# Patient Record
Sex: MALE | Race: WHITE | NOT HISPANIC OR LATINO
[De-identification: names, ages, dates, MRNs, and addresses within clinical notes are randomized per-mention and may not be internally consistent; named-entity substitution may affect disease eponyms.]

---

## 2017-01-01 ENCOUNTER — NON-APPOINTMENT (OUTPATIENT)
Age: 62
End: 2017-01-01

## 2017-01-05 ENCOUNTER — RESULT REVIEW (OUTPATIENT)
Age: 62
End: 2017-01-05

## 2017-02-03 ENCOUNTER — MEDICATION RENEWAL (OUTPATIENT)
Age: 62
End: 2017-02-03

## 2017-02-14 ENCOUNTER — MEDICATION RENEWAL (OUTPATIENT)
Age: 62
End: 2017-02-14

## 2017-03-07 ENCOUNTER — MEDICATION RENEWAL (OUTPATIENT)
Age: 62
End: 2017-03-07

## 2017-03-22 ENCOUNTER — RX RENEWAL (OUTPATIENT)
Age: 62
End: 2017-03-22

## 2017-05-01 ENCOUNTER — RX RENEWAL (OUTPATIENT)
Age: 62
End: 2017-05-01

## 2017-05-09 ENCOUNTER — RX RENEWAL (OUTPATIENT)
Age: 62
End: 2017-05-09

## 2017-05-12 ENCOUNTER — RX RENEWAL (OUTPATIENT)
Age: 62
End: 2017-05-12

## 2017-05-18 ENCOUNTER — MEDICATION RENEWAL (OUTPATIENT)
Age: 62
End: 2017-05-18

## 2017-05-22 ENCOUNTER — RX RENEWAL (OUTPATIENT)
Age: 62
End: 2017-05-22

## 2017-05-23 ENCOUNTER — MEDICATION RENEWAL (OUTPATIENT)
Age: 62
End: 2017-05-23

## 2017-05-23 ENCOUNTER — RX RENEWAL (OUTPATIENT)
Age: 62
End: 2017-05-23

## 2017-05-30 ENCOUNTER — RX RENEWAL (OUTPATIENT)
Age: 62
End: 2017-05-30

## 2017-06-08 ENCOUNTER — APPOINTMENT (OUTPATIENT)
Dept: CARDIOLOGY | Facility: CLINIC | Age: 62
End: 2017-06-08

## 2017-06-12 ENCOUNTER — RX RENEWAL (OUTPATIENT)
Age: 62
End: 2017-06-12

## 2017-06-15 ENCOUNTER — APPOINTMENT (OUTPATIENT)
Dept: CARDIOLOGY | Facility: CLINIC | Age: 62
End: 2017-06-15

## 2017-08-01 ENCOUNTER — RX RENEWAL (OUTPATIENT)
Age: 62
End: 2017-08-01

## 2017-08-02 ENCOUNTER — RX RENEWAL (OUTPATIENT)
Age: 62
End: 2017-08-02

## 2017-08-03 ENCOUNTER — RX RENEWAL (OUTPATIENT)
Age: 62
End: 2017-08-03

## 2017-08-04 ENCOUNTER — RX RENEWAL (OUTPATIENT)
Age: 62
End: 2017-08-04

## 2017-08-08 ENCOUNTER — NON-APPOINTMENT (OUTPATIENT)
Age: 62
End: 2017-08-08

## 2017-08-08 ENCOUNTER — APPOINTMENT (OUTPATIENT)
Dept: CARDIOLOGY | Facility: CLINIC | Age: 62
End: 2017-08-08
Payer: COMMERCIAL

## 2017-08-08 VITALS
BODY MASS INDEX: 26.74 KG/M2 | SYSTOLIC BLOOD PRESSURE: 133 MMHG | HEART RATE: 57 BPM | HEIGHT: 71 IN | WEIGHT: 191 LBS | DIASTOLIC BLOOD PRESSURE: 75 MMHG | OXYGEN SATURATION: 98 %

## 2017-08-08 DIAGNOSIS — Z92.29 PERSONAL HISTORY OF OTHER DRUG THERAPY: ICD-10-CM

## 2017-08-08 PROCEDURE — 93000 ELECTROCARDIOGRAM COMPLETE: CPT

## 2017-08-08 PROCEDURE — 36415 COLL VENOUS BLD VENIPUNCTURE: CPT

## 2017-08-08 PROCEDURE — 99215 OFFICE O/P EST HI 40 MIN: CPT | Mod: 25

## 2017-08-09 LAB
ALBUMIN SERPL ELPH-MCNC: 4.7 G/DL
ALP BLD-CCNC: 60 U/L
ALT SERPL-CCNC: 22 U/L
ANION GAP SERPL CALC-SCNC: 18 MMOL/L
AST SERPL-CCNC: 14 U/L
BILIRUB SERPL-MCNC: 1.1 MG/DL
BUN SERPL-MCNC: 32 MG/DL
CALCIUM SERPL-MCNC: 10.2 MG/DL
CHLORIDE SERPL-SCNC: 100 MMOL/L
CHOLEST SERPL-MCNC: 161 MG/DL
CHOLEST/HDLC SERPL: 3.4 RATIO
CO2 SERPL-SCNC: 19 MMOL/L
CREAT SERPL-MCNC: 1.16 MG/DL
GLUCOSE SERPL-MCNC: 250 MG/DL
HBA1C MFR BLD HPLC: 9.9 %
HDLC SERPL-MCNC: 48 MG/DL
LDLC SERPL CALC-MCNC: 86 MG/DL
POTASSIUM SERPL-SCNC: 4.8 MMOL/L
PROT SERPL-MCNC: 7.1 G/DL
SODIUM SERPL-SCNC: 137 MMOL/L
TRIGL SERPL-MCNC: 137 MG/DL

## 2017-08-14 ENCOUNTER — RX RENEWAL (OUTPATIENT)
Age: 62
End: 2017-08-14

## 2017-09-06 ENCOUNTER — APPOINTMENT (OUTPATIENT)
Dept: DERMATOLOGY | Facility: CLINIC | Age: 62
End: 2017-09-06
Payer: COMMERCIAL

## 2017-09-06 VITALS — BODY MASS INDEX: 25.9 KG/M2 | WEIGHT: 185 LBS | HEIGHT: 71 IN

## 2017-09-06 DIAGNOSIS — I78.1 NEVUS, NON-NEOPLASTIC: ICD-10-CM

## 2017-09-06 DIAGNOSIS — D22.9 MELANOCYTIC NEVI, UNSPECIFIED: ICD-10-CM

## 2017-09-06 PROCEDURE — 11100 BX SKIN SUBCUTANEOUS&/MUCOUS MEMBRANE 1 LESION: CPT

## 2017-09-06 PROCEDURE — 99203 OFFICE O/P NEW LOW 30 MIN: CPT | Mod: 25

## 2017-09-11 ENCOUNTER — RX RENEWAL (OUTPATIENT)
Age: 62
End: 2017-09-11

## 2017-09-11 LAB — CORE LAB BIOPSY: NORMAL

## 2017-09-12 ENCOUNTER — MEDICATION RENEWAL (OUTPATIENT)
Age: 62
End: 2017-09-12

## 2017-09-25 ENCOUNTER — RX RENEWAL (OUTPATIENT)
Age: 62
End: 2017-09-25

## 2017-10-12 ENCOUNTER — APPOINTMENT (OUTPATIENT)
Dept: DERMATOLOGY | Facility: CLINIC | Age: 62
End: 2017-10-12
Payer: COMMERCIAL

## 2017-10-12 DIAGNOSIS — C44.310 BASAL CELL CARCINOMA OF SKIN OF UNSPECIFIED PARTS OF FACE: ICD-10-CM

## 2017-10-12 PROCEDURE — 17282 DSTR MAL LS F/E/E/N/L/M1.1-2: CPT

## 2017-11-06 ENCOUNTER — RX RENEWAL (OUTPATIENT)
Age: 62
End: 2017-11-06

## 2017-12-06 ENCOUNTER — RX RENEWAL (OUTPATIENT)
Age: 62
End: 2017-12-06

## 2017-12-08 ENCOUNTER — RX RENEWAL (OUTPATIENT)
Age: 62
End: 2017-12-08

## 2018-01-12 ENCOUNTER — RX RENEWAL (OUTPATIENT)
Age: 63
End: 2018-01-12

## 2018-01-25 ENCOUNTER — APPOINTMENT (OUTPATIENT)
Dept: CARDIOLOGY | Facility: CLINIC | Age: 63
End: 2018-01-25

## 2018-02-03 ENCOUNTER — RX RENEWAL (OUTPATIENT)
Age: 63
End: 2018-02-03

## 2018-02-07 ENCOUNTER — RX RENEWAL (OUTPATIENT)
Age: 63
End: 2018-02-07

## 2018-02-08 ENCOUNTER — RX RENEWAL (OUTPATIENT)
Age: 63
End: 2018-02-08

## 2018-02-09 ENCOUNTER — RX RENEWAL (OUTPATIENT)
Age: 63
End: 2018-02-09

## 2018-02-12 ENCOUNTER — RX RENEWAL (OUTPATIENT)
Age: 63
End: 2018-02-12

## 2018-02-12 ENCOUNTER — MEDICATION RENEWAL (OUTPATIENT)
Age: 63
End: 2018-02-12

## 2018-02-22 ENCOUNTER — NON-APPOINTMENT (OUTPATIENT)
Age: 63
End: 2018-02-22

## 2018-02-22 ENCOUNTER — APPOINTMENT (OUTPATIENT)
Dept: CARDIOLOGY | Facility: CLINIC | Age: 63
End: 2018-02-22
Payer: COMMERCIAL

## 2018-02-22 VITALS — SYSTOLIC BLOOD PRESSURE: 146 MMHG | DIASTOLIC BLOOD PRESSURE: 78 MMHG

## 2018-02-22 VITALS
WEIGHT: 194 LBS | HEIGHT: 71 IN | SYSTOLIC BLOOD PRESSURE: 175 MMHG | BODY MASS INDEX: 27.16 KG/M2 | DIASTOLIC BLOOD PRESSURE: 91 MMHG | OXYGEN SATURATION: 97 % | HEART RATE: 70 BPM

## 2018-02-22 PROCEDURE — 99215 OFFICE O/P EST HI 40 MIN: CPT | Mod: 25

## 2018-02-22 PROCEDURE — 93000 ELECTROCARDIOGRAM COMPLETE: CPT

## 2018-02-26 ENCOUNTER — RX RENEWAL (OUTPATIENT)
Age: 63
End: 2018-02-26

## 2018-02-27 ENCOUNTER — MEDICATION RENEWAL (OUTPATIENT)
Age: 63
End: 2018-02-27

## 2018-03-22 ENCOUNTER — RX RENEWAL (OUTPATIENT)
Age: 63
End: 2018-03-22

## 2018-04-16 ENCOUNTER — RX RENEWAL (OUTPATIENT)
Age: 63
End: 2018-04-16

## 2018-05-09 ENCOUNTER — RX RENEWAL (OUTPATIENT)
Age: 63
End: 2018-05-09

## 2018-05-18 ENCOUNTER — RX RENEWAL (OUTPATIENT)
Age: 63
End: 2018-05-18

## 2018-05-31 ENCOUNTER — APPOINTMENT (OUTPATIENT)
Dept: CARDIOLOGY | Facility: CLINIC | Age: 63
End: 2018-05-31

## 2018-06-26 ENCOUNTER — RX RENEWAL (OUTPATIENT)
Age: 63
End: 2018-06-26

## 2018-06-28 ENCOUNTER — RX RENEWAL (OUTPATIENT)
Age: 63
End: 2018-06-28

## 2018-07-09 ENCOUNTER — RX RENEWAL (OUTPATIENT)
Age: 63
End: 2018-07-09

## 2018-07-12 ENCOUNTER — MEDICATION RENEWAL (OUTPATIENT)
Age: 63
End: 2018-07-12

## 2018-07-25 PROBLEM — C44.310 BASAL CELL CARCINOMA OF FACE: Status: ACTIVE | Noted: 2017-10-12

## 2018-09-24 ENCOUNTER — RX RENEWAL (OUTPATIENT)
Age: 63
End: 2018-09-24

## 2018-09-25 ENCOUNTER — MEDICATION RENEWAL (OUTPATIENT)
Age: 63
End: 2018-09-25

## 2018-09-28 ENCOUNTER — RX RENEWAL (OUTPATIENT)
Age: 63
End: 2018-09-28

## 2018-10-09 ENCOUNTER — CLINICAL ADVICE (OUTPATIENT)
Age: 63
End: 2018-10-09

## 2018-10-29 ENCOUNTER — RX RENEWAL (OUTPATIENT)
Age: 63
End: 2018-10-29

## 2018-10-30 ENCOUNTER — MEDICATION RENEWAL (OUTPATIENT)
Age: 63
End: 2018-10-30

## 2018-11-20 ENCOUNTER — RX RENEWAL (OUTPATIENT)
Age: 63
End: 2018-11-20

## 2018-12-26 ENCOUNTER — RX RENEWAL (OUTPATIENT)
Age: 63
End: 2018-12-26

## 2018-12-31 ENCOUNTER — RX RENEWAL (OUTPATIENT)
Age: 63
End: 2018-12-31

## 2019-04-05 ENCOUNTER — RX RENEWAL (OUTPATIENT)
Age: 64
End: 2019-04-05

## 2019-06-20 ENCOUNTER — RX RENEWAL (OUTPATIENT)
Age: 64
End: 2019-06-20

## 2019-08-13 ENCOUNTER — RX RENEWAL (OUTPATIENT)
Age: 64
End: 2019-08-13

## 2019-09-10 ENCOUNTER — RX RENEWAL (OUTPATIENT)
Age: 64
End: 2019-09-10

## 2020-01-21 ENCOUNTER — APPOINTMENT (OUTPATIENT)
Dept: CARDIOLOGY | Facility: CLINIC | Age: 65
End: 2020-01-21
Payer: COMMERCIAL

## 2020-01-21 ENCOUNTER — NON-APPOINTMENT (OUTPATIENT)
Age: 65
End: 2020-01-21

## 2020-01-21 VITALS
WEIGHT: 179 LBS | SYSTOLIC BLOOD PRESSURE: 161 MMHG | BODY MASS INDEX: 25.06 KG/M2 | DIASTOLIC BLOOD PRESSURE: 93 MMHG | HEART RATE: 69 BPM | HEIGHT: 71 IN | OXYGEN SATURATION: 100 %

## 2020-01-21 PROCEDURE — 99215 OFFICE O/P EST HI 40 MIN: CPT

## 2020-01-21 PROCEDURE — 93000 ELECTROCARDIOGRAM COMPLETE: CPT

## 2020-01-21 NOTE — HISTORY OF PRESENT ILLNESS
[FreeTextEntry1] : Jeffrey Cardenas is a poorly compliant 64 year old man with multiple medical problems, including coronary artery disease, hyperlipidemia, hypertension, diabetes mellitus, peripheral artery disease, and skin cancer (basal cell carcinoma of face s/p excision in October 2017), who returns for routine examination after a nearly two-year absence -- he describes a single visit with Dr. Oliveira since I last saw him but otherwise has had no medical attention.  He says that he feels well -- angina only in the setting of strenuous activities (not with activities of daily living).  He says that he has been compliant with prescribed medications but ran out of amlodipine.\par

## 2020-01-21 NOTE — DISCUSSION/SUMMARY
[___ Week(s)] : [unfilled] week(s) [With Me] : with me [FreeTextEntry1] : \par Hypertension:  Suboptimal control - running at amlodipine early this month certainly is contributing; continue present antihypertensive regimen (with renewal of amlodipine) and reassess in 4-6 weeks with plans to titrate medications at that time as appropriate.\par \par Diabetes mellitus: I suspect continued poor control - doesn't seem willing to return to his previous endocrinologist but is amenable to establishing care with a new primary care physician; check hemoglobin A1c.  I also recommend ophthalmology and podiatry evaluations.\par \par Coronary artery disease: Multivessel CAD; continue aspirin, atorvastatin, carvedilol, and Ranexa; I told Mr. Cardenas that I would like to perform an ischemia evaluation later this year once his medical therapy is better optimized.\par \par Hypercholesterolemia: Tolerating atorvastatin. Check lipid panel.

## 2020-01-21 NOTE — REVIEW OF SYSTEMS
[Feeling Fatigued] : feeling fatigued [Recent Weight Loss (___ Lbs)] : recent [unfilled] ~Ulb weight loss [Dyspnea on exertion] : not dyspnea during exertion [Chest  Pressure] : chest pressure [Lower Ext Edema] : no extremity edema [Chest Pain] : no chest pain [Palpitations] : no palpitations [Cough] : no cough [Dizziness] : no dizziness [Joint Pain] : joint pain [Abdominal Pain] : no abdominal pain [Anxiety] : anxiety [see HPI] : see HPI [Under Stress] : under stress [Easy Bleeding] : no tendency for easy bleeding

## 2020-01-21 NOTE — PHYSICAL EXAM
[General Appearance - In No Acute Distress] : no acute distress [Well Groomed] : well groomed [No Oral Cyanosis] : no oral cyanosis [No Oral Pallor] : no oral pallor [] : no respiratory distress [FreeTextEntry1] : No JVD [Auscultation Breath Sounds / Voice Sounds] : lungs were clear to auscultation bilaterally [Respiration, Rhythm And Depth] : normal respiratory rhythm and effort [Heart Rate And Rhythm] : heart rate and rhythm were normal [Heart Sounds] : normal S1 and S2 [Murmurs] : no murmurs present [Abdomen Tenderness] : non-tender [Bowel Sounds] : normal bowel sounds [Abdomen Soft] : soft [Abnormal Walk] : normal gait [Nail Clubbing] : no clubbing of the fingernails [Cyanosis, Localized] : no localized cyanosis [Impaired Insight] : insight and judgment were intact [Oriented To Time, Place, And Person] : oriented to person, place, and time [Mood] : the mood was normal [Affect] : the affect was normal

## 2020-01-22 LAB
ALBUMIN SERPL ELPH-MCNC: 4.7 G/DL
ALP BLD-CCNC: 60 U/L
ALT SERPL-CCNC: 27 U/L
ANION GAP SERPL CALC-SCNC: 13 MMOL/L
AST SERPL-CCNC: 16 U/L
BASOPHILS # BLD AUTO: 0.04 K/UL
BASOPHILS NFR BLD AUTO: 0.7 %
BILIRUB SERPL-MCNC: 0.8 MG/DL
BUN SERPL-MCNC: 30 MG/DL
CALCIUM SERPL-MCNC: 10.2 MG/DL
CHLORIDE SERPL-SCNC: 94 MMOL/L
CHOLEST SERPL-MCNC: 194 MG/DL
CHOLEST/HDLC SERPL: 4.8 RATIO
CO2 SERPL-SCNC: 25 MMOL/L
CREAT SERPL-MCNC: 1.07 MG/DL
EOSINOPHIL # BLD AUTO: 0.07 K/UL
EOSINOPHIL NFR BLD AUTO: 1.3 %
ESTIMATED AVERAGE GLUCOSE: 303 MG/DL
GLUCOSE SERPL-MCNC: 349 MG/DL
HBA1C MFR BLD HPLC: 12.2 %
HCT VFR BLD CALC: 43 %
HDLC SERPL-MCNC: 41 MG/DL
HGB BLD-MCNC: 15.1 G/DL
IMM GRANULOCYTES NFR BLD AUTO: 0.4 %
LDLC SERPL CALC-MCNC: 117 MG/DL
LYMPHOCYTES # BLD AUTO: 1.48 K/UL
LYMPHOCYTES NFR BLD AUTO: 27.7 %
MAN DIFF?: NORMAL
MCHC RBC-ENTMCNC: 30 PG
MCHC RBC-ENTMCNC: 35.1 GM/DL
MCV RBC AUTO: 85.3 FL
MONOCYTES # BLD AUTO: 0.48 K/UL
MONOCYTES NFR BLD AUTO: 9 %
NEUTROPHILS # BLD AUTO: 3.26 K/UL
NEUTROPHILS NFR BLD AUTO: 60.9 %
PLATELET # BLD AUTO: 205 K/UL
POTASSIUM SERPL-SCNC: 4.6 MMOL/L
PROT SERPL-MCNC: 7 G/DL
PSA SERPL-MCNC: 6.34 NG/ML
RBC # BLD: 5.04 M/UL
RBC # FLD: 13.2 %
SODIUM SERPL-SCNC: 132 MMOL/L
TRIGL SERPL-MCNC: 183 MG/DL
WBC # FLD AUTO: 5.35 K/UL

## 2020-01-30 ENCOUNTER — APPOINTMENT (OUTPATIENT)
Dept: INTERNAL MEDICINE | Facility: CLINIC | Age: 65
End: 2020-01-30
Payer: COMMERCIAL

## 2020-01-30 ENCOUNTER — RESULT CHARGE (OUTPATIENT)
Age: 65
End: 2020-01-30

## 2020-01-30 VITALS
HEIGHT: 71 IN | SYSTOLIC BLOOD PRESSURE: 136 MMHG | WEIGHT: 178 LBS | RESPIRATION RATE: 14 BRPM | BODY MASS INDEX: 24.92 KG/M2 | TEMPERATURE: 97.8 F | OXYGEN SATURATION: 97 % | HEART RATE: 76 BPM | DIASTOLIC BLOOD PRESSURE: 80 MMHG

## 2020-01-30 LAB — GLUCOSE BLDC GLUCOMTR-MCNC: 367

## 2020-01-30 PROCEDURE — 82947 ASSAY GLUCOSE BLOOD QUANT: CPT | Mod: QW

## 2020-01-30 PROCEDURE — 99204 OFFICE O/P NEW MOD 45 MIN: CPT | Mod: 25

## 2020-01-30 NOTE — PLAN
[FreeTextEntry1] : Add Humalog 5 units SC TID with each meal\par Continue Lantus 42 units SC QHS\par Continue Metformin 1000mg BID\par Follow strict diabetic diet.\par See Dr. Dixon, Endocrinology, ASAP\par RTO 2 weeks\par Pt will start monitoring glucose level with Whit device\par Pt to see Urology, Dr. Pagan, regarding  elevated PSA\par \par

## 2020-01-30 NOTE — ASSESSMENT
[FreeTextEntry1] : Poorly controlled type 2 DM\par Pt admits dietary indiscretion and lack of exercise\par

## 2020-01-30 NOTE — PHYSICAL EXAM
[No Acute Distress] : no acute distress [Well Developed] : well developed [Well Nourished] : well nourished [Well-Appearing] : well-appearing [PERRL] : pupils equal round and reactive to light [Normal Sclera/Conjunctiva] : normal sclera/conjunctiva [Normal Outer Ear/Nose] : the outer ears and nose were normal in appearance [EOMI] : extraocular movements intact [Normal Oropharynx] : the oropharynx was normal [No Lymphadenopathy] : no lymphadenopathy [No JVD] : no jugular venous distention [Supple] : supple [Thyroid Normal, No Nodules] : the thyroid was normal and there were no nodules present [No Respiratory Distress] : no respiratory distress  [No Accessory Muscle Use] : no accessory muscle use [Clear to Auscultation] : lungs were clear to auscultation bilaterally [Normal Rate] : normal rate  [Normal S1, S2] : normal S1 and S2 [Regular Rhythm] : with a regular rhythm [No Murmur] : no murmur heard [No Carotid Bruits] : no carotid bruits [No Abdominal Bruit] : a ~M bruit was not heard ~T in the abdomen [Pedal Pulses Present] : the pedal pulses are present [No Varicosities] : no varicosities [No Edema] : there was no peripheral edema [No Extremity Clubbing/Cyanosis] : no extremity clubbing/cyanosis [No Palpable Aorta] : no palpable aorta [Soft] : abdomen soft [Non Tender] : non-tender [Non-distended] : non-distended [No HSM] : no HSM [No Masses] : no abdominal mass palpated [Normal Bowel Sounds] : normal bowel sounds [Normal Posterior Cervical Nodes] : no posterior cervical lymphadenopathy [Normal Anterior Cervical Nodes] : no anterior cervical lymphadenopathy [No Spinal Tenderness] : no spinal tenderness [No CVA Tenderness] : no CVA  tenderness [Grossly Normal Strength/Tone] : grossly normal strength/tone [No Joint Swelling] : no joint swelling [No Rash] : no rash [No Focal Deficits] : no focal deficits [Coordination Grossly Intact] : coordination grossly intact [Normal Affect] : the affect was normal [Normal Gait] : normal gait [Deep Tendon Reflexes (DTR)] : deep tendon reflexes were 2+ and symmetric [Normal Insight/Judgement] : insight and judgment were intact

## 2020-01-30 NOTE — HISTORY OF PRESENT ILLNESS
[FreeTextEntry8] : Pt is here regarding poorly controlled Type 2 DM. His hemoglobin A1C is elevated at 12.2. He admits to eating chocolate daily. He eats a good deal of processed carbohydrates. Pt says that he alcohol only once per week.\par Pt doesn't exercise, but is physically active at work.\par

## 2020-02-05 ENCOUNTER — APPOINTMENT (OUTPATIENT)
Dept: ENDOCRINOLOGY | Facility: CLINIC | Age: 65
End: 2020-02-05
Payer: COMMERCIAL

## 2020-02-05 ENCOUNTER — LABORATORY RESULT (OUTPATIENT)
Age: 65
End: 2020-02-05

## 2020-02-05 VITALS
DIASTOLIC BLOOD PRESSURE: 74 MMHG | WEIGHT: 182 LBS | BODY MASS INDEX: 25.48 KG/M2 | HEIGHT: 71 IN | SYSTOLIC BLOOD PRESSURE: 143 MMHG | OXYGEN SATURATION: 98 % | HEART RATE: 70 BPM

## 2020-02-05 DIAGNOSIS — Z87.891 PERSONAL HISTORY OF NICOTINE DEPENDENCE: ICD-10-CM

## 2020-02-05 LAB — GLUCOSE BLDC GLUCOMTR-MCNC: 175

## 2020-02-05 PROCEDURE — 82962 GLUCOSE BLOOD TEST: CPT

## 2020-02-05 PROCEDURE — 99205 OFFICE O/P NEW HI 60 MIN: CPT | Mod: 25

## 2020-02-05 RX ORDER — INSULIN LISPRO 100 [IU]/ML
100 INJECTION, SOLUTION INTRAVENOUS; SUBCUTANEOUS
Qty: 1 | Refills: 1 | Status: DISCONTINUED | COMMUNITY
Start: 2020-01-30 | End: 2020-02-05

## 2020-02-05 RX ORDER — INSULIN ASPART 100 [IU]/ML
100 INJECTION, SOLUTION INTRAVENOUS; SUBCUTANEOUS
Qty: 1 | Refills: 1 | Status: DISCONTINUED | COMMUNITY
Start: 2020-02-01 | End: 2020-02-05

## 2020-02-05 NOTE — PHYSICAL EXAM
[Alert] : alert [No Acute Distress] : no acute distress [Well Nourished] : well nourished [Well Developed] : well developed [Normal Sclera/Conjunctiva] : normal sclera/conjunctiva [No Proptosis] : no proptosis [EOMI] : extra ocular movement intact [Thyroid Not Enlarged] : the thyroid was not enlarged [Normal Oropharynx] : the oropharynx was normal [No Thyroid Nodules] : there were no palpable thyroid nodules [No Respiratory Distress] : no respiratory distress [No Accessory Muscle Use] : no accessory muscle use [Clear to Auscultation] : lungs were clear to auscultation bilaterally [Normal Rate] : heart rate was normal  [Normal S1, S2] : normal S1 and S2 [Regular Rhythm] : with a regular rhythm [Pedal Pulses Normal] : the pedal pulses are present [Normal Bowel Sounds] : normal bowel sounds [No Edema] : there was no peripheral edema [Not Tender] : non-tender [Soft] : abdomen soft [Not Distended] : not distended [Post Cervical Nodes] : posterior cervical nodes [Anterior Cervical Nodes] : anterior cervical nodes [Axillary Nodes] : axillary nodes [Spine Straight] : spine straight [No Spinal Tenderness] : no spinal tenderness [No Stigmata of Cushings Syndrome] : no stigmata of cushings syndrome [Normal Gait] : normal gait [Normal Strength/Tone] : muscle strength and tone were normal [No Rash] : no rash [Normal Reflexes] : deep tendon reflexes were 2+ and symmetric [No Tremors] : no tremors [Oriented x3] : oriented to person, place, and time [Right Foot Was Examined] : right foot ~C was examined [Left Foot Was Examined] : left foot ~C was examined [1+] : 1+ in the posterior tibialis [Normal] : normal [0] : 0 in the dorsalis pedis [Position Sense Dec.] : diminished position sense at the level of the toes [Diminished Throughout Both Feet] : diminished tactile sensation with monofilament testing throughout both feet [#1 Diminished] : number 1 was diminished [#2 Diminished] : number 2 was diminished [#4 Diminished] : number 4 was diminished [#3 Diminished] : number 3 was diminished [#5 Diminished] : number 5 was diminished [#6 Diminished] : number 6 was diminished [FreeTextEntry6] : 2nd toe amputated [Acanthosis Nigricans] : no acanthosis nigricans

## 2020-02-05 NOTE — REVIEW OF SYSTEMS
[Fatigue] : fatigue [Negative] : Heme/Lymph [Pain/Numbness of Digits] : pain/numbness of digits [Polyuria] : polyuria

## 2020-02-05 NOTE — ASSESSMENT
[Carbohydrate Consistent Diet] : carbohydrate consistent diet [Hypoglycemia Management] : hypoglycemia management [Diabetes Foot Care] : diabetes foot care [Long Term Vascular Complications] : long term vascular complications of diabetes [Action and use of Insulin] : action and use of short and long-acting insulin [Importance of Diet and Exercise] : importance of diet and exercise to improve glycemic control, achieve weight loss and improve cardiovascular health [Insulin Self-Administration] : insulin self-administration [Self Monitoring of Blood Glucose] : self monitoring of blood glucose [Injection Technique, Storage, Sharps Disposal] : injection technique, storage, and sharps disposal [Retinopathy Screening] : Patient was referred to ophthalmology for retinopathy screening [FreeTextEntry1] : 1. DM 2- uncontrolled, uncomplicated\par Patient with cardiac history and toe amputation with uncontrolled diabetes. I addressed how important it is for him to get the glycemic control better. At this point, he might end end up with CABG and other diabetic complications. Patient has not been paying attention to his blood sugars but his regimen was only treating mostly his basal requirements. He needs prandial treatment as well and insulin is the best option right now given his glucose toxicity. Patient's diet and the necessary changes discussed. Nutrition guides provided\par Freestyle wilfredo device taught and placed. He is recommended to check 5-6 times per day.\par Will check microalbumin/cre\par Cont Lantus 42 units QHS, Start Novolog 5 units TID w/ meals\par Cont Metformin 1000 mg BID\par Cont diabetic diet\par Cont exercise\par will check microalbumin/cre\par Concerned about his PAD\par Will refer to podiatry and vascular\par Also rec to see optho\par \par 2. HLD- elevated\par Encouraged statin compliance\par \par Follow up in 2 weeks\par \par \par

## 2020-02-05 NOTE — CONSULT LETTER
[Dear  ___] : Dear  [unfilled], [Consult Letter:] : I had the pleasure of evaluating your patient, [unfilled]. [Please see my note below.] : Please see my note below. [Consult Closing:] : Thank you very much for allowing me to participate in the care of this patient.  If you have any questions, please do not hesitate to contact me. [Sincerely,] : Sincerely, [FreeTextEntry3] : Emani Dixon MS. DO.\par Endocrinology\par [address] [place] [address]\[place] [address]\par Tel [phone]\par Fax [phone]\par

## 2020-02-05 NOTE — HISTORY OF PRESENT ILLNESS
[FreeTextEntry1] : Mr. BROWN HEATON  is a 64 year old male with past medical history of Diabetes Mellitus Type 2, HLD, CAD, HTN, PAD  who presents for management of his diabetes. Patient has a known history of non-compliance. His diabetes has been under very poor control. There is immense concern given his history CAD and PAD already. Patient has history of L toe amputation. He has neuropathy of his feet and is due to see podiatry. Patient denies any history of diabetic retinopathy or nephropathy . He denies any blurry vision and polydipsia. Patient brought in the freestyle wilfredo device.\par \par \par POCT Glucose: 175 mg/dL, fasting\par \par Diabetes first diagnosed: Over 7 years\par Type: 2\par \par Current diabetic regimen:\par Lantus 42 units QHS\par \par Other relevant medications:\par Norvasc\par Coreg\par Lipitor\par Losartan\par \par Pt does not check his blood sugars\par \par Hypoglycemia: none\par \par \par Diet:\par Breakfast: cereal, or eggs\par Lunch: deli sandwiches sometimes skip\par Dinner:deli sandwiches, chicken \par \par Exercise: None\par \par Urine micro: NA\par lipid profile:  1/21/20\par last hba1c: 12.2% 1/21/20, 9.9% 8/2017, 10.6% 2/2015\par eye exam: 2 years ago\par diabetic foot exam/podiatry: NA\par \par \par \par \par

## 2020-02-06 ENCOUNTER — APPOINTMENT (OUTPATIENT)
Dept: UROLOGY | Facility: CLINIC | Age: 65
End: 2020-02-06
Payer: COMMERCIAL

## 2020-02-06 VITALS
OXYGEN SATURATION: 97 % | SYSTOLIC BLOOD PRESSURE: 162 MMHG | HEART RATE: 63 BPM | BODY MASS INDEX: 24.92 KG/M2 | WEIGHT: 178 LBS | HEIGHT: 71 IN | DIASTOLIC BLOOD PRESSURE: 80 MMHG

## 2020-02-06 PROCEDURE — 99202 OFFICE O/P NEW SF 15 MIN: CPT

## 2020-02-08 ENCOUNTER — INPATIENT (INPATIENT)
Facility: HOSPITAL | Age: 65
LOS: 0 days | Discharge: HOME | DRG: 420 | End: 2020-02-09
Attending: INTERNAL MEDICINE | Admitting: INTERNAL MEDICINE
Payer: COMMERCIAL

## 2020-02-08 VITALS — WEIGHT: 175.05 LBS | HEIGHT: 67 IN

## 2020-02-08 DIAGNOSIS — T68.XXXA HYPOTHERMIA, INITIAL ENCOUNTER: ICD-10-CM

## 2020-02-08 LAB
ALBUMIN SERPL ELPH-MCNC: 4.6 G/DL — SIGNIFICANT CHANGE UP (ref 3.3–5)
ALP SERPL-CCNC: 54 U/L — SIGNIFICANT CHANGE UP (ref 40–120)
ALT FLD-CCNC: 37 U/L — SIGNIFICANT CHANGE UP (ref 12–78)
ANION GAP SERPL CALC-SCNC: 8 MMOL/L — SIGNIFICANT CHANGE UP (ref 5–17)
APTT BLD: 34.6 SEC — SIGNIFICANT CHANGE UP (ref 27.5–36.3)
AST SERPL-CCNC: 22 U/L — SIGNIFICANT CHANGE UP (ref 15–37)
BASOPHILS # BLD AUTO: 0.04 K/UL — SIGNIFICANT CHANGE UP (ref 0–0.2)
BASOPHILS NFR BLD AUTO: 0.5 % — SIGNIFICANT CHANGE UP (ref 0–2)
BILIRUB SERPL-MCNC: 0.7 MG/DL — SIGNIFICANT CHANGE UP (ref 0.2–1.2)
BUN SERPL-MCNC: 34 MG/DL — HIGH (ref 7–23)
CALCIUM SERPL-MCNC: 9.8 MG/DL — SIGNIFICANT CHANGE UP (ref 8.5–10.1)
CHLORIDE SERPL-SCNC: 102 MMOL/L — SIGNIFICANT CHANGE UP (ref 96–108)
CK SERPL-CCNC: 198 U/L — SIGNIFICANT CHANGE UP (ref 26–308)
CO2 SERPL-SCNC: 27 MMOL/L — SIGNIFICANT CHANGE UP (ref 22–31)
CREAT SERPL-MCNC: 1.17 MG/DL — SIGNIFICANT CHANGE UP (ref 0.5–1.3)
EOSINOPHIL # BLD AUTO: 0.04 K/UL — SIGNIFICANT CHANGE UP (ref 0–0.5)
EOSINOPHIL NFR BLD AUTO: 0.5 % — SIGNIFICANT CHANGE UP (ref 0–6)
GLUCOSE SERPL-MCNC: 236 MG/DL — HIGH (ref 70–99)
HCT VFR BLD CALC: 46 % — SIGNIFICANT CHANGE UP (ref 39–50)
HGB BLD-MCNC: 16.1 G/DL — SIGNIFICANT CHANGE UP (ref 13–17)
IMM GRANULOCYTES NFR BLD AUTO: 0.4 % — SIGNIFICANT CHANGE UP (ref 0–1.5)
INR BLD: 1.02 RATIO — SIGNIFICANT CHANGE UP (ref 0.88–1.16)
LACTATE SERPL-SCNC: 2.4 MMOL/L — HIGH (ref 0.7–2)
LYMPHOCYTES # BLD AUTO: 1.18 K/UL — SIGNIFICANT CHANGE UP (ref 1–3.3)
LYMPHOCYTES # BLD AUTO: 14.1 % — SIGNIFICANT CHANGE UP (ref 13–44)
MCHC RBC-ENTMCNC: 30.2 PG — SIGNIFICANT CHANGE UP (ref 27–34)
MCHC RBC-ENTMCNC: 35 GM/DL — SIGNIFICANT CHANGE UP (ref 32–36)
MCV RBC AUTO: 86.3 FL — SIGNIFICANT CHANGE UP (ref 80–100)
MONOCYTES # BLD AUTO: 0.36 K/UL — SIGNIFICANT CHANGE UP (ref 0–0.9)
MONOCYTES NFR BLD AUTO: 4.3 % — SIGNIFICANT CHANGE UP (ref 2–14)
NEUTROPHILS # BLD AUTO: 6.74 K/UL — SIGNIFICANT CHANGE UP (ref 1.8–7.4)
NEUTROPHILS NFR BLD AUTO: 80.2 % — HIGH (ref 43–77)
PLATELET # BLD AUTO: 215 K/UL — SIGNIFICANT CHANGE UP (ref 150–400)
POTASSIUM SERPL-MCNC: 4.3 MMOL/L — SIGNIFICANT CHANGE UP (ref 3.5–5.3)
POTASSIUM SERPL-SCNC: 4.3 MMOL/L — SIGNIFICANT CHANGE UP (ref 3.5–5.3)
PROT SERPL-MCNC: 7.8 GM/DL — SIGNIFICANT CHANGE UP (ref 6–8.3)
PROTHROM AB SERPL-ACNC: 11.3 SEC — SIGNIFICANT CHANGE UP (ref 10–12.9)
RBC # BLD: 5.33 M/UL — SIGNIFICANT CHANGE UP (ref 4.2–5.8)
RBC # FLD: 13.2 % — SIGNIFICANT CHANGE UP (ref 10.3–14.5)
SODIUM SERPL-SCNC: 137 MMOL/L — SIGNIFICANT CHANGE UP (ref 135–145)
TROPONIN I SERPL-MCNC: <0.015 NG/ML — SIGNIFICANT CHANGE UP (ref 0.01–0.04)
WBC # BLD: 8.39 K/UL — SIGNIFICANT CHANGE UP (ref 3.8–10.5)
WBC # FLD AUTO: 8.39 K/UL — SIGNIFICANT CHANGE UP (ref 3.8–10.5)

## 2020-02-08 PROCEDURE — 82962 GLUCOSE BLOOD TEST: CPT

## 2020-02-08 PROCEDURE — 71045 X-RAY EXAM CHEST 1 VIEW: CPT | Mod: 26

## 2020-02-08 PROCEDURE — 86803 HEPATITIS C AB TEST: CPT

## 2020-02-08 PROCEDURE — G0008: CPT

## 2020-02-08 PROCEDURE — 87486 CHLMYD PNEUM DNA AMP PROBE: CPT

## 2020-02-08 PROCEDURE — 99223 1ST HOSP IP/OBS HIGH 75: CPT

## 2020-02-08 PROCEDURE — 90686 IIV4 VACC NO PRSV 0.5 ML IM: CPT

## 2020-02-08 PROCEDURE — 87633 RESP VIRUS 12-25 TARGETS: CPT

## 2020-02-08 PROCEDURE — 81001 URINALYSIS AUTO W/SCOPE: CPT

## 2020-02-08 PROCEDURE — 87086 URINE CULTURE/COLONY COUNT: CPT

## 2020-02-08 PROCEDURE — 80053 COMPREHEN METABOLIC PANEL: CPT

## 2020-02-08 PROCEDURE — 83605 ASSAY OF LACTIC ACID: CPT

## 2020-02-08 PROCEDURE — 85025 COMPLETE CBC W/AUTO DIFF WBC: CPT

## 2020-02-08 PROCEDURE — 36415 COLL VENOUS BLD VENIPUNCTURE: CPT

## 2020-02-08 PROCEDURE — 87798 DETECT AGENT NOS DNA AMP: CPT

## 2020-02-08 PROCEDURE — 87581 M.PNEUMON DNA AMP PROBE: CPT

## 2020-02-08 PROCEDURE — 93010 ELECTROCARDIOGRAM REPORT: CPT

## 2020-02-08 PROCEDURE — 83036 HEMOGLOBIN GLYCOSYLATED A1C: CPT

## 2020-02-08 RX ORDER — SODIUM CHLORIDE 9 MG/ML
2500 INJECTION INTRAMUSCULAR; INTRAVENOUS; SUBCUTANEOUS ONCE
Refills: 0 | Status: COMPLETED | OUTPATIENT
Start: 2020-02-08 | End: 2020-02-08

## 2020-02-08 RX ADMIN — SODIUM CHLORIDE 2500 MILLILITER(S): 9 INJECTION INTRAMUSCULAR; INTRAVENOUS; SUBCUTANEOUS at 21:58

## 2020-02-08 NOTE — ED ADULT TRIAGE NOTE - CHIEF COMPLAINT QUOTE
patient presents to ed with chills, sweats, as per patient he is type 2 diabetic, checked his blood sugar at work at approximately 1430, sugar was 50, drank a coke and according to his insulin pump his sugar increased to 70. Pt has been seeing a new md and has had his changed his insulin to 5 units of nova log post meals. Pt sugar at triage is 177.

## 2020-02-08 NOTE — ED STATDOCS - PMH
BPH (Benign Prostatic Hyperplasia)    Bronchitis    Diabetes mellitus    Hypercholesteremia    Hypertension

## 2020-02-08 NOTE — ED PROVIDER NOTE - CLINICAL SUMMARY MEDICAL DECISION MAKING FREE TEXT BOX
Hypoglycemic episode now resolved.  Pt overcorrected by food/drink intake.  No DKA.  Hypothermic by rectal temp 92 degrees on arrival.  Lactic acid elevated slightly 2.4, however pt without signs or symptoms of infection, thus antibiotics held.  WBC normal.  UA pending.  CXR clear.  Pt still remains hypothermic to 93.7 degrees oral.  Plan for admission for continued hypothermia, in setting of transient hypoglycemia.

## 2020-02-08 NOTE — ED PROVIDER NOTE - NSCAREINITIATED _GEN_ER
Tissue Cultured Epidermal Autograft Text: The defect edges were debeveled with a #15 scalpel blade.  Given the location of the defect, shape of the defect and the proximity to free margins a tissue cultured epidermal autograft was deemed most appropriate.  The graft was then trimmed to fit the size of the defect.  The graft was then placed in the primary defect and oriented appropriately. Verna Jolley(Attending)

## 2020-02-08 NOTE — ED ADULT NURSE NOTE - OBJECTIVE STATEMENT
Patient presents to the ED stating that he was hypoglycemic at home; his at home glucometer measured 40 mg/dl; he was subsequently given coke, orange juice. pt states he only at a banana for lunch, then took his 5 units of lantus. pt states that he did not eat dinner; also took his 1000 of metformin this morning. Pt is awake and alert at this time; did not loser consciousness; pt came into ED with low temperature, temp orally and axillary was unable to be obtained; rectal temp 99.2. pt has no complaints at this time.

## 2020-02-08 NOTE — ED STATDOCS - PROGRESS NOTE DETAILS
Nidhi BOB for ED attending Dr. Jolley: 65 y/o male with a PMHx of DM, HLD, HTN presents to the ED c/o chills, sweats and hypoglycemia. Pt states he had a small lunch. Pt had an episode of feeling nauseous, dizzy and weak. Will send pt to main ED for further evaluation.

## 2020-02-08 NOTE — ED STATDOCS - CLINICAL SUMMARY MEDICAL DECISION MAKING FREE TEXT BOX
Evidence of symptomatic hypoglycemia at home, still feeling lethargic, EKG and labs will be checked in pain. Pt has normal mental status at this time. Will send pt to main ED for further evaluation.

## 2020-02-08 NOTE — ED PROVIDER NOTE - OBJECTIVE STATEMENT
63 y/o male with a PMHx of DM, HTN, HLD presents to the ED c/o episode of hypoglycemia today. Pt states he just started taking Novolog 2 days ago. Today he ate lunch and then took 5 units of Novolog at 2:30pm. Shortly after he felt dizzy and diaphoretic. His blood glucose monitor in his arm read 40. Pt drank some juice and ate some chocolate and began to feel better. On arrival to ED his blood glucose monitor read 80, however reading taken here was 177. Wife is concerned that his monitor may be inaccurate. Pt also hypothermic on arrival to 92.2. Pt denies recent fever, chills, n/v/d, cough, sore throat, abd pain. Pt notes his daughter recently had strep throat.

## 2020-02-09 ENCOUNTER — TRANSCRIPTION ENCOUNTER (OUTPATIENT)
Age: 65
End: 2020-02-09

## 2020-02-09 VITALS
OXYGEN SATURATION: 97 % | SYSTOLIC BLOOD PRESSURE: 106 MMHG | HEART RATE: 61 BPM | DIASTOLIC BLOOD PRESSURE: 57 MMHG | TEMPERATURE: 98 F

## 2020-02-09 LAB
ALBUMIN SERPL ELPH-MCNC: 3.3 G/DL — SIGNIFICANT CHANGE UP (ref 3.3–5)
ALP SERPL-CCNC: 41 U/L — SIGNIFICANT CHANGE UP (ref 40–120)
ALT FLD-CCNC: 28 U/L — SIGNIFICANT CHANGE UP (ref 12–78)
ANION GAP SERPL CALC-SCNC: 7 MMOL/L — SIGNIFICANT CHANGE UP (ref 5–17)
APPEARANCE UR: CLEAR — SIGNIFICANT CHANGE UP
AST SERPL-CCNC: 12 U/L — LOW (ref 15–37)
BASOPHILS # BLD AUTO: 0.02 K/UL — SIGNIFICANT CHANGE UP (ref 0–0.2)
BASOPHILS NFR BLD AUTO: 0.4 % — SIGNIFICANT CHANGE UP (ref 0–2)
BILIRUB SERPL-MCNC: 0.4 MG/DL — SIGNIFICANT CHANGE UP (ref 0.2–1.2)
BILIRUB UR-MCNC: NEGATIVE — SIGNIFICANT CHANGE UP
BUN SERPL-MCNC: 23 MG/DL — SIGNIFICANT CHANGE UP (ref 7–23)
CALCIUM SERPL-MCNC: 8.4 MG/DL — LOW (ref 8.5–10.1)
CHLORIDE SERPL-SCNC: 112 MMOL/L — HIGH (ref 96–108)
CO2 SERPL-SCNC: 23 MMOL/L — SIGNIFICANT CHANGE UP (ref 22–31)
COLOR SPEC: YELLOW — SIGNIFICANT CHANGE UP
CREAT SERPL-MCNC: 0.94 MG/DL — SIGNIFICANT CHANGE UP (ref 0.5–1.3)
DIFF PNL FLD: NEGATIVE — SIGNIFICANT CHANGE UP
EOSINOPHIL # BLD AUTO: 0.05 K/UL — SIGNIFICANT CHANGE UP (ref 0–0.5)
EOSINOPHIL NFR BLD AUTO: 1.1 % — SIGNIFICANT CHANGE UP (ref 0–6)
GLUCOSE SERPL-MCNC: 223 MG/DL — HIGH (ref 70–99)
GLUCOSE UR QL: 1000 MG/DL
HBA1C BLD-MCNC: 12.3 % — HIGH (ref 4–5.6)
HCT VFR BLD CALC: 36.6 % — LOW (ref 39–50)
HCV AB S/CO SERPL IA: 0.1 S/CO — SIGNIFICANT CHANGE UP (ref 0–0.99)
HCV AB SERPL-IMP: SIGNIFICANT CHANGE UP
HGB BLD-MCNC: 12.6 G/DL — LOW (ref 13–17)
IMM GRANULOCYTES NFR BLD AUTO: 0.2 % — SIGNIFICANT CHANGE UP (ref 0–1.5)
KETONES UR-MCNC: ABNORMAL
LACTATE SERPL-SCNC: 2.3 MMOL/L — HIGH (ref 0.7–2)
LEUKOCYTE ESTERASE UR-ACNC: NEGATIVE — SIGNIFICANT CHANGE UP
LYMPHOCYTES # BLD AUTO: 1.37 K/UL — SIGNIFICANT CHANGE UP (ref 1–3.3)
LYMPHOCYTES # BLD AUTO: 29.7 % — SIGNIFICANT CHANGE UP (ref 13–44)
MCHC RBC-ENTMCNC: 29.6 PG — SIGNIFICANT CHANGE UP (ref 27–34)
MCHC RBC-ENTMCNC: 34.4 GM/DL — SIGNIFICANT CHANGE UP (ref 32–36)
MCV RBC AUTO: 86.1 FL — SIGNIFICANT CHANGE UP (ref 80–100)
MONOCYTES # BLD AUTO: 0.39 K/UL — SIGNIFICANT CHANGE UP (ref 0–0.9)
MONOCYTES NFR BLD AUTO: 8.4 % — SIGNIFICANT CHANGE UP (ref 2–14)
NEUTROPHILS # BLD AUTO: 2.78 K/UL — SIGNIFICANT CHANGE UP (ref 1.8–7.4)
NEUTROPHILS NFR BLD AUTO: 60.2 % — SIGNIFICANT CHANGE UP (ref 43–77)
NITRITE UR-MCNC: NEGATIVE — SIGNIFICANT CHANGE UP
PH UR: 5 — SIGNIFICANT CHANGE UP (ref 5–8)
PLATELET # BLD AUTO: 185 K/UL — SIGNIFICANT CHANGE UP (ref 150–400)
POTASSIUM SERPL-MCNC: 4 MMOL/L — SIGNIFICANT CHANGE UP (ref 3.5–5.3)
POTASSIUM SERPL-SCNC: 4 MMOL/L — SIGNIFICANT CHANGE UP (ref 3.5–5.3)
PROT SERPL-MCNC: 5.9 GM/DL — LOW (ref 6–8.3)
PROT UR-MCNC: 30 MG/DL
RAPID RVP RESULT: SIGNIFICANT CHANGE UP
RBC # BLD: 4.25 M/UL — SIGNIFICANT CHANGE UP (ref 4.2–5.8)
RBC # FLD: 13 % — SIGNIFICANT CHANGE UP (ref 10.3–14.5)
SODIUM SERPL-SCNC: 142 MMOL/L — SIGNIFICANT CHANGE UP (ref 135–145)
SP GR SPEC: 1.02 — SIGNIFICANT CHANGE UP (ref 1.01–1.02)
UROBILINOGEN FLD QL: NEGATIVE MG/DL — SIGNIFICANT CHANGE UP
WBC # BLD: 4.62 K/UL — SIGNIFICANT CHANGE UP (ref 3.8–10.5)
WBC # FLD AUTO: 4.62 K/UL — SIGNIFICANT CHANGE UP (ref 3.8–10.5)

## 2020-02-09 PROCEDURE — 99239 HOSP IP/OBS DSCHRG MGMT >30: CPT

## 2020-02-09 RX ORDER — AMLODIPINE BESYLATE 2.5 MG/1
10 TABLET ORAL DAILY
Refills: 0 | Status: DISCONTINUED | OUTPATIENT
Start: 2020-02-09 | End: 2020-02-09

## 2020-02-09 RX ORDER — GLUCAGON INJECTION, SOLUTION 0.5 MG/.1ML
1 INJECTION, SOLUTION SUBCUTANEOUS ONCE
Refills: 0 | Status: DISCONTINUED | OUTPATIENT
Start: 2020-02-09 | End: 2020-02-09

## 2020-02-09 RX ORDER — LOSARTAN POTASSIUM 100 MG/1
100 TABLET, FILM COATED ORAL DAILY
Refills: 0 | Status: DISCONTINUED | OUTPATIENT
Start: 2020-02-09 | End: 2020-02-09

## 2020-02-09 RX ORDER — DEXTROSE 50 % IN WATER 50 %
15 SYRINGE (ML) INTRAVENOUS ONCE
Refills: 0 | Status: DISCONTINUED | OUTPATIENT
Start: 2020-02-09 | End: 2020-02-09

## 2020-02-09 RX ORDER — DEXTROSE 50 % IN WATER 50 %
25 SYRINGE (ML) INTRAVENOUS ONCE
Refills: 0 | Status: DISCONTINUED | OUTPATIENT
Start: 2020-02-09 | End: 2020-02-09

## 2020-02-09 RX ORDER — INSULIN LISPRO 100/ML
VIAL (ML) SUBCUTANEOUS
Refills: 0 | Status: DISCONTINUED | OUTPATIENT
Start: 2020-02-09 | End: 2020-02-09

## 2020-02-09 RX ORDER — ASPIRIN/CALCIUM CARB/MAGNESIUM 324 MG
81 TABLET ORAL DAILY
Refills: 0 | Status: DISCONTINUED | OUTPATIENT
Start: 2020-02-09 | End: 2020-02-09

## 2020-02-09 RX ORDER — CEFEPIME 1 G/1
500 INJECTION, POWDER, FOR SOLUTION INTRAMUSCULAR; INTRAVENOUS ONCE
Refills: 0 | Status: COMPLETED | OUTPATIENT
Start: 2020-02-09 | End: 2020-02-09

## 2020-02-09 RX ORDER — CARVEDILOL PHOSPHATE 80 MG/1
25 CAPSULE, EXTENDED RELEASE ORAL EVERY 12 HOURS
Refills: 0 | Status: DISCONTINUED | OUTPATIENT
Start: 2020-02-09 | End: 2020-02-09

## 2020-02-09 RX ORDER — CEFEPIME 1 G/1
500 INJECTION, POWDER, FOR SOLUTION INTRAMUSCULAR; INTRAVENOUS EVERY 12 HOURS
Refills: 0 | Status: DISCONTINUED | OUTPATIENT
Start: 2020-02-09 | End: 2020-02-09

## 2020-02-09 RX ORDER — DEXTROSE 50 % IN WATER 50 %
12.5 SYRINGE (ML) INTRAVENOUS ONCE
Refills: 0 | Status: DISCONTINUED | OUTPATIENT
Start: 2020-02-09 | End: 2020-02-09

## 2020-02-09 RX ORDER — INSULIN GLARGINE 100 [IU]/ML
26 INJECTION, SOLUTION SUBCUTANEOUS AT BEDTIME
Refills: 0 | Status: DISCONTINUED | OUTPATIENT
Start: 2020-02-09 | End: 2020-02-09

## 2020-02-09 RX ORDER — ATORVASTATIN CALCIUM 80 MG/1
40 TABLET, FILM COATED ORAL DAILY
Refills: 0 | Status: DISCONTINUED | OUTPATIENT
Start: 2020-02-09 | End: 2020-02-09

## 2020-02-09 RX ORDER — INFLUENZA VIRUS VACCINE 15; 15; 15; 15 UG/.5ML; UG/.5ML; UG/.5ML; UG/.5ML
0.5 SUSPENSION INTRAMUSCULAR ONCE
Refills: 0 | Status: COMPLETED | OUTPATIENT
Start: 2020-02-09 | End: 2020-02-09

## 2020-02-09 RX ORDER — SODIUM CHLORIDE 9 MG/ML
1000 INJECTION, SOLUTION INTRAVENOUS
Refills: 0 | Status: DISCONTINUED | OUTPATIENT
Start: 2020-02-09 | End: 2020-02-09

## 2020-02-09 RX ORDER — SODIUM CHLORIDE 9 MG/ML
1000 INJECTION INTRAMUSCULAR; INTRAVENOUS; SUBCUTANEOUS ONCE
Refills: 0 | Status: COMPLETED | OUTPATIENT
Start: 2020-02-09 | End: 2020-02-09

## 2020-02-09 RX ORDER — RANOLAZINE 500 MG/1
500 TABLET, FILM COATED, EXTENDED RELEASE ORAL
Refills: 0 | Status: DISCONTINUED | OUTPATIENT
Start: 2020-02-09 | End: 2020-02-09

## 2020-02-09 RX ORDER — CEFEPIME 1 G/1
INJECTION, POWDER, FOR SOLUTION INTRAMUSCULAR; INTRAVENOUS
Refills: 0 | Status: DISCONTINUED | OUTPATIENT
Start: 2020-02-09 | End: 2020-02-09

## 2020-02-09 RX ORDER — ENOXAPARIN SODIUM 100 MG/ML
40 INJECTION SUBCUTANEOUS DAILY
Refills: 0 | Status: DISCONTINUED | OUTPATIENT
Start: 2020-02-09 | End: 2020-02-09

## 2020-02-09 RX ADMIN — RANOLAZINE 500 MILLIGRAM(S): 500 TABLET, FILM COATED, EXTENDED RELEASE ORAL at 17:30

## 2020-02-09 RX ADMIN — Medication 81 MILLIGRAM(S): at 12:20

## 2020-02-09 RX ADMIN — SODIUM CHLORIDE 1000 MILLILITER(S): 9 INJECTION INTRAMUSCULAR; INTRAVENOUS; SUBCUTANEOUS at 01:14

## 2020-02-09 RX ADMIN — RANOLAZINE 500 MILLIGRAM(S): 500 TABLET, FILM COATED, EXTENDED RELEASE ORAL at 05:55

## 2020-02-09 RX ADMIN — CARVEDILOL PHOSPHATE 25 MILLIGRAM(S): 80 CAPSULE, EXTENDED RELEASE ORAL at 05:55

## 2020-02-09 RX ADMIN — CEFEPIME 100 MILLIGRAM(S): 1 INJECTION, POWDER, FOR SOLUTION INTRAMUSCULAR; INTRAVENOUS at 02:00

## 2020-02-09 RX ADMIN — Medication 6: at 17:26

## 2020-02-09 RX ADMIN — SODIUM CHLORIDE 2500 MILLILITER(S): 9 INJECTION INTRAMUSCULAR; INTRAVENOUS; SUBCUTANEOUS at 01:12

## 2020-02-09 RX ADMIN — CARVEDILOL PHOSPHATE 25 MILLIGRAM(S): 80 CAPSULE, EXTENDED RELEASE ORAL at 17:30

## 2020-02-09 RX ADMIN — INFLUENZA VIRUS VACCINE 0.5 MILLILITER(S): 15; 15; 15; 15 SUSPENSION INTRAMUSCULAR at 12:17

## 2020-02-09 RX ADMIN — AMLODIPINE BESYLATE 10 MILLIGRAM(S): 2.5 TABLET ORAL at 05:55

## 2020-02-09 RX ADMIN — ENOXAPARIN SODIUM 40 MILLIGRAM(S): 100 INJECTION SUBCUTANEOUS at 12:19

## 2020-02-09 RX ADMIN — ATORVASTATIN CALCIUM 40 MILLIGRAM(S): 80 TABLET, FILM COATED ORAL at 12:37

## 2020-02-09 RX ADMIN — LOSARTAN POTASSIUM 100 MILLIGRAM(S): 100 TABLET, FILM COATED ORAL at 05:55

## 2020-02-09 NOTE — H&P ADULT - NSICDXPASTMEDICALHX_GEN_ALL_CORE_FT
PAST MEDICAL HISTORY:  BPH (Benign Prostatic Hyperplasia)     Bronchitis     Diabetes mellitus     Hypercholesteremia     Hypertension

## 2020-02-09 NOTE — DISCHARGE NOTE PROVIDER - NSDCMRMEDTOKEN_GEN_ALL_CORE_FT
amLODIPine 10 mg oral tablet: 1 tab(s) orally once a day  Aspirin Low Dose 81 mg oral delayed release tablet: 1 tab(s) orally once a day  atorvastatin 40 mg oral tablet: 1 tab(s) orally once a day  carvedilol 25 mg oral tablet: 1 tab(s) orally 2 times a day  hydroCHLOROthiazide 12.5 mg oral tablet: 1 tab(s) orally once a day  Lantus 100 units/mL subcutaneous solution: 35  subcutaneous once a day (at bedtime)  losartan 100 mg oral tablet: 1 tab(s) orally once a day  ranolazine 500 mg oral tablet, extended release: 1 tab(s) orally 2 times a day  Vitamin B-12 500 mcg oral tablet: 1 tab(s) orally once a day  Vitamin C 1000 mg oral tablet: 1 tab(s) orally once a day  Vitamin D3 1000 intl units (25 mcg) oral tablet: 1 tab(s) orally once a day

## 2020-02-09 NOTE — DISCHARGE NOTE PROVIDER - CARE PROVIDER_API CALL
Genaro Luna)  Internal Medicine  [address] [place] [address]  Phone: [phone]  Fax: [phone]  Follow Up Time:     Emani Dixon (DO)  Internal Medicine  [address] [place] [address]  Phone: [phone]  Follow Up Time:

## 2020-02-09 NOTE — DISCHARGE NOTE PROVIDER - NSDCCPCAREPLAN_GEN_ALL_CORE_FT
PRINCIPAL DISCHARGE DIAGNOSIS  Diagnosis: Hypothermia  Assessment and Plan of Treatment:       SECONDARY DISCHARGE DIAGNOSES  Diagnosis: Hypoglycemia  Assessment and Plan of Treatment:

## 2020-02-09 NOTE — H&P ADULT - NSHPPHYSICALEXAM_GEN_ALL_CORE
Vital signsT(C): 36.7 (02-09-20 @ 02:00), Max: 36.7 (02-09-20 @ 02:00)  HR: 60 (02-09-20 @ 02:00) (50 - 70)  BP: 108/64 (02-09-20 @ 02:00) (79/52 - 147/83)  RR: 17 (02-09-20 @ 02:00) (16 - 18)  SpO2: 100% (02-09-20 @ 02:00) (98% - 100%)  Wt(kg): --  General apperance: Patient in no acute distress   HEENT: No JVD, no cervical lymphadenopathy   CVS: S1 S2 no murmurs, rubs or gallops   Lung: Clear to auscultation bilaterally   Abdomen: Soft non tender non distended + BS   Extremity: no lower extremity edema   MSK: 5/5 strength in all four extremities , sensation grossly intact throughout   Back: No spinal tenderness

## 2020-02-09 NOTE — DISCHARGE NOTE PROVIDER - NSDCFUSCHEDAPPT_GEN_ALL_CORE_FT
BROWN HEATON ; 02/19/2020 ; [facility] Endocrin [address]  BROWN HEATON ; 02/25/2020 ; [facility] Cardio [address]

## 2020-02-09 NOTE — DISCHARGE NOTE PROVIDER - HOSPITAL COURSE
65 yo male with history of HTN, HLD DM diagnsoed 7 years ago previously on lantus alone and recently changed his DM regimen and started on addtional insulin regimen who presented for hypoglycemic episode. Pt states he just started taking Novolog 2 days ago. Today he ate lunch and then took 5 units of Novolog at 2:30pm. Shortly after he felt dizzy and diaphoretic. His blood glucose monitor in his arm read 40. Pt drank some juice and ate some chocolate and began to feel better. On admission fs 177. In ED, The patients Temp was 92.2. patient was placed on bear hugger and now temp better regulated., While in ED pateints lactate noted at 4.3.     Pt was given IV abx and admitted for possible sepsis but doubt infection based on history, and LA probably elevated in the setting of metformin use hypoglycemia etc.     Will resume lantus at home (35units tonight),  follow-up with his endocrinologist tomorrow. 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 Details below.         2/9 - no headache cp palps sob abdo pain tenderness dysuria back pain tingling or numbness anywhere.    PHYSICAL EXAM:    GENERAL: NAD, able to lie flat in bed    HEAD:  Atraumatic, Normocephalic    EYES: EOMI, PERRLA, normal sclera    ENT: Moist mucous membranes    NECK: Supple, No JVD, no nuchal rigidity    CHEST/LUNG: Clear to auscultation bilaterally; No rales, rhonchi, wheezing, or rubs. Unlabored respirations    HEART: Regular rate and rhythm; No murmurs, rubs, or gallops    ABDOMEN: Bowel sounds present; Soft, Nontender, Nondistended. No hepatomegaly    EXTREMITIES:  no pitting bilaterally    NERVOUS SYSTEM:  Alert & Oriented X3, speech clear. No focal motor or sensory deficits    MSK: FROM all 4 extremities, full and equal strength    SKIN: No rashes or lesions        tele 2/9 rev by me shows sinus    CXR rev by me - no infiltrates    LABS: All Labs Reviewed:                            12.6     4.62  )-----------( 185      ( 09 Feb 2020 07:07 )               36.6         02-09        142  |  112<H>  |  23    ----------------------------<  223<H>    4.0   |  23  |  0.94        Ca    8.4<L>      09 Feb 2020 07:07        TPro  5.9<L>  /  Alb  3.3  /  TBili  0.4  /  DBili  x   /  AST  12<L>  /  ALT  28  /  AlkPhos  41  02-09    PT/INR - ( 08 Feb 2020 21:07 )   PT: 11.3 sec;   INR: 1.02 ratio      PTT - ( 08 Feb 2020 21:07 )  PTT:34.6 sec    CARDIAC MARKERS ( 08 Feb 2020 21:07 )    <0.015 ng/mL / x     / 198 U/L / x     / x           A/P     65 yo with hx of DM HTN HLD here for lactic acidosis complicated by hypoglycemia and hypothermia likely medication induced.     POSSIBLE SEPSIS    - got empiric coverage with Cefepime due to lactic acidosis and hypothermia     - continue to trend Lactate - improved    - additional liter of IV f     at this time doubt sepsis, will dc antibiotics and send patient home        DM     - give hypoglycemic episode would hold on additional insulin due to profound hypoglycemia     - will resume lantus (at home takes 42 units - tonight he can take 35units, then follow-up with his endocrinologist tomorrow)    PREMEAL INSULIN HAS BEEN DISCONTINUED    METFORMIN HELD IN LIGHT OF LACTIC ACIDOSIS     discussed with patient need for close monitoring, high A1C        HTN     - continue with CCB     - HCTZ    - now on ranexa         HLD     - statin     DVT ppx: Lovenox         discussed with RN pt and wife at bedside    DC time - 55 min

## 2020-02-09 NOTE — H&P ADULT - HISTORY OF PRESENT ILLNESS
63 yo male with history of HTN, HLD DM diagnsoed 7 years ago previously on lantus alone and recently changed his DM regimen and started on addtional insulin regimen who presented for hypoglycemic episode. Pt states he just started taking Novolog 2 days ago. Today he ate lunch and then took 5 units of Novolog at 2:30pm. Shortly after he felt dizzy and diaphoretic. His blood glucose monitor in his arm read 40. Pt drank some juice and ate some chocolate and began to feel better. On admission fs 177. In ED, The patients Temp was 92.2. patient was placed on bear hugger and now temp better regulated., While in ED pateints lactate noted at 4.3.   NO dysuria, diarrhea.   + Sick contacts - Daughter with recent strep

## 2020-02-09 NOTE — H&P ADULT - ASSESSMENT
63 yo with hx of DM HTN HLD here for lactic acidosis complicated by hypoglycemia and hypothermia likely medication induced.     SEPSIS  - empiric covereage with Cefepime due to lactic acidosis and hypothermia   - contiue to trend Lactate   - addtional liter of IV f   - follow up cultures  RVP -precautions       DM   - give hypoglycemic episode would hold on additional insulin due to profound hypoglycemia   - if stable would restart basal insulin   - will need DM teaching   - hold metfomin     HTN   - continue with CCB   - HCTZ  - now on ranexa     HLD   - statin   DVT ppx: Lovenox 65 yo with hx of DM HTN HLD here for lactic acidosis complicated by hypoglycemia and hypothermia likely medication induced.     SEPSIS  - empiric covereage with Cefepime due to lactic acidosis and hypothermia   - contiue to trend Lactate   - addtional liter of IV f   - follow up cultures  RVP -precautions       DM   - give hypoglycemic episode would hold on additional insulin due to profound hypoglycemia   - if stable would restart basal insulin   - will need DM teaching   - hold metfomin     HTN   - continue with CCB   - HCTZ  - now on ranexa     HLD   - statin   DVT ppx: Lovenox       HOME MEDICATIONS:   amLODIPine Besylate 10 MG Oral Tablet; TAKE ONE TABLET BY MOUTH EVERY DAY  Aspirin 81 MG TABS; TAKE 1 TABLET DAILY  Atorvastatin Calcium 40 MG Oral Tablet; TAKE ONE TABLET BY MOUTH EVERY DAY AS  DIRECTED  BD Pen Needle Short U/F 31G X 8 MM; USE UP TO THREE TIMES A DAY AS NEEDED  Carvedilol 25 MG Oral Tablet; TAKE ONE TABLET BY MOUTH TWICE A DAY  Glucosamine CAPS; TAKE 1 CAPSULE Daily  hydroCHLOROthiazide 12.5 MG Oral Tablet; Take 1 tablet daily  Lantus SoloStar 100 UNIT/ML Subcutaneous Solution Pen-injector; INJECT 40 UNITS  UNDER THE SKIN AS DIRECTED  Losartan Potassium 100 MG Oral Tablet; TAKE ONE TABLET BY MOUTH EVERY DAY  metFORMIN HCl - 1000 MG Oral Tablet; TAKE ONE TABLET BY MOUTH EVERY 12  HOURS  Multi-Vitamin TABS; TAKE 1 TABLET DAILY  Ranolazine  MG Oral Tablet Extended Release 12 Hour; TAKE ONE TABLET BY  MOUTH EVERY 12 HOURS  Vitamin B-12 500 MCG Oral Tablet  Vitamin C 1000 MG Oral Tablet; TAKE 1 TABLET DAILY  Vitamin D3 25 MCG (1000 UT) Oral Tablet; TAKE 1 TABLET DAILY

## 2020-02-09 NOTE — DISCHARGE NOTE NURSING/CASE MANAGEMENT/SOCIAL WORK - PATIENT PORTAL LINK FT
You can access the FollowMyHealth Patient Portal offered by [facility] by registering at the following website: [email or web address]. By joining [employer]’s FollowMyHealth portal, you will also be able to view your health information using other applications (apps) compatible with our system.

## 2020-02-09 NOTE — H&P ADULT - NSHPLABSRESULTS_GEN_ALL_CORE
.  LABS:                         16.1   8.39  )-----------( 215      ( 2020 21:07 )             46.0         137  |  102  |  34<H>  ----------------------------<  236<H>  4.3   |  27  |  1.17    Ca    9.8      2020 21:07    TPro  7.8  /  Alb  4.6  /  TBili  0.7  /  DBili  x   /  AST  22  /  ALT  37  /  AlkPhos  54      PT/INR - ( 2020 21:07 )   PT: 11.3 sec;   INR: 1.02 ratio         PTT - ( 2020 21:07 )  PTT:34.6 sec  Urinalysis Basic - ( 2020 23:50 )    Color: Yellow / Appearance: Clear / S.020 / pH: x  Gluc: x / Ketone: Trace  / Bili: Negative / Urobili: Negative mg/dL   Blood: x / Protein: 30 mg/dL / Nitrite: Negative   Leuk Esterase: Negative / RBC: 0-2 /HPF / WBC Negative   Sq Epi: x / Non Sq Epi: Occasional / Bacteria: Occasional        Lactate, Blood: 4.3 mmol/L ( @ 00:31)  Lactate, Blood: 2.4 mmol/L ( @ 21:07)      RADIOLOGY, EKG & ADDITIONAL TESTS: Reviewed.

## 2020-02-09 NOTE — DISCHARGE NOTE PROVIDER - CARE PROVIDERS DIRECT ADDRESSES
[email or web address]

## 2020-02-10 LAB
CULTURE RESULTS: SIGNIFICANT CHANGE UP
SPECIMEN SOURCE: SIGNIFICANT CHANGE UP

## 2020-02-11 LAB
CREAT SPEC-SCNC: 97 MG/DL
MICROALBUMIN 24H UR DL<=1MG/L-MCNC: 5.5 MG/DL
MICROALBUMIN/CREAT 24H UR-RTO: 56 MG/G

## 2020-02-14 LAB
CULTURE RESULTS: SIGNIFICANT CHANGE UP
CULTURE RESULTS: SIGNIFICANT CHANGE UP
SPECIMEN SOURCE: SIGNIFICANT CHANGE UP
SPECIMEN SOURCE: SIGNIFICANT CHANGE UP

## 2020-02-15 NOTE — HISTORY OF PRESENT ILLNESS
[FreeTextEntry1] : 64 year old man seen 02/06/2020  with complaint of elevated PSA. This began 1/21/20, where it was found to be 6.34.  Patient had his PSA checked on 2/3/2015 before this where it was 2.03 and does not recall if he ever had another PSA level since. \par It is not associated with LUTS. Pt reports over the years he has had some nocturia but it is not bothersome and would not like to try any medications at this time. He denies any recent sexual activity or bike riding prior to getting his PSA checked.\par \par No hematuria, no dysuria, no frequency, no urgency, no hesitancy, no straining. No incontinence. \par No fevers, no chills, no nausea, no vomiting, no flank pain.\par \par Pt denies any family hx of prostate cancer

## 2020-02-15 NOTE — ASSESSMENT
[FreeTextEntry1] : 65 yo M with elevated PSA of 6.34. Discussed with patient that PSA is imprecise test. PSA can be elevated due to benign prostate enlargement, prostate stimulation, sexual activity, urinary tract infection, prostatitis, as well as prostate cancer. There is no value for PSA which is diagnostic for prostate cancer, nor is there value which conclusively excludes prostate cancer. PSA simply stratifies risk for prostate cancer and diagnosis of prostate cancer requires prostate biopsy. \par Prostate biopsy was recommended. The risks, including bleeding, infection, urinary retention were discussed with the patient. Pt was instructed to take Cipro 500 mg BID for 3 days beginning the day prior to procedure. Pt will administer Fleets enema the morning of procedure. He will eat a light breakfast. All questions and concerns addressed.\par At this time patient prefers to repeat PSA in 3 months and based on the PSA if it remains the same or elevated, he will consider biopsy.\par - Repeat PSA in 3 months\par

## 2020-02-15 NOTE — PHYSICAL EXAM
[General Appearance - Well Nourished] : well nourished [General Appearance - Well Developed] : well developed [Normal Appearance] : normal appearance [Well Groomed] : well groomed [General Appearance - In No Acute Distress] : no acute distress [Edema] : no peripheral edema [Respiration, Rhythm And Depth] : normal respiratory rhythm and effort [Exaggerated Use Of Accessory Muscles For Inspiration] : no accessory muscle use [Abdomen Soft] : soft [Abdomen Tenderness] : non-tender [Urinary Bladder Findings] : the bladder was normal on palpation [Urethral Meatus] : meatus normal [Costovertebral Angle Tenderness] : no ~M costovertebral angle tenderness [No Prostate Nodules] : no prostate nodules [Testes Mass (___cm)] : there were no testicular masses [Scrotum] : the scrotum was normal [Prostate Enlarged] : was enlarged [Prostate Size ___ gm] : prostate size [unfilled] gm [Normal Station and Gait] : the gait and station were normal for the patient's age [No Focal Deficits] : no focal deficits [] : no rash [Mood] : the mood was normal [Affect] : the affect was normal [Oriented To Time, Place, And Person] : oriented to person, place, and time [Not Anxious] : not anxious [No Palpable Adenopathy] : no palpable adenopathy [Prostate Nodule] : did not have a nodule [Prostate Tenderness] : was not tender [Prostate Fluctuant] : was not fluctuant

## 2020-02-19 ENCOUNTER — APPOINTMENT (OUTPATIENT)
Dept: ENDOCRINOLOGY | Facility: CLINIC | Age: 65
End: 2020-02-19
Payer: COMMERCIAL

## 2020-02-19 VITALS
OXYGEN SATURATION: 98 % | WEIGHT: 178 LBS | HEART RATE: 70 BPM | HEIGHT: 71 IN | DIASTOLIC BLOOD PRESSURE: 91 MMHG | SYSTOLIC BLOOD PRESSURE: 169 MMHG | BODY MASS INDEX: 24.92 KG/M2

## 2020-02-19 LAB — GLUCOSE BLDC GLUCOMTR-MCNC: 319

## 2020-02-19 PROCEDURE — 82962 GLUCOSE BLOOD TEST: CPT

## 2020-02-19 PROCEDURE — 99214 OFFICE O/P EST MOD 30 MIN: CPT | Mod: 25

## 2020-02-19 NOTE — PHYSICAL EXAM
[Alert] : alert [No Acute Distress] : no acute distress [Well Nourished] : well nourished [Normal Sclera/Conjunctiva] : normal sclera/conjunctiva [Well Developed] : well developed [EOMI] : extra ocular movement intact [No Proptosis] : no proptosis [Thyroid Not Enlarged] : the thyroid was not enlarged [Normal Oropharynx] : the oropharynx was normal [No Thyroid Nodules] : there were no palpable thyroid nodules [No Respiratory Distress] : no respiratory distress [No Accessory Muscle Use] : no accessory muscle use [Clear to Auscultation] : lungs were clear to auscultation bilaterally [Normal Rate] : heart rate was normal  [Normal S1, S2] : normal S1 and S2 [Regular Rhythm] : with a regular rhythm [Pedal Pulses Normal] : the pedal pulses are present [No Edema] : there was no peripheral edema [Not Tender] : non-tender [Normal Bowel Sounds] : normal bowel sounds [Soft] : abdomen soft [Not Distended] : not distended [Post Cervical Nodes] : posterior cervical nodes [Anterior Cervical Nodes] : anterior cervical nodes [Axillary Nodes] : axillary nodes [Normal] : normal and non tender [No Spinal Tenderness] : no spinal tenderness [Spine Straight] : spine straight [Normal Gait] : normal gait [No Stigmata of Cushings Syndrome] : no stigmata of cushings syndrome [No Rash] : no rash [Normal Strength/Tone] : muscle strength and tone were normal [No Tremors] : no tremors [Normal Reflexes] : deep tendon reflexes were 2+ and symmetric [Oriented x3] : oriented to person, place, and time [Acanthosis Nigricans] : no acanthosis nigricans

## 2020-02-19 NOTE — ASSESSMENT
[FreeTextEntry1] : 1. DM 2- uncontrolled, uncomplicated\par Patient with cardiac history and toe amputation with uncontrolled diabetes. I addressed how important it is for him to get the glycemic control better. At this point, he might end end up with CABG and other diabetic complications. Patient is more aware now but is worried about another hypoglycemic episode. I reassured that we can slowly titrate up and as long as he pays attention to carb intake, he is at less risk. Reviewed hypoglycemia tx. Will try to get him Gvoke glucagon set. Patient has glucose tablets\par Inc to  Lantus 42 units QHS and Novolog 3-4 units TID w/ meals\par Restart Metformin 1000 mg BID\par Cont diabetic diet\par Cont exercise\par Will send to diabetes educator\par Concerned about his PAD\par Will refer to podiatry and vascular\par Also rec to see optho\par \par 2. HLD- elevated\par Encouraged statin compliance\par \par Follow up in 1 month\par \par \par

## 2020-02-19 NOTE — HISTORY OF PRESENT ILLNESS
[FreeTextEntry1] : Mr. BROWN HEATON  is a 64 year old male with past medical history of Diabetes Mellitus Type 2, HLD, CAD, HTN, PAD  who presents for follow-up of his diabetes. Two weeks ago patient had an episode of hypoglycemia and he was taken to the hospital where he was admitted overnight. He stopped the Novolog and then we started at 2 units and I encouraged to go up by 1 unit if his fsg were still above 200s. His lantus was reduced to 35 units.\par \par \par POCT Glucose: 319  mg/dL, patient is fasting, he ate chinese last night without novolog\par \par Diabetes first diagnosed: Over 7 years\par Type: 2\par \par Current diabetic regimen:\par Lantus 35 units QHS, Novolog 2 units TID\par \par Other relevant medications:\par Norvasc\par Coreg\par Lipitor\par Losartan\par \par Pt has freestyle wilfredo \par CGM interpretation: 78% out of target\par Mostly in the 200-300s. Patient is sensitive to Novolog and it brings down the sugar a lot but baseline is too high\par \par Hypoglycemia: only 1 episode\par \par \par Diet:\par Breakfast: cereal, or eggs\par Lunch: deli sandwiches sometimes skip\par Dinner:deli sandwiches, chicken \par \par Exercise: None\par \par Urine micro: 56 mg/g 2/05/20\par lipid profile:  1/21/20\par last hba1c: 12.2% 1/21/20, 9.9% 8/2017, 10.6% 2/2015\par eye exam: 2 years ago\par diabetic foot exam/podiatry: WNL 2/5/20\par \par \par \par \par

## 2020-03-10 ENCOUNTER — APPOINTMENT (OUTPATIENT)
Dept: CARDIOLOGY | Facility: CLINIC | Age: 65
End: 2020-03-10

## 2020-03-18 ENCOUNTER — APPOINTMENT (OUTPATIENT)
Dept: ENDOCRINOLOGY | Facility: CLINIC | Age: 65
End: 2020-03-18

## 2020-03-24 ENCOUNTER — APPOINTMENT (OUTPATIENT)
Dept: CARDIOLOGY | Facility: CLINIC | Age: 65
End: 2020-03-24

## 2020-04-16 ENCOUNTER — APPOINTMENT (OUTPATIENT)
Dept: DISASTER EMERGENCY | Facility: CLINIC | Age: 65
End: 2020-04-16
Payer: COMMERCIAL

## 2020-04-16 VITALS
OXYGEN SATURATION: 99 % | TEMPERATURE: 98.2 F | RESPIRATION RATE: 14 BRPM | HEART RATE: 73 BPM | SYSTOLIC BLOOD PRESSURE: 132 MMHG | DIASTOLIC BLOOD PRESSURE: 74 MMHG

## 2020-04-16 PROCEDURE — 99202 OFFICE O/P NEW SF 15 MIN: CPT

## 2020-04-16 NOTE — HISTORY OF PRESENT ILLNESS
[Patient presents to the office today for COVID-19 evaluation and testing.] : Patient presents to the office today for COVID-19 evaluation and testing. [Patient has been pre-screened by RN at call center for appointment today with our facility.] : Patient has been pre-screened by RN at call center for appointment today with our facility. [] : mild dizziness on standing [None Known] : none known [Age >= 60 years] : age >= 60 years [Heart Disease] : heart disease [None] : none [Clear] : clear [Good Air Entry] : good air entry [Speaks in full sentences] : speaks in full sentences [Normal O2 sat at rest] : normal O2 sat at rest [Grossly normal, interacts, not tired or weak] : grossly normal, interacts, not tired or weak [COVID-19 testing ordered and specimen obtained] : COVID-19 testing ordered and specimen obtained [Discharged with current Quarantine instructions and advised of signs of worsening illness.] : Patient discharged with current quarantine instructions and advised of signs of worsening illness. Patient told to seek emergent care if symptoms occur. [FreeTextEntry1] : 63 yo male presents w/ loss of taste and smell, starting a few days ago, Monday night totally gone.  Has some left low back pain, then right shoulder blade this morning.  Has nasal congestion.  A couple of weeks ago had diarrhea but none now (poss due to meds).  No fever, but has occ chill, no sweating.  No SOB.  No rashes, Lives w/ wife and daughter, both ok.  Trying to self isolate. 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 [TextBox_48] : No abnormal dizziness [TextBox_66] : HTN, DM II

## 2020-04-17 LAB — SARS-COV-2 N GENE NPH QL NAA+PROBE: DETECTED

## 2020-04-27 ENCOUNTER — RX RENEWAL (OUTPATIENT)
Age: 65
End: 2020-04-27

## 2020-05-19 ENCOUNTER — APPOINTMENT (OUTPATIENT)
Dept: UROLOGY | Facility: CLINIC | Age: 65
End: 2020-05-19

## 2020-05-19 LAB
SARS-COV-2 IGG SERPL IA-ACNC: 5.8 INDEX
SARS-COV-2 IGG SERPL QL IA: POSITIVE

## 2020-06-06 ENCOUNTER — RX RENEWAL (OUTPATIENT)
Age: 65
End: 2020-06-06

## 2020-06-30 ENCOUNTER — LABORATORY RESULT (OUTPATIENT)
Age: 65
End: 2020-06-30

## 2020-06-30 ENCOUNTER — APPOINTMENT (OUTPATIENT)
Dept: INTERNAL MEDICINE | Facility: CLINIC | Age: 65
End: 2020-06-30
Payer: COMMERCIAL

## 2020-06-30 VITALS
OXYGEN SATURATION: 98 % | HEART RATE: 63 BPM | WEIGHT: 174 LBS | RESPIRATION RATE: 14 BRPM | BODY MASS INDEX: 24.27 KG/M2 | DIASTOLIC BLOOD PRESSURE: 70 MMHG | SYSTOLIC BLOOD PRESSURE: 114 MMHG | TEMPERATURE: 98.7 F

## 2020-06-30 DIAGNOSIS — B35.3 TINEA PEDIS: ICD-10-CM

## 2020-06-30 PROCEDURE — 99214 OFFICE O/P EST MOD 30 MIN: CPT | Mod: 25

## 2020-06-30 PROCEDURE — 36415 COLL VENOUS BLD VENIPUNCTURE: CPT

## 2020-06-30 NOTE — PHYSICAL EXAM
[No Acute Distress] : no acute distress [Well Nourished] : well nourished [Normal Sclera/Conjunctiva] : normal sclera/conjunctiva [Well-Appearing] : well-appearing [Well Developed] : well developed [EOMI] : extraocular movements intact [Normal Outer Ear/Nose] : the outer ears and nose were normal in appearance [PERRL] : pupils equal round and reactive to light [Normal Oropharynx] : the oropharynx was normal [No JVD] : no jugular venous distention [No Lymphadenopathy] : no lymphadenopathy [Supple] : supple [No Respiratory Distress] : no respiratory distress  [Thyroid Normal, No Nodules] : the thyroid was normal and there were no nodules present [Normal Rate] : normal rate  [No Accessory Muscle Use] : no accessory muscle use [Clear to Auscultation] : lungs were clear to auscultation bilaterally [Regular Rhythm] : with a regular rhythm [Normal S1, S2] : normal S1 and S2 [No Murmur] : no murmur heard [No Carotid Bruits] : no carotid bruits [No Abdominal Bruit] : a ~M bruit was not heard ~T in the abdomen [No Varicosities] : no varicosities [Pedal Pulses Present] : the pedal pulses are present [No Edema] : there was no peripheral edema [No Extremity Clubbing/Cyanosis] : no extremity clubbing/cyanosis [No Palpable Aorta] : no palpable aorta [Soft] : abdomen soft [Non Tender] : non-tender [Non-distended] : non-distended [No Masses] : no abdominal mass palpated [No HSM] : no HSM [Normal Bowel Sounds] : normal bowel sounds [Normal Posterior Cervical Nodes] : no posterior cervical lymphadenopathy [Normal Anterior Cervical Nodes] : no anterior cervical lymphadenopathy [No Spinal Tenderness] : no spinal tenderness [No CVA Tenderness] : no CVA  tenderness [No Joint Swelling] : no joint swelling [Grossly Normal Strength/Tone] : grossly normal strength/tone [No Rash] : no rash [No Focal Deficits] : no focal deficits [Coordination Grossly Intact] : coordination grossly intact [Normal Affect] : the affect was normal [Normal Gait] : normal gait [Deep Tendon Reflexes (DTR)] : deep tendon reflexes were 2+ and symmetric [Normal Insight/Judgement] : insight and judgment were intact [de-identified] : likely fungal lesion of right foot

## 2020-06-30 NOTE — HISTORY OF PRESENT ILLNESS
[FreeTextEntry1] : Here for follow-up Overall feeling well. [de-identified] : Overall feeling well.\par Pt tested positive for COVID-19 in mid April. Feeling better now\par Still has some fatigue and taste alteration.\par Seeing Dr. Dixon, Endocrinology fro management of diabetes.\par Today's blood glucose level was 127 today.

## 2020-06-30 NOTE — PLAN
[FreeTextEntry1] : Pt will see Dr. Dixon, Endocrinology\par Pt will see a dermatologist to evaluate his feet

## 2020-07-07 LAB
ALBUMIN SERPL ELPH-MCNC: 4.7 G/DL
ALP BLD-CCNC: 37 U/L
ALT SERPL-CCNC: 22 U/L
ANION GAP SERPL CALC-SCNC: 15 MMOL/L
APPEARANCE: CLEAR
AST SERPL-CCNC: 18 U/L
BASOPHILS # BLD AUTO: 0.04 K/UL
BASOPHILS NFR BLD AUTO: 0.8 %
BILIRUB SERPL-MCNC: 0.6 MG/DL
BILIRUBIN URINE: NEGATIVE
BLOOD URINE: ABNORMAL
BUN SERPL-MCNC: 48 MG/DL
CALCIUM SERPL-MCNC: 9.9 MG/DL
CHLORIDE SERPL-SCNC: 104 MMOL/L
CHOLEST SERPL-MCNC: 156 MG/DL
CHOLEST/HDLC SERPL: 3.8 RATIO
CO2 SERPL-SCNC: 20 MMOL/L
COLOR: YELLOW
CREAT SERPL-MCNC: 1.26 MG/DL
EOSINOPHIL # BLD AUTO: 0.15 K/UL
EOSINOPHIL NFR BLD AUTO: 2.9 %
ESTIMATED AVERAGE GLUCOSE: 186 MG/DL
FOLATE SERPL-MCNC: 10.1 NG/ML
GLUCOSE QUALITATIVE U: NORMAL
GLUCOSE SERPL-MCNC: 135 MG/DL
HBA1C MFR BLD HPLC: 8.1 %
HCT VFR BLD CALC: 34.9 %
HDLC SERPL-MCNC: 41 MG/DL
HGB BLD-MCNC: 11.7 G/DL
IMM GRANULOCYTES NFR BLD AUTO: 0.4 %
KETONES URINE: NEGATIVE
LDLC SERPL CALC-MCNC: 91 MG/DL
LEUKOCYTE ESTERASE URINE: ABNORMAL
LYMPHOCYTES # BLD AUTO: 1.68 K/UL
LYMPHOCYTES NFR BLD AUTO: 32.6 %
MAN DIFF?: NORMAL
MCHC RBC-ENTMCNC: 31 PG
MCHC RBC-ENTMCNC: 33.5 GM/DL
MCV RBC AUTO: 92.3 FL
MONOCYTES # BLD AUTO: 0.45 K/UL
MONOCYTES NFR BLD AUTO: 8.7 %
NEUTROPHILS # BLD AUTO: 2.81 K/UL
NEUTROPHILS NFR BLD AUTO: 54.6 %
NITRITE URINE: NEGATIVE
PH URINE: 5.5
PLATELET # BLD AUTO: 219 K/UL
POTASSIUM SERPL-SCNC: 4.7 MMOL/L
PROT SERPL-MCNC: 6.6 G/DL
PROTEIN URINE: ABNORMAL
PSA SERPL-MCNC: 2.47 NG/ML
RBC # BLD: 3.78 M/UL
RBC # FLD: 14.6 %
SODIUM SERPL-SCNC: 139 MMOL/L
SPECIFIC GRAVITY URINE: 1.02
TRIGL SERPL-MCNC: 120 MG/DL
TSH SERPL-ACNC: 1.96 UIU/ML
UROBILINOGEN URINE: NORMAL
VIT B12 SERPL-MCNC: 1043 PG/ML
WBC # FLD AUTO: 5.15 K/UL

## 2020-07-09 ENCOUNTER — RX RENEWAL (OUTPATIENT)
Age: 65
End: 2020-07-09

## 2020-07-15 ENCOUNTER — RX RENEWAL (OUTPATIENT)
Age: 65
End: 2020-07-15

## 2020-08-11 ENCOUNTER — RX RENEWAL (OUTPATIENT)
Age: 65
End: 2020-08-11

## 2020-08-31 ENCOUNTER — RX RENEWAL (OUTPATIENT)
Age: 65
End: 2020-08-31

## 2020-09-11 ENCOUNTER — RX RENEWAL (OUTPATIENT)
Age: 65
End: 2020-09-11

## 2020-10-05 ENCOUNTER — RX RENEWAL (OUTPATIENT)
Age: 65
End: 2020-10-05

## 2020-10-13 ENCOUNTER — RX RENEWAL (OUTPATIENT)
Age: 65
End: 2020-10-13

## 2020-10-13 ENCOUNTER — APPOINTMENT (OUTPATIENT)
Dept: CARDIOLOGY | Facility: CLINIC | Age: 65
End: 2020-10-13
Payer: COMMERCIAL

## 2020-10-13 ENCOUNTER — NON-APPOINTMENT (OUTPATIENT)
Age: 65
End: 2020-10-13

## 2020-10-13 VITALS
RESPIRATION RATE: 14 BRPM | WEIGHT: 186 LBS | HEART RATE: 59 BPM | OXYGEN SATURATION: 100 % | HEIGHT: 71 IN | BODY MASS INDEX: 26.04 KG/M2

## 2020-10-13 VITALS
OXYGEN SATURATION: 100 % | SYSTOLIC BLOOD PRESSURE: 158 MMHG | DIASTOLIC BLOOD PRESSURE: 79 MMHG | WEIGHT: 186 LBS | BODY MASS INDEX: 26.04 KG/M2 | HEART RATE: 59 BPM | HEIGHT: 71 IN

## 2020-10-13 VITALS — SYSTOLIC BLOOD PRESSURE: 144 MMHG | DIASTOLIC BLOOD PRESSURE: 74 MMHG

## 2020-10-13 DIAGNOSIS — Z20.828 CONTACT WITH AND (SUSPECTED) EXPOSURE TO OTHER VIRAL COMMUNICABLE DISEASES: ICD-10-CM

## 2020-10-13 DIAGNOSIS — Z86.19 PERSONAL HISTORY OF OTHER INFECTIOUS AND PARASITIC DISEASES: ICD-10-CM

## 2020-10-13 DIAGNOSIS — J21.9 ACUTE BRONCHITIS, UNSPECIFIED: ICD-10-CM

## 2020-10-13 DIAGNOSIS — J20.9 ACUTE BRONCHITIS, UNSPECIFIED: ICD-10-CM

## 2020-10-13 PROCEDURE — 99214 OFFICE O/P EST MOD 30 MIN: CPT

## 2020-10-13 PROCEDURE — 93000 ELECTROCARDIOGRAM COMPLETE: CPT

## 2020-10-13 NOTE — PHYSICAL EXAM
[Well Groomed] : well groomed [No Oral Pallor] : no oral pallor [] : no respiratory distress [Respiration, Rhythm And Depth] : normal respiratory rhythm and effort [Auscultation Breath Sounds / Voice Sounds] : lungs were clear to auscultation bilaterally [Heart Rate And Rhythm] : heart rate and rhythm were normal [Heart Sounds] : normal S1 and S2 [Bowel Sounds] : normal bowel sounds [Abnormal Walk] : normal gait [Nail Clubbing] : no clubbing of the fingernails [Cyanosis, Localized] : no localized cyanosis [Oriented To Time, Place, And Person] : oriented to person, place, and time [Impaired Insight] : insight and judgment were intact [Affect] : the affect was normal [Mood] : the mood was normal [Normal Appearance] : normal appearance [FreeTextEntry1] : No JVD [Edema] : no peripheral edema present

## 2020-10-13 NOTE — REVIEW OF SYSTEMS
[Feeling Fatigued] : feeling fatigued [Joint Pain] : joint pain [see HPI] : see HPI [Anxiety] : anxiety [Under Stress] : under stress [Recent Weight Gain (___ Lbs)] : recent [unfilled] ~Ulb weight gain [Upper Back Pain] : upper back pain [Shortness Of Breath] : no shortness of breath [Dyspnea on exertion] : dyspnea during exertion [Chest  Pressure] : no chest pressure [Chest Pain] : no chest pain [Lower Ext Edema] : no extremity edema [Palpitations] : no palpitations [Cough] : no cough [Abdominal Pain] : no abdominal pain [Dizziness] : no dizziness [Easy Bleeding] : no tendency for easy bleeding

## 2020-10-13 NOTE — DISCUSSION/SUMMARY
[With Me] : with me [___ Month(s)] : [unfilled] month(s) [FreeTextEntry1] : \par Hypertension:  Improved; recent blood pressure measurements taken by other providers were lower than what I obtained today; continue present antihypertensive regimen.\par \par Diabetes mellitus: Improved - now under the care of an endocrinologist; check HgbA1C\par \par Coronary artery disease: Multivessel CAD (diffuse disease and with poor targets); continue medical management with aspirin, atorvastatin, carvedilol, and Ranexa; echocardiography to assess left ventricular systolic function given complaints of exertional dyspnea.\par \par Hypercholesterolemia: Tolerating atorvastatin with LDL less than 100

## 2020-10-13 NOTE — HISTORY OF PRESENT ILLNESS
[FreeTextEntry1] : Jeffrey Cardenas is a 65 year old man with multiple medical problems, including coronary artery disease, hyperlipidemia, hypertension, diabetes mellitus, peripheral artery disease, and skin cancer (basal cell carcinoma of face s/p excision in October 2017), who returns for cardiac examination.  Since our last encounter on January 21, 2020 he establish primary care with Dr. Luna and has been seen Dr. Dixon (endocrinology). He developed  COVID-19 infection in April 2020.  His glycemic control has improved (hemoglobin A1c 12.2% to 8.1%); LDL is now 91.  He describes ongoing fatigue, neck pain, stable dyspnea with exertion, and the absence of angina.\par

## 2020-10-14 LAB
ALBUMIN SERPL ELPH-MCNC: 4.6 G/DL
ALP BLD-CCNC: 48 U/L
ALT SERPL-CCNC: 17 U/L
ANION GAP SERPL CALC-SCNC: 13 MMOL/L
AST SERPL-CCNC: 14 U/L
BILIRUB SERPL-MCNC: 0.4 MG/DL
BUN SERPL-MCNC: 31 MG/DL
CALCIUM SERPL-MCNC: 9.9 MG/DL
CHLORIDE SERPL-SCNC: 102 MMOL/L
CO2 SERPL-SCNC: 25 MMOL/L
CREAT SERPL-MCNC: 0.94 MG/DL
ESTIMATED AVERAGE GLUCOSE: 203 MG/DL
GLUCOSE SERPL-MCNC: 201 MG/DL
HBA1C MFR BLD HPLC: 8.7 %
POTASSIUM SERPL-SCNC: 4.3 MMOL/L
PROT SERPL-MCNC: 6.5 G/DL
PSA FREE FLD-MCNC: 26 %
PSA FREE SERPL-MCNC: 0.77 NG/ML
PSA SERPL-MCNC: 2.97 NG/ML
PSA SERPL-MCNC: 3.06 NG/ML
SARS-COV-2 IGG SERPL IA-ACNC: 3.44 INDEX
SARS-COV-2 IGG SERPL QL IA: POSITIVE
SODIUM SERPL-SCNC: 139 MMOL/L

## 2020-10-21 ENCOUNTER — RX RENEWAL (OUTPATIENT)
Age: 65
End: 2020-10-21

## 2020-10-28 ENCOUNTER — RX RENEWAL (OUTPATIENT)
Age: 65
End: 2020-10-28

## 2020-11-19 ENCOUNTER — APPOINTMENT (OUTPATIENT)
Dept: INTERNAL MEDICINE | Facility: CLINIC | Age: 65
End: 2020-11-19
Payer: MEDICARE

## 2020-11-19 VITALS
HEIGHT: 71 IN | WEIGHT: 174 LBS | SYSTOLIC BLOOD PRESSURE: 122 MMHG | HEART RATE: 78 BPM | BODY MASS INDEX: 24.36 KG/M2 | DIASTOLIC BLOOD PRESSURE: 80 MMHG | RESPIRATION RATE: 14 BRPM | TEMPERATURE: 96.8 F | OXYGEN SATURATION: 97 %

## 2020-11-19 DIAGNOSIS — R10.9 UNSPECIFIED ABDOMINAL PAIN: ICD-10-CM

## 2020-11-19 DIAGNOSIS — N40.0 BENIGN PROSTATIC HYPERPLASIA WITHOUT LOWER URINARY TRACT SYMPMS: ICD-10-CM

## 2020-11-19 DIAGNOSIS — M54.9 DORSALGIA, UNSPECIFIED: ICD-10-CM

## 2020-11-19 PROCEDURE — 81003 URINALYSIS AUTO W/O SCOPE: CPT | Mod: QW

## 2020-11-19 PROCEDURE — 99214 OFFICE O/P EST MOD 30 MIN: CPT

## 2020-11-19 RX ORDER — INSULIN GLARGINE 100 [IU]/ML
100 INJECTION, SOLUTION SUBCUTANEOUS
Qty: 1 | Refills: 0 | Status: DISCONTINUED | COMMUNITY
End: 2020-11-19

## 2020-11-19 NOTE — PLAN
[FreeTextEntry1] : Renal sono\par Lumbar spine xray\par See Dr. Dixon, Endocrinology , regarding uncontrolled DM\par See Urologist regarding left flank pain and 2 episodes of dark urine

## 2020-11-19 NOTE — HISTORY OF PRESENT ILLNESS
[FreeTextEntry8] : Pt experienced right-sided sciatica about 3 weeks ago along with right inguinal soreness and swelling. These symptoms improved after 1 week. Then pt noted two episodes of dark urine. Then he developed a dull ache in his left lower vqck about 1 week ago.

## 2020-11-26 LAB
ALBUMIN SERPL ELPH-MCNC: 4.8 G/DL
ALP BLD-CCNC: 54 U/L
ALT SERPL-CCNC: 19 U/L
AMYLASE/CREAT SERPL: 42 U/L
ANION GAP SERPL CALC-SCNC: 12 MMOL/L
APPEARANCE: CLEAR
AST SERPL-CCNC: 11 U/L
BACTERIA UR CULT: NORMAL
BACTERIA: NEGATIVE
BASOPHILS # BLD AUTO: 0.03 K/UL
BASOPHILS NFR BLD AUTO: 0.6 %
BILIRUB SERPL-MCNC: 0.6 MG/DL
BILIRUB UR QL STRIP: NORMAL
BILIRUBIN URINE: NEGATIVE
BLOOD URINE: NEGATIVE
BUN SERPL-MCNC: 44 MG/DL
CALCIUM SERPL-MCNC: 9.9 MG/DL
CHLORIDE SERPL-SCNC: 97 MMOL/L
CLARITY UR: NORMAL
CO2 SERPL-SCNC: 25 MMOL/L
COLLECTION METHOD: NORMAL
COLOR: NORMAL
CREAT SERPL-MCNC: 1.51 MG/DL
CRP SERPL-MCNC: <0.1 MG/DL
EOSINOPHIL # BLD AUTO: 0.14 K/UL
EOSINOPHIL NFR BLD AUTO: 2.7 %
ESTIMATED AVERAGE GLUCOSE: 209 MG/DL
GLUCOSE QUALITATIVE U: ABNORMAL
GLUCOSE SERPL-MCNC: 266 MG/DL
GLUCOSE UR-MCNC: NORMAL
HBA1C MFR BLD HPLC: 8.9 %
HCG UR QL: 0.2 EU/DL
HCT VFR BLD CALC: 40.7 %
HGB BLD-MCNC: 14.1 G/DL
HGB UR QL STRIP.AUTO: NORMAL
HYALINE CASTS: 1 /LPF
IMM GRANULOCYTES NFR BLD AUTO: 0.4 %
KETONES UR-MCNC: NORMAL
KETONES URINE: NEGATIVE
LEUKOCYTE ESTERASE UR QL STRIP: NORMAL
LEUKOCYTE ESTERASE URINE: NEGATIVE
LPL SERPL-CCNC: 26 U/L
LYMPHOCYTES # BLD AUTO: 1.55 K/UL
LYMPHOCYTES NFR BLD AUTO: 30.2 %
MAN DIFF?: NORMAL
MCHC RBC-ENTMCNC: 30.3 PG
MCHC RBC-ENTMCNC: 34.6 GM/DL
MCV RBC AUTO: 87.5 FL
MICROSCOPIC-UA: NORMAL
MONOCYTES # BLD AUTO: 0.61 K/UL
MONOCYTES NFR BLD AUTO: 11.9 %
NEUTROPHILS # BLD AUTO: 2.78 K/UL
NEUTROPHILS NFR BLD AUTO: 54.2 %
NITRITE UR QL STRIP: NORMAL
NITRITE URINE: NEGATIVE
PH UR STRIP: 5
PH URINE: 5
PLATELET # BLD AUTO: 233 K/UL
POTASSIUM SERPL-SCNC: 4.7 MMOL/L
PROT SERPL-MCNC: 6.8 G/DL
PROT UR STRIP-MCNC: NORMAL
PROTEIN URINE: NORMAL
PSA SERPL-MCNC: 2.53 NG/ML
RBC # BLD: 4.65 M/UL
RBC # FLD: 13 %
RED BLOOD CELLS URINE: 4 /HPF
SODIUM SERPL-SCNC: 134 MMOL/L
SP GR UR STRIP: 1.02
SPECIFIC GRAVITY URINE: 1.02
SQUAMOUS EPITHELIAL CELLS: 6 /HPF
URINE COMMENTS: NORMAL
UROBILINOGEN URINE: NORMAL
WBC # FLD AUTO: 5.13 K/UL
WHITE BLOOD CELLS URINE: 30 /HPF

## 2020-11-30 ENCOUNTER — APPOINTMENT (OUTPATIENT)
Dept: CARDIOLOGY | Facility: CLINIC | Age: 65
End: 2020-11-30
Payer: MEDICARE

## 2020-11-30 PROCEDURE — 99072 ADDL SUPL MATRL&STAF TM PHE: CPT

## 2020-11-30 PROCEDURE — 93306 TTE W/DOPPLER COMPLETE: CPT

## 2020-12-02 ENCOUNTER — RX RENEWAL (OUTPATIENT)
Age: 65
End: 2020-12-02

## 2020-12-04 ENCOUNTER — NON-APPOINTMENT (OUTPATIENT)
Age: 65
End: 2020-12-04

## 2020-12-04 DIAGNOSIS — R79.89 OTHER SPECIFIED ABNORMAL FINDINGS OF BLOOD CHEMISTRY: ICD-10-CM

## 2020-12-10 ENCOUNTER — APPOINTMENT (OUTPATIENT)
Dept: UROLOGY | Facility: CLINIC | Age: 65
End: 2020-12-10
Payer: MEDICARE

## 2020-12-10 VITALS
SYSTOLIC BLOOD PRESSURE: 152 MMHG | HEIGHT: 71 IN | OXYGEN SATURATION: 98 % | DIASTOLIC BLOOD PRESSURE: 87 MMHG | WEIGHT: 178 LBS | HEART RATE: 74 BPM | BODY MASS INDEX: 24.92 KG/M2 | TEMPERATURE: 97 F

## 2020-12-10 PROCEDURE — 99072 ADDL SUPL MATRL&STAF TM PHE: CPT

## 2020-12-10 PROCEDURE — 99213 OFFICE O/P EST LOW 20 MIN: CPT

## 2020-12-10 NOTE — HISTORY OF PRESENT ILLNESS
[FreeTextEntry1] : 64 year old man seen 02/06/2020  with complaint of elevated PSA. This began 1/21/20, where it was found to be 6.34.  Patient had his PSA checked on 2/3/2015 before this where it was 2.03 and does not recall if he ever had another PSA level since. \par It is not associated with LUTS. Pt reports over the years he has had some nocturia but it is not bothersome and would not like to try any medications at this time. He denies any recent sexual activity or bike riding prior to getting his PSA checked.No hematuria, no dysuria, no frequency, no urgency, no hesitancy, no straining. No incontinence. No fevers, no chills, no nausea, no vomiting, no flank pain.Pt denies any family hx of prostate cancer\par \par 12/10/2020: Patient presents for follow up. He reports feeling a lump on RIGHT groin. It is not painful, but can be bothersome. Lifting makes it worse. He also reports some LEFT flank pain, then some gross hematuria. It has since resolved. No new  symptoms and other medical  issues remain unchanged from above. No current hematuria, no dysuria, no frequency, no urgency, no hesitancy, no straining. No incontinence. No fevers, no chills, no nausea, no vomiting, no flank pain. Renal US from [facility] was negative for stones, masses, or hydronephrosis. Cr elevated to 1.6

## 2020-12-10 NOTE — ASSESSMENT
[FreeTextEntry1] : 65 yo M with elevated PSA of 6.34 now down to baseline of 2. For hematuria, discussed that history does sound like urolithiasis however other etiologies cannot be excluded. We discussed that the differential diagnosis includes both benign and malignant conditions including renal stones, BPH, urinary tract infections, and cancer of the bladder or ureter or kidney. Cystoscopy was recommended to rule out pathology in the bladder. CT Urogram would be recommended to evaluate for presence of nephroureteral stones or malignancies, however would not do this now given the elevated Cr. Will repeat Cr, and if found to have gone back to normal will consider CTU at that time. Pt agrees and understands.

## 2020-12-10 NOTE — PHYSICAL EXAM
[General Appearance - Well Developed] : well developed [General Appearance - Well Nourished] : well nourished [Normal Appearance] : normal appearance [Well Groomed] : well groomed [General Appearance - In No Acute Distress] : no acute distress [Abdomen Soft] : soft [Abdomen Tenderness] : non-tender [Costovertebral Angle Tenderness] : no ~M costovertebral angle tenderness [Urinary Bladder Findings] : the bladder was normal on palpation [Prostate Enlarged] : was enlarged [Edema] : no peripheral edema [] : no respiratory distress [Respiration, Rhythm And Depth] : normal respiratory rhythm and effort [Exaggerated Use Of Accessory Muscles For Inspiration] : no accessory muscle use [Oriented To Time, Place, And Person] : oriented to person, place, and time [Affect] : the affect was normal [Mood] : the mood was normal [Not Anxious] : not anxious [Normal Station and Gait] : the gait and station were normal for the patient's age [No Focal Deficits] : no focal deficits [No Palpable Adenopathy] : no palpable adenopathy [Prostate Nodule] : did not have a nodule [Prostate Tenderness] : was not tender [Prostate Fluctuant] : was not fluctuant

## 2020-12-14 ENCOUNTER — APPOINTMENT (OUTPATIENT)
Dept: ENDOCRINOLOGY | Facility: CLINIC | Age: 65
End: 2020-12-14
Payer: MEDICARE

## 2020-12-14 VITALS
OXYGEN SATURATION: 99 % | HEART RATE: 67 BPM | SYSTOLIC BLOOD PRESSURE: 166 MMHG | BODY MASS INDEX: 24.64 KG/M2 | DIASTOLIC BLOOD PRESSURE: 89 MMHG | HEIGHT: 71 IN | WEIGHT: 176 LBS

## 2020-12-14 PROCEDURE — 99072 ADDL SUPL MATRL&STAF TM PHE: CPT

## 2020-12-14 PROCEDURE — 82962 GLUCOSE BLOOD TEST: CPT

## 2020-12-14 PROCEDURE — 95251 CONT GLUC MNTR ANALYSIS I&R: CPT

## 2020-12-14 PROCEDURE — 83036 HEMOGLOBIN GLYCOSYLATED A1C: CPT | Mod: QW

## 2020-12-14 PROCEDURE — 99214 OFFICE O/P EST MOD 30 MIN: CPT | Mod: 25

## 2020-12-14 RX ORDER — INSULIN ASPART 100 [IU]/ML
100 INJECTION, SOLUTION INTRAVENOUS; SUBCUTANEOUS 3 TIMES DAILY
Qty: 1 | Refills: 0 | Status: DISCONTINUED | COMMUNITY
Start: 2020-02-03 | End: 2020-12-14

## 2020-12-15 LAB
CREAT SPEC-SCNC: 82 MG/DL
MICROALBUMIN 24H UR DL<=1MG/L-MCNC: 23.6 MG/DL
MICROALBUMIN/CREAT 24H UR-RTO: 288 MG/G

## 2020-12-16 ENCOUNTER — RX RENEWAL (OUTPATIENT)
Age: 65
End: 2020-12-16

## 2020-12-17 ENCOUNTER — APPOINTMENT (OUTPATIENT)
Dept: DERMATOLOGY | Facility: CLINIC | Age: 65
End: 2020-12-17

## 2020-12-17 NOTE — HISTORY OF PRESENT ILLNESS
[FreeTextEntry1] : Mr. BROWN HEATON  is a 64 year old male with past medical history of Diabetes Mellitus Type 2, HLD, CAD, HTN, PAD  who presents for follow-up of his diabetes. \par \par \par POCT Glucose: 319  mg/dL, patient is fasting, he ate chinese last night without novolog\par \par Diabetes first diagnosed: Over 7 years\par Type: 2\par \par Current diabetic regimen:\par Lantus 35 units QHS, Novolog 2 units TID\par \par Other relevant medications:\par Norvasc\par Coreg\par Lipitor\par Losartan\par \par Pt has freestyle wilfredo \par CGM interpretation: 78% out of target\par Mostly in the 200-300s. Patient is sensitive to Novolog and it brings down the sugar a lot but baseline is too high\par \par Hypoglycemia: only 1 episode\par \par \par Diet:\par Breakfast: cereal, or eggs\par Lunch: deli sandwiches sometimes skip\par Dinner:deli sandwiches, chicken \par \par Exercise: None\par \par Urine micro: 56 mg/g 2/05/20\par lipid profile:  1/21/20\par last hba1c: 12.2% 1/21/20, 9.9% 8/2017, 10.6% 2/2015\par eye exam: 2 years ago\par diabetic foot exam/podiatry: WNL 2/5/20\par \par \par \par \par

## 2020-12-24 ENCOUNTER — RX RENEWAL (OUTPATIENT)
Age: 65
End: 2020-12-24

## 2021-01-05 ENCOUNTER — RX RENEWAL (OUTPATIENT)
Age: 66
End: 2021-01-05

## 2021-01-05 ENCOUNTER — APPOINTMENT (OUTPATIENT)
Dept: UROLOGY | Facility: CLINIC | Age: 66
End: 2021-01-05

## 2021-01-06 ENCOUNTER — RX RENEWAL (OUTPATIENT)
Age: 66
End: 2021-01-06

## 2021-01-19 ENCOUNTER — APPOINTMENT (OUTPATIENT)
Dept: DERMATOLOGY | Facility: CLINIC | Age: 66
End: 2021-01-19
Payer: COMMERCIAL

## 2021-01-19 DIAGNOSIS — L30.8 OTHER SPECIFIED DERMATITIS: ICD-10-CM

## 2021-01-19 DIAGNOSIS — B35.4 TINEA CORPORIS: ICD-10-CM

## 2021-01-19 PROCEDURE — 99072 ADDL SUPL MATRL&STAF TM PHE: CPT

## 2021-01-19 PROCEDURE — 99203 OFFICE O/P NEW LOW 30 MIN: CPT

## 2021-01-19 NOTE — PHYSICAL EXAM
[Alert] : alert [Oriented x 3] : ~L oriented x 3 [Well Nourished] : well nourished [FreeTextEntry3] : Erythematous patches of the bilateral distal legs, right > left, with peripheral distinct scale.\par \par Xerosis with eczematous patches, of the bilateral shins.

## 2021-01-19 NOTE — HISTORY OF PRESENT ILLNESS
[FreeTextEntry1] : Rash. [de-identified] : Legs, since having COVID-19 in April, '20.  Since then, eruption has progressed, with marked pruritus.  Tx'ed by primary MD with econazole for 1 full tube, without resolution.  No  tenderness.  No self tx.

## 2021-01-19 NOTE — ASSESSMENT
[FreeTextEntry1] : Tinea corporis\par Failed econazole cream.\par Try oxistat cream qd x 4-6 weeks.\par Call if eruption persists.\par \par Bilateral shins with asteatotic eczema\par Education.\par Cutivate cream bid prn.\par AmLactin lotion daily.

## 2021-01-21 ENCOUNTER — INPATIENT (INPATIENT)
Facility: HOSPITAL | Age: 66
LOS: 5 days | Discharge: HOME HEALTH | DRG: 629 | End: 2021-01-27
Attending: HOSPITALIST | Admitting: INTERNAL MEDICINE
Payer: MEDICARE

## 2021-01-21 VITALS — WEIGHT: 171.96 LBS | HEIGHT: 67 IN

## 2021-01-21 DIAGNOSIS — Z89.429 ACQUIRED ABSENCE OF OTHER TOE(S), UNSPECIFIED SIDE: Chronic | ICD-10-CM

## 2021-01-21 DIAGNOSIS — L97.519 NON-PRESSURE CHRONIC ULCER OF OTHER PART OF RIGHT FOOT WITH UNSPECIFIED SEVERITY: ICD-10-CM

## 2021-01-21 LAB
ALBUMIN SERPL ELPH-MCNC: 2.9 G/DL — LOW (ref 3.3–5)
ALP SERPL-CCNC: 70 U/L — SIGNIFICANT CHANGE UP (ref 40–120)
ALT FLD-CCNC: 31 U/L — SIGNIFICANT CHANGE UP (ref 12–78)
ANION GAP SERPL CALC-SCNC: 6 MMOL/L — SIGNIFICANT CHANGE UP (ref 5–17)
APPEARANCE UR: ABNORMAL
APTT BLD: 30.8 SEC — SIGNIFICANT CHANGE UP (ref 27.5–35.5)
AST SERPL-CCNC: 13 U/L — LOW (ref 15–37)
BASOPHILS # BLD AUTO: 0.03 K/UL — SIGNIFICANT CHANGE UP (ref 0–0.2)
BASOPHILS NFR BLD AUTO: 0.2 % — SIGNIFICANT CHANGE UP (ref 0–2)
BILIRUB SERPL-MCNC: 0.8 MG/DL — SIGNIFICANT CHANGE UP (ref 0.2–1.2)
BILIRUB UR-MCNC: NEGATIVE — SIGNIFICANT CHANGE UP
BUN SERPL-MCNC: 27 MG/DL — HIGH (ref 7–23)
CALCIUM SERPL-MCNC: 8.8 MG/DL — SIGNIFICANT CHANGE UP (ref 8.5–10.1)
CHLORIDE SERPL-SCNC: 98 MMOL/L — SIGNIFICANT CHANGE UP (ref 96–108)
CO2 SERPL-SCNC: 26 MMOL/L — SIGNIFICANT CHANGE UP (ref 22–31)
COLOR SPEC: YELLOW — SIGNIFICANT CHANGE UP
CREAT SERPL-MCNC: 1.18 MG/DL — SIGNIFICANT CHANGE UP (ref 0.5–1.3)
DIFF PNL FLD: ABNORMAL
EOSINOPHIL # BLD AUTO: 0.02 K/UL — SIGNIFICANT CHANGE UP (ref 0–0.5)
EOSINOPHIL NFR BLD AUTO: 0.1 % — SIGNIFICANT CHANGE UP (ref 0–6)
ERYTHROCYTE [SEDIMENTATION RATE] IN BLOOD: 87 MM/HR — HIGH (ref 0–20)
GLUCOSE SERPL-MCNC: 200 MG/DL — HIGH (ref 70–99)
GLUCOSE UR QL: 250 MG/DL
HCT VFR BLD CALC: 30.7 % — LOW (ref 39–50)
HGB BLD-MCNC: 10.5 G/DL — LOW (ref 13–17)
IMM GRANULOCYTES NFR BLD AUTO: 0.4 % — SIGNIFICANT CHANGE UP (ref 0–1.5)
INR BLD: 1.35 RATIO — HIGH (ref 0.88–1.16)
KETONES UR-MCNC: NEGATIVE — SIGNIFICANT CHANGE UP
LACTATE SERPL-SCNC: 1 MMOL/L — SIGNIFICANT CHANGE UP (ref 0.7–2)
LEUKOCYTE ESTERASE UR-ACNC: ABNORMAL
LYMPHOCYTES # BLD AUTO: 0.83 K/UL — LOW (ref 1–3.3)
LYMPHOCYTES # BLD AUTO: 5.9 % — LOW (ref 13–44)
MCHC RBC-ENTMCNC: 29.2 PG — SIGNIFICANT CHANGE UP (ref 27–34)
MCHC RBC-ENTMCNC: 34.2 GM/DL — SIGNIFICANT CHANGE UP (ref 32–36)
MCV RBC AUTO: 85.3 FL — SIGNIFICANT CHANGE UP (ref 80–100)
MONOCYTES # BLD AUTO: 1.31 K/UL — HIGH (ref 0–0.9)
MONOCYTES NFR BLD AUTO: 9.3 % — SIGNIFICANT CHANGE UP (ref 2–14)
NEUTROPHILS # BLD AUTO: 11.85 K/UL — HIGH (ref 1.8–7.4)
NEUTROPHILS NFR BLD AUTO: 84.1 % — HIGH (ref 43–77)
NITRITE UR-MCNC: NEGATIVE — SIGNIFICANT CHANGE UP
PH UR: 5 — SIGNIFICANT CHANGE UP (ref 5–8)
PLATELET # BLD AUTO: 303 K/UL — SIGNIFICANT CHANGE UP (ref 150–400)
POTASSIUM SERPL-MCNC: 3.9 MMOL/L — SIGNIFICANT CHANGE UP (ref 3.5–5.3)
POTASSIUM SERPL-SCNC: 3.9 MMOL/L — SIGNIFICANT CHANGE UP (ref 3.5–5.3)
PROT SERPL-MCNC: 7.1 GM/DL — SIGNIFICANT CHANGE UP (ref 6–8.3)
PROT UR-MCNC: 30 MG/DL
PROTHROM AB SERPL-ACNC: 15.6 SEC — HIGH (ref 10.6–13.6)
RBC # BLD: 3.6 M/UL — LOW (ref 4.2–5.8)
RBC # FLD: 12.6 % — SIGNIFICANT CHANGE UP (ref 10.3–14.5)
SARS-COV-2 RNA SPEC QL NAA+PROBE: SIGNIFICANT CHANGE UP
SODIUM SERPL-SCNC: 130 MMOL/L — LOW (ref 135–145)
SP GR SPEC: 1.02 — SIGNIFICANT CHANGE UP (ref 1.01–1.02)
UROBILINOGEN FLD QL: NEGATIVE MG/DL — SIGNIFICANT CHANGE UP
WBC # BLD: 14.1 K/UL — HIGH (ref 3.8–10.5)
WBC # FLD AUTO: 14.1 K/UL — HIGH (ref 3.8–10.5)

## 2021-01-21 PROCEDURE — U0005: CPT

## 2021-01-21 PROCEDURE — 73620 X-RAY EXAM OF FOOT: CPT | Mod: RT

## 2021-01-21 PROCEDURE — 82962 GLUCOSE BLOOD TEST: CPT

## 2021-01-21 PROCEDURE — 80202 ASSAY OF VANCOMYCIN: CPT

## 2021-01-21 PROCEDURE — 93010 ELECTROCARDIOGRAM REPORT: CPT

## 2021-01-21 PROCEDURE — 88305 TISSUE EXAM BY PATHOLOGIST: CPT

## 2021-01-21 PROCEDURE — 85025 COMPLETE CBC W/AUTO DIFF WBC: CPT

## 2021-01-21 PROCEDURE — 84466 ASSAY OF TRANSFERRIN: CPT

## 2021-01-21 PROCEDURE — 87075 CULTR BACTERIA EXCEPT BLOOD: CPT

## 2021-01-21 PROCEDURE — 73620 X-RAY EXAM OF FOOT: CPT | Mod: 26,RT

## 2021-01-21 PROCEDURE — 88304 TISSUE EXAM BY PATHOLOGIST: CPT

## 2021-01-21 PROCEDURE — 83550 IRON BINDING TEST: CPT

## 2021-01-21 PROCEDURE — 87186 SC STD MICRODIL/AGAR DIL: CPT

## 2021-01-21 PROCEDURE — 83540 ASSAY OF IRON: CPT

## 2021-01-21 PROCEDURE — U0003: CPT

## 2021-01-21 PROCEDURE — A9579: CPT

## 2021-01-21 PROCEDURE — 87070 CULTURE OTHR SPECIMN AEROBIC: CPT

## 2021-01-21 PROCEDURE — 73720 MRI LWR EXTREMITY W/O&W/DYE: CPT | Mod: 26,RT

## 2021-01-21 PROCEDURE — 94640 AIRWAY INHALATION TREATMENT: CPT

## 2021-01-21 PROCEDURE — 71045 X-RAY EXAM CHEST 1 VIEW: CPT

## 2021-01-21 PROCEDURE — 87116 MYCOBACTERIA CULTURE: CPT

## 2021-01-21 PROCEDURE — 85027 COMPLETE CBC AUTOMATED: CPT

## 2021-01-21 PROCEDURE — 36415 COLL VENOUS BLD VENIPUNCTURE: CPT

## 2021-01-21 PROCEDURE — 84100 ASSAY OF PHOSPHORUS: CPT

## 2021-01-21 PROCEDURE — 73720 MRI LWR EXTREMITY W/O&W/DYE: CPT | Mod: RT

## 2021-01-21 PROCEDURE — 83036 HEMOGLOBIN GLYCOSYLATED A1C: CPT

## 2021-01-21 PROCEDURE — 87015 SPECIMEN INFECT AGNT CONCNTJ: CPT

## 2021-01-21 PROCEDURE — 88311 DECALCIFY TISSUE: CPT

## 2021-01-21 PROCEDURE — 99223 1ST HOSP IP/OBS HIGH 75: CPT | Mod: GC

## 2021-01-21 PROCEDURE — 80053 COMPREHEN METABOLIC PANEL: CPT

## 2021-01-21 PROCEDURE — 87206 SMEAR FLUORESCENT/ACID STAI: CPT

## 2021-01-21 PROCEDURE — 71045 X-RAY EXAM CHEST 1 VIEW: CPT | Mod: 26

## 2021-01-21 PROCEDURE — 80048 BASIC METABOLIC PNL TOTAL CA: CPT

## 2021-01-21 PROCEDURE — 83735 ASSAY OF MAGNESIUM: CPT

## 2021-01-21 PROCEDURE — 87102 FUNGUS ISOLATION CULTURE: CPT

## 2021-01-21 PROCEDURE — 82728 ASSAY OF FERRITIN: CPT

## 2021-01-21 PROCEDURE — 93970 EXTREMITY STUDY: CPT

## 2021-01-21 RX ORDER — LOSARTAN POTASSIUM 100 MG/1
100 TABLET, FILM COATED ORAL DAILY
Refills: 0 | Status: DISCONTINUED | OUTPATIENT
Start: 2021-01-21 | End: 2021-01-26

## 2021-01-21 RX ORDER — INSULIN GLARGINE 100 [IU]/ML
42 INJECTION, SOLUTION SUBCUTANEOUS AT BEDTIME
Refills: 0 | Status: DISCONTINUED | OUTPATIENT
Start: 2021-01-21 | End: 2021-01-21

## 2021-01-21 RX ORDER — INSULIN GLARGINE 100 [IU]/ML
42 INJECTION, SOLUTION SUBCUTANEOUS AT BEDTIME
Refills: 0 | Status: DISCONTINUED | OUTPATIENT
Start: 2021-01-21 | End: 2021-01-22

## 2021-01-21 RX ORDER — METFORMIN HYDROCHLORIDE 850 MG/1
1000 TABLET ORAL
Refills: 0 | Status: DISCONTINUED | OUTPATIENT
Start: 2021-01-21 | End: 2021-01-22

## 2021-01-21 RX ORDER — PREGABALIN 225 MG/1
1000 CAPSULE ORAL DAILY
Refills: 0 | Status: DISCONTINUED | OUTPATIENT
Start: 2021-01-21 | End: 2021-01-27

## 2021-01-21 RX ORDER — VANCOMYCIN HCL 1 G
1000 VIAL (EA) INTRAVENOUS ONCE
Refills: 0 | Status: COMPLETED | OUTPATIENT
Start: 2021-01-21 | End: 2021-01-21

## 2021-01-21 RX ORDER — INSULIN LISPRO 100/ML
VIAL (ML) SUBCUTANEOUS AT BEDTIME
Refills: 0 | Status: DISCONTINUED | OUTPATIENT
Start: 2021-01-21 | End: 2021-01-27

## 2021-01-21 RX ORDER — ASCORBIC ACID 60 MG
1000 TABLET,CHEWABLE ORAL DAILY
Refills: 0 | Status: DISCONTINUED | OUTPATIENT
Start: 2021-01-21 | End: 2021-01-27

## 2021-01-21 RX ORDER — ACETAMINOPHEN 500 MG
650 TABLET ORAL ONCE
Refills: 0 | Status: COMPLETED | OUTPATIENT
Start: 2021-01-21 | End: 2021-01-22

## 2021-01-21 RX ORDER — HYDROCHLOROTHIAZIDE 25 MG
25 TABLET ORAL DAILY
Refills: 0 | Status: DISCONTINUED | OUTPATIENT
Start: 2021-01-21 | End: 2021-01-22

## 2021-01-21 RX ORDER — CEFTRIAXONE 500 MG/1
1000 INJECTION, POWDER, FOR SOLUTION INTRAMUSCULAR; INTRAVENOUS ONCE
Refills: 0 | Status: COMPLETED | OUTPATIENT
Start: 2021-01-21 | End: 2021-01-21

## 2021-01-21 RX ORDER — ATORVASTATIN CALCIUM 80 MG/1
40 TABLET, FILM COATED ORAL DAILY
Refills: 0 | Status: DISCONTINUED | OUTPATIENT
Start: 2021-01-21 | End: 2021-01-27

## 2021-01-21 RX ORDER — FLUTICASONE PROPIONATE 50 MCG
1 SPRAY, SUSPENSION NASAL
Refills: 0 | Status: DISCONTINUED | OUTPATIENT
Start: 2021-01-21 | End: 2021-01-27

## 2021-01-21 RX ORDER — GLUCAGON INJECTION, SOLUTION 0.5 MG/.1ML
1 INJECTION, SOLUTION SUBCUTANEOUS ONCE
Refills: 0 | Status: DISCONTINUED | OUTPATIENT
Start: 2021-01-21 | End: 2021-01-27

## 2021-01-21 RX ORDER — CEFTRIAXONE 500 MG/1
1000 INJECTION, POWDER, FOR SOLUTION INTRAMUSCULAR; INTRAVENOUS ONCE
Refills: 0 | Status: DISCONTINUED | OUTPATIENT
Start: 2021-01-21 | End: 2021-01-21

## 2021-01-21 RX ORDER — INSULIN LISPRO 100/ML
VIAL (ML) SUBCUTANEOUS
Refills: 0 | Status: DISCONTINUED | OUTPATIENT
Start: 2021-01-21 | End: 2021-01-25

## 2021-01-21 RX ORDER — ASPIRIN/CALCIUM CARB/MAGNESIUM 324 MG
81 TABLET ORAL DAILY
Refills: 0 | Status: DISCONTINUED | OUTPATIENT
Start: 2021-01-21 | End: 2021-01-27

## 2021-01-21 RX ORDER — AMLODIPINE BESYLATE 2.5 MG/1
10 TABLET ORAL DAILY
Refills: 0 | Status: DISCONTINUED | OUTPATIENT
Start: 2021-01-21 | End: 2021-01-26

## 2021-01-21 RX ORDER — CARVEDILOL PHOSPHATE 80 MG/1
25 CAPSULE, EXTENDED RELEASE ORAL
Refills: 0 | Status: DISCONTINUED | OUTPATIENT
Start: 2021-01-21 | End: 2021-01-27

## 2021-01-21 RX ORDER — SODIUM CHLORIDE 9 MG/ML
1000 INJECTION, SOLUTION INTRAVENOUS
Refills: 0 | Status: DISCONTINUED | OUTPATIENT
Start: 2021-01-21 | End: 2021-01-27

## 2021-01-21 RX ORDER — RANOLAZINE 500 MG/1
500 TABLET, FILM COATED, EXTENDED RELEASE ORAL
Refills: 0 | Status: DISCONTINUED | OUTPATIENT
Start: 2021-01-21 | End: 2021-01-27

## 2021-01-21 RX ADMIN — CEFTRIAXONE 1000 MILLIGRAM(S): 500 INJECTION, POWDER, FOR SOLUTION INTRAMUSCULAR; INTRAVENOUS at 20:16

## 2021-01-21 RX ADMIN — Medication 250 MILLIGRAM(S): at 18:37

## 2021-01-21 NOTE — H&P ADULT - ASSESSMENT
#T2DM   -        #T2DM   - Last seen by endocrinologist Dr. Emani Dixon on 12/14/20   - Lantus adjusted from 35 units at bedtime to currently 42 units at bedtime   - On metformin 1000 mg BID - will hold inpatient   - Continue Lantus 42 units QHS   - Continue ISS   - Carb consistent diet     #Gross Hematuria   - UA: few WBCs, few bacteria, small LE, and moderate blood   - Seen by Dr. Sohail Pagan on 12/10/20 for similar complaint   --> At the time history he provided sounded like urolithiasis   -->Cystoscopy was recommended to rule out bladder pathology   --> CT urogram was recommended to evaluate for nephroureteral stones/malignancy, however held off due to elevated Cr. Recommended to have one done when Cr back to normal.     #Asteatotic eczema   - seen by dermatologist Dr. Jean-Pierre Avitia on 1/19/21  - Recommended to start fluticasone proprionate cr     #CAD  - Continue     #T2DM   - Last seen by endocrinologist Dr. Emani Dixon on 12/14/20   - Lantus adjusted from 35 units at bedtime to currently 42 units at bedtime   - On metformin 1000 mg BID - will hold inpatient   - Continue Lantus 42 units QHS   - Continue ISS   - Carb consistent diet     #Gross Hematuria   - UA: few WBCs, few bacteria, small LE, and moderate blood   - Seen by Dr. Sohail Pagan on 12/10/20 for similar complaint   --> At the time history he provided sounded like urolithiasis   -->Cystoscopy was recommended to rule out bladder pathology   --> CT urogram was recommended to evaluate for nephroureteral stones/malignancy, however held off due to elevated Cr. Recommended to have one done when Cr back to normal.     #Asteatotic eczema   - seen by dermatologist Dr. Jean-Pierre Avitia on 1/19/21  - Recommended to start fluticasone propionate 0.05% external cream        #Gross Hematuria   - UA: few WBCs, few bacteria, small LE, and moderate blood   - Seen by Dr. Sohail Pagan on 12/10/20 for similar complaint   --> At the time history he provided sounded like urolithiasis   -->Cystoscopy was recommended to rule out bladder pathology   --> CT urogram was recommended to evaluate for nephroureteral stones/malignancy, however held off due to elevated Cr. Recommended to have one done when Cr back to normal.     #CAD  - Continue aspirin 81 mg daily   - Continue atorvastatin 40 mg daily   - Continue carvedilol 25 mg BID  - Continue ranolazine 500 mg BID    #T2DM   - Last seen by endocrinologist Dr. Emani Dixon on 12/14/20   - Lantus adjusted from 35 units at bedtime to currently 42 units at bedtime   - On metformin 1000 mg BID - will hold inpatient   - Continue Lantus 42 units QHS   - Continue ISS   - Carb consistent diet     #HLD  - Continue atorvastatin 40 mg daily     #HTN   - Continue amlodipine 10 mg daily   - Continue losartan 100 mg daily   - On hydrochlorothiazide 25 mg daily; consider holding due to low sodium levels     #Normocytic Anemia   - RBCs: 3.60 ; H/H: 10.5/30.7   - F/u AM iron studies     #Asteatotic eczema   - seen by dermatologist Dr. Jean-Pierre Avitia on 1/19/21  - Recommended to start fluticasone propionate 0.05% external cream    #Diet   - Carb consistent     #DVT ppx   - IMPROVE score   - SCDs     #Advance Directives   -     #Disposition   - pending improvement in symptoms           #Gross Hematuria   - Now resolved; noticed yesterday that his urine was tinged with blood but no longer seen this AM; denies any flank  pain   - UA: few WBCs, few bacteria, small LE, and moderate blood   - Seen by Dr. Sohail Pagan on 12/10/20 for similar complaint   --> At the time history he provided sounded like urolithiasis   -->Cystoscopy was recommended to rule out bladder pathology   --> CT urogram was recommended to evaluate for nephroureteral stones/malignancy, however held off due to elevated Cr. Recommended to have one done when Cr back to normal.   - F/u Urology consult     #CAD  - Continue aspirin 81 mg daily   - Continue atorvastatin 40 mg daily   - Continue carvedilol 25 mg BID  - Continue ranolazine 500 mg BID    #T2DM   - Last seen by endocrinologist Dr. Emani Dixon on 12/14/20   - Lantus adjusted from 35 units at bedtime to currently 42 units at bedtime   - On metformin 1000 mg BID - will hold inpatient   - Continue Lantus 42 units QHS   - Continue ISS   - Carb consistent diet     #HLD  - Continue atorvastatin 40 mg daily     #HTN   - Continue amlodipine 10 mg daily   - Continue losartan 100 mg daily   - On hydrochlorothiazide 25 mg daily; consider holding due to low sodium levels     #Normocytic Anemia   - RBCs: 3.60 ; H/H: 10.5/30.7   - F/u AM iron studies     #Asteatotic eczema   - seen by dermatologist Dr. Jean-Pierre Avitia on 1/19/21  - Recommended to start fluticasone propionate 0.05% external cream    #Diet   - Carb consistent     #DVT ppx   - IMPROVE score   - SCDs     #Advance Directives   -     #Disposition   - pending improvement in symptoms           #Gross Hematuria   - Now resolved; noticed yesterday that his urine was tinged with blood but no longer seen this AM; denies any flank  pain   - UA: few WBCs, few bacteria, small LE, and moderate blood   - Seen by Dr. Sohail Pagan on 12/10/20 for similar complaint expect at that time he had L flank pain   --> At the time history he provided sounded like urolithiasis   -->Cystoscopy was recommended to rule out bladder pathology   --> CT urogram was recommended to evaluate for nephroureteral stones/malignancy, however held off due to elevated Cr. Recommended to have one done when Cr back to normal.   --> Patient was unable to complete these tests due to weather conditions and scheduling conflicts   - F/u Urology consult     #Hyponatremia   - Na: 130   - Pt asymptomatic - denies nausea/headache/confusion/diarrhea/fatigue/excessive thirst/spasms/cramps   - His cardiologist recently increased his HCTZ dose from 12.5 mg to 25 mg two days ago, this is likely the cause   - Consider reducing/holding off on HCTZ while in hospital     #Normocytic Anemia   - RBCs: 3.60 ; H/H: 10.5/30.7   - F/u AM iron studies     #Asteatotic eczema of R lower extremity   - seen by dermatologist Dr. Jean-Pierre Avitia on 1/19/21  - Recommended to start fluticasone propionate 0.05% external cream    #Tinea corporis of L lower extremity   - Start on terbinafine cream - apply to affected area once daily for 1 week     #CAD  - Continue aspirin 81 mg daily   - Continue atorvastatin 40 mg daily   - Continue carvedilol 25 mg BID  - Continue ranolazine 500 mg BID    #T2DM   - Last seen by endocrinologist Dr. Emani Dixon on 12/14/20   - Lantus adjusted from 35 units at bedtime to currently 42 units at bedtime   - On metformin 1000 mg BID - will hold inpatient   - Continue Lantus 42 units QHS   - Continue ISS   - Carb consistent diet     #HLD  - Continue atorvastatin 40 mg daily     #HTN   - Continue amlodipine 10 mg daily   - Continue losartan 100 mg daily   - On hydrochlorothiazide 25 mg daily; consider holding or reducing dose due to low sodium levels     #Normocytic Anemia   - RBCs: 3.60 ; H/H: 10.5/30.7   - F/u AM iron studies     #Asteatotic eczema   - seen by dermatologist Dr. Jean-Pierre Avitia on 1/19/21  - Recommended to start fluticasone propionate 0.05% external cream    #Diet   - Carb consistent     #DVT ppx   - IMPROVE score: 1  - SCDs     #Advance Directives   - Full code     #Disposition   - pending improvement in symptoms         #     #Gross Hematuria   - Now resolved; noticed yesterday that his urine was tinged with blood but no longer seen this AM; denies any flank  pain   - UA: few WBCs, few bacteria, small LE, and moderate blood   - Seen by Dr. Sohail Pagan on 12/10/20 for similar complaint expect at that time he had L flank pain   --> At the time history he provided sounded like urolithiasis   -->Cystoscopy was recommended to rule out bladder pathology   --> CT urogram was recommended to evaluate for nephroureteral stones/malignancy, however held off due to elevated Cr. Recommended to have one done when Cr back to normal.   --> Patient was unable to complete these tests due to weather conditions and scheduling conflicts   - F/u Urology consult     #Hyponatremia   - Na: 130   - Pt asymptomatic - denies nausea/headache/confusion/diarrhea/fatigue/excessive thirst/spasms/cramps   - His cardiologist recently increased his HCTZ dose from 12.5 mg to 25 mg two days ago, this is likely the cause   - Consider reducing/holding off on HCTZ while in hospital     #Normocytic Anemia   - RBCs: 3.60 ; H/H: 10.5/30.7   - F/u AM iron studies     #Asteatotic eczema of R lower extremity   - seen by dermatologist Dr. Jean-Pierre Avitia on 1/19/21  - Recommended to start fluticasone propionate 0.05% external cream    #Tinea corporis of L lower extremity   - Start on clotrimazole cream - apply to affected area twice daily for 4 weeks     #CAD  - Continue aspirin 81 mg daily   - Continue atorvastatin 40 mg daily   - Continue carvedilol 25 mg BID  - Continue ranolazine 500 mg BID    #T2DM   - Last seen by endocrinologist Dr. Emani Dixon on 12/14/20   - Lantus adjusted from 35 units at bedtime to currently 42 units at bedtime   - On metformin 1000 mg BID - will hold inpatient   - Continue Lantus 42 units QHS   - Continue ISS   - Carb consistent diet     #HLD  - Continue atorvastatin 40 mg daily     #HTN   - Continue amlodipine 10 mg daily   - Continue losartan 100 mg daily   - On hydrochlorothiazide 25 mg daily; consider holding or reducing dose due to low sodium levels     #Diet   - Carb consistent     #DVT ppx   - IMPROVE score: 1  - SCDs     #Advance Directives   - Full code     #Disposition   - pending improvement in symptoms         65 y.o M with extensive PMH of DM, PAD, CAD, HTN, and HLD presenting with erythematous R foot with notable drainage found to have elevated WBC admitted for cellulitis of R foot.     # Suspected Cellulitis of R   - Pt with warm, erythematous R foot and notable drainage from 1st metarsal; WBC 14.10 (acute neutrophils 11.85)  - s/p ceftriaxone x1 and vancomycin x1 in the ED   - XR of R foot: There is soft tissue prominence along the dorsum of the foot.  - MRI of R foot:   1. Nonspecific marrow edema and enhancement involves the bipartite medial hallux sesamoid with similar appearance to the prior, however, nearby plantar soft tissue wound is present. This may reflect persistence of previously noted sesamoiditis versus osteomyelitis.  2. There are presumed reactive marrow changes involving the first metatarsal head and lateral hallux sesamoid although early infection cannot be absolutely excluded.  - F/u ID consult in AM    #Absence of dorsalis pedis/posterior tibial pulse of R foot   - Pt with hx of PAD  - F/u vascular consult in AM    #Gross Hematuria   - Now resolved; noticed yesterday that his urine was tinged with blood but no longer seen this AM; denies any flank  pain   - UA: few WBCs, few bacteria, small LE, and moderate blood   - Seen by Dr. Sohail Pagan on 12/10/20 for similar complaint expect at that time he had L flank pain   --> At the time history he provided sounded like urolithiasis   -->Cystoscopy was recommended to rule out bladder pathology   --> CT urogram was recommended to evaluate for nephroureteral stones/malignancy, however held off due to elevated Cr. Recommended to have one done when Cr back to normal.   --> Patient was unable to complete these tests due to weather conditions and scheduling conflicts   - F/u Urology consult     #Hyponatremia   - Na: 130   - Pt asymptomatic - denies nausea/headache/confusion/diarrhea/fatigue/excessive thirst/spasms/cramps   - His cardiologist recently increased his HCTZ dose from 12.5 mg to 25 mg two days ago, this is likely the cause   - Consider reducing/holding off on HCTZ while in hospital     #Normocytic Anemia   - RBCs: 3.60 ; H/H: 10.5/30.7   - F/u AM iron studies     #Asteatotic eczema of R lower extremity   - seen by dermatologist Dr. Jean-Pierre Avitia on 1/19/21  - Recommended to start fluticasone propionate 0.05% external cream    #Tinea corporis of L lower extremity   - Start on clotrimazole cream - apply to affected area twice daily for 4 weeks     #CAD  - Continue aspirin 81 mg daily   - Continue atorvastatin 40 mg daily   - Continue carvedilol 25 mg BID  - Continue ranolazine 500 mg BID    #T2DM   - Last seen by endocrinologist Dr. Emani Dixon on 12/14/20   - Lantus adjusted from 35 units at bedtime to currently 42 units at bedtime   - On metformin 1000 mg BID - will hold inpatient   - Continue Lantus 42 units QHS   - Continue ISS   - Carb consistent diet     #HLD  - Continue atorvastatin 40 mg daily     #HTN   - Continue amlodipine 10 mg daily   - Continue losartan 100 mg daily   - On hydrochlorothiazide 25 mg daily; consider holding or reducing dose due to low sodium levels     #Diet   - Carb consistent     #DVT ppx   - IMPROVE score: 1  - SCDs     #Advance Directives   - Full code     #Disposition   - pending improvement in symptoms         65 y.o M with extensive PMH of DM, PAD, CAD, HTN, and HLD presenting with erythematous R foot with notable drainage found to have elevated WBC and ESR admitted for cellulitis of R foot.     # Suspected Cellulitis of R   - Pt with warm, erythematous R foot and notable drainage from 1st metarsal; WBC 14.10 (acute neutrophils 11.85), ESR 87  - s/p ceftriaxone x1 and vancomycin x1 in the ED   - XR of R foot: There is soft tissue prominence along the dorsum of the foot.  - MRI of R foot:   1. Nonspecific marrow edema and enhancement involves the bipartite medial hallux sesamoid with similar appearance to the prior, however, nearby plantar soft tissue wound is present. This may reflect persistence of previously noted sesamoiditis versus osteomyelitis.  2. There are presumed reactive marrow changes involving the first metatarsal head and lateral hallux sesamoid although early infection cannot be absolutely excluded.  - Will start on IV Zosyn   - F/u ID consult in AM    #Absence of dorsalis pedis/posterior tibial pulse of R foot   - Pt with hx of PAD  - F/u vascular consult in AM    #Gross Hematuria   - Now resolved; noticed yesterday that his urine was tinged with blood but no longer seen this AM; denies any flank  pain   - UA: few WBCs, few bacteria, small LE, and moderate blood   - Seen by Dr. Sohail Pagan on 12/10/20 for similar complaint expect at that time he had L flank pain   --> At the time history he provided sounded like urolithiasis   -->Cystoscopy was recommended to rule out bladder pathology   --> CT urogram was recommended to evaluate for nephroureteral stones/malignancy, however held off due to elevated Cr. Recommended to have one done when Cr back to normal.   --> Patient was unable to complete these tests due to weather conditions and scheduling conflicts   - F/u Urology consult   - F/u AM CBC, H/H    #Hyponatremia   - Na: 130   - Pt asymptomatic - denies nausea/headache/confusion/diarrhea/fatigue/excessive thirst/spasms/cramps   - His cardiologist recently increased his HCTZ dose from 12.5 mg to 25 mg two days ago, this is likely the cause   - Will hold off from HCTZ in hospital     #Normocytic Anemia   - RBCs: 3.60 ; H/H: 10.5/30.7   - F/u AM iron studies   - F/u AM CBC and H/H    #Asteatotic eczema of R lower extremity   - seen by dermatologist Dr. Jean-Pierre Avitia on 1/19/21  - Recommended to start fluticasone propionate 0.05% external cream    #Tinea corporis of L lower extremity   - Start on clotrimazole cream - apply to affected area twice daily for 4 weeks     #CAD  - Continue aspirin 81 mg daily   - Continue atorvastatin 40 mg daily   - Continue carvedilol 25 mg BID  - Continue ranolazine 500 mg BID    #T2DM   - Last seen by endocrinologist Dr. Emani Dixon on 12/14/20   - Lantus adjusted from 35 units at bedtime to currently 42 units at bedtime   - On metformin 1000 mg BID - will hold inpatient   - Continue Lantus 42 units QHS   - Continue ISS   - Carb consistent diet     #HLD  - Continue atorvastatin 40 mg daily     #HTN   - Continue amlodipine 10 mg daily   - Continue losartan 100 mg daily   - On hydrochlorothiazide 25 mg daily; hold in hospital     #Diet   - Carb consistent     #DVT ppx   - IMPROVE score: 1  - SCDs     #Advance Directives   - Full code     #Disposition   - pending improvement in symptoms

## 2021-01-21 NOTE — ED STATDOCS - OBJECTIVE STATEMENT
64 y/o M with PMHx of HTN, HLD, and DM presents to the ED sent by podiatrist c/o +wound to R heel worsening x2 weeks with associated +pain and +drainage. Also notes intermittent +fevers. NKDA. PCP: Dr. Genaro Luna. Podiatrist: Dr. Jeffrey Bullock. 66 y/o M with PMHx of HTN, HLD, and DM presents to the ED sent by podiatrist c/o +wound to R foot worsening x2 weeks with associated +pain and +drainage. Also notes intermittent +fevers. NKDA. PCP: Dr. Genaro Luna. Podiatrist: Dr. Jeffrey Bullock.

## 2021-01-21 NOTE — H&P ADULT - NSHPLABSRESULTS_GEN_ALL_CORE
EKG as per my review - sinus rhythm, rate~75, normal axis, normal DE interval, normal QRS interval, normal QT interval, no ST elevations or T wave inversions appreciated

## 2021-01-21 NOTE — ED STATDOCS - SKIN, MLM
+R foot wound wrapped, dirty dressing draining, oozing pus, NVI distally +wound underneath R first toe at ball of foot spreading to the medial foot with area of discoloration to medial foot, NVI distally

## 2021-01-21 NOTE — H&P ADULT - NSHPOUTPATIENTPROVIDERS_GEN_ALL_CORE
PCP: Dr. Genaro Luna   Podiatry: Dr. Jeffrey Bullock   Vascular: Dr. Moi Arndt   Cardiology: Dr. Theodore Franco   Urology: Dr. Sohail Pagan

## 2021-01-21 NOTE — PROGRESS NOTE ADULT - ASSESSMENT
Pt / PVD T2DM PSN advise admit for ABX, off load ID & Vascular consult. Will do MRI Right foot r/o osteomyelitis.  Admit Hospitalist Service.

## 2021-01-21 NOTE — H&P ADULT - NSHPPHYSICALEXAM_GEN_ALL_CORE
GENERAL: NAD, lying in bed comfortably  HEAD:  Atraumatic, Normocephalic  EYES: EOMI, conjunctiva clear, sclera white   ENT: Moist mucous membranes  NECK: Supple, No JVD  CHEST/LUNG: Clear to auscultation bilaterally; No rales, rhonchi, wheezing, or rubs.   HEART: Regular rate and rhythm; No murmurs, rubs, or gallops  ABDOMEN: Bowel sounds present; Soft, Nontender, Nondistended.   EXTREMITIES:  no dorsalis pedis/posterior tibial pulses in R foot; diminished pulses in L foot. R foot with necrotic wound to the 1st metatarsal. 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 R leg with pruritic, dry, cracked, and polygonally fissured skin with irregular scaling. L leg with ring-shaped rash, red and slightly raised.   NERVOUS SYSTEM:  Alert & Oriented X3, speech clear. No deficits   MSK: FROM all 4 extremities, full and equal strength

## 2021-01-21 NOTE — H&P ADULT - NSHPSOCIALHISTORY_GEN_ALL_CORE
- , lives with wife   - Works in [employer]   - ETOH:   - Tobacco:   - Denies recreational drug use - , lives with wife   - Works in [employer]   - ETOH: denies alcohol use   - Tobacco: used to smoke in his 20s, has not since then   - Denies recreational drug use

## 2021-01-21 NOTE — PROGRESS NOTE ADULT - SUBJECTIVE AND OBJECTIVE BOX
PODIATRIC SX ED H & PE: Patient is a 65y old  Male who was seen in the office today with increased drainage & erythema to right foot. He states that hepicked a "callous" to his foot & Tx it with local antibiotic ointment. No fever or chills. 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 PMHx includes T2DM / Insulin x yrs HLD PSN HTN PMD MEHRDAD Franco  HPI:      Allergies    No Known Allergies    Intolerances        MEDICATIONS  (STANDING):  cefTRIAXone Injectable. 1000 milliGRAM(s) IV Push Once  vancomycin  IVPB 1000 milliGRAM(s) IV Intermittent once    MEDICATIONS  (PRN): Metformin 1000 mg twice a day Carvedilol 25 mg twice a day HCTZ 25 mg q day Amlodipine 10 mg Q day Atorvastatin 40 mg hs Losartan 100 mg q day Rapolazine 500 mg twice a day B12 Vit C 1000 mg q day   Vit D 1000 U q day ASA 81 mg q day      PHYSICAL EXAM: Anxious W M Right foot nonpalpable DP/PT pulses, left foot weak DP pulse edema R >L forefoot cap fill 3 seconds no rubor/pallor DTRs/STRs muted to lower extremities, SWM %.07 muted to 4 plantar sites Right foot with diaining sinus tract under 1st metatarsal head / coin shaped necrotic patch medial 1st metatarsal head local drainage & cellulitis to forefoot.      Constitutional:    Eyes: clear moist    ENMT:no tinnitus    Neck:No DJV    Breasts: defer    Back:no LBP    Respiratory: no cough    Cardiovascular:no SOB    Gastrointestinal: no nausea    Genitourinary: no frequency    Rectal: defer    Extremities: weakness    Vascular: PVD    Neurological: muted sensorium    Skin: DFU    Lymph Nodes:none    Musculoskeletal:weakness    Psychiatric:?        RADIOLOGY < from: Xray Foot AP + Lateral, Right (01.21.21 @ 15:51) >    EXAM:  XR FOOT 2 VIEWS RT                            PROCEDURE DATE:  01/21/2021          INTERPRETATION:  RIGHT FOOT: AP, lateral, and oblique views    CLINICAL HISTORY: Right foot ulcer.    COMPARISON: None Available    FINDINGS:    There is softtissue prominence along the dorsum of the foot.    The alignment at the tarsometatarsal joints remains within normal limits.  No acute fracture or focal osteolysis is noted.    Calcaneal enthesopathy is noted, including ossification along the plantarsurface of the foot.    Impression:    Findings as discussed above.                      PEPITO PRUETT MD; Attending Radiologist  This document has been electronically signed. Jan 21 2021  4:45PM    < end of copied text >  Xray reviewed No Fx gas or focal bone destruction.

## 2021-01-21 NOTE — H&P ADULT - NSICDXPASTMEDICALHX_GEN_ALL_CORE_FT
PAST MEDICAL HISTORY:  Basal cell carcinoma Of face   s/p excision Oct 2017    BPH (Benign Prostatic Hyperplasia)     CAD (coronary atherosclerotic disease)     Diabetes mellitus     Hypercholesteremia     Hypertension     PAD (peripheral artery disease)

## 2021-01-21 NOTE — H&P ADULT - ATTENDING COMMENTS
64 y/o M PMHx as noted above presents to  for further evaluation and management of severe right foot erythema found to have notable drainage, leukocytosis, and an elevated sedimentation rate concerning for underlying osteomyelitis.    #Right Foot Cellultis  #Suspected Osteomyelitis of the Right Foot  ~f/u PAN C+S  ~cont. IV abx  ~f/u MRI of Right foot:   ~f/u w/ ID consultation in the am    #Absence of dorsalis pedis/posterior tibial pulse of R foot   ~patient with known hx for PAD  ~f/u w/ Vascular surgery in the am    #Gross Hematuria   ~f/u w/ Urology in the am  ~patient will likely require cystoscopy as was recommended to rule out bladder pathology   ~f/u serial CBCs    #Hyponatremia   ~as noted above will hold off HCTZ for now  ~f/u am albs     #CAD  ~cont. Aspirin 81 mg po daily   ~cont. Carvedilol 25 mg po bid  ~cont. Ranolazine 500 mg po bid    #Diabetes Mellitus type 2  ~FS qAC/HS  ~cont. Basal/Bolus insulin regimen  ~cont. ADA diet    #Hyperlipidemia  ~cont. Atorvastatin 40 mg po qhs     #Hypertension  ~cont. Amlodipine 10 mg po daily   ~cont. Losartan 100 mg po daily     #Vte ppx  ~IMPROVE Vte Risk Score is 1  ~cont. SCDs

## 2021-01-21 NOTE — ED ADULT TRIAGE NOTE - CHIEF COMPLAINT QUOTE
sent in by dr. king for right foot cellulitis, need ID consult and vascular consult with dr. nixon, pt states wound is draining, denies fever, c/o pain to right foot

## 2021-01-21 NOTE — H&P ADULT - HISTORY OF PRESENT ILLNESS
In the ED vitals were /65 HR 84 RR 18 T 100.4 SpO2 98% RA. He received IV ceftriaxone 1g x1 and IV vancomycin 1g x1.  Patient is a 64 y/o M with a PMH of HTN, HLD, T2DM, PAD, CAD, and BPH presenting to the ED after he was seen in     In the ED vitals were /65 HR 84 RR 18 T 100.4 SpO2 98% RA. He received IV ceftriaxone 1g x1 and IV vancomycin 1g x1.  Patient is a 64 y/o M with a PMH of HTN, HLD, T2DM, PAD, CAD, and BPH presenting to the ED after he was seen in Dr. Bullock's office. About 2 weeks ago patient noticed a "callous' on the plantar surface near the ball of his right foot. He started to treat on his own by applying neosporin and band aids to the area. He reports that over the course of the week he noted improvement with this. However, one week ago while putting his pants on, the band-aid got caught in his pant leg and "ripped off the callous". He did not seek medical attention at the time since he did not feel any pain (due to diabetic neuropathy in bilateral legs0    In the ED vitals were /65 HR 84 RR 18 T 100.4 SpO2 98% RA. He received IV ceftriaxone 1g x1 and IV vancomycin 1g x1.  Patient is a 64 y/o M with a PMH of HTN, HLD, T2DM, PAD, CAD, and BPH presenting to the ED after he was seen in Dr. Bullock's office. About 2 weeks ago patient noticed a "callous' on the plantar surface near the ball of his right foot. He started to treat on his own by applying neosporin and band aids to the area. He reports that over the course of the week he noted improvement with this. However, one week ago while putting his pants on, the band-aid got caught in his pant leg and "ripped off the callous". He did not seek medical attention at the time since he did not feel any pain (due to diabetic neuropathy in bilateral legs). He saw his podiatrist today due to increased drainage and erythema to the R foot. He denies any fever or chills. He is able to bear weight on both feet. He also mentions noticing some blood in his urine yesterday. He saw his urologist about 1 month ago for a similar complaint.     In the ED vitals were /65 HR 84 RR 18 T 100.4 SpO2 98% RA. He received IV ceftriaxone 1g x1 and IV vancomycin 1g x1.

## 2021-01-21 NOTE — ED STATDOCS - PROGRESS NOTE DETAILS
signed Jammie Pace PA-C Pt seen initially in intake by Dr Butt.   65M sent for admission, IV abx, MRI of right foot wound by podiatry Dr Cisneros. PMH HTN, HLD, DM. Pt alert, NAD. signed Jammie Pace PA-C Pt seen initially in intake by Dr Butt.   65M sent for admission, IV abx, MRI of right foot wound by podiatry Dr Cisneros. PMH HTN, HLD, DM. Pt alert, NAD. Pt agrees with admission and plan of care. signed Jammie Pace PA-C   Case discussed with and admission accepted by hospitalist Dr. Swann. Pt seen in ED by Dr Cisneros.

## 2021-01-21 NOTE — ED ADULT NURSE NOTE - OBJECTIVE STATEMENT
Pt heaving right foot cellulitis with open wound to his right bottom of the foot. Pt was send by Dr Starr for admission. Left foot  2nd toe amputation in the past.

## 2021-01-21 NOTE — H&P ADULT - NSHPREVIEWOFSYSTEMS_GEN_ALL_CORE
REVIEW OF SYSTEMS:  CONSTITUTIONAL: No weakness, fevers or chills  RESPIRATORY: No cough, wheezing, hemoptysis; No shortness of breath  CARDIOVASCULAR: No chest pain or palpitations  GASTROINTESTINAL: No abdominal or epigastric pain. No nausea, vomiting, or hematemesis; No diarrhea or constipation. No melena or hematochezia.  GENITOURINARY: mild dysuria with some hesitancy. + hematuria    NEUROLOGICAL: No numbness or weakness  PSYCH: Denies Insomnia, loss of interest in activities, feelings of guilt, decreased energy, difficulty concentrating, decreased appetite, SI/HI  SKIN: + dry skin, +rash   All other review of systems is negative unless indicated above

## 2021-01-21 NOTE — H&P ADULT - NSICDXPASTSURGICALHX_GEN_ALL_CORE_FT
PAST SURGICAL HISTORY:  No Past Surgical History      PAST SURGICAL HISTORY:  History of amputation of toe L foot 2nd toe amputation

## 2021-01-21 NOTE — ED STATDOCS - NS_ ATTENDINGSCRIBEDETAILS _ED_A_ED_FT
I, Richy Butt MD,  performed the initial face to face bedside interview with this patient regarding history of present illness, review of symptoms and relevant past medical, social and family history.  I completed an independent physical examination.    The history, relevant review of systems, past medical and surgical history, medical decision making, and physical examination was documented by the scribe in my presence and I attest to the accuracy of the documentation.

## 2021-01-21 NOTE — ED STATDOCS - CPE ED EYE NORM
bilateral normal... Minocycline Counseling: Patient advised regarding possible photosensitivity and discoloration of the teeth, skin, lips, tongue and gums.  Patient instructed to avoid sunlight, if possible.  When exposed to sunlight, patients should wear protective clothing, sunglasses, and sunscreen.  The patient was instructed to call the office immediately if the following severe adverse effects occur:  hearing changes, easy bruising/bleeding, severe headache, or vision changes.  The patient verbalized understanding of the proper use and possible adverse effects of minocycline.  All of the patient's questions and concerns were addressed.

## 2021-01-22 ENCOUNTER — TRANSCRIPTION ENCOUNTER (OUTPATIENT)
Age: 66
End: 2021-01-22

## 2021-01-22 LAB
A1C WITH ESTIMATED AVERAGE GLUCOSE RESULT: 8.8 % — HIGH (ref 4–5.6)
ANION GAP SERPL CALC-SCNC: 8 MMOL/L — SIGNIFICANT CHANGE UP (ref 5–17)
BUN SERPL-MCNC: 27 MG/DL — HIGH (ref 7–23)
CALCIUM SERPL-MCNC: 8.5 MG/DL — SIGNIFICANT CHANGE UP (ref 8.5–10.1)
CHLORIDE SERPL-SCNC: 100 MMOL/L — SIGNIFICANT CHANGE UP (ref 96–108)
CO2 SERPL-SCNC: 26 MMOL/L — SIGNIFICANT CHANGE UP (ref 22–31)
CREAT SERPL-MCNC: 1.23 MG/DL — SIGNIFICANT CHANGE UP (ref 0.5–1.3)
CRP SERPL-MCNC: 16.4 MG/DL — HIGH (ref 0–0.4)
CULTURE RESULTS: SIGNIFICANT CHANGE UP
ESTIMATED AVERAGE GLUCOSE: 206 MG/DL — HIGH (ref 68–114)
FERRITIN SERPL-MCNC: 409 NG/ML — HIGH (ref 30–400)
GLUCOSE SERPL-MCNC: 199 MG/DL — HIGH (ref 70–99)
HCT VFR BLD CALC: 26.7 % — LOW (ref 39–50)
HGB BLD-MCNC: 9.1 G/DL — LOW (ref 13–17)
IRON SATN MFR SERPL: 19 UG/DL — LOW (ref 45–165)
IRON SATN MFR SERPL: 9 % — LOW (ref 16–55)
MAGNESIUM SERPL-MCNC: 1.8 MG/DL — SIGNIFICANT CHANGE UP (ref 1.6–2.6)
MCHC RBC-ENTMCNC: 29.1 PG — SIGNIFICANT CHANGE UP (ref 27–34)
MCHC RBC-ENTMCNC: 34.1 GM/DL — SIGNIFICANT CHANGE UP (ref 32–36)
MCV RBC AUTO: 85.3 FL — SIGNIFICANT CHANGE UP (ref 80–100)
PHOSPHATE SERPL-MCNC: 2.9 MG/DL — SIGNIFICANT CHANGE UP (ref 2.5–4.5)
PLATELET # BLD AUTO: 264 K/UL — SIGNIFICANT CHANGE UP (ref 150–400)
POTASSIUM SERPL-MCNC: 3.7 MMOL/L — SIGNIFICANT CHANGE UP (ref 3.5–5.3)
POTASSIUM SERPL-SCNC: 3.7 MMOL/L — SIGNIFICANT CHANGE UP (ref 3.5–5.3)
RBC # BLD: 3.13 M/UL — LOW (ref 4.2–5.8)
RBC # FLD: 12.5 % — SIGNIFICANT CHANGE UP (ref 10.3–14.5)
SODIUM SERPL-SCNC: 134 MMOL/L — LOW (ref 135–145)
SPECIMEN SOURCE: SIGNIFICANT CHANGE UP
TIBC SERPL-MCNC: 214 UG/DL — LOW (ref 220–430)
TRANSFERRIN SERPL-MCNC: 157 MG/DL — LOW (ref 200–360)
UIBC SERPL-MCNC: 195 UG/DL — SIGNIFICANT CHANGE UP (ref 110–370)
WBC # BLD: 10.38 K/UL — SIGNIFICANT CHANGE UP (ref 3.8–10.5)
WBC # FLD AUTO: 10.38 K/UL — SIGNIFICANT CHANGE UP (ref 3.8–10.5)

## 2021-01-22 PROCEDURE — 99232 SBSQ HOSP IP/OBS MODERATE 35: CPT | Mod: GC

## 2021-01-22 PROCEDURE — 99223 1ST HOSP IP/OBS HIGH 75: CPT

## 2021-01-22 RX ORDER — INSULIN GLARGINE 100 [IU]/ML
20 INJECTION, SOLUTION SUBCUTANEOUS AT BEDTIME
Refills: 0 | Status: DISCONTINUED | OUTPATIENT
Start: 2021-01-22 | End: 2021-01-23

## 2021-01-22 RX ORDER — VANCOMYCIN HCL 1 G
1000 VIAL (EA) INTRAVENOUS EVERY 12 HOURS
Refills: 0 | Status: DISCONTINUED | OUTPATIENT
Start: 2021-01-22 | End: 2021-01-24

## 2021-01-22 RX ORDER — INSULIN GLARGINE 100 [IU]/ML
15 INJECTION, SOLUTION SUBCUTANEOUS ONCE
Refills: 0 | Status: COMPLETED | OUTPATIENT
Start: 2021-01-22 | End: 2021-01-22

## 2021-01-22 RX ORDER — CEFEPIME 1 G/1
2000 INJECTION, POWDER, FOR SOLUTION INTRAMUSCULAR; INTRAVENOUS EVERY 12 HOURS
Refills: 0 | Status: DISCONTINUED | OUTPATIENT
Start: 2021-01-22 | End: 2021-01-24

## 2021-01-22 RX ORDER — PIPERACILLIN AND TAZOBACTAM 4; .5 G/20ML; G/20ML
3.38 INJECTION, POWDER, LYOPHILIZED, FOR SOLUTION INTRAVENOUS ONCE
Refills: 0 | Status: COMPLETED | OUTPATIENT
Start: 2021-01-22 | End: 2021-01-22

## 2021-01-22 RX ORDER — PIPERACILLIN AND TAZOBACTAM 4; .5 G/20ML; G/20ML
3.38 INJECTION, POWDER, LYOPHILIZED, FOR SOLUTION INTRAVENOUS EVERY 8 HOURS
Refills: 0 | Status: DISCONTINUED | OUTPATIENT
Start: 2021-01-22 | End: 2021-01-22

## 2021-01-22 RX ADMIN — Medication 250 MILLIGRAM(S): at 17:52

## 2021-01-22 RX ADMIN — Medication 1 SPRAY(S): at 08:35

## 2021-01-22 RX ADMIN — CARVEDILOL PHOSPHATE 25 MILLIGRAM(S): 80 CAPSULE, EXTENDED RELEASE ORAL at 20:32

## 2021-01-22 RX ADMIN — Medication 4: at 16:57

## 2021-01-22 RX ADMIN — PIPERACILLIN AND TAZOBACTAM 200 GRAM(S): 4; .5 INJECTION, POWDER, LYOPHILIZED, FOR SOLUTION INTRAVENOUS at 00:55

## 2021-01-22 RX ADMIN — RANOLAZINE 500 MILLIGRAM(S): 500 TABLET, FILM COATED, EXTENDED RELEASE ORAL at 20:31

## 2021-01-22 RX ADMIN — RANOLAZINE 500 MILLIGRAM(S): 500 TABLET, FILM COATED, EXTENDED RELEASE ORAL at 08:35

## 2021-01-22 RX ADMIN — Medication 1 SPRAY(S): at 00:54

## 2021-01-22 RX ADMIN — CEFEPIME 100 MILLIGRAM(S): 1 INJECTION, POWDER, FOR SOLUTION INTRAMUSCULAR; INTRAVENOUS at 16:59

## 2021-01-22 RX ADMIN — LOSARTAN POTASSIUM 100 MILLIGRAM(S): 100 TABLET, FILM COATED ORAL at 08:35

## 2021-01-22 RX ADMIN — RANOLAZINE 500 MILLIGRAM(S): 500 TABLET, FILM COATED, EXTENDED RELEASE ORAL at 00:54

## 2021-01-22 RX ADMIN — AMLODIPINE BESYLATE 10 MILLIGRAM(S): 2.5 TABLET ORAL at 08:33

## 2021-01-22 RX ADMIN — Medication 1000 MILLIGRAM(S): at 08:33

## 2021-01-22 RX ADMIN — CARVEDILOL PHOSPHATE 25 MILLIGRAM(S): 80 CAPSULE, EXTENDED RELEASE ORAL at 00:55

## 2021-01-22 RX ADMIN — PREGABALIN 1000 MICROGRAM(S): 225 CAPSULE ORAL at 08:35

## 2021-01-22 RX ADMIN — Medication 1 SPRAY(S): at 20:31

## 2021-01-22 RX ADMIN — INSULIN GLARGINE 15 UNIT(S): 100 INJECTION, SOLUTION SUBCUTANEOUS at 01:03

## 2021-01-22 RX ADMIN — Medication 2: at 08:34

## 2021-01-22 RX ADMIN — CARVEDILOL PHOSPHATE 25 MILLIGRAM(S): 80 CAPSULE, EXTENDED RELEASE ORAL at 08:35

## 2021-01-22 RX ADMIN — ATORVASTATIN CALCIUM 40 MILLIGRAM(S): 80 TABLET, FILM COATED ORAL at 08:34

## 2021-01-22 RX ADMIN — Medication 81 MILLIGRAM(S): at 08:33

## 2021-01-22 RX ADMIN — Medication 650 MILLIGRAM(S): at 16:57

## 2021-01-22 RX ADMIN — PIPERACILLIN AND TAZOBACTAM 25 GRAM(S): 4; .5 INJECTION, POWDER, LYOPHILIZED, FOR SOLUTION INTRAVENOUS at 08:34

## 2021-01-22 RX ADMIN — Medication 1 APPLICATION(S): at 20:32

## 2021-01-22 RX ADMIN — Medication 100 MILLIGRAM(S): at 01:03

## 2021-01-22 RX ADMIN — Medication 1 APPLICATION(S): at 08:35

## 2021-01-22 RX ADMIN — INSULIN GLARGINE 20 UNIT(S): 100 INJECTION, SOLUTION SUBCUTANEOUS at 21:19

## 2021-01-22 RX ADMIN — Medication 4: at 12:07

## 2021-01-22 NOTE — CONSULT NOTE ADULT - ASSESSMENT
Physical Exam:  Physical Exam: GENERAL: NAD, lying in bed comfortably HEAD:  Atraumatic, Normocephalic EYES: EOMI, conjunctiva clear, sclera white  ENT: Moist mucous membranes NECK: Supple, No JVD CHEST/LUNG: Clear to auscultation bilaterally; No rales, rhonchi, wheezing, or rubs.  HEART: Regular rate and rhythm; No murmurs, rubs, or gallops ABDOMEN: Bowel sounds present; Soft, Nontender, Nondistended.  EXTREMITIES:  2/4 R PT pulse and R foot adequately perfused appearing; ulcerated wound R 1st metatarsal head plantar surface with halo of erythema; no lymphangitis or crepitus NERVOUS SYSTEM:  Alert & Oriented X3, speech clear. No deficits  MSK: FROM all 4 extremities, full and equal strength    Labs and Results: Labs, Radiology, Cardiology, and Other Results: EKG as per my review - sinus rhythm, rate~75, normal axis, normal MN interval, normal QRS interval, normal QT interval, no ST elevations or T wave inversions appreciated  Laboratory:  General Chemistry:   21-Jan-2021 16:38, Comprehensive Metabolic Panel Sodium, Serum:    130, [135 - 145 mmol/L] Potassium, Serum: 3.9, [3.5 - 5.3 mmol/L] Chloride, Serum: 98, [96 - 108 mmol/L] Carbon Dioxide, Serum: 26, [22 - 31 mmol/L] Anion Gap, Serum: 6, [5 - 17 mmol/L] Blood Urea Nitrogen, Serum:    27, [7 - 23 mg/dL] Creatinine, Serum: 1.18, [0.50 - 1.30 mg/dL] Glucose, Serum:    200, [70 - 99 mg/dL] Calcium, Total Serum: 8.8, [8.5 - 10.1 mg/dL] Protein Total, Serum: 7.1, [6.0 - 8.3 gm/dL] Albumin, Serum:    2.9, [3.3 - 5.0 g/dL] Bilirubin Total, Serum: 0.8, [0.2 - 1.2 mg/dL] Alkaline Phosphatase, Serum: 70, [40 - 120 U/L] Aspartate Aminotransferase (AST/SGOT):    13, [15 - 37 U/L] Alanine Aminotransferase (ALT/SGPT): 31, [12 - 78 U/L] eGFR if Non African American: 63, [>=60 mL/min/1.73M2], Interpretative comment 	The units for eGFR are mL/min/1.73M2 (normalized body surface area). The 	eGFR is calculated from a serum creatinine using the CKD-EPI equation. 	Other variables required for calculation are race, age and sex. Among 	patients with chronic kidney disease (CKD), the eGFR is useful in 	determining the stage of disease according to KDOQI CKD classification. 	All eGFR results are reported numerically with the following 	interpretation. 	        GFR                    With                 Without 	   (ml/min/1.73 m2)    Kidney Damage       Kidney Damage 	      >= 90                    Stage 1                     Normal 	      60-89                    Stage 2                     Decreased GFR 	      30-59     Stage 3                     Stage 3 	      15-29                    Stage 4                     Stage 4 	      < 15                      Stage 5                     Stage 5 	Each stage of CKD assumes that the associated GFR level has been in 	effect for at least 3 months. Determination of stages one and two (with 	eGFR > 59 ml/min/m2) requires estimation of kidney damage for at least 3 	months as defined by structural or functional abnormalities. 	Limitations: All estimates of GFR will be less accurate for patients at 	extremes of muscle mass (including but not limited to frail elderly, 	critically ill, or cancer patients), those with unusual diets, and those 	with conditions associated with reduced secretion or extrarenal 	elimination of creatinine. The eGFR equation is not recommended for use 	in patients with unstable creatinine levels. eGFR if : 74, [>=60 mL/min/1.73M2]   21-Jan-2021 16:38, Lactate, Blood Lactate, Blood: 1.0, [0.7 - 2.0 mmol/L]   21-Jan-2021 16:38, Uric Acid, Serum Uric Acid, Serum: 5.4, [3.4 - 8.8 mg/dL] Coagulation:   21-Jan-2021 16:38, Activated Partial Thromboplastin Time Activated Partial Thromboplastin Time: 30.8, [27.5 - 35.5 sec]   21-Jan-2021 16:38, Prothrombin Time and INR, Plasma Prothrombin Time, Plasma:    15.6, [10.6 - 13.6 sec] INR:    1.35, [0.88 - 1.16 ratio], Recommended ranges for therapeutic INR: 	  2.0-3.0 for most medical and surgical thromboembolic states 	  2.0-3.0 for atrial fibrillation 	  2.0-3.0 for bileaflet mechanical valve in aortic position 	  2.5-3.5 for mechanical heart valves 	  Chest 2004;[id number] 	The presence of direct thrombin inhibitors (argatroban, refludan) 	may falsely increase results. Hematology:   21-Jan-2021 16:38, Complete Blood Count + Automated Diff WBC Count:    14.10, [3.80 - 10.50 K/uL] RBC Count:    3.60, [4.20 - 5.80 M/uL] Hemoglobin:    10.5, [13.0 - 17.0 g/dL] Hematocrit:    30.7, [39.0 - 50.0 %] Mean Cell Volume: 85.3, [80.0 - 100.0 fl] Mean Cell Hemoglobin: 29.2, [27.0 - 34.0 pg] Mean Cell Hemoglobin Conc: 34.2, [32.0 - 36.0 gm/dL] Red Cell Distrib Width: 12.6, [10.3 - 14.5 %] Platelet Count - Automated: 303, [150 - 400 K/uL] Auto Neutrophil #:    11.85, [1.80 - 7.40 K/uL] Auto Lymphocyte #:    0.83, [1.00 - 3.30 K/uL] Auto Monocyte #:    1.31, [0.00 - 0.90 K/uL] Auto Eosinophil #: 0.02, [0.00 - 0.50 K/uL] Auto Basophil #: 0.03, [0.00 - 0.20 K/uL] Auto Neutrophil %:    84.1, [43.0 - 77.0 %], Differential percentages must be correlated with absolute numbers for 	clinical significance. Auto Lymphocyte %:    5.9, [13.0 - 44.0 %] Auto Monocyte %: 9.3, [2.0 - 14.0 %] Auto Eosinophil %: 0.1, [0.0 - 6.0 %] Auto Basophil %: 0.2, [0.0 - 2.0 %] Auto Immature Granulocyte %: 0.4, [0.0 - 1.5 %]   21-Jan-2021 16:38, Sedimentation Rate, Erythrocyte Sedimentation Rate, Erythrocyte:    87, [0 - 20 mm/hr] Urine:   21-Fabrizio-2021 16:38, Urinalysis Color: Yellow, [Yellow] Urine Appearance:    very cloudy, [Clear] Bilirubin: Negative, [Negative] Ketone - Urine: Negative, [Negative] Specific Gravity: 1.020, [1.010 - 1.025] Protein, Urine:    30, [Negative mg/dL] Urobilinogen: Negative, [Negative mg/dL] Nitrite: Negative, [Negative] Leukocyte Esterase Concentration:    Small, [Negative] Blood, Urine:    Moderate, [Negative] pH Urine: 5.0, [5.0 - 8.0] Glucose Qualitative, Urine:    250, [Negative mg/dL]   21-Jan-2021 16:38, Urine Microscopic-Add On (NC) Epithelial Cells: Negative, [Neg. - Few] Red Blood Cell - Urine:    11-25, [0 - 4 /HPF] Comment - Urine: Moderate amorphous sediments Bacteria:    Few, [Negative] White Blood Cell - Urine: 3-5, [Negative]  Radiology:  MRI:   21-Jan-2021 18:33, MR Foot w/wo IV Cont, Right MR Foot w/wo IV Cont, Right:  	EXAM:  MR FOOT WAW IC RT                       	 	 	PROCEDURE DATE:  01/21/2021   	 	 	 	INTERPRETATION:  MR FOOT WITHOUT AND WITH IV CONTRAST RIGHT 	 	HISTORY: Forefoot swelling. Pain. Infection. Diabetes. Evaluate for osteomyelitis of the first metatarsophalangeal joint. 	 	TECHNIQUE:  Multiplanar MRI of the right forefoot was performed without and with 7.0 cc of Gadavist intravenous gadolinium contrast using 15 sequences. 	 	COMPARISON:  Right foot x-rays dated January 21, 2021 and MRI datedJanuary 3, 2017 	 	FINDINGS: 	 	OSSEOUS STRUCTURES 	  Acute Fractures/Stress Reactions:  None. 	  Chronic Fractures: 	  Marrow:  Marrow edema is present throughout the bipartite medial hallux sesamoid with T1 marrow replacement and enhancement. Adjacent soft tissue wound is present present. The marrow changes are similar to the prior. There is nonspecific subcortical edema of the lateral hallux sesamoid. Slight subcortical edema is noted within the plantar medial margin of the first metatarsal head. 	 	INTERPHALANGEAL JOINTS 	  Articular Surfaces:  Tiny subchondral cysts are noted within the first proximal phalanx head. 	  Effusions/Synovitis:  None. 	 	1ST METATARSOPHALANGEAL JOINT 	  Articular Surfaces:  Grade III chondromalacia is present with small to moderate-sized osteophytes and large dorsal ossification within the joint that is new from the prior. Small subchondral cysts are present with slight subchondral edema. 	  Effusions/Synovitis:  Mild synovitis is present. 	  Sesamoids:  Hallux sesamoids are located with small to moderate-sized osteophytes. Marrow edema is present throughout the bipartite medial hallux sesamoid with T1 marrow replacement and enhancement. Adjacent soft tissue wound is present present. The marrow changesare similar to the prior. There is nonspecific subcortical edema of the lateral hallux sesamoid.. 	 	LESSER METATARSOPHALANGEAL JOINTS 	  Articular Surfaces:  Preserved. 	  Effusions/Synovitis:  Slight synovitis is noted at the second metatarsophalangeal joint. 	 	TARSOMETATARSAL JOINTS 	  Articular Surfaces:  Small subchondral cysts are present at the second tarsometatarsal joint with tiny marginal osteophytes. 	  Effusions/Synovitis:  None. 	 	INTERTARSAL JOINTS 	  Articular Surfaces:  Small subchondral cysts are present with reactive marrow edema and tiny osteophytes of the naviculocuneiform joints. 	  Effusions/Synovitis:  None. 	 	INTERMETATARSAL SPACES 	  Neuromas:  None. 	  Bursa:  None. 	 	LISFRANC LIGAMENT 	Intact. 	 	TENDONS 	  Flexor Tendons:  Intact. 	  Extensor Tendons:  Intact. 	 	DISTAL PLANTAR FASCIA 	Intact. 	 	SOFT TISSUES 	  Musculature:  There is diffuse muscle atrophy with increased T2 signal consistent with diabetic neuropathy. 	  Subcutaneous Tissues:  Moderate to severe subcutaneous edema is present. No discrete abscess is seen. Soft tissue induration is present involving the first webspace with soft tissue wounds along the plantar and medial margins of the first metatarsophalangeal joint and medial hallux sesamoid. 	 	IMPRESSION: 	1. Nonspecific marrow edema and enhancement involves the bipartite medial hallux sesamoid with similar appearance to the prior, however, nearby plantar soft tissue wound is present. This may reflect persistence of previously noted sesamoiditis versus osteomyelitis. 	2. There are presumed reactive marrow changes involving the first metatarsal head and lateral hallux sesamoid although early infection cannot be absolutely excluded. 	3. Osteoarthritis is noted with possible hallux rigidus. 	 	 	 	 	 	JOSE VIVEROS MD; Attending Radiologist 	This document has been electronically signed. Jan 21 2021  8:24PM X-Ray, Fluoroscopy:   21-Jan-2021 15:49, Xray Chest 1 View-PORTABLE IMMEDIATE PACS Image: Image(s) Available Xray Chest 1 View-PORTABLE IMMEDIATE:  	EXAM:  XR CHEST PORTABLE IMMED 1V                       	 	 	PROCEDURE DATE:  01/21/2021   	 	 	 	INTERPRETATION:  AP chest on January 21, 2021 at 3:45 PM. Patient has sepsis. 	 	Heart magnified by technique. 	 	The lung fields and pleural surfacesare unremarkable. 	 	Chest is similar to January 3, 2017 except there is a right PICC line on the earlier study no longer evident. 	 	IMPRESSION: No acute chest finding. 	 	 	 	 	 	EDNA US MD; Attending Radiologist 	This document has been electronically signed. Jan 21 2021  4:47PM   21-Jan-2021 15:51, Xray Foot AP + Lateral, Right PACS Image: Image(s) Available Xray Foot AP + Lateral, Right:  	EXAM:  XR FOOT 2 VIEWS RT                       	 	 	PROCEDURE DATE:  01/21/2021   	 	 	 	INTERPRETATION:  RIGHT FOOT: AP, lateral, and oblique views 	 	CLINICAL HISTORY: Right foot ulcer. 	 	COMPARISON: None Available 	 	FINDINGS: 	 	There is softtissue prominence along the dorsum of the foot. 	 	The alignment at the tarsometatarsal joints remains within normal limits. 	No acute fracture or focal osteolysis is noted. 	 	Calcaneal enthesopathy is noted, including ossification along the plantarsurface of the foot. 	 	Impression: 	 	Findings as discussed above. 	 	 	Assessment and Plan:   Assessment: · Assessment	 65 y.o M with extensive PMH of DM, PAD, CAD, HTN, and HLD presenting with erythematous R foot with notable drainage found to have elevated WBC and ESR admitted for cellulitis of R foot.   # Suspected Cellulitis of R  - Pt with warm, erythematous R foot and notable drainage from 1st metarsal; WBC 14.10 (acute neutrophils 11.85), ESR 87 - s/p ceftriaxone x1 and vancomycin x1 in the ED  - XR of R foot: There is soft tissue prominence along the dorsum of the foot. - MRI of R foot:  1. Nonspecific marrow edema and enhancement involves the bipartite medial hallux sesamoid with similar appearance to the prior, however, nearby plantar soft tissue wound is present. This may reflect persistence of previously noted sesamoiditis versus osteomyelitis. 2. There are presumed reactive marrow changes involving the first metatarsal head and lateral hallux sesamoid although early infection cannot be absolutely excluded. - Will start on IV Zosyn  - F/u ID consult in AM  R plantar diabetic ulcer without gross osteomyelitis on MRI.  Arterial perfusion adequate given palpable PT pulse.  As per Dr. Bullock would continue with IV antibiotics, local care, unloading, and follow up imaging as appropriate.   	 	#Gross Hematuria  - Now resolved; noticed yesterday that his urine was tinged with blood but no longer seen this AM; denies any flank  pain  - UA: few WBCs, few bacteria, small LE, and moderate blood  - Seen by Dr. Sohail Pagan on 12/10/20 for similar complaint expect at that time he had L flank pain  --> At the time history he provided sounded like urolithiasis  -->Cystoscopy was recommended to rule out bladder pathology  --> CT urogram was recommended to evaluate for nephroureteral stones/malignancy, however held off due to elevated Cr. Recommended to have one done when Cr back to normal.  --> Patient was unable to complete these tests due to weather conditions and scheduling conflicts  - F/u Urology consult  - F/u AM CBC, H/H  #Hyponatremia  - Na: 130  - Pt asymptomatic - denies nausea/headache/confusion/diarrhea/fatigue/excessive thirst/spasms/cramps  - His cardiologist recently increased his HCTZ dose from 12.5 mg to 25 mg two days ago, this is likely the cause  - Will hold off from HCTZ in hospital   #Normocytic Anemia  - RBCs: 3.60 ; H/H: 10.5/30.7  - F/u AM iron studies  - F/u AM CBC and H/H  #Asteatotic eczema of R lower extremity  - seen by dermatologist Dr. Jean-Pierre Avitia on 1/19/21 - Recommended to start fluticasone propionate 0.05% external cream  #Tinea corporis of L lower extremity  - Start on clotrimazole cream - apply to affected area twice daily for 4 weeks   #CAD - Continue aspirin 81 mg daily  - Continue atorvastatin 40 mg daily  - Continue carvedilol 25 mg BID - Continue ranolazine 500 mg BID  #T2DM  - Last seen by endocrinologist Dr. Emani Dixon on 12/14/20  - Lantus adjusted from 35 units at bedtime to currently 42 units at bedtime  - On metformin 1000 mg BID - will hold inpatient  - Continue Lantus 42 units QHS  - Continue ISS  - Carb consistent diet   #HLD - Continue atorvastatin 40 mg daily   #HTN  - Continue amlodipine 10 mg daily  - Continue losartan 100 mg daily  - On hydrochlorothiazide 25 mg daily; hold in hospital

## 2021-01-22 NOTE — CONSULT NOTE ADULT - SUBJECTIVE AND OBJECTIVE BOX
Patient is a 65y old  Male who presents with a chief complaint of Right foot infection     HPI:  66 y/o Male with h/o HTN, HLD, T2DM, PAD, CAD, BPH was admitted on  after he was seen in Dr. Bullock's office for nonhealing foot wound. About 2 weeks PTA patient noticed a "callous' on the plantar surface near the ball of his right foot. He started to treat on his own by applying neosporin and band aids to the area. He reports that over the course of the week he noted improvement with this. However, one week PTA while putting his pants on, the band-aid got caught in his pant leg and "ripped off the callous". He did not seek medical attention at the time since he did not feel any pain (due to diabetic neuropathy in bilateral legs). He saw his podiatrist due to increased drainage and erythema to the R foot and was sent to ER. In ER he received IV ceftriaxone 1g x1 and IV vancomycin 1g x1, then zosyn.      PMH: as above  PSH: as above  Meds: per reconciliation sheet, noted below  MEDICATIONS  (STANDING):  amLODIPine   Tablet 10 milliGRAM(s) Oral daily  ascorbic acid  Oral Tab/Cap - Peds 1000 milliGRAM(s) Oral daily  aspirin enteric coated 81 milliGRAM(s) Oral daily  atorvastatin Oral Tab/Cap - Peds 40 milliGRAM(s) Oral daily  carvedilol Oral Tab/Cap - Peds 25 milliGRAM(s) Oral two times a day  clotrimazole 1% Cream 1 Application(s) Topical two times a day  cyanocobalamin 1000 MICROGram(s) Oral daily  dextrose 5%. 1000 milliLiter(s) (100 mL/Hr) IV Continuous <Continuous>  fluticasone propionate 50 MICROgram(s)/spray Nasal Spray 1 Spray(s) Both Nostrils two times a day  glucagon  Injectable 1 milliGRAM(s) IntraMuscular once  insulin glargine Injectable (LANTUS) 42 Unit(s) SubCutaneous at bedtime  insulin lispro (ADMELOG) corrective regimen sliding scale   SubCutaneous three times a day before meals  insulin lispro (ADMELOG) corrective regimen sliding scale   SubCutaneous at bedtime  losartan 100 milliGRAM(s) Oral daily  piperacillin/tazobactam IVPB.. 3.375 Gram(s) IV Intermittent every 8 hours  ranolazine 500 milliGRAM(s) Oral two times a day    MEDICATIONS  (PRN):  acetaminophen   Tablet .. 650 milliGRAM(s) Oral once PRN Temp greater or equal to 38C (100.4F), Mild Pain (1 - 3)    Allergies    No Known Allergies    Intolerances      Social: no smoking, no alcohol, no illegal drugs; no recent travel, no exposure to TB  FAMILY HISTORY:  FH: myocardial infarction  Father had MI,  age 47      no history of premature cardiovascular disease in first degree relatives    ROS: the patient denies fever, no chills, no HA, no seizures, no dizziness, no sore throat, no nasal congestion, no blurry vision, no CP, no palpitations, no SOB, no cough, no abdominal pain, no diarrhea, no N/V, no dysuria, no leg pain, no claudication, no rash, no joint aches, no rectal pain or bleeding, no night sweats; has right foot wound and redness  All other systems reviewed and are negative    Vital Signs Last 24 Hrs  T(C): 37.7 (2021 08:02), Max: 38 (2021 15:28)  T(F): 99.8 (2021 08:02), Max: 100.4 (2021 15:28)  HR: 68 (2021 08:02) (68 - 84)  BP: 114/61 (2021 08:02) (113/58 - 130/95)  BP(mean): 72 (2021 22:33) (72 - 82)  RR: 18 (2021 08:02) (17 - 18)  SpO2: 96% (2021 08:02) (96% - 100%)  Daily Height in cm: 170.18 (2021 15:20)    Daily     PE:    Constitutional:  No acute distress  HEENT: NC/AT, EOMI, PERRLA, conjunctivae clear; ears and nose atraumatic; pharynx benign  Neck: supple; thyroid not palpable  Back: no tenderness  Respiratory: respiratory effort normal; clear to auscultation  Cardiovascular: S1S2 regular, no murmurs  Abdomen: soft, not tender, not distended, positive BS; no liver or spleen organomegaly  Genitourinary: no suprapubic tenderness  Lymphatic: no LN palpable  Musculoskeletal: no muscle tenderness, no joint swelling or tenderness  Extremities: no pedal edema  Right foot   Neurological/ Psychiatric: AxOx3, judgement and insight normal; moving all extremities  Skin: no rashes; no palpable lesions    Labs: all available labs reviewed                        9.1    1038 )-----------( 264      ( 2021 06:35 )             26.7         134<L>  |  100  |  27<H>  ----------------------------<  199<H>  3.7   |  26  |  1.23    Ca    8.5      2021 06:35  Phos  2.9       Mg     1.8         TPro  7.1  /  Alb  2.9<L>  /  TBili  0.8  /  DBili  x   /  AST  13<L>  /  ALT  31  /  AlkPhos  70       LIVER FUNCTIONS - ( 2021 16:38 )  Alb: 2.9 g/dL / Pro: 7.1 gm/dL / ALK PHOS: 70 U/L / ALT: 31 U/L / AST: 13 U/L / GGT: x           Urinalysis Basic - ( 2021 16:38 )    Color: Yellow / Appearance: very cloudy / S.020 / pH: x  Gluc: x / Ketone: Negative  / Bili: Negative / Urobili: Negative mg/dL   Blood: x / Protein: 30 mg/dL / Nitrite: Negative   Leuk Esterase: Small / RBC: 11-25 /HPF / WBC 3-5   Sq Epi: x / Non Sq Epi: Negative / Bacteria: Few      COVID-19 PCR: NotDetec (21 @ 18:40)      Radiology: all available radiological tests reviewed    Advanced directives addressed: full resuscitation Patient is a 65y old  Male who presents with a chief complaint of Right foot infection     HPI:  66 y/o Male with h/o HTN, HLD, T2DM, PAD, CAD, BPH was admitted on  after he was seen in Dr. Bullock's office for nonhealing foot wound. About 2 weeks PTA patient noticed a "callous' on the plantar surface near the ball of his right foot. He started to treat on his own by applying neosporin and band aids to the area. He reports that over the course of the week he noted improvement with this. However, one week PTA while putting his pants on, the band-aid got caught in his pant leg and "ripped off the callous". He did not seek medical attention at the time since he did not feel any pain (due to diabetic neuropathy in bilateral legs). He saw his podiatrist due to increased drainage and erythema to the R foot and was sent to ER. In ER he received IV ceftriaxone 1g x1 and IV vancomycin 1g x1, then zosyn.      PMH: as above  PSH: as above  Meds: per reconciliation sheet, noted below  MEDICATIONS  (STANDING):  amLODIPine   Tablet 10 milliGRAM(s) Oral daily  ascorbic acid  Oral Tab/Cap - Peds 1000 milliGRAM(s) Oral daily  aspirin enteric coated 81 milliGRAM(s) Oral daily  atorvastatin Oral Tab/Cap - Peds 40 milliGRAM(s) Oral daily  carvedilol Oral Tab/Cap - Peds 25 milliGRAM(s) Oral two times a day  clotrimazole 1% Cream 1 Application(s) Topical two times a day  cyanocobalamin 1000 MICROGram(s) Oral daily  dextrose 5%. 1000 milliLiter(s) (100 mL/Hr) IV Continuous <Continuous>  fluticasone propionate 50 MICROgram(s)/spray Nasal Spray 1 Spray(s) Both Nostrils two times a day  glucagon  Injectable 1 milliGRAM(s) IntraMuscular once  insulin glargine Injectable (LANTUS) 42 Unit(s) SubCutaneous at bedtime  insulin lispro (ADMELOG) corrective regimen sliding scale   SubCutaneous three times a day before meals  insulin lispro (ADMELOG) corrective regimen sliding scale   SubCutaneous at bedtime  losartan 100 milliGRAM(s) Oral daily  piperacillin/tazobactam IVPB.. 3.375 Gram(s) IV Intermittent every 8 hours  ranolazine 500 milliGRAM(s) Oral two times a day    MEDICATIONS  (PRN):  acetaminophen   Tablet .. 650 milliGRAM(s) Oral once PRN Temp greater or equal to 38C (100.4F), Mild Pain (1 - 3)    Allergies    No Known Allergies    Intolerances      Social: no smoking, no alcohol, no illegal drugs; no recent travel, no exposure to TB  FAMILY HISTORY:  FH: myocardial infarction  Father had MI,  age 47      no history of premature cardiovascular disease in first degree relatives    ROS: the patient denies fever, no chills, no HA, no seizures, no dizziness, no sore throat, no nasal congestion, no blurry vision, no CP, no palpitations, no SOB, no cough, no abdominal pain, no diarrhea, no N/V, no dysuria, no leg pain, no claudication, no rash, no joint aches, no rectal pain or bleeding, no night sweats; has right foot wound and redness  All other systems reviewed and are negative    Vital Signs Last 24 Hrs  T(C): 37.7 (2021 08:02), Max: 38 (2021 15:28)  T(F): 99.8 (2021 08:02), Max: 100.4 (2021 15:28)  HR: 68 (2021 08:02) (68 - 84)  BP: 114/61 (2021 08:02) (113/58 - 130/95)  BP(mean): 72 (2021 22:33) (72 - 82)  RR: 18 (2021 08:02) (17 - 18)  SpO2: 96% (2021 08:02) (96% - 100%)  Daily Height in cm: 170.18 (2021 15:20)    Daily     PE:    Constitutional:  No acute distress  HEENT: NC/AT, EOMI, PERRLA, conjunctivae clear; ears and nose atraumatic; pharynx benign  Neck: supple; thyroid not palpable  Back: no tenderness  Respiratory: respiratory effort normal; clear to auscultation  Cardiovascular: S1S2 regular, no murmurs  Abdomen: soft, not tender, not distended, positive BS; no liver or spleen organomegaly  Genitourinary: no suprapubic tenderness  Lymphatic: no LN palpable  Musculoskeletal: no muscle tenderness, no joint swelling or tenderness  Extremities: no pedal edema  Right foot first MTP plantar wound with surrounding erythema and edema  Neurological/ Psychiatric: AxOx3, judgement and insight normal; moving all extremities  Skin: no rashes; no palpable lesions    Labs: all available labs reviewed                        9.1    10.38 )-----------( 264      ( 2021 06:35 )             26.7         134<L>  |  100  |  27<H>  ----------------------------<  199<H>  3.7   |  26  |  1.23    Ca    8.5      2021 06:35  Phos  2.9       Mg     1.8         TPro  7.1  /  Alb  2.9<L>  /  TBili  0.8  /  DBili  x   /  AST  13<L>  /  ALT  31  /  AlkPhos  70       LIVER FUNCTIONS - ( 2021 16:38 )  Alb: 2.9 g/dL / Pro: 7.1 gm/dL / ALK PHOS: 70 U/L / ALT: 31 U/L / AST: 13 U/L / GGT: x           Urinalysis Basic - ( 2021 16:38 )    Color: Yellow / Appearance: very cloudy / S.020 / pH: x  Gluc: x / Ketone: Negative  / Bili: Negative / Urobili: Negative mg/dL   Blood: x / Protein: 30 mg/dL / Nitrite: Negative   Leuk Esterase: Small / RBC: 11-25 /HPF / WBC 3-5   Sq Epi: x / Non Sq Epi: Negative / Bacteria: Few      COVID-19 PCR: NotDetec (21 @ 18:40)      Radiology: all available radiological tests reviewed    < from: MR Foot w/wo IV Cont, Right (21 @ 18:33) >  1. Nonspecific marrow edema and enhancement involves the bipartite medial hallux sesamoid with similar appearance to the prior, however, nearby plantar soft tissue wound is present. This may reflect persistence of previously noted sesamoiditis versus osteomyelitis.  2. There are presumed reactive marrow changes involving the first metatarsal head and lateral hallux sesamoid although early infection cannot be absolutely excluded.  3. Osteoarthritis is noted with possible hallux rigidus.    < end of copied text >      Advanced directives addressed: full resuscitation

## 2021-01-22 NOTE — CONSULT NOTE ADULT - SUBJECTIVE AND OBJECTIVE BOX
History of Present Illness:   Patient is a 64 y/o M with a PMH of HTN, HLD, T2DM, PAD, CAD, and BPH presenting to the ED after he was seen in Dr. Bullock's office. About 2 weeks ago patient noticed a "callous' on the plantar surface near the ball of his right foot. He started to treat on his own by applying neosporin and band aids to the area. He reports that over the course of the week he noted improvement with this. However, one week ago while putting his pants on, the band-aid got caught in his pant leg and "ripped off the callous". He did not seek medical attention at the time since he did not feel any pain (due to diabetic neuropathy in bilateral legs). He saw his podiatrist today due to increased drainage and erythema to the R foot. He denies any fever or chills. He is able to bear weight on both feet. He also mentions noticing some blood in his urine yesterday. He saw his urologist about 1 month ago for a similar complaint.     66 yo M admitted with infected foot ulcer, reported gross hematuria. Pt reports single episode of hematuria, voiding clear now. No other  complaints.     Review of Systems:  Review of Systems: REVIEW OF SYSTEMS:  CONSTITUTIONAL: No weakness, fevers or chills  RESPIRATORY: No cough, wheezing, hemoptysis; No shortness of breath  CARDIOVASCULAR: No chest pain or palpitations  GASTROINTESTINAL: No abdominal or epigastric pain. No nausea, vomiting, or hematemesis; No diarrhea or constipation. No melena or hematochezia.  GENITOURINARY: mild dysuria with some hesitancy.   NEUROLOGICAL: No numbness or weakness  PSYCH: Denies Insomnia, loss of interest in activities, feelings of guilt, decreased energy, difficulty concentrating, decreased appetite, SI/HI  SKIN: + dry skin, +rash   All other review of systems is negative unless indicated above  Other Review of Systems: All other review of systems negative, except as noted in HPI  Allergies and Intolerances:        Allergies:  	No Known Allergies: Patient History:   Past Medical, Past Surgical, and Family History:  PAST MEDICAL HISTORY:  Basal cell carcinoma Of face   s/p excision Oct 2017    BPH (Benign Prostatic Hyperplasia)     CAD (coronary atherosclerotic disease)     Diabetes mellitus     Hypercholesteremia     Hypertension     PAD (peripheral artery disease).     PAST SURGICAL HISTORY:  History of amputation of toe L foot 2nd toe amputation.     FAMILY HISTORY:  FH: myocardial infarction, Father had MI,  age 47.    Social History:  Social History (marital status, living situation, occupation, tobacco use, alcohol and drug use, and sexual history): - , lives with wife   - Works in Auto repair   - ETOH: denies alcohol use   - Tobacco: used to smoke in his 20s, has not since then   - Denies recreational drug use  Physical Exam:   Physical Exam: GENERAL: NAD, lying in bed comfortably  HEAD:  Atraumatic, Normocephalic  EYES: EOMI, conjunctiva clear, sclera white   ENT: Moist mucous membranes  NECK: Supple, No JVD  CHEST/LUNG: Clear to auscultation bilaterally; No rales, rhonchi, wheezing, or rubs.   HEART: Regular rate and rhythm; No murmurs, rubs, or gallops  ABDOMEN: Bowel sounds present; Soft, Nontender, Nondistended.   EXTREMITIES:  no dorsalis pedis/posterior tibial pulses in R foot; diminished pulses in L foot. R foot with necrotic wound to the 1st metatarsal. 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 R leg with pruritic, dry, cracked, and polygonally fissured skin with irregular scaling. L leg with ring-shaped rash, red and slightly raised.   NERVOUS SYSTEM:  Alert & Oriented X3, speech clear. No deficits   MSK: FROM all 4 extremities, full and equal strength                            9.1    10.38 )-----------( 264      ( 2021 06:35 )             26.7         134<L>  |  100  |  27<H>  ----------------------------<  199<H>  3.7   |  26  |  1.23    Ca    8.5      2021 06:35  Phos  2.9       Mg     1.8         TPro  7.1  /  Alb  2.9<L>  /  TBili  0.8  /  DBili  x   /  AST  13<L>  /  ALT  31  /  AlkPhos  70

## 2021-01-22 NOTE — CONSULT NOTE ADULT - ASSESSMENT
65 y.o M with extensive PMH of DM, PAD, CAD, HTN, and HLD presenting with erythematous R foot with notable drainage found to have elevated WBC and ESR admitted for cellulitis of R foot.   Urology consulted for gross hematuria, now voiding clear. He has been seen in office, hematuria work up was pending. UA does not show signs of infection. Cr wnl. Recommend defer hematuria work up for now unless hematuria returns. Outpatient CTU and cysto after he recovers from current infection

## 2021-01-22 NOTE — CONSULT NOTE ADULT - SUBJECTIVE AND OBJECTIVE BOX
History of Present Illness:  Reason for Admission: Right foot infection    History of Present Illness:   Patient is a 66 y/o M with a PMH of HTN, HLD, T2DM, PAD, CAD, and BPH presenting to the ED after he was seen in Dr. Bullock's office. About 2 weeks ago patient noticed a "callous' on the plantar surface near the ball of his right foot. He started to treat on his own by applying neosporin and band aids to the area. He reports that over the course of the week he noted improvement with this. However, one week ago while putting his pants on, the band-aid got caught in his pant leg and "ripped off the callous". He did not seek medical attention at the time since he did not feel any pain (due to diabetic neuropathy in bilateral legs). He saw his podiatrist today due to increased drainage and erythema to the R foot. He denies any fever or chills. He is able to bear weight on both feet. He also mentions noticing some blood in his urine yesterday. He saw his urologist about 1 month ago for a similar complaint.     In the ED vitals were /65 HR 84 RR 18 T 100.4 SpO2 98% RA. He received IV ceftriaxone 1g x1 and IV vancomycin 1g x1.     History as above.  He denies fever or chills.  He denies symptoms of claudication.  He is a long standing diabetic for over a decade.  He is a non smoker.       Review of Systems:  Review of Systems: REVIEW OF SYSTEMS:  CONSTITUTIONAL: No weakness, fevers or chills  RESPIRATORY: No cough, wheezing, hemoptysis; No shortness of breath  CARDIOVASCULAR: No chest pain or palpitations  GASTROINTESTINAL: No abdominal or epigastric pain. No nausea, vomiting, or hematemesis; No diarrhea or constipation. No melena or hematochezia.  GENITOURINARY: mild dysuria with some hesitancy. + hematuria    NEUROLOGICAL: No numbness or weakness  PSYCH: Denies Insomnia, loss of interest in activities, feelings of guilt, decreased energy, difficulty concentrating, decreased appetite, SI/HI  SKIN: + dry skin, +rash   All other review of systems is negative unless indicated above  Other Review of Systems: All other review of systems negative, except as noted in HPI      Allergies and Intolerances:        Allergies:  	No Known Allergies:     Home Medications:   * Patient Currently Takes Medications as of 2021 19:04 documented in Structured Notes  · 	amLODIPine 10 mg oral tablet: Last Dose Taken:  , 1 tab(s) orally once a day  · 	Aspirin Low Dose 81 mg oral delayed release tablet: Last Dose Taken:  , 1 tab(s) orally once a day  · 	atorvastatin 40 mg oral tablet: Last Dose Taken:  , 1 tab(s) orally once a day  · 	carvedilol 25 mg oral tablet: Last Dose Taken:  , 1 tab(s) orally 2 times a day  · 	losartan 100 mg oral tablet: Last Dose Taken:  , 1 tab(s) orally once a day  · 	ranolazine 500 mg oral tablet, extended release: Last Dose Taken:  , 1 tab(s) orally 2 times a day  · 	Vitamin C 1000 mg oral tablet: Last Dose Taken:  , 1 tab(s) orally once a day  · 	Vitamin D3 1000 intl units (25 mcg) oral tablet: Last Dose Taken:  , 1 tab(s) orally once a day  · 	hydroCHLOROthiazide 25 mg oral tablet: Last Dose Taken:  , 1 tab(s) orally once a day  · 	Lantus 100 units/mL subcutaneous solution: Last Dose Taken:  , 42  subcutaneous once a day (at bedtime)  · 	Vitamin B-12 1000 mcg oral tablet: Last Dose Taken:  , 1 tab(s) orally once a day  · 	metFORMIN 1000 mg oral tablet: 1 tab(s) orally 2 times a day  · 	fluticasone 50 mcg/inh nasal spray: Last Dose Taken:  , 1 spray(s) in each nostril 2 times a day  · 	Cinnamon 500 mg oral capsule: Last Dose Taken:  , 2 cap(s) orally once a day  · 	Glucosamine Chondroitin oral capsule: Last Dose Taken:  , 3 cap(s) orally once a day    .    Patient History:    Past Medical, Past Surgical, and Family History:  PAST MEDICAL HISTORY:  Basal cell carcinoma Of face   s/p excision Oct 2017    BPH (Benign Prostatic Hyperplasia)     CAD (coronary atherosclerotic disease)     Diabetes mellitus     Hypercholesteremia     Hypertension     PAD (peripheral artery disease).     PAST SURGICAL HISTORY:  History of amputation of toe L foot 2nd toe amputation.     FAMILY HISTORY:  FH: myocardial infarction, Father had MI,  age 47.     Social History:  Social History (marital status, living situation, occupation, tobacco use, alcohol and drug use, and sexual history): - , lives with wife   - Works in Auto repair   - ETOH: denies alcohol use   - Tobacco: used to smoke in his 20s, has not since then   - Denies recreational drug use     Tobacco Screening:  · Core Measure Site	Yes  · Has the patient used tobacco in the past 30 days?	No    Risk Assessment:    Present on Admission:  Deep Venous Thrombosis	no  Pulmonary Embolus	no     Heart Failure:  Does this patient have a history of or has been diagnosed with heart failure? no.

## 2021-01-22 NOTE — PROGRESS NOTE ADULT - ASSESSMENT
64 y/o M PMHx as noted above presents to  for further evaluation and management of severe right foot erythema found to have notable drainage, leukocytosis, and an elevated sedimentation rate concerning for underlying osteomyelitis.    #Right Foot Cellultis  #Suspected Osteomyelitis of the Right Foot  ~f/u PAN C+S- NGTD  ~f/u MRI of Right foot: < from: MR Foot w/wo IV Cont, Right (01.21.21 @ 18:33) >  plantar soft tissue wound is present. This may reflect persistence of previously noted sesamoiditis versus osteomyelitis.  ~ID recs appreciated: start vancomycin 1 gm IV q12h and cefepime 2 gm IV q12h      #Absence of dorsalis pedis/posterior tibial pulse of R foot   ~patient with known hx for PAD  ~f/u w/ Vascular surgery     #Gross Hematuria   ~Urology recs appreciated: oupatient CTU and cysto  ~f/u serial CBCs    #Hyponatremia   ~as noted above will hold off HCTZ for now  ~f/u am albs     #CAD  ~cont. Aspirin 81 mg po daily   ~cont. Carvedilol 25 mg po bid  ~cont. Ranolazine 500 mg po bid    #Diabetes Mellitus type 2  ~FS qAC/HS  ~cont. Basal/Bolus insulin regimen  ~cont. ADA diet    #Hyperlipidemia  ~cont. Atorvastatin 40 mg po qhs     #Hypertension  ~cont. Amlodipine 10 mg po daily   ~cont. Losartan 100 mg po daily     #Vte ppx  ~IMPROVE Vte Risk Score is 1  ~cont. SCDs .     d/w Dr. Guerrero

## 2021-01-22 NOTE — CHART NOTE - NSCHARTNOTEFT_GEN_A_CORE
Pt seen and examined with house staff.  Plan formulated and reviewed on rounds     Briefly,  66 y/o male with HTN, DM, HL, PAD and CAD admitted with R foot cellulitis infection and very suspicious for OM.  Started on IV abx  No events overnight   ROS o/w negative     Awake and alert  NAD  Stable vitals.  Tm 99.8    Grossly non focal   R foot in bandage    Labs reviewed    WBC 10    Cont with IV Vanco/Cefe  Follow up Bcx  Will eventually need PICC for long term Abx  FS with insulin coverage.  Need better FS.  Keep FS < 180.  Adjust Lantus as needed  Hematuria to be addressed as outpt   DVT prophy    Med Surg

## 2021-01-22 NOTE — PROGRESS NOTE ADULT - SUBJECTIVE AND OBJECTIVE BOX
Patient is a 65y old  Male who presents with a chief complaint of Right foot infection (21 Jan 2021 20:33)      HPI:  Patient is a 66 y/o M with a PMH of HTN, HLD, T2DM, PAD, CAD, and BPH presenting to the ED after he was seen in Dr. Bullock's office. About 2 weeks ago patient noticed a "callous' on the plantar surface near the ball of his right foot. He started to treat on his own by applying neosporin and band aids to the area. He reports that over the course of the week he noted improvement with this. However, one week ago while putting his pants on, the band-aid got caught in his pant leg and "ripped off the callous". He did not seek medical attention at the time since he did not feel any pain (due to diabetic neuropathy in bilateral legs). He saw his podiatrist today due to increased drainage and erythema to the R foot. He denies any fever or chills. He is able to bear weight on both feet. He also mentions noticing some blood in his urine yesterday. He saw his urologist about 1 month ago for a similar complaint.     In the ED vitals were /65 HR 84 RR 18 T 100.4 SpO2 98% RA. He received IV ceftriaxone 1g x1 and IV vancomycin 1g x1.  (21 Jan 2021 20:33)      Allergies    No Known Allergies    Intolerances        MEDICATIONS  (STANDING):  amLODIPine   Tablet 10 milliGRAM(s) Oral daily  ascorbic acid  Oral Tab/Cap - Peds 1000 milliGRAM(s) Oral daily  aspirin enteric coated 81 milliGRAM(s) Oral daily  atorvastatin Oral Tab/Cap - Peds 40 milliGRAM(s) Oral daily  carvedilol Oral Tab/Cap - Peds 25 milliGRAM(s) Oral two times a day  clotrimazole 1% Cream 1 Application(s) Topical two times a day  cyanocobalamin 1000 MICROGram(s) Oral daily  dextrose 5%. 1000 milliLiter(s) (100 mL/Hr) IV Continuous <Continuous>  fluticasone propionate 50 MICROgram(s)/spray Nasal Spray 1 Spray(s) Both Nostrils two times a day  glucagon  Injectable 1 milliGRAM(s) IntraMuscular once  insulin glargine Injectable (LANTUS) 42 Unit(s) SubCutaneous at bedtime  insulin lispro (ADMELOG) corrective regimen sliding scale   SubCutaneous three times a day before meals  insulin lispro (ADMELOG) corrective regimen sliding scale   SubCutaneous at bedtime  losartan 100 milliGRAM(s) Oral daily  piperacillin/tazobactam IVPB.. 3.375 Gram(s) IV Intermittent every 8 hours  ranolazine 500 milliGRAM(s) Oral two times a day    MEDICATIONS  (PRN):  acetaminophen   Tablet .. 650 milliGRAM(s) Oral once PRN Temp greater or equal to 38C (100.4F), Mild Pain (1 - 3)        RADIOLOGY    CBC Full  -  ( 22 Jan 2021 06:35 )  WBC Count : 10.38 K/uL  RBC Count : 3.13 M/uL  Hemoglobin : 9.1 g/dL  Hematocrit : 26.7 %  Platelet Count - Automated : 264 K/uL  Mean Cell Volume : 85.3 fl  Mean Cell Hemoglobin : 29.1 pg  Mean Cell Hemoglobin Concentration : 34.1 gm/dL  Auto Neutrophil # : x  Auto Lymphocyte # : x  Auto Monocyte # : x  Auto Eosinophil # : x  Auto Basophil # : x  Auto Neutrophil % : x  Auto Lymphocyte % : x  Auto Monocyte % : x  Auto Eosinophil % : x  Auto Basophil % : x      01-22    134<L>  |  100  |  27<H>  ----------------------------<  199<H>  3.7   |  26  |  1.23    Ca    8.5      22 Jan 2021 06:35  Phos  2.9     01-22  Mg     1.8     01-22    TPro  7.1  /  Alb  2.9<L>  /  TBili  0.8  /  DBili  x   /  AST  13<L>  /  ALT  31  /  AlkPhos  70  01-21      Sedimentation Rate, Erythrocyte: 87 mm/hr (01-21 @ 16:38)    < from: MR Foot w/wo IV Cont, Right (01.21.21 @ 18:33) >  EXAM:  MR FOOT WAW IC RT                            PROCEDURE DATE:  01/21/2021          INTERPRETATION:  MR FOOT WITHOUT AND WITH IV CONTRAST RIGHT    HISTORY: Forefoot swelling. Pain. Infection. Diabetes. Evaluate for osteomyelitis of the first metatarsophalangeal joint.    TECHNIQUE:  Multiplanar MRI of the right forefoot was performed without and with 7.0 cc of Gadavist intravenous gadolinium contrast using 15 sequences.    COMPARISON:  Right foot x-rays dated January 21, 2021 and MRI datedJanuary 3, 2017    FINDINGS:    OSSEOUS STRUCTURES    Acute Fractures/Stress Reactions:  None.    Chronic Fractures:    Marrow:  Marrow edema is present throughout the bipartite medial hallux sesamoid with T1 marrow replacement and enhancement. Adjacent soft tissue wound is present present. The marrow changes are similar to the prior. There is nonspecific subcortical edema of the lateral hallux sesamoid. Slight subcortical edema is noted within the plantar medial margin of the first     < end of copied text >  < from: MR Foot w/wo IV Cont, Right (01.21.21 @ 18:33) >  metatarsal head.    INTERPHALANGEAL JOINTS    Articular Surfaces:  Tiny subchondral cysts are noted within the first proximal phalanx head.    Effusions/Synovitis:  None.    1ST METATARSOPHALANGEAL JOINT    Articular Surfaces:  Grade III chondromalacia is present with small to moderate-sized osteophytes and large dorsal ossification within the joint that is new from the prior. Small subchondral cysts are present with slight subchondral edema.    Effusions/Synovitis:  Mild synovitis is present.    Sesamoids:  Hallux sesamoids are located with small to moderate-sized osteophytes. Marrow edema is present throughout the bipartite medial hallux sesamoid with T1 marrow replacement and enhancement. Adjacent soft tissue wound is present present. The marrow changesare similar to the prior. There is nonspecific subcortical edema of the lateral hallux sesamoid..    LESSER METATARSOPHALANGEAL JOINTS    Articular Surfaces:  Preserved.    Effusions/Synovitis:  Slight synovitis is noted at the second metatarsophalangeal joint.    TARSOMETATARSAL JOINTS    Articular Surfaces:  Small subchondral cysts are present at the second tarsometatarsal joint with tiny marginal osteophytes.    Effusions/Synovitis:  None.    INTERTARSAL JOINTS    Articular Surfaces:  Small subchondral cysts are present with reactive marrow edema and tiny osteophytes of the naviculocuneiform joints.    Effusions/Synovitis:  None.    INTERMETATARSAL SPACES    Neuromas:  None.    Bursa:  None.    < end of copied text >  < from: MR Foot w/wo IV Cont, Right (01.21.21 @ 18:33) >  LISFRANC LIGAMENT  Intact.    TENDONS    Flexor Tendons:  Intact.    Extensor Tendons:  Intact.    DISTAL PLANTAR FASCIA  Intact.    SOFT TISSUES    Musculature:  There is diffuse muscle atrophy with increased T2 signal consistent with diabetic neuropathy.    Subcutaneous Tissues:  Moderate to severe subcutaneous edema is present. No discrete abscess is seen. Soft tissue induration is present involving the first webspace with soft tissue wounds along the plantar and medial margins of the first metatarsophalangeal joint and medial hallux sesamoid.    IMPRESSION:  1. Nonspecific marrow edema and enhancement involves the bipartite medial hallux sesamoid with similar appearance to the     < end of copied text >  < from: MR Foot w/wo IV Cont, Right (01.21.21 @ 18:33) >  prior, however, nearby plantar soft tissue wound is present. This may reflect persistence of previously noted sesamoiditis versus osteomyelitis.  2. There are presumed reactive marrow changes involving the first metatarsal head and lateral hallux sesamoid although early infection cannot be absolutely excluded.  3. Osteoarthritis is noted with possible hallux rigidus.            JOSE VIVEROS MD; Attending Radiologist  This document has been electronically signed. Jan 21 2021  8:24PM    < end of copied text >

## 2021-01-22 NOTE — DISCHARGE NOTE NURSING/CASE MANAGEMENT/SOCIAL WORK - NSDCCRNAME_GEN_ALL_CORE_FT
Yellow discharge planning folder provided including private hire list, home care agency list and community resources

## 2021-01-22 NOTE — DISCHARGE NOTE NURSING/CASE MANAGEMENT/SOCIAL WORK - PATIENT PORTAL LINK FT
You can access the FollowMyHealth Patient Portal offered by [facility] by registering at the following website: [email or web address]. By joining [facility]’s FollowMyHealth portal, you will also be able to view your health information using other applications (apps) compatible with our system.

## 2021-01-22 NOTE — PROGRESS NOTE ADULT - SUBJECTIVE AND OBJECTIVE BOX
Patient is a 64 y/o M with a PMH of HTN, HLD, T2DM, PAD, CAD, and BPH presenting to the ED after he was seen in Dr. Bullock's office. About 2 weeks ago patient noticed a "callous' on the plantar surface near the ball of his right foot. He started to treat on his own by applying neosporin and band aids to the area. He reports that over the course of the week he noted improvement with this. However, one week ago while putting his pants on, the band-aid got caught in his pant leg and "ripped off the callous". He did not seek medical attention at the time since he did not feel any pain (due to diabetic neuropathy in bilateral legs). He saw his podiatrist today due to increased drainage and erythema to the R foot. He denies any fever or chills. He is able to bear weight on both feet. He also mentions noticing some blood in his urine yesterday. He saw his urologist about 1 month ago for a similar complaint.     In the ED vitals were /65 HR 84 RR 18 T 100.4 SpO2 98% RA. He received IV ceftriaxone 1g x1 and IV vancomycin 1g x1.     1/22/21: Patient seen and examined at bedside. Patient eating breakfast in NAD. No complaints. awaiting PICC placement on Monday.       REVIEW OF SYSTEMS:  CONSTITUTIONAL: No weakness, fevers or chills  EYES/ENT: No visual changes;  No vertigo or throat pain   NECK: No pain or stiffness  RESPIRATORY: No cough, wheezing, hemoptysis; No shortness of breath  CARDIOVASCULAR: No chest pain or palpitations  GASTROINTESTINAL: No abdominal or epigastric pain. No nausea, vomiting, or hematemesis; No diarrhea or constipation. No melena or hematochezia.  GENITOURINARY: No dysuria, frequency or hematuria  NEUROLOGICAL: No numbness or weakness  SKIN: No itching, burning, rashes, or lesions   All other review of systems is negative unless indicated above    PHYSICAL EXAM:  Vital Signs Last 24 Hrs  T(C): 37.4 (22 Jan 2021 15:45), Max: 37.7 (22 Jan 2021 08:02)  T(F): 99.3 (22 Jan 2021 15:45), Max: 99.8 (22 Jan 2021 08:02)  HR: 68 (22 Jan 2021 15:45) (68 - 78)  BP: 95/45 (22 Jan 2021 15:45) (95/45 - 130/95)  BP(mean): 72 (21 Jan 2021 22:33) (72 - 72)  RR: 18 (22 Jan 2021 15:45) (18 - 18)  SpO2: 97% (22 Jan 2021 15:45) (96% - 100%)    Constitutional: Pt lying in bed, awake and alert, NAD  HEENT: EOMI, normal hearing, moist mucous membranes  Neck: Soft and supple, no JVD  Respiratory: CTABL, No wheezing, rales or rhonchi  Cardiovascular: S1S2+, RRR, no M/G/R  Gastrointestinal: BS+, soft, NT/ND, no guarding, no rebound  Extremities: No peripheral edema, Right foot first MTP plantar wound with surrounding erythema and edema  Vascular: 2+ peripheral pulses  Neurological: AAOx3, no focal deficits  Musculoskeletal: 5/5 strength b/l upper and lower extremities  Skin: No rashes    MEDICATIONS:  MEDICATIONS  (STANDING):  amLODIPine   Tablet 10 milliGRAM(s) Oral daily  ascorbic acid  Oral Tab/Cap - Peds 1000 milliGRAM(s) Oral daily  aspirin enteric coated 81 milliGRAM(s) Oral daily  atorvastatin Oral Tab/Cap - Peds 40 milliGRAM(s) Oral daily  carvedilol Oral Tab/Cap - Peds 25 milliGRAM(s) Oral two times a day  cefepime   IVPB 2000 milliGRAM(s) IV Intermittent every 12 hours  clotrimazole 1% Cream 1 Application(s) Topical two times a day  cyanocobalamin 1000 MICROGram(s) Oral daily  dextrose 5%. 1000 milliLiter(s) (100 mL/Hr) IV Continuous <Continuous>  fluticasone propionate 50 MICROgram(s)/spray Nasal Spray 1 Spray(s) Both Nostrils two times a day  glucagon  Injectable 1 milliGRAM(s) IntraMuscular once  insulin glargine Injectable (LANTUS) 42 Unit(s) SubCutaneous at bedtime  insulin lispro (ADMELOG) corrective regimen sliding scale   SubCutaneous three times a day before meals  insulin lispro (ADMELOG) corrective regimen sliding scale   SubCutaneous at bedtime  losartan 100 milliGRAM(s) Oral daily  ranolazine 500 milliGRAM(s) Oral two times a day  vancomycin  IVPB 1000 milliGRAM(s) IV Intermittent every 12 hours      LABS: All Labs Reviewed:                        9.1    10.38 )-----------( 264      ( 22 Jan 2021 06:35 )             26.7     01-22    134<L>  |  100  |  27<H>  ----------------------------<  199<H>  3.7   |  26  |  1.23    Ca    8.5      22 Jan 2021 06:35  Phos  2.9     01-22  Mg     1.8     01-22    TPro  7.1  /  Alb  2.9<L>  /  TBili  0.8  /  DBili  x   /  AST  13<L>  /  ALT  31  /  AlkPhos  70  01-21    PT/INR - ( 21 Jan 2021 16:38 )   PT: 15.6 sec;   INR: 1.35 ratio         PTT - ( 21 Jan 2021 16:38 )  PTT:30.8 sec      Blood Culture:   I&O's Summary    CAPILLARY BLOOD GLUCOSE      POCT Blood Glucose.: 247 mg/dL (22 Jan 2021 11:49)  POCT Blood Glucose.: 195 mg/dL (22 Jan 2021 08:01)  POCT Blood Glucose.: 236 mg/dL (22 Jan 2021 00:23)      RADIOLOGY/EKG:    < from: Xray Chest 1 View-PORTABLE IMMEDIATE (01.21.21 @ 15:49) >  IMPRESSION: No acute chest finding.    < end of copied text >  < from: MR Foot w/wo IV Cont, Right (01.21.21 @ 18:33) >  IMPRESSION:  1. Nonspecific marrow edema and enhancement involves the bipartite medial hallux sesamoid with similar appearance to the prior, however, nearby plantar soft tissue wound is present. This may reflect persistence of previously noted sesamoiditis versus osteomyelitis.  2. There are presumed reactive marrow changes involving the first metatarsal head and lateral hallux sesamoid although early infection cannot be absolutely excluded.  3. Osteoarthritis is noted with possible hallux rigidus.    < end of copied text >

## 2021-01-22 NOTE — PROGRESS NOTE ADULT - ASSESSMENT
Alert afebrile Pt resting w/o major complaint. Right foot with less edema, erythema & drainage. Necrotic patch to medial 1st metatarsal head & plantar sinus not able to probe to bone under tibial sesamoid. MRI R forefoot suggests possible osteomyelitis to right 1st metatarsal head & sesamoid(S)? No abscess or fluid collection. ESR 87 Uric Ac WNL 's. Discussed with him Tx op including wide debridement, excised medial sesamoid & exploration v PICC line , Rest and local wound care rigid control of blood glucose. He wants to try IV ABX / P[facility] suggest go to rehab. I go to [facility]. Await ID eval & F/U THX

## 2021-01-23 LAB
ANION GAP SERPL CALC-SCNC: 7 MMOL/L — SIGNIFICANT CHANGE UP (ref 5–17)
BASOPHILS # BLD AUTO: 0.04 K/UL — SIGNIFICANT CHANGE UP (ref 0–0.2)
BASOPHILS NFR BLD AUTO: 0.3 % — SIGNIFICANT CHANGE UP (ref 0–2)
BUN SERPL-MCNC: 25 MG/DL — HIGH (ref 7–23)
CALCIUM SERPL-MCNC: 8.1 MG/DL — LOW (ref 8.5–10.1)
CHLORIDE SERPL-SCNC: 100 MMOL/L — SIGNIFICANT CHANGE UP (ref 96–108)
CO2 SERPL-SCNC: 24 MMOL/L — SIGNIFICANT CHANGE UP (ref 22–31)
CREAT SERPL-MCNC: 1.08 MG/DL — SIGNIFICANT CHANGE UP (ref 0.5–1.3)
EOSINOPHIL # BLD AUTO: 0.08 K/UL — SIGNIFICANT CHANGE UP (ref 0–0.5)
EOSINOPHIL NFR BLD AUTO: 0.7 % — SIGNIFICANT CHANGE UP (ref 0–6)
GLUCOSE SERPL-MCNC: 194 MG/DL — HIGH (ref 70–99)
HCT VFR BLD CALC: 25.9 % — LOW (ref 39–50)
HGB BLD-MCNC: 8.8 G/DL — LOW (ref 13–17)
IMM GRANULOCYTES NFR BLD AUTO: 0.6 % — SIGNIFICANT CHANGE UP (ref 0–1.5)
LYMPHOCYTES # BLD AUTO: 1.03 K/UL — SIGNIFICANT CHANGE UP (ref 1–3.3)
LYMPHOCYTES # BLD AUTO: 8.7 % — LOW (ref 13–44)
MCHC RBC-ENTMCNC: 28.9 PG — SIGNIFICANT CHANGE UP (ref 27–34)
MCHC RBC-ENTMCNC: 34 GM/DL — SIGNIFICANT CHANGE UP (ref 32–36)
MCV RBC AUTO: 84.9 FL — SIGNIFICANT CHANGE UP (ref 80–100)
MONOCYTES # BLD AUTO: 1.23 K/UL — HIGH (ref 0–0.9)
MONOCYTES NFR BLD AUTO: 10.3 % — SIGNIFICANT CHANGE UP (ref 2–14)
NEUTROPHILS # BLD AUTO: 9.44 K/UL — HIGH (ref 1.8–7.4)
NEUTROPHILS NFR BLD AUTO: 79.4 % — HIGH (ref 43–77)
PLATELET # BLD AUTO: 240 K/UL — SIGNIFICANT CHANGE UP (ref 150–400)
POTASSIUM SERPL-MCNC: 3.7 MMOL/L — SIGNIFICANT CHANGE UP (ref 3.5–5.3)
POTASSIUM SERPL-SCNC: 3.7 MMOL/L — SIGNIFICANT CHANGE UP (ref 3.5–5.3)
RBC # BLD: 3.05 M/UL — LOW (ref 4.2–5.8)
RBC # FLD: 12.5 % — SIGNIFICANT CHANGE UP (ref 10.3–14.5)
SODIUM SERPL-SCNC: 131 MMOL/L — LOW (ref 135–145)
WBC # BLD: 11.89 K/UL — HIGH (ref 3.8–10.5)
WBC # FLD AUTO: 11.89 K/UL — HIGH (ref 3.8–10.5)

## 2021-01-23 PROCEDURE — 99232 SBSQ HOSP IP/OBS MODERATE 35: CPT | Mod: GC

## 2021-01-23 RX ORDER — INSULIN GLARGINE 100 [IU]/ML
25 INJECTION, SOLUTION SUBCUTANEOUS AT BEDTIME
Refills: 0 | Status: DISCONTINUED | OUTPATIENT
Start: 2021-01-23 | End: 2021-01-27

## 2021-01-23 RX ADMIN — CEFEPIME 100 MILLIGRAM(S): 1 INJECTION, POWDER, FOR SOLUTION INTRAMUSCULAR; INTRAVENOUS at 17:18

## 2021-01-23 RX ADMIN — CEFEPIME 100 MILLIGRAM(S): 1 INJECTION, POWDER, FOR SOLUTION INTRAMUSCULAR; INTRAVENOUS at 04:52

## 2021-01-23 RX ADMIN — Medication 4: at 22:45

## 2021-01-23 RX ADMIN — Medication 250 MILLIGRAM(S): at 05:42

## 2021-01-23 RX ADMIN — ATORVASTATIN CALCIUM 40 MILLIGRAM(S): 80 TABLET, FILM COATED ORAL at 10:21

## 2021-01-23 RX ADMIN — Medication 1 APPLICATION(S): at 22:37

## 2021-01-23 RX ADMIN — Medication 6: at 17:18

## 2021-01-23 RX ADMIN — Medication 1 SPRAY(S): at 22:36

## 2021-01-23 RX ADMIN — Medication 250 MILLIGRAM(S): at 18:24

## 2021-01-23 RX ADMIN — CARVEDILOL PHOSPHATE 25 MILLIGRAM(S): 80 CAPSULE, EXTENDED RELEASE ORAL at 10:20

## 2021-01-23 RX ADMIN — LOSARTAN POTASSIUM 100 MILLIGRAM(S): 100 TABLET, FILM COATED ORAL at 10:20

## 2021-01-23 RX ADMIN — PREGABALIN 1000 MICROGRAM(S): 225 CAPSULE ORAL at 10:20

## 2021-01-23 RX ADMIN — INSULIN GLARGINE 25 UNIT(S): 100 INJECTION, SOLUTION SUBCUTANEOUS at 22:37

## 2021-01-23 RX ADMIN — Medication 1000 MILLIGRAM(S): at 10:20

## 2021-01-23 RX ADMIN — Medication 4: at 11:47

## 2021-01-23 RX ADMIN — Medication 100 MILLIGRAM(S): at 22:36

## 2021-01-23 RX ADMIN — Medication 1 APPLICATION(S): at 10:21

## 2021-01-23 RX ADMIN — CARVEDILOL PHOSPHATE 25 MILLIGRAM(S): 80 CAPSULE, EXTENDED RELEASE ORAL at 22:36

## 2021-01-23 RX ADMIN — AMLODIPINE BESYLATE 10 MILLIGRAM(S): 2.5 TABLET ORAL at 10:20

## 2021-01-23 RX ADMIN — RANOLAZINE 500 MILLIGRAM(S): 500 TABLET, FILM COATED, EXTENDED RELEASE ORAL at 22:37

## 2021-01-23 RX ADMIN — Medication 81 MILLIGRAM(S): at 10:20

## 2021-01-23 RX ADMIN — Medication 2: at 08:38

## 2021-01-23 RX ADMIN — RANOLAZINE 500 MILLIGRAM(S): 500 TABLET, FILM COATED, EXTENDED RELEASE ORAL at 10:20

## 2021-01-23 NOTE — PROGRESS NOTE ADULT - ASSESSMENT
66 y/o M PMHx as noted above presents to  for further evaluation and management of severe right foot erythema found to have notable drainage, leukocytosis, and an elevated sedimentation rate concerning for underlying osteomyelitis.    #Right Foot Cellultis  #Suspected Osteomyelitis of the Right Foot  ~f/u PAN C+S- NGTD  ~f/u MRI of Right foot: plantar soft tissue wound is present. This may reflect persistence of previously noted sesamoiditis versus osteomyelitis.  ~ Podiatry recs appreciated: Possible debridement on Monday.   ~ID recs appreciated: On vancomycin 1 gm IV q12h and cefepime 2 gm IV q12h (Day 2)    #Absence of dorsalis pedis/posterior tibial pulse of R foot   ~patient with known hx for PAD  ~f/u w/ Vascular surgery     #Gross Hematuria   ~Urology recs appreciated: oupatient CTU and cysto  ~f/u serial CBCs    #Hyponatremia   ~as noted above will hold off HCTZ for now  ~f/u am labs    #CAD  ~cont. Aspirin 81 mg po daily   ~cont. Carvedilol 25 mg po bid  ~cont. Ranolazine 500 mg po bid    #Diabetes Mellitus type 2  ~FS qAC/HS  ~cont. Basal/Bolus insulin regimen  ~20 units lantus QHS --> Will increase to   ~cont. ADA diet    #Hyperlipidemia  ~cont. Atorvastatin 40 mg po qhs     #Hypertension  ~cont. Amlodipine 10 mg po daily   ~cont. Losartan 100 mg po daily     #Vte ppx  ~IMPROVE Vte Risk Score is 1  ~cont. SCDs .     d/w Dr. Guerrero

## 2021-01-23 NOTE — CHART NOTE - NSCHARTNOTEFT_GEN_A_CORE
Pt seen and examined with house staff.  Plan formulated and reviewed on rounds    Briefly,  64 y/o male with HTN, DM, HL, PAD and CAD admitted with R foot cellulitis infection and very suspicious for OM.  Started on IV abx  Febrile to 101.1  BCx and UCx negative thus far  ROS o/w negative     Awake and alert  NAD  Stable vitals.  Tm 101.1    Grossly non focal   R foot in bandage    Labs reviewed    WBC 12    Cont with IV Vanco/Cefe (2)  Follow up Bcx  Will eventually need PICC for long term Abx  Possible debridement on Monday   FS with insulin coverage.  Need better FS.  Keep FS < 180.  Adjust Lantus as needed  Hematuria to be addressed as outpt   DVT prophy--ambulation     Med Surg

## 2021-01-23 NOTE — PROGRESS NOTE ADULT - SUBJECTIVE AND OBJECTIVE BOX
Patient is a 65y old  Male who presents with a chief complaint of Right foot infection (22 Jan 2021 16:06)      HPI:  Patient is a 64 y/o M with a PMH of HTN, HLD, T2DM, PAD, CAD, and BPH presenting to the ED after he was seen in Dr. Bullock's office. About 2 weeks ago patient noticed a "callous' on the plantar surface near the ball of his right foot. He started to treat on his own by applying neosporin and band aids to the area. He reports that over the course of the week he noted improvement with this. However, one week ago while putting his pants on, the band-aid got caught in his pant leg and "ripped off the callous". He did not seek medical attention at the time since he did not feel any pain (due to diabetic neuropathy in bilateral legs). He saw his podiatrist today due to increased drainage and erythema to the R foot. He denies any fever or chills. He is able to bear weight on both feet. He also mentions noticing some blood in his urine yesterday. He saw his urologist about 1 month ago for a similar complaint.     In the ED vitals were /65 HR 84 RR 18 T 100.4 SpO2 98% RA. He received IV ceftriaxone 1g x1 and IV vancomycin 1g x1.  (21 Jan 2021 20:33)      Allergies    No Known Allergies    Intolerances        MEDICATIONS  (STANDING):  amLODIPine   Tablet 10 milliGRAM(s) Oral daily  ascorbic acid  Oral Tab/Cap - Peds 1000 milliGRAM(s) Oral daily  aspirin enteric coated 81 milliGRAM(s) Oral daily  atorvastatin Oral Tab/Cap - Peds 40 milliGRAM(s) Oral daily  carvedilol Oral Tab/Cap - Peds 25 milliGRAM(s) Oral two times a day  cefepime   IVPB 2000 milliGRAM(s) IV Intermittent every 12 hours  clotrimazole 1% Cream 1 Application(s) Topical two times a day  cyanocobalamin 1000 MICROGram(s) Oral daily  dextrose 5%. 1000 milliLiter(s) (100 mL/Hr) IV Continuous <Continuous>  fluticasone propionate 50 MICROgram(s)/spray Nasal Spray 1 Spray(s) Both Nostrils two times a day  glucagon  Injectable 1 milliGRAM(s) IntraMuscular once  insulin glargine Injectable (LANTUS) 20 Unit(s) SubCutaneous at bedtime  insulin lispro (ADMELOG) corrective regimen sliding scale   SubCutaneous three times a day before meals  insulin lispro (ADMELOG) corrective regimen sliding scale   SubCutaneous at bedtime  losartan 100 milliGRAM(s) Oral daily  ranolazine 500 milliGRAM(s) Oral two times a day  vancomycin  IVPB 1000 milliGRAM(s) IV Intermittent every 12 hours    MEDICATIONS  (PRN):        RADIOLOGY    CBC Full  -  ( 22 Jan 2021 06:35 )  WBC Count : 10.38 K/uL  RBC Count : 3.13 M/uL  Hemoglobin : 9.1 g/dL  Hematocrit : 26.7 %  Platelet Count - Automated : 264 K/uL  Mean Cell Volume : 85.3 fl  Mean Cell Hemoglobin : 29.1 pg  Mean Cell Hemoglobin Concentration : 34.1 gm/dL  Auto Neutrophil # : x  Auto Lymphocyte # : x  Auto Monocyte # : x  Auto Eosinophil # : x  Auto Basophil # : x  Auto Neutrophil % : x  Auto Lymphocyte % : x  Auto Monocyte % : x  Auto Eosinophil % : x  Auto Basophil % : x      01-22    134<L>  |  100  |  27<H>  ----------------------------<  199<H>  3.7   |  26  |  1.23    Ca    8.5      22 Jan 2021 06:35  Phos  2.9     01-22  Mg     1.8     01-22    TPro  7.1  /  Alb  2.9<L>  /  TBili  0.8  /  DBili  x   /  AST  13<L>  /  ALT  31  /  AlkPhos  70  01-21      BooCulture - Blood (01.21.21 @ 16:38)   Specimen Source: .Blood Blood-Peripheral   Culture Results:   No growth to date.     M< from: MR Foot w/wo IV Cont, Right (01.21.21 @ 18:33) >  EXAM:  MR FOOT WAW IC RT                            PROCEDURE DATE:  01/21/2021          INTERPRETATION:  MR FOOT WITHOUT AND WITH IV CONTRAST RIGHT    HISTORY: Forefoot swelling. Pain. Infection. Diabetes. Evaluate for osteomyelitis of the first metatarsophalangeal joint.    TECHNIQUE:  Multiplanar MRI of the right forefoot was performed without and with 7.0 cc of Gadavist intravenous gadolinium contrast using 15 sequences.    COMPARISON:  Right foot x-rays dated January 21, 2021 and MRI datedJanuary 3, 2017    FINDINGS:    OSSEOUS STRUCTURES    Acute Fractures/Stress Reactions:  None.    Chronic Fractures:    Marrow:  Marrow edema is present throughout the bipartite medial hallux sesamoid with T1 marrow replacement and enhancement. Adjacent soft tissue wound is present present. The marrow changes are similar to the prior. There is nonspecific subcortical edema of the lateral hallux sesamoid. Slight subcortical edema is noted within the plantar medial margin of the first     < end of copied text >  < from: MR Foot w/wo IV Cont, Right (01.21.21 @ 18:33) >  metatarsal head.    INTERPHALANGEAL JOINTS    Articular Surfaces:  Tiny subchondral cysts are noted within the first proximal phalanx head.    Effusions/Synovitis:  None.    1ST METATARSOPHALANGEAL JOINT    Articular Surfaces:  Grade III chondromalacia is present with small to moderate-sized osteophytes and large dorsal ossification within the joint that is new from the prior. Small subchondral cysts are present with slight subchondral edema.    Effusions/Synovitis:  Mild synovitis is present.    Sesamoids:  Hallux sesamoids are located with small to moderate-sized osteophytes. Marrow edema is present throughout the bipartite medial hallux sesamoid with T1 marrow replacement and enhancement. Adjacent soft tissue wound is present present. The marrow changesare similar to the prior. There is nonspecific subcortical edema of the lateral hallux sesamoid..    LESSER METATARSOPHALANGEAL JOINTS    Articular Surfaces:  Preserved.    Effusions/Synovitis:  Slight synovitis is noted at the second metatarsophalangeal joint.    TARSOMETATARSAL JOINTS    Articular Surfaces:  Small subchondral cysts are present at the second tarsometatarsal joint with tiny marginal osteophytes.    Effusions/Synovitis:  None.    INTERTARSAL JOINTS    Articular Surfaces:  Small subchondral cysts are present with reactive marrow edema and tiny osteophytes of the naviculocuneiform joints.    Effusions/Synovitis:  None.    INTERMETATARSAL SPACES    < end of copied text >  < from: MR Foot w/wo IV Cont, Right (01.21.21 @ 18:33) >    Neuromas:  None.    Bursa:  None.    LISFRANC LIGAMENT  Intact.    TENDONS    Flexor Tendons:  Intact.    Extensor Tendons:  Intact.    DISTAL PLANTAR FASCIA    < end of copied text >  < from: MR Foot w/wo IV Cont, Right (01.21.21 @ 18:33) >    SOFT TISSUES    Musculature:  There is diffuse muscle atrophy with increased T2 signal consistent with diabetic neuropathy.    Subcutaneous Tissues:  Moderate to severe subcutaneous edema is present. No discrete abscess is seen. Soft tissue induration is present involving the first webspace with soft tissue wounds along the plantar and medial margins of the first metatarsophalangeal joint and medial hallux sesamoid.    IMPRESSION:  1. Nonspecific marrow edema and enhancement involves the bipartite medial hallux sesamoid with similar appearance to the prior, however, nearby plantar soft tissue wound is present. This may reflect persistence of previously noted sesamoiditis versus osteomyelitis.  2. There are presumed reactive marrow changes involving the first metatarsal head and lateral hallux sesamoid although early infection cannot be absolutely excluded.  3. Osteoarthritis is noted with possible hallux rigidus.            JOSE VIVEROS MD; Attending Radiologist  This document has been electronically signed. Jan 21 2021  8:24PM    < end of copied text >

## 2021-01-23 NOTE — PROGRESS NOTE ADULT - ASSESSMENT
Alert T Max 101.1 Right foot still with cellulitis to dorsum &1st MTP, plantar ulcer under tibial sesamoid / medial 1st metatarsal necrotic patch. ESR 87 MRI reviewed Osteomyelitis Tibial sesamoid/Medial 1st metatarsal head? No abscess noted.  Pt with Febrile Syndrome with probable underlying osteomyelitis 1st metatarsal head & medial sesamoid. No overt abscess noted on imag. If, persists will need debridement / I&D . Will F/U no change OR Monday. Vascular note noted & appreciated.

## 2021-01-23 NOTE — PROGRESS NOTE ADULT - SUBJECTIVE AND OBJECTIVE BOX
Patient is a 64 y/o M with a PMH of HTN, HLD, T2DM, PAD, CAD, and BPH presenting to the ED after he was seen in Dr. Bullock's office. About 2 weeks ago patient noticed a "callous' on the plantar surface near the ball of his right foot. He started to treat on his own by applying neosporin and band aids to the area. He reports that over the course of the week he noted improvement with this. However, one week ago while putting his pants on, the band-aid got caught in his pant leg and "ripped off the callous". He did not seek medical attention at the time since he did not feel any pain (due to diabetic neuropathy in bilateral legs). He saw his podiatrist today due to increased drainage and erythema to the R foot. He denies any fever or chills. He is able to bear weight on both feet. He also mentions noticing some blood in his urine yesterday. He saw his urologist about 1 month ago for a similar complaint.     In the ED vitals were /65 HR 84 RR 18 T 100.4 SpO2 98% RA. He received IV ceftriaxone 1g x1 and IV vancomycin 1g x1.     1/23/21: Patient seen and examined at bedside. Patient in bed sleeping. Seen by podiatrist earlier in regards to possible debridement on Monday.   No complaints. Awaiting PICC placement on Monday.       REVIEW OF SYSTEMS:  CONSTITUTIONAL: No weakness, fevers or chills  EYES/ENT: No visual changes;  No vertigo or throat pain   NECK: No pain or stiffness  RESPIRATORY: No cough, wheezing, hemoptysis; No shortness of breath  CARDIOVASCULAR: No chest pain or palpitations  GASTROINTESTINAL: No abdominal or epigastric pain. No nausea, vomiting, or hematemesis; No diarrhea or constipation. No melena or hematochezia.  GENITOURINARY: No dysuria, frequency or hematuria  NEUROLOGICAL: No numbness or weakness  SKIN: No itching, burning, rashes, or lesions   All other review of systems is negative unless indicated above    PHYSICAL EXAM:  Vital Signs Last 24 Hrs  T(C): 37.6 (23 Jan 2021 06:30), Max: 38.4 (22 Jan 2021 16:56)  T(F): 99.7 (23 Jan 2021 06:30), Max: 101.1 (22 Jan 2021 16:56)  HR: 65 (22 Jan 2021 20:29) (65 - 72)  BP: 124/65 (22 Jan 2021 20:29) (95/45 - 124/65)  RR: 18 (22 Jan 2021 20:29) (18 - 18)  SpO2: 97% (22 Jan 2021 20:29) (96% - 97%)    Constitutional: Pt lying in bed, awake and alert, NAD  HEENT: EOMI, normal hearing, moist mucous membranes  Neck: Soft and supple, no JVD  Respiratory: CTABL, No wheezing, rales or rhonchi  Cardiovascular: S1S2+, RRR, no M/G/R  Gastrointestinal: BS+, soft, NT/ND, no guarding, no rebound  Extremities: No peripheral edema, Right foot first MTP plantar wound with surrounding erythema and edema  Vascular: 2+ peripheral pulses  Neurological: AAOx3, no focal deficits  Musculoskeletal: 5/5 strength b/l upper and lower extremities  Skin: No rashes    MEDICATIONS:  MEDICATIONS  (STANDING):  amLODIPine   Tablet 10 milliGRAM(s) Oral daily  ascorbic acid  Oral Tab/Cap - Peds 1000 milliGRAM(s) Oral daily  aspirin enteric coated 81 milliGRAM(s) Oral daily  atorvastatin Oral Tab/Cap - Peds 40 milliGRAM(s) Oral daily  carvedilol Oral Tab/Cap - Peds 25 milliGRAM(s) Oral two times a day  cefepime   IVPB 2000 milliGRAM(s) IV Intermittent every 12 hours  clotrimazole 1% Cream 1 Application(s) Topical two times a day  cyanocobalamin 1000 MICROGram(s) Oral daily  dextrose 5%. 1000 milliLiter(s) (100 mL/Hr) IV Continuous <Continuous>  fluticasone propionate 50 MICROgram(s)/spray Nasal Spray 1 Spray(s) Both Nostrils two times a day  glucagon  Injectable 1 milliGRAM(s) IntraMuscular once  insulin glargine Injectable (LANTUS) 42 Unit(s) SubCutaneous at bedtime  insulin lispro (ADMELOG) corrective regimen sliding scale   SubCutaneous three times a day before meals  insulin lispro (ADMELOG) corrective regimen sliding scale   SubCutaneous at bedtime  losartan 100 milliGRAM(s) Oral daily  ranolazine 500 milliGRAM(s) Oral two times a day  vancomycin  IVPB 1000 milliGRAM(s) IV Intermittent every 12 hours      LABS: All Labs Reviewed:                                   8.8    11.89 )-----------( 240      ( 23 Jan 2021 06:38 )             25.9     01-23    131<L>  |  100  |  25<H>  ----------------------------<  194<H>  3.7   |  24  |  1.08    Ca    8.1<L>      23 Jan 2021 06:38    Blood culture: NGTD  Urine Culture: Negative    POCT Blood Glucose.: 245 mg/dL (22 Jan 2021 20:24)  POCT Blood Glucose.: 247 mg/dL (22 Jan 2021 11:49)  POCT Blood Glucose.: 195 mg/dL (22 Jan 2021 08:01)  POCT Blood Glucose.: 236 mg/dL (22 Jan 2021 00:23)      RADIOLOGY/EKG:    < from: Xray Chest 1 View-PORTABLE IMMEDIATE (01.21.21 @ 15:49) >  IMPRESSION: No acute chest finding.    < end of copied text >  < from: MR Foot w/wo IV Cont, Right (01.21.21 @ 18:33) >  IMPRESSION:  1. Nonspecific marrow edema and enhancement involves the bipartite medial hallux sesamoid with similar appearance to the prior, however, nearby plantar soft tissue wound is present. This may reflect persistence of previously noted sesamoiditis versus osteomyelitis.  2. There are presumed reactive marrow changes involving the first metatarsal head and lateral hallux sesamoid although early infection cannot be absolutely excluded.  3. Osteoarthritis is noted with possible hallux rigidus.    < end of copied text >   Patient is a 66 y/o M with a PMH of HTN, HLD, T2DM, PAD, CAD, and BPH presenting to the ED after he was seen in Dr. Bullock's office. About 2 weeks ago patient noticed a "callous' on the plantar surface near the ball of his right foot. He started to treat on his own by applying neosporin and band aids to the area. He reports that over the course of the week he noted improvement with this. However, one week ago while putting his pants on, the band-aid got caught in his pant leg and "ripped off the callous". He did not seek medical attention at the time since he did not feel any pain (due to diabetic neuropathy in bilateral legs). He saw his podiatrist today due to increased drainage and erythema to the R foot. He denies any fever or chills. He is able to bear weight on both feet. He also mentions noticing some blood in his urine yesterday. He saw his urologist about 1 month ago for a similar complaint.     In the ED vitals were /65 HR 84 RR 18 T 100.4 SpO2 98% RA. He received IV ceftriaxone 1g x1 and IV vancomycin 1g x1.     1/23/21: Patient seen and examined at bedside. Patient in bed resting. Seen by podiatrist earlier in regards to possible debridement on Monday.   No complaints, VSS . Awaiting PICC placement on Monday.       REVIEW OF SYSTEMS:  CONSTITUTIONAL: No weakness, fevers or chills  EYES/ENT: No visual changes;  No vertigo or throat pain   NECK: No pain or stiffness  RESPIRATORY: No cough, wheezing, hemoptysis; No shortness of breath  CARDIOVASCULAR: No chest pain or palpitations  GASTROINTESTINAL: No abdominal or epigastric pain. No nausea, vomiting, or hematemesis; No diarrhea or constipation. No melena or hematochezia.  GENITOURINARY: No dysuria, frequency or hematuria  NEUROLOGICAL: No numbness or weakness  SKIN: No itching, burning, rashes, or lesions   All other review of systems is negative unless indicated above    PHYSICAL EXAM:  Vital Signs Last 24 Hrs  T(C): 37.6 (23 Jan 2021 06:30), Max: 38.4 (22 Jan 2021 16:56)  T(F): 99.7 (23 Jan 2021 06:30), Max: 101.1 (22 Jan 2021 16:56)  HR: 65 (22 Jan 2021 20:29) (65 - 72)  BP: 124/65 (22 Jan 2021 20:29) (95/45 - 124/65)  RR: 18 (22 Jan 2021 20:29) (18 - 18)  SpO2: 97% (22 Jan 2021 20:29) (96% - 97%)    Constitutional: Pt lying in bed, awake and alert, NAD  HEENT: EOMI, normal hearing, moist mucous membranes  Neck: Soft and supple, no JVD  Respiratory: CTABL, No wheezing, rales or rhonchi  Cardiovascular: S1S2+, RRR, no M/G/R  Gastrointestinal: BS+, soft, NT/ND, no guarding, no rebound  Extremities: No peripheral edema, Right foot first MTP plantar wound with surrounding erythema and edema  Vascular: 2+ peripheral pulses  Neurological: AAOx3, no focal deficits  Musculoskeletal: 5/5 strength b/l upper and lower extremities  Skin: No rashes    MEDICATIONS:  MEDICATIONS  (STANDING):  amLODIPine   Tablet 10 milliGRAM(s) Oral daily  ascorbic acid  Oral Tab/Cap - Peds 1000 milliGRAM(s) Oral daily  aspirin enteric coated 81 milliGRAM(s) Oral daily  atorvastatin Oral Tab/Cap - Peds 40 milliGRAM(s) Oral daily  carvedilol Oral Tab/Cap - Peds 25 milliGRAM(s) Oral two times a day  cefepime   IVPB 2000 milliGRAM(s) IV Intermittent every 12 hours  clotrimazole 1% Cream 1 Application(s) Topical two times a day  cyanocobalamin 1000 MICROGram(s) Oral daily  dextrose 5%. 1000 milliLiter(s) (100 mL/Hr) IV Continuous <Continuous>  fluticasone propionate 50 MICROgram(s)/spray Nasal Spray 1 Spray(s) Both Nostrils two times a day  glucagon  Injectable 1 milliGRAM(s) IntraMuscular once  insulin glargine Injectable (LANTUS) 42 Unit(s) SubCutaneous at bedtime  insulin lispro (ADMELOG) corrective regimen sliding scale   SubCutaneous three times a day before meals  insulin lispro (ADMELOG) corrective regimen sliding scale   SubCutaneous at bedtime  losartan 100 milliGRAM(s) Oral daily  ranolazine 500 milliGRAM(s) Oral two times a day  vancomycin  IVPB 1000 milliGRAM(s) IV Intermittent every 12 hours      LABS: All Labs Reviewed:                                   8.8    11.89 )-----------( 240      ( 23 Jan 2021 06:38 )             25.9     01-23    131<L>  |  100  |  25<H>  ----------------------------<  194<H>  3.7   |  24  |  1.08    Ca    8.1<L>      23 Jan 2021 06:38    Blood culture: NGTD  Urine Culture: Negative    POCT Blood Glucose.: 245 mg/dL (22 Jan 2021 20:24)  POCT Blood Glucose.: 247 mg/dL (22 Jan 2021 11:49)  POCT Blood Glucose.: 195 mg/dL (22 Jan 2021 08:01)  POCT Blood Glucose.: 236 mg/dL (22 Jan 2021 00:23)      RADIOLOGY/EKG:    < from: Xray Chest 1 View-PORTABLE IMMEDIATE (01.21.21 @ 15:49) >  IMPRESSION: No acute chest finding.    < end of copied text >  < from: MR Foot w/wo IV Cont, Right (01.21.21 @ 18:33) >  IMPRESSION:  1. Nonspecific marrow edema and enhancement involves the bipartite medial hallux sesamoid with similar appearance to the prior, however, nearby plantar soft tissue wound is present. This may reflect persistence of previously noted sesamoiditis versus osteomyelitis.  2. There are presumed reactive marrow changes involving the first metatarsal head and lateral hallux sesamoid although early infection cannot be absolutely excluded.  3. Osteoarthritis is noted with possible hallux rigidus.    < end of copied text >

## 2021-01-24 LAB
ALBUMIN SERPL ELPH-MCNC: 2.3 G/DL — LOW (ref 3.3–5)
ALP SERPL-CCNC: 84 U/L — SIGNIFICANT CHANGE UP (ref 40–120)
ALT FLD-CCNC: 86 U/L — HIGH (ref 12–78)
ANION GAP SERPL CALC-SCNC: 7 MMOL/L — SIGNIFICANT CHANGE UP (ref 5–17)
ANION GAP SERPL CALC-SCNC: 8 MMOL/L — SIGNIFICANT CHANGE UP (ref 5–17)
AST SERPL-CCNC: 54 U/L — HIGH (ref 15–37)
BASOPHILS # BLD AUTO: 0.04 K/UL — SIGNIFICANT CHANGE UP (ref 0–0.2)
BASOPHILS # BLD AUTO: 0.05 K/UL — SIGNIFICANT CHANGE UP (ref 0–0.2)
BASOPHILS NFR BLD AUTO: 0.3 % — SIGNIFICANT CHANGE UP (ref 0–2)
BASOPHILS NFR BLD AUTO: 0.3 % — SIGNIFICANT CHANGE UP (ref 0–2)
BILIRUB SERPL-MCNC: 0.6 MG/DL — SIGNIFICANT CHANGE UP (ref 0.2–1.2)
BUN SERPL-MCNC: 32 MG/DL — HIGH (ref 7–23)
BUN SERPL-MCNC: 32 MG/DL — HIGH (ref 7–23)
CALCIUM SERPL-MCNC: 8.3 MG/DL — LOW (ref 8.5–10.1)
CALCIUM SERPL-MCNC: 8.3 MG/DL — LOW (ref 8.5–10.1)
CHLORIDE SERPL-SCNC: 97 MMOL/L — SIGNIFICANT CHANGE UP (ref 96–108)
CHLORIDE SERPL-SCNC: 97 MMOL/L — SIGNIFICANT CHANGE UP (ref 96–108)
CO2 SERPL-SCNC: 23 MMOL/L — SIGNIFICANT CHANGE UP (ref 22–31)
CO2 SERPL-SCNC: 23 MMOL/L — SIGNIFICANT CHANGE UP (ref 22–31)
CREAT SERPL-MCNC: 1.36 MG/DL — HIGH (ref 0.5–1.3)
CREAT SERPL-MCNC: 1.43 MG/DL — HIGH (ref 0.5–1.3)
EOSINOPHIL # BLD AUTO: 0.05 K/UL — SIGNIFICANT CHANGE UP (ref 0–0.5)
EOSINOPHIL # BLD AUTO: 0.07 K/UL — SIGNIFICANT CHANGE UP (ref 0–0.5)
EOSINOPHIL NFR BLD AUTO: 0.3 % — SIGNIFICANT CHANGE UP (ref 0–6)
EOSINOPHIL NFR BLD AUTO: 0.5 % — SIGNIFICANT CHANGE UP (ref 0–6)
GLUCOSE SERPL-MCNC: 234 MG/DL — HIGH (ref 70–99)
GLUCOSE SERPL-MCNC: 270 MG/DL — HIGH (ref 70–99)
HCT VFR BLD CALC: 29.5 % — LOW (ref 39–50)
HCT VFR BLD CALC: 29.7 % — LOW (ref 39–50)
HGB BLD-MCNC: 10 G/DL — LOW (ref 13–17)
HGB BLD-MCNC: 10.1 G/DL — LOW (ref 13–17)
IMM GRANULOCYTES NFR BLD AUTO: 0.9 % — SIGNIFICANT CHANGE UP (ref 0–1.5)
IMM GRANULOCYTES NFR BLD AUTO: 1.1 % — SIGNIFICANT CHANGE UP (ref 0–1.5)
LYMPHOCYTES # BLD AUTO: 1.09 K/UL — SIGNIFICANT CHANGE UP (ref 1–3.3)
LYMPHOCYTES # BLD AUTO: 1.12 K/UL — SIGNIFICANT CHANGE UP (ref 1–3.3)
LYMPHOCYTES # BLD AUTO: 7.3 % — LOW (ref 13–44)
LYMPHOCYTES # BLD AUTO: 7.5 % — LOW (ref 13–44)
MCHC RBC-ENTMCNC: 28.9 PG — SIGNIFICANT CHANGE UP (ref 27–34)
MCHC RBC-ENTMCNC: 29.1 PG — SIGNIFICANT CHANGE UP (ref 27–34)
MCHC RBC-ENTMCNC: 33.9 GM/DL — SIGNIFICANT CHANGE UP (ref 32–36)
MCHC RBC-ENTMCNC: 34 GM/DL — SIGNIFICANT CHANGE UP (ref 32–36)
MCV RBC AUTO: 84.9 FL — SIGNIFICANT CHANGE UP (ref 80–100)
MCV RBC AUTO: 85.8 FL — SIGNIFICANT CHANGE UP (ref 80–100)
MONOCYTES # BLD AUTO: 1.21 K/UL — HIGH (ref 0–0.9)
MONOCYTES # BLD AUTO: 1.44 K/UL — HIGH (ref 0–0.9)
MONOCYTES NFR BLD AUTO: 8.3 % — SIGNIFICANT CHANGE UP (ref 2–14)
MONOCYTES NFR BLD AUTO: 9.4 % — SIGNIFICANT CHANGE UP (ref 2–14)
NEUTROPHILS # BLD AUTO: 12.01 K/UL — HIGH (ref 1.8–7.4)
NEUTROPHILS # BLD AUTO: 12.4 K/UL — HIGH (ref 1.8–7.4)
NEUTROPHILS NFR BLD AUTO: 81.4 % — HIGH (ref 43–77)
NEUTROPHILS NFR BLD AUTO: 82.7 % — HIGH (ref 43–77)
PLATELET # BLD AUTO: 298 K/UL — SIGNIFICANT CHANGE UP (ref 150–400)
PLATELET # BLD AUTO: 301 K/UL — SIGNIFICANT CHANGE UP (ref 150–400)
POTASSIUM SERPL-MCNC: 3.7 MMOL/L — SIGNIFICANT CHANGE UP (ref 3.5–5.3)
POTASSIUM SERPL-MCNC: 4 MMOL/L — SIGNIFICANT CHANGE UP (ref 3.5–5.3)
POTASSIUM SERPL-SCNC: 3.7 MMOL/L — SIGNIFICANT CHANGE UP (ref 3.5–5.3)
POTASSIUM SERPL-SCNC: 4 MMOL/L — SIGNIFICANT CHANGE UP (ref 3.5–5.3)
PROT SERPL-MCNC: 6.6 GM/DL — SIGNIFICANT CHANGE UP (ref 6–8.3)
RBC # BLD: 3.44 M/UL — LOW (ref 4.2–5.8)
RBC # BLD: 3.5 M/UL — LOW (ref 4.2–5.8)
RBC # FLD: 12.7 % — SIGNIFICANT CHANGE UP (ref 10.3–14.5)
RBC # FLD: 12.7 % — SIGNIFICANT CHANGE UP (ref 10.3–14.5)
SARS-COV-2 IGG SERPL QL IA: POSITIVE
SARS-COV-2 IGM SERPL IA-ACNC: 52.7 INDEX — HIGH
SODIUM SERPL-SCNC: 127 MMOL/L — LOW (ref 135–145)
SODIUM SERPL-SCNC: 128 MMOL/L — LOW (ref 135–145)
VANCOMYCIN TROUGH SERPL-MCNC: 21.8 UG/ML — HIGH (ref 10–20)
WBC # BLD: 14.53 K/UL — HIGH (ref 3.8–10.5)
WBC # BLD: 15.25 K/UL — HIGH (ref 3.8–10.5)
WBC # FLD AUTO: 14.53 K/UL — HIGH (ref 3.8–10.5)
WBC # FLD AUTO: 15.25 K/UL — HIGH (ref 3.8–10.5)

## 2021-01-24 PROCEDURE — 99233 SBSQ HOSP IP/OBS HIGH 50: CPT | Mod: GC

## 2021-01-24 PROCEDURE — 93970 EXTREMITY STUDY: CPT | Mod: 26

## 2021-01-24 RX ORDER — VANCOMYCIN HCL 1 G
1000 VIAL (EA) INTRAVENOUS EVERY 12 HOURS
Refills: 0 | Status: DISCONTINUED | OUTPATIENT
Start: 2021-01-24 | End: 2021-01-25

## 2021-01-24 RX ORDER — SODIUM CHLORIDE 9 MG/ML
3 INJECTION INTRAMUSCULAR; INTRAVENOUS; SUBCUTANEOUS EVERY 8 HOURS
Refills: 0 | Status: DISCONTINUED | OUTPATIENT
Start: 2021-01-24 | End: 2021-01-27

## 2021-01-24 RX ORDER — SODIUM CHLORIDE 9 MG/ML
1000 INJECTION INTRAMUSCULAR; INTRAVENOUS; SUBCUTANEOUS ONCE
Refills: 0 | Status: COMPLETED | OUTPATIENT
Start: 2021-01-24 | End: 2021-01-24

## 2021-01-24 RX ORDER — CEFEPIME 1 G/1
2000 INJECTION, POWDER, FOR SOLUTION INTRAMUSCULAR; INTRAVENOUS EVERY 12 HOURS
Refills: 0 | Status: DISCONTINUED | OUTPATIENT
Start: 2021-01-24 | End: 2021-01-27

## 2021-01-24 RX ADMIN — AMLODIPINE BESYLATE 10 MILLIGRAM(S): 2.5 TABLET ORAL at 09:34

## 2021-01-24 RX ADMIN — RANOLAZINE 500 MILLIGRAM(S): 500 TABLET, FILM COATED, EXTENDED RELEASE ORAL at 21:54

## 2021-01-24 RX ADMIN — SODIUM CHLORIDE 3 MILLILITER(S): 9 INJECTION INTRAMUSCULAR; INTRAVENOUS; SUBCUTANEOUS at 21:54

## 2021-01-24 RX ADMIN — CARVEDILOL PHOSPHATE 25 MILLIGRAM(S): 80 CAPSULE, EXTENDED RELEASE ORAL at 09:34

## 2021-01-24 RX ADMIN — SODIUM CHLORIDE 1000 MILLILITER(S): 9 INJECTION INTRAMUSCULAR; INTRAVENOUS; SUBCUTANEOUS at 14:22

## 2021-01-24 RX ADMIN — SODIUM CHLORIDE 3 MILLILITER(S): 9 INJECTION INTRAMUSCULAR; INTRAVENOUS; SUBCUTANEOUS at 13:22

## 2021-01-24 RX ADMIN — Medication 250 MILLIGRAM(S): at 18:16

## 2021-01-24 RX ADMIN — Medication 6: at 08:23

## 2021-01-24 RX ADMIN — ATORVASTATIN CALCIUM 40 MILLIGRAM(S): 80 TABLET, FILM COATED ORAL at 09:34

## 2021-01-24 RX ADMIN — CEFEPIME 100 MILLIGRAM(S): 1 INJECTION, POWDER, FOR SOLUTION INTRAMUSCULAR; INTRAVENOUS at 16:57

## 2021-01-24 RX ADMIN — CEFEPIME 100 MILLIGRAM(S): 1 INJECTION, POWDER, FOR SOLUTION INTRAMUSCULAR; INTRAVENOUS at 04:56

## 2021-01-24 RX ADMIN — RANOLAZINE 500 MILLIGRAM(S): 500 TABLET, FILM COATED, EXTENDED RELEASE ORAL at 09:34

## 2021-01-24 RX ADMIN — Medication 6: at 12:51

## 2021-01-24 RX ADMIN — PREGABALIN 1000 MICROGRAM(S): 225 CAPSULE ORAL at 09:34

## 2021-01-24 RX ADMIN — Medication 81 MILLIGRAM(S): at 09:34

## 2021-01-24 RX ADMIN — INSULIN GLARGINE 25 UNIT(S): 100 INJECTION, SOLUTION SUBCUTANEOUS at 21:53

## 2021-01-24 RX ADMIN — CARVEDILOL PHOSPHATE 25 MILLIGRAM(S): 80 CAPSULE, EXTENDED RELEASE ORAL at 21:54

## 2021-01-24 RX ADMIN — Medication 1 APPLICATION(S): at 09:34

## 2021-01-24 RX ADMIN — LOSARTAN POTASSIUM 100 MILLIGRAM(S): 100 TABLET, FILM COATED ORAL at 09:34

## 2021-01-24 RX ADMIN — Medication 1 SPRAY(S): at 21:53

## 2021-01-24 RX ADMIN — Medication 1000 MILLIGRAM(S): at 09:34

## 2021-01-24 RX ADMIN — Medication 2: at 16:56

## 2021-01-24 RX ADMIN — Medication 1 APPLICATION(S): at 21:54

## 2021-01-24 RX ADMIN — Medication 250 MILLIGRAM(S): at 05:32

## 2021-01-24 NOTE — PROGRESS NOTE ADULT - SUBJECTIVE AND OBJECTIVE BOX
65y YO Male With a PMH of  HTN, HLD, T2DM, PAD, CAD, and BPH presents from  Dr. Bullock's office. Patient noticed a "callous' on the plantar surface near the ball of his right foot. he applied neosporin and band aids to the area. and noted improvement with this. he then proceeded to "ripped off the callous". He did not seek medical attention at the time since he did not feel any pain (due to diabetic neuropathy in bilateral legs). He saw Dr. Cisneros due to increased drainage and erythema to the R foot. He denies any fever or chills. He is able to bear weight on both feet. He also mentions noticing some blood in his urine. He saw his urologist about 1 month ago for a similar complaint.       Subjective: Patient was seen and examined by me this morning at bedside. He attributes a dry cough that has not improved, states that he has been afebrile overnight    REVIEW OF SYSTEMS:  CONSTITUTIONAL: No weakness, fevers or chills  EYES/ENT: No visual changes;  No vertigo or throat pain   NECK: No pain or stiffness  RESPIRATORY: No cough, wheezing, hemoptysis; No shortness of breath  CARDIOVASCULAR: No chest pain or palpitations  GASTROINTESTINAL: No abdominal or epigastric pain. No nausea, vomiting; No diarrhea or constipation.   GENITOURINARY: No dysuria, frequency or hematuria  NEUROLOGICAL: No numbness or weakness  SKIN: No itching, burning, rashes, or lesions     PHYSICAL EXAM:  Vital Signs Last 24 Hrs  T(C): 37.5 (24 Jan 2021 09:24), Max: 37.7 (23 Jan 2021 15:25)  T(F): 99.5 (24 Jan 2021 09:24), Max: 99.9 (23 Jan 2021 21:05)  HR: 76 (24 Jan 2021 09:24) (71 - 78)  BP: 120/67 (24 Jan 2021 09:24) (101/55 - 120/67)  BP(mean): --  RR: 18 (24 Jan 2021 09:24) (18 - 18)  SpO2: 98% (24 Jan 2021 09:24) (89% - 98%)    Constitutional: Pt lying in bed, awake and alert, NAD  HEENT: EOMI, normal hearing, moist mucous membranes  Neck: Soft and supple, no JVD  Respiratory: CTABL, No wheezing, rales or rhonchi  Cardiovascular: S1S2+, RRR, no M/G/R  Gastrointestinal: BS+, soft, NT/ND, no guarding, no rebound  Extremities: No peripheral edema  Vascular: 2+ peripheral pulses  Neurological: AAOx3, no focal deficits  Musculoskeletal: 5/5 strength b/l upper and lower extremities. Right foot first MTP plantar wound with surrounding erythema and edema  Skin: No rashes    MEDICATIONS:  MEDICATIONS  (STANDING):  amLODIPine   Tablet 10 milliGRAM(s) Oral daily  ascorbic acid  Oral Tab/Cap - Peds 1000 milliGRAM(s) Oral daily  aspirin enteric coated 81 milliGRAM(s) Oral daily  atorvastatin Oral Tab/Cap - Peds 40 milliGRAM(s) Oral daily  carvedilol Oral Tab/Cap - Peds 25 milliGRAM(s) Oral two times a day  cefepime   IVPB 2000 milliGRAM(s) IV Intermittent every 12 hours  clotrimazole 1% Cream 1 Application(s) Topical two times a day  cyanocobalamin 1000 MICROGram(s) Oral daily  dextrose 5%. 1000 milliLiter(s) (100 mL/Hr) IV Continuous <Continuous>  fluticasone propionate 50 MICROgram(s)/spray Nasal Spray 1 Spray(s) Both Nostrils two times a day  glucagon  Injectable 1 milliGRAM(s) IntraMuscular once  insulin glargine Injectable (LANTUS) 25 Unit(s) SubCutaneous at bedtime  insulin lispro (ADMELOG) corrective regimen sliding scale   SubCutaneous three times a day before meals  insulin lispro (ADMELOG) corrective regimen sliding scale   SubCutaneous at bedtime  losartan 100 milliGRAM(s) Oral daily  ranolazine 500 milliGRAM(s) Oral two times a day  sodium chloride 0.9% Bolus 1000 milliLiter(s) IV Bolus once  sodium chloride 0.9% lock flush 3 milliLiter(s) IV Push every 8 hours  vancomycin  IVPB 1000 milliGRAM(s) IV Intermittent every 12 hours    MEDICATIONS  (PRN):      LABS: All Labs Reviewed:                        10.0   14.53 )-----------( 298      ( 24 Jan 2021 10:00 )             29.5     01-24    128<L>  |  97  |  32<H>  ----------------------------<  270<H>  3.7   |  23  |  1.43<H>    Ca    8.3<L>      24 Jan 2021 10:00            Blood Culture: 01-21 @ 16:38  Organism --  Gram Stain Blood -- Gram Stain --  Specimen Source .Blood Blood-Peripheral  Culture-Blood --      I&O's Summary    CAPILLARY BLOOD GLUCOSE      POCT Blood Glucose.: 271 mg/dL (24 Jan 2021 12:48)  POCT Blood Glucose.: 273 mg/dL (24 Jan 2021 08:20)  POCT Blood Glucose.: 336 mg/dL (23 Jan 2021 22:34)  POCT Blood Glucose.: 271 mg/dL (23 Jan 2021 17:12)      RADIOLOGY/EKG:  MRI Right Foot With IV Contrast Impression:    1. Nonspecific marrow edema and enhancement involves the bipartite medial hallux sesamoid with similar appearance to the prior, however, nearby plantar soft tissue wound is present. This may reflect persistence of previously noted sesamoiditis versus osteomyelitis.  2. There are presumed reactive marrow changes involving the first metatarsal head and lateral hallux sesamoid although early infection cannot be absolutely excluded.  3. Osteoarthritis is noted with possible hallux rigidus.    JOSE VIVEROS MD; Attending Radiologist  This document has been electronically signed. Jan 21 2021  8:24PM    XRay Right Foot Impression:    There is softtissue prominence along the dorsum of the foot.  The alignment at the tarsometatarsal joints remains within normal limits.  No acute fracture or focal osteolysis is noted.    PEPITO PRUETT MD; Attending Radiologist  This document has been electronically signed. Jan 21 2021  4:45PM

## 2021-01-24 NOTE — CHART NOTE - NSCHARTNOTEFT_GEN_A_CORE
Pt seen and examined with house staff.  Plan formulated and reviewed on rounds    Briefly,  64 y/o male with HTN, DM, HL, PAD and CAD admitted with R foot cellulitis infection and very suspicious for OM.  Started on IV abx  Tm 99.9  WBC up 14  Cr up 1.4  BCx and UCx negative thus far  ROS o/w negative     Awake and alert  NAD  Stable vitals.  Tm 99.9    Grossly non focal   R foot in bandage    Labs reviewed    WBC 14  Cr 1.4  JAROD (KDIGO 1) ?? from IV Vanco    Cont with IV Vanco/Cefe (3)--follow vanco level closely  Follow up Bcx  Will eventually need PICC for long term Abx  Possible debridement on Monday   FS with insulin coverage.  Need better FS.  Keep FS < 180.  Adjust Lantus as needed.  Change all meds to NS solution  Hematuria to be addressed as outpt   DVT prophy--ambulation     Med Surg. Pt seen and examined with house staff.  Plan formulated and reviewed on rounds    Briefly,  64 y/o male with HTN, DM, HL, PAD and CAD admitted with R foot cellulitis infection and very suspicious for OM.  Started on IV abx  Tm 99.9  WBC up 14  Cr up 1.4  BCx and UCx negative thus far  ROS o/w negative     Awake and alert  NAD  Stable vitals.  Tm 99.9    Grossly non focal   R foot in bandage    Labs reviewed    WBC 14  Cr 1.4  JAROD (KDIGO 1) ?? from IV Vanco    Cont with IV Vanco/Cefe (3)--follow vanco level closely  Follow up Bcx  Will eventually need PICC for long term Abx  Possible debridement on Monday   Follow renal Fx--may need to stop ARB if Cr continues to rise  FS with insulin coverage.  Need better FS.  Keep FS < 180.  Adjust Lantus as needed.  Change all meds to NS solution  Hematuria to be addressed as outpt   DVT prophy--ambulation     Med Surg.

## 2021-01-24 NOTE — PROGRESS NOTE ADULT - ATTENDING COMMENTS
As above, pt seen and examined with house staff and treatment plan formulated on rounds.
As above, pt seen and examined with house staff and treatment plan formulated on rounds.     Tm 101.1 O/N  Follow Cxs closely
As above, pt seen and examined with house staff and treatment plan formulated on rounds.     JAROD   Leukocytosis

## 2021-01-24 NOTE — PROGRESS NOTE ADULT - ASSESSMENT
66 y/o M PMHx as noted above presents  for further evaluation and management of severe right foot erythema found to have notable drainage, leukocytosis, elevated ESR concerning for underlying osteomyelitis.    #Right Foot Cellultis, Suspected Osteomyelitis  ~f/u PAN C+S- NGTD  - MRI of Right foot: plantar soft tissue wound is present. This may reflect persistence of previously noted sesamoiditis versus osteomyelitis.  ~ Podiatry Debridement on Monday.   ~ID recs appreciated: On vancomycin 1 gm IV q12h and cefepime 2 gm IV q12h (Day 3)   - PICC Line needed after Debriedment     #Gross Hematuria   ~Urology oupatient CTU and cysto  ~Follow Up Daily CBCs    #Hyponatremia   - Continue to hold off HCTZ  - Bolus NS  - Change Abx to NS from D5W solution    #CAD  - Aspirin 81 mg QD  - Carvedilol 25 mg BID  - Ranolazine 500 mg BID    #Diabetes Mellitus  - 25 Lantus QHS  - ISS  - Remove D5 Solution from Abx  - Diabetes Diet     #Hyperlipidemia  - Atorvastatin 40 QHS    #Hypertension  - Amlodipine 10 mg QD  - Losartan 100 mg QD    #VTE PPX  - SCD    #Diet  - Diabetes Diet  - NPO After Midnight tonight for Debridement in AM    #Code Status  - Full Code      64 y/o M PMHx as noted above presents  for further evaluation and management of severe right foot erythema found to have notable drainage, leukocytosis, elevated ESR concerning for underlying osteomyelitis.    #Right Foot Cellultis, Suspected Osteomyelitis  ~f/u PAN C+S- NGTD  - MRI of Right foot: plantar soft tissue wound is present. This may reflect persistence of previously noted sesamoiditis versus osteomyelitis.  ~ Podiatry Debridement on Monday.   - Medically Optimized for surgery. NPO after midnight tonight. RCRI 2 points, Class II Risk   ~ID recs appreciated: On vancomycin 1 gm IV q12h and cefepime 2 gm IV q12h (Day 3)   - PICC Line needed after Debriedment     #Gross Hematuria   ~Urology oupatient CTU and cysto  ~Follow Up Daily CBCs    #Hyponatremia   - Continue to hold off HCTZ  - Bolus NS  - Change Abx to NS from D5W solution    #CAD  - Aspirin 81 mg QD  - Carvedilol 25 mg BID  - Ranolazine 500 mg BID    #Diabetes Mellitus  - 25 Lantus QHS  - ISS  - Remove D5 Solution from Abx  - Diabetes Diet     #Hyperlipidemia  - Atorvastatin 40 QHS    #Hypertension  - Amlodipine 10 mg QD  - Losartan 100 mg QD    #VTE PPX  - SCD    #Diet  - Diabetes Diet  - NPO After Midnight tonight for Debridement in AM    #Code Status  - Full Code      66 y/o M PMHx as noted above presents  for further evaluation and management of severe right foot erythema found to have notable drainage, leukocytosis, elevated ESR concerning for underlying osteomyelitis.    #Right Foot Cellultis, Suspected Osteomyelitis  ~f/u PAN C+S- NGTD  - MRI of Right foot: plantar soft tissue wound is present. This may reflect persistence of previously noted sesamoiditis versus osteomyelitis.  ~ Podiatry Debridement on Monday, pt RCI class III risk, pt medically stable for low risk procedure such as debridement   - Medically Optimized for surgery. NPO after midnight tonight. RCRI 2 points, Class II Risk   ~ID recs appreciated: On vancomycin 1 gm IV q12h and cefepime 2 gm IV q12h (Day 3)   - PICC Line needed after Debriedment     #Gross Hematuria   ~Urology oupatient CTU and cysto  ~Follow Up Daily CBCs    #Hyponatremia   - Continue to hold off HCTZ  - Bolus NS  - Change Abx to NS from D5W solution    #CAD  - Aspirin 81 mg QD  - Carvedilol 25 mg BID  - Ranolazine 500 mg BID    #Diabetes Mellitus  - 25 Lantus QHS  - ISS  - Remove D5 Solution from Abx  - Diabetes Diet     #Hyperlipidemia  - Atorvastatin 40 QHS    #Hypertension  - Amlodipine 10 mg QD  - Losartan 100 mg QD    #VTE PPX  - SCD    #Diet  - Diabetes Diet  - NPO After Midnight tonight for Debridement in AM    #Code Status  - Full Code

## 2021-01-24 NOTE — PROGRESS NOTE ADULT - SUBJECTIVE AND OBJECTIVE BOX
Patient is a 65y old  Male who presents with a chief complaint of Right foot infection (23 Jan 2021 07:49)      HPI:  Patient is a 66 y/o M with a PMH of HTN, HLD, T2DM, PAD, CAD, and BPH presenting to the ED after he was seen in Dr. Bullock's office. About 2 weeks ago patient noticed a "callous' on the plantar surface near the ball of his right foot. He started to treat on his own by applying neosporin and band aids to the area. He reports that over the course of the week he noted improvement with this. However, one week ago while putting his pants on, the band-aid got caught in his pant leg and "ripped off the callous". He did not seek medical attention at the time since he did not feel any pain (due to diabetic neuropathy in bilateral legs). He saw his podiatrist today due to increased drainage and erythema to the R foot. He denies any fever or chills. He is able to bear weight on both feet. He also mentions noticing some blood in his urine yesterday. He saw his urologist about 1 month ago for a similar complaint.     In the ED vitals were /65 HR 84 RR 18 T 100.4 SpO2 98% RA. He received IV ceftriaxone 1g x1 and IV vancomycin 1g x1.  (21 Jan 2021 20:33)      Allergies    No Known Allergies    Intolerances        MEDICATIONS  (STANDING):  amLODIPine   Tablet 10 milliGRAM(s) Oral daily  ascorbic acid  Oral Tab/Cap - Peds 1000 milliGRAM(s) Oral daily  aspirin enteric coated 81 milliGRAM(s) Oral daily  atorvastatin Oral Tab/Cap - Peds 40 milliGRAM(s) Oral daily  carvedilol Oral Tab/Cap - Peds 25 milliGRAM(s) Oral two times a day  cefepime   IVPB 2000 milliGRAM(s) IV Intermittent every 12 hours  clotrimazole 1% Cream 1 Application(s) Topical two times a day  cyanocobalamin 1000 MICROGram(s) Oral daily  dextrose 5%. 1000 milliLiter(s) (100 mL/Hr) IV Continuous <Continuous>  fluticasone propionate 50 MICROgram(s)/spray Nasal Spray 1 Spray(s) Both Nostrils two times a day  glucagon  Injectable 1 milliGRAM(s) IntraMuscular once  insulin glargine Injectable (LANTUS) 25 Unit(s) SubCutaneous at bedtime  insulin lispro (ADMELOG) corrective regimen sliding scale   SubCutaneous three times a day before meals  insulin lispro (ADMELOG) corrective regimen sliding scale   SubCutaneous at bedtime  losartan 100 milliGRAM(s) Oral daily  ranolazine 500 milliGRAM(s) Oral two times a day  vancomycin  IVPB 1000 milliGRAM(s) IV Intermittent every 12 hours    MEDICATIONS  (PRN):        RADIOLOGY    CBC Full  -  ( 24 Jan 2021 10:00 )  WBC Count : 14.53 K/uL  RBC Count : 3.44 M/uL  Hemoglobin : 10.0 g/dL  Hematocrit : 29.5 %  Platelet Count - Automated : 298 K/uL  Mean Cell Volume : 85.8 fl  Mean Cell Hemoglobin : 29.1 pg  Mean Cell Hemoglobin Concentration : 33.9 gm/dL  Auto Neutrophil # : 12.01 K/uL  Auto Lymphocyte # : 1.09 K/uL  Auto Monocyte # : 1.21 K/uL  Auto Eosinophil # : 0.05 K/uL  Auto Basophil # : 0.04 K/uL  Auto Neutrophil % : 82.7 %  Auto Lymphocyte % : 7.5 %  Auto Monocyte % : 8.3 %  Auto Eosinophil % : 0.3 %  Auto Basophil % : 0.3 %      01-24    128<L>  |  97  |  32<H>  ----------------------------<  270<H>  3.7   |  23  |  1.43<H>    Ca    8.3<L>      24 Jan 2021 10:00

## 2021-01-24 NOTE — PROGRESS NOTE ADULT - ASSESSMENT
Alert Right foot with edema, erythema, subq fluctuance 1st mtp low grade temp & leukocytosis, hematuria. Discussed with him at length attempt to salvage his right foot advise open debridement 1st metatarsal head & excision of medial & lateral sesamoids. He is aware that wound may not heal & require future Sx or more proximal amputation including BKA/AKA. He freely signed consent with RN witness. R leg tender & edematous will do US/venous duplex R/O DVT.  For OR in AM / JAY/Local anesthesia pending med eval THX

## 2021-01-25 ENCOUNTER — RESULT REVIEW (OUTPATIENT)
Age: 66
End: 2021-01-25

## 2021-01-25 DIAGNOSIS — L02.619 CUTANEOUS ABSCESS OF UNSPECIFIED FOOT: ICD-10-CM

## 2021-01-25 DIAGNOSIS — E11.621 TYPE 2 DIABETES MELLITUS WITH FOOT ULCER: ICD-10-CM

## 2021-01-25 DIAGNOSIS — M86.9 OSTEOMYELITIS, UNSPECIFIED: ICD-10-CM

## 2021-01-25 LAB
ANION GAP SERPL CALC-SCNC: 10 MMOL/L — SIGNIFICANT CHANGE UP (ref 5–17)
BUN SERPL-MCNC: 29 MG/DL — HIGH (ref 7–23)
CALCIUM SERPL-MCNC: 8.3 MG/DL — LOW (ref 8.5–10.1)
CHLORIDE SERPL-SCNC: 101 MMOL/L — SIGNIFICANT CHANGE UP (ref 96–108)
CO2 SERPL-SCNC: 20 MMOL/L — LOW (ref 22–31)
CREAT SERPL-MCNC: 1.25 MG/DL — SIGNIFICANT CHANGE UP (ref 0.5–1.3)
GLUCOSE SERPL-MCNC: 158 MG/DL — HIGH (ref 70–99)
HCT VFR BLD CALC: 27.5 % — LOW (ref 39–50)
HGB BLD-MCNC: 9.3 G/DL — LOW (ref 13–17)
MCHC RBC-ENTMCNC: 28.8 PG — SIGNIFICANT CHANGE UP (ref 27–34)
MCHC RBC-ENTMCNC: 33.8 GM/DL — SIGNIFICANT CHANGE UP (ref 32–36)
MCV RBC AUTO: 85.1 FL — SIGNIFICANT CHANGE UP (ref 80–100)
PLATELET # BLD AUTO: 281 K/UL — SIGNIFICANT CHANGE UP (ref 150–400)
POTASSIUM SERPL-MCNC: 3.7 MMOL/L — SIGNIFICANT CHANGE UP (ref 3.5–5.3)
POTASSIUM SERPL-SCNC: 3.7 MMOL/L — SIGNIFICANT CHANGE UP (ref 3.5–5.3)
RBC # BLD: 3.23 M/UL — LOW (ref 4.2–5.8)
RBC # FLD: 12.8 % — SIGNIFICANT CHANGE UP (ref 10.3–14.5)
SODIUM SERPL-SCNC: 131 MMOL/L — LOW (ref 135–145)
WBC # BLD: 15.42 K/UL — HIGH (ref 3.8–10.5)
WBC # FLD AUTO: 15.42 K/UL — HIGH (ref 3.8–10.5)

## 2021-01-25 PROCEDURE — 88305 TISSUE EXAM BY PATHOLOGIST: CPT | Mod: 26

## 2021-01-25 PROCEDURE — 88304 TISSUE EXAM BY PATHOLOGIST: CPT | Mod: 26

## 2021-01-25 PROCEDURE — 73620 X-RAY EXAM OF FOOT: CPT | Mod: 26,RT

## 2021-01-25 PROCEDURE — 99233 SBSQ HOSP IP/OBS HIGH 50: CPT | Mod: GC

## 2021-01-25 PROCEDURE — 88311 DECALCIFY TISSUE: CPT | Mod: 26

## 2021-01-25 RX ORDER — ACETAMINOPHEN 500 MG
650 TABLET ORAL EVERY 6 HOURS
Refills: 0 | Status: DISCONTINUED | OUTPATIENT
Start: 2021-01-25 | End: 2021-01-27

## 2021-01-25 RX ORDER — MEPERIDINE HYDROCHLORIDE 50 MG/ML
12.5 INJECTION INTRAMUSCULAR; INTRAVENOUS; SUBCUTANEOUS
Refills: 0 | Status: DISCONTINUED | OUTPATIENT
Start: 2021-01-25 | End: 2021-01-25

## 2021-01-25 RX ORDER — FENTANYL CITRATE 50 UG/ML
25 INJECTION INTRAVENOUS
Refills: 0 | Status: DISCONTINUED | OUTPATIENT
Start: 2021-01-25 | End: 2021-01-25

## 2021-01-25 RX ORDER — SODIUM CHLORIDE 9 MG/ML
1000 INJECTION, SOLUTION INTRAVENOUS
Refills: 0 | Status: DISCONTINUED | OUTPATIENT
Start: 2021-01-25 | End: 2021-01-25

## 2021-01-25 RX ORDER — FENTANYL CITRATE 50 UG/ML
50 INJECTION INTRAVENOUS
Refills: 0 | Status: DISCONTINUED | OUTPATIENT
Start: 2021-01-25 | End: 2021-01-25

## 2021-01-25 RX ORDER — SODIUM CHLORIDE 9 MG/ML
500 INJECTION INTRAMUSCULAR; INTRAVENOUS; SUBCUTANEOUS ONCE
Refills: 0 | Status: COMPLETED | OUTPATIENT
Start: 2021-01-25 | End: 2021-01-25

## 2021-01-25 RX ORDER — ONDANSETRON 8 MG/1
4 TABLET, FILM COATED ORAL ONCE
Refills: 0 | Status: DISCONTINUED | OUTPATIENT
Start: 2021-01-25 | End: 2021-01-25

## 2021-01-25 RX ORDER — VANCOMYCIN HCL 1 G
750 VIAL (EA) INTRAVENOUS EVERY 12 HOURS
Refills: 0 | Status: DISCONTINUED | OUTPATIENT
Start: 2021-01-25 | End: 2021-01-27

## 2021-01-25 RX ADMIN — Medication 1 APPLICATION(S): at 21:33

## 2021-01-25 RX ADMIN — CEFEPIME 100 MILLIGRAM(S): 1 INJECTION, POWDER, FOR SOLUTION INTRAMUSCULAR; INTRAVENOUS at 08:56

## 2021-01-25 RX ADMIN — PREGABALIN 1000 MICROGRAM(S): 225 CAPSULE ORAL at 18:54

## 2021-01-25 RX ADMIN — SODIUM CHLORIDE 3 MILLILITER(S): 9 INJECTION INTRAMUSCULAR; INTRAVENOUS; SUBCUTANEOUS at 21:33

## 2021-01-25 RX ADMIN — Medication 1000 MILLIGRAM(S): at 18:54

## 2021-01-25 RX ADMIN — Medication 2: at 08:03

## 2021-01-25 RX ADMIN — RANOLAZINE 500 MILLIGRAM(S): 500 TABLET, FILM COATED, EXTENDED RELEASE ORAL at 18:54

## 2021-01-25 RX ADMIN — INSULIN GLARGINE 25 UNIT(S): 100 INJECTION, SOLUTION SUBCUTANEOUS at 21:33

## 2021-01-25 RX ADMIN — Medication 1 SPRAY(S): at 08:52

## 2021-01-25 RX ADMIN — RANOLAZINE 500 MILLIGRAM(S): 500 TABLET, FILM COATED, EXTENDED RELEASE ORAL at 21:33

## 2021-01-25 RX ADMIN — Medication 250 MILLIGRAM(S): at 22:41

## 2021-01-25 RX ADMIN — Medication 1 SPRAY(S): at 21:45

## 2021-01-25 RX ADMIN — ATORVASTATIN CALCIUM 40 MILLIGRAM(S): 80 TABLET, FILM COATED ORAL at 19:00

## 2021-01-25 RX ADMIN — Medication 1 APPLICATION(S): at 14:00

## 2021-01-25 RX ADMIN — CEFEPIME 100 MILLIGRAM(S): 1 INJECTION, POWDER, FOR SOLUTION INTRAMUSCULAR; INTRAVENOUS at 21:34

## 2021-01-25 RX ADMIN — SODIUM CHLORIDE 3 MILLILITER(S): 9 INJECTION INTRAMUSCULAR; INTRAVENOUS; SUBCUTANEOUS at 14:35

## 2021-01-25 RX ADMIN — SODIUM CHLORIDE 1000 MILLILITER(S): 9 INJECTION INTRAMUSCULAR; INTRAVENOUS; SUBCUTANEOUS at 17:08

## 2021-01-25 RX ADMIN — SODIUM CHLORIDE 3 MILLILITER(S): 9 INJECTION INTRAMUSCULAR; INTRAVENOUS; SUBCUTANEOUS at 05:39

## 2021-01-25 RX ADMIN — Medication 81 MILLIGRAM(S): at 18:54

## 2021-01-25 RX ADMIN — Medication 250 MILLIGRAM(S): at 10:12

## 2021-01-25 NOTE — PROGRESS NOTE ADULT - SUBJECTIVE AND OBJECTIVE BOX
Patient is a 65y old  Male who presents with a chief complaint of Right foot infection (24 Jan 2021 13:31)      HPI:  Patient is a 64 y/o M with a PMH of HTN, HLD, T2DM, PAD, CAD, and BPH presenting to the ED after he was seen in Dr. Bullock's office. About 2 weeks ago patient noticed a "callous' on the plantar surface near the ball of his right foot. He started to treat on his own by applying neosporin and band aids to the area. He reports that over the course of the week he noted improvement with this. However, one week ago while putting his pants on, the band-aid got caught in his pant leg and "ripped off the callous". He did not seek medical attention at the time since he did not feel any pain (due to diabetic neuropathy in bilateral legs). He saw his podiatrist today due to increased drainage and erythema to the R foot. He denies any fever or chills. He is able to bear weight on both feet. He also mentions noticing some blood in his urine yesterday. He saw his urologist about 1 month ago for a similar complaint.     In the ED vitals were /65 HR 84 RR 18 T 100.4 SpO2 98% RA. He received IV ceftriaxone 1g x1 and IV vancomycin 1g x1.  (21 Jan 2021 20:33)      Allergies    No Known Allergies    Intolerances        MEDICATIONS  (STANDING):  amLODIPine   Tablet 10 milliGRAM(s) Oral daily  ascorbic acid  Oral Tab/Cap - Peds 1000 milliGRAM(s) Oral daily  aspirin enteric coated 81 milliGRAM(s) Oral daily  atorvastatin Oral Tab/Cap - Peds 40 milliGRAM(s) Oral daily  carvedilol Oral Tab/Cap - Peds 25 milliGRAM(s) Oral two times a day  cefepime   IVPB 2000 milliGRAM(s) IV Intermittent every 12 hours  clotrimazole 1% Cream 1 Application(s) Topical two times a day  cyanocobalamin 1000 MICROGram(s) Oral daily  dextrose 5%. 1000 milliLiter(s) (100 mL/Hr) IV Continuous <Continuous>  fluticasone propionate 50 MICROgram(s)/spray Nasal Spray 1 Spray(s) Both Nostrils two times a day  glucagon  Injectable 1 milliGRAM(s) IntraMuscular once  insulin glargine Injectable (LANTUS) 25 Unit(s) SubCutaneous at bedtime  insulin lispro (ADMELOG) corrective regimen sliding scale   SubCutaneous three times a day before meals  insulin lispro (ADMELOG) corrective regimen sliding scale   SubCutaneous at bedtime  losartan 100 milliGRAM(s) Oral daily  ranolazine 500 milliGRAM(s) Oral two times a day  sodium chloride 0.9% lock flush 3 milliLiter(s) IV Push every 8 hours  vancomycin  IVPB 1000 milliGRAM(s) IV Intermittent every 12 hours    MEDICATIONS  (PRN):        RADIOLOGY    CBC Full  -  ( 24 Jan 2021 13:10 )  WBC Count : 15.25 K/uL  RBC Count : 3.50 M/uL  Hemoglobin : 10.1 g/dL  Hematocrit : 29.7 %  Platelet Count - Automated : 301 K/uL  Mean Cell Volume : 84.9 fl  Mean Cell Hemoglobin : 28.9 pg  Mean Cell Hemoglobin Concentration : 34.0 gm/dL  Auto Neutrophil # : 12.40 K/uL  Auto Lymphocyte # : 1.12 K/uL  Auto Monocyte # : 1.44 K/uL  Auto Eosinophil # : 0.07 K/uL  Auto Basophil # : 0.05 K/uL  Auto Neutrophil % : 81.4 %  Auto Lymphocyte % : 7.3 %  Auto Monocyte % : 9.4 %  Auto Eosinophil % : 0.5 %  Auto Basophil % : 0.3 %      01-24    127<L>  |  97  |  32<H>  ----------------------------<  234<H>  4.0   |  23  |  1.36<H>    Ca    8.3<L>      24 Jan 2021 13:10    TPro  6.6  /  Alb  2.3<L>  /  TBili  0.6  /  DBili  x   /  AST  54<H>  /  ALT  86<H>  /  AlkPhos  84  01-24      Culture - Urine (01.21.21 @ 16:38)   Specimen Source: .Urine Clean Catch (Midstream)   Culture Results:   <10,000 CFU/mL Normal Urogenital Lisa Culture - Blood (01.21.21 @ 16:38)   Specimen Source: .Blood Blood-Peripheral   Culture Results:   No growth to date. Culture - Blood (01.21.21 @ 16:38)   Specimen Source: .Blood Blood-Peripheral   Culture Results:   No growth to date.   < from: MR Foot w/wo IV Cont, Right (01.21.21 @ 18:33) >    EXAM:  MR FOOT WAW IC RT                            PROCEDURE DATE:  01/21/2021          INTERPRETATION:  MR FOOT WITHOUT AND WITH IV CONTRAST RIGHT    HISTORY: Forefoot swelling. Pain. Infection. Diabetes. Evaluate for osteomyelitis of the first metatarsophalangeal joint.    TECHNIQUE:  Multiplanar MRI of the right forefoot was performed without and with 7.0 cc of Gadavist intravenous gadolinium contrast using 15 sequences.    COMPARISON:  Right foot x-rays dated January 21, 2021 and MRI datedJanuary 3, 2017    FINDINGS:    OSSEOUS STRUCTURES    Acute Fractures/Stress Reactions:  None.    Chronic Fractures:    Marrow:  Marrow edema is present throughout the bipartite medial hallux sesamoid with T1 marrow replacement and enhancement. Adjacent soft tissue wound is present present. The marrow changes are similar to the prior. There is nonspecific subcortical edema of the lateral hallux sesamoid. Slight subcortical edema is noted within the plantar medial margin of the first     < end of copied text >  < from: MR Foot w/wo IV Cont, Right (01.21.21 @ 18:33) >  metatarsal head.    INTERPHALANGEAL JOINTS    Articular Surfaces:  Tiny subchondral cysts are noted within the first proximal phalanx head.    Effusions/Synovitis:  None.    1ST METATARSOPHALANGEAL JOINT    Articular Surfaces:  Grade III chondromalacia is present with small to moderate-sized osteophytes and large dorsal ossification within the joint that is new from the prior. Small subchondral cysts are present with slight subchondral edema.    Effusions/Synovitis:  Mild synovitis is present.    Sesamoids:  Hallux sesamoids are located with small to moderate-sized osteophytes. Marrow edema is present throughout the bipartite medial hallux sesamoid with T1 marrow replacement and enhancement. Adjacent soft tissue wound is present present. The marrow changesare similar to the prior. There is nonspecific subcortical edema of the lateral hallux sesamoid..    LESSER METATARSOPHALANGEAL JOINTS    Articular Surfaces:  Preserved.    Effusions/Synovitis:  Slight synovitis is noted at the second metatarsophalangeal joint.    TARSOMETATARSAL JOINTS    Articular Surfaces:  Small subchondral cysts are present at the second tarsometatarsal joint with tiny marginal osteophytes.    Effusions/Synovitis:  None.    INTERTARSAL JOINTS    Articular Surfaces:  Small subchondral cysts are present with reactive marrow edema and tiny osteophytes of the naviculocuneiform joints.    Effusions/Synovitis:  None.      < end of copied text >  < from: MR Foot w/wo IV Cont, Right (01.21.21 @ 18:33) >  INTERMETATARSAL SPACES    Neuromas:  None.    Bursa:  None.    LISFRANC LIGAMENT  Intact.    TENDONS    Flexor Tendons:  Intact.    Extensor Tendons:  Intact.    DISTAL PLANTAR FASCIA    < end of copied text >  < from: MR Foot w/wo IV Cont, Right (01.21.21 @ 18:33) >  DISTAL PLANTAR FASCIA  Intact.    SOFT TISSUES    Musculature:  There is diffuse muscle atrophy with increased T2 signal consistent with diabetic neuropathy.    Subcutaneous Tissues:  Moderate to severe subcutaneous edema is present. No discrete abscess is seen. Soft tissue induration is present involving the first webspace with soft tissue wounds along the plantar and medial margins of the first metatarsophalangeal joint and medial hallux sesamoid.    IMPRESSION:  1. Nonspecific marrow edema and enhancement involves the bipartite medial hallux sesamoid with similar appearance to the prior, however, nearby plantar soft tissue wound is present. This may reflect persistence of previously noted sesamoiditis versus osteomyelitis.  2. There are presumed reactive marrow changes involving the first metatarsal head and lateral hallux sesamoid although early infection cannot be absolutely excluded.  3. Osteoarthritis is noted with possible hallux rigidus.            JOSE VIVEROS MD; Attending Radiologist    < end of copied text >  < from: Xray Foot AP + Lateral, Right (01.21.21 @ 15:51) >  EXAM:  XR FOOT 2 VIEWS RT                            PROCEDURE DATE:  01/21/2021          INTERPRETATION:  RIGHT FOOT: AP, lateral, and oblique views    CLINICAL HISTORY: Right foot ulcer.    COMPARISON: None Available    FINDINGS:    There is softtissue prominence along the dorsum of the foot.    The alignment at the tarsometatarsal joints remains within normal limits.  No acute fracture or focal osteolysis is noted.    Calcaneal enthesopathy is noted, including ossification along the plantarsurface of the foot.    Impression:      < end of copied text >  < from: Xray Foot AP + Lateral, Right (01.21.21 @ 15:51) >  Findings as discussed above.                      PEPITO PRUETT MD; Attending Radiologist  This document has been electronically signed. Jan 21 2021  4:45PM    < end of copied text >

## 2021-01-25 NOTE — PROGRESS NOTE ADULT - SUBJECTIVE AND OBJECTIVE BOX
66 YO M With a PMH of  HTN, HLD, T2DM, PAD, CAD, and BPH presents from  Dr. Bullock's office. Patient noticed a "callous' on the plantar surface near the ball of his right foot. he applied neosporin and band aids to the area. and noted improvement with this. he then proceeded to "ripped off the callous". He did not seek medical attention at the time since he did not feel any pain (due to diabetic neuropathy in bilateral legs). He saw Dr. Cisneros due to increased drainage and erythema to the R foot. He denies any fever or chills. He is able to bear weight on both feet. He also mentions noticing some blood in his urine. He saw his urologist about 1 month ago for a similar complaint.     Subjective: Patient was seen and examined by me this morning at bedside. He was found to have a fever of 101.6 before heading to the ER.     REVIEW OF SYSTEMS:  CONSTITUTIONAL: No weakness, fevers or chills  EYES/ENT: No visual changes;  No vertigo or throat pain   NECK: No pain or stiffness  RESPIRATORY: No cough, wheezing, hemoptysis; No shortness of breath  CARDIOVASCULAR: No chest pain or palpitations  GASTROINTESTINAL: No abdominal or epigastric pain. No nausea, vomiting; No diarrhea or constipation.   GENITOURINARY: No dysuria, frequency or hematuria  NEUROLOGICAL: No numbness or weakness  SKIN: No itching, burning, rashes, or lesions     PHYSICAL EXAM:  Vital Signs Last 24 Hrs  T(C): 37.5 (24 Jan 2021 09:24), Max: 37.7 (23 Jan 2021 15:25)  T(F): 99.5 (24 Jan 2021 09:24), Max: 99.9 (23 Jan 2021 21:05)  HR: 76 (24 Jan 2021 09:24) (71 - 78)  BP: 120/67 (24 Jan 2021 09:24) (101/55 - 120/67)  BP(mean): --  RR: 18 (24 Jan 2021 09:24) (18 - 18)  SpO2: 98% (24 Jan 2021 09:24) (89% - 98%)    Constitutional: Pt lying in bed, awake and alert, NAD  HEENT: EOMI, normal hearing, moist mucous membranes  Neck: Soft and supple, no JVD  Respiratory: CTABL, No wheezing, rales or rhonchi  Cardiovascular: S1S2+, RRR, no M/G/R  Gastrointestinal: BS+, soft, NT/ND, no guarding, no rebound  Extremities: No peripheral edema  Vascular: 2+ peripheral pulses  Neurological: AAOx3, no focal deficits  Musculoskeletal: 5/5 strength b/l upper and lower extremities. Right foot first MTP plantar wound with surrounding erythema and edema  Skin: No rashes    MEDICATIONS:  MEDICATIONS  (STANDING):  amLODIPine   Tablet 10 milliGRAM(s) Oral daily  ascorbic acid  Oral Tab/Cap - Peds 1000 milliGRAM(s) Oral daily  aspirin enteric coated 81 milliGRAM(s) Oral daily  atorvastatin Oral Tab/Cap - Peds 40 milliGRAM(s) Oral daily  carvedilol Oral Tab/Cap - Peds 25 milliGRAM(s) Oral two times a day  cefepime   IVPB 2000 milliGRAM(s) IV Intermittent every 12 hours  clotrimazole 1% Cream 1 Application(s) Topical two times a day  cyanocobalamin 1000 MICROGram(s) Oral daily  dextrose 5%. 1000 milliLiter(s) (100 mL/Hr) IV Continuous <Continuous>  fluticasone propionate 50 MICROgram(s)/spray Nasal Spray 1 Spray(s) Both Nostrils two times a day  glucagon  Injectable 1 milliGRAM(s) IntraMuscular once  insulin glargine Injectable (LANTUS) 25 Unit(s) SubCutaneous at bedtime  insulin lispro (ADMELOG) corrective regimen sliding scale   SubCutaneous three times a day before meals  insulin lispro (ADMELOG) corrective regimen sliding scale   SubCutaneous at bedtime  losartan 100 milliGRAM(s) Oral daily  ranolazine 500 milliGRAM(s) Oral two times a day  sodium chloride 0.9% Bolus 1000 milliLiter(s) IV Bolus once  sodium chloride 0.9% lock flush 3 milliLiter(s) IV Push every 8 hours  vancomycin  IVPB 1000 milliGRAM(s) IV Intermittent every 12 hours    MEDICATIONS  (PRN):      LABS: All Labs Reviewed:                        10.0   14.53 )-----------( 298      ( 24 Jan 2021 10:00 )             29.5     01-24    128<L>  |  97  |  32<H>  ----------------------------<  270<H>  3.7   |  23  |  1.43<H>    Ca    8.3<L>      24 Jan 2021 10:00            Blood Culture: 01-21 @ 16:38  Organism --  Gram Stain Blood -- Gram Stain --  Specimen Source .Blood Blood-Peripheral  Culture-Blood --      I&O's Summary    CAPILLARY BLOOD GLUCOSE      POCT Blood Glucose.: 271 mg/dL (24 Jan 2021 12:48)  POCT Blood Glucose.: 273 mg/dL (24 Jan 2021 08:20)  POCT Blood Glucose.: 336 mg/dL (23 Jan 2021 22:34)  POCT Blood Glucose.: 271 mg/dL (23 Jan 2021 17:12)      RADIOLOGY/EKG:  MRI Right Foot With IV Contrast Impression:    1. Nonspecific marrow edema and enhancement involves the bipartite medial hallux sesamoid with similar appearance to the prior, however, nearby plantar soft tissue wound is present. This may reflect persistence of previously noted sesamoiditis versus osteomyelitis.  2. There are presumed reactive marrow changes involving the first metatarsal head and lateral hallux sesamoid although early infection cannot be absolutely excluded.  3. Osteoarthritis is noted with possible hallux rigidus.    JOSE VIVEROS MD; Attending Radiologist  This document has been electronically signed. Jan 21 2021  8:24PM    XRay Right Foot Impression:    There is softtissue prominence along the dorsum of the foot.  The alignment at the tarsometatarsal joints remains within normal limits.  No acute fracture or focal osteolysis is noted.    PEPITO PRUETT MD; Attending Radiologist  This document has been electronically signed. Jan 21 2021  4:45PM       66 YO M With a PMH of  HTN, HLD, T2DM, PAD, CAD, and BPH presents from  Dr. Bullock's office. Patient noticed a "callous' on the plantar surface near the ball of his right foot. he applied neosporin and band aids to the area. and noted improvement with this. he then proceeded to "ripped off the callous". He did not seek medical attention at the time since he did not feel any pain (due to diabetic neuropathy in bilateral legs). He saw Dr. Cisneros due to increased drainage and erythema to the R foot. He denies any fever or chills. He is able to bear weight on both feet. He also mentions noticing some blood in his urine. He saw his urologist about 1 month ago for a similar complaint.     Subjective: Patient was seen and examined by me this morning at bedside. He was found to have a fever of 101.6 before heading to the OR. GIven Tylenol pre-op. POD#0    REVIEW OF SYSTEMS:  CONSTITUTIONAL: No weakness, fevers or chills  EYES/ENT: No visual changes;  No vertigo or throat pain   NECK: No pain or stiffness  RESPIRATORY: No cough, wheezing, hemoptysis; No shortness of breath  CARDIOVASCULAR: No chest pain or palpitations  GASTROINTESTINAL: No abdominal or epigastric pain. No nausea, vomiting; No diarrhea or constipation.   GENITOURINARY: No dysuria, frequency or hematuria  NEUROLOGICAL: No numbness or weakness  SKIN: No itching, burning, rashes, or lesions     PHYSICAL EXAM:  Vital Signs Last 24 Hrs  T(C): 36.9 (25 Jan 2021 16:35), Max: 38.7 (25 Jan 2021 14:19)  T(F): 98.5 (25 Jan 2021 16:35), Max: 101.6 (25 Jan 2021 14:19)  HR: 62 (25 Jan 2021 17:30) (58 - 84)  BP: 92/59 (25 Jan 2021 17:30) (86/58 - 117/67)  BP(mean): --  RR: 15 (25 Jan 2021 17:30) (12 - 18)  SpO2: 96% (25 Jan 2021 17:30) (92% - 99%)    Constitutional: Pt lying in bed, awake and alert, NAD  HEENT: EOMI, normal hearing, moist mucous membranes  Neck: Soft and supple, no JVD  Respiratory: CTABL, No wheezing, rales or rhonchi  Cardiovascular: S1S2+, RRR, no M/G/R  Gastrointestinal: BS+, soft, NT/ND, no guarding, no rebound  Extremities: No peripheral edema  Vascular: 2+ peripheral pulses  Neurological: AAOx3, no focal deficits  Musculoskeletal: 5/5 strength b/l upper and lower extremities. Right foot first MTP plantar wound with surrounding erythema and edema  Skin: No rashes    MEDICATIONS:  MEDICATIONS  (STANDING):  amLODIPine   Tablet 10 milliGRAM(s) Oral daily  ascorbic acid  Oral Tab/Cap - Peds 1000 milliGRAM(s) Oral daily  aspirin enteric coated 81 milliGRAM(s) Oral daily  atorvastatin Oral Tab/Cap - Peds 40 milliGRAM(s) Oral daily  carvedilol Oral Tab/Cap - Peds 25 milliGRAM(s) Oral two times a day  cefepime   IVPB 2000 milliGRAM(s) IV Intermittent every 12 hours  clotrimazole 1% Cream 1 Application(s) Topical two times a day  cyanocobalamin 1000 MICROGram(s) Oral daily  dextrose 5%. 1000 milliLiter(s) (100 mL/Hr) IV Continuous <Continuous>  fluticasone propionate 50 MICROgram(s)/spray Nasal Spray 1 Spray(s) Both Nostrils two times a day  glucagon  Injectable 1 milliGRAM(s) IntraMuscular once  insulin glargine Injectable (LANTUS) 25 Unit(s) SubCutaneous at bedtime  insulin lispro (ADMELOG) corrective regimen sliding scale   SubCutaneous three times a day before meals  insulin lispro (ADMELOG) corrective regimen sliding scale   SubCutaneous at bedtime  losartan 100 milliGRAM(s) Oral daily  ranolazine 500 milliGRAM(s) Oral two times a day  sodium chloride 0.9% Bolus 1000 milliLiter(s) IV Bolus once  sodium chloride 0.9% lock flush 3 milliLiter(s) IV Push every 8 hours  vancomycin  IVPB 1000 milliGRAM(s) IV Intermittent every 12 hours    MEDICATIONS  (PRN):      LABS: All Labs Reviewed:                                   9.3    15.42 )-----------( 281      ( 25 Jan 2021 08:40 )             27.5   01-25    131<L>  |  101  |  29<H>  ----------------------------<  158<H>  3.7   |  20<L>  |  1.25    Ca    8.3<L>      25 Jan 2021 08:40    TPro  6.6  /  Alb  2.3<L>  /  TBili  0.6  /  DBili  x   /  AST  54<H>  /  ALT  86<H>  /  AlkPhos  84  01-24    Blood Culture: 01-21 @ 16:38  Organism --  Gram Stain Blood -- Gram Stain --  Specimen Source .Blood Blood-Peripheral  Culture-Blood --      I&O's Summary    CAPILLARY BLOOD GLUCOSE      POCT Blood Glucose.: 146 mg/dL (25 Jan 2021 17:23)  POCT Blood Glucose.: 150 mg/dL (25 Jan 2021 12:23)  POCT Blood Glucose.: 178 mg/dL (25 Jan 2021 07:45)  POCT Blood Glucose.: 201 mg/dL (24 Jan 2021 21:52)        RADIOLOGY/EKG:  MRI Right Foot With IV Contrast Impression:    1. Nonspecific marrow edema and enhancement involves the bipartite medial hallux sesamoid with similar appearance to the prior, however, nearby plantar soft tissue wound is present. This may reflect persistence of previously noted sesamoiditis versus osteomyelitis.  2. There are presumed reactive marrow changes involving the first metatarsal head and lateral hallux sesamoid although early infection cannot be absolutely excluded.  3. Osteoarthritis is noted with possible hallux rigidus.    JOSE VIVEROS MD; Attending Radiologist  This document has been electronically signed. Jan 21 2021  8:24PM    XRay Right Foot Impression:    There is softtissue prominence along the dorsum of the foot.  The alignment at the tarsometatarsal joints remains within normal limits.  No acute fracture or focal osteolysis is noted.    PEPITO PRUETT MD; Attending Radiologist  This document has been electronically signed. Jan 21 2021  4:45PM

## 2021-01-25 NOTE — PROGRESS NOTE ADULT - ASSESSMENT
66 y/o Male with h/o HTN, HLD, T2DM, PAD, CAD, BPH was admitted on 1/21 after he was seen in Dr. Bullock's office for nonhealing foot wound. About 2 weeks PTA patient noticed a "callous' on the plantar surface near the ball of his right foot. He started to treat on his own by applying neosporin and band aids to the area. He reports that over the course of the week he noted improvement with this. However, one week PTA while putting his pants on, the band-aid got caught in his pant leg and "ripped off the callous". He did not seek medical attention at the time since he did not feel any pain (due to diabetic neuropathy in bilateral legs). He saw his podiatrist due to increased drainage and erythema to the R foot and was sent to ER. In ER he received IV ceftriaxone 1g x1 and IV vancomycin 1g x1, then zosyn.    1. Right foot plantar ulcer with probable underlying acute on chronic OM and surrounding foot cellulitis. PVD.  -has foot pain; low grade fever  -on vancomycin 1 gm IV q12h and cefepime 2 gm IV q12h # 3  -tolerating abx well so far; no side effects noted  -vancomycin trough level is high; decrease vancomycin to 750 mg IV q12h  -podiatry evaluation appreciated - plan for surgery  -MRI reviewed  -local wound care  -continue IV abx coverage  -monitor temps  -f/u CBC  -supportive care  2. Other issues:   -care per medicine

## 2021-01-25 NOTE — PROGRESS NOTE ADULT - ASSESSMENT
Pt has been seen and examined with FP resident, resident supervised agree with a/p       Patient is a 65y old  Male who presents with a chief complaint of Right foot infection (25 Jan 2021 07:11)        PHYSICAL EXAM:  Vital Signs Last 24 Hrs  T(C): 37.7 (25 Jan 2021 09:16), Max: 37.7 (24 Jan 2021 15:26)  T(F): 99.9 (25 Jan 2021 09:16), Max: 99.9 (24 Jan 2021 15:26)  HR: 80 (25 Jan 2021 09:16) (77 - 80)  BP: 117/67 (25 Jan 2021 09:16) (109/65 - 117/67)  BP(mean): --  RR: 18 (25 Jan 2021 09:16) (18 - 18)  SpO2: 92% (25 Jan 2021 09:16) (92% - 96%)  -rs-aeeb, cta  -cvs-s1s2 normal   -p/a-soft,bs+      A/P    # MRI suspicious for metatarsal head & sesamoid osteomyelitis. OR today for debridement, excision  of sesamoids     #pt is medically stable to undergo planned procedure \    #dvt pr     #ct abx

## 2021-01-25 NOTE — BRIEF OPERATIVE NOTE - NSICDXBRIEFPOSTOP_GEN_ALL_CORE_FT
POST-OP DIAGNOSIS:  Foot osteomyelitis, right 25-Jan-2021 16:47:02  BROWN HERNANDEZ  Foot abscess, right 25-Jan-2021 16:46:48  BROWN HERNANDEZ

## 2021-01-25 NOTE — BRIEF OPERATIVE NOTE - NSICDXBRIEFPROCEDURE_GEN_ALL_CORE_FT
PROCEDURES:  Excisional debridement of ulcer of right foot 25-Jan-2021 16:48:38  BROWN HERNANDEZ  Incision and drainage, bone abscess or osteomyelitis, foot 25-Jan-2021 16:45:47  BROWN HERNANDEZ  Debridement of osteomyelitis of foot 25-Jan-2021 16:44:30  BROWN HERNANDEZ  Debridement, phalanx, foot 25-Jan-2021 16:43:53  BROWN HERNANDEZ

## 2021-01-25 NOTE — BRIEF OPERATIVE NOTE - NSICDXBRIEFPREOP_GEN_ALL_CORE_FT
PRE-OP DIAGNOSIS:  Diabetic ulcer of right foot 25-Jan-2021 16:49:27  BROWN HERNANDEZ  Foot osteomyelitis 25-Jan-2021 16:46:18  BROWN HERNANDEZ  Foot abscess, right 25-Jan-2021 16:46:05  BROWN HERNANDEZ

## 2021-01-25 NOTE — BRIEF OPERATIVE NOTE - OPERATION/FINDINGS
Plantar ulcer under medial sesamoid probe to bone, sinus with 1St intermetatarsal space abscess necrotic medial 1st metatarsal ulcer EXCISIONAL PROCEDURES

## 2021-01-25 NOTE — BRIEF OPERATIVE NOTE - BRIEF OP NOTE DRAINS
Penrose 1st interspace R foot dorsal to plantar / 1/4" NuGauze and 1/4" NuGauze plain medially 1st metatarsal head

## 2021-01-25 NOTE — PROGRESS NOTE ADULT - SUBJECTIVE AND OBJECTIVE BOX
Patient is a 65y old  Male who presents with a chief complaint of Right foot infection (25 Jan 2021 07:06)      HPI:  Patient is a 64 y/o M with a PMH of HTN, HLD, T2DM, PAD, CAD, and BPH presenting to the ED after he was seen in Dr. Bullock's office. About 2 weeks ago patient noticed a "callous' on the plantar surface near the ball of his right foot. He started to treat on his own by applying neosporin and band aids to the area. He reports that over the course of the week he noted improvement with this. However, one week ago while putting his pants on, the band-aid got caught in his pant leg and "ripped off the callous". He did not seek medical attention at the time since he did not feel any pain (due to diabetic neuropathy in bilateral legs). He saw his podiatrist today due to increased drainage and erythema to the R foot. He denies any fever or chills. He is able to bear weight on both feet. He also mentions noticing some blood in his urine yesterday. He saw his urologist about 1 month ago for a similar complaint.     In the ED vitals were /65 HR 84 RR 18 T 100.4 SpO2 98% RA. He received IV ceftriaxone 1g x1 and IV vancomycin 1g x1.  (21 Jan 2021 20:33)      Allergies    No Known Allergies    Intolerances        MEDICATIONS  (STANDING):  amLODIPine   Tablet 10 milliGRAM(s) Oral daily  ascorbic acid  Oral Tab/Cap - Peds 1000 milliGRAM(s) Oral daily  aspirin enteric coated 81 milliGRAM(s) Oral daily  atorvastatin Oral Tab/Cap - Peds 40 milliGRAM(s) Oral daily  carvedilol Oral Tab/Cap - Peds 25 milliGRAM(s) Oral two times a day  cefepime   IVPB 2000 milliGRAM(s) IV Intermittent every 12 hours  clotrimazole 1% Cream 1 Application(s) Topical two times a day  cyanocobalamin 1000 MICROGram(s) Oral daily  dextrose 5%. 1000 milliLiter(s) (100 mL/Hr) IV Continuous <Continuous>  fluticasone propionate 50 MICROgram(s)/spray Nasal Spray 1 Spray(s) Both Nostrils two times a day  glucagon  Injectable 1 milliGRAM(s) IntraMuscular once  insulin glargine Injectable (LANTUS) 25 Unit(s) SubCutaneous at bedtime  insulin lispro (ADMELOG) corrective regimen sliding scale   SubCutaneous three times a day before meals  insulin lispro (ADMELOG) corrective regimen sliding scale   SubCutaneous at bedtime  losartan 100 milliGRAM(s) Oral daily  ranolazine 500 milliGRAM(s) Oral two times a day  sodium chloride 0.9% lock flush 3 milliLiter(s) IV Push every 8 hours  vancomycin  IVPB 1000 milliGRAM(s) IV Intermittent every 12 hours    MEDICATIONS  (PRN):        RADIOLOGY    CBC Full  -  ( 24 Jan 2021 13:10 )  WBC Count : 15.25 K/uL  RBC Count : 3.50 M/uL  Hemoglobin : 10.1 g/dL  Hematocrit : 29.7 %  Platelet Count - Automated : 301 K/uL  Mean Cell Volume : 84.9 fl  Mean Cell Hemoglobin : 28.9 pg  Mean Cell Hemoglobin Concentration : 34.0 gm/dL  Auto Neutrophil # : 12.40 K/uL  Auto Lymphocyte # : 1.12 K/uL  Auto Monocyte # : 1.44 K/uL  Auto Eosinophil # : 0.07 K/uL  Auto Basophil # : 0.05 K/uL  Auto Neutrophil % : 81.4 %  Auto Lymphocyte % : 7.3 %  Auto Monocyte % : 9.4 %  Auto Eosinophil % : 0.5 %  Auto Basophil % : 0.3 %      01-24    127<L>  |  97  |  32<H>  ----------------------------<  234<H>  4.0   |  23  |  1.36<H>    Ca    8.3<L>      24 Jan 2021 13:10    TPro  6.6  /  Alb  2.3<L>  /  TBili  0.6  /  DBili  x   /  AST  54<H>  /  ALT  86<H>  /  AlkPhos  84  01-24

## 2021-01-25 NOTE — PROGRESS NOTE ADULT - ASSESSMENT
64 y/o M PMHx as noted above presents  for further evaluation and management of severe right foot erythema found to have notable drainage, leukocytosis, elevated ESR concerning for underlying osteomyelitis.    #Right Foot Cellultis, Suspected Osteomyelitis  ~f/u PAN C+S- NGTD  - MRI of Right foot: plantar soft tissue wound is present. This may reflect persistence of previously noted sesamoiditis versus osteomyelitis.  ~ Podiatry Debridement on Monday, pt RCI class III risk, pt medically stable for low risk procedure such as debridement   - Medically Optimized for surgery. NPO after midnight tonight. RCRI 2 points, Class II Risk   ~ID recs appreciated: On vancomycin 1 gm IV q12h and cefepime 2 gm IV q12h (Day 3)   - PICC Line needed after Debriedment     #Gross Hematuria   ~Urology oupatient CTU and cysto  ~Follow Up Daily CBCs    #Hyponatremia   - Continue to hold off HCTZ  - Bolus NS  - Change Abx to NS from D5W solution    #CAD  - Aspirin 81 mg QD  - Carvedilol 25 mg BID  - Ranolazine 500 mg BID    #Diabetes Mellitus  - 25 Lantus QHS  - ISS  - Remove D5 Solution from Abx  - Diabetes Diet     #Hyperlipidemia  - Atorvastatin 40 QHS    #Hypertension  - Amlodipine 10 mg QD  - Losartan 100 mg QD    #VTE PPX  - SCD    #Diet  - Diabetes Diet  - NPO After Midnight tonight for Debridement in AM    #Code Status  - Full Code    64 y/o M PMHx as noted above presents  for further evaluation and management of severe right foot erythema found to have notable drainage, leukocytosis, elevated ESR concerning for underlying osteomyelitis.    #Right Foot Cellultis, Suspected Osteomyelitis  - PAN C+S- NGTD  - MRI of Right foot: plantar soft tissue wound is present. This may reflect persistence of previously noted sesamoiditis versus osteomyelitis.  ~ Podiatry Debridement on Monday, pt RCI class III risk, pt medically stable for low risk procedure such as debridement   - Medically Optimized for surgery. NPO after midnight tonight. RCRI 2 points, Class II Risk   ~ID recs appreciated: On vancomycin 1 gm IV q12h and cefepime 2 gm IV q12h (Day 4)  - PICC Line needed after Debriedment    #Gross Hematuria   ~Urology oupatient CTU and cysto  ~Follow Up Daily CBCs    #Hyponatremia   - Continue to hold off HCTZ  - Bolus NS      #CAD  - Aspirin 81 mg QD  - Carvedilol 25 mg BID  - Ranolazine 500 mg BID    #Diabetes Mellitus  - 25 Lantus QHS  - ISS  - Diabetes Diet     #Hyperlipidemia  - Atorvastatin 40 QHS    #Hypertension  - Amlodipine 10 mg QD  - Losartan 100 mg QD    #VTE PPX  - SCD    #Diet  - Diabetes Diet    #Code Status  - Full Code

## 2021-01-25 NOTE — PROGRESS NOTE ADULT - SUBJECTIVE AND OBJECTIVE BOX
Date of service: 21 @ 10:33    Lying in bed in NAD  Has right foot pain  Weak looking  Has low grade fever  Reported with vancomycin level high    ROS: denies dizziness, no HA, no SOB or cough, no abdominal pain, no diarrhea or constipation; no dysuria    MEDICATIONS  (STANDING):  amLODIPine   Tablet 10 milliGRAM(s) Oral daily  ascorbic acid  Oral Tab/Cap - Peds 1000 milliGRAM(s) Oral daily  aspirin enteric coated 81 milliGRAM(s) Oral daily  atorvastatin Oral Tab/Cap - Peds 40 milliGRAM(s) Oral daily  carvedilol Oral Tab/Cap - Peds 25 milliGRAM(s) Oral two times a day  cefepime   IVPB 2000 milliGRAM(s) IV Intermittent every 12 hours  clotrimazole 1% Cream 1 Application(s) Topical two times a day  cyanocobalamin 1000 MICROGram(s) Oral daily  dextrose 5%. 1000 milliLiter(s) (100 mL/Hr) IV Continuous <Continuous>  fluticasone propionate 50 MICROgram(s)/spray Nasal Spray 1 Spray(s) Both Nostrils two times a day  glucagon  Injectable 1 milliGRAM(s) IntraMuscular once  insulin glargine Injectable (LANTUS) 25 Unit(s) SubCutaneous at bedtime  insulin lispro (ADMELOG) corrective regimen sliding scale   SubCutaneous three times a day before meals  insulin lispro (ADMELOG) corrective regimen sliding scale   SubCutaneous at bedtime  losartan 100 milliGRAM(s) Oral daily  ranolazine 500 milliGRAM(s) Oral two times a day  sodium chloride 0.9% lock flush 3 milliLiter(s) IV Push every 8 hours  vancomycin  IVPB 1000 milliGRAM(s) IV Intermittent every 12 hours    Vital Signs Last 24 Hrs  T(C): 37.7 (2021 09:16), Max: 37.7 (2021 15:26)  T(F): 99.9 (2021 09:16), Max: 99.9 (2021 15:26)  HR: 80 (2021 09:16) (77 - 80)  BP: 117/67 (2021 09:16) (109/65 - 117/67)  BP(mean): --  RR: 18 (2021 09:16) (18 - 18)  SpO2: 92% (2021 09:16) (92% - 96%)     Physical exam:    Constitutional:  No acute distress  HEENT: NC/AT, EOMI, PERRLA, conjunctivae clear  Neck: supple; thyroid not palpable  Back: no tenderness  Respiratory: respiratory effort normal; clear to auscultation  Cardiovascular: S1S2 regular, no murmurs  Abdomen: soft, not tender, not distended, positive BS  Genitourinary: no suprapubic tenderness  Lymphatic: no LN palpable  Musculoskeletal: no muscle tenderness, no joint swelling or tenderness  Extremities: no pedal edema  Right foot first MTP plantar wound with surrounding erythema and edema  Neurological/ Psychiatric: AxOx3, moving all extremities  Skin: no rashes; no palpable lesions    Labs: reviewed                        9.3    15.42 )-----------( 281      ( 2021 08:40 )             27.5         131<L>  |  101  |  29<H>  ----------------------------<  158<H>  3.7   |  20<L>  |  1.25    Ca    8.3<L>      2021 08:40    TPro  6.6  /  Alb  2.3<L>  /  TBili  0.6  /  DBili  x   /  AST  54<H>  /  ALT  86<H>  /  AlkPhos  84        Vancomycin Level, Trough: 21.8 ug/mL ( @ 10:00)    Ferritin, Serum: 409 ng/mL (21 @ 06:35)  C-Reactive Protein, Serum: 16.40 mg/dL (21 @ 16:38)                        9.1    10.38 )-----------( 264      ( 2021 06:35 )             26.7         134<L>  |  100  |  27<H>  ----------------------------<  199<H>  3.7   |  26  |  1.23    Ca    8.5      2021 06:35  Phos  2.9       Mg     1.8         TPro  7.1  /  Alb  2.9<L>  /  TBili  0.8  /  DBili  x   /  AST  13<L>  /  ALT  31  /  AlkPhos  70       LIVER FUNCTIONS - ( 2021 16:38 )  Alb: 2.9 g/dL / Pro: 7.1 gm/dL / ALK PHOS: 70 U/L / ALT: 31 U/L / AST: 13 U/L / GGT: x           Urinalysis Basic - ( 2021 16:38 )    Color: Yellow / Appearance: very cloudy / S.020 / pH: x  Gluc: x / Ketone: Negative  / Bili: Negative / Urobili: Negative mg/dL   Blood: x / Protein: 30 mg/dL / Nitrite: Negative   Leuk Esterase: Small / RBC: 11-25 /HPF / WBC 3-5   Sq Epi: x / Non Sq Epi: Negative / Bacteria: Few      COVID-19 PCR: NotDetec (21 @ 18:40)      Radiology: all available radiological tests reviewed    < from: MR Foot w/wo IV Cont, Right (21 @ 18:33) >  1. Nonspecific marrow edema and enhancement involves the bipartite medial hallux sesamoid with similar appearance to the prior, however, nearby plantar soft tissue wound is present. This may reflect persistence of previously noted sesamoiditis versus osteomyelitis.  2. There are presumed reactive marrow changes involving the first metatarsal head and lateral hallux sesamoid although early infection cannot be absolutely excluded.  3. Osteoarthritis is noted with possible hallux rigidus.    < end of copied text >      Advanced directives addressed: full resuscitation

## 2021-01-25 NOTE — BRIEF OPERATIVE NOTE - COMMENTS
Excisional procedures:Excise Medial & lateral sesamoids, debride plantar foot ulcer debride medial 1 St metatarsal head & base of proximal phalanx I & D DEEP 1st interspace abscess

## 2021-01-26 LAB
ANION GAP SERPL CALC-SCNC: 10 MMOL/L — SIGNIFICANT CHANGE UP (ref 5–17)
BUN SERPL-MCNC: 29 MG/DL — HIGH (ref 7–23)
CALCIUM SERPL-MCNC: 8.1 MG/DL — LOW (ref 8.5–10.1)
CHLORIDE SERPL-SCNC: 104 MMOL/L — SIGNIFICANT CHANGE UP (ref 96–108)
CO2 SERPL-SCNC: 19 MMOL/L — LOW (ref 22–31)
CREAT SERPL-MCNC: 1.19 MG/DL — SIGNIFICANT CHANGE UP (ref 0.5–1.3)
GLUCOSE SERPL-MCNC: 185 MG/DL — HIGH (ref 70–99)
HCT VFR BLD CALC: 24.3 % — LOW (ref 39–50)
HGB BLD-MCNC: 8.2 G/DL — LOW (ref 13–17)
MCHC RBC-ENTMCNC: 28.8 PG — SIGNIFICANT CHANGE UP (ref 27–34)
MCHC RBC-ENTMCNC: 33.7 GM/DL — SIGNIFICANT CHANGE UP (ref 32–36)
MCV RBC AUTO: 85.3 FL — SIGNIFICANT CHANGE UP (ref 80–100)
PLATELET # BLD AUTO: 270 K/UL — SIGNIFICANT CHANGE UP (ref 150–400)
POTASSIUM SERPL-MCNC: 4.1 MMOL/L — SIGNIFICANT CHANGE UP (ref 3.5–5.3)
POTASSIUM SERPL-SCNC: 4.1 MMOL/L — SIGNIFICANT CHANGE UP (ref 3.5–5.3)
RBC # BLD: 2.85 M/UL — LOW (ref 4.2–5.8)
RBC # FLD: 13 % — SIGNIFICANT CHANGE UP (ref 10.3–14.5)
SODIUM SERPL-SCNC: 133 MMOL/L — LOW (ref 135–145)
WBC # BLD: 13.99 K/UL — HIGH (ref 3.8–10.5)
WBC # FLD AUTO: 13.99 K/UL — HIGH (ref 3.8–10.5)

## 2021-01-26 PROCEDURE — 99233 SBSQ HOSP IP/OBS HIGH 50: CPT | Mod: GC

## 2021-01-26 RX ORDER — SODIUM CHLORIDE 9 MG/ML
500 INJECTION, SOLUTION INTRAVENOUS ONCE
Refills: 0 | Status: COMPLETED | OUTPATIENT
Start: 2021-01-26 | End: 2021-01-26

## 2021-01-26 RX ORDER — ALBUTEROL 90 UG/1
2 AEROSOL, METERED ORAL ONCE
Refills: 0 | Status: COMPLETED | OUTPATIENT
Start: 2021-01-26 | End: 2021-01-26

## 2021-01-26 RX ORDER — LOSARTAN POTASSIUM 100 MG/1
50 TABLET, FILM COATED ORAL DAILY
Refills: 0 | Status: DISCONTINUED | OUTPATIENT
Start: 2021-01-27 | End: 2021-01-27

## 2021-01-26 RX ORDER — INSULIN LISPRO 100/ML
VIAL (ML) SUBCUTANEOUS
Refills: 0 | Status: DISCONTINUED | OUTPATIENT
Start: 2021-01-26 | End: 2021-01-26

## 2021-01-26 RX ORDER — INSULIN LISPRO 100/ML
4 VIAL (ML) SUBCUTANEOUS
Refills: 0 | Status: DISCONTINUED | OUTPATIENT
Start: 2021-01-26 | End: 2021-01-27

## 2021-01-26 RX ORDER — INSULIN LISPRO 100/ML
VIAL (ML) SUBCUTANEOUS
Refills: 0 | Status: DISCONTINUED | OUTPATIENT
Start: 2021-01-26 | End: 2021-01-27

## 2021-01-26 RX ADMIN — INSULIN GLARGINE 25 UNIT(S): 100 INJECTION, SOLUTION SUBCUTANEOUS at 21:46

## 2021-01-26 RX ADMIN — Medication 4 UNIT(S): at 18:00

## 2021-01-26 RX ADMIN — SODIUM CHLORIDE 3 MILLILITER(S): 9 INJECTION INTRAMUSCULAR; INTRAVENOUS; SUBCUTANEOUS at 22:00

## 2021-01-26 RX ADMIN — RANOLAZINE 500 MILLIGRAM(S): 500 TABLET, FILM COATED, EXTENDED RELEASE ORAL at 21:45

## 2021-01-26 RX ADMIN — Medication 1 APPLICATION(S): at 10:46

## 2021-01-26 RX ADMIN — Medication 1 APPLICATION(S): at 21:46

## 2021-01-26 RX ADMIN — Medication 1000 MILLIGRAM(S): at 10:42

## 2021-01-26 RX ADMIN — RANOLAZINE 500 MILLIGRAM(S): 500 TABLET, FILM COATED, EXTENDED RELEASE ORAL at 10:41

## 2021-01-26 RX ADMIN — Medication 6: at 13:00

## 2021-01-26 RX ADMIN — Medication 2: at 21:45

## 2021-01-26 RX ADMIN — Medication 1 SPRAY(S): at 10:46

## 2021-01-26 RX ADMIN — Medication 81 MILLIGRAM(S): at 10:42

## 2021-01-26 RX ADMIN — CEFEPIME 100 MILLIGRAM(S): 1 INJECTION, POWDER, FOR SOLUTION INTRAMUSCULAR; INTRAVENOUS at 21:46

## 2021-01-26 RX ADMIN — Medication 650 MILLIGRAM(S): at 06:12

## 2021-01-26 RX ADMIN — SODIUM CHLORIDE 3 MILLILITER(S): 9 INJECTION INTRAMUSCULAR; INTRAVENOUS; SUBCUTANEOUS at 05:05

## 2021-01-26 RX ADMIN — ATORVASTATIN CALCIUM 40 MILLIGRAM(S): 80 TABLET, FILM COATED ORAL at 10:42

## 2021-01-26 RX ADMIN — SODIUM CHLORIDE 3 MILLILITER(S): 9 INJECTION INTRAMUSCULAR; INTRAVENOUS; SUBCUTANEOUS at 12:33

## 2021-01-26 RX ADMIN — PREGABALIN 1000 MICROGRAM(S): 225 CAPSULE ORAL at 10:42

## 2021-01-26 RX ADMIN — CEFEPIME 100 MILLIGRAM(S): 1 INJECTION, POWDER, FOR SOLUTION INTRAMUSCULAR; INTRAVENOUS at 10:42

## 2021-01-26 RX ADMIN — Medication 1 SPRAY(S): at 21:46

## 2021-01-26 RX ADMIN — Medication 250 MILLIGRAM(S): at 22:32

## 2021-01-26 RX ADMIN — ALBUTEROL 2 PUFF(S): 90 AEROSOL, METERED ORAL at 06:07

## 2021-01-26 RX ADMIN — SODIUM CHLORIDE 500 MILLILITER(S): 9 INJECTION, SOLUTION INTRAVENOUS at 16:00

## 2021-01-26 RX ADMIN — CARVEDILOL PHOSPHATE 25 MILLIGRAM(S): 80 CAPSULE, EXTENDED RELEASE ORAL at 21:45

## 2021-01-26 RX ADMIN — Medication 6: at 17:14

## 2021-01-26 RX ADMIN — Medication 250 MILLIGRAM(S): at 11:38

## 2021-01-26 NOTE — PROGRESS NOTE ADULT - ASSESSMENT
Pt has been seen and examined with FP resident, resident supervised agree with a/p       Patient is a 65y old  Male who presents with a chief complaint of Right foot infection (25 Jan 2021 07:11)        PHYSICAL EXAM:    -rs-aeeb, cta  -cvs-s1s2 normal   -p/a-soft,bs+      A/P    # MRI suspicious for metatarsal head & sesamoid osteomyelitis s/p debridement, excision  of sesamoids -ct supportive care       #dvt pr     #ct abx

## 2021-01-26 NOTE — PROGRESS NOTE ADULT - ASSESSMENT
T Max 101.7 now 98.4 Pt state "I have the sweats" POD # 1 Debride right 1st ray, excise sesamoids, I&D deep space abscess, debride plantar ulcer. All digits viable, no cyanosis Packing & Penrose in place. Penrose mobilized. Foot looks better, no edema or erythema. Cultures neg to date including blood cultures. Cleanse sites / Betadine. Apply DSD / ABDs & 4 x4 / ACE wrap. Advise off load, will need PICC. Refusing to go to rehab. Will remove drains in AM THX

## 2021-01-26 NOTE — PROGRESS NOTE ADULT - ASSESSMENT
66 y/o M PMHx as noted above presents  for further evaluation and management of severe right foot erythema found to have notable drainage, leukocytosis, elevated ESR concerning for underlying osteomyelitis.    #Right Foot Cellultis, Suspected Osteomyelitis  - PAN C+S- NGTD  - MRI of Right foot: plantar soft tissue wound is present. This may reflect persistence of previously noted sesamoiditis versus osteomyelitis.  ~ Podiatry Debridement POD#1  ~ID recs appreciated: On vancomycin 1 gm IV q12h and cefepime 2 gm IV q12h (Day 5)  - PICC Line needed after Debriedment, once afebrile and stable for DC.     #Gross Hematuria   ~Urology oupatient CTU and cysto  ~Follow Up Daily CBCs    #Hyponatremia   - Continue to hold off HCTZ  - resolving    #CAD  - Aspirin 81 mg QD  - Carvedilol 25 mg BID  - Ranolazine 500 mg BID    #Diabetes Mellitus  - 25 Lantus QHS  - 3 Admelog Premeal  - ISS  - Diabetes Diet     #Hyperlipidemia  - Atorvastatin 40 QHS    #Hypertension  - Amlodipine 10 mg QD  - Losartan 100 mg QD    #VTE PPX  - SCD    #Diet  - Diabetes Diet    #Code Status  - Full Code    64 y/o M PMHx as noted above presents  for further evaluation and management of severe right foot erythema found to have notable drainage, leukocytosis, elevated ESR concerning for underlying osteomyelitis.    #Right Foot Cellultis, Suspected Osteomyelitis  - PAN C+S- NGTD  - MRI of Right foot: plantar soft tissue wound is present. This may reflect persistence of previously noted sesamoiditis versus osteomyelitis.  ~ Podiatry Debridement POD#1  ~ID recs appreciated: On vancomycin 1 gm IV q12h and cefepime 2 gm IV q12h (Day 5)  - PICC Line needed after Debriedment, once afebrile and stable for DC.     #Gross Hematuria   ~Urology oupatient CTU and cysto  ~Follow Up Daily CBCs    #Hyponatremia   - Continue to hold off HCTZ  - resolving    #CAD  - Aspirin 81 mg QD  - Carvedilol 25 mg BID  - Ranolazine 500 mg BID    #Diabetes Mellitus  - 25 Lantus QHS  - 4 Admelog Premeal  - ISS  - Diabetes Diet     #Hyperlipidemia  - Atorvastatin 40 QHS    #Hypertension  - Amlodipine 10 mg QD  - Losartan 100 mg QD    #VTE PPX  - SCD    #Diet  - Diabetes Diet    #Code Status  - Full Code

## 2021-01-26 NOTE — PROGRESS NOTE ADULT - SUBJECTIVE AND OBJECTIVE BOX
Date of service: 21 @ 08:37    Lying in bed in NAD  s/p right foot surgery  Has right foot pain  Febrile to 101.7F overnight      ROS: denies dizziness, no HA, no SOB or cough, no abdominal pain, no diarrhea or constipation; no dysuria    MEDICATIONS  (STANDING):  amLODIPine   Tablet 10 milliGRAM(s) Oral daily  ascorbic acid  Oral Tab/Cap - Peds 1000 milliGRAM(s) Oral daily  aspirin enteric coated 81 milliGRAM(s) Oral daily  atorvastatin Oral Tab/Cap - Peds 40 milliGRAM(s) Oral daily  carvedilol Oral Tab/Cap - Peds 25 milliGRAM(s) Oral two times a day  cefepime   IVPB 2000 milliGRAM(s) IV Intermittent every 12 hours  clotrimazole 1% Cream 1 Application(s) Topical two times a day  cyanocobalamin 1000 MICROGram(s) Oral daily  dextrose 5%. 1000 milliLiter(s) (100 mL/Hr) IV Continuous <Continuous>  fluticasone propionate 50 MICROgram(s)/spray Nasal Spray 1 Spray(s) Both Nostrils two times a day  glucagon  Injectable 1 milliGRAM(s) IntraMuscular once  insulin glargine Injectable (LANTUS) 25 Unit(s) SubCutaneous at bedtime  insulin lispro (ADMELOG) corrective regimen sliding scale   SubCutaneous at bedtime  losartan 100 milliGRAM(s) Oral daily  ranolazine 500 milliGRAM(s) Oral two times a day  sodium chloride 0.9% lock flush 3 milliLiter(s) IV Push every 8 hours  vancomycin  IVPB 750 milliGRAM(s) IV Intermittent every 12 hours    Vital Signs Last 24 Hrs  T(C): 38.7 (2021 06:12), Max: 38.7 (2021 14:19)  T(F): 101.7 (2021 06:12), Max: 101.7 (2021 06:12)  HR: 81 (2021 06:12) (58 - 84)  BP: 102/70 (2021 06:12) (86/58 - 117/67)  BP(mean): --  RR: 20 (2021 06:12) (12 - 20)  SpO2: 93% (2021 06:12) (92% - 99%)     Physical exam:    Constitutional:  No acute distress  HEENT: NC/AT, EOMI, PERRLA, conjunctivae clear  Neck: supple; thyroid not palpable  Back: no tenderness  Respiratory: respiratory effort normal; clear to auscultation  Cardiovascular: S1S2 regular, no murmurs  Abdomen: soft, not tender, not distended, positive BS  Genitourinary: no suprapubic tenderness  Lymphatic: no LN palpable  Musculoskeletal: no muscle tenderness, no joint swelling or tenderness  Extremities: no pedal edema  Right foot first MTP plantar wound with surrounding erythema and edema s/p surgery  Neurological/ Psychiatric: AxOx3, moving all extremities  Skin: no rashes; no palpable lesions    Labs: reviewed                        8.2    13.99 )-----------( 270      ( 2021 06:55 )             24.3         133<L>  |  104  |  29<H>  ----------------------------<  185<H>  4.1   |  19<L>  |  1.19    Ca    8.1<L>      2021 06:55    TPro  6.6  /  Alb  2.3<L>  /  TBili  0.6  /  DBili  x   /  AST  54<H>  /  ALT  86<H>  /  AlkPhos  84        Vancomycin Level, Trough: 21.8 ug/mL ( @ 10:00)    Ferritin, Serum: 409 ng/mL (21 @ 06:35)  C-Reactive Protein, Serum: 16.40 mg/dL (21 @ 16:38)                          9.3    15.42 )-----------( 281      ( 2021 08:40 )             27.5         131<L>  |  101  |  29<H>  ----------------------------<  158<H>  3.7   |  20<L>  |  1.25    Ca    8.3<L>      2021 08:40    TPro  6.6  /  Alb  2.3<L>  /  TBili  0.6  /  DBili  x   /  AST  54<H>  /  ALT  86<H>  /  AlkPhos  84        Vancomycin Level, Trough: 21.8 ug/mL ( @ 10:00)    Ferritin, Serum: 409 ng/mL (21 @ 06:35)  C-Reactive Protein, Serum: 16.40 mg/dL (21 @ 16:38)                        9.1    10.38 )-----------( 264      ( 2021 06:35 )             26.7         134<L>  |  100  |  27<H>  ----------------------------<  199<H>  3.7   |  26  |  1.23    Ca    8.5      2021 06:35  Phos  2.9       Mg     1.8         TPro  7.1  /  Alb  2.9<L>  /  TBili  0.8  /  DBili  x   /  AST  13<L>  /  ALT  31  /  AlkPhos  70       LIVER FUNCTIONS - ( 2021 16:38 )  Alb: 2.9 g/dL / Pro: 7.1 gm/dL / ALK PHOS: 70 U/L / ALT: 31 U/L / AST: 13 U/L / GGT: x           Urinalysis Basic - ( 2021 16:38 )    Color: Yellow / Appearance: very cloudy / S.020 / pH: x  Gluc: x / Ketone: Negative  / Bili: Negative / Urobili: Negative mg/dL   Blood: x / Protein: 30 mg/dL / Nitrite: Negative   Leuk Esterase: Small / RBC: 11-25 /HPF / WBC 3-5   Sq Epi: x / Non Sq Epi: Negative / Bacteria: Few      Culture - Fungal, Tissue (collected 2021 15:15)  Source: .Tissue None  Preliminary Report (2021 08:37):    Testing in progress    Culture - Tissue with Gram Stain (collected 2021 15:15)  Source: .Tissue #4- right medial aspect first metatarsal base of great toe  Gram Stain (2021 03:32):    No polymorphonuclear cells seen per low power field    No organisms seen per oil power field    Culture - Fungal, Tissue (collected 2021 15:15)  Source: .Tissue #3- right lateral sesamoid bone  Preliminary Report (2021 08:37):    Testing in progress    Culture - Tissue with Gram Stain (collected 2021 15:15)  Source: .Tissue #3- right lateral sesamoid bone  Gram Stain (2021 03:33):    No polymorphonuclear cells seen per low power field    No organisms seen per oil power field    Culture - Fungal, Tissue (collected 2021 15:15)  Source: .Tissue #2-right medial sesamoid bone  Preliminary Report (2021 08:06):    Testing in progress    Culture - Tissue with Gram Stain (collected 2021 15:15)  Source: .Tissue #2-right medial sesamoid bone  Gram Stain (2021 04:37):    No polymorphonuclear cells seen per low power field    No organisms seen per oil power field    Culture - Urine (collected 2021 16:38)  Source: .Urine Clean Catch (Midstream)  Final Report (2021 23:27):    <10,000 CFU/mL Normal Urogenital Lisa    Culture - Blood (collected 2021 16:38)  Source: .Blood Blood-Peripheral  Preliminary Report (2021 01:02):    No growth to date.    Culture - Blood (collected 2021 16:38)  Source: .Blood Blood-Peripheral  Preliminary Report (2021 01:02):    No growth to date.        COVID-19 PCR: NotDetec (21 @ 18:40)      Radiology: all available radiological tests reviewed    < from: MR Foot w/wo IV Cont, Right (21 @ 18:33) >  1. Nonspecific marrow edema and enhancement involves the bipartite medial hallux sesamoid with similar appearance to the prior, however, nearby plantar soft tissue wound is present. This may reflect persistence of previously noted sesamoiditis versus osteomyelitis.  2. There are presumed reactive marrow changes involving the first metatarsal head and lateral hallux sesamoid although early infection cannot be absolutely excluded.  3. Osteoarthritis is noted with possible hallux rigidus.    < end of copied text >      Advanced directives addressed: full resuscitation

## 2021-01-26 NOTE — PROVIDER CONTACT NOTE (OTHER) - SITUATION
LEFT MESSAGE WITH OG FROM DR. KESSLER'S ANSWERING SERVICE.
Left message with service re: consult.
MD aware of v/s

## 2021-01-26 NOTE — PROGRESS NOTE ADULT - ASSESSMENT
66 y/o Male with h/o HTN, HLD, T2DM, PAD, CAD, BPH was admitted on 1/21 after he was seen in Dr. Bullock's office for nonhealing foot wound. About 2 weeks PTA patient noticed a "callous' on the plantar surface near the ball of his right foot. He started to treat on his own by applying neosporin and band aids to the area. He reports that over the course of the week he noted improvement with this. However, one week PTA while putting his pants on, the band-aid got caught in his pant leg and "ripped off the callous". He did not seek medical attention at the time since he did not feel any pain (due to diabetic neuropathy in bilateral legs). He saw his podiatrist due to increased drainage and erythema to the R foot and was sent to ER. In ER he received IV ceftriaxone 1g x1 and IV vancomycin 1g x1, then zosyn.    1. Right foot plantar ulcer with probable underlying acute on chronic OM and surrounding foot cellulitis s/p surgery. PVD.  -fever ?related to surgery  -cultures collected  -has foot pain; low grade fever  -on vancomycin 750 mg IV q12h and cefepime 2 gm IV q12h # 4  -tolerating abx well so far; no side effects noted  -repeat vancomycin trough level  -podiatry evaluation s/p surgery  -local wound care  -continue IV abx coverage  -will need PICC line and long term IV abx therapy  -monitor temps  -f/u CBC  -supportive care  2. Other issues:   -care per medicine

## 2021-01-26 NOTE — PROGRESS NOTE ADULT - SUBJECTIVE AND OBJECTIVE BOX
Patient is a 65y old  Male who presents with a chief complaint of Right foot infection (25 Jan 2021 17:34)      HPI:  Patient is a 66 y/o M with a PMH of HTN, HLD, T2DM, PAD, CAD, and BPH presenting to the ED after he was seen in Dr. Bullock's office. About 2 weeks ago patient noticed a "callous' on the plantar surface near the ball of his right foot. He started to treat on his own by applying neosporin and band aids to the area. He reports that over the course of the week he noted improvement with this. However, one week ago while putting his pants on, the band-aid got caught in his pant leg and "ripped off the callous". He did not seek medical attention at the time since he did not feel any pain (due to diabetic neuropathy in bilateral legs). He saw his podiatrist today due to increased drainage and erythema to the R foot. He denies any fever or chills. He is able to bear weight on both feet. He also mentions noticing some blood in his urine yesterday. He saw his urologist about 1 month ago for a similar complaint.     In the ED vitals were /65 HR 84 RR 18 T 100.4 SpO2 98% RA. He received IV ceftriaxone 1g x1 and IV vancomycin 1g x1.  (21 Jan 2021 20:33)      Allergies    No Known Allergies    Intolerances        MEDICATIONS  (STANDING):  amLODIPine   Tablet 10 milliGRAM(s) Oral daily  ascorbic acid  Oral Tab/Cap - Peds 1000 milliGRAM(s) Oral daily  aspirin enteric coated 81 milliGRAM(s) Oral daily  atorvastatin Oral Tab/Cap - Peds 40 milliGRAM(s) Oral daily  carvedilol Oral Tab/Cap - Peds 25 milliGRAM(s) Oral two times a day  cefepime   IVPB 2000 milliGRAM(s) IV Intermittent every 12 hours  clotrimazole 1% Cream 1 Application(s) Topical two times a day  cyanocobalamin 1000 MICROGram(s) Oral daily  dextrose 5%. 1000 milliLiter(s) (100 mL/Hr) IV Continuous <Continuous>  fluticasone propionate 50 MICROgram(s)/spray Nasal Spray 1 Spray(s) Both Nostrils two times a day  glucagon  Injectable 1 milliGRAM(s) IntraMuscular once  insulin glargine Injectable (LANTUS) 25 Unit(s) SubCutaneous at bedtime  insulin lispro (ADMELOG) corrective regimen sliding scale   SubCutaneous at bedtime  losartan 100 milliGRAM(s) Oral daily  ranolazine 500 milliGRAM(s) Oral two times a day  sodium chloride 0.9% lock flush 3 milliLiter(s) IV Push every 8 hours  vancomycin  IVPB 750 milliGRAM(s) IV Intermittent every 12 hours    MEDICATIONS  (PRN):  acetaminophen   Tablet .. 650 milliGRAM(s) Oral every 6 hours PRN Temp greater or equal to 38C (100.4F), Mild Pain (1 - 3), Moderate Pain (4 - 6)        RADIOLOGY    CBC Full  -  ( 25 Jan 2021 08:40 )  WBC Count : 15.42 K/uL  RBC Count : 3.23 M/uL  Hemoglobin : 9.3 g/dL  Hematocrit : 27.5 %  Platelet Count - Automated : 281 K/uL  Mean Cell Volume : 85.1 fl  Mean Cell Hemoglobin : 28.8 pg  Mean Cell Hemoglobin Concentration : 33.8 gm/dL  Auto Neutrophil # : x  Auto Lymphocyte # : x  Auto Monocyte # : x  Auto Eosinophil # : x  Auto Basophil # : x  Auto Neutrophil % : x  Auto Lymphocyte % : x  Auto Monocyte % : x  Auto Eosinophil % : x  Auto Basophil % : x      01-25    131<L>  |  101  |  29<H>  ----------------------------<  158<H>  3.7   |  20<L>  |  1.25    Ca    8.3<L>      25 Jan 2021 08:40    TPro  6.6  /  Alb  2.3<L>  /  TBili  0.6  /  DBili  x   /  AST  54<H>  /  ALT  86<H>  /  AlkPhos  84  01-24      X< from: Xray Foot AP + Lateral, Right (01.25.21 @ 17:15) >    EXAM:  XR FOOT 2 VIEWS RT                            PROCEDURE DATE:  01/25/2021          INTERPRETATION:  RIGHT foot    CLINICAL INFORMATION:Postoperative fixation of RIGHT sesamoid bones..    TECHNIQUE: AP,lateral and oblique views.    FINDINGS:    Status post excision of sesamoid bones. Postsurgical soft tissue swelling of the first hallux NOTED.  Remaining osseous and joint structures intact  IMPRESSION:    Postop resection of sesamoid bones.    < end of copied text >  < from: Xray Foot AP + Lateral, Right (01.25.21 @ 17:15) >    EDNA RIVERA MD; Attending Radiologist  This document has been electronically signed. Jan 25 2021  5:19PM    < end of copied text >  Culture - Tissue with Gram Stain (01.25.21 @ 15:15)   Gram Stain:   No polymorphonuclear cells seen per low power field   No organisms seen per oil power field   Specimen Source: .Tissue #4- right medial aspect first metatarsal base of great toe Culture - Tissue with Gram Stain (01.25.21 @ 15:15)   Gram Stain:   No polymorphonuclear cells seen per low power field   No organisms seen per oil power field   Specimen Source: .Tissue #4- right medial aspect first metatarsal base of great toe Culture - Tissue with Gram Stain (01.25.21 @ 15:15)   Gram Stain:   No polymorphonuclear cells seen per low power field   No organisms seen per oil power field   Specimen Source: .Tissue #3- right lateral sesamoid bone Culture - Tissue with Gram Stain (01.25.21 @ 15:15)   Gram Stain:   No polymorphonuclear cells seen per low power field   No organisms seen per oil power field   Specimen Source: .Tissue #2-right medial sesamoid bone Culture - Urine (01.21.21 @ 16:38)   Specimen Source: .Urine Clean Catch (Midstream)   Culture Results:   <10,000 CFU/mL Normal Urogenital Lisa   Culture - Blood (01.21.21 @ 16:38)   Specimen Source: .Blood Blood-Peripheral   Culture Results:   No growth to date.

## 2021-01-26 NOTE — PROGRESS NOTE ADULT - SUBJECTIVE AND OBJECTIVE BOX
64 YO M With a PMH of  HTN, HLD, T2DM, PAD, CAD, and BPH presents from  Dr. Bullock's office. Patient noticed a "callous' on the plantar surface near the ball of his right foot. he applied neosporin and band aids to the area. and noted improvement with this. he then proceeded to "ripped off the callous". He did not seek medical attention at the time since he did not feel any pain (due to diabetic neuropathy in bilateral legs). He saw Dr. Cisneros due to increased drainage and erythema to the R foot. He denies any fever or chills. He is able to bear weight on both feet. He also mentions noticing some blood in his urine. He saw his urologist about 1 month ago for a similar complaint.     Subjective: Patient was seen and examined by me this morning at bedside. He was found to have a fever of 101.7 this morning, and was controlled with tylenol. POD#1    REVIEW OF SYSTEMS:  CONSTITUTIONAL: No weakness, fevers or chills  EYES/ENT: No visual changes;  No vertigo or throat pain   NECK: No pain or stiffness  RESPIRATORY: No cough, wheezing, hemoptysis; No shortness of breath  CARDIOVASCULAR: No chest pain or palpitations  GASTROINTESTINAL: No abdominal or epigastric pain. No nausea, vomiting; No diarrhea or constipation.   GENITOURINARY: No dysuria, frequency or hematuria  NEUROLOGICAL: No numbness or weakness  SKIN: No itching, burning, rashes, or lesions     PHYSICAL EXAM:  Vital Signs Last 24 Hrs  T(C): 36.8 (26 Jan 2021 08:42), Max: 38.7 (26 Jan 2021 06:12)  T(F): 98.3 (26 Jan 2021 08:42), Max: 101.7 (26 Jan 2021 06:12)  HR: 65 (26 Jan 2021 08:42) (58 - 81)  BP: 100/55 (26 Jan 2021 08:42) (86/58 - 108/64)  BP(mean): --  RR: 18 (26 Jan 2021 08:42) (12 - 20)  SpO2: 94% (26 Jan 2021 08:42) (92% - 99%)    Constitutional: Pt lying in bed, awake and alert, NAD  HEENT: EOMI, normal hearing, moist mucous membranes  Neck: Soft and supple, no JVD  Respiratory: CTABL, No wheezing, rales or rhonchi  Cardiovascular: S1S2+, RRR, no M/G/R  Gastrointestinal: BS+, soft, NT/ND, no guarding, no rebound  Extremities: No peripheral edema  Vascular: 2+ peripheral pulses  Neurological: AAOx3, no focal deficits  Musculoskeletal: 5/5 strength b/l upper and lower extremities. Right foot POD#1, wrapped.  Skin: No rashes    MEDICATIONS:  MEDICATIONS  (STANDING):  amLODIPine   Tablet 10 milliGRAM(s) Oral daily  ascorbic acid  Oral Tab/Cap - Peds 1000 milliGRAM(s) Oral daily  aspirin enteric coated 81 milliGRAM(s) Oral daily  atorvastatin Oral Tab/Cap - Peds 40 milliGRAM(s) Oral daily  carvedilol Oral Tab/Cap - Peds 25 milliGRAM(s) Oral two times a day  cefepime   IVPB 2000 milliGRAM(s) IV Intermittent every 12 hours  clotrimazole 1% Cream 1 Application(s) Topical two times a day  cyanocobalamin 1000 MICROGram(s) Oral daily  dextrose 5%. 1000 milliLiter(s) (100 mL/Hr) IV Continuous <Continuous>  fluticasone propionate 50 MICROgram(s)/spray Nasal Spray 1 Spray(s) Both Nostrils two times a day  glucagon  Injectable 1 milliGRAM(s) IntraMuscular once  insulin glargine Injectable (LANTUS) 25 Unit(s) SubCutaneous at bedtime  insulin lispro (ADMELOG) corrective regimen sliding scale   SubCutaneous three times a day before meals  insulin lispro (ADMELOG) corrective regimen sliding scale   SubCutaneous at bedtime  losartan 100 milliGRAM(s) Oral daily  ranolazine 500 milliGRAM(s) Oral two times a day  sodium chloride 0.9% Bolus 1000 milliLiter(s) IV Bolus once  sodium chloride 0.9% lock flush 3 milliLiter(s) IV Push every 8 hours  vancomycin  IVPB 1000 milliGRAM(s) IV Intermittent every 12 hours    MEDICATIONS  (PRN):      LABS: All Labs Reviewed:                                 8.2    13.99 )-----------( 270      ( 26 Jan 2021 06:55 )             24.3   01-26    133<L>  |  104  |  29<H>  ----------------------------<  185<H>  4.1   |  19<L>  |  1.19    Ca    8.1<L>      26 Jan 2021 06:55      Blood Culture: 01-21 @ 16:38  Organism --  Gram Stain Blood -- Gram Stain --  Specimen Source .Blood Blood-Peripheral  Culture-Blood --      I&O's Summary    CAPILLARY BLOOD GLUCOSE      POCT Blood Glucose.: 267 mg/dL (26 Jan 2021 12:25)  POCT Blood Glucose.: 224 mg/dL (25 Jan 2021 21:28)  POCT Blood Glucose.: 146 mg/dL (25 Jan 2021 17:23)      RADIOLOGY/EKG:  MRI Right Foot With IV Contrast Impression:    1. Nonspecific marrow edema and enhancement involves the bipartite medial hallux sesamoid with similar appearance to the prior, however, nearby plantar soft tissue wound is present. This may reflect persistence of previously noted sesamoiditis versus osteomyelitis.  2. There are presumed reactive marrow changes involving the first metatarsal head and lateral hallux sesamoid although early infection cannot be absolutely excluded.  3. Osteoarthritis is noted with possible hallux rigidus.    JOSE VIVEROS MD; Attending Radiologist  This document has been electronically signed. Jan 21 2021  8:24PM    XRay Right Foot Impression:    There is softtissue prominence along the dorsum of the foot.  The alignment at the tarsometatarsal joints remains within normal limits.  No acute fracture or focal osteolysis is noted.    PEPITO PRUETT MD; Attending Radiologist  This document has been electronically signed. Jan 21 2021  4:45PM

## 2021-01-27 ENCOUNTER — TRANSCRIPTION ENCOUNTER (OUTPATIENT)
Age: 66
End: 2021-01-27

## 2021-01-27 VITALS
HEART RATE: 74 BPM | RESPIRATION RATE: 20 BRPM | SYSTOLIC BLOOD PRESSURE: 107 MMHG | DIASTOLIC BLOOD PRESSURE: 66 MMHG | OXYGEN SATURATION: 94 % | TEMPERATURE: 99 F

## 2021-01-27 LAB
ANION GAP SERPL CALC-SCNC: 10 MMOL/L — SIGNIFICANT CHANGE UP (ref 5–17)
BUN SERPL-MCNC: 27 MG/DL — HIGH (ref 7–23)
CALCIUM SERPL-MCNC: 8.5 MG/DL — SIGNIFICANT CHANGE UP (ref 8.5–10.1)
CHLORIDE SERPL-SCNC: 102 MMOL/L — SIGNIFICANT CHANGE UP (ref 96–108)
CO2 SERPL-SCNC: 20 MMOL/L — LOW (ref 22–31)
CREAT SERPL-MCNC: 1.1 MG/DL — SIGNIFICANT CHANGE UP (ref 0.5–1.3)
CULTURE RESULTS: SIGNIFICANT CHANGE UP
CULTURE RESULTS: SIGNIFICANT CHANGE UP
GLUCOSE SERPL-MCNC: 200 MG/DL — HIGH (ref 70–99)
HCT VFR BLD CALC: 26.7 % — LOW (ref 39–50)
HGB BLD-MCNC: 9 G/DL — LOW (ref 13–17)
MCHC RBC-ENTMCNC: 28.7 PG — SIGNIFICANT CHANGE UP (ref 27–34)
MCHC RBC-ENTMCNC: 33.7 GM/DL — SIGNIFICANT CHANGE UP (ref 32–36)
MCV RBC AUTO: 85 FL — SIGNIFICANT CHANGE UP (ref 80–100)
PLATELET # BLD AUTO: 328 K/UL — SIGNIFICANT CHANGE UP (ref 150–400)
POTASSIUM SERPL-MCNC: 3.7 MMOL/L — SIGNIFICANT CHANGE UP (ref 3.5–5.3)
POTASSIUM SERPL-SCNC: 3.7 MMOL/L — SIGNIFICANT CHANGE UP (ref 3.5–5.3)
RBC # BLD: 3.14 M/UL — LOW (ref 4.2–5.8)
RBC # FLD: 13.2 % — SIGNIFICANT CHANGE UP (ref 10.3–14.5)
SODIUM SERPL-SCNC: 132 MMOL/L — LOW (ref 135–145)
SPECIMEN SOURCE: SIGNIFICANT CHANGE UP
SPECIMEN SOURCE: SIGNIFICANT CHANGE UP
VANCOMYCIN TROUGH SERPL-MCNC: 15.9 UG/ML — SIGNIFICANT CHANGE UP (ref 10–20)
WBC # BLD: 11.11 K/UL — HIGH (ref 3.8–10.5)
WBC # FLD AUTO: 11.11 K/UL — HIGH (ref 3.8–10.5)

## 2021-01-27 PROCEDURE — 99239 HOSP IP/OBS DSCHRG MGMT >30: CPT | Mod: GC

## 2021-01-27 PROCEDURE — 71045 X-RAY EXAM CHEST 1 VIEW: CPT | Mod: 26

## 2021-01-27 RX ORDER — LOSARTAN POTASSIUM 100 MG/1
1 TABLET, FILM COATED ORAL
Qty: 14 | Refills: 0
Start: 2021-01-27 | End: 2021-02-09

## 2021-01-27 RX ORDER — INSULIN GLARGINE 100 [IU]/ML
30 INJECTION, SOLUTION SUBCUTANEOUS
Qty: 0 | Refills: 0 | DISCHARGE
Start: 2021-01-27

## 2021-01-27 RX ORDER — CEFEPIME 1 G/1
2 INJECTION, POWDER, FOR SOLUTION INTRAMUSCULAR; INTRAVENOUS
Qty: 0 | Refills: 0 | DISCHARGE
Start: 2021-01-27

## 2021-01-27 RX ORDER — VANCOMYCIN HCL 1 G
750 VIAL (EA) INTRAVENOUS
Qty: 0 | Refills: 0 | DISCHARGE
Start: 2021-01-27

## 2021-01-27 RX ADMIN — SODIUM CHLORIDE 3 MILLILITER(S): 9 INJECTION INTRAMUSCULAR; INTRAVENOUS; SUBCUTANEOUS at 05:26

## 2021-01-27 RX ADMIN — CARVEDILOL PHOSPHATE 25 MILLIGRAM(S): 80 CAPSULE, EXTENDED RELEASE ORAL at 08:50

## 2021-01-27 RX ADMIN — LOSARTAN POTASSIUM 50 MILLIGRAM(S): 100 TABLET, FILM COATED ORAL at 08:50

## 2021-01-27 RX ADMIN — Medication 4: at 13:10

## 2021-01-27 RX ADMIN — Medication 81 MILLIGRAM(S): at 08:50

## 2021-01-27 RX ADMIN — Medication 1 SPRAY(S): at 08:55

## 2021-01-27 RX ADMIN — Medication 4: at 07:47

## 2021-01-27 RX ADMIN — PREGABALIN 1000 MICROGRAM(S): 225 CAPSULE ORAL at 08:50

## 2021-01-27 RX ADMIN — RANOLAZINE 500 MILLIGRAM(S): 500 TABLET, FILM COATED, EXTENDED RELEASE ORAL at 08:50

## 2021-01-27 RX ADMIN — Medication 1 APPLICATION(S): at 08:56

## 2021-01-27 RX ADMIN — Medication 4 UNIT(S): at 07:47

## 2021-01-27 RX ADMIN — ATORVASTATIN CALCIUM 40 MILLIGRAM(S): 80 TABLET, FILM COATED ORAL at 08:55

## 2021-01-27 RX ADMIN — Medication 250 MILLIGRAM(S): at 09:44

## 2021-01-27 RX ADMIN — Medication 4 UNIT(S): at 13:10

## 2021-01-27 RX ADMIN — SODIUM CHLORIDE 3 MILLILITER(S): 9 INJECTION INTRAMUSCULAR; INTRAVENOUS; SUBCUTANEOUS at 07:17

## 2021-01-27 RX ADMIN — CEFEPIME 100 MILLIGRAM(S): 1 INJECTION, POWDER, FOR SOLUTION INTRAMUSCULAR; INTRAVENOUS at 08:50

## 2021-01-27 RX ADMIN — Medication 1000 MILLIGRAM(S): at 08:50

## 2021-01-27 NOTE — PROGRESS NOTE ADULT - ASSESSMENT
66 y/o Male with h/o HTN, HLD, T2DM, PAD, CAD, BPH was admitted on 1/21 after he was seen in Dr. Bullock's office for nonhealing foot wound. About 2 weeks PTA patient noticed a "callous' on the plantar surface near the ball of his right foot. He started to treat on his own by applying neosporin and band aids to the area. He reports that over the course of the week he noted improvement with this. However, one week PTA while putting his pants on, the band-aid got caught in his pant leg and "ripped off the callous". He did not seek medical attention at the time since he did not feel any pain (due to diabetic neuropathy in bilateral legs). He saw his podiatrist due to increased drainage and erythema to the R foot and was sent to ER. In ER he received IV ceftriaxone 1g x1 and IV vancomycin 1g x1, then zosyn.    1. Right foot plantar ulcer with probable underlying acute on chronic OM and surrounding foot cellulitis s/p surgery. PVD.  -fever ?related to surgery  -cultures collected - no growth so far  -has foot pain; low grade fever  -on vancomycin 750 mg IV q12h and cefepime 2 gm IV q12h # 5  -tolerating abx well so far; no side effects noted  -repeat vancomycin trough level  -podiatry evaluation s/p surgery  -local wound care  -continue IV abx coverage for 6 weeks  -will need PICC line and long term IV abx therapy  -home IV abx setup in progress; case reviewed with case management; orders reviewed and called in to pharmacist with infusion company; awaiting insurance approval  -f/u with podiatry at home  -monitor temps  -f/u CBC  -supportive care  2. Other issues:   -care per medicine

## 2021-01-27 NOTE — DISCHARGE NOTE PROVIDER - CARE PROVIDER_API CALL
Jeffrey Bullock  PODIATRIC MEDICINE AND SURGERY  [address] [place] [address]  Phone: [phone]  Fax: [phone]  Follow Up Time:     Genaro Luna)  Internal Medicine  [address] [place] [address]  Phone: [phone]  Fax: [phone]  Follow Up Time:     Sohail Pagan)  Urology  [address] [facility], [address] [place] [address]  Phone: [phone]  Fax: [phone]  Follow Up Time:

## 2021-01-27 NOTE — PROGRESS NOTE ADULT - SUBJECTIVE AND OBJECTIVE BOX
Date of service: 21 @ 08:52    Lying in bed in NAD  Right foot is dressed  Denies pain  No further fever    ROS: no fever or chills; denies dizziness, no HA, no SOB or cough, no abdominal pain, no diarrhea or constipation; no dysuria, no legs pain    MEDICATIONS  (STANDING):  ascorbic acid  Oral Tab/Cap - Peds 1000 milliGRAM(s) Oral daily  aspirin enteric coated 81 milliGRAM(s) Oral daily  atorvastatin Oral Tab/Cap - Peds 40 milliGRAM(s) Oral daily  carvedilol Oral Tab/Cap - Peds 25 milliGRAM(s) Oral two times a day  cefepime   IVPB 2000 milliGRAM(s) IV Intermittent every 12 hours  clotrimazole 1% Cream 1 Application(s) Topical two times a day  cyanocobalamin 1000 MICROGram(s) Oral daily  dextrose 5%. 1000 milliLiter(s) (100 mL/Hr) IV Continuous <Continuous>  fluticasone propionate 50 MICROgram(s)/spray Nasal Spray 1 Spray(s) Both Nostrils two times a day  glucagon  Injectable 1 milliGRAM(s) IntraMuscular once  insulin glargine Injectable (LANTUS) 25 Unit(s) SubCutaneous at bedtime  insulin lispro (ADMELOG) corrective regimen sliding scale   SubCutaneous at bedtime  insulin lispro (ADMELOG) corrective regimen sliding scale   SubCutaneous three times a day before meals  insulin lispro Injectable (ADMELOG) 4 Unit(s) SubCutaneous three times a day before meals  losartan 50 milliGRAM(s) Oral daily  ranolazine 500 milliGRAM(s) Oral two times a day  sodium chloride 0.9% lock flush 3 milliLiter(s) IV Push every 8 hours  vancomycin  IVPB 750 milliGRAM(s) IV Intermittent every 12 hours    Vital Signs Last 24 Hrs  T(C): 37.2 (2021 08:06), Max: 37.2 (2021 08:06)  T(F): 98.9 (2021 08:06), Max: 98.9 (2021 08:06)  HR: 74 (2021 08:06) (74 - 81)  BP: 107/66 (2021 08:06) (96/56 - 120/69)  BP(mean): --  RR: 20 (2021 08:06) (18 - 20)  SpO2: 94% (2021 08:06) (92% - 100%)     Physical exam:    Constitutional:  No acute distress  HEENT: NC/AT, EOMI, PERRLA, conjunctivae clear  Neck: supple; thyroid not palpable  Back: no tenderness  Respiratory: respiratory effort normal; clear to auscultation  Cardiovascular: S1S2 regular, no murmurs  Abdomen: soft, not tender, not distended, positive BS  Genitourinary: no suprapubic tenderness  Lymphatic: no LN palpable  Musculoskeletal: no muscle tenderness, no joint swelling or tenderness  Extremities: no pedal edema  Right foot first MTP plantar wound with surrounding erythema and edema s/p surgery  Neurological/ Psychiatric: AxOx3, moving all extremities  Skin: no rashes; no palpable lesions    Labs: reviewed                        8.2    13.99 )-----------( 270      ( 2021 06:55 )             24.3         133<L>  |  104  |  29<H>  ----------------------------<  185<H>  4.1   |  19<L>  |  1.19    Ca    8.1<L>      2021 06:55    Ferritin, Serum: 409 ng/mL (21 @ 06:35)  C-Reactive Protein, Serum: 16.40 mg/dL (21 @ 16:38)                        8.2    13.99 )-----------( 270      ( 2021 06:55 )             24.3         133<L>  |  104  |  29<H>  ----------------------------<  185<H>  4.1   |  19<L>  |  1.19    Ca    8.1<L>      2021 06:55    TPro  6.6  /  Alb  2.3<L>  /  TBili  0.6  /  DBili  x   /  AST  54<H>  /  ALT  86<H>  /  AlkPhos  84        Vancomycin Level, Trough: 21.8 ug/mL ( @ 10:00)    Ferritin, Serum: 409 ng/mL (21 @ 06:35)  C-Reactive Protein, Serum: 16.40 mg/dL (21 @ 16:38)                          9.3    15.42 )-----------( 281      ( 2021 08:40 )             27.5         131<L>  |  101  |  29<H>  ----------------------------<  158<H>  3.7   |  20<L>  |  1.25    Ca    8.3<L>      2021 08:40    TPro  6.6  /  Alb  2.3<L>  /  TBili  0.6  /  DBili  x   /  AST  54<H>  /  ALT  86<H>  /  AlkPhos  84        Vancomycin Level, Trough: 21.8 ug/mL ( @ 10:00)    Ferritin, Serum: 409 ng/mL (21 @ 06:35)  C-Reactive Protein, Serum: 16.40 mg/dL (21 @ 16:38)                        9.1    10.38 )-----------( 264      ( 2021 06:35 )             26.7         134<L>  |  100  |  27<H>  ----------------------------<  199<H>  3.7   |  26  |  1.23    Ca    8.5      2021 06:35  Phos  2.9       Mg     1.8         TPro  7.1  /  Alb  2.9<L>  /  TBili  0.8  /  DBili  x   /  AST  13<L>  /  ALT  31  /  AlkPhos  70       LIVER FUNCTIONS - ( 2021 16:38 )  Alb: 2.9 g/dL / Pro: 7.1 gm/dL / ALK PHOS: 70 U/L / ALT: 31 U/L / AST: 13 U/L / GGT: x           Urinalysis Basic - ( 2021 16:38 )    Color: Yellow / Appearance: very cloudy / S.020 / pH: x  Gluc: x / Ketone: Negative  / Bili: Negative / Urobili: Negative mg/dL   Blood: x / Protein: 30 mg/dL / Nitrite: Negative   Leuk Esterase: Small / RBC: 11-25 /HPF / WBC 3-5   Sq Epi: x / Non Sq Epi: Negative / Bacteria: Few      Culture - Fungal, Tissue (collected 2021 15:15)  Source: .Tissue None  Preliminary Report (2021 08:37):    Testing in progress    Culture - Tissue with Gram Stain (collected 2021 15:15)  Source: .Tissue #4- right medial aspect first metatarsal base of great toe  Gram Stain (2021 03:32):    No polymorphonuclear cells seen per low power field    No organisms seen per oil power field    Culture - Fungal, Tissue (collected 2021 15:15)  Source: .Tissue #3- right lateral sesamoid bone  Preliminary Report (2021 08:37):    Testing in progress    Culture - Tissue with Gram Stain (collected 2021 15:15)  Source: .Tissue #3- right lateral sesamoid bone  Gram Stain (2021 03:33):    No polymorphonuclear cells seen per low power field    No organisms seen per oil power field    Culture - Fungal, Tissue (collected 2021 15:15)  Source: .Tissue #2-right medial sesamoid bone  Preliminary Report (2021 08:06):    Testing in progress    Culture - Tissue with Gram Stain (collected 2021 15:15)  Source: .Tissue #2-right medial sesamoid bone  Gram Stain (2021 04:37):    No polymorphonuclear cells seen per low power field    No organisms seen per oil power field    Culture - Urine (collected 2021 16:38)  Source: .Urine Clean Catch (Midstream)  Final Report (2021 23:27):    <10,000 CFU/mL Normal Urogenital Lisa    Culture - Blood (collected 2021 16:38)  Source: .Blood Blood-Peripheral  Preliminary Report (2021 01:02):    No growth to date.    Culture - Blood (collected 2021 16:38)  Source: .Blood Blood-Peripheral  Preliminary Report (2021 01:02):    No growth to date.        COVID-19 PCR: NotDetec (21 @ 18:40)      Radiology: all available radiological tests reviewed    < from: MR Foot w/wo IV Cont, Right (21 @ 18:33) >  1. Nonspecific marrow edema and enhancement involves the bipartite medial hallux sesamoid with similar appearance to the prior, however, nearby plantar soft tissue wound is present. This may reflect persistence of previously noted sesamoiditis versus osteomyelitis.  2. There are presumed reactive marrow changes involving the first metatarsal head and lateral hallux sesamoid although early infection cannot be absolutely excluded.  3. Osteoarthritis is noted with possible hallux rigidus.    < end of copied text >      Advanced directives addressed: full resuscitation

## 2021-01-27 NOTE — PROGRESS NOTE ADULT - SUBJECTIVE AND OBJECTIVE BOX
Patient is a 65y old  Male who presents with a chief complaint of Right foot infection (26 Jan 2021 14:17)      HPI:  Patient is a 66 y/o M with a PMH of HTN, HLD, T2DM, PAD, CAD, and BPH presenting to the ED after he was seen in Dr. Bullock's office. About 2 weeks ago patient noticed a "callous' on the plantar surface near the ball of his right foot. He started to treat on his own by applying neosporin and band aids to the area. He reports that over the course of the week he noted improvement with this. However, one week ago while putting his pants on, the band-aid got caught in his pant leg and "ripped off the callous". He did not seek medical attention at the time since he did not feel any pain (due to diabetic neuropathy in bilateral legs). He saw his podiatrist today due to increased drainage and erythema to the R foot. He denies any fever or chills. He is able to bear weight on both feet. He also mentions noticing some blood in his urine yesterday. He saw his urologist about 1 month ago for a similar complaint.     In the ED vitals were /65 HR 84 RR 18 T 100.4 SpO2 98% RA. He received IV ceftriaxone 1g x1 and IV vancomycin 1g x1.  (21 Jan 2021 20:33)      Allergies    No Known Allergies    Intolerances        MEDICATIONS  (STANDING):  ascorbic acid  Oral Tab/Cap - Peds 1000 milliGRAM(s) Oral daily  aspirin enteric coated 81 milliGRAM(s) Oral daily  atorvastatin Oral Tab/Cap - Peds 40 milliGRAM(s) Oral daily  carvedilol Oral Tab/Cap - Peds 25 milliGRAM(s) Oral two times a day  cefepime   IVPB 2000 milliGRAM(s) IV Intermittent every 12 hours  clotrimazole 1% Cream 1 Application(s) Topical two times a day  cyanocobalamin 1000 MICROGram(s) Oral daily  dextrose 5%. 1000 milliLiter(s) (100 mL/Hr) IV Continuous <Continuous>  fluticasone propionate 50 MICROgram(s)/spray Nasal Spray 1 Spray(s) Both Nostrils two times a day  glucagon  Injectable 1 milliGRAM(s) IntraMuscular once  insulin glargine Injectable (LANTUS) 25 Unit(s) SubCutaneous at bedtime  insulin lispro (ADMELOG) corrective regimen sliding scale   SubCutaneous at bedtime  insulin lispro (ADMELOG) corrective regimen sliding scale   SubCutaneous three times a day before meals  insulin lispro Injectable (ADMELOG) 4 Unit(s) SubCutaneous three times a day before meals  losartan 50 milliGRAM(s) Oral daily  ranolazine 500 milliGRAM(s) Oral two times a day  sodium chloride 0.9% lock flush 3 milliLiter(s) IV Push every 8 hours  vancomycin  IVPB 750 milliGRAM(s) IV Intermittent every 12 hours    MEDICATIONS  (PRN):  acetaminophen   Tablet .. 650 milliGRAM(s) Oral every 6 hours PRN Temp greater or equal to 38C (100.4F), Mild Pain (1 - 3), Moderate Pain (4 - 6)        RADIOLOGY    CBC Full  -  ( 26 Jan 2021 06:55 )  WBC Count : 13.99 K/uL  RBC Count : 2.85 M/uL  Hemoglobin : 8.2 g/dL  Hematocrit : 24.3 %  Platelet Count - Automated : 270 K/uL  Mean Cell Volume : 85.3 fl  Mean Cell Hemoglobin : 28.8 pg  Mean Cell Hemoglobin Concentration : 33.7 gm/dL  Auto Neutrophil # : x  Auto Lymphocyte # : x  Auto Monocyte # : x  Auto Eosinophil # : x  Auto Basophil # : x  Auto Neutrophil % : x  Auto Lymphocyte % : x  Auto Monocyte % : x  Auto Eosinophil % : x  Auto Basophil % : x      01-26    133<L>  |  104  |  29<H>  ----------------------------<  185<H>  4.1   |  19<L>  |  1.19    Ca    8.1<L>      26 Jan 2021 06:55      Culture - Tissue with Gram Stain (01.25.21 @ 15:15)   Gram Stain:   No polymorphonuclear cells seen per low power field   No organisms seen per oil power field   Specimen Source: .Tissue #4- right medial aspect first metatarsal base of great toe   Culture Results:   No growth Culture - Blood (01.21.21 @ 16:38)   Specimen Source: .Blood Blood-Peripheral   Culture Results:   No Growth Final

## 2021-01-27 NOTE — DISCHARGE NOTE PROVIDER - NSDCCPCAREPLAN_GEN_ALL_CORE_FT
PRINCIPAL DISCHARGE DIAGNOSIS  Diagnosis: Ulcer of right foot, unspecified ulcer stage  Assessment and Plan of Treatment:        PRINCIPAL DISCHARGE DIAGNOSIS  Diagnosis: Ulcer of right foot, unspecified ulcer stage  Assessment and Plan of Treatment: You were diagnosed with a right foot ulcer with possible osteomylitis of the right foot. This may be due to your poor circulation and diabetes that was drained and debreided in the operating room. You were seen by your podiatrist and Infectious disease. You will need to continue to take the antibiotics given to you via PICC Line, for 6 weeks and you will need to follow up with podiatry and infectious disease overnight.      SECONDARY DISCHARGE DIAGNOSES  Diagnosis: Diabetes mellitus  Assessment and Plan of Treatment: You were given your medications and placed on a low carbohydrate diet during this admission for diabetes. Please continue to take your home dosages of medication.    Diagnosis: Gross hematuria  Assessment and Plan of Treatment: You were seen by urology for your blood in urine. The blood in urine resolved, but you will need to follow up outpatient for further management.     PRINCIPAL DISCHARGE DIAGNOSIS  Diagnosis: Ulcer of right foot, unspecified ulcer stage  Assessment and Plan of Treatment: You were diagnosed with a right foot ulcer with possible osteomylitis of the right foot. This may be due to your poor circulation and diabetes that was drained and debreided in the operating room. You were seen by your podiatrist and Infectious disease. You will need to continue to take the antibiotics given to you via PICC Line, for 6 weeks and you will need to follow up with podiatry and infectious disease overnight.      SECONDARY DISCHARGE DIAGNOSES  Diagnosis: Hypertension  Assessment and Plan of Treatment: For your hypertension, you were on home medications, that made your blood pressure run low. We will stop your Hydrochlorothiazide, and decrease your losartan from 100mg to 50 mg a day.    Diagnosis: Diabetes mellitus  Assessment and Plan of Treatment: You were given your medications and placed on a low carbohydrate diet during this admission for diabetes. Please continue to take your home dosages of medication. You will continue to take your dose of metformin 1000 twice a day and 30 units of lantus at night. Please follow up with your primary care provider.    Diagnosis: Gross hematuria  Assessment and Plan of Treatment: You were seen by urology for your blood in urine. The blood in urine resolved, but you will need to follow up outpatient for further management.

## 2021-01-27 NOTE — PROGRESS NOTE ADULT - SUBJECTIVE AND OBJECTIVE BOX
66 YO M With a PMH of  HTN, HLD, T2DM, PAD, CAD, and BPH presents from  Dr. Bullock's office. Patient noticed a "callous' on the plantar surface near the ball of his right foot. he applied neosporin and band aids to the area. and noted improvement with this. he then proceeded to "ripped off the callous". He did not seek medical attention at the time since he did not feel any pain (due to diabetic neuropathy in bilateral legs). He saw Dr. Cisneros due to increased drainage and erythema to the R foot. He denies any fever or chills. He is able to bear weight on both feet. He also mentions noticing some blood in his urine. He saw his urologist about 1 month ago for a similar complaint.     Subjective: Patient was seen and examined by me this morning at bedside. No Fevers overnight and was controlled with tylenol. POD#2    REVIEW OF SYSTEMS:  CONSTITUTIONAL: No weakness, fevers or chills  EYES/ENT: No visual changes;  No vertigo or throat pain   NECK: No pain or stiffness  RESPIRATORY: No cough, wheezing, hemoptysis; No shortness of breath  CARDIOVASCULAR: No chest pain or palpitations  GASTROINTESTINAL: No abdominal or epigastric pain. No nausea, vomiting; No diarrhea or constipation.   GENITOURINARY: No dysuria, frequency or hematuria  NEUROLOGICAL: No numbness or weakness  SKIN: No itching, burning, rashes, or lesions     PHYSICAL EXAM:  Vital Signs Last 24 Hrs  T(C): 36.8 (26 Jan 2021 08:42), Max: 38.7 (26 Jan 2021 06:12)  T(F): 98.3 (26 Jan 2021 08:42), Max: 101.7 (26 Jan 2021 06:12)  HR: 65 (26 Jan 2021 08:42) (58 - 81)  BP: 100/55 (26 Jan 2021 08:42) (86/58 - 108/64)  BP(mean): --  RR: 18 (26 Jan 2021 08:42) (12 - 20)  SpO2: 94% (26 Jan 2021 08:42) (92% - 99%)    Constitutional: Pt lying in bed, awake and alert, NAD  HEENT: EOMI, normal hearing, moist mucous membranes  Neck: Soft and supple, no JVD  Respiratory: CTABL, No wheezing, rales or rhonchi  Cardiovascular: S1S2+, RRR, no M/G/R  Gastrointestinal: BS+, soft, NT/ND, no guarding, no rebound  Extremities: No peripheral edema  Vascular: 2+ peripheral pulses  Neurological: AAOx3, no focal deficits  Musculoskeletal: 5/5 strength b/l upper and lower extremities. Right foot POD#1, wrapped.  Skin: No rashes    MEDICATIONS:  MEDICATIONS  (STANDING):  amLODIPine   Tablet 10 milliGRAM(s) Oral daily  ascorbic acid  Oral Tab/Cap - Peds 1000 milliGRAM(s) Oral daily  aspirin enteric coated 81 milliGRAM(s) Oral daily  atorvastatin Oral Tab/Cap - Peds 40 milliGRAM(s) Oral daily  carvedilol Oral Tab/Cap - Peds 25 milliGRAM(s) Oral two times a day  cefepime   IVPB 2000 milliGRAM(s) IV Intermittent every 12 hours  clotrimazole 1% Cream 1 Application(s) Topical two times a day  cyanocobalamin 1000 MICROGram(s) Oral daily  dextrose 5%. 1000 milliLiter(s) (100 mL/Hr) IV Continuous <Continuous>  fluticasone propionate 50 MICROgram(s)/spray Nasal Spray 1 Spray(s) Both Nostrils two times a day  glucagon  Injectable 1 milliGRAM(s) IntraMuscular once  insulin glargine Injectable (LANTUS) 25 Unit(s) SubCutaneous at bedtime  insulin lispro (ADMELOG) corrective regimen sliding scale   SubCutaneous three times a day before meals  insulin lispro (ADMELOG) corrective regimen sliding scale   SubCutaneous at bedtime  losartan 100 milliGRAM(s) Oral daily  ranolazine 500 milliGRAM(s) Oral two times a day  sodium chloride 0.9% Bolus 1000 milliLiter(s) IV Bolus once  sodium chloride 0.9% lock flush 3 milliLiter(s) IV Push every 8 hours  vancomycin  IVPB 1000 milliGRAM(s) IV Intermittent every 12 hours    MEDICATIONS  (PRN):      LABS: All Labs Reviewed:                                 8.2    13.99 )-----------( 270      ( 26 Jan 2021 06:55 )             24.3   01-26    133<L>  |  104  |  29<H>  ----------------------------<  185<H>  4.1   |  19<L>  |  1.19    Ca    8.1<L>      26 Jan 2021 06:55      Blood Culture: 01-21 @ 16:38  Organism --  Gram Stain Blood -- Gram Stain --  Specimen Source .Blood Blood-Peripheral  Culture-Blood --      I&O's Summary    CAPILLARY BLOOD GLUCOSE      POCT Blood Glucose.: 267 mg/dL (26 Jan 2021 12:25)  POCT Blood Glucose.: 224 mg/dL (25 Jan 2021 21:28)  POCT Blood Glucose.: 146 mg/dL (25 Jan 2021 17:23)      RADIOLOGY/EKG:  MRI Right Foot With IV Contrast Impression:    1. Nonspecific marrow edema and enhancement involves the bipartite medial hallux sesamoid with similar appearance to the prior, however, nearby plantar soft tissue wound is present. This may reflect persistence of previously noted sesamoiditis versus osteomyelitis.  2. There are presumed reactive marrow changes involving the first metatarsal head and lateral hallux sesamoid although early infection cannot be absolutely excluded.  3. Osteoarthritis is noted with possible hallux rigidus.    JOSE VIVEROS MD; Attending Radiologist  This document has been electronically signed. Jan 21 2021  8:24PM    XRay Right Foot Impression:    There is softtissue prominence along the dorsum of the foot.  The alignment at the tarsometatarsal joints remains within normal limits.  No acute fracture or focal osteolysis is noted.    PEPITO PRUETT MD; Attending Radiologist  This document has been electronically signed. Jan 21 2021  4:45PM       66 YO M With a PMH of  HTN, HLD, T2DM, PAD, CAD, and BPH presents from  Dr. Bullock's office. Patient noticed a "callous' on the plantar surface near the ball of his right foot. he applied neosporin and band aids to the area. and noted improvement with this. he then proceeded to "ripped off the callous". He did not seek medical attention at the time since he did not feel any pain (due to diabetic neuropathy in bilateral legs). He saw Dr. Cisneros due to increased drainage and erythema to the R foot. He denies any fever or chills. He is able to bear weight on both feet. He also mentions noticing some blood in his urine. He saw his urologist about 1 month ago for a similar complaint.     Subjective: Patient was seen and examined by me this morning at bedside. No Fevers overnight  POD#2    REVIEW OF SYSTEMS:  CONSTITUTIONAL: No weakness, fevers or chills  EYES/ENT: No visual changes;  No vertigo or throat pain   NECK: No pain or stiffness  RESPIRATORY: + dry cough, wheezing, hemoptysis; No shortness of breath  CARDIOVASCULAR: No chest pain or palpitations  GASTROINTESTINAL: No abdominal or epigastric pain. No nausea, vomiting; No diarrhea or constipation.   GENITOURINARY: No dysuria, frequency or hematuria  NEUROLOGICAL: No numbness or weakness  SKIN: No itching, burning, rashes, or lesions     PHYSICAL EXAM:  Vital Signs Last 24 Hrs  T(C): 37.2 (27 Jan 2021 08:06), Max: 37.2 (27 Jan 2021 08:06)  T(F): 98.9 (27 Jan 2021 08:06), Max: 98.9 (27 Jan 2021 08:06)  HR: 74 (27 Jan 2021 08:06) (74 - 81)  BP: 107/66 (27 Jan 2021 08:06) (96/56 - 120/69)  BP(mean): --  RR: 20 (27 Jan 2021 08:06) (18 - 20)  SpO2: 94% (27 Jan 2021 08:06) (92% - 100%)    Constitutional: Pt lying in bed, awake and alert, NAD  HEENT: EOMI, normal hearing, moist mucous membranes  Neck: Soft and supple, no JVD  Respiratory: CTABL, No wheezing, rales or rhonchi  Cardiovascular: S1S2+, RRR, no M/G/R  Gastrointestinal: BS+, soft, NT/ND, no guarding, no rebound  Extremities: No peripheral edema  Vascular: 2+ peripheral pulses  Neurological: AAOx3, no focal deficits  Musculoskeletal: 5/5 strength b/l upper and lower extremities. Right foot POD#1, wrapped.  Skin: No rashes    MEDICATIONS:  MEDICATIONS  (STANDING):  amLODIPine   Tablet 10 milliGRAM(s) Oral daily  ascorbic acid  Oral Tab/Cap - Peds 1000 milliGRAM(s) Oral daily  aspirin enteric coated 81 milliGRAM(s) Oral daily  atorvastatin Oral Tab/Cap - Peds 40 milliGRAM(s) Oral daily  carvedilol Oral Tab/Cap - Peds 25 milliGRAM(s) Oral two times a day  cefepime   IVPB 2000 milliGRAM(s) IV Intermittent every 12 hours  clotrimazole 1% Cream 1 Application(s) Topical two times a day  cyanocobalamin 1000 MICROGram(s) Oral daily  dextrose 5%. 1000 milliLiter(s) (100 mL/Hr) IV Continuous <Continuous>  fluticasone propionate 50 MICROgram(s)/spray Nasal Spray 1 Spray(s) Both Nostrils two times a day  glucagon  Injectable 1 milliGRAM(s) IntraMuscular once  insulin glargine Injectable (LANTUS) 25 Unit(s) SubCutaneous at bedtime  insulin lispro (ADMELOG) corrective regimen sliding scale   SubCutaneous three times a day before meals  insulin lispro (ADMELOG) corrective regimen sliding scale   SubCutaneous at bedtime  losartan 100 milliGRAM(s) Oral daily  ranolazine 500 milliGRAM(s) Oral two times a day  sodium chloride 0.9% Bolus 1000 milliLiter(s) IV Bolus once  sodium chloride 0.9% lock flush 3 milliLiter(s) IV Push every 8 hours  vancomycin  IVPB 1000 milliGRAM(s) IV Intermittent every 12 hours    MEDICATIONS  (PRN):      LABS: All Labs Reviewed:                                 8.2    13.99 )-----------( 270      ( 26 Jan 2021 06:55 )             24.3   01-26    133<L>  |  104  |  29<H>  ----------------------------<  185<H>  4.1   |  19<L>  |  1.19    Ca    8.1<L>      26 Jan 2021 06:55      Blood Culture: 01-21 @ 16:38  Organism --  Gram Stain Blood -- Gram Stain --  Specimen Source .Blood Blood-Peripheral  Culture-Blood --      I&O's Summary    CAPILLARY BLOOD GLUCOSE      POCT Blood Glucose.: 267 mg/dL (26 Jan 2021 12:25)  POCT Blood Glucose.: 224 mg/dL (25 Jan 2021 21:28)  POCT Blood Glucose.: 146 mg/dL (25 Jan 2021 17:23)      RADIOLOGY/EKG:  MRI Right Foot With IV Contrast Impression:    1. Nonspecific marrow edema and enhancement involves the bipartite medial hallux sesamoid with similar appearance to the prior, however, nearby plantar soft tissue wound is present. This may reflect persistence of previously noted sesamoiditis versus osteomyelitis.  2. There are presumed reactive marrow changes involving the first metatarsal head and lateral hallux sesamoid although early infection cannot be absolutely excluded.  3. Osteoarthritis is noted with possible hallux rigidus.    JOSE VIVEROS MD; Attending Radiologist  This document has been electronically signed. Jan 21 2021  8:24PM    XRay Right Foot Impression:    There is softtissue prominence along the dorsum of the foot.  The alignment at the tarsometatarsal joints remains within normal limits.  No acute fracture or focal osteolysis is noted.    PEPITO PRUETT MD; Attending Radiologist  This document has been electronically signed. Jan 21 2021  4:45PM       66 YO M With a PMH of  HTN, HLD, T2DM, PAD, CAD, and BPH presents from  Dr. Bullock's office. Patient noticed a "callous' on the plantar surface near the ball of his right foot. he applied neosporin and band aids to the area. and noted improvement with this. he then proceeded to "ripped off the callous". He did not seek medical attention at the time since he did not feel any pain (due to diabetic neuropathy in bilateral legs). He saw Dr. Cisneros due to increased drainage and erythema to the R foot. He denies any fever or chills. He is able to bear weight on both feet. He also mentions noticing some blood in his urine. He saw his urologist about 1 month ago for a similar complaint.     Subjective: Patient was seen and examined by me this morning at bedside. No Fevers overnight  POD#2    REVIEW OF SYSTEMS:  CONSTITUTIONAL: No weakness, fevers or chills  EYES/ENT: No visual changes;  No vertigo or throat pain   NECK: No pain or stiffness  RESPIRATORY: + dry cough, wheezing, hemoptysis; No shortness of breath  CARDIOVASCULAR: No chest pain or palpitations  GASTROINTESTINAL: No abdominal or epigastric pain. No nausea, vomiting; No diarrhea or constipation.   GENITOURINARY: No dysuria, frequency or hematuria  NEUROLOGICAL: No numbness or weakness  SKIN: No itching, burning, rashes, or lesions     PHYSICAL EXAM:  Vital Signs Last 24 Hrs  T(C): 37.2 (27 Jan 2021 08:06), Max: 37.2 (27 Jan 2021 08:06)  T(F): 98.9 (27 Jan 2021 08:06), Max: 98.9 (27 Jan 2021 08:06)  HR: 74 (27 Jan 2021 08:06) (74 - 81)  BP: 107/66 (27 Jan 2021 08:06) (96/56 - 120/69)  BP(mean): --  RR: 20 (27 Jan 2021 08:06) (18 - 20)  SpO2: 94% (27 Jan 2021 08:06) (92% - 100%)    Constitutional: Pt lying in bed, awake and alert, NAD  HEENT: EOMI, normal hearing, moist mucous membranes  Neck: Soft and supple, no JVD  Respiratory: CTABL, No wheezing, rales or rhonchi  Cardiovascular: S1S2+, RRR, no M/G/R  Gastrointestinal: BS+, soft, NT/ND, no guarding, no rebound  Extremities: No peripheral edema  Vascular: 2+ peripheral pulses  Neurological: AAOx3, no focal deficits  Musculoskeletal: 5/5 strength b/l upper and lower extremities. Right foot POD#3, wrapped.  Skin: No rashes    MEDICATIONS:  MEDICATIONS  (STANDING):  amLODIPine   Tablet 10 milliGRAM(s) Oral daily  ascorbic acid  Oral Tab/Cap - Peds 1000 milliGRAM(s) Oral daily  aspirin enteric coated 81 milliGRAM(s) Oral daily  atorvastatin Oral Tab/Cap - Peds 40 milliGRAM(s) Oral daily  carvedilol Oral Tab/Cap - Peds 25 milliGRAM(s) Oral two times a day  cefepime   IVPB 2000 milliGRAM(s) IV Intermittent every 12 hours  clotrimazole 1% Cream 1 Application(s) Topical two times a day  cyanocobalamin 1000 MICROGram(s) Oral daily  dextrose 5%. 1000 milliLiter(s) (100 mL/Hr) IV Continuous <Continuous>  fluticasone propionate 50 MICROgram(s)/spray Nasal Spray 1 Spray(s) Both Nostrils two times a day  glucagon  Injectable 1 milliGRAM(s) IntraMuscular once  insulin glargine Injectable (LANTUS) 25 Unit(s) SubCutaneous at bedtime  insulin lispro (ADMELOG) corrective regimen sliding scale   SubCutaneous three times a day before meals  insulin lispro (ADMELOG) corrective regimen sliding scale   SubCutaneous at bedtime  losartan 100 milliGRAM(s) Oral daily  ranolazine 500 milliGRAM(s) Oral two times a day  sodium chloride 0.9% Bolus 1000 milliLiter(s) IV Bolus once  sodium chloride 0.9% lock flush 3 milliLiter(s) IV Push every 8 hours  vancomycin  IVPB 1000 milliGRAM(s) IV Intermittent every 12 hours    MEDICATIONS  (PRN):      LABS: All Labs Reviewed:                        9.0    11.11 )-----------( 328      ( 27 Jan 2021 08:42 )             26.7   01-27    132<L>  |  102  |  27<H>  ----------------------------<  200<H>  3.7   |  20<L>  |  1.10    Ca    8.5      27 Jan 2021 08:42      Blood Culture: 01-21 @ 16:38  Organism --  Gram Stain Blood -- Gram Stain --  Specimen Source .Blood Blood-Peripheral  Culture-Blood --      I&O's Summary    CAPILLARY BLOOD GLUCOSE      POCT Blood Glucose.: 267 mg/dL (26 Jan 2021 12:25)  POCT Blood Glucose.: 224 mg/dL (25 Jan 2021 21:28)  POCT Blood Glucose.: 146 mg/dL (25 Jan 2021 17:23)      RADIOLOGY/EKG:  MRI Right Foot With IV Contrast Impression:    1. Nonspecific marrow edema and enhancement involves the bipartite medial hallux sesamoid with similar appearance to the prior, however, nearby plantar soft tissue wound is present. This may reflect persistence of previously noted sesamoiditis versus osteomyelitis.  2. There are presumed reactive marrow changes involving the first metatarsal head and lateral hallux sesamoid although early infection cannot be absolutely excluded.  3. Osteoarthritis is noted with possible hallux rigidus.    JOSE VIVEROS MD; Attending Radiologist  This document has been electronically signed. Jan 21 2021  8:24PM    XRay Right Foot Impression:    There is softtissue prominence along the dorsum of the foot.  The alignment at the tarsometatarsal joints remains within normal limits.  No acute fracture or focal osteolysis is noted.    PEPITO PRUETT MD; Attending Radiologist  This document has been electronically signed. Jan 21 2021  4:45PM

## 2021-01-27 NOTE — PROGRESS NOTE ADULT - ASSESSMENT
66 y/o M PMHx as noted above presents  for further evaluation and management of severe right foot erythema found to have notable drainage, leukocytosis, elevated ESR concerning for underlying osteomyelitis.    #Right Foot Cellultis, Suspected Osteomyelitis  - PAN C+S- NGTD  - MRI of Right foot: plantar soft tissue wound is present. This may reflect persistence of previously noted sesamoiditis versus osteomyelitis.  ~ Podiatry Debridement POD#1  ~ID recs appreciated: On vancomycin 1 gm IV q12h and cefepime 2 gm IV q12h (Day 5)  - PICC Line needed after Debriedment, once afebrile and stable for DC.     #Gross Hematuria   ~Urology oupatient CTU and cysto  ~Follow Up Daily CBCs    #Hyponatremia   - Continue to hold off HCTZ  - resolving    #CAD  - Aspirin 81 mg QD  - Carvedilol 25 mg BID  - Ranolazine 500 mg BID    #Diabetes Mellitus  - 25 Lantus QHS  - 4 Admelog Premeal  - ISS  - Diabetes Diet     #Hyperlipidemia  - Atorvastatin 40 QHS    #Hypertension  - Amlodipine 10 mg QD  - Losartan 100 mg QD    #VTE PPX  - SCD    #Diet  - Diabetes Diet    #Code Status  - Full Code    64 y/o M PMHx as noted above presents  for further evaluation and management of severe right foot erythema found to have notable drainage, leukocytosis, elevated ESR concerning for underlying osteomyelitis.    #Right Foot Cellultis, Suspected Osteomyelitis  - PAN C+S- NGTD  - MRI of Right foot: plantar soft tissue wound is present. This may reflect persistence of previously noted sesamoiditis versus osteomyelitis.  ~ Podiatry Debridement POD#1  ~ID recs appreciated: On vancomycin 1 gm IV q12h and cefepime 2 gm IV q12h (Day 6)  - PICC Line needed after Debriedment, process underway for Discharge.     #Gross Hematuria   ~Urology oupatient CTU and cysto  ~Follow Up Daily CBCs    #Hyponatremia   - Continue to hold off HCTZ  - resolving    #CAD  - Aspirin 81 mg QD  - Carvedilol 25 mg BID  - Ranolazine 500 mg BID    #Diabetes Mellitus  - 30 Lantus QHS  - 4 Admelog Premeal  - ISS  - Diabetes Diet     #Hyperlipidemia  - Atorvastatin 40 QHS    #Hypertension  - Amlodipine 10 mg QD  - Losartan 100 mg QD    #VTE PPX  - SCD    #Diet  - Diabetes Diet    #Code Status  - Full Code

## 2021-01-27 NOTE — ADVANCED PRACTICE NURSE CONSULT - ASSESSMENT
Patient is awake and alert. PICC insertion along with risks, benefits, possible complications and infection prevention explained to pt who verbalized understanding. All questions addressed and written signed consent placed on chart. Time out along with hand washing by all RN's done. Right arm cleansed with CHG. Patient draped in usual fashion with sterile sheet. Lidocaine 1%, 2ml given intradermally to right arm marked site. Using strict sterile technique, under ultrasound guidance, placed Navilyst Xcela 4F single lumen PICC with PASV technology (lot#[id number]) cut to 48cm into right basilic vein. Brisk blood return and flushed with 20ml NS. Minimal blood loss and pt tolerated procedure well. All sharps accounted for. Dressing and end cap in place. Xray done and report given to district RN.

## 2021-01-27 NOTE — DISCHARGE NOTE PROVIDER - HOSPITAL COURSE
65y YO Male Presented to Hospital on 01-21-21 who presents with a chief complaint of Right foot infection, sent by Dr. Cisneros's office.   . In the ED, Patient was found to have cellulitis of the Right foot which was later diagnosed as osteomyelitis. Patient also had Gross hematuria  Labs and Imaging revealed elevated WBC, Neutrophils, and ESR. Xray and MRI place osteomyelitis of the medial hallux sesamoid and changes in the first metatarsal head, and lateral hallux of the right Foot.    Patient was started on IV Zosyn,   Patient was transferred to [facility]   Consults included Urology, Podiatry, Infectious disease. Urology recommends outpatient follow up for CTU and cystoscopy, Podiatry debrided wound, patient tolerated operation well and ID placed him on Vancomycin and cefepime for 5 days. Patient will need antibiotic coverage for 6 weeks, through a PICC line.     Patient was seen on 01-27, is medically cleared for discharge  Patient will need follow up with Infectious Disease, Podiatry, Urology and his primary care physician.   Patient will be discharged on antibiotics, Vancomycin and Cefepime that will be administered through PICC Line for the next 6 weeks.     Subjective: Patient was seen and examined by me this morning at bedside. Complains of a cough, but is not short of breath, and is eager to go home.     REVIEW OF SYSTEMS:  CONSTITUTIONAL: No weakness, fevers or chills  EYES/ENT: No visual changes;  No vertigo or throat pain   NECK: No pain or stiffness  RESPIRATORY: +Dry cough, wheezing, hemoptysis; No shortness of breath  CARDIOVASCULAR: No chest pain or palpitations  GASTROINTESTINAL: No abdominal or epigastric pain. No nausea, vomiting; No diarrhea or constipation.   GENITOURINARY: No dysuria, frequency or hematuria  NEUROLOGICAL: No numbness or weakness  SKIN: No itching, burning, rashes, or lesions     PHYSICAL EXAM:  Vital Signs Last 24 Hrs  T(C): 37.2 (27 Jan 2021 08:06), Max: 37.2 (27 Jan 2021 08:06)  T(F): 98.9 (27 Jan 2021 08:06), Max: 98.9 (27 Jan 2021 08:06)  HR: 74 (27 Jan 2021 08:06) (74 - 81)  BP: 107/66 (27 Jan 2021 08:06) (96/56 - 120/69)  BP(mean): --  RR: 20 (27 Jan 2021 08:06) (18 - 20)  SpO2: 94% (27 Jan 2021 08:06) (92% - 100%)    Constitutional: Pt lying in bed, awake and alert, NAD  HEENT: EOMI, normal hearing, moist mucous membranes  Neck: Soft and supple, no JVD  Respiratory: CTABL, No wheezing, rales or rhonchi  Cardiovascular: S1S2+, RRR, no M/G/R  Gastrointestinal: BS+, soft, NT/ND, no guarding, no rebound  Extremities: No peripheral edema  Vascular: 2+ peripheral pulses  Neurological: AAOx3, no focal deficits  Musculoskeletal: 5/5 strength b/l upper and lower extremities  Skin: No rashes 65y YO Male Presented to Hospital on 01-21-21 who presents with a chief complaint of Right foot infection, sent by Dr. Cisneros's office.   . In the ED, Patient was found to have cellulitis of the Right foot which was later diagnosed as osteomyelitis. Patient also had Gross hematuria  Labs and Imaging revealed elevated WBC, Neutrophils, and ESR. Xray and MRI place osteomyelitis of the medial hallux sesamoid and changes in the first metatarsal head, and lateral hallux of the right Foot.    Patient was started on IV Zosyn,   Patient was transferred to [facility]   Consults included Urology, Podiatry, Infectious disease. Urology recommends outpatient follow up for CTU and cystoscopy, Podiatry debrided wound, patient tolerated operation well and ID placed him on Vancomycin and cefepime for 5 days. Patient will need antibiotic coverage for 6 weeks, through a PICC line.     Patient was seen on 01-27, is medically cleared for discharge  Patient will need follow up with Infectious Disease, Podiatry, Urology and his primary care physician.   Patient will be discharged on antibiotics, Vancomycin and Cefepime that will be administered through PICC Line for the next 6 weeks. We removed  hydrochlorothiazide and decreased Losartan from 100 to 50mg daily.   Subjective: Patient was seen and examined by me this morning at bedside. Complains of a cough, but is not short of breath, and is eager to go home.     REVIEW OF SYSTEMS:  CONSTITUTIONAL: No weakness, fevers or chills  EYES/ENT: No visual changes;  No vertigo or throat pain   NECK: No pain or stiffness  RESPIRATORY: +Dry cough, wheezing, hemoptysis; No shortness of breath  CARDIOVASCULAR: No chest pain or palpitations  GASTROINTESTINAL: No abdominal or epigastric pain. No nausea, vomiting; No diarrhea or constipation.   GENITOURINARY: No dysuria, frequency or hematuria  NEUROLOGICAL: No numbness or weakness  SKIN: No itching, burning, rashes, or lesions     PHYSICAL EXAM:  Vital Signs Last 24 Hrs  T(C): 37.2 (27 Jan 2021 08:06), Max: 37.2 (27 Jan 2021 08:06)  T(F): 98.9 (27 Jan 2021 08:06), Max: 98.9 (27 Jan 2021 08:06)  HR: 74 (27 Jan 2021 08:06) (74 - 81)  BP: 107/66 (27 Jan 2021 08:06) (96/56 - 120/69)  BP(mean): --  RR: 20 (27 Jan 2021 08:06) (18 - 20)  SpO2: 94% (27 Jan 2021 08:06) (92% - 100%)    Constitutional: Pt lying in bed, awake and alert, NAD  HEENT: EOMI, normal hearing, moist mucous membranes  Neck: Soft and supple, no JVD  Respiratory: CTABL, No wheezing, rales or rhonchi  Cardiovascular: S1S2+, RRR, no M/G/R  Gastrointestinal: BS+, soft, NT/ND, no guarding, no rebound  Extremities: No peripheral edema  Vascular: 2+ peripheral pulses  Neurological: AAOx3, no focal deficits  Musculoskeletal: 5/5 strength b/l upper and lower extremities  Skin: No rashes

## 2021-01-27 NOTE — DISCHARGE NOTE PROVIDER - CARE PROVIDERS DIRECT ADDRESSES
,DirectAddress_Unknown,[email or web address]

## 2021-01-27 NOTE — PROGRESS NOTE ADULT - PROVIDER SPECIALTY LIST ADULT
Hospitalist
Podiatry
Urology
Family Medicine
Hospitalist
Hospitalist
Infectious Disease
Podiatry
Infectious Disease
Infectious Disease
Podiatry

## 2021-01-27 NOTE — PROGRESS NOTE ADULT - REASON FOR ADMISSION
Right foot infection

## 2021-01-27 NOTE — DISCHARGE NOTE PROVIDER - NSDCFUADDINST_GEN_ALL_CORE_FT
Continue with Antibiotics through PICC line  Continue Metformin 1000mg twice a day  Continue 30 Lantus at night  STOP Hydrocholorthiazide  Losartan dose down from 100 to 50 once a day  Follow up outpatient with your primary care provider.

## 2021-01-27 NOTE — DISCHARGE NOTE PROVIDER - NSDCMRMEDTOKEN_GEN_ALL_CORE_FT
amLODIPine 10 mg oral tablet: 1 tab(s) orally once a day  Aspirin Low Dose 81 mg oral delayed release tablet: 1 tab(s) orally once a day  atorvastatin 40 mg oral tablet: 1 tab(s) orally once a day  carvedilol 25 mg oral tablet: 1 tab(s) orally 2 times a day  cefepime 2 g intravenous injection: 2 gram(s) intravenous every 12 hours  Cinnamon 500 mg oral capsule: 2 cap(s) orally once a day  fluticasone 50 mcg/inh nasal spray: 1 spray(s) in each nostril 2 times a day  Glucosamine Chondroitin oral capsule: 3 cap(s) orally once a day  hydroCHLOROthiazide 25 mg oral tablet: 1 tab(s) orally once a day  Lantus 100 units/mL subcutaneous solution: 42  subcutaneous once a day (at bedtime)  losartan 100 mg oral tablet: 1 tab(s) orally once a day  metFORMIN 1000 mg oral tablet: 1 tab(s) orally 2 times a day  ranolazine 500 mg oral tablet, extended release: 1 tab(s) orally 2 times a day  vancomycin 750 mg intravenous injection: 750 milligram(s) intravenous every 12 hours  Vitamin B-12 1000 mcg oral tablet: 1 tab(s) orally once a day  Vitamin C 1000 mg oral tablet: 1 tab(s) orally once a day  Vitamin D3 1000 intl units (25 mcg) oral tablet: 1 tab(s) orally once a day   Aspirin Low Dose 81 mg oral delayed release tablet: 1 tab(s) orally once a day  atorvastatin 40 mg oral tablet: 1 tab(s) orally once a day  carvedilol 25 mg oral tablet: 1 tab(s) orally 2 times a day  cefepime 2 g intravenous injection: 2 gram(s) intravenous every 12 hours  Cinnamon 500 mg oral capsule: 2 cap(s) orally once a day  Cozaar 50 mg oral tablet: 1 tab(s) orally once a day  fluticasone 50 mcg/inh nasal spray: 1 spray(s) in each nostril 2 times a day  Glucosamine Chondroitin oral capsule: 3 cap(s) orally once a day  insulin glargine: 30 unit(s) subcutaneous once a day (at bedtime)  metFORMIN 1000 mg oral tablet: 1 tab(s) orally 2 times a day  ranolazine 500 mg oral tablet, extended release: 1 tab(s) orally 2 times a day  vancomycin 750 mg intravenous injection: 750 milligram(s) intravenous every 12 hours  Vitamin B-12 1000 mcg oral tablet: 1 tab(s) orally once a day  Vitamin C 1000 mg oral tablet: 1 tab(s) orally once a day  Vitamin D3 1000 intl units (25 mcg) oral tablet: 1 tab(s) orally once a day

## 2021-01-27 NOTE — DISCHARGE NOTE PROVIDER - NSDCFUSCHEDAPPT_GEN_ALL_CORE_FT
[place] [place] ; 02/09/2021 ; [facility] Cardio [address]  [place] ; 03/15/2021 ; [facility] Endocrin [address]

## 2021-01-27 NOTE — PROGRESS NOTE ADULT - ASSESSMENT
POD # 2 Debride R foot / I&D Deep space abscess.  Alert afebrile Right foot remove packing to medial 1st MTP & 1st inter space, clean granulation tissue noted. No pus/odor. Penrose mobilized & removed from midfoot. Cultures neg to date. Irrigate sites / 1:5 Betadine : NS then flush / NS. Repack 1st interspace / NS soaked 4x4' gauze Wrap / Kerlix, ABDs. Advise rest off load will need PICC. Pt wants to go home advise REHAB he refused. Will need VNS to irrigate wound right foot / NS repack 1st interspace wound / sterile 4 x 4' gauze / NS & cover / ABDs & 4 " Kerlix  DAILY.  WBC 13.99 Cultures neg to date. D/C plan. Will F/U THX

## 2021-01-27 NOTE — DISCHARGE NOTE PROVIDER - PROVIDER TOKENS
PROVIDER:[TOKEN:[[id number]]],PROVIDER:[TOKEN:[[id number]]],PROVIDER:[TOKEN:[[id number]]]

## 2021-01-27 NOTE — PROGRESS NOTE ADULT - ASSESSMENT
Pt has been seen and examined with FP resident, resident supervised agree with a/p       Patient is a 65y old  Male who presents with a chief complaint of Right foot infection (25 Jan 2021 07:11)        PHYSICAL EXAM:    -rs-aeeb, cta  -cvs-s1s2 normal   -p/a-soft,bs+      A/P    #d/c today with further management as an outpt, time spent 45 minutes       #dvt pr     #ct abx

## 2021-01-28 ENCOUNTER — NON-APPOINTMENT (OUTPATIENT)
Age: 66
End: 2021-01-28

## 2021-01-29 PROBLEM — C44.91 BASAL CELL CARCINOMA OF SKIN, UNSPECIFIED: Chronic | Status: ACTIVE | Noted: 2021-01-21

## 2021-01-29 PROBLEM — I25.10 ATHEROSCLEROTIC HEART DISEASE OF NATIVE CORONARY ARTERY WITHOUT ANGINA PECTORIS: Chronic | Status: ACTIVE | Noted: 2021-01-21

## 2021-01-29 PROBLEM — I73.9 PERIPHERAL VASCULAR DISEASE, UNSPECIFIED: Chronic | Status: ACTIVE | Noted: 2021-01-21

## 2021-01-30 ENCOUNTER — INPATIENT (INPATIENT)
Facility: HOSPITAL | Age: 66
LOS: 10 days | Discharge: NURSING, REHAB OR LONG-TERM CARE | DRG: 246 | End: 2021-02-10
Attending: INTERNAL MEDICINE | Admitting: INTERNAL MEDICINE
Payer: MEDICARE

## 2021-01-30 VITALS — HEIGHT: 71 IN | WEIGHT: 175.05 LBS

## 2021-01-30 DIAGNOSIS — Z89.429 ACQUIRED ABSENCE OF OTHER TOE(S), UNSPECIFIED SIDE: Chronic | ICD-10-CM

## 2021-01-30 PROCEDURE — 93010 ELECTROCARDIOGRAM REPORT: CPT

## 2021-01-30 NOTE — ED ADULT TRIAGE NOTE - CHIEF COMPLAINT QUOTE
Pt. is a 65 YOM presenting to ED w/ SOB that has been worsening the last few days.  Pt. endorses chills. Denies fever.

## 2021-01-31 DIAGNOSIS — L08.9 LOCAL INFECTION OF THE SKIN AND SUBCUTANEOUS TISSUE, UNSPECIFIED: ICD-10-CM

## 2021-01-31 DIAGNOSIS — Z98.890 OTHER SPECIFIED POSTPROCEDURAL STATES: Chronic | ICD-10-CM

## 2021-01-31 DIAGNOSIS — E11.9 TYPE 2 DIABETES MELLITUS WITHOUT COMPLICATIONS: ICD-10-CM

## 2021-01-31 DIAGNOSIS — J81.0 ACUTE PULMONARY EDEMA: ICD-10-CM

## 2021-01-31 DIAGNOSIS — E78.00 PURE HYPERCHOLESTEROLEMIA, UNSPECIFIED: ICD-10-CM

## 2021-01-31 DIAGNOSIS — I10 ESSENTIAL (PRIMARY) HYPERTENSION: ICD-10-CM

## 2021-01-31 DIAGNOSIS — I25.10 ATHEROSCLEROTIC HEART DISEASE OF NATIVE CORONARY ARTERY WITHOUT ANGINA PECTORIS: ICD-10-CM

## 2021-01-31 LAB
ALBUMIN SERPL ELPH-MCNC: 2.2 G/DL — LOW (ref 3.3–5)
ALBUMIN SERPL ELPH-MCNC: 2.2 G/DL — LOW (ref 3.3–5)
ALP SERPL-CCNC: 227 U/L — HIGH (ref 40–120)
ALP SERPL-CCNC: 246 U/L — HIGH (ref 40–120)
ALT FLD-CCNC: 135 U/L — HIGH (ref 12–78)
ALT FLD-CCNC: 149 U/L — HIGH (ref 12–78)
ANION GAP SERPL CALC-SCNC: 12 MMOL/L — SIGNIFICANT CHANGE UP (ref 5–17)
ANION GAP SERPL CALC-SCNC: 9 MMOL/L — SIGNIFICANT CHANGE UP (ref 5–17)
APPEARANCE UR: CLEAR — SIGNIFICANT CHANGE UP
APTT BLD: 30.2 SEC — SIGNIFICANT CHANGE UP (ref 27.5–35.5)
AST SERPL-CCNC: 46 U/L — HIGH (ref 15–37)
AST SERPL-CCNC: 69 U/L — HIGH (ref 15–37)
BASOPHILS # BLD AUTO: 0.05 K/UL — SIGNIFICANT CHANGE UP (ref 0–0.2)
BASOPHILS # BLD AUTO: 0.07 K/UL — SIGNIFICANT CHANGE UP (ref 0–0.2)
BASOPHILS NFR BLD AUTO: 0.2 % — SIGNIFICANT CHANGE UP (ref 0–2)
BASOPHILS NFR BLD AUTO: 0.3 % — SIGNIFICANT CHANGE UP (ref 0–2)
BILIRUB SERPL-MCNC: 0.6 MG/DL — SIGNIFICANT CHANGE UP (ref 0.2–1.2)
BILIRUB SERPL-MCNC: 0.6 MG/DL — SIGNIFICANT CHANGE UP (ref 0.2–1.2)
BILIRUB UR-MCNC: NEGATIVE — SIGNIFICANT CHANGE UP
BUN SERPL-MCNC: 20 MG/DL — SIGNIFICANT CHANGE UP (ref 7–23)
BUN SERPL-MCNC: 22 MG/DL — SIGNIFICANT CHANGE UP (ref 7–23)
CALCIUM SERPL-MCNC: 8.4 MG/DL — LOW (ref 8.5–10.1)
CALCIUM SERPL-MCNC: 8.7 MG/DL — SIGNIFICANT CHANGE UP (ref 8.5–10.1)
CHLORIDE SERPL-SCNC: 98 MMOL/L — SIGNIFICANT CHANGE UP (ref 96–108)
CHLORIDE SERPL-SCNC: 99 MMOL/L — SIGNIFICANT CHANGE UP (ref 96–108)
CK SERPL-CCNC: 99 U/L — SIGNIFICANT CHANGE UP (ref 26–308)
CO2 SERPL-SCNC: 18 MMOL/L — LOW (ref 22–31)
CO2 SERPL-SCNC: 21 MMOL/L — LOW (ref 22–31)
COLOR SPEC: YELLOW — SIGNIFICANT CHANGE UP
CREAT SERPL-MCNC: 1.15 MG/DL — SIGNIFICANT CHANGE UP (ref 0.5–1.3)
CREAT SERPL-MCNC: 1.17 MG/DL — SIGNIFICANT CHANGE UP (ref 0.5–1.3)
CRP SERPL-MCNC: 6.95 MG/DL — HIGH (ref 0–0.4)
DIFF PNL FLD: ABNORMAL
EOSINOPHIL # BLD AUTO: 0.02 K/UL — SIGNIFICANT CHANGE UP (ref 0–0.5)
EOSINOPHIL # BLD AUTO: 0.02 K/UL — SIGNIFICANT CHANGE UP (ref 0–0.5)
EOSINOPHIL NFR BLD AUTO: 0.1 % — SIGNIFICANT CHANGE UP (ref 0–6)
EOSINOPHIL NFR BLD AUTO: 0.1 % — SIGNIFICANT CHANGE UP (ref 0–6)
GLUCOSE SERPL-MCNC: 264 MG/DL — HIGH (ref 70–99)
GLUCOSE SERPL-MCNC: 280 MG/DL — HIGH (ref 70–99)
GLUCOSE UR QL: NEGATIVE MG/DL — SIGNIFICANT CHANGE UP
HCT VFR BLD CALC: 27.3 % — LOW (ref 39–50)
HCT VFR BLD CALC: 27.4 % — LOW (ref 39–50)
HGB BLD-MCNC: 9 G/DL — LOW (ref 13–17)
HGB BLD-MCNC: 9.1 G/DL — LOW (ref 13–17)
IMM GRANULOCYTES NFR BLD AUTO: 1.6 % — HIGH (ref 0–1.5)
IMM GRANULOCYTES NFR BLD AUTO: 1.8 % — HIGH (ref 0–1.5)
INR BLD: 1.47 RATIO — HIGH (ref 0.88–1.16)
KETONES UR-MCNC: NEGATIVE — SIGNIFICANT CHANGE UP
LACTATE SERPL-SCNC: 1.9 MMOL/L — SIGNIFICANT CHANGE UP (ref 0.7–2)
LEUKOCYTE ESTERASE UR-ACNC: NEGATIVE — SIGNIFICANT CHANGE UP
LYMPHOCYTES # BLD AUTO: 0.69 K/UL — LOW (ref 1–3.3)
LYMPHOCYTES # BLD AUTO: 1.15 K/UL — SIGNIFICANT CHANGE UP (ref 1–3.3)
LYMPHOCYTES # BLD AUTO: 3.2 % — LOW (ref 13–44)
LYMPHOCYTES # BLD AUTO: 5.7 % — LOW (ref 13–44)
MAGNESIUM SERPL-MCNC: 1.9 MG/DL — SIGNIFICANT CHANGE UP (ref 1.6–2.6)
MAGNESIUM SERPL-MCNC: 2.1 MG/DL — SIGNIFICANT CHANGE UP (ref 1.6–2.6)
MCHC RBC-ENTMCNC: 28.2 PG — SIGNIFICANT CHANGE UP (ref 27–34)
MCHC RBC-ENTMCNC: 28.4 PG — SIGNIFICANT CHANGE UP (ref 27–34)
MCHC RBC-ENTMCNC: 33 GM/DL — SIGNIFICANT CHANGE UP (ref 32–36)
MCHC RBC-ENTMCNC: 33.2 GM/DL — SIGNIFICANT CHANGE UP (ref 32–36)
MCV RBC AUTO: 85.6 FL — SIGNIFICANT CHANGE UP (ref 80–100)
MCV RBC AUTO: 85.6 FL — SIGNIFICANT CHANGE UP (ref 80–100)
MONOCYTES # BLD AUTO: 0.87 K/UL — SIGNIFICANT CHANGE UP (ref 0–0.9)
MONOCYTES # BLD AUTO: 1.16 K/UL — HIGH (ref 0–0.9)
MONOCYTES NFR BLD AUTO: 4.1 % — SIGNIFICANT CHANGE UP (ref 2–14)
MONOCYTES NFR BLD AUTO: 5.8 % — SIGNIFICANT CHANGE UP (ref 2–14)
NEUTROPHILS # BLD AUTO: 17.37 K/UL — HIGH (ref 1.8–7.4)
NEUTROPHILS # BLD AUTO: 19.34 K/UL — HIGH (ref 1.8–7.4)
NEUTROPHILS NFR BLD AUTO: 86.6 % — HIGH (ref 43–77)
NEUTROPHILS NFR BLD AUTO: 90.5 % — HIGH (ref 43–77)
NITRITE UR-MCNC: NEGATIVE — SIGNIFICANT CHANGE UP
NT-PROBNP SERPL-SCNC: 5141 PG/ML — HIGH (ref 0–125)
PH UR: 5 — SIGNIFICANT CHANGE UP (ref 5–8)
PHOSPHATE SERPL-MCNC: 3 MG/DL — SIGNIFICANT CHANGE UP (ref 2.5–4.5)
PLATELET # BLD AUTO: 389 K/UL — SIGNIFICANT CHANGE UP (ref 150–400)
PLATELET # BLD AUTO: 422 K/UL — HIGH (ref 150–400)
POTASSIUM SERPL-MCNC: 4.2 MMOL/L — SIGNIFICANT CHANGE UP (ref 3.5–5.3)
POTASSIUM SERPL-MCNC: 4.7 MMOL/L — SIGNIFICANT CHANGE UP (ref 3.5–5.3)
POTASSIUM SERPL-SCNC: 4.2 MMOL/L — SIGNIFICANT CHANGE UP (ref 3.5–5.3)
POTASSIUM SERPL-SCNC: 4.7 MMOL/L — SIGNIFICANT CHANGE UP (ref 3.5–5.3)
PROT SERPL-MCNC: 6.2 GM/DL — SIGNIFICANT CHANGE UP (ref 6–8.3)
PROT SERPL-MCNC: 6.4 GM/DL — SIGNIFICANT CHANGE UP (ref 6–8.3)
PROT UR-MCNC: 30 MG/DL
PROTHROM AB SERPL-ACNC: 16.7 SEC — HIGH (ref 10.6–13.6)
RAPID RVP RESULT: SIGNIFICANT CHANGE UP
RBC # BLD: 3.19 M/UL — LOW (ref 4.2–5.8)
RBC # BLD: 3.2 M/UL — LOW (ref 4.2–5.8)
RBC # FLD: 13.5 % — SIGNIFICANT CHANGE UP (ref 10.3–14.5)
RBC # FLD: 13.8 % — SIGNIFICANT CHANGE UP (ref 10.3–14.5)
SARS-COV-2 IGG SERPL QL IA: NEGATIVE — SIGNIFICANT CHANGE UP
SARS-COV-2 IGM SERPL IA-ACNC: 1.25 INDEX — SIGNIFICANT CHANGE UP
SARS-COV-2 RNA SPEC QL NAA+PROBE: SIGNIFICANT CHANGE UP
SODIUM SERPL-SCNC: 128 MMOL/L — LOW (ref 135–145)
SODIUM SERPL-SCNC: 129 MMOL/L — LOW (ref 135–145)
SP GR SPEC: 1.01 — SIGNIFICANT CHANGE UP (ref 1.01–1.02)
TROPONIN I SERPL-MCNC: 0.07 NG/ML — HIGH (ref 0.01–0.04)
TROPONIN I SERPL-MCNC: 0.22 NG/ML — HIGH (ref 0.01–0.04)
TROPONIN I SERPL-MCNC: 1.33 NG/ML — HIGH (ref 0.01–0.04)
TROPONIN I SERPL-MCNC: 1.39 NG/ML — HIGH (ref 0.01–0.04)
UROBILINOGEN FLD QL: NEGATIVE MG/DL — SIGNIFICANT CHANGE UP
WBC # BLD: 20.08 K/UL — HIGH (ref 3.8–10.5)
WBC # BLD: 21.38 K/UL — HIGH (ref 3.8–10.5)
WBC # FLD AUTO: 20.08 K/UL — HIGH (ref 3.8–10.5)
WBC # FLD AUTO: 21.38 K/UL — HIGH (ref 3.8–10.5)

## 2021-01-31 PROCEDURE — 84550 ASSAY OF BLOOD/URIC ACID: CPT

## 2021-01-31 PROCEDURE — 86850 RBC ANTIBODY SCREEN: CPT

## 2021-01-31 PROCEDURE — C1887: CPT

## 2021-01-31 PROCEDURE — 80053 COMPREHEN METABOLIC PANEL: CPT

## 2021-01-31 PROCEDURE — 87086 URINE CULTURE/COLONY COUNT: CPT

## 2021-01-31 PROCEDURE — 83550 IRON BINDING TEST: CPT

## 2021-01-31 PROCEDURE — 99223 1ST HOSP IP/OBS HIGH 75: CPT

## 2021-01-31 PROCEDURE — U0005: CPT

## 2021-01-31 PROCEDURE — C1894: CPT

## 2021-01-31 PROCEDURE — 83735 ASSAY OF MAGNESIUM: CPT

## 2021-01-31 PROCEDURE — 88311 DECALCIFY TISSUE: CPT

## 2021-01-31 PROCEDURE — 82607 VITAMIN B-12: CPT

## 2021-01-31 PROCEDURE — 93005 ELECTROCARDIOGRAM TRACING: CPT

## 2021-01-31 PROCEDURE — 92610 EVALUATE SWALLOWING FUNCTION: CPT | Mod: GN

## 2021-01-31 PROCEDURE — 93458 L HRT ARTERY/VENTRICLE ANGIO: CPT | Mod: 59

## 2021-01-31 PROCEDURE — 36415 COLL VENOUS BLD VENIPUNCTURE: CPT

## 2021-01-31 PROCEDURE — 88305 TISSUE EXAM BY PATHOLOGIST: CPT

## 2021-01-31 PROCEDURE — 85027 COMPLETE CBC AUTOMATED: CPT

## 2021-01-31 PROCEDURE — 82746 ASSAY OF FOLIC ACID SERUM: CPT

## 2021-01-31 PROCEDURE — 12345: CPT | Mod: NC

## 2021-01-31 PROCEDURE — 88304 TISSUE EXAM BY PATHOLOGIST: CPT

## 2021-01-31 PROCEDURE — 97162 PT EVAL MOD COMPLEX 30 MIN: CPT | Mod: GP

## 2021-01-31 PROCEDURE — 85025 COMPLETE CBC W/AUTO DIFF WBC: CPT

## 2021-01-31 PROCEDURE — 83540 ASSAY OF IRON: CPT

## 2021-01-31 PROCEDURE — 71045 X-RAY EXAM CHEST 1 VIEW: CPT | Mod: 26

## 2021-01-31 PROCEDURE — C1725: CPT

## 2021-01-31 PROCEDURE — C1769: CPT

## 2021-01-31 PROCEDURE — 97116 GAIT TRAINING THERAPY: CPT | Mod: GP

## 2021-01-31 PROCEDURE — 87070 CULTURE OTHR SPECIMN AEROBIC: CPT

## 2021-01-31 PROCEDURE — 84484 ASSAY OF TROPONIN QUANT: CPT

## 2021-01-31 PROCEDURE — 86140 C-REACTIVE PROTEIN: CPT

## 2021-01-31 PROCEDURE — 86901 BLOOD TYPING SEROLOGIC RH(D): CPT

## 2021-01-31 PROCEDURE — 93306 TTE W/DOPPLER COMPLETE: CPT

## 2021-01-31 PROCEDURE — 86900 BLOOD TYPING SEROLOGIC ABO: CPT

## 2021-01-31 PROCEDURE — C9600: CPT | Mod: LD

## 2021-01-31 PROCEDURE — 85652 RBC SED RATE AUTOMATED: CPT

## 2021-01-31 PROCEDURE — 82728 ASSAY OF FERRITIN: CPT

## 2021-01-31 PROCEDURE — 82962 GLUCOSE BLOOD TEST: CPT

## 2021-01-31 PROCEDURE — 73620 X-RAY EXAM OF FOOT: CPT | Mod: RT

## 2021-01-31 PROCEDURE — U0003: CPT

## 2021-01-31 PROCEDURE — 87075 CULTR BACTERIA EXCEPT BLOOD: CPT

## 2021-01-31 PROCEDURE — 99223 1ST HOSP IP/OBS HIGH 75: CPT | Mod: AI

## 2021-01-31 PROCEDURE — 73720 MRI LWR EXTREMITY W/O&W/DYE: CPT | Mod: RT

## 2021-01-31 PROCEDURE — C1874: CPT

## 2021-01-31 PROCEDURE — A9579: CPT

## 2021-01-31 PROCEDURE — 81001 URINALYSIS AUTO W/SCOPE: CPT

## 2021-01-31 PROCEDURE — 82248 BILIRUBIN DIRECT: CPT

## 2021-01-31 PROCEDURE — 84100 ASSAY OF PHOSPHORUS: CPT

## 2021-01-31 PROCEDURE — 80202 ASSAY OF VANCOMYCIN: CPT

## 2021-01-31 PROCEDURE — 71275 CT ANGIOGRAPHY CHEST: CPT | Mod: 26

## 2021-01-31 PROCEDURE — 73630 X-RAY EXAM OF FOOT: CPT | Mod: 26,77,RT

## 2021-01-31 PROCEDURE — 73630 X-RAY EXAM OF FOOT: CPT | Mod: 26,RT

## 2021-01-31 PROCEDURE — 80048 BASIC METABOLIC PNL TOTAL CA: CPT

## 2021-01-31 PROCEDURE — 87040 BLOOD CULTURE FOR BACTERIA: CPT

## 2021-01-31 RX ORDER — CEFEPIME 1 G/1
2000 INJECTION, POWDER, FOR SOLUTION INTRAMUSCULAR; INTRAVENOUS EVERY 12 HOURS
Refills: 0 | Status: DISCONTINUED | OUTPATIENT
Start: 2021-01-31 | End: 2021-02-10

## 2021-01-31 RX ORDER — INSULIN LISPRO 100/ML
5 VIAL (ML) SUBCUTANEOUS
Refills: 0 | Status: DISCONTINUED | OUTPATIENT
Start: 2021-01-31 | End: 2021-02-01

## 2021-01-31 RX ORDER — ASPIRIN/CALCIUM CARB/MAGNESIUM 324 MG
81 TABLET ORAL AT BEDTIME
Refills: 0 | Status: DISCONTINUED | OUTPATIENT
Start: 2021-01-31 | End: 2021-02-10

## 2021-01-31 RX ORDER — DEXTROSE 50 % IN WATER 50 %
12.5 SYRINGE (ML) INTRAVENOUS ONCE
Refills: 0 | Status: DISCONTINUED | OUTPATIENT
Start: 2021-01-31 | End: 2021-02-10

## 2021-01-31 RX ORDER — VANCOMYCIN HCL 1 G
750 VIAL (EA) INTRAVENOUS ONCE
Refills: 0 | Status: DISCONTINUED | OUTPATIENT
Start: 2021-01-31 | End: 2021-01-31

## 2021-01-31 RX ORDER — MEROPENEM 1 G/30ML
1000 INJECTION INTRAVENOUS ONCE
Refills: 0 | Status: COMPLETED | OUTPATIENT
Start: 2021-01-31 | End: 2021-01-31

## 2021-01-31 RX ORDER — VANCOMYCIN HCL 1 G
750 VIAL (EA) INTRAVENOUS EVERY 12 HOURS
Refills: 0 | Status: DISCONTINUED | OUTPATIENT
Start: 2021-01-31 | End: 2021-02-08

## 2021-01-31 RX ORDER — ASPIRIN/CALCIUM CARB/MAGNESIUM 324 MG
243 TABLET ORAL ONCE
Refills: 0 | Status: COMPLETED | OUTPATIENT
Start: 2021-01-31 | End: 2021-01-31

## 2021-01-31 RX ORDER — CEFEPIME 1 G/1
2000 INJECTION, POWDER, FOR SOLUTION INTRAMUSCULAR; INTRAVENOUS EVERY 12 HOURS
Refills: 0 | Status: DISCONTINUED | OUTPATIENT
Start: 2021-01-31 | End: 2021-01-31

## 2021-01-31 RX ORDER — INSULIN LISPRO 100/ML
VIAL (ML) SUBCUTANEOUS AT BEDTIME
Refills: 0 | Status: DISCONTINUED | OUTPATIENT
Start: 2021-01-31 | End: 2021-02-06

## 2021-01-31 RX ORDER — VANCOMYCIN HCL 1 G
750 VIAL (EA) INTRAVENOUS ONCE
Refills: 0 | Status: COMPLETED | OUTPATIENT
Start: 2021-01-31 | End: 2021-01-31

## 2021-01-31 RX ORDER — INSULIN LISPRO 100/ML
VIAL (ML) SUBCUTANEOUS
Refills: 0 | Status: DISCONTINUED | OUTPATIENT
Start: 2021-01-31 | End: 2021-02-10

## 2021-01-31 RX ORDER — GLUCAGON INJECTION, SOLUTION 0.5 MG/.1ML
1 INJECTION, SOLUTION SUBCUTANEOUS ONCE
Refills: 0 | Status: DISCONTINUED | OUTPATIENT
Start: 2021-01-31 | End: 2021-02-10

## 2021-01-31 RX ORDER — FUROSEMIDE 40 MG
40 TABLET ORAL ONCE
Refills: 0 | Status: COMPLETED | OUTPATIENT
Start: 2021-01-31 | End: 2021-01-31

## 2021-01-31 RX ORDER — HUMAN INSULIN 100 [IU]/ML
20 INJECTION, SUSPENSION SUBCUTANEOUS ONCE
Refills: 0 | Status: COMPLETED | OUTPATIENT
Start: 2021-01-31 | End: 2021-01-31

## 2021-01-31 RX ORDER — CARVEDILOL PHOSPHATE 80 MG/1
25 CAPSULE, EXTENDED RELEASE ORAL EVERY 12 HOURS
Refills: 0 | Status: DISCONTINUED | OUTPATIENT
Start: 2021-01-31 | End: 2021-01-31

## 2021-01-31 RX ORDER — ATORVASTATIN CALCIUM 80 MG/1
40 TABLET, FILM COATED ORAL AT BEDTIME
Refills: 0 | Status: DISCONTINUED | OUTPATIENT
Start: 2021-01-31 | End: 2021-02-10

## 2021-01-31 RX ORDER — SODIUM CHLORIDE 9 MG/ML
1000 INJECTION, SOLUTION INTRAVENOUS
Refills: 0 | Status: DISCONTINUED | OUTPATIENT
Start: 2021-01-31 | End: 2021-02-10

## 2021-01-31 RX ORDER — CLOPIDOGREL BISULFATE 75 MG/1
75 TABLET, FILM COATED ORAL DAILY
Refills: 0 | Status: DISCONTINUED | OUTPATIENT
Start: 2021-01-31 | End: 2021-02-10

## 2021-01-31 RX ORDER — DEXTROSE 50 % IN WATER 50 %
25 SYRINGE (ML) INTRAVENOUS ONCE
Refills: 0 | Status: DISCONTINUED | OUTPATIENT
Start: 2021-01-31 | End: 2021-02-10

## 2021-01-31 RX ORDER — FUROSEMIDE 40 MG
40 TABLET ORAL DAILY
Refills: 0 | Status: DISCONTINUED | OUTPATIENT
Start: 2021-01-31 | End: 2021-01-31

## 2021-01-31 RX ORDER — FUROSEMIDE 40 MG
40 TABLET ORAL
Refills: 0 | Status: DISCONTINUED | OUTPATIENT
Start: 2021-01-31 | End: 2021-02-01

## 2021-01-31 RX ORDER — HEPARIN SODIUM 5000 [USP'U]/ML
5000 INJECTION INTRAVENOUS; SUBCUTANEOUS EVERY 8 HOURS
Refills: 0 | Status: DISCONTINUED | OUTPATIENT
Start: 2021-01-31 | End: 2021-02-07

## 2021-01-31 RX ORDER — DEXTROSE 50 % IN WATER 50 %
15 SYRINGE (ML) INTRAVENOUS ONCE
Refills: 0 | Status: DISCONTINUED | OUTPATIENT
Start: 2021-01-31 | End: 2021-02-10

## 2021-01-31 RX ORDER — CARVEDILOL PHOSPHATE 80 MG/1
12.5 CAPSULE, EXTENDED RELEASE ORAL EVERY 12 HOURS
Refills: 0 | Status: DISCONTINUED | OUTPATIENT
Start: 2021-01-31 | End: 2021-02-05

## 2021-01-31 RX ORDER — INSULIN GLARGINE 100 [IU]/ML
42 INJECTION, SOLUTION SUBCUTANEOUS AT BEDTIME
Refills: 0 | Status: DISCONTINUED | OUTPATIENT
Start: 2021-01-31 | End: 2021-02-02

## 2021-01-31 RX ADMIN — CARVEDILOL PHOSPHATE 12.5 MILLIGRAM(S): 80 CAPSULE, EXTENDED RELEASE ORAL at 21:40

## 2021-01-31 RX ADMIN — MEROPENEM 100 MILLIGRAM(S): 1 INJECTION INTRAVENOUS at 01:59

## 2021-01-31 RX ADMIN — Medication 166.67 MILLIGRAM(S): at 05:36

## 2021-01-31 RX ADMIN — HEPARIN SODIUM 5000 UNIT(S): 5000 INJECTION INTRAVENOUS; SUBCUTANEOUS at 13:55

## 2021-01-31 RX ADMIN — Medication 6: at 11:07

## 2021-01-31 RX ADMIN — Medication 4: at 18:47

## 2021-01-31 RX ADMIN — Medication 5 UNIT(S): at 11:08

## 2021-01-31 RX ADMIN — Medication 5 UNIT(S): at 18:46

## 2021-01-31 RX ADMIN — CEFEPIME 100 MILLIGRAM(S): 1 INJECTION, POWDER, FOR SOLUTION INTRAMUSCULAR; INTRAVENOUS at 13:55

## 2021-01-31 RX ADMIN — Medication 250 MILLIGRAM(S): at 19:08

## 2021-01-31 RX ADMIN — Medication 40 MILLIGRAM(S): at 09:14

## 2021-01-31 RX ADMIN — Medication 40 MILLIGRAM(S): at 01:52

## 2021-01-31 RX ADMIN — Medication: at 09:10

## 2021-01-31 RX ADMIN — MEROPENEM 1000 MILLIGRAM(S): 1 INJECTION INTRAVENOUS at 02:30

## 2021-01-31 RX ADMIN — Medication 81 MILLIGRAM(S): at 21:40

## 2021-01-31 RX ADMIN — ATORVASTATIN CALCIUM 40 MILLIGRAM(S): 80 TABLET, FILM COATED ORAL at 21:40

## 2021-01-31 RX ADMIN — Medication 243 MILLIGRAM(S): at 09:00

## 2021-01-31 RX ADMIN — INSULIN GLARGINE 42 UNIT(S): 100 INJECTION, SOLUTION SUBCUTANEOUS at 21:55

## 2021-01-31 RX ADMIN — HEPARIN SODIUM 5000 UNIT(S): 5000 INJECTION INTRAVENOUS; SUBCUTANEOUS at 21:40

## 2021-01-31 RX ADMIN — Medication 40 MILLIGRAM(S): at 21:40

## 2021-01-31 NOTE — CONSULT NOTE ADULT - SUBJECTIVE AND OBJECTIVE BOX
PCP:    REQUESTING PHYSICIAN:    REASON FOR CONSULT:    CHIEF COMPLAINT:    HPI: 66 yo M with a PMH CAD, PAD, HTN, HLD, DM2 (on insulin) c/b neuropathy, and BPH who presents with SOB. Over the past day, he has had worsening SOB, particularly on exertion but also at rest and while lying down in bed. He also endorses a chest tightness with the dyspnea and has palpitations on exertion, but denies wheezing. He denies fevers but endorsed some chills today. He has had a chronic nonproductive cough for he past month.    He was recently admitted from 21-21 for a right foot cellulitis and osteomyelitis involving the right medial hallux sesamoid and 1st metatarsal. Wound cultures showed rare MSSA, bacteroides fragilis, and strep constellatus. He had a debridement by podiatry on . He had a PICC line placed and was started on Cefepime and Vancomycin for a planned course of 4-6 weeks. His course was also complicated by hematuria, for which he saw Urology who felt there was no inpatient workup required, but should have an outpatient CT urography/cystography. Since discharge, he has had some loose stools, about one BM per day, and not fully liquid. He denies abdominal pain, nausea, vomiting, current hematuria, or dysuria.  His right foot is doing better. Sometimes he has purulent/bloody drainage, but very little. He has an appointment with Dr. Bullock on .    Of note, he was also stopped off of his HCTZ 25 mg QD and his Losartan was decreased from 100 to 50 mg QD. His Amlodipine 10 mg QD was not restarted upon discharge as well.    In the ED, he was given Furosemide 40 mg IV x1, Meropenem 1 g IV x1, and Vancomycin 750 mg IV x1. (2021 05:52)      PAST MEDICAL & SURGICAL HISTORY:  Insulin dependent type 2 diabetes mellitus  Basal cell carcinoma Of face s/p excision Oct 2017  PAD (peripheral artery disease)  CAD (coronary atherosclerotic disease)  Hypercholesteremia  Hypertension  BPH (Benign Prostatic Hyperplasia)  Status post debridement right foot ulcer, with I&amp;D  History of amputation of toe L foot 2nd toe amputation    Allergies  No Known Allergies  Intolerances    SOCIAL HISTORY:    FAMILY HISTORY:  Family history of hyperlipidemia  Family history of hypertension  FH: myocardial infarction   Father had MI,  age 47    MEDICATIONS  (STANDING):  aspirin enteric coated 81 milliGRAM(s) Oral at bedtime  cefepime   IVPB 2000 milliGRAM(s) IV Intermittent every 12 hours  dextrose 40% Gel 15 Gram(s) Oral once  dextrose 5%. 1000 milliLiter(s) (100 mL/Hr) IV Continuous <Continuous>  dextrose 50% Injectable 25 Gram(s) IV Push once  dextrose 50% Injectable 12.5 Gram(s) IV Push once  dextrose 50% Injectable 25 Gram(s) IV Push once  furosemide   Injectable 40 milliGRAM(s) IV Push daily  glucagon  Injectable 1 milliGRAM(s) IntraMuscular once  heparin   Injectable 5000 Unit(s) SubCutaneous every 8 hours  insulin glargine Injectable (LANTUS) 42 Unit(s) SubCutaneous at bedtime  insulin lispro (ADMELOG) corrective regimen sliding scale   SubCutaneous three times a day before meals  insulin lispro (ADMELOG) corrective regimen sliding scale   SubCutaneous at bedtime  insulin lispro Injectable (ADMELOG) 5 Unit(s) SubCutaneous three times a day before meals  vancomycin  IVPB 750 milliGRAM(s) IV Intermittent every 12 hours    Vital Signs Last 24 Hrs  T(C): 36.4 (2021 09:08), Max: 37.2 (2021 23:57)  T(F): 97.6 (2021 09:08), Max: 98.9 (2021 23:57)  HR: 80 (2021 09:08) (77 - 98)  BP: 115/79 (2021 09:08) (105/73 - 122/81)  BP(mean): 82 (2021 05:30) (82 - 94)  RR: 17 (2021 09:08) (17 - 23)  SpO2: 95% (2021 09:08) (94% - 97%)    PHYSICAL EXAM:  Constitutional: NAD, awake and alert, well-developed  HEENT: PERR, EOMI,  No oral cyananosis.  Neck:  supple,  No JVD  Respiratory: Breath sounds are clear bilaterally, No wheezing, rales or rhonchi  Cardiovascular: S1 and S2, regular rate and rhythm, no Murmurs, gallops or rubs  Gastrointestinal: Bowel Sounds present, soft, nontender.   Extremities: No peripheral edema. No clubbing or cyanosis.  Vascular: 2+ peripheral pulses  Neurological: A/O x 3, no focal deficits  Musculoskeletal: no calf tenderness.  Skin: No rashes.      LABS: All Labs Reviewed:                        9.0    20.08 )-----------( 389      ( 2021 08:03 )             27.3                         9.1    21.38 )-----------( 422      ( 2021 00:23 )             27.4     2021 08:03    129    |  99     |  20     ----------------------------<  264    4.2     |  18     |  1.15   2021 00:23    128    |  98     |  22     ----------------------------<  280    4.7     |  21     |  1.17     Ca    8.7        2021 08:03  Ca    8.4        2021 00:23  Phos  3.0       2021 08:03  Mg     2.1       2021 08:03  Mg     1.9       2021 00:23    TPro  6.2    /  Alb  2.2    /  TBili  0.6    /  DBili  x      /  AST  46     /  ALT  135    /  AlkPhos  227    2021 08:03  TPro  6.4    /  Alb  2.2    /  TBili  0.6    /  DBili  x      /  AST  69     /  ALT  149    /  AlkPhos  246    2021 00:23    PT/INR - ( 2021 00:23 )   PT: 16.7 sec;   INR: 1.47 ratio         PTT - ( 2021 00:23 )  PTT:30.2 sec  CARDIAC MARKERS ( 2021 08:03 )  1.390 ng/mL / x     / x     / x     / x      CARDIAC MARKERS ( 2021 02:59 )  0.217 ng/mL / x     / x     / x     / x      CARDIAC MARKERS ( 2021 00:23 )  0.070 ng/mL / x     / 99 U/L / x     / x        Blood Culture:    @ 00:23  Pro Bnp [id number]    RADIOLOGY:  < from: CT Angio Chest PE Protocol w/ IV Cont (21 @ 01:45) >  IMPRESSION:  No evidence of pulmonary embolism followed to the segmental pulmonary arterial branches.  Moderate sized bilateral pleural effusions with overlying compressive atelectasis.  Moderate pulmonary vascular congestion.  Small volume perihepatic ascites.  < end of copied text >    EKG:  < from: 12 Lead ECG (21 @ 15:41) >  Diagnosis Line Normal sinus rhythm  Normal ECG  When compared with ECG of 2020 20:19,  Questionable change in QRS axis  < end of copied text >   PCP:    REQUESTING PHYSICIAN:    REASON FOR CONSULT:    CHIEF COMPLAINT:    HPI: 64 yo M with a PMH CAD, PAD, HTN, HLD, DM2 (on insulin) c/b neuropathy, and BPH who presents with SOB. Over the past day, he has had worsening SOB, particularly on exertion but also at rest and while lying down in bed. He also endorses a chest tightness with the dyspnea and has palpitations on exertion, but denies wheezing. He denies fevers but endorsed some chills today. He has had a chronic nonproductive cough for he past month.    He was recently admitted from 21-21 for a right foot cellulitis and osteomyelitis involving the right medial hallux sesamoid and 1st metatarsal. Wound cultures showed rare MSSA, bacteroides fragilis, and strep constellatus. He had a debridement by podiatry on . He had a PICC line placed and was started on Cefepime and Vancomycin for a planned course of 4-6 weeks. His course was also complicated by hematuria, for which he saw Urology who felt there was no inpatient workup required, but should have an outpatient CT urography/cystography. Since discharge, he has had some loose stools, about one BM per day, and not fully liquid. He denies abdominal pain, nausea, vomiting, current hematuria, or dysuria. His right foot is doing better. Sometimes he has purulent/bloody drainage, but very little. He has an appointment with Dr. Bullock on . Of note, he was also stopped off of his HCTZ 25 mg QD and his Losartan was decreased from 100 to 50 mg QD. His Amlodipine 10 mg QD was not restarted upon discharge as well. In the ED, he was given Furosemide 40 mg IV x1, Meropenem 1 g IV x1, and Vancomycin 750 mg IV x1. (2021 05:52)  -- last night started sob. ALFARO going up stairs. no chest pain. chest tightness across chest - couldnt get deep breath.        PAST MEDICAL & SURGICAL HISTORY:  Insulin dependent type 2 diabetes mellitus  Basal cell carcinoma Of face s/p excision Oct 2017  PAD (peripheral artery disease)  CAD (coronary atherosclerotic disease)  Hypercholesteremia  Hypertension  BPH (Benign Prostatic Hyperplasia)  Status post debridement right foot ulcer, with I&amp;D  History of amputation of toe L foot 2nd toe amputation    Allergies  No Known Allergies  Intolerances    SOCIAL HISTORY:    FAMILY HISTORY:  Family history of hyperlipidemia  Family history of hypertension  FH: myocardial infarction   Father had MI,  age 47    MEDICATIONS  (STANDING):  aspirin enteric coated 81 milliGRAM(s) Oral at bedtime  cefepime   IVPB 2000 milliGRAM(s) IV Intermittent every 12 hours  dextrose 40% Gel 15 Gram(s) Oral once  dextrose 5%. 1000 milliLiter(s) (100 mL/Hr) IV Continuous <Continuous>  dextrose 50% Injectable 25 Gram(s) IV Push once  dextrose 50% Injectable 12.5 Gram(s) IV Push once  dextrose 50% Injectable 25 Gram(s) IV Push once  furosemide   Injectable 40 milliGRAM(s) IV Push daily  glucagon  Injectable 1 milliGRAM(s) IntraMuscular once  heparin   Injectable 5000 Unit(s) SubCutaneous every 8 hours  insulin glargine Injectable (LANTUS) 42 Unit(s) SubCutaneous at bedtime  insulin lispro (ADMELOG) corrective regimen sliding scale   SubCutaneous three times a day before meals  insulin lispro (ADMELOG) corrective regimen sliding scale   SubCutaneous at bedtime  insulin lispro Injectable (ADMELOG) 5 Unit(s) SubCutaneous three times a day before meals  vancomycin  IVPB 750 milliGRAM(s) IV Intermittent every 12 hours    Vital Signs Last 24 Hrs  T(C): 36.4 (2021 09:08), Max: 37.2 (2021 23:57)  T(F): 97.6 (2021 09:08), Max: 98.9 (2021 23:57)  HR: 80 (2021 09:08) (77 - 98)  BP: 115/79 (2021 09:08) (105/73 - 122/81)  BP(mean): 82 (2021 05:30) (82 - 94)  RR: 17 (2021 09:08) (17 - 23)  SpO2: 95% (2021 09:08) (94% - 97%)    PHYSICAL EXAM:  Constitutional: NAD, awake and alert, well-developed  HEENT: PERR, EOMI,  No oral cyananosis.  Neck:  supple,  No JVD  Respiratory: Breath sounds are clear bilaterally, No wheezing, rales or rhonchi  Cardiovascular: S1 and S2, regular rate and rhythm, no Murmurs, gallops or rubs  Gastrointestinal: Bowel Sounds present, soft, nontender.   Extremities: No peripheral edema. No clubbing or cyanosis.  Vascular: 2+ peripheral pulses  Neurological: A/O x 3, no focal deficits  Musculoskeletal: no calf tenderness.  Skin: No rashes.      LABS: All Labs Reviewed:                        9.0    20.08 )-----------( 389      ( 2021 08:03 )             27.3                         9.1    21.38 )-----------( 422      ( 2021 00:23 )             27.4     2021 08:03    129    |  99     |  20     ----------------------------<  264    4.2     |  18     |  1.15   2021 00:23    128    |  98     |  22     ----------------------------<  280    4.7     |  21     |  1.17     Ca    8.7        2021 08:03  Ca    8.4        2021 00:23  Phos  3.0       2021 08:03  Mg     2.1       2021 08:03  Mg     1.9       2021 00:23    TPro  6.2    /  Alb  2.2    /  TBili  0.6    /  DBili  x      /  AST  46     /  ALT  135    /  AlkPhos  227    2021 08:03  TPro  6.4    /  Alb  2.2    /  TBili  0.6    /  DBili  x      /  AST  69     /  ALT  149    /  AlkPhos  246    2021 00:23    PT/INR - ( 2021 00:23 )   PT: 16.7 sec;   INR: 1.47 ratio         PTT - ( 2021 00:23 )  PTT:30.2 sec  CARDIAC MARKERS ( 2021 08:03 )  1.390 ng/mL / x     / x     / x     / x      CARDIAC MARKERS ( 2021 02:59 )  0.217 ng/mL / x     / x     / x     / x      CARDIAC MARKERS ( 2021 00:23 )  0.070 ng/mL / x     / 99 U/L / x     / x        Blood Culture:    @ 00:23  Pro Bnp [id number]    RADIOLOGY:  < from: CT Angio Chest PE Protocol w/ IV Cont (21 @ 01:45) >  IMPRESSION:  No evidence of pulmonary embolism followed to the segmental pulmonary arterial branches.  Moderate sized bilateral pleural effusions with overlying compressive atelectasis.  Moderate pulmonary vascular congestion.  Small volume perihepatic ascites.  < end of copied text >    EKG:  < from: 12 Lead ECG (21 @ 15:41) >  Diagnosis Line Normal sinus rhythm  Normal ECG  When compared with ECG of 2020 20:19,  Questionable change in QRS axis  < end of copied text >   PCP: Cheryl PCP    REQUESTING PHYSICIAN: Hospitalist     REASON FOR CONSULT: pulmonary edema, r/o CHF    CHIEF COMPLAINT:    HPI: 64 yo M with a PMH CAD, PAD, HTN, HLD, DM2 (on insulin) c/b neuropathy, and BPH who presents with SOB. Over the past day, he has had worsening SOB, particularly on exertion but also at rest and while lying down in bed. He also endorses a chest tightness with the dyspnea and has palpitations on exertion, but denies wheezing. He denies fevers but endorsed some chills today. He has had a chronic nonproductive cough for he past month.    He was recently admitted from 21-21 for a right foot cellulitis and osteomyelitis involving the right medial hallux sesamoid and 1st metatarsal. Wound cultures showed rare MSSA, bacteroides fragilis, and strep constellatus. He had a debridement by podiatry on . He had a PICC line placed and was started on Cefepime and Vancomycin for a planned course of 4-6 weeks. His course was also complicated by hematuria, for which he saw Urology who felt there was no inpatient workup required, but should have an outpatient CT urography/cystography. Since discharge, he has had some loose stools, about one BM per day, and not fully liquid. He denies abdominal pain, nausea, vomiting, current hematuria, or dysuria. His right foot is doing better. Sometimes he has purulent/bloody drainage, but very little. He has an appointment with Dr. Bullock on . Of note, he was also stopped off of his HCTZ 25 mg QD and his Losartan was decreased from 100 to 50 mg QD. His Amlodipine 10 mg QD was not restarted upon discharge as well. In the ED, he was given Furosemide 40 mg IV x1, Meropenem 1 g IV x1, and Vancomycin 750 mg IV x1. (2021 05:52)  -- 21: Cardiology consulted for pulmonary edema, r/o chf. Patient reported last night started sob. ALFARO going up stairs. no chest pain. chest tightness across chest - couldnt get deep breath. reports bp at home have been controlled sbps 115-120. Recently admitted to hospital 10 days ago for RT foot cellulitis / OM.      PAST MEDICAL & SURGICAL HISTORY:  Insulin dependent type 2 diabetes mellitus  Basal cell carcinoma Of face s/p excision Oct 2017  PAD (peripheral artery disease)  CAD (coronary atherosclerotic disease)  Hypercholesteremia  Hypertension  BPH (Benign Prostatic Hyperplasia)  Status post debridement right foot ulcer, with I&amp;D  History of amputation of toe L foot 2nd toe amputation    Allergies  No Known Allergies  Intolerances    SOCIAL HISTORY:  , lives with wife.  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  Denies alcohol use.  Used to smoke in his 20s, has not since then .  Denies recreational drug use.    FAMILY HISTORY:  Family history of hyperlipidemia  Family history of hypertension  FH: myocardial infarction   Father had MI,  age 47    MEDICATIONS  (STANDING):  aspirin enteric coated 81 milliGRAM(s) Oral at bedtime  cefepime   IVPB 2000 milliGRAM(s) IV Intermittent every 12 hours  dextrose 40% Gel 15 Gram(s) Oral once  dextrose 5%. 1000 milliLiter(s) (100 mL/Hr) IV Continuous <Continuous>  dextrose 50% Injectable 25 Gram(s) IV Push once  dextrose 50% Injectable 12.5 Gram(s) IV Push once  dextrose 50% Injectable 25 Gram(s) IV Push once  furosemide   Injectable 40 milliGRAM(s) IV Push daily  glucagon  Injectable 1 milliGRAM(s) IntraMuscular once  heparin   Injectable 5000 Unit(s) SubCutaneous every 8 hours  insulin glargine Injectable (LANTUS) 42 Unit(s) SubCutaneous at bedtime  insulin lispro (ADMELOG) corrective regimen sliding scale   SubCutaneous three times a day before meals  insulin lispro (ADMELOG) corrective regimen sliding scale   SubCutaneous at bedtime  insulin lispro Injectable (ADMELOG) 5 Unit(s) SubCutaneous three times a day before meals  vancomycin  IVPB 750 milliGRAM(s) IV Intermittent every 12 hours    Vital Signs Last 24 Hrs  T(C): 36.4 (2021 09:08), Max: 37.2 (2021 23:57)  T(F): 97.6 (2021 09:08), Max: 98.9 (2021 23:57)  HR: 80 (2021 09:08) (77 - 98)  BP: 115/79 (2021 09:08) (105/73 - 122/81)  BP(mean): 82 (2021 05:30) (82 - 94)  RR: 17 (2021 09:08) (17 - 23)  SpO2: 95% (2021 09:08) (94% - 97%) -- 2L NC    PHYSICAL EXAM:  Constitutional: Awake and alert, well-developed  HEENT: PERR, MMM  Neck:  supple,  No JVD  Respiratory: Breath sounds are diminished bilaterally, No wheezing   Cardiovascular: S1 and S2, regular rate and rhythm, no Murmurs,    Gastrointestinal: Bowel Sounds present, soft, nontender.   Extremities: mod. b/l peripheral edema.    Vascular: 2+ peripheral pulses  Neurological: A/O x 3, no focal deficits  Musculoskeletal: no calf tenderness.  Skin:  RT foot ulcer  Lines: PICC line in place.      LABS: All Labs Reviewed:                        9.0    20.08 )-----------( 389      ( 2021 08:03 )             27.3                         9.1    21.38 )-----------( 422      ( 2021 00:23 )             27.4     2021 08:03    129    |  99     |  20     ----------------------------<  264    4.2     |  18     |  1.15   2021 00:23    128    |  98     |  22     ----------------------------<  280    4.7     |  21     |  1.17     Ca    8.7        2021 08:03  Ca    8.4        2021 00:23  Phos  3.0       2021 08:03  Mg     2.1       2021 08:03  Mg     1.9       2021 00:23    TPro  6.2    /  Alb  2.2    /  TBili  0.6    /  DBili  x      /  AST  46     /  ALT  135    /  AlkPhos  227    2021 08:03  TPro  6.4    /  Alb  2.2    /  TBili  0.6    /  DBili  x      /  AST  69     /  ALT  149    /  AlkPhos  246    2021 00:23    PT/INR - ( 2021 00:23 )   PT: 16.7 sec;   INR: 1.47 ratio         PTT - ( 2021 00:23 )  PTT:30.2 sec  CARDIAC MARKERS ( 2021 08:03 )  1.390 ng/mL / x     / x     / x     / x      CARDIAC MARKERS ( 2021 02:59 )  0.217 ng/mL / x     / x     / x     / x      CARDIAC MARKERS ( 2021 00:23 )  0.070 ng/mL / x     / 99 U/L / x     / x        Blood Culture:    @ 00:23  Pro Bnp [id number]    RADIOLOGY:  < from: CT Angio Chest PE Protocol w/ IV Cont (21 @ 01:45) >  IMPRESSION:  No evidence of pulmonary embolism followed to the segmental pulmonary arterial branches.  Moderate sized bilateral pleural effusions with overlying compressive atelectasis.  Moderate pulmonary vascular congestion.  Small volume perihepatic ascites.  < end of copied text >    EKG:  < from: 12 Lead ECG (21 @ 15:41) >  Diagnosis Line Normal sinus rhythm  Normal ECG  When compared with ECG of 2020 20:19,  Questionable change in QRS axis  < end of copied text >   PCP: Cheryl PCP    REQUESTING PHYSICIAN: Hospitalist     REASON FOR CONSULT: pulmonary edema, r/o CHF    CHIEF COMPLAINT: sob, alfaro    HPI: 64 yo M with a PMH CAD, PAD, HTN, HLD, DM2 (on insulin) c/b neuropathy, and BPH who presents with SOB. Over the past day, he has had worsening SOB, particularly on exertion but also at rest and while lying down in bed. He also endorses a chest tightness with the dyspnea and has palpitations on exertion, but denies wheezing. He denies fevers but endorsed some chills today. He has had a chronic nonproductive cough for he past month.    He was recently admitted from 21-21 for a right foot cellulitis and osteomyelitis involving the right medial hallux sesamoid and 1st metatarsal. Wound cultures showed rare MSSA, bacteroides fragilis, and strep constellatus. He had a debridement by podiatry on . He had a PICC line placed and was started on Cefepime and Vancomycin for a planned course of 4-6 weeks. His course was also complicated by hematuria, for which he saw Urology who felt there was no inpatient workup required, but should have an outpatient CT urography/cystography. Since discharge, he has had some loose stools, about one BM per day, and not fully liquid. He denies abdominal pain, nausea, vomiting, current hematuria, or dysuria. His right foot is doing better. Sometimes he has purulent/bloody drainage, but very little. He has an appointment with Dr. Bullock on . Of note, he was also stopped off of his HCTZ 25 mg QD and his Losartan was decreased from 100 to 50 mg QD. His Amlodipine 10 mg QD was not restarted upon discharge as well. In the ED, he was given Furosemide 40 mg IV x1, Meropenem 1 g IV x1, and Vancomycin 750 mg IV x1. (2021 05:52)  -- 21: Cardiology consulted for pulmonary edema, r/o chf. Patient reported last night started sob. ALFARO going up stairs. no chest pain. chest tightness across chest - couldnt get deep breath. reports bp at home have been controlled sbps 115-120. Recently admitted to hospital 10 days ago for RT foot cellulitis / OM.      PAST MEDICAL & SURGICAL HISTORY:  Insulin dependent type 2 diabetes mellitus  Basal cell carcinoma Of face s/p excision Oct 2017  PAD (peripheral artery disease)  CAD (coronary atherosclerotic disease)  Hypercholesteremia  Hypertension  BPH (Benign Prostatic Hyperplasia)  Status post debridement right foot ulcer, with I&amp;D  History of amputation of toe L foot 2nd toe amputation    Allergies  No Known Allergies  Intolerances    SOCIAL HISTORY:  , lives with wife.  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  Denies alcohol use.  Used to smoke in his 20s, has not since then .  Denies recreational drug use.    FAMILY HISTORY:  Family history of hyperlipidemia  Family history of hypertension  FH: myocardial infarction   Father had MI,  age 47    MEDICATIONS  (STANDING):  aspirin enteric coated 81 milliGRAM(s) Oral at bedtime  cefepime   IVPB 2000 milliGRAM(s) IV Intermittent every 12 hours  dextrose 40% Gel 15 Gram(s) Oral once  dextrose 5%. 1000 milliLiter(s) (100 mL/Hr) IV Continuous <Continuous>  dextrose 50% Injectable 25 Gram(s) IV Push once  dextrose 50% Injectable 12.5 Gram(s) IV Push once  dextrose 50% Injectable 25 Gram(s) IV Push once  furosemide   Injectable 40 milliGRAM(s) IV Push daily  glucagon  Injectable 1 milliGRAM(s) IntraMuscular once  heparin   Injectable 5000 Unit(s) SubCutaneous every 8 hours  insulin glargine Injectable (LANTUS) 42 Unit(s) SubCutaneous at bedtime  insulin lispro (ADMELOG) corrective regimen sliding scale   SubCutaneous three times a day before meals  insulin lispro (ADMELOG) corrective regimen sliding scale   SubCutaneous at bedtime  insulin lispro Injectable (ADMELOG) 5 Unit(s) SubCutaneous three times a day before meals  vancomycin  IVPB 750 milliGRAM(s) IV Intermittent every 12 hours    Vital Signs Last 24 Hrs  T(C): 36.4 (2021 09:08), Max: 37.2 (2021 23:57)  T(F): 97.6 (2021 09:08), Max: 98.9 (2021 23:57)  HR: 80 (2021 09:08) (77 - 98)  BP: 115/79 (2021 09:08) (105/73 - 122/81)  BP(mean): 82 (2021 05:30) (82 - 94)  RR: 17 (2021 09:08) (17 - 23)  SpO2: 95% (2021 09:08) (94% - 97%) -- 2L NC    PHYSICAL EXAM:  Constitutional: Awake and alert, well-developed  HEENT: PERR, MMM  Neck:  supple,  No JVD  Respiratory: Breath sounds are diminished bilaterally, No wheezing   Cardiovascular: S1 and S2, regular rate and rhythm, no Murmurs,    Gastrointestinal: Bowel Sounds present, soft, nontender.   Extremities: mod. b/l peripheral edema.    Vascular: 2+ peripheral pulses  Neurological: A/O x 3, no focal deficits  Musculoskeletal: no calf tenderness.  Skin:  RT foot ulcer  Lines: PICC line in place.      LABS: All Labs Reviewed:                        9.0    20.08 )-----------( 389      ( 2021 08:03 )             27.3                         9.1    21.38 )-----------( 422      ( 2021 00:23 )             27.4     2021 08:03    129    |  99     |  20     ----------------------------<  264    4.2     |  18     |  1.15   2021 00:23    128    |  98     |  22     ----------------------------<  280    4.7     |  21     |  1.17     Ca    8.7        2021 08:03  Ca    8.4        2021 00:23  Phos  3.0       2021 08:03  Mg     2.1       2021 08:03  Mg     1.9       2021 00:23    TPro  6.2    /  Alb  2.2    /  TBili  0.6    /  DBili  x      /  AST  46     /  ALT  135    /  AlkPhos  227    2021 08:03  TPro  6.4    /  Alb  2.2    /  TBili  0.6    /  DBili  x      /  AST  69     /  ALT  149    /  AlkPhos  246    2021 00:23    PT/INR - ( 2021 00:23 )   PT: 16.7 sec;   INR: 1.47 ratio         PTT - ( 2021 00:23 )  PTT:30.2 sec  CARDIAC MARKERS ( 2021 08:03 )  1.390 ng/mL / x     / x     / x     / x      CARDIAC MARKERS ( 2021 02:59 )  0.217 ng/mL / x     / x     / x     / x      CARDIAC MARKERS ( 2021 00:23 )  0.070 ng/mL / x     / 99 U/L / x     / x        Blood Culture:    @ 00:23  Pro Bnp [id number]    RADIOLOGY:  < from: CT Angio Chest PE Protocol w/ IV Cont (21 @ 01:45) >  IMPRESSION:  No evidence of pulmonary embolism followed to the segmental pulmonary arterial branches.  Moderate sized bilateral pleural effusions with overlying compressive atelectasis.  Moderate pulmonary vascular congestion.  Small volume perihepatic ascites.  < end of copied text >    EKG:  < from: 12 Lead ECG (21 @ 15:41) >  Diagnosis Line Normal sinus rhythm  Normal ECG  When compared with ECG of 2020 20:19,  Questionable change in QRS axis  < end of copied text >   PCP: Cheryl PCP    REQUESTING PHYSICIAN: Hospitalist     REASON FOR CONSULT: pulmonary edema, r/o CHF    CHIEF COMPLAINT: sob, rodriguez    HPI: 64 yo M with a PMH CAD, PAD, HTN, HLD, DM2 (on insulin) c/b neuropathy, and BPH who presents with SOB. Over the past day, he has had worsening SOB with tightness in the chest , particularly on exertion but also at rest and while lying down in bed. He denies fevers but endorsed some chills today. He has had a chronic nonproductive cough for he past month.    He was recently admitted from 21-21 for a right foot cellulitis   he was also stopped off of his HCTZ 25 mg QD and his Losartan was decreased from 100 to 50 mg QD. His Amlodipine 10 mg QD was not restarted upon discharge as well. In the ED, he was given Furosemide 40 mg IV x1, Meropenem 1 g IV x1, and Vancomycin 750 mg IV x1.    Patient blood work showed elevated troponin , warranted cardiology consulted , patient  blood pressure was controlled  his CT angio showed bilateral pleural effusion without evidence of PE ,         PAST MEDICAL & SURGICAL HISTORY:  Insulin dependent type 2 diabetes mellitus  Basal cell carcinoma Of face s/p excision Oct 2017  PAD (peripheral artery disease)  CAD (coronary atherosclerotic disease)  Hypercholesteremia  Hypertension  BPH (Benign Prostatic Hyperplasia)  Status post debridement right foot ulcer, with I&amp;D  History of amputation of toe L foot 2nd toe amputation    Allergies  No Known Allergies  Intolerances    SOCIAL HISTORY:  , lives with wife.  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  Denies alcohol use.  Used to smoke in his 20s, has not since then .  Denies recreational drug use.    FAMILY HISTORY:  Family history of hyperlipidemia  Family history of hypertension  FH: myocardial infarction   Father had MI,  age 47    MEDICATIONS  (STANDING):  aspirin enteric coated 81 milliGRAM(s) Oral at bedtime  cefepime   IVPB 2000 milliGRAM(s) IV Intermittent every 12 hours  dextrose 40% Gel 15 Gram(s) Oral once  dextrose 5%. 1000 milliLiter(s) (100 mL/Hr) IV Continuous <Continuous>  dextrose 50% Injectable 25 Gram(s) IV Push once  dextrose 50% Injectable 12.5 Gram(s) IV Push once  dextrose 50% Injectable 25 Gram(s) IV Push once  furosemide   Injectable 40 milliGRAM(s) IV Push daily  glucagon  Injectable 1 milliGRAM(s) IntraMuscular once  heparin   Injectable 5000 Unit(s) SubCutaneous every 8 hours  insulin glargine Injectable (LANTUS) 42 Unit(s) SubCutaneous at bedtime  insulin lispro (ADMELOG) corrective regimen sliding scale   SubCutaneous three times a day before meals  insulin lispro (ADMELOG) corrective regimen sliding scale   SubCutaneous at bedtime  insulin lispro Injectable (ADMELOG) 5 Unit(s) SubCutaneous three times a day before meals  vancomycin  IVPB 750 milliGRAM(s) IV Intermittent every 12 hours    Vital Signs Last 24 Hrs  T(C): 36.4 (2021 09:08), Max: 37.2 (2021 23:57)  T(F): 97.6 (2021 09:08), Max: 98.9 (2021 23:57)  HR: 80 (2021 09:08) (77 - 98)  BP: 115/79 (2021 09:08) (105/73 - 122/81)  BP(mean): 82 (2021 05:30) (82 - 94)  RR: 17 (2021 09:08) (17 - 23)  SpO2: 95% (2021 09:08) (94% - 97%) -- 2L NC    PHYSICAL EXAM:  Constitutional: Awake and alert, well-developed  HEENT: PERR, MMM  Neck:  supple,  No JVD  Respiratory: Breath sounds are diminished bilaterally, No wheezing   Cardiovascular: S1 and S2, regular rate and rhythm, no Murmurs,    Gastrointestinal: Bowel Sounds present, soft, nontender.   Extremities: mod. b/l peripheral edema.    Vascular: 2+ peripheral pulses  Neurological: A/O x 3, no focal deficits  Musculoskeletal: no calf tenderness.  Skin:  RT foot ulcer  Lines: PICC line in place.      LABS: All Labs Reviewed:                        9.0    20.08 )-----------( 389      ( 2021 08:03 )             27.3                         9.1    21.38 )-----------( 422      ( 2021 00:23 )             27.4     2021 08:03    129    |  99     |  20     ----------------------------<  264    4.2     |  18     |  1.15   2021 00:23    128    |  98     |  22     ----------------------------<  280    4.7     |  21     |  1.17     Ca    8.7        2021 08:03  Ca    8.4        2021 00:23  Phos  3.0       2021 08:03  Mg     2.1       2021 08:03  Mg     1.9       2021 00:23    TPro  6.2    /  Alb  2.2    /  TBili  0.6    /  DBili  x      /  AST  46     /  ALT  135    /  AlkPhos  227    2021 08:03  TPro  6.4    /  Alb  2.2    /  TBili  0.6    /  DBili  x      /  AST  69     /  ALT  149    /  AlkPhos  246    2021 00:23    PT/INR - ( 2021 00:23 )   PT: 16.7 sec;   INR: 1.47 ratio         PTT - ( 2021 00:23 )  PTT:30.2 sec  CARDIAC MARKERS ( 2021 08:03 )  1.390 ng/mL / x     / x     / x     / x      CARDIAC MARKERS ( 2021 02:59 )  0.217 ng/mL / x     / x     / x     / x      CARDIAC MARKERS ( 2021 00:23 )  0.070 ng/mL / x     / 99 U/L / x     / x        Blood Culture:    @ 00:23  Pro Bnp 5141    RADIOLOGY:  < from: CT Angio Chest PE Protocol w/ IV Cont (21 @ 01:45) >  IMPRESSION:  No evidence of pulmonary embolism followed to the segmental pulmonary arterial branches.  Moderate sized bilateral pleural effusions with overlying compressive atelectasis.  Moderate pulmonary vascular congestion.  Small volume perihepatic ascites.  < end of copied text >    21  NSR low qrs voltage  Poor R wave progression

## 2021-01-31 NOTE — PROGRESS NOTE ADULT - ASSESSMENT
ED PODIATRIC H & PE: Patient is a 65y old  Male who presents with a chief complaint of pulmonary edema (31 Jan 2021 05:52)pt's wife called me last pm 23:00 told me he had SOB no fever but "cold"      HPI:  64 yo M with a PMH CAD, PAD, HTN, HLD, DM2 (on insulin) c/b neuropathy, and BPH who presents with SOB. Over the past day, he has had worsening SOB, particularly on exertion but also at rest and while lying down in bed. He also endorses a chest tightness with the dyspnea and has palpitations on exertion, but denies wheezing. He denies fevers but endorsed some chills today. He has had a chronic nonproductive cough for he past month.    He was recently admitted from 1/21/21-1/27/21 for a right foot cellulitis and osteomyelitis involving the right medial hallux sesamoid and 1st metatarsal. Wound cultures showed rare MSSA, bacteroides fragilis, and strep constellatus. He had a debridement by podiatry on 1/25. He had a PICC line placed and was started on Cefepime and Vancomycin for a planned course of 4-6 weeks. His course was also complicated by hematuria, for which he saw Urology who felt there was no inpatient workup required, but should have an outpatient CT urography/cystography. Since discharge, he has had some loose stools, about one BM per day, and not fully liquid. He denies abdominal pain, nausea, vomiting, current hematuria, or dysuria.  His right foot is doing better. Sometimes he has purulent/bloody drainage, but very little. He has an appointment with Dr. Bullock on Monday 2/1.    Of note, he was also stopped off of his HCTZ 25 mg QD and his Losartan was decreased from 100 to 50 mg QD. His Amlodipine 10 mg QD was not restarted upon discharge as well.    In the ED, he was given Furosemide 40 mg IV x1, Meropenem 1 g IV x1, and Vancomycin 750 mg IV x1. (31 Jan 2021 05:52)      Allergies    No Known Allergies    Intolerances        MEDICATIONS  (STANDING):  aspirin enteric coated 81 milliGRAM(s) Oral at bedtime  cefepime   IVPB 2000 milliGRAM(s) IV Intermittent every 12 hours  dextrose 40% Gel 15 Gram(s) Oral once  dextrose 5%. 1000 milliLiter(s) (100 mL/Hr) IV Continuous <Continuous>  dextrose 50% Injectable 25 Gram(s) IV Push once  dextrose 50% Injectable 12.5 Gram(s) IV Push once  dextrose 50% Injectable 25 Gram(s) IV Push once  furosemide   Injectable 40 milliGRAM(s) IV Push daily  glucagon  Injectable 1 milliGRAM(s) IntraMuscular once  heparin   Injectable 5000 Unit(s) SubCutaneous every 8 hours  insulin glargine Injectable (LANTUS) 42 Unit(s) SubCutaneous at bedtime  insulin lispro (ADMELOG) corrective regimen sliding scale   SubCutaneous three times a day before meals  insulin lispro (ADMELOG) corrective regimen sliding scale   SubCutaneous at bedtime  insulin lispro Injectable (ADMELOG) 5 Unit(s) SubCutaneous three times a day before meals  vancomycin  IVPB 750 milliGRAM(s) IV Intermittent every 12 hours    MEDICATIONS  (PRN):      PHYSICAL EXAM: Alert aferbrile in ED  Right foot with palpable PT pulse. Wound to medial 1st metatarsal head has drainage & edema, first interspace wound with grey to necrotic lateral wound extending to the base of the second toe. Low grade erythema to dorsum.      Constitutional:    Eyes:clear moist    ENMT: no tinnitus    Neck:No DJV    Breasts:defer    Back:No LBP    Respiratory: No cough    Cardiovascular:no SOB    Gastrointestinal:loose stools    Genitourinary: hematuria    Rectal:loose stools    Extremities:weakness    Vascular:Microangiopathy    Neurological: PSN    Skin: Atrophic DFU    Lymph Nodes:No    Musculoskeletal:weakness    Psychiatric:none        RADIOLOGY    CBC Full  -  ( 31 Jan 2021 08:03 )  WBC Count : 20.08 K/uL  RBC Count : 3.19 M/uL  Hemoglobin : 9.0 g/dL  Hematocrit : 27.3 %  Platelet Count - Automated : 389 K/uL  Mean Cell Volume : 85.6 fl  Mean Cell Hemoglobin : 28.2 pg  Mean Cell Hemoglobin Concentration : 33.0 gm/dL  Auto Neutrophil # : 17.37 K/uL  Auto Lymphocyte # : 1.15 K/uL  Auto Monocyte # : 1.16 K/uL  Auto Eosinophil # : 0.02 K/uL  Auto Basophil # : 0.05 K/uL  Auto Neutrophil % : 86.6 %  Auto Lymphocyte % : 5.7 %  Auto Monocyte % : 5.8 %  Auto Eosinophil % : 0.1 %  Auto Basophil % : 0.2 %      01-31    129<L>  |  99  |  20  ----------------------------<  264<H>  4.2   |  18<L>  |  1.15    Ca    8.7      31 Jan 2021 08:03  Phos  3.0     01-31  Mg     2.1     01-31    TPro  6.2  /  Alb  2.2<L>  /  TBili  0.6  /  DBili  x   /  AST  46<H>  /  ALT  135<H>  /  AlkPhos  227<H>  01-31      Culture - Surgical Swab (01.25.21 @ 15:15)   Specimen Source: .Surgical Swab None   Culture Results:   Few Streptococcus constellatus "Susceptibilities not performed"   Moderate Bacteroides fragilis "Susceptibilities not performed" Culture - Tissue with Gram Stain (01.25.21 @ 15:15)   Gram Stain:   No polymorphonuclear cells seen per low power field   No organisms seen per oil power field   - Ampicillin/Sulbactam: S <=8/4   - Cefazolin: S <=4   - Clindamycin: R <=0.25 This isolate is presumed to be clindamycin resistant based on detection of inducible resistance. Clindamycin may still be effective in some patients.   - Erythromycin: R >4   - Gentamicin: S <=1 Should not be used as monotherapy   - Oxacillin: S <=0.25   - Penicillin: R 2   - RIF- Rifampin: S <=1 Should not be used as monotherapy   - Tetra/Doxy: S <=1   - Trimethoprim/Sulfamethoxazole: S <=0.5/9.5   - Vancomycin: S 2   Specimen Source: .Tissue #4- right medial aspect first metatarsal base of great toe   Culture Results:   Rare Staphylococcus aureus   Rare Bacteroides fragilis "Susceptibilities not performed"   Rare Streptococcus constellatus "Susceptibilities not performed"   Organism Identification: Staphylococcus aureus   Organism: Staphylococcus aureus   Method Type: ARTURO       Historical Values  Culture - Tissue with Gram Stain (01.25.21 @ 15:15)   Gram Stain:   No polymorphonuclear cells seen per low power field   Culture - Blood (01.21.21 @ 16:38)   Specimen Source: .Blood Blood-Peripheral   Culture Results:   No Growth Final

## 2021-01-31 NOTE — H&P ADULT - NSICDXFAMILYHX_GEN_ALL_CORE_FT
FAMILY HISTORY:  Family history of hyperlipidemia  Family history of hypertension  FH: myocardial infarction, Father had MI,  age 47

## 2021-01-31 NOTE — PROGRESS NOTE ADULT - SUBJECTIVE AND OBJECTIVE BOX
Patient is a 65y old  Male who presents with a chief complaint of pulmonary edema (31 Jan 2021 05:52)      HPI:  64 yo M with a PMH CAD, PAD, HTN, HLD, DM2 (on insulin) c/b neuropathy, and BPH who presents with SOB. Over the past day, he has had worsening SOB, particularly on exertion but also at rest and while lying down in bed. He also endorses a chest tightness with the dyspnea and has palpitations on exertion, but denies wheezing. He denies fevers but endorsed some chills today. He has had a chronic nonproductive cough for he past month.    He was recently admitted from 1/21/21-1/27/21 for a right foot cellulitis and osteomyelitis involving the right medial hallux sesamoid and 1st metatarsal. Wound cultures showed rare MSSA, bacteroides fragilis, and strep constellatus. He had a debridement by podiatry on 1/25. He had a PICC line placed and was started on Cefepime and Vancomycin for a planned course of 4-6 weeks. His course was also complicated by hematuria, for which he saw Urology who felt there was no inpatient workup required, but should have an outpatient CT urography/cystography. Since discharge, he has had some loose stools, about one BM per day, and not fully liquid. He denies abdominal pain, nausea, vomiting, current hematuria, or dysuria.  His right foot is doing better. Sometimes he has purulent/bloody drainage, but very little. He has an appointment with Dr. Bullock on Monday 2/1.    Of note, he was also stopped off of his HCTZ 25 mg QD and his Losartan was decreased from 100 to 50 mg QD. His Amlodipine 10 mg QD was not restarted upon discharge as well.    In the ED, he was given Furosemide 40 mg IV x1, Meropenem 1 g IV x1, and Vancomycin 750 mg IV x1. (31 Jan 2021 05:52)      Allergies    No Known Allergies    Intolerances        MEDICATIONS  (STANDING):  aspirin enteric coated 81 milliGRAM(s) Oral at bedtime  cefepime   IVPB 2000 milliGRAM(s) IV Intermittent every 12 hours  dextrose 40% Gel 15 Gram(s) Oral once  dextrose 5%. 1000 milliLiter(s) (100 mL/Hr) IV Continuous <Continuous>  dextrose 50% Injectable 25 Gram(s) IV Push once  dextrose 50% Injectable 12.5 Gram(s) IV Push once  dextrose 50% Injectable 25 Gram(s) IV Push once  furosemide   Injectable 40 milliGRAM(s) IV Push daily  glucagon  Injectable 1 milliGRAM(s) IntraMuscular once  heparin   Injectable 5000 Unit(s) SubCutaneous every 8 hours  insulin glargine Injectable (LANTUS) 42 Unit(s) SubCutaneous at bedtime  insulin lispro (ADMELOG) corrective regimen sliding scale   SubCutaneous three times a day before meals  insulin lispro (ADMELOG) corrective regimen sliding scale   SubCutaneous at bedtime  insulin lispro Injectable (ADMELOG) 5 Unit(s) SubCutaneous three times a day before meals  vancomycin  IVPB 750 milliGRAM(s) IV Intermittent every 12 hours    MEDICATIONS  (PRN):      PHYSICAL EXAM:      Constitutional:    Eyes:    ENMT:    Neck:    Breasts:    Back:    Respiratory:    Cardiovascular:    Gastrointestinal:    Genitourinary:    Rectal:    Extremities:    Vascular:    Neurological:    Skin:    Lymph Nodes:    Musculoskeletal:    Psychiatric:        RADIOLOGY    CBC Full  -  ( 31 Jan 2021 08:03 )  WBC Count : 20.08 K/uL  RBC Count : 3.19 M/uL  Hemoglobin : 9.0 g/dL  Hematocrit : 27.3 %  Platelet Count - Automated : 389 K/uL  Mean Cell Volume : 85.6 fl  Mean Cell Hemoglobin : 28.2 pg  Mean Cell Hemoglobin Concentration : 33.0 gm/dL  Auto Neutrophil # : 17.37 K/uL  Auto Lymphocyte # : 1.15 K/uL  Auto Monocyte # : 1.16 K/uL  Auto Eosinophil # : 0.02 K/uL  Auto Basophil # : 0.05 K/uL  Auto Neutrophil % : 86.6 %  Auto Lymphocyte % : 5.7 %  Auto Monocyte % : 5.8 %  Auto Eosinophil % : 0.1 %  Auto Basophil % : 0.2 %      01-31    129<L>  |  99  |  20  ----------------------------<  264<H>  4.2   |  18<L>  |  1.15    Ca    8.7      31 Jan 2021 08:03  Phos  3.0     01-31  Mg     2.1     01-31    TPro  6.2  /  Alb  2.2<L>  /  TBili  0.6  /  DBili  x   /  AST  46<H>  /  ALT  135<H>  /  AlkPhos  227<H>  01-31      < from: Xray Foot AP + Lateral, Right (01.25.21 @ 17:15) >    EXAM:  XR FOOT 2 VIEWS RT                            PROCEDURE DATE:  01/25/2021          INTERPRETATION:  RIGHT foot    CLINICAL INFORMATION:Postoperative fixation of RIGHT sesamoid bones..    TECHNIQUE: AP,lateral and oblique views.    FINDINGS:    Status post excision of sesamoid bones. Postsurgical soft tissue swelling of the first hallux NOTED.  Remaining osseous and joint structures intact  IMPRESSION:    Postop resection of sesamoid bones.            EDNA RIVERA MD; Attending Radiologist  This document has been electronically signed. Jan 25 2021  5:19PM    < end of copied text >  < from: Xray Chest 1 View-PORTABLE IMMEDIATE (Xray Chest 1 View-PORTABLE IMMEDIATE .) (01.31.21 @ 01:53) >    EXAM:  XR CHEST PORTABLE IMMED 1V                            PROCEDURE DATE:  01/31/2021          INTERPRETATION:  AP chest.    Clinical indications: Shortness of breath.    IMPRESSION: The cardiac silhouette is enlarged. There is mild CHF with bilateral patchy airspace opacities third small bilateral pleural effusions are present. The bones are intact.            ANGELINA VILLATORO MD; Attending Radiologist  This document has been electronically signed. Jan 31 2021 10:20AM    < end of copied text >

## 2021-01-31 NOTE — ED PROVIDER NOTE - NSTIMEPROVIDERCAREINITIATE_GEN_ER
[facility]    Hospitalist Progress Note    Date of Service (when I saw the patient): 11/11/2017    Assessment & Plan   Suma Calabrese is a 62 year old female who was admitted on 11/7/2017. A bowel obstruction and diverticulitis and possible intra abdominal abscesses    Summary: Bowel obstruction, imrooving  Diverticulitis, being treated  Hypothyroidism, receiving IV L4  Hypertension, not on her diuretic but blood pressure is controlled  Hyperlipidemia, not eating, not a problem now      What I did today:  -reviewed the chart and did a history and exam    -    -      DVT Prophylaxis: Pneumatic Compression Devices  Code Status: Full Code    Disposition: Expected discharge in 1-2 days once bowel obstruction is resolved.    José Antonio Osuna MD    Interval History   Ms. Calabrese is feeling better.  She still feels bloated, but no nausea or vomiting and is tolerating the clear liquid diet. Popsicles, and juices are ok, no jello and did not like the broth.  Voiding ok, no BM yet, but   Passing flatus. Voiding ok.  Breathing ok, no headache, light headedness or dizzziness, no chest pain or dyspnea. Mouth still feels moist.      -Data reviewed today: I reviewed all new labs and imaging results over the last 24 hours. I personally reviewed no images or EKG's today.    Physical Exam   Temp: 98.9  F (37.2  C) Temp src: Oral BP: 116/73 Pulse: 73 Heart Rate: 74 Resp: 18 SpO2: 94 % O2 Device: None (Room air)    Vitals:    11/07/17 1308   Weight: 55.8 kg (123 lb)     Vital Signs with Ranges  Temp:  [98.5  F (36.9  C)-98.9  F (37.2  C)] 98.9  F (37.2  C)  Pulse:  [64-73] 73  Heart Rate:  [74] 74  Resp:  [16-18] 18  BP: (116-129)/(70-80) 116/73  SpO2:  [94 %-97 %] 94 %  I/O last 3 completed shifts:  In: 2539 [P.O.:550; I.V.:1989]  Out: 500 [Urine:500]    Constitutional: Alert, cooperative  Respiratory: Lungs are clear to A&P  Cardiovascular: regular rhythm, normal S1 and S2, no S3 or murmurs, no edema. Calves not  tender  GI: good bowel sounds, still distended, some diffuse tenderness  Skin/Integumen: one SK on her left back, lateral  Other:  labs are good as of 11/9/2017 and the creatinine is normal this AM    Medications     NaCl 75 mL/hr at 11/11/17 0935       ertapenem (INVanz) IV  1 g Intravenous Q24H     levothyroxine  37.5 mcg Intravenous Daily     metroNIDAZOLE  500 mg Intravenous Q6H       Data     Recent Labs  Lab 11/11/17  0744 11/09/17  0930 11/08/17  0500 11/07/17  1340   WBC  --  4.2 5.6 5.8   HGB  --  12.2 12.1 12.8   MCV  --  95 92 91   PLT  --  281 260 286   NA  --  137 134 129*   POTASSIUM  --  3.7 3.4 3.2*   CHLORIDE  --  106 103 96   CO2  --  21 24 25   BUN  --  12 6* 5*   CR 0.68 0.77 0.64 0.72   ANIONGAP  --  10 7 8   CODY  --  8.6 8.3* 8.8   GLC  --  73 109* 115*   ALBUMIN  --  2.5*  --  2.7*   PROTTOTAL  --  5.9*  --  6.2*   BILITOTAL  --  0.3  --  0.4   ALKPHOS  --  70  --  88   ALT  --  12  --  13   AST  --  14  --  14   LIPASE  --   --   --  59*       Imaging:  No results found for this or any previous visit (from the past 24 hour(s)).     31-Jan-2021 00:01

## 2021-01-31 NOTE — ED PROVIDER NOTE - PMH
Basal cell carcinoma  Of face   s/p excision Oct 2017  BPH (Benign Prostatic Hyperplasia)    CAD (coronary atherosclerotic disease)    Diabetes mellitus    Hypercholesteremia    Hypertension    PAD (peripheral artery disease)

## 2021-01-31 NOTE — CONSULT NOTE ADULT - PROBLEM SELECTOR RECOMMENDATION 9
Pro Bnp 5141. CT w/ moderate pulm congestion and b/l effusions. strict I&Os, add FR 1500ml. Cont. IV lasix Pro Bnp 5141. CT w/ moderate pulm congestion and b/l effusions. strict I&Os, add FR 1500ml. Cont. IV Lasix. Pro Bnp 5141. CT w/ moderate pulm congestion and b/l effusions, no PE. strict I&Os, add FR 1500ml. Cont. IV Lasix. diastolic heart failure  Pro Bnp 5141. CT w/ moderate pulm congestion and b/l effusions, no PE. strict I&Os,Cont. IV Lasix. follow up CXR , echo

## 2021-01-31 NOTE — CONSULT NOTE ADULT - PROBLEM SELECTOR RECOMMENDATION 2
- cont. asa, statin elevated troponin  chest tightness  ? ACS   add lopressor ,, plavix , continue ecotrin  will need ischemic evaluation elevated troponin  chest tightness  ? ACS  continue coreg  ,, plavix , continue ecotrin  will need ischemic evaluation

## 2021-01-31 NOTE — H&P ADULT - HISTORY OF PRESENT ILLNESS
64 yo M with a PMH CAD, PAD, HTN, HLD, DM2 (on insulin), and BPH who presents with SOB. He was recently admitted from 1/21/21-1/27/21 for a right foot cellulitis and osteomyelitis involving the right medial hallux sesamoid and 1st metastarsal. Wound cultures showed rare MSSA, bacteroides fragilis, and strep constellatus. He had a debridement by podiatry on 1/25. He had a PICC line placed and was started on Cefepime and Vancomycin for a planned course of 4-6 weeks. His course was also complicated by hematuria, for which he saw Urology who felt there was no inpatient workup required, but should have an outpatient CT urography/cystography.  Of note, he was also stopped off of his HCTZ 25 mg QD and his Losartan was decreased from 100 to 50 mg QD. His Amlodipine 10 mg Qd was not restarted upon discharge as well.    In the ED, he was given Furosemide 40 mg IV x1, Meropenem 1 g IV x1, and Vancomycin 750 mg IV x1. 66 yo M with a PMH CAD, PAD, HTN, HLD, DM2 (on insulin) c/b neuropathy, and BPH who presents with SOB. Over the past day, he has had worsening SOB, particularly on exertion but also at rest and while lying down in bed. He also endorses a chest tightness with the dyspnea and has palpitations on exertion, but denies wheezing. He denies fevers but endorsed some chills today. He has had a chronic nonproductive cough for he past month.    He was recently admitted from 1/21/21-1/27/21 for a right foot cellulitis and osteomyelitis involving the right medial hallux sesamoid and 1st metatarsal. Wound cultures showed rare MSSA, bacteroides fragilis, and strep constellatus. He had a debridement by podiatry on 1/25. He had a PICC line placed and was started on Cefepime and Vancomycin for a planned course of 4-6 weeks. His course was also complicated by hematuria, for which he saw Urology who felt there was no inpatient workup required, but should have an outpatient CT urography/cystography. Since discharge, he has had some loose stools, about one BM per day, and not fully liquid. He denies abdominal pain, nausea, vomiting, current hematuria, or dysuria.  His right foot is doing better. Sometimes he has purulent/bloody drainage, but very little. He has an appointment with Dr. Bullock on Monday 2/1.    Of note, he was also stopped off of his HCTZ 25 mg QD and his Losartan was decreased from 100 to 50 mg QD. His Amlodipine 10 mg QD was not restarted upon discharge as well.    In the ED, he was given Furosemide 40 mg IV x1, Meropenem 1 g IV x1, and Vancomycin 750 mg IV x1.

## 2021-01-31 NOTE — PROGRESS NOTE ADULT - NUTRITIONAL ASSESSMENT
Pt S/P Debridement for fetid Right foot. Now with necrotic interspace wound, some erythema into 2nd interspace as well + MRSA Bacteroides Strep from OR WBC 20.08          H&H 9/27.3 Glu 297 Troponin 13.90 Feels better on diuretics no SOB Med note noted. Will Xray Right foot & RE MRI Abscess? Other issues hematuria as per medicine. Will need future debridement possible second ray resection. Discussed with DR Lacey this AM PCR for C diff? THX

## 2021-01-31 NOTE — CONSULT NOTE ADULT - PROBLEM SELECTOR RECOMMENDATION 4
Medications adjusted prior admission. HCTZ stopped. Norvasc stopped however patient continued to take at home. Hold Norvasc given ble edema. resume coreg bid. bp currently stable. Medications adjusted prior admission. HCTZ stopped. Norvasc stopped however patient continued to take at home. Hold Norvasc given ble edema.  will start on BB Medications adjusted prior admission. HCTZ stopped. Norvasc stopped however patient continued to take at home. Hold Norvasc given ble edema. continue coreg

## 2021-01-31 NOTE — PATIENT PROFILE ADULT - LIVING ENVIRONMENT
VITAL SIGNS: I have reviewed nursing notes and confirm.  CONSTITUTIONAL: Well-developed; well-nourished; in no acute distress.  SKIN: Agree with RN documentation regarding decubitus evaluation. Remainder of skin exam is warm and dry, no acute rash.  HEAD: Normocephalic; atraumatic.  EYES: PERRL, EOM intact; conjunctiva and sclera clear.  ENT: No nasal discharge; airway clear.  NECK: Supple; non tender.  CARD: S1, S2 normal; no murmurs, gallops, or rubs. RRR  RESP: No wheezes, rales or rhonchi.  ABD: Normal bowel sounds; soft; non-distended; non-tender  EXT: UE's and LLE wnl, + swelling and TTP R ankle in stirrup splint with intact distal pulses, sensation intact, no erythema/ecchymoses  LYMPH: No acute cervical adenopathy.  NEURO: Alert, oriented. Grossly unremarkable.  PSYCH: Cooperative, appropriate. no

## 2021-01-31 NOTE — ED ADULT NURSE REASSESSMENT NOTE - NS ED NURSE REASSESS COMMENT FT1
Assumed care of patient from RN BALJEET Hargrove.  Patient A&O, resting in stretcher.  Patient receiving iv abx at this time.  Patient received iv lasix, UA pending.  oxygen saturation 95% on 2L NC.  Call bell within reach, safety maintained.
Kimmy (wife) - contact information- cell [phone].
Patient resting in stretcher at this time.  patient urinated 550mL.  Patient states he feels better breathing.  Patient awaiting to be seen by hospitalist, awaiting admit orders at this time.  Strict I&O's. Call bell within reach, safety maintained.
Assumed care of patient from RADHA Meneses at 1445. Patient AxOx4. Patient moved to peds/supertrack area at this time. Patient resting comfortably in stretcher, side rails up, denies pain at this time. Patient noted to have bilateral LE edema to ankles and feet, +1. Patient is missing second left toe s/p amputation. Patient has gauze dressing placed by podiatry today to right medial ball of foot, no drainage noted at this time. Patient has right PICC line in place with pink do not use extremity band. Patient has left AC IV. Patient in no acute signs of distress. All needs addressed at this time. Safety and comfort maintained. Will continue to monitor.

## 2021-01-31 NOTE — H&P ADULT - NSHPSOCIALHISTORY_GEN_ALL_CORE
, lives with wife.  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  Denies alcohol use.  Used to smoke in his 20s, has not since then .  Denies recreational drug use.

## 2021-01-31 NOTE — H&P ADULT - NSICDXPASTMEDICALHX_GEN_ALL_CORE_FT
PAST MEDICAL HISTORY:  Basal cell carcinoma Of face   s/p excision Oct 2017    BPH (Benign Prostatic Hyperplasia)     CAD (coronary atherosclerotic disease)     Hypercholesteremia     Hypertension     Insulin dependent type 2 diabetes mellitus     PAD (peripheral artery disease)

## 2021-01-31 NOTE — H&P ADULT - NSHPOUTPATIENTPROVIDERS_GEN_ALL_CORE
PCP, Genaro Luna, ([phone])  Podiatry, Jeffrey Bullock  Cardiology, Theodore Franco  Endocrinology, Emani Dixon  Urology, Sohail Pagan

## 2021-01-31 NOTE — H&P ADULT - NSICDXPASTSURGICALHX_GEN_ALL_CORE_FT
PAST SURGICAL HISTORY:  History of amputation of toe L foot 2nd toe amputation    Status post debridement right foot ulcer, with I&D

## 2021-01-31 NOTE — ED ADULT NURSE NOTE - OBJECTIVE STATEMENT
Complaining of SOB.  Recently admitted to  for right foot surgery.  Patient has PICC line in right AC.  On room air O2 sat was 92%, with 2L NC O2 sat came up to 95%.  Complaining of dry heaving but not vomiting.

## 2021-01-31 NOTE — H&P ADULT - ASSESSMENT
64 yo M with a PMH CAD, PAD, HTN, HLD, DM2 (on insulin), and BPH who presents with SOB. 66 yo M with a PMH CAD, PAD, HTN, HLD, DM2 (on insulin) c/b neuropathy, and BPH who presents with SOB.    1/2/3) Acute respiratory failure/pulmonary edema/Elevated troponin  - With borderline hypoxia to 92% on RA  - May be in the setting of IVFs from past admission (although not given much) with stopping HCTZ +/- new diagnosis of CHF  - proBNP elevated at 5141  - Troponin elevated from 0.070 x1, then 0.217 x1  - Despite marked increase in leukocytosis, no obvious evidence of pneumonia based on imaging and patient's history; COIVD-19 PCR and RVP negative  - Would give Lasix 40 mg IV QD  - Trend troponin at least x3 to r/o ACS as a cause, monitor on telemetry  - Cardiology consult  - Check TTE  - Will give loading dose of 243 mg (pt took 81 mg last night) aspirin  - C/w Atorvastatin 40 mg QD, Carvedilol 25 mg BID, and Ranolazine 500 mg BID for CAD  - If trending upward, consider starting heparin gtt    4) Uncontrolled Type 2 Diabetes Mellitus  - Patient states his sugars at home have been in the 200s since recent discharge  - Patient is on antibiotics via PICC with dextrose  - ____________________________  - Hold outpatient PO meds  - A1C 8.8    5) HTN  - Restart Amlodipine 10 mg QD  - Was recently dosed lower Losartan from 100 to 50 mg QD; will continue at lower dose (50 mg) for now due to soft BPs  - Once patient's symptoms improve, would change Lasix back to patient's prior home HCTZ at 25 mg QD (or 12.5 mg)    6) Right foot osteomyelitis  - On Cefepime and Vancomycin via PICC line for planned 6 week course from 1/27  - ______________  - ID and podiatry consults for continued f/u    7) Prophylactic measure  - DVT PPX: IMPROVE score of 1, but will give SQH Q8H  - Diet: consistent carb/DASH  - Dispo: pending improvement in sx, consultant hortencia 66 yo M with a PMH CAD, PAD, HTN, HLD, DM2 (on insulin) c/b neuropathy, and BPH who presents with SOB.    1/2/3) Acute respiratory failure/Acute pulmonary edema/Elevated troponin  - With borderline hypoxia to 92% on RA  - May be in the setting of IVFs from past admission (although not given much) with stopping HCTZ +/- new diagnosis of CHF  - ProBNP elevated at 5141  - Troponin elevated from 0.070 x1, then 0.217 x1  - Despite marked increase in leukocytosis, no obvious evidence of pneumonia based on imaging and patient's history; COIVD-19 PCR and RVP negative  - Would give Lasix 40 mg IV QD  - Trend troponin at least x3 to r/o ACS as a cause, monitor on telemetry  - Cardiology consult  - Check TTE  - Will give loading dose of 243 mg (pt took 81 mg last night) aspirin  - C/w Atorvastatin 40 mg QD, Carvedilol 25 mg BID, and Ranolazine 500 mg BID for CAD  - If trending upward, consider starting heparin gtt    4) Uncontrolled Type 2 diabetes mellitus with insulin therapy  - Patient states his sugars at home have been in the 200s since recent discharge  - Patient is on antibiotics via PICC with dextrose  - Will continue Lantus 42 units QHS (will give NPH since did not take Lantus last night), give standing insulin 5 units QAC, plus give moderate dose SSI QAC + HS  - Avoid fluid/antibiotics with dextrose  - Check FS QAC + HS; goal 140-180  - Hold outpatient PO meds  - A1C 8.8    5) HTN  - Restart Amlodipine 10 mg QD  - Was recently dosed lower Losartan from 100 to 50 mg QD; will continue at lower dose (50 mg) for now due to soft BPs  - Once patient's symptoms improve, would change Lasix back to patient's prior home HCTZ at 25 mg QD (or 12.5 mg)    6) Osteomyelitis of right foot  - S/p debridement 1/25  - On Cefepime and Vancomycin via PICC line for planned 6 week course from 1/27  - Despite leukocytosis, no need to change antibiotics at this time  - C/w Cefepime 2 g IV Q12H and Vancomycin 750 mg IV Q12H; avoid antibiotics with dextrose  - ID and podiatry consults for continued f/u    7) Leukocytosis  - WBC was 10-15 during last admission, now 21 on current admission  - May be reactive to pulmonary edema  - Patient is not having worsening foot symptoms, less likely patient has pneumonia, UA unremarkable, and is not obviously septic  - No obvious need to upgrade antibiotics at this point, no history of resistant organisms  - No obvious reason to d/c PICC line at this time  - Check Vanc trough  - Continue to trend CBC with diff  - ID and podiatry consults  - F/u blood and urine cx    8) Transaminitis  - Likely from congestive hepatopathy; with perihepatic ascites  - No abdominal pain  - Recheck and trend CMPs; if LFTs worsening, consider imaging    9) Prophylactic measure  - DVT PPX: IMPROVE score of 1, but will give SQH Q8H  - Diet: consistent carb/DASH  - Dispo: pending improvement in sx, consultant hortencia

## 2021-01-31 NOTE — ED PROVIDER NOTE - CLINICAL SUMMARY MEDICAL DECISION MAKING FREE TEXT BOX
Acute Pulmonary edema and right foot diabetic foot infection.  Will give antibiotics and lasix to diurese.  Will admit to tele.  CTA without PE.

## 2021-01-31 NOTE — H&P ADULT - NSHPLABSRESULTS_GEN_ALL_CORE
Labs personally reviewed and interpreted. Notable for leukocytosis (WBC 21.38, increased from 11 three days prior) with 90.5% neutrophils and lymphopenia (0.69). Hb 9.1 (baseline ~ 10), plt 422, INR 1.47, lactate normal at 1.9, Na low at 128, K 4.7, Cl 98, HCO3 21, BUN/creatinine 22/1.17 (at baseline creatinine), BG markedly elevated at 280, calcium 8.4 with albumin 2.2, Mg 1.9, alk phos/AST/ALT all elevated at 246/69/149, troponin elevated at 0.070 x1 and then 0.217 x1, proBNP elevated at 5141.  UA result pending.  COVID-19 PCR and RVP results negative.    CXR personally reviewed and interpreted. Notable for bilateral pleural effusions and lower lobe infiltrates, right more than left, increased from before. No pneumothorax or obvious cardiomegaly. Right sided PICC line seen in place.  CTA chest personally reviewed and interpreted. Notable for moderate sized bilateral pleural effusions with overlying compressive atelectasis. Interlobular septal thickening and scattered ground-glass opacities suggesting pulmonary vascular congestion. There is no focal consolidation, parenchymal mass or suspicious pulmonary nodule. The visualized portions of the upper abdomen demonstrates small volume perihepatic ascites.  Right foot XR seen. No obvious osteomyelitis or foot lesions seen. Awaiting formal radiology read.    EKG personally reviewed. Normal sinus rhythm, normal axis. TWI isolated only to V2. Poor R wave progression. No pathological Q waves or significant ST changes. Rate 98, , QTc 431. Labs personally reviewed and interpreted. Notable for leukocytosis (WBC 21.38, increased from 11 three days prior) with 90.5% neutrophils and lymphopenia (0.69). Hb 9.1 (baseline ~ 10), plt 422, INR 1.47, lactate normal at 1.9, Na low at 128, K 4.7, Cl 98, HCO3 21, BUN/creatinine 22/1.17 (at baseline creatinine), BG markedly elevated at 280, calcium 8.4 with albumin 2.2, Mg 1.9, alk phos/AST/ALT all elevated at 246/69/149, troponin elevated at 0.070 x1 and then 0.217 x1, proBNP elevated at 5141.  UA shows moderate blood, 3-5 WBCs, 0-5 RBCs, few bacteria and epithelial cells, SG 1.010, and 30 protein.  COVID-19 PCR and RVP results negative.    CXR personally reviewed and interpreted. Notable for bilateral pleural effusions and lower lobe infiltrates, right more than left, increased from before. No pneumothorax or obvious cardiomegaly. Right sided PICC line seen in place.  CTA chest personally reviewed and interpreted. Notable for moderate sized bilateral pleural effusions with overlying compressive atelectasis. Interlobular septal thickening and scattered ground-glass opacities suggesting pulmonary vascular congestion. There is no focal consolidation, parenchymal mass or suspicious pulmonary nodule. The visualized portions of the upper abdomen demonstrates small volume perihepatic ascites.  Right foot XR seen. No obvious osteomyelitis or foot lesions seen. Awaiting formal radiology read.    EKG personally reviewed. Normal sinus rhythm, normal axis. TWI isolated only to V2. Poor R wave progression. No pathological Q waves or significant ST changes. Rate 98, , QTc 431.

## 2021-01-31 NOTE — H&P ADULT - NSHPPHYSICALEXAM_GEN_ALL_CORE
Vital Signs Last 24 Hrs  T(C): 36.7 (31 Jan 2021 05:30), Max: 37.2 (30 Jan 2021 23:57)  T(F): 98.1 (31 Jan 2021 05:30), Max: 98.9 (30 Jan 2021 23:57)  HR: 83 (31 Jan 2021 05:30) (83 - 98)  BP: 105/74 (31 Jan 2021 05:30) (105/73 - 122/81)  BP(mean): 82 (31 Jan 2021 05:30) (82 - 94)  RR: 18 (31 Jan 2021 05:30) (18 - 23)  SpO2: 96% (31 Jan 2021 05:30) (94% - 96%) Vital Signs Last 24 Hrs  T(C): 36.7 (31 Jan 2021 05:30), Max: 37.2 (30 Jan 2021 23:57)  T(F): 98.1 (31 Jan 2021 05:30), Max: 98.9 (30 Jan 2021 23:57)  HR: 83 (31 Jan 2021 05:30) (83 - 98)  BP: 105/74 (31 Jan 2021 05:30) (105/73 - 122/81)  BP(mean): 82 (31 Jan 2021 05:30) (82 - 94)  RR: 18 (31 Jan 2021 05:30) (18 - 23)  SpO2: 96% (31 Jan 2021 05:30) (94% - 96%)    GENERAL: No acute distress  HEENT: PERRL, EOMI, MM dry, no oropharyngeal lesions  NECK: supple, no stiffness, no JVD, no thyromegaly  PULM: respirations non-labored, clear to auscultation bilaterally bu decreased breath sounds at the bilateral bases, no rales, rhonchi, or wheezes  CV: regular rate and rhythm, no murmurs, gallops, or rubs  GI: abdomen soft, nontender, nondistended, no masses felt, normal bowel sounds  MSK: strength 5/5 bilateral upper/lower extremities. Right foot ulcer wrapped in bandage with slightly bloody drainage.  LYMPH: no anterior cervical, posterior cervical, supraclavicular, or inguinal lymphadenopathy  NEURO: A&Ox3, no tremors, sensation decreased in bilateral feet below heel  SKIN: + trace bilateral LE edema. No rashes or lesions other than as noted above Vital Signs Last 24 Hrs  T(C): 36.7 (31 Jan 2021 05:30), Max: 37.2 (30 Jan 2021 23:57)  T(F): 98.1 (31 Jan 2021 05:30), Max: 98.9 (30 Jan 2021 23:57)  HR: 83 (31 Jan 2021 05:30) (83 - 98)  BP: 105/74 (31 Jan 2021 05:30) (105/73 - 122/81)  BP(mean): 82 (31 Jan 2021 05:30) (82 - 94)  RR: 18 (31 Jan 2021 05:30) (18 - 23)  SpO2: 96% (31 Jan 2021 05:30) (94% - 96%)    GENERAL: No acute distress  HEENT: PERRL, EOMI, MM dry, no oropharyngeal lesions  NECK: supple, no stiffness, no JVD, no thyromegaly  PULM: respirations non-labored, clear to auscultation bilaterally bu decreased breath sounds at the bilateral bases, no rales, rhonchi, or wheezes  CV: regular rate and rhythm, no murmurs, gallops, or rubs  GI: abdomen soft, nontender, nondistended, no masses felt, normal bowel sounds  MSK: strength 5/5 bilateral upper/lower extremities. Right foot ulcer wrapped in bandage with slightly bloody drainage.  LYMPH: no anterior cervical, posterior cervical, supraclavicular, or inguinal lymphadenopathy  NEURO: A&Ox3, no tremors, sensation decreased in bilateral feet below heel  SKIN: + trace bilateral LE edema. RUE PICC line seen, non-tender, no drainage. No rashes or lesions other than as noted above

## 2021-01-31 NOTE — ED PROVIDER NOTE - SKIN, MLM
Skin pale; right foot with wound debridgement medial to right great toe with sutures in place; deep wound between 1st and 2nd toes with soaked bloody gauze in place

## 2021-01-31 NOTE — ED PROVIDER NOTE - MUSCULOSKELETAL, MLM
Spine appears normal, range of motion is not limited, no muscle or joint tenderness; Right UE with PICC line in upper arm; area c/d/i

## 2021-01-31 NOTE — ED PROVIDER NOTE - CONSTITUTIONAL, MLM
normal... Well appearing, awake, alert, oriented to person, place, time/situation; conversational dyspnea; tachypnea

## 2021-01-31 NOTE — ED PROVIDER NOTE - PROGRESS NOTE DETAILS
Bloody gauze dressings on right foot removed and replaced with clean dry gauze over surgical debridement sites. Right foot wrapped in gauze. CXR showing left pleural effusion and increased vascular markings.

## 2021-01-31 NOTE — H&P ADULT - NSHPREVIEWOFSYSTEMS_GEN_ALL_CORE
Gen: + chills, malaise, fatigue. Negative for fevers, weight loss, or weight gain  Eyes: no blurred vision or lacrimation  ENT: no tinnitus, vertigo, or decreased hearing  Resp: + cough, dyspnea, orthopnea. No wheezing, hemoptysis, or orthopnea  CV: + chest pain, dyspnea on exertion, or palpitations  GI: + loose stools. No nausea, vomiting, abdominal pain, constipation, melena, or hematochezia  : no dysuria, hematuria, or incontinence  MSK: no arthralgias, joint swelling, or myalgias  Neuro: no focal deficits, confusion, dizziness, tremors, or seizures  Skin: no rash or lesions

## 2021-01-31 NOTE — PROGRESS NOTE ADULT - SUBJECTIVE AND OBJECTIVE BOX
Hospitalist Addendum:    Pt admitted earlier today by Dr. Del Angel.    Pt with recent admission for OM right foot/ toe discharged on IV ABX through PICC line Cefepime / Vanco.    Patient admitted with SOB found to have new onset CHF Exacerbation.  Also likely NSTEMI.    As per patient, no hx of CHF in the past.    Cardio eval.  ECHO.  Trend trop.    IV diuresis.    ASA/ statin.    Also worsening OM right foot-- podiatry f/u.  ID f/u.    May require further OR debridement.    Will f/u in am.

## 2021-01-31 NOTE — ED PROVIDER NOTE - OBJECTIVE STATEMENT
Pt. is a 66 yo M with a hx of CAD, peripheral arterial disease, HTN, hypercholesterolemia, BPH, DM discharged 2 days ago from [facility] after a 2 week long stay for foot ulcer and foot infection requiring debridement by podiatry Dr. Bullock presents for SOB.  Patient states he has dry heaving, nausea, uncontrollable chills, and shortness of breath.  Shortness of breath started yesterday with exertion and now has SOB at rest.  +peripheral edema.  Has a PICC line in right UE which has been working well.  States he had blood pressure meds discontinued prior to discharge as his blood pressure was low while hospitalized. PMD: Cheryl

## 2021-02-01 LAB
CULTURE RESULTS: NO GROWTH — SIGNIFICANT CHANGE UP
HCT VFR BLD CALC: 26.8 % — LOW (ref 39–50)
HGB BLD-MCNC: 8.7 G/DL — LOW (ref 13–17)
MCHC RBC-ENTMCNC: 28 PG — SIGNIFICANT CHANGE UP (ref 27–34)
MCHC RBC-ENTMCNC: 32.5 GM/DL — SIGNIFICANT CHANGE UP (ref 32–36)
MCV RBC AUTO: 86.2 FL — SIGNIFICANT CHANGE UP (ref 80–100)
PLATELET # BLD AUTO: 391 K/UL — SIGNIFICANT CHANGE UP (ref 150–400)
RBC # BLD: 3.11 M/UL — LOW (ref 4.2–5.8)
RBC # FLD: 14 % — SIGNIFICANT CHANGE UP (ref 10.3–14.5)
SPECIMEN SOURCE: SIGNIFICANT CHANGE UP
VANCOMYCIN TROUGH SERPL-MCNC: 17.9 UG/ML — SIGNIFICANT CHANGE UP (ref 10–20)
WBC # BLD: 13.54 K/UL — HIGH (ref 3.8–10.5)
WBC # FLD AUTO: 13.54 K/UL — HIGH (ref 3.8–10.5)

## 2021-02-01 PROCEDURE — 73720 MRI LWR EXTREMITY W/O&W/DYE: CPT | Mod: 26,RT

## 2021-02-01 PROCEDURE — 99232 SBSQ HOSP IP/OBS MODERATE 35: CPT

## 2021-02-01 PROCEDURE — 93306 TTE W/DOPPLER COMPLETE: CPT | Mod: 26

## 2021-02-01 PROCEDURE — 99223 1ST HOSP IP/OBS HIGH 75: CPT

## 2021-02-01 RX ORDER — FUROSEMIDE 40 MG
40 TABLET ORAL DAILY
Refills: 0 | Status: DISCONTINUED | OUTPATIENT
Start: 2021-02-02 | End: 2021-02-03

## 2021-02-01 RX ORDER — INSULIN LISPRO 100/ML
7 VIAL (ML) SUBCUTANEOUS
Refills: 0 | Status: DISCONTINUED | OUTPATIENT
Start: 2021-02-01 | End: 2021-02-10

## 2021-02-01 RX ADMIN — CARVEDILOL PHOSPHATE 12.5 MILLIGRAM(S): 80 CAPSULE, EXTENDED RELEASE ORAL at 21:44

## 2021-02-01 RX ADMIN — Medication 5 UNIT(S): at 09:03

## 2021-02-01 RX ADMIN — CLOPIDOGREL BISULFATE 75 MILLIGRAM(S): 75 TABLET, FILM COATED ORAL at 10:58

## 2021-02-01 RX ADMIN — Medication 250 MILLIGRAM(S): at 18:02

## 2021-02-01 RX ADMIN — ATORVASTATIN CALCIUM 40 MILLIGRAM(S): 80 TABLET, FILM COATED ORAL at 21:43

## 2021-02-01 RX ADMIN — CEFEPIME 100 MILLIGRAM(S): 1 INJECTION, POWDER, FOR SOLUTION INTRAMUSCULAR; INTRAVENOUS at 02:57

## 2021-02-01 RX ADMIN — HEPARIN SODIUM 5000 UNIT(S): 5000 INJECTION INTRAVENOUS; SUBCUTANEOUS at 21:44

## 2021-02-01 RX ADMIN — Medication 5 UNIT(S): at 12:33

## 2021-02-01 RX ADMIN — Medication 250 MILLIGRAM(S): at 06:39

## 2021-02-01 RX ADMIN — Medication 81 MILLIGRAM(S): at 21:44

## 2021-02-01 RX ADMIN — HEPARIN SODIUM 5000 UNIT(S): 5000 INJECTION INTRAVENOUS; SUBCUTANEOUS at 06:39

## 2021-02-01 RX ADMIN — Medication 40 MILLIGRAM(S): at 10:58

## 2021-02-01 RX ADMIN — HEPARIN SODIUM 5000 UNIT(S): 5000 INJECTION INTRAVENOUS; SUBCUTANEOUS at 14:41

## 2021-02-01 RX ADMIN — Medication 6: at 12:33

## 2021-02-01 RX ADMIN — Medication 2: at 09:03

## 2021-02-01 RX ADMIN — INSULIN GLARGINE 42 UNIT(S): 100 INJECTION, SOLUTION SUBCUTANEOUS at 21:44

## 2021-02-01 RX ADMIN — CARVEDILOL PHOSPHATE 12.5 MILLIGRAM(S): 80 CAPSULE, EXTENDED RELEASE ORAL at 10:58

## 2021-02-01 RX ADMIN — CEFEPIME 100 MILLIGRAM(S): 1 INJECTION, POWDER, FOR SOLUTION INTRAMUSCULAR; INTRAVENOUS at 14:37

## 2021-02-01 NOTE — PROGRESS NOTE ADULT - SUBJECTIVE AND OBJECTIVE BOX
PCP: Cheryl PCP    REQUESTING PHYSICIAN: Hospitalist     REASON FOR CONSULT: pulmonary edema, r/o CHF    CHIEF COMPLAINT: sob, rodriguez    HPI: 64 yo M with a PMH CAD, PAD, HTN, HLD, DM2 (on insulin) c/b neuropathy, and BPH who presents with SOB. Over the past day, he has had worsening SOB with tightness in the chest , particularly on exertion but also at rest and while lying down in bed. He denies fevers but endorsed some chills today. He has had a chronic nonproductive cough for he past month.    He was recently admitted from 21-21 for a right foot cellulitis   he was also stopped off of his HCTZ 25 mg QD and his Losartan was decreased from 100 to 50 mg QD. His Amlodipine 10 mg QD was not restarted upon discharge as well. In the ED, he was given Furosemide 40 mg IV x1, Meropenem 1 g IV x1, and Vancomycin 750 mg IV x1.    Patient blood work showed elevated troponin , warranted cardiology consulted , patient  blood pressure was controlled  his CT angio showed bilateral pleural effusion without evidence of PE ,     : dyspnea improved substantially near baseline levels.    MEDICATIONS:  OUTPATIENT  Home Medications:  amLODIPine 10 mg oral tablet: 1 tab(s) orally once a day    **was not discharged on med, but patient states he is taking (2021 07:40)  Aspirin Low Dose 81 mg oral delayed release tablet: 1 tab(s) orally once a day (2021 07:40)  atorvastatin 40 mg oral tablet: 1 tab(s) orally once a day (2021 07:40)  carvedilol 25 mg oral tablet: 1 tab(s) orally 2 times a day (2021 07:40)  cefepime 2 g intravenous injection: 2 gram(s) intravenous every 12 hours (2021 07:40)  Cinnamon 500 mg oral capsule: 2 cap(s) orally once a day (2021 07:40)  fluticasone 50 mcg/inh nasal spray: 1 spray(s) in each nostril 2 times a day (2021 07:40)  Glucosamine Chondroitin oral capsule: 3 cap(s) orally once a day (2021 07:40)  insulin glargine: 42 unit(s) subcutaneous once a day (at bedtime)    **per d/c paperwork, patient was decreased to 30 units, but patient states he has been taking 42 (2021 07:40)  metFORMIN 1000 mg oral tablet: 1 tab(s) orally 2 times a day (2021 07:40)  ranolazine 500 mg oral tablet, extended release: 1 tab(s) orally 2 times a day (2021 07:40)  vancomycin 750 mg intravenous injection: 750 milligram(s) intravenous every 12 hours (2021 07:40)  Vitamin B-12 1000 mcg oral tablet: 1 tab(s) orally once a day (2021 07:40)  Vitamin C 1000 mg oral tablet: 1 tab(s) orally once a day (2021 07:40)  Vitamin D3 1000 intl units (25 mcg) oral tablet: 1 tab(s) orally once a day (2021 07:40)      INPATIENT  MEDICATIONS  (STANDING):  aspirin enteric coated 81 milliGRAM(s) Oral at bedtime  atorvastatin 40 milliGRAM(s) Oral at bedtime  carvedilol 12.5 milliGRAM(s) Oral every 12 hours  cefepime   IVPB 2000 milliGRAM(s) IV Intermittent every 12 hours  clopidogrel Tablet 75 milliGRAM(s) Oral daily  dextrose 40% Gel 15 Gram(s) Oral once  dextrose 5%. 1000 milliLiter(s) (100 mL/Hr) IV Continuous <Continuous>  dextrose 50% Injectable 25 Gram(s) IV Push once  dextrose 50% Injectable 12.5 Gram(s) IV Push once  dextrose 50% Injectable 25 Gram(s) IV Push once  glucagon  Injectable 1 milliGRAM(s) IntraMuscular once  heparin   Injectable 5000 Unit(s) SubCutaneous every 8 hours  insulin glargine Injectable (LANTUS) 42 Unit(s) SubCutaneous at bedtime  insulin lispro (ADMELOG) corrective regimen sliding scale   SubCutaneous three times a day before meals  insulin lispro (ADMELOG) corrective regimen sliding scale   SubCutaneous at bedtime  insulin lispro Injectable (ADMELOG) 7 Unit(s) SubCutaneous three times a day before meals  vancomycin  IVPB 750 milliGRAM(s) IV Intermittent every 12 hours      Vital Signs Last 24 Hrs  T(C): 36.7 (2021 09:20), Max: 36.8 (2021 21:21)  T(F): 98 (2021 09:20), Max: 98.2 (2021 21:21)  HR: 84 (2021 09:20) (81 - 84)  BP: 118/67 (2021 09:20) (108/72 - 118/67)  BP(mean): --  RR: 18 (2021 09:20) (16 - 19)  SpO2: 96% (2021 09:20) (94% - 98%)Daily     Daily Weight in k (2021 06:47)I&O's Summary    2021 07:01  -  2021 07:00  --------------------------------------------------------  IN: 0 mL / OUT: 1625 mL / NET: -1625 mL      PHYSICAL EXAM:    Constitutional: Awake and alert, well-developed  HEENT: PERR, MMM  Neck:  supple,  No JVD  Respiratory: CTAB, No wheezing   Cardiovascular: S1 and S2, regular rate and rhythm, no Murmurs,    Gastrointestinal: Bowel Sounds present, soft, nontender.   Extremities: +2 bilat. edema  Vascular: 2+ peripheral pulses  Neurological: A/O x 3, no focal deficits      LABS: All Labs Reviewed:                        9.0    20.08 )-----------( 389      ( 2021 08:03 )             27.3                         9.1    21.38 )-----------( 422      ( 2021 00:23 )             27.4     2021 08:03    129    |  99     |  20     ----------------------------<  264    4.2     |  18     |  1.15   2021 00:23    128    |  98     |  22     ----------------------------<  280    4.7     |  21     |  1.17     Ca    8.7        2021 08:03  Ca    8.4        2021 00:23  Phos  3.0       2021 08:03  Mg     2.1       2021 08:03  Mg     1.9       2021 00:23    TPro  6.2    /  Alb  2.2    /  TBili  0.6    /  DBili  x      /  AST  46     /  ALT  135    /  AlkPhos  227    2021 08:03  TPro  6.4    /  Alb  2.2    /  TBili  0.6    /  DBili  x      /  AST  69     /  ALT  149    /  AlkPhos  246    2021 00:23    PT/INR - ( 2021 00:23 )   PT: 16.7 sec;   INR: 1.47 ratio         PTT - ( 2021 00:23 )  PTT:30.2 sec  CARDIAC MARKERS ( 2021 16:51 )  1.330 ng/mL / x     / x     / x     / x      CARDIAC MARKERS ( 2021 08:03 )  1.390 ng/mL / x     / x     / x     / x      CARDIAC MARKERS ( 2021 02:59 )  0.217 ng/mL / x     / x     / x     / x      CARDIAC MARKERS ( 2021 00:23 )  0.070 ng/mL / x     / 99 U/L / x     / x         @ 00:23  Pro Bnp [id number]    ===============================  EKG:  < from: 12 Lead ECG (21 @ 15:41) >  Diagnosis Line Normal sinus rhythm  Normal ECG  When compared with ECG of 2020 20:19,  Questionable change in QRS axis  Confirmed by NICHOLAS KIM MD ([id number]) on 2021 8:34:05 AM    < end of copied text >      TELE: NSR  ==============================  RADIOLOGY:  < from: CT Angio Chest PE Protocol w/ IV Cont (21 @ 01:45) >  IMPRESSION:  No evidence of pulmonary embolism followed to the segmental pulmonary arterial branches.  Moderate sized bilateral pleural effusions with overlying compressive atelectasis.  Moderate pulmonary vascular congestion.  Small volume perihepatic ascites.  < end of copied text >    ==============================

## 2021-02-01 NOTE — CONSULT NOTE ADULT - ASSESSMENT
64 y/o Male with h/o HTN, HLD, T2DM, PAD, CAD, BPH, nonhealing foot ulcer with probable underlying acute on chronic OM on IV vancomycin and cefepime via PICC line since 1/21 was admitted on 1/30 for persistent foot wound and increased weakness and SOB.     1. Right foot plantar ulcer with probable underlying acute on chronic OM and surrounding foot cellulitis s/p surgery. PVD.  -fever ?related to surgery  -recent wound cultures showed rare MSSA, bacteroides fragilis, and strep constellatus  -on vancomycin 750 mg IV q12h and cefepime 2 gm IV q12h since 1/21  -tolerating abx well so far; no side effects noted  -repeat vancomycin trough level is therapeutic  -MRI foot reviewed - no OM reported, but clinically the suspicion for underlying OM is high  -PICC line site clean  -podiatry evaluation appreciated; will need further surgery  -local wound care  -continue IV abx coverage  -monitor temps  -f/u CBC  -supportive care  2. Other issues:   -care per medicine

## 2021-02-01 NOTE — PROGRESS NOTE ADULT - PROBLEM SELECTOR PLAN 2
elevated troponin  chest tightness  ? ACS  continue coreg  ,, plavix , continue ecotrin  will need ischemic evaluation - await results of echo.

## 2021-02-01 NOTE — CONSULT NOTE ADULT - SUBJECTIVE AND OBJECTIVE BOX
Patient is a 65y old  Male who presents with a chief complaint of pulmonary edema (2021 07:52)    HPI:  64 y/o Male with h/o HTN, HLD, T2DM, PAD, CAD, BPH, nonhealing foot ulcer with underlying acute on chronic OM on IV vancomycin and cefepime via PICC line since  was admitted on  for persistent foot wound and increased weakness and SOB.     He was recently admitted from 21-21 for a right foot cellulitis and osteomyelitis involving the right medial hallux sesamoid and 1st metatarsal. Wound cultures showed rare MSSA, bacteroides fragilis, and strep constellatus. He had a debridement by podiatry on . He had a PICC line placed and was started on Cefepime and Vancomycin for a planned course of 4-6 weeks. His course was also complicated by hematuria, for which he saw Urology who felt there was no inpatient workup required, but should have an outpatient CT urography/cystography. Since discharge, he has had some loose stools, about one BM per day, and not fully liquid. He denies abdominal pain, nausea, vomiting, current hematuria, or dysuria.  His right foot is doing better. Sometimes he has purulent/bloody drainage, but very little. He has an appointment with Dr. Bullock on .  Of note, he was also stopped off of his HCTZ 25 mg QD and his Losartan was decreased from 100 to 50 mg QD. His Amlodipine 10 mg QD was not restarted upon discharge as well.  In the ED, he was given Furosemide 40 mg IV x1, Meropenem 1 g IV x1, and Vancomycin 750 mg IV x1.       PMH: as above  PSH: as above  Meds: per reconciliation sheet, noted below  MEDICATIONS  (STANDING):  aspirin enteric coated 81 milliGRAM(s) Oral at bedtime  atorvastatin 40 milliGRAM(s) Oral at bedtime  carvedilol 12.5 milliGRAM(s) Oral every 12 hours  cefepime   IVPB 2000 milliGRAM(s) IV Intermittent every 12 hours  clopidogrel Tablet 75 milliGRAM(s) Oral daily  dextrose 40% Gel 15 Gram(s) Oral once  dextrose 5%. 1000 milliLiter(s) (100 mL/Hr) IV Continuous <Continuous>  dextrose 50% Injectable 25 Gram(s) IV Push once  dextrose 50% Injectable 12.5 Gram(s) IV Push once  dextrose 50% Injectable 25 Gram(s) IV Push once  furosemide   Injectable 40 milliGRAM(s) IV Push two times a day  glucagon  Injectable 1 milliGRAM(s) IntraMuscular once  heparin   Injectable 5000 Unit(s) SubCutaneous every 8 hours  insulin glargine Injectable (LANTUS) 42 Unit(s) SubCutaneous at bedtime  insulin lispro (ADMELOG) corrective regimen sliding scale   SubCutaneous three times a day before meals  insulin lispro (ADMELOG) corrective regimen sliding scale   SubCutaneous at bedtime  insulin lispro Injectable (ADMELOG) 5 Unit(s) SubCutaneous three times a day before meals  vancomycin  IVPB 750 milliGRAM(s) IV Intermittent every 12 hours    MEDICATIONS  (PRN):    Allergies    No Known Allergies    Intolerances      Social: no smoking, no alcohol, no illegal drugs; no recent travel, no exposure to TB  FAMILY HISTORY:  Family history of hyperlipidemia    Family history of hypertension    FH: myocardial infarction  Father had MI,  age 47      no history of premature cardiovascular disease in first degree relatives    ROS: the patient denies fever, no chills, no HA, no seizures, no dizziness, no sore throat, no nasal congestion, no blurry vision, no CP, no palpitations, had SOB, no cough, no abdominal pain, no diarrhea, no N/V, no dysuria, no leg pain, no claudication, has right foot ulcer and rash, no joint aches, no rectal pain or bleeding, no night sweats  All other systems reviewed and are negative    Vital Signs Last 24 Hrs  T(C): 36.7 (2021 09:20), Max: 36.8 (2021 21:21)  T(F): 98 (2021 09:20), Max: 98.2 (2021 21:21)  HR: 84 (2021 09:20) (77 - 84)  BP: 118/67 (2021 09:20) (108/72 - 121/77)  BP(mean): 91 (2021 15:12) (91 - 91)  RR: 18 (2021 09:20) (16 - 19)  SpO2: 96% (2021 09:20) (94% - 99%)  Daily     Daily Weight in k (2021 06:47)    PE:    Constitutional:  No acute distress  HEENT: NC/AT, EOMI, PERRLA, conjunctivae clear; ears and nose atraumatic; pharynx benign  Neck: supple; thyroid not palpable  Back: no tenderness  Respiratory: respiratory effort normal; clear to auscultation  Cardiovascular: S1S2 regular, no murmurs  Abdomen: soft, not tender, not distended, positive BS; no liver or spleen organomegaly  Genitourinary: no suprapubic tenderness  Lymphatic: no LN palpable  Musculoskeletal: no muscle tenderness, no joint swelling or tenderness  Extremities: no pedal edema  Right foot first metatarsal plantar ulcer with scant discharge  Neurological/ Psychiatric: AxOx3, judgement and insight normal; moving all extremities  Skin: no rashes; no palpable lesions    Labs: all available labs reviewed                        9.0    20.08 )-----------( 389      ( 2021 08:03 )             27.3         129<L>  |  99  |  20  ----------------------------<  264<H>  4.2   |  18<L>  |  1.15    Ca    8.7      2021 08:03  Phos  3.0       Mg     2.1         TPro  6.2  /  Alb  2.2<L>  /  TBili  0.6  /  DBili  x   /  AST  46<H>  /  ALT  135<H>  /  AlkPhos  227<H>       LIVER FUNCTIONS - ( 2021 08:03 )  Alb: 2.2 g/dL / Pro: 6.2 gm/dL / ALK PHOS: 227 U/L / ALT: 135 U/L / AST: 46 U/L / GGT: x           Urinalysis Basic - ( 2021 04:58 )    Color: Yellow / Appearance: Clear / S.010 / pH: x  Gluc: x / Ketone: Negative  / Bili: Negative / Urobili: Negative mg/dL   Blood: x / Protein: 30 mg/dL / Nitrite: Negative   Leuk Esterase: Negative / RBC: 0-5 /HPF / WBC 3-5   Sq Epi: x / Non Sq Epi: Few / Bacteria: Few    COVID (01-31 @ 00:23) NotDetec      Radiology: all available radiological tests reviewed    < from: MR Foot w/wo IV Cont, Right (21 @ 09:57) >  1.  Patient status post sesamoidectomy and surgical debridement with postsurgical changes as noted above.  2.  No abscess or other focal drainable fluid collection.  3.  No acute osteomyelitis.    < end of copied text >      Advanced directives addressed: full resuscitation

## 2021-02-01 NOTE — PROGRESS NOTE ADULT - SUBJECTIVE AND OBJECTIVE BOX
CHIEF COMPLAINT/DIAGNOSIS: SOB    HPI: 64 yo M with a PMH CAD, PAD, HTN, HLD, DM2 (on insulin) c/b neuropathy, and BPH who presents with SOB. Over the past day, he has had worsening SOB, particularly on exertion but also at rest and while lying down in bed. He also endorses a chest tightness with the dyspnea and has palpitations on exertion, but denies wheezing. He denies fevers but endorsed some chills today. He has had a chronic nonproductive cough for he past month.    He was recently admitted from 1/21/21-1/27/21 for a right foot cellulitis and osteomyelitis involving the right medial hallux sesamoid and 1st metatarsal. Wound cultures showed rare MSSA, bacteroides fragilis, and strep constellatus. He had a debridement by podiatry on 1/25. He had a PICC line placed and was started on Cefepime and Vancomycin for a planned course of 4-6 weeks. His course was also complicated by hematuria, for which he saw Urology who felt there was no inpatient workup required, but should have an outpatient CT urography/cystography. Since discharge, he has had some loose stools, about one BM per day, and not fully liquid. He denies abdominal pain, nausea, vomiting, current hematuria, or dysuria.    His right foot is doing better. Sometimes he has purulent/bloody drainage, but very little. He has an appointment with Dr. Bullock on Monday 2/1.    Of note, he was also stopped off of his HCTZ 25 mg QD and his Losartan was decreased from 100 to 50 mg QD. His Amlodipine 10 mg QD was not restarted upon discharge as well.    In the ED, he was given Furosemide 40 mg IV x1, Meropenem 1 g IV x1, and Vancomycin 750 mg IV x1.      2/1:   REVIEW OF SYSTEMS:  All other review of systems is negative unless indicated above            PHYSICAL EXAM:  Constitutional: NAD, awake and alert, well-developed  HEENT: PERR, EOMI, Normal Hearing, MMM  Neck: Soft and supple, No LAD, No JVD  Respiratory: Breath sounds are clear bilaterally, No wheezing, rales or rhonchi  Cardiovascular: S1 and S2, regular rate and rhythm, no Murmurs, gallops or rubs  Gastrointestinal: Bowel Sounds present, soft, nontender, nondistended, no guarding, no rebound  Extremities: No peripheral edema  Vascular: 2+ peripheral pulses  Neurological: A/O x 3, no focal deficits  Musculoskeletal: 5/5 strength b/l upper and lower extremities  Skin: No rashes      Vital Signs Last 24 Hrs  T(C): 36.7 (01 Feb 2021 09:20), Max: 36.8 (31 Jan 2021 21:21)  T(F): 98 (01 Feb 2021 09:20), Max: 98.2 (31 Jan 2021 21:21)  HR: 84 (01 Feb 2021 09:20) (81 - 84)  BP: 118/67 (01 Feb 2021 09:20) (108/72 - 118/67)  BP(mean): --  RR: 18 (01 Feb 2021 09:20) (16 - 19)  SpO2: 96% (01 Feb 2021 09:20) (94% - 98%)              LABS: All Labs Reviewed:                        9.0    20.08 )-----------( 389      ( 31 Jan 2021 08:03 )             27.3     01-31    129<L>  |  99  |  20  ----------------------------<  264<H>  4.2   |  18<L>  |  1.15    Ca    8.7      31 Jan 2021 08:03  Phos  3.0     01-31  Mg     2.1     01-31    TPro  6.2  /  Alb  2.2<L>  /  TBili  0.6  /  DBili  x   /  AST  46<H>  /  ALT  135<H>  /  AlkPhos  227<H>  01-31    PT/INR - ( 31 Jan 2021 00:23 )   PT: 16.7 sec;   INR: 1.47 ratio         PTT - ( 31 Jan 2021 00:23 )  PTT:30.2 sec  CARDIAC MARKERS ( 31 Jan 2021 16:51 )  1.330 ng/mL / x     / x     / x     / x      CARDIAC MARKERS ( 31 Jan 2021 08:03 )  1.390 ng/mL / x     / x     / x     / x      CARDIAC MARKERS ( 31 Jan 2021 02:59 )  0.217 ng/mL / x     / x     / x     / x      CARDIAC MARKERS ( 31 Jan 2021 00:23 )  0.070 ng/mL / x     / 99 U/L / x     / x          Blood Culture: 01-31 @ 04:58  Organism --  Gram Stain Blood -- Gram Stain --  Specimen Source .Urine None  Culture-Blood --    01-31 @ 00:23  Organism --  Gram Stain Blood -- Gram Stain --  Specimen Source .Blood None  Culture-Blood --      01-31 @ 04:58  Organism --  Gram Stain Blood -- Gram Stain --  Specimen Source .Urine None  Culture-Blood --    01-31 @ 00:23  Organism --  Gram Stain Blood -- Gram Stain --  Specimen Source .Blood None  Culture-Blood --          RADIOLOGY:        ECHOCARDIOGRAM:          MEDICATIONS:  MEDICATIONS  (STANDING):  aspirin enteric coated 81 milliGRAM(s) Oral at bedtime  atorvastatin 40 milliGRAM(s) Oral at bedtime  carvedilol 12.5 milliGRAM(s) Oral every 12 hours  cefepime   IVPB 2000 milliGRAM(s) IV Intermittent every 12 hours  clopidogrel Tablet 75 milliGRAM(s) Oral daily  dextrose 40% Gel 15 Gram(s) Oral once  dextrose 5%. 1000 milliLiter(s) (100 mL/Hr) IV Continuous <Continuous>  dextrose 50% Injectable 25 Gram(s) IV Push once  dextrose 50% Injectable 12.5 Gram(s) IV Push once  dextrose 50% Injectable 25 Gram(s) IV Push once  furosemide   Injectable 40 milliGRAM(s) IV Push two times a day  glucagon  Injectable 1 milliGRAM(s) IntraMuscular once  heparin   Injectable 5000 Unit(s) SubCutaneous every 8 hours  insulin glargine Injectable (LANTUS) 42 Unit(s) SubCutaneous at bedtime  insulin lispro (ADMELOG) corrective regimen sliding scale   SubCutaneous three times a day before meals  insulin lispro (ADMELOG) corrective regimen sliding scale   SubCutaneous at bedtime  insulin lispro Injectable (ADMELOG) 5 Unit(s) SubCutaneous three times a day before meals  vancomycin  IVPB 750 milliGRAM(s) IV Intermittent every 12 hours          TELEMETRY REVIEW:          ASSESSMENT AND PLAN:    64 yo M with a PMH CAD, PAD, HTN, HLD, DM2 (on insulin) c/b neuropathy, and BPH who presents with SOB.    1/2/3) Acute respiratory failure/Acute pulmonary edema/Elevated troponin  - With borderline hypoxia to 92% on RA  - May be in the setting of IVFs from past admission (although not given much) with stopping HCTZ +/- new diagnosis of CHF  - ProBNP elevated at 5141  - Troponin elevated from 0.070 x1, then 0.217 x1  - Despite marked increase in leukocytosis, no obvious evidence of pneumonia based on imaging and patient's history; COIVD-19 PCR and RVP negative  - Would give Lasix 40 mg IV QD  - Trend troponin at least x3 to r/o ACS as a cause, monitor on telemetry  - Cardiology consult  - Check TTE  - Will give loading dose of 243 mg (pt took 81 mg last night) aspirin  - C/w Atorvastatin 40 mg QD, Carvedilol 25 mg BID, and Ranolazine 500 mg BID for CAD  - If trending upward, consider starting heparin gtt    4) Uncontrolled Type 2 diabetes mellitus with insulin therapy  - Patient states his sugars at home have been in the 200s since recent discharge  - Patient is on antibiotics via PICC with dextrose  - Will continue Lantus 42 units QHS (will give NPH since did not take Lantus last night), give standing insulin 5 units QAC, plus give moderate dose SSI QAC + HS  - Avoid fluid/antibiotics with dextrose  - Check FS QAC + HS; goal 140-180  - Hold outpatient PO meds  - A1C 8.8    5) HTN  - Restart Amlodipine 10 mg QD  - Was recently dosed lower Losartan from 100 to 50 mg QD; will continue at lower dose (50 mg) for now due to soft BPs  - Once patient's symptoms improve, would change Lasix back to patient's prior home HCTZ at 25 mg QD (or 12.5 mg)    6) Osteomyelitis of right foot  - S/p debridement 1/25  - On Cefepime and Vancomycin via PICC line for planned 6 week course from 1/27  - Despite leukocytosis, no need to change antibiotics at this time  - C/w Cefepime 2 g IV Q12H and Vancomycin 750 mg IV Q12H; avoid antibiotics with dextrose  - ID and podiatry consults for continued f/u    7) Leukocytosis  - WBC was 10-15 during last admission, now 21 on current admission  - May be reactive to pulmonary edema  - Patient is not having worsening foot symptoms, less likely patient has pneumonia, UA unremarkable, and is not obviously septic  - No obvious need to upgrade antibiotics at this point, no history of resistant organisms  - No obvious reason to d/c PICC line at this time  - Check Vanc trough  - Continue to trend CBC with diff  - ID and podiatry consults  - F/u blood and urine cx    8) Transaminitis  - Likely from congestive hepatopathy; with perihepatic ascites  - No abdominal pain  - Recheck and trend CMPs; if LFTs worsening, consider imaging    9) Prophylactic measure  - DVT PPX: IMPROVE score of 1, but will give SQH Q8H  - Diet: consistent carb/DASH  - Dispo: pending improvement in sx, consultant hortencia CHIEF COMPLAINT/DIAGNOSIS: SOB    HPI: 64 yo M with a PMH CAD, PAD, HTN, HLD, DM2 (on insulin) c/b neuropathy, and BPH who presents with SOB. Over the past day, he has had worsening SOB, particularly on exertion but also at rest and while lying down in bed. He also endorses a chest tightness with the dyspnea and has palpitations on exertion, but denies wheezing. He denies fevers but endorsed some chills today. He has had a chronic nonproductive cough for he past month.    He was recently admitted from 1/21/21-1/27/21 for a right foot cellulitis and osteomyelitis involving the right medial hallux sesamoid and 1st metatarsal. Wound cultures showed rare MSSA, bacteroides fragilis, and strep constellatus. He had a debridement by podiatry on 1/25. He had a PICC line placed and was started on Cefepime and Vancomycin for a planned course of 4-6 weeks. His course was also complicated by hematuria, for which he saw Urology who felt there was no inpatient workup required, but should have an outpatient CT urography/cystography. Since discharge, he has had some loose stools, about one BM per day, and not fully liquid. He denies abdominal pain, nausea, vomiting, current hematuria, or dysuria.    His right foot is doing better. Sometimes he has purulent/bloody drainage, but very little. He has an appointment with Dr. Bullock on Monday 2/1. Of note, he was also stopped off of his HCTZ 25 mg QD and his Losartan was decreased from 100 to 50 mg QD. His Amlodipine 10 mg QD was not restarted upon discharge as well. In the ED, he was given Furosemide 40 mg IV x1, Meropenem 1 g IV x1, and Vancomycin 750 mg IV x1.      2/1: sob improved. no further chest tightness of pain. no c/o of foot pain noted.  REVIEW OF SYSTEMS: All other review of systems is negative unless indicated above    PHYSICAL EXAM:  Constitutional: Awake and alert, well-developed  HEENT: PERR, MMM  Neck:  supple,  No JVD  Respiratory: Breath sounds are diminished bilaterally, No wheezing   Cardiovascular: S1 and S2, regular rate and rhythm, no Murmurs,    Gastrointestinal: Bowel Sounds present, soft, nontender.   Extremities: mod. b/l peripheral edema.    Vascular: 2+ peripheral pulses  Neurological: A/O x 3, no focal deficits  Musculoskeletal: no calf tenderness.  Skin:  RT foot ulcer  Lines: PICC line in place.    Vital Signs Last 24 Hrs  T(C): 36.7 (01 Feb 2021 09:20), Max: 36.8 (31 Jan 2021 21:21)  T(F): 98 (01 Feb 2021 09:20), Max: 98.2 (31 Jan 2021 21:21)  HR: 84 (01 Feb 2021 09:20) (81 - 84)  BP: 118/67 (01 Feb 2021 09:20) (108/72 - 118/67)  BP(mean): --  RR: 18 (01 Feb 2021 09:20) (16 - 19)  SpO2: 96% (01 Feb 2021 09:20) (94% - 98%) -- room air     LABS: All Labs Reviewed:                        9.0    20.08 )-----------( 389      ( 31 Jan 2021 08:03 )             27.3     01-31    129<L>  |  99  |  20  ----------------------------<  264<H>  4.2   |  18<L>  |  1.15    Ca    8.7      31 Jan 2021 08:03  Phos  3.0     01-31  Mg     2.1     01-31    TPro  6.2  /  Alb  2.2<L>  /  TBili  0.6  /  DBili  x   /  AST  46<H>  /  ALT  135<H>  /  AlkPhos  227<H>  01-31    PT/INR - ( 31 Jan 2021 00:23 )   PT: 16.7 sec;   INR: 1.47 ratio         PTT - ( 31 Jan 2021 00:23 )  PTT:30.2 sec  CARDIAC MARKERS ( 31 Jan 2021 16:51 )  1.330 ng/mL / x     / x     / x     / x      CARDIAC MARKERS ( 31 Jan 2021 08:03 )  1.390 ng/mL / x     / x     / x     / x      CARDIAC MARKERS ( 31 Jan 2021 02:59 )  0.217 ng/mL / x     / x     / x     / x      CARDIAC MARKERS ( 31 Jan 2021 00:23 )  0.070 ng/mL / x     / 99 U/L / x     / x        RADIOLOGY:  < from: MR Foot w/wo IV Cont, Right (02.01.21 @ 09:57) >  IMPRESSION:  1.  Patient status post sesamoidectomy and surgical debridement with postsurgical changes as noted above.  2.  No abscess or other focal drainable fluid collection.  3.  No acute osteomyelitis.  < end of copied text >    < from: Xray Chest 1 View-PORTABLE IMMEDIATE (Xray Chest 1 View-PORTABLE IMMEDIATE .) (01.31.21 @ 01:53) >  IMPRESSION: The cardiac silhouette is enlarged. There is mild CHF with bilateral patchy airspace opacities third small bilateral pleural effusions are present. The bones are intact.  < end of copied text >    ECHOCARDIOGRAM: pending    MEDICATIONS  (STANDING):  aspirin enteric coated 81 milliGRAM(s) Oral at bedtime  atorvastatin 40 milliGRAM(s) Oral at bedtime  carvedilol 12.5 milliGRAM(s) Oral every 12 hours  cefepime   IVPB 2000 milliGRAM(s) IV Intermittent every 12 hours  clopidogrel Tablet 75 milliGRAM(s) Oral daily  dextrose 40% Gel 15 Gram(s) Oral once  dextrose 5%. 1000 milliLiter(s) (100 mL/Hr) IV Continuous <Continuous>  dextrose 50% Injectable 25 Gram(s) IV Push once  dextrose 50% Injectable 12.5 Gram(s) IV Push once  dextrose 50% Injectable 25 Gram(s) IV Push once  furosemide   Injectable 40 milliGRAM(s) IV Push two times a day  glucagon  Injectable 1 milliGRAM(s) IntraMuscular once  heparin   Injectable 5000 Unit(s) SubCutaneous every 8 hours  insulin glargine Injectable (LANTUS) 42 Unit(s) SubCutaneous at bedtime  insulin lispro (ADMELOG) corrective regimen sliding scale   SubCutaneous three times a day before meals  insulin lispro (ADMELOG) corrective regimen sliding scale   SubCutaneous at bedtime  insulin lispro Injectable (ADMELOG) 5 Unit(s) SubCutaneous three times a day before meals  vancomycin  IVPB 750 milliGRAM(s) IV Intermittent every 12 hours    ASSESSMENT AND PLAN:    64 yo M with a PMH CAD, PAD, HTN, HLD, DM2 (on insulin) c/b neuropathy, and BPH who presents with SOB.    1) Acute respiratory failure/Acute pulmonary edema poss due to underlying HF  - f/u echo to evaluate EF  - COIVD-19 PCR and RVP negative  - cont. tele monitoring. NSR on monitor. off supplemental 02 - 02 sats stable on RA  - Cont. IV Lasix, reduce to daily.  - Cont. Coreg BID (reduced to 12.5mg BID)  - Cardio f/u appreciated     2) Chest Tightness | Elevated troponin  - Cont. ASA, Plavix, Statin  - currently asymptomatic- no CP. ECG for AM  - Cardio f/u appreciated. ischemic eval inpatient?    3) Right foot plantar ulcer with probable underlying acute on chronic OM and surrounding foot cellulitis s/p surgery. PVD.  - recent wound cultures showed rare MSSA, bacteroides fragilis, and strep constellatus  - S/p debridement 1/25  - F/u blood and urine cx - NGTD  - MRI as above. No abscess, drainable fluid collection or osteo  - On Cefepime and Vancomycin via PICC line for planned 6 week course from 1/27. VT wnl  - ID and podiatry consults appreciated     4) Uncontrolled Type 2 diabetes mellitus with insulin therapy  - Cont. ISS, Lantus HS, increase pre-meal to 7U  - Hold outpatient PO meds  - diabetic restricted diet  - A1C 8.8    5) HTN  - Medications adjusted prior admission. HCTZ stopped.   - Norvasc stopped however patient continued to take at home. Hold Norvasc given ble edema. continue coreg.  - BP optimal currently.    6) Leukocytosis  - May be reactive to pulmonary edema. repeat labs.  - on ABX course as above for OM.    7) Transaminitis  - Likely from congestive hepatopathy; with perihepatic ascites  - No abdominal pain  - repeat labs.      8) DVT ppx  - SQ Heparin  CHIEF COMPLAINT/DIAGNOSIS: SOB    HPI: 64 yo M with a PMH CAD, PAD, HTN, HLD, DM2 (on insulin) c/b neuropathy, and BPH who presents with SOB. Over the past day, he has had worsening SOB, particularly on exertion but also at rest and while lying down in bed. He also endorses a chest tightness with the dyspnea and has palpitations on exertion, but denies wheezing. He denies fevers but endorsed some chills today. He has had a chronic nonproductive cough for he past month.    He was recently admitted from 1/21/21-1/27/21 for a right foot cellulitis and osteomyelitis involving the right medial hallux sesamoid and 1st metatarsal. Wound cultures showed rare MSSA, bacteroides fragilis, and strep constellatus. He had a debridement by podiatry on 1/25. He had a PICC line placed and was started on Cefepime and Vancomycin for a planned course of 4-6 weeks. His course was also complicated by hematuria, for which he saw Urology who felt there was no inpatient workup required, but should have an outpatient CT urography/cystography. Since discharge, he has had some loose stools, about one BM per day, and not fully liquid. He denies abdominal pain, nausea, vomiting, current hematuria, or dysuria.    His right foot is doing better. Sometimes he has purulent/bloody drainage, but very little. He has an appointment with Dr. Bullock on Monday 2/1. Of note, he was also stopped off of his HCTZ 25 mg QD and his Losartan was decreased from 100 to 50 mg QD. His Amlodipine 10 mg QD was not restarted upon discharge as well. In the ED, he was given Furosemide 40 mg IV x1, Meropenem 1 g IV x1, and Vancomycin 750 mg IV x1.      2/1: sob improved. no further chest tightness of pain. no c/o of foot pain noted.  REVIEW OF SYSTEMS: All other review of systems is negative unless indicated above    PHYSICAL EXAM:  Constitutional: Awake and alert, well-developed  HEENT: PERR, MMM  Neck:  supple,  No JVD  Respiratory: Breath sounds are diminished bilaterally, No wheezing   Cardiovascular: S1 and S2, regular rate and rhythm, no Murmurs,    Gastrointestinal: Bowel Sounds present, soft, nontender.   Extremities: mod. b/l peripheral edema.    Vascular: 2+ peripheral pulses  Neurological: A/O x 3, no focal deficits  Musculoskeletal: no calf tenderness.  Skin:  RT foot ulcer  Lines: PICC line in place.    Vital Signs Last 24 Hrs  T(C): 36.7 (01 Feb 2021 09:20), Max: 36.8 (31 Jan 2021 21:21)  T(F): 98 (01 Feb 2021 09:20), Max: 98.2 (31 Jan 2021 21:21)  HR: 84 (01 Feb 2021 09:20) (81 - 84)  BP: 118/67 (01 Feb 2021 09:20) (108/72 - 118/67)  BP(mean): --  RR: 18 (01 Feb 2021 09:20) (16 - 19)  SpO2: 96% (01 Feb 2021 09:20) (94% - 98%) -- room air     LABS: All Labs Reviewed:                        9.0    20.08 )-----------( 389      ( 31 Jan 2021 08:03 )             27.3     01-31    129<L>  |  99  |  20  ----------------------------<  264<H>  4.2   |  18<L>  |  1.15    Ca    8.7      31 Jan 2021 08:03  Phos  3.0     01-31  Mg     2.1     01-31    TPro  6.2  /  Alb  2.2<L>  /  TBili  0.6  /  DBili  x   /  AST  46<H>  /  ALT  135<H>  /  AlkPhos  227<H>  01-31    PT/INR - ( 31 Jan 2021 00:23 )   PT: 16.7 sec;   INR: 1.47 ratio         PTT - ( 31 Jan 2021 00:23 )  PTT:30.2 sec  CARDIAC MARKERS ( 31 Jan 2021 16:51 )  1.330 ng/mL / x     / x     / x     / x      CARDIAC MARKERS ( 31 Jan 2021 08:03 )  1.390 ng/mL / x     / x     / x     / x      CARDIAC MARKERS ( 31 Jan 2021 02:59 )  0.217 ng/mL / x     / x     / x     / x      CARDIAC MARKERS ( 31 Jan 2021 00:23 )  0.070 ng/mL / x     / 99 U/L / x     / x        RADIOLOGY:  < from: MR Foot w/wo IV Cont, Right (02.01.21 @ 09:57) >  IMPRESSION:  1.  Patient status post sesamoidectomy and surgical debridement with postsurgical changes as noted above.  2.  No abscess or other focal drainable fluid collection.  3.  No acute osteomyelitis.  < end of copied text >    < from: Xray Chest 1 View-PORTABLE IMMEDIATE (Xray Chest 1 View-PORTABLE IMMEDIATE .) (01.31.21 @ 01:53) >  IMPRESSION: The cardiac silhouette is enlarged. There is mild CHF with bilateral patchy airspace opacities third small bilateral pleural effusions are present. The bones are intact.  < end of copied text >    ECHOCARDIOGRAM: pending    MEDICATIONS  (STANDING):  aspirin enteric coated 81 milliGRAM(s) Oral at bedtime  atorvastatin 40 milliGRAM(s) Oral at bedtime  carvedilol 12.5 milliGRAM(s) Oral every 12 hours  cefepime   IVPB 2000 milliGRAM(s) IV Intermittent every 12 hours  clopidogrel Tablet 75 milliGRAM(s) Oral daily  dextrose 40% Gel 15 Gram(s) Oral once  dextrose 5%. 1000 milliLiter(s) (100 mL/Hr) IV Continuous <Continuous>  dextrose 50% Injectable 25 Gram(s) IV Push once  dextrose 50% Injectable 12.5 Gram(s) IV Push once  dextrose 50% Injectable 25 Gram(s) IV Push once  furosemide   Injectable 40 milliGRAM(s) IV Push two times a day  glucagon  Injectable 1 milliGRAM(s) IntraMuscular once  heparin   Injectable 5000 Unit(s) SubCutaneous every 8 hours  insulin glargine Injectable (LANTUS) 42 Unit(s) SubCutaneous at bedtime  insulin lispro (ADMELOG) corrective regimen sliding scale   SubCutaneous three times a day before meals  insulin lispro (ADMELOG) corrective regimen sliding scale   SubCutaneous at bedtime  insulin lispro Injectable (ADMELOG) 5 Unit(s) SubCutaneous three times a day before meals  vancomycin  IVPB 750 milliGRAM(s) IV Intermittent every 12 hours    ASSESSMENT AND PLAN:    64 yo M with a PMH CAD, PAD, HTN, HLD, DM2 (on insulin) c/b neuropathy, and BPH who presents with SOB.    1) Acute respiratory failure/Acute pulmonary edema poss due acute systolic HF, suspected Takasubos Cardiomyopathy   - echo EF >> EF 30% consistent w/ takasubos CM ~ keep NPO after midnight will d/w cardio about ischemic eval for tm??  - COIVD-19 PCR and RVP negative  - cont. tele monitoring. NSR on monitor. off supplemental 02 - 02 sats stable on RA  - Cont. IV Lasix, reduce to daily.  - Cont. Coreg BID (reduced to 12.5mg BID)  - Cardio f/u appreciated     2) Chest Tightness | Elevated troponin  - Cont. ASA, Plavix, Statin  - currently asymptomatic- no CP. ECG for AM  - Cardio f/u appreciated. ischemic eval inpatient?    3) Right foot plantar ulcer with probable underlying acute on chronic OM and surrounding foot cellulitis s/p surgery. PVD.  - recent wound cultures showed rare MSSA, bacteroides fragilis, and strep constellatus  - S/p debridement 1/25  - F/u blood and urine cx - NGTD  - MRI as above. No abscess, drainable fluid collection or osteo  - On Cefepime and Vancomycin via PICC line for planned 6 week course from 1/27. VT wnl  - ID and podiatry consults appreciated     4) Uncontrolled Type 2 diabetes mellitus with insulin therapy  - Cont. ISS, Lantus HS, increase pre-meal to 7U  - Hold outpatient PO meds  - diabetic restricted diet  - A1C 8.8    5) HTN  - Medications adjusted prior admission. HCTZ stopped.   - Norvasc stopped however patient continued to take at home. Hold Norvasc given ble edema. continue coreg.  - BP optimal currently.    6) Leukocytosis  - May be reactive to pulmonary edema. repeat labs.  - on ABX course as above for OM.    7) Transaminitis  - Likely from congestive hepatopathy; with perihepatic ascites  - No abdominal pain  - repeat labs.      8) DVT ppx  - SQ Heparin  CHIEF COMPLAINT/DIAGNOSIS: SOB    HPI: 64 yo M with a PMH CAD, PAD, HTN, HLD, DM2 (on insulin) c/b neuropathy, and BPH who presents with SOB. Over the past day, he has had worsening SOB, particularly on exertion but also at rest and while lying down in bed. He also endorses a chest tightness with the dyspnea and has palpitations on exertion, but denies wheezing. He denies fevers but endorsed some chills today. He has had a chronic nonproductive cough for he past month.    He was recently admitted from 1/21/21-1/27/21 for a right foot cellulitis and osteomyelitis involving the right medial hallux sesamoid and 1st metatarsal. Wound cultures showed rare MSSA, bacteroides fragilis, and strep constellatus. He had a debridement by podiatry on 1/25. He had a PICC line placed and was started on Cefepime and Vancomycin for a planned course of 4-6 weeks. His course was also complicated by hematuria, for which he saw Urology who felt there was no inpatient workup required, but should have an outpatient CT urography/cystography. Since discharge, he has had some loose stools, about one BM per day, and not fully liquid. He denies abdominal pain, nausea, vomiting, current hematuria, or dysuria.    His right foot is doing better. Sometimes he has purulent/bloody drainage, but very little. He has an appointment with Dr. Bullock on Monday 2/1. Of note, he was also stopped off of his HCTZ 25 mg QD and his Losartan was decreased from 100 to 50 mg QD. His Amlodipine 10 mg QD was not restarted upon discharge as well. In the ED, he was given Furosemide 40 mg IV x1, Meropenem 1 g IV x1, and Vancomycin 750 mg IV x1.      2/1: sob improved. no further chest tightness of pain. no c/o of foot pain noted.  REVIEW OF SYSTEMS: All other review of systems is negative unless indicated above    PHYSICAL EXAM:  Constitutional: Awake and alert, well-developed  HEENT: PERR, MMM  Neck:  supple,  No JVD  Respiratory: Breath sounds are diminished bilaterally, No wheezing   Cardiovascular: S1 and S2, regular rate and rhythm, no Murmurs,    Gastrointestinal: Bowel Sounds present, soft, nontender.   Extremities: mod. b/l peripheral edema.    Vascular: 2+ peripheral pulses  Neurological: A/O x 3, no focal deficits  Musculoskeletal: no calf tenderness.  Skin:  RT foot ulcer  Lines: PICC line in place.    Vital Signs Last 24 Hrs  T(C): 36.7 (01 Feb 2021 09:20), Max: 36.8 (31 Jan 2021 21:21)  T(F): 98 (01 Feb 2021 09:20), Max: 98.2 (31 Jan 2021 21:21)  HR: 84 (01 Feb 2021 09:20) (81 - 84)  BP: 118/67 (01 Feb 2021 09:20) (108/72 - 118/67)  BP(mean): --  RR: 18 (01 Feb 2021 09:20) (16 - 19)  SpO2: 96% (01 Feb 2021 09:20) (94% - 98%) -- room air     LABS: All Labs Reviewed:                        9.0    20.08 )-----------( 389      ( 31 Jan 2021 08:03 )             27.3     01-31    129<L>  |  99  |  20  ----------------------------<  264<H>  4.2   |  18<L>  |  1.15    Ca    8.7      31 Jan 2021 08:03  Phos  3.0     01-31  Mg     2.1     01-31    TPro  6.2  /  Alb  2.2<L>  /  TBili  0.6  /  DBili  x   /  AST  46<H>  /  ALT  135<H>  /  AlkPhos  227<H>  01-31    PT/INR - ( 31 Jan 2021 00:23 )   PT: 16.7 sec;   INR: 1.47 ratio         PTT - ( 31 Jan 2021 00:23 )  PTT:30.2 sec  CARDIAC MARKERS ( 31 Jan 2021 16:51 )  1.330 ng/mL / x     / x     / x     / x      CARDIAC MARKERS ( 31 Jan 2021 08:03 )  1.390 ng/mL / x     / x     / x     / x      CARDIAC MARKERS ( 31 Jan 2021 02:59 )  0.217 ng/mL / x     / x     / x     / x      CARDIAC MARKERS ( 31 Jan 2021 00:23 )  0.070 ng/mL / x     / 99 U/L / x     / x        RADIOLOGY:  < from: MR Foot w/wo IV Cont, Right (02.01.21 @ 09:57) >  IMPRESSION:  1.  Patient status post sesamoidectomy and surgical debridement with postsurgical changes as noted above.  2.  No abscess or other focal drainable fluid collection.  3.  No acute osteomyelitis.  < end of copied text >    < from: Xray Chest 1 View-PORTABLE IMMEDIATE (Xray Chest 1 View-PORTABLE IMMEDIATE .) (01.31.21 @ 01:53) >  IMPRESSION: The cardiac silhouette is enlarged. There is mild CHF with bilateral patchy airspace opacities third small bilateral pleural effusions are present. The bones are intact.  < end of copied text >    ECHOCARDIOGRAM: pending    MEDICATIONS  (STANDING):  aspirin enteric coated 81 milliGRAM(s) Oral at bedtime  atorvastatin 40 milliGRAM(s) Oral at bedtime  carvedilol 12.5 milliGRAM(s) Oral every 12 hours  cefepime   IVPB 2000 milliGRAM(s) IV Intermittent every 12 hours  clopidogrel Tablet 75 milliGRAM(s) Oral daily  dextrose 40% Gel 15 Gram(s) Oral once  dextrose 5%. 1000 milliLiter(s) (100 mL/Hr) IV Continuous <Continuous>  dextrose 50% Injectable 25 Gram(s) IV Push once  dextrose 50% Injectable 12.5 Gram(s) IV Push once  dextrose 50% Injectable 25 Gram(s) IV Push once  furosemide   Injectable 40 milliGRAM(s) IV Push two times a day  glucagon  Injectable 1 milliGRAM(s) IntraMuscular once  heparin   Injectable 5000 Unit(s) SubCutaneous every 8 hours  insulin glargine Injectable (LANTUS) 42 Unit(s) SubCutaneous at bedtime  insulin lispro (ADMELOG) corrective regimen sliding scale   SubCutaneous three times a day before meals  insulin lispro (ADMELOG) corrective regimen sliding scale   SubCutaneous at bedtime  insulin lispro Injectable (ADMELOG) 5 Unit(s) SubCutaneous three times a day before meals  vancomycin  IVPB 750 milliGRAM(s) IV Intermittent every 12 hours    ASSESSMENT AND PLAN:    64 yo M with a PMH CAD, PAD, HTN, HLD, DM2 (on insulin) c/b neuropathy, and BPH who presents with SOB.    1) Acute respiratory failure/Acute pulmonary edema due acute systolic HF, suspected Takasubos Cardiomyopathy   - echo EF >> EF 30% consistent w/ takasubos CM ~ keep NPO after midnight will d/w cardio about ischemic eval for tm??  - COIVD-19 PCR and RVP negative  - cont. tele monitoring. NSR on monitor. off supplemental 02 - 02 sats stable on RA  - Cont. IV Lasix, reduce to daily.  - Cont. Coreg BID (reduced to 12.5mg BID)  - Cardio f/u appreciated     2) Chest Tightness | Elevated troponin  - Cont. ASA, Plavix, Statin  - currently asymptomatic- no CP. ECG for AM  - Cardio f/u appreciated. ischemic eval inpatient?    3) Right foot plantar ulcer with probable underlying acute on chronic OM and surrounding foot cellulitis s/p surgery. PVD.  - recent wound cultures showed rare MSSA, bacteroides fragilis, and strep constellatus  - S/p debridement 1/25  - F/u blood and urine cx - NGTD  - MRI as above. No abscess, drainable fluid collection or osteo  - On Cefepime and Vancomycin via PICC line for planned 6 week course from 1/27. VT wnl  - ID and podiatry consults appreciated     4) Uncontrolled Type 2 diabetes mellitus with insulin therapy  - Cont. ISS, Lantus HS, increase pre-meal to 7U  - Hold outpatient PO meds  - diabetic restricted diet  - A1C 8.8    5) HTN  - Medications adjusted prior admission. HCTZ stopped.   - Norvasc stopped however patient continued to take at home. Hold Norvasc given ble edema. continue coreg.  - BP optimal currently.    6) Leukocytosis  - May be reactive to pulmonary edema. repeat labs.  - on ABX course as above for OM.    7) Transaminitis  - Likely from congestive hepatopathy; with perihepatic ascites  - No abdominal pain  - repeat labs.      8) DVT ppx  - SQ Heparin

## 2021-02-01 NOTE — PROGRESS NOTE ADULT - ASSESSMENT
Afebrile. Pt in Echo Xray no gas or bone destruction Awaiting MRI R Foot. Med note noted ESR 83 Will F/U

## 2021-02-01 NOTE — PROGRESS NOTE ADULT - SUBJECTIVE AND OBJECTIVE BOX
Patient is a 65y old  Male who presents with a chief complaint of pulmonary edema (31 Jan 2021 15:39)      HPI:  64 yo M with a PMH CAD, PAD, HTN, HLD, DM2 (on insulin) c/b neuropathy, and BPH who presents with SOB. Over the past day, he has had worsening SOB, particularly on exertion but also at rest and while lying down in bed. He also endorses a chest tightness with the dyspnea and has palpitations on exertion, but denies wheezing. He denies fevers but endorsed some chills today. He has had a chronic nonproductive cough for he past month.    He was recently admitted from 1/21/21-1/27/21 for a right foot cellulitis and osteomyelitis involving the right medial hallux sesamoid and 1st metatarsal. Wound cultures showed rare MSSA, bacteroides fragilis, and strep constellatus. He had a debridement by podiatry on 1/25. He had a PICC line placed and was started on Cefepime and Vancomycin for a planned course of 4-6 weeks. His course was also complicated by hematuria, for which he saw Urology who felt there was no inpatient workup required, but should have an outpatient CT urography/cystography. Since discharge, he has had some loose stools, about one BM per day, and not fully liquid. He denies abdominal pain, nausea, vomiting, current hematuria, or dysuria.  His right foot is doing better. Sometimes he has purulent/bloody drainage, but very little. He has an appointment with Dr. Bullock on Monday 2/1.    Of note, he was also stopped off of his HCTZ 25 mg QD and his Losartan was decreased from 100 to 50 mg QD. His Amlodipine 10 mg QD was not restarted upon discharge as well.    In the ED, he was given Furosemide 40 mg IV x1, Meropenem 1 g IV x1, and Vancomycin 750 mg IV x1. (31 Jan 2021 05:52)      Allergies    No Known Allergies    Intolerances        MEDICATIONS  (STANDING):  aspirin enteric coated 81 milliGRAM(s) Oral at bedtime  atorvastatin 40 milliGRAM(s) Oral at bedtime  carvedilol 12.5 milliGRAM(s) Oral every 12 hours  cefepime   IVPB 2000 milliGRAM(s) IV Intermittent every 12 hours  clopidogrel Tablet 75 milliGRAM(s) Oral daily  dextrose 40% Gel 15 Gram(s) Oral once  dextrose 5%. 1000 milliLiter(s) (100 mL/Hr) IV Continuous <Continuous>  dextrose 50% Injectable 25 Gram(s) IV Push once  dextrose 50% Injectable 12.5 Gram(s) IV Push once  dextrose 50% Injectable 25 Gram(s) IV Push once  furosemide   Injectable 40 milliGRAM(s) IV Push two times a day  glucagon  Injectable 1 milliGRAM(s) IntraMuscular once  heparin   Injectable 5000 Unit(s) SubCutaneous every 8 hours  insulin glargine Injectable (LANTUS) 42 Unit(s) SubCutaneous at bedtime  insulin lispro (ADMELOG) corrective regimen sliding scale   SubCutaneous three times a day before meals  insulin lispro (ADMELOG) corrective regimen sliding scale   SubCutaneous at bedtime  insulin lispro Injectable (ADMELOG) 5 Unit(s) SubCutaneous three times a day before meals  vancomycin  IVPB 750 milliGRAM(s) IV Intermittent every 12 hours    MEDICATIONS  (PRN):        RADIOLOGY    CBC Full  -  ( 31 Jan 2021 08:03 )  WBC Count : 20.08 K/uL  RBC Count : 3.19 M/uL  Hemoglobin : 9.0 g/dL  Hematocrit : 27.3 %  Platelet Count - Automated : 389 K/uL  Mean Cell Volume : 85.6 fl  Mean Cell Hemoglobin : 28.2 pg  Mean Cell Hemoglobin Concentration : 33.0 gm/dL  Auto Neutrophil # : 17.37 K/uL  Auto Lymphocyte # : 1.15 K/uL  Auto Monocyte # : 1.16 K/uL  Auto Eosinophil # : 0.02 K/uL  Auto Basophil # : 0.05 K/uL  Auto Neutrophil % : 86.6 %  Auto Lymphocyte % : 5.7 %  Auto Monocyte % : 5.8 %  Auto Eosinophil % : 0.1 %  Auto Basophil % : 0.2 %      01-31    129<L>  |  99  |  20  ----------------------------<  264<H>  4.2   |  18<L>  |  1.15    Ca    8.7      31 Jan 2021 08:03  Phos  3.0     01-31  Mg     2.1     01-31    TPro  6.2  /  Alb  2.2<L>  /  TBili  0.6  /  DBili  x   /  AST  46<H>  /  ALT  135<H>  /  AlkPhos  227<H>  01-31      Sedimentation Rate, Erythrocyte: 83 mm/hr (01-31 @ 12:30)      < from: Xray Foot AP + Lateral + Oblique, Right (01.31.21 @ 01:54) >    EXAM:  XR FOOT COMP MIN 3 VIEWS RT                            PROCEDURE DATE:  01/31/2021          INTERPRETATION:  3 views of the right foot.    Clinical indications: Right foot pain.    IMPRESSION: There is marked soft tissue swelling of the great toe. There is no fracture identified. There is no radiographic evidence of osteomyelitis. There is erosive change at the first metatarsal head likely related to gout. Enthesophyte formation is seen at the calcaneus.            ANGELINA VILLATORO MD; Attending Radiologist  This document has been electronically signed. Jan 31 2021 10:52AM    < end of copied text >

## 2021-02-02 ENCOUNTER — APPOINTMENT (OUTPATIENT)
Dept: CARDIOLOGY | Facility: CLINIC | Age: 66
End: 2021-02-02

## 2021-02-02 DIAGNOSIS — E11.51 TYPE 2 DIABETES MELLITUS WITH DIABETIC PERIPHERAL ANGIOPATHY WITHOUT GANGRENE: ICD-10-CM

## 2021-02-02 DIAGNOSIS — E11.69 TYPE 2 DIABETES MELLITUS WITH OTHER SPECIFIED COMPLICATION: ICD-10-CM

## 2021-02-02 DIAGNOSIS — D72.829 ELEVATED WHITE BLOOD CELL COUNT, UNSPECIFIED: ICD-10-CM

## 2021-02-02 DIAGNOSIS — N40.0 BENIGN PROSTATIC HYPERPLASIA WITHOUT LOWER URINARY TRACT SYMPTOMS: ICD-10-CM

## 2021-02-02 DIAGNOSIS — Z87.891 PERSONAL HISTORY OF NICOTINE DEPENDENCE: ICD-10-CM

## 2021-02-02 DIAGNOSIS — E11.621 TYPE 2 DIABETES MELLITUS WITH FOOT ULCER: ICD-10-CM

## 2021-02-02 DIAGNOSIS — L03.115 CELLULITIS OF RIGHT LOWER LIMB: ICD-10-CM

## 2021-02-02 DIAGNOSIS — Z89.422 ACQUIRED ABSENCE OF OTHER LEFT TOE(S): ICD-10-CM

## 2021-02-02 DIAGNOSIS — D64.9 ANEMIA, UNSPECIFIED: ICD-10-CM

## 2021-02-02 DIAGNOSIS — Z79.4 LONG TERM (CURRENT) USE OF INSULIN: ICD-10-CM

## 2021-02-02 DIAGNOSIS — N17.9 ACUTE KIDNEY FAILURE, UNSPECIFIED: ICD-10-CM

## 2021-02-02 DIAGNOSIS — E87.1 HYPO-OSMOLALITY AND HYPONATREMIA: ICD-10-CM

## 2021-02-02 DIAGNOSIS — Z79.82 LONG TERM (CURRENT) USE OF ASPIRIN: ICD-10-CM

## 2021-02-02 DIAGNOSIS — B35.0 TINEA BARBAE AND TINEA CAPITIS: ICD-10-CM

## 2021-02-02 DIAGNOSIS — Z82.49 FAMILY HISTORY OF ISCHEMIC HEART DISEASE AND OTHER DISEASES OF THE CIRCULATORY SYSTEM: ICD-10-CM

## 2021-02-02 DIAGNOSIS — Z85.828 PERSONAL HISTORY OF OTHER MALIGNANT NEOPLASM OF SKIN: ICD-10-CM

## 2021-02-02 DIAGNOSIS — L85.3 XEROSIS CUTIS: ICD-10-CM

## 2021-02-02 DIAGNOSIS — I10 ESSENTIAL (PRIMARY) HYPERTENSION: ICD-10-CM

## 2021-02-02 DIAGNOSIS — E78.5 HYPERLIPIDEMIA, UNSPECIFIED: ICD-10-CM

## 2021-02-02 DIAGNOSIS — I25.10 ATHEROSCLEROTIC HEART DISEASE OF NATIVE CORONARY ARTERY WITHOUT ANGINA PECTORIS: ICD-10-CM

## 2021-02-02 LAB
-  BACTEROIDES FRAGILIS: SIGNIFICANT CHANGE UP
ANION GAP SERPL CALC-SCNC: 8 MMOL/L — SIGNIFICANT CHANGE UP (ref 5–17)
BUN SERPL-MCNC: 17 MG/DL — SIGNIFICANT CHANGE UP (ref 7–23)
CALCIUM SERPL-MCNC: 8.4 MG/DL — LOW (ref 8.5–10.1)
CHLORIDE SERPL-SCNC: 104 MMOL/L — SIGNIFICANT CHANGE UP (ref 96–108)
CO2 SERPL-SCNC: 24 MMOL/L — SIGNIFICANT CHANGE UP (ref 22–31)
CREAT SERPL-MCNC: 0.89 MG/DL — SIGNIFICANT CHANGE UP (ref 0.5–1.3)
CULTURE RESULTS: SIGNIFICANT CHANGE UP
GLUCOSE SERPL-MCNC: 97 MG/DL — SIGNIFICANT CHANGE UP (ref 70–99)
GRAM STN FLD: SIGNIFICANT CHANGE UP
GRAM STN FLD: SIGNIFICANT CHANGE UP
HCT VFR BLD CALC: 25.5 % — LOW (ref 39–50)
HGB BLD-MCNC: 8.5 G/DL — LOW (ref 13–17)
MAGNESIUM SERPL-MCNC: 2 MG/DL — SIGNIFICANT CHANGE UP (ref 1.6–2.6)
MCHC RBC-ENTMCNC: 28.6 PG — SIGNIFICANT CHANGE UP (ref 27–34)
MCHC RBC-ENTMCNC: 33.3 GM/DL — SIGNIFICANT CHANGE UP (ref 32–36)
MCV RBC AUTO: 85.9 FL — SIGNIFICANT CHANGE UP (ref 80–100)
METHOD TYPE: SIGNIFICANT CHANGE UP
ORGANISM # SPEC MICROSCOPIC CNT: SIGNIFICANT CHANGE UP
ORGANISM # SPEC MICROSCOPIC CNT: SIGNIFICANT CHANGE UP
PLATELET # BLD AUTO: 357 K/UL — SIGNIFICANT CHANGE UP (ref 150–400)
POTASSIUM SERPL-MCNC: 3.3 MMOL/L — LOW (ref 3.5–5.3)
POTASSIUM SERPL-SCNC: 3.3 MMOL/L — LOW (ref 3.5–5.3)
RBC # BLD: 2.97 M/UL — LOW (ref 4.2–5.8)
RBC # FLD: 14 % — SIGNIFICANT CHANGE UP (ref 10.3–14.5)
SODIUM SERPL-SCNC: 136 MMOL/L — SIGNIFICANT CHANGE UP (ref 135–145)
SPECIMEN SOURCE: SIGNIFICANT CHANGE UP
WBC # BLD: 11.03 K/UL — HIGH (ref 3.8–10.5)
WBC # FLD AUTO: 11.03 K/UL — HIGH (ref 3.8–10.5)

## 2021-02-02 PROCEDURE — 99233 SBSQ HOSP IP/OBS HIGH 50: CPT

## 2021-02-02 PROCEDURE — 99232 SBSQ HOSP IP/OBS MODERATE 35: CPT

## 2021-02-02 PROCEDURE — 93010 ELECTROCARDIOGRAM REPORT: CPT

## 2021-02-02 RX ORDER — METRONIDAZOLE 500 MG
500 TABLET ORAL EVERY 8 HOURS
Refills: 0 | Status: DISCONTINUED | OUTPATIENT
Start: 2021-02-02 | End: 2021-02-10

## 2021-02-02 RX ORDER — POTASSIUM CHLORIDE 20 MEQ
40 PACKET (EA) ORAL ONCE
Refills: 0 | Status: COMPLETED | OUTPATIENT
Start: 2021-02-02 | End: 2021-02-02

## 2021-02-02 RX ORDER — POTASSIUM CHLORIDE 20 MEQ
40 PACKET (EA) ORAL DAILY
Refills: 0 | Status: DISCONTINUED | OUTPATIENT
Start: 2021-02-03 | End: 2021-02-10

## 2021-02-02 RX ORDER — METRONIDAZOLE 500 MG
TABLET ORAL
Refills: 0 | Status: DISCONTINUED | OUTPATIENT
Start: 2021-02-02 | End: 2021-02-10

## 2021-02-02 RX ORDER — RANOLAZINE 500 MG/1
500 TABLET, FILM COATED, EXTENDED RELEASE ORAL
Refills: 0 | Status: DISCONTINUED | OUTPATIENT
Start: 2021-02-02 | End: 2021-02-10

## 2021-02-02 RX ORDER — INSULIN GLARGINE 100 [IU]/ML
20 INJECTION, SOLUTION SUBCUTANEOUS AT BEDTIME
Refills: 0 | Status: COMPLETED | OUTPATIENT
Start: 2021-02-02 | End: 2021-02-02

## 2021-02-02 RX ORDER — METRONIDAZOLE 500 MG
500 TABLET ORAL ONCE
Refills: 0 | Status: COMPLETED | OUTPATIENT
Start: 2021-02-02 | End: 2021-02-02

## 2021-02-02 RX ADMIN — ATORVASTATIN CALCIUM 40 MILLIGRAM(S): 80 TABLET, FILM COATED ORAL at 22:41

## 2021-02-02 RX ADMIN — RANOLAZINE 500 MILLIGRAM(S): 500 TABLET, FILM COATED, EXTENDED RELEASE ORAL at 22:41

## 2021-02-02 RX ADMIN — Medication 250 MILLIGRAM(S): at 17:39

## 2021-02-02 RX ADMIN — HEPARIN SODIUM 5000 UNIT(S): 5000 INJECTION INTRAVENOUS; SUBCUTANEOUS at 22:41

## 2021-02-02 RX ADMIN — Medication 4: at 17:41

## 2021-02-02 RX ADMIN — INSULIN GLARGINE 20 UNIT(S): 100 INJECTION, SOLUTION SUBCUTANEOUS at 22:41

## 2021-02-02 RX ADMIN — CEFEPIME 100 MILLIGRAM(S): 1 INJECTION, POWDER, FOR SOLUTION INTRAMUSCULAR; INTRAVENOUS at 03:24

## 2021-02-02 RX ADMIN — Medication 40 MILLIGRAM(S): at 11:33

## 2021-02-02 RX ADMIN — HEPARIN SODIUM 5000 UNIT(S): 5000 INJECTION INTRAVENOUS; SUBCUTANEOUS at 14:53

## 2021-02-02 RX ADMIN — Medication 40 MILLIEQUIVALENT(S): at 14:52

## 2021-02-02 RX ADMIN — Medication 81 MILLIGRAM(S): at 22:41

## 2021-02-02 RX ADMIN — CEFEPIME 100 MILLIGRAM(S): 1 INJECTION, POWDER, FOR SOLUTION INTRAMUSCULAR; INTRAVENOUS at 14:52

## 2021-02-02 RX ADMIN — CARVEDILOL PHOSPHATE 12.5 MILLIGRAM(S): 80 CAPSULE, EXTENDED RELEASE ORAL at 11:33

## 2021-02-02 RX ADMIN — Medication 7 UNIT(S): at 17:41

## 2021-02-02 RX ADMIN — Medication 100 MILLIGRAM(S): at 22:40

## 2021-02-02 RX ADMIN — Medication 250 MILLIGRAM(S): at 05:34

## 2021-02-02 RX ADMIN — Medication 100 MILLIGRAM(S): at 11:33

## 2021-02-02 RX ADMIN — CLOPIDOGREL BISULFATE 75 MILLIGRAM(S): 75 TABLET, FILM COATED ORAL at 11:33

## 2021-02-02 NOTE — PROGRESS NOTE ADULT - SUBJECTIVE AND OBJECTIVE BOX
REASON FOR VISIT: Pulmonary edema, CHF, Elev troponin    HPI: 65 year old man with a history of 3V CAD (cath in 2012 - not amenable to revascularization), PAD, HTN, HLD, DM2 (on insulin), neuropathy, BPH, and recently diagnosed foot osteomyelitis (treated with long-course of IV antibiotics via PICC) admitted on 1/31/2021 with decompensated HF; found to have severe LV dysfunction, mild elevation of troponin.    2/1/'21: dyspnea improved substantially near baseline levels.  2/2/2021:  Comfortable; feels "50% better."  No chest pain today.  Less SOB.    MEDICATIONS  (STANDING):  aspirin enteric coated 81 milliGRAM(s) Oral at bedtime  atorvastatin 40 milliGRAM(s) Oral at bedtime  carvedilol 12.5 milliGRAM(s) Oral every 12 hours  cefepime   IVPB 2000 milliGRAM(s) IV Intermittent every 12 hours  clopidogrel Tablet 75 milliGRAM(s) Oral daily  furosemide   Injectable 40 milliGRAM(s) IV Push daily  glucagon  Injectable 1 milliGRAM(s) IntraMuscular once  heparin   Injectable 5000 Unit(s) SubCutaneous every 8 hours  insulin glargine Injectable (LANTUS) 42 Unit(s) SubCutaneous at bedtime  insulin lispro (ADMELOG) corrective regimen sliding scale   SubCutaneous three times a day before meals  insulin lispro (ADMELOG) corrective regimen sliding scale   SubCutaneous at bedtime  insulin lispro Injectable (ADMELOG) 7 Unit(s) SubCutaneous three times a day before meals  vancomycin  IVPB 750 milliGRAM(s) IV Intermittent every 12 hours    Vital Signs Last 24 Hrs  T(C): 36.9 (02 Feb 2021 07:46), Max: 37.4 (01 Feb 2021 21:45)  T(F): 98.5 (02 Feb 2021 07:46), Max: 99.3 (01 Feb 2021 21:45)  HR: 91 (02 Feb 2021 07:46) (78 - 91)  BP: 95/52 (02 Feb 2021 07:46) (95/52 - 118/74)  RR: 18 (02 Feb 2021 07:46) (18 - 18)  SpO2: 93% (02 Feb 2021 07:46) (93% - 96%)    PHYSICAL EXAM:  Constitutional: Awake and alert, no distress  Respiratory: nonlabored, decreased breath sounds at bases, no crackles/wheeze   Cardiovascular: S1 and S2, regular rate and rhythm, pedal edema  Gastrointestinal: Bowel Sounds present, abdomen is soft, nontender.   Psych: Appropriate mood and affect    LABS:   CARDIAC MARKERS ( 31 Jan 2021 16:51 ) 1.330 ng/mL / x     / x     / x     / x                            8.5    11.03 )-----------( 357      ( 02 Feb 2021 08:09 )             25.5     134<L>  |  100  |  21  ----------------------------<  117<H>  3.7   |  25  |  1.08    12 Lead ECG (01.30.21 @ 23:48):  Normal sinus rhythm, Low voltage QRS, Cannot rule out Anterior infarct , age undetermined. When compared with ECG of 21-JAN-2021 15:41, No significant change was found    CT Angio Chest PE Protocol w/ IV Cont (01.31.21 @ 01:45):  No evidence of pulmonary embolism followed to the segmental pulmonary arterial branches.  Moderate sized bilateral pleural effusions with overlying compressive atelectasis.  Moderate pulmonary vascular congestion.  Small volume perihepatic ascites.    TTE Echo Complete w/o Contrast w/ Doppler (02.01.21 @ 07:55):   Left ventricle systolic function appears moderately impaired; segmental wall motion abnormalities noted consistent with Takotsubo's cardiomyopathy. Estimated Ejection Fraction is 30%.   The left atrium is moderately dilated.   Mild mitral regurgitation    Tele: SR

## 2021-02-02 NOTE — PROGRESS NOTE ADULT - SUBJECTIVE AND OBJECTIVE BOX
Patient is a 65y old  Male who presents with a chief complaint of pulmonary edema (02 Feb 2021 09:41)      HPI:  66 yo M with a PMH CAD, PAD, HTN, HLD, DM2 (on insulin) c/b neuropathy, and BPH who presents with SOB. Over the past day, he has had worsening SOB, particularly on exertion but also at rest and while lying down in bed. He also endorses a chest tightness with the dyspnea and has palpitations on exertion, but denies wheezing. He denies fevers but endorsed some chills today. He has had a chronic nonproductive cough for he past month.    He was recently admitted from 1/21/21-1/27/21 for a right foot cellulitis and osteomyelitis involving the right medial hallux sesamoid and 1st metatarsal. Wound cultures showed rare MSSA, bacteroides fragilis, and strep constellatus. He had a debridement by podiatry on 1/25. He had a PICC line placed and was started on Cefepime and Vancomycin for a planned course of 4-6 weeks. His course was also complicated by hematuria, for which he saw Urology who felt there was no inpatient workup required, but should have an outpatient CT urography/cystography. Since discharge, he has had some loose stools, about one BM per day, and not fully liquid. He denies abdominal pain, nausea, vomiting, current hematuria, or dysuria.  His right foot is doing better. Sometimes he has purulent/bloody drainage, but very little. He has an appointment with Dr. Bullock on Monday 2/1.    Of note, he was also stopped off of his HCTZ 25 mg QD and his Losartan was decreased from 100 to 50 mg QD. His Amlodipine 10 mg QD was not restarted upon discharge as well.    In the ED, he was given Furosemide 40 mg IV x1, Meropenem 1 g IV x1, and Vancomycin 750 mg IV x1. (31 Jan 2021 05:52)      Allergies    No Known Allergies    Intolerances        MEDICATIONS  (STANDING):  aspirin enteric coated 81 milliGRAM(s) Oral at bedtime  atorvastatin 40 milliGRAM(s) Oral at bedtime  carvedilol 12.5 milliGRAM(s) Oral every 12 hours  cefepime   IVPB 2000 milliGRAM(s) IV Intermittent every 12 hours  clopidogrel Tablet 75 milliGRAM(s) Oral daily  dextrose 40% Gel 15 Gram(s) Oral once  dextrose 5%. 1000 milliLiter(s) (100 mL/Hr) IV Continuous <Continuous>  dextrose 50% Injectable 25 Gram(s) IV Push once  dextrose 50% Injectable 12.5 Gram(s) IV Push once  dextrose 50% Injectable 25 Gram(s) IV Push once  furosemide   Injectable 40 milliGRAM(s) IV Push daily  glucagon  Injectable 1 milliGRAM(s) IntraMuscular once  heparin   Injectable 5000 Unit(s) SubCutaneous every 8 hours  insulin glargine Injectable (LANTUS) 42 Unit(s) SubCutaneous at bedtime  insulin lispro (ADMELOG) corrective regimen sliding scale   SubCutaneous three times a day before meals  insulin lispro (ADMELOG) corrective regimen sliding scale   SubCutaneous at bedtime  insulin lispro Injectable (ADMELOG) 7 Unit(s) SubCutaneous three times a day before meals  metroNIDAZOLE  IVPB      metroNIDAZOLE  IVPB 500 milliGRAM(s) IV Intermittent once  metroNIDAZOLE  IVPB 500 milliGRAM(s) IV Intermittent every 8 hours  vancomycin  IVPB 750 milliGRAM(s) IV Intermittent every 12 hours    MEDICATIONS  (PRN):        RADIOLOGY    CBC Full  -  ( 02 Feb 2021 08:09 )  WBC Count : 11.03 K/uL  RBC Count : 2.97 M/uL  Hemoglobin : 8.5 g/dL  Hematocrit : 25.5 %  Platelet Count - Automated : 357 K/uL  Mean Cell Volume : 85.9 fl  Mean Cell Hemoglobin : 28.6 pg  Mean Cell Hemoglobin Concentration : 33.3 gm/dL  Auto Neutrophil # : x  Auto Lymphocyte # : x  Auto Monocyte # : x  Auto Eosinophil # : x  Auto Basophil # : x  Auto Neutrophil % : x  Auto Lymphocyte % : x  Auto Monocyte % : x  Auto Eosinophil % : x  Auto Basophil % : x      02-02    136  |  104  |  17  ----------------------------<  97  3.3<L>   |  24  |  0.89    Ca    8.4<L>      02 Feb 2021 08:09  Mg     2.0     02-02    TPro  5.9<L>  /  Alb  2.0<L>  /  TBili  0.4  /  DBili  0.1  /  AST  74<H>  /  ALT  141<H>  /  AlkPhos  177<H>  02-01      < from: MR Foot w/wo IV Cont, Right (02.01.21 @ 09:57) >  EXAM:  MR FOOT WAW IC RT                            PROCEDURE DATE:  02/01/2021          INTERPRETATION:  RIGHT FOOT MRI    CLINICAL INFORMATION: Foot infection status post debridement on 25 January 2021. Question persistent osteomyelitis or abscess.  TECHNIQUE: Multiplanar, multisequence MRI was obtained of the RIGHT foot before and after the intravenous administration of 7.5 ml Gadavist (0 ml discarded) .  COMPARISON: Right foot radiographs 31 January 2021. Right foot MRI 21 January 2021.    FINDINGS:    PERIPHERAL SOFT TISSUES: Evidence of prior surgical debridement is identified along the medial aspect of the hallux and in the first webspace. Surgical packing material is seen in the debridement sites. No focal drainable fluid collections or abscesses are identified. Areas of devascularization are seen along the plantar aspect of the foot in the postsurgical bed. There is extensive subcutaneous edema and skin thickening throughout the imaged foot.  BONE MARROW: Patient status post sesamoidectomy. No focal increased STIR signal or loss of T1 hyperintense signal to suggest acute osteomyelitis. No acute fracture.    MUSCLES AND TENDONS: Diffuse edema in the intrinsic musculature. Tendons are intact without tear or tenosynovitis.  LIGAMENTS AND CAPSULAR STRUCTURES: Lisfranc ligament is intact.  CARTILAGE AND SUBCHONDRAL BONE: Osteoarthritis of the first MTP joint.  SYNOVIUM/JOINT FLUID: No large joint fluid collection.      IMPRESSION:  1.  Patient status post sesamoidectomy and surgical debridement with postsurgical changes as noted above.    < end of copied text >  < from: MR Foot w/wo IV Cont, Right (02.01.21 @ 09:57) >  2.  No abscess or other focal drainable fluid collection.  3.  No acute osteomyelitis.            ANDRIA REN MD; Attending Radiologist  This document has been electronically signed. Feb 1 2021 10:51AM    < end of copied text >

## 2021-02-02 NOTE — PROGRESS NOTE ADULT - PROBLEM SELECTOR PLAN 2
3V disease on remote angiogram (2012) that was not amenable to revascularization -- likely progressed over past 9 years in setting of poorly controlled adherence / DM / HTN; given new LV dysfunction, complaints of angina / dyspnea, and elevated troponin, I recommend coronary angiography (scheduled for 2/3 with Dr Smith).  elevated troponin  chest tightness  ? ACS  continue coreg  ,, plavix , continue ecotrin  will need ischemic evaluation - await results of echo.

## 2021-02-02 NOTE — PROVIDER CONTACT NOTE (CRITICAL VALUE NOTIFICATION) - TEST AND RESULT REPORTED:
growth in anaerobic gram negative rods
Blood cultures from Jan 31st , Preliminary results:  Growth in anerobic bottle :Gram negative rods
troponin 0.070

## 2021-02-02 NOTE — PROGRESS NOTE ADULT - ASSESSMENT
Alert Afebrile Right foot with draining medial wound and liquefactive necrosis to !st web space I&D. Discuss / cardiology today for cardiac Cath. MRI R foot NO abscess? No osteomyelitis. Path reports no osteomyelitis to medial metatarsal head, base of proximal phalanx & sesamoids. Consider future debridement right foot in future. THX

## 2021-02-02 NOTE — PROGRESS NOTE ADULT - SUBJECTIVE AND OBJECTIVE BOX
CHIEF COMPLAINT/DIAGNOSIS: SOB    HPI: 66 yo M with a PMH CAD, PAD, HTN, HLD, DM2 (on insulin) c/b neuropathy, and BPH who presents with SOB. Over the past day, he has had worsening SOB, particularly on exertion but also at rest and while lying down in bed. He also endorses a chest tightness with the dyspnea and has palpitations on exertion, but denies wheezing. He denies fevers but endorsed some chills today. He has had a chronic nonproductive cough for he past month.    He was recently admitted from 1/21/21-1/27/21 for a right foot cellulitis and osteomyelitis involving the right medial hallux sesamoid and 1st metatarsal. Wound cultures showed rare MSSA, bacteroides fragilis, and strep constellatus. He had a debridement by podiatry on 1/25. He had a PICC line placed and was started on Cefepime and Vancomycin for a planned course of 4-6 weeks. His course was also complicated by hematuria, for which he saw Urology who felt there was no inpatient workup required, but should have an outpatient CT urography/cystography. Since discharge, he has had some loose stools, about one BM per day, and not fully liquid. He denies abdominal pain, nausea, vomiting, current hematuria, or dysuria.    His right foot is doing better. Sometimes he has purulent/bloody drainage, but very little. He has an appointment with Dr. Bullock on Monday 2/1. Of note, he was also stopped off of his HCTZ 25 mg QD and his Losartan was decreased from 100 to 50 mg QD. His Amlodipine 10 mg QD was not restarted upon discharge as well. In the ED, he was given Furosemide 40 mg IV x1, Meropenem 1 g IV x1, and Vancomycin 750 mg IV x1.      2/1: sob improved. no further chest tightness of pain. no c/o of foot pain noted.  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 reports breathing improved. no further CP or tightness.     REVIEW OF SYSTEMS: All other review of systems is negative unless indicated above    PHYSICAL EXAM:  Constitutional: Awake and alert, well-developed  HEENT: PERR, MMM  Neck:  supple,  No JVD  Respiratory: Breath sounds are diminished bilaterally, No wheezing   Cardiovascular: S1 and S2, regular rate and rhythm, no Murmurs,    Gastrointestinal: Bowel Sounds present, soft, nontender.   Extremities: mod. b/l peripheral edema.    Vascular: 2+ peripheral pulses  Neurological: A/O x 3, no focal deficits  Musculoskeletal: no calf tenderness.  Skin:  RT foot ulcer  Lines: PICC line in place.    Vital Signs Last 24 Hrs  T(C): 36.9 (02 Feb 2021 07:46), Max: 37.4 (01 Feb 2021 21:45)  T(F): 98.5 (02 Feb 2021 07:46), Max: 99.3 (01 Feb 2021 21:45)  HR: 91 (02 Feb 2021 07:46) (78 - 91)  BP: 95/52 (02 Feb 2021 07:46) (95/52 - 118/74)  BP(mean): --  RR: 18 (02 Feb 2021 07:46) (18 - 18)  SpO2: 93% (02 Feb 2021 07:46) (93% - 96%)-- room air     LABS: All Labs Reviewed:                        8.5    11.03 )-----------( 357      ( 02 Feb 2021 08:09 )             25.5     02-02    136  |  104  |  17  ----------------------------<  97  3.3<L>   |  24  |  0.89    Ca    8.4<L>      02 Feb 2021 08:09  Mg     2.0     02-02    TPro  5.9<L>  /  Alb  2.0<L>  /  TBili  0.4  /  DBili  0.1  /  AST  74<H>  /  ALT  141<H>  /  AlkPhos  177<H>  02-01    CARDIAC MARKERS ( 31 Jan 2021 16:51 )  1.330 ng/mL / x     / x     / x     / x        RADIOLOGY:  < from: MR Foot w/wo IV Cont, Right (02.01.21 @ 09:57) >  IMPRESSION:  1.  Patient status post sesamoidectomy and surgical debridement with postsurgical changes as noted above.  2.  No abscess or other focal drainable fluid collection.  3.  No acute osteomyelitis.  < end of copied text >    < from: Xray Chest 1 View-PORTABLE IMMEDIATE (Xray Chest 1 View-PORTABLE IMMEDIATE .) (01.31.21 @ 01:53) >  IMPRESSION: The cardiac silhouette is enlarged. There is mild CHF with bilateral patchy airspace opacities third small bilateral pleural effusions are present. The bones are intact.  < end of copied text >    ECHOCARDIOGRAM:    < from: TTE Echo Complete w/o Contrast w/ Doppler (02.01.21 @ 07:55) >   Left ventricle systolic function appears moderately impaired;   segmental wall motion abnormalities noted   consistent with Takotsubo's cardiomyopathy.   Estimated Ejection Fraction is 30%.   The left atrium is moderately dilated.   Mild mitral regurgitation  < end of copied text >    MEDICATIONS  (STANDING):  aspirin enteric coated 81 milliGRAM(s) Oral at bedtime  atorvastatin 40 milliGRAM(s) Oral at bedtime  carvedilol 12.5 milliGRAM(s) Oral every 12 hours  cefepime   IVPB 2000 milliGRAM(s) IV Intermittent every 12 hours  clopidogrel Tablet 75 milliGRAM(s) Oral daily  dextrose 40% Gel 15 Gram(s) Oral once  dextrose 5%. 1000 milliLiter(s) (100 mL/Hr) IV Continuous <Continuous>  dextrose 50% Injectable 25 Gram(s) IV Push once  dextrose 50% Injectable 12.5 Gram(s) IV Push once  dextrose 50% Injectable 25 Gram(s) IV Push once  furosemide   Injectable 40 milliGRAM(s) IV Push two times a day  glucagon  Injectable 1 milliGRAM(s) IntraMuscular once  heparin   Injectable 5000 Unit(s) SubCutaneous every 8 hours  insulin glargine Injectable (LANTUS) 42 Unit(s) SubCutaneous at bedtime  insulin lispro (ADMELOG) corrective regimen sliding scale   SubCutaneous three times a day before meals  insulin lispro (ADMELOG) corrective regimen sliding scale   SubCutaneous at bedtime  insulin lispro Injectable (ADMELOG) 5 Unit(s) SubCutaneous three times a day before meals  vancomycin  IVPB 750 milliGRAM(s) IV Intermittent every 12 hours    ASSESSMENT AND PLAN:    66 yo M with a PMH CAD, PAD, HTN, HLD, DM2 (on insulin) c/b neuropathy, and BPH who presents with SOB.    1) Acute respiratory failure/Acute pulmonary edema due acute systolic HF, suspected Takasubos Cardiomyopathy   - echo EF >> EF 30% consistent w/ takasubos CM   - COIVD-19 PCR and RVP negative  - cont. tele monitoring. NSR on monitor. off supplemental 02 - 02 sats stable on RA  - Cont. IV Lasix, reduce to daily. add 40meq K daily  - Cont. Coreg BID (reduced to 12.5mg BID). defer starting ACE or ARB - will d/w cardio about Entresto   - Cardio f/u appreciated     2) Chest Tightness | Elevated troponin  - Cont. ASA, Plavix, Statin  - currently asymptomatic- no CP. ECG for AM  - Cardio f/u appreciated.   2/2: keep NPO tonight for Cardiac cath in AM    3) Right foot plantar ulcer with probable underlying acute on chronic OM and surrounding foot cellulitis s/p surgery. PVD.  - recent wound cultures showed rare MSSA, bacteroides fragilis, and strep constellatus  - S/p debridement 1/25  - F/u blood >> sensitivities w/ Bacteroides fragilis. d/w ID start Flagyl.  - MRI as above. No abscess, drainable fluid collection or osteo  - Cont. Cefepime/ Vancomycin / Flagyl via PICC line   - ID and podiatry consults appreciated     4) Uncontrolled Type 2 diabetes mellitus with insulin therapy  - Cont. ISS, Lantus HS, increase pre-meal to 7U  - Hold outpatient PO meds  - diabetic restricted diet  - A1C 8.8    5) HTN  - Medications adjusted prior admission. HCTZ stopped.   - Norvasc stopped however patient continued to take at home. Hold Norvasc given ble edema. continue coreg.  - BP optimal currently.    6) Leukocytosis  - May be reactive to pulmonary edema. repeat labs improved  - on ABX course as above for OM.    7) Transaminitis -- improving.  - Likely from congestive hepatopathy; with perihepatic ascites  - No abdominal pain  - repeat labs.      8) DVT ppx  - SQ Heparin     Dispo: NPO at midnight for cardiac cath in AM. CHIEF COMPLAINT/DIAGNOSIS: SOB    HPI: 66 yo M with a PMH CAD, PAD, HTN, HLD, DM2 (on insulin) c/b neuropathy, and BPH who presents with SOB. Over the past day, he has had worsening SOB, particularly on exertion but also at rest and while lying down in bed. He also endorses a chest tightness with the dyspnea and has palpitations on exertion, but denies wheezing. He denies fevers but endorsed some chills today. He has had a chronic nonproductive cough for he past month.    He was recently admitted from 1/21/21-1/27/21 for a right foot cellulitis and osteomyelitis involving the right medial hallux sesamoid and 1st metatarsal. Wound cultures showed rare MSSA, bacteroides fragilis, and strep constellatus. He had a debridement by podiatry on 1/25. He had a PICC line placed and was started on Cefepime and Vancomycin for a planned course of 4-6 weeks. His course was also complicated by hematuria, for which he saw Urology who felt there was no inpatient workup required, but should have an outpatient CT urography/cystography. Since discharge, he has had some loose stools, about one BM per day, and not fully liquid. He denies abdominal pain, nausea, vomiting, current hematuria, or dysuria.    His right foot is doing better. Sometimes he has purulent/bloody drainage, but very little. He has an appointment with Dr. Bullock on Monday 2/1. Of note, he was also stopped off of his HCTZ 25 mg QD and his Losartan was decreased from 100 to 50 mg QD. His Amlodipine 10 mg QD was not restarted upon discharge as well. In the ED, he was given Furosemide 40 mg IV x1, Meropenem 1 g IV x1, and Vancomycin 750 mg IV x1.      2/1: sob improved. no further chest tightness of pain. no c/o of foot pain noted.  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 reports breathing improved. no further CP or tightness.     REVIEW OF SYSTEMS: All other review of systems is negative unless indicated above    PHYSICAL EXAM:  Constitutional: Awake and alert, well-developed  HEENT: PERR, MMM  Neck:  supple,  No JVD  Respiratory: Breath sounds are diminished bilaterally, No wheezing   Cardiovascular: S1 and S2, regular rate and rhythm, no Murmurs,    Gastrointestinal: Bowel Sounds present, soft, nontender.   Extremities: mod. b/l peripheral edema.    Vascular: 2+ peripheral pulses  Neurological: A/O x 3, no focal deficits  Musculoskeletal: no calf tenderness.  Skin:  RT foot ulcer  Lines: PICC line in place.    Vital Signs Last 24 Hrs  T(C): 36.9 (02 Feb 2021 07:46), Max: 37.4 (01 Feb 2021 21:45)  T(F): 98.5 (02 Feb 2021 07:46), Max: 99.3 (01 Feb 2021 21:45)  HR: 91 (02 Feb 2021 07:46) (78 - 91)  BP: 95/52 (02 Feb 2021 07:46) (95/52 - 118/74)  BP(mean): --  RR: 18 (02 Feb 2021 07:46) (18 - 18)  SpO2: 93% (02 Feb 2021 07:46) (93% - 96%)-- room air     LABS: All Labs Reviewed:                        8.5    11.03 )-----------( 357      ( 02 Feb 2021 08:09 )             25.5     02-02    136  |  104  |  17  ----------------------------<  97  3.3<L>   |  24  |  0.89    Ca    8.4<L>      02 Feb 2021 08:09  Mg     2.0     02-02    TPro  5.9<L>  /  Alb  2.0<L>  /  TBili  0.4  /  DBili  0.1  /  AST  74<H>  /  ALT  141<H>  /  AlkPhos  177<H>  02-01    CARDIAC MARKERS ( 31 Jan 2021 16:51 )  1.330 ng/mL / x     / x     / x     / x        RADIOLOGY:  < from: MR Foot w/wo IV Cont, Right (02.01.21 @ 09:57) >  IMPRESSION:  1.  Patient status post sesamoidectomy and surgical debridement with postsurgical changes as noted above.  2.  No abscess or other focal drainable fluid collection.  3.  No acute osteomyelitis.  < end of copied text >    < from: Xray Chest 1 View-PORTABLE IMMEDIATE (Xray Chest 1 View-PORTABLE IMMEDIATE .) (01.31.21 @ 01:53) >  IMPRESSION: The cardiac silhouette is enlarged. There is mild CHF with bilateral patchy airspace opacities third small bilateral pleural effusions are present. The bones are intact.  < end of copied text >    ECHOCARDIOGRAM:    < from: TTE Echo Complete w/o Contrast w/ Doppler (02.01.21 @ 07:55) >   Left ventricle systolic function appears moderately impaired;   segmental wall motion abnormalities noted   consistent with Takotsubo's cardiomyopathy.   Estimated Ejection Fraction is 30%.   The left atrium is moderately dilated.   Mild mitral regurgitation  < end of copied text >    MEDICATIONS  (STANDING):  aspirin enteric coated 81 milliGRAM(s) Oral at bedtime  atorvastatin 40 milliGRAM(s) Oral at bedtime  carvedilol 12.5 milliGRAM(s) Oral every 12 hours  cefepime   IVPB 2000 milliGRAM(s) IV Intermittent every 12 hours  clopidogrel Tablet 75 milliGRAM(s) Oral daily  dextrose 40% Gel 15 Gram(s) Oral once  dextrose 5%. 1000 milliLiter(s) (100 mL/Hr) IV Continuous <Continuous>  dextrose 50% Injectable 25 Gram(s) IV Push once  dextrose 50% Injectable 12.5 Gram(s) IV Push once  dextrose 50% Injectable 25 Gram(s) IV Push once  furosemide   Injectable 40 milliGRAM(s) IV Push two times a day  glucagon  Injectable 1 milliGRAM(s) IntraMuscular once  heparin   Injectable 5000 Unit(s) SubCutaneous every 8 hours  insulin glargine Injectable (LANTUS) 42 Unit(s) SubCutaneous at bedtime  insulin lispro (ADMELOG) corrective regimen sliding scale   SubCutaneous three times a day before meals  insulin lispro (ADMELOG) corrective regimen sliding scale   SubCutaneous at bedtime  insulin lispro Injectable (ADMELOG) 5 Unit(s) SubCutaneous three times a day before meals  vancomycin  IVPB 750 milliGRAM(s) IV Intermittent every 12 hours    ASSESSMENT AND PLAN:    66 yo M with a PMH CAD, PAD, HTN, HLD, DM2 (on insulin) c/b neuropathy, and BPH who presents with SOB.    1) Acute respiratory failure/Acute pulmonary edema due acute systolic HF, suspected Takasubos Cardiomyopathy   - echo EF >> EF 30% consistent w/ takasubos CM   - COIVD-19 PCR and RVP negative  - cont. tele monitoring. NSR on monitor. off supplemental 02 - 02 sats stable on RA  - Cont. IV Lasix, reduce to daily. add 40meq K daily  - Cont. Coreg BID (reduced to 12.5mg BID). defer starting ACE or ARB - will d/w cardio about Entresto   - Cardio f/u appreciated     2) Chest Tightness | Elevated troponin  - Cont. ASA, Plavix, Statin  - currently asymptomatic- no CP. ECG for AM  - Cardio f/u appreciated.   2/2: keep NPO tonight for Cardiac cath in AM    3) Right foot plantar ulcer with probable underlying acute on chronic OM and surrounding foot cellulitis s/p surgery. PVD.  - recent wound cultures showed rare MSSA, bacteroides fragilis, and strep constellatus  - S/p debridement 1/25  - F/u blood >> sensitivities w/ Bacteroides fragilis. d/w ID start Flagyl.  - MRI as above. No abscess, drainable fluid collection or osteo  - Cont. Cefepime/ Vancomycin / Flagyl via PICC line   - ID and podiatry consults appreciated     4) Uncontrolled Type 2 diabetes mellitus with insulin therapy  - Cont. ISS, Lantus HS, increase pre-meal to 7U  - Hold outpatient PO meds  - diabetic restricted diet  - A1C 8.8  2/2: reduced Lantus. NPO for procedure tm.    5) HTN  - Medications adjusted prior admission. HCTZ stopped.   - Norvasc stopped however patient continued to take at home. Hold Norvasc given ble edema. continue coreg.  - BP optimal currently.    6) Leukocytosis  - May be reactive to pulmonary edema. repeat labs improved  - on ABX course as above for OM.    7) Transaminitis -- improving.  - Likely from congestive hepatopathy; with perihepatic ascites  - No abdominal pain  - repeat labs.      8) DVT ppx  - SQ Heparin     Dispo: NPO at midnight for cardiac cath in AM.

## 2021-02-03 LAB
ANION GAP SERPL CALC-SCNC: 9 MMOL/L — SIGNIFICANT CHANGE UP (ref 5–17)
BASOPHILS # BLD AUTO: 0.04 K/UL — SIGNIFICANT CHANGE UP (ref 0–0.2)
BASOPHILS NFR BLD AUTO: 0.4 % — SIGNIFICANT CHANGE UP (ref 0–2)
BUN SERPL-MCNC: 15 MG/DL — SIGNIFICANT CHANGE UP (ref 7–23)
CALCIUM SERPL-MCNC: 8.3 MG/DL — LOW (ref 8.5–10.1)
CHLORIDE SERPL-SCNC: 105 MMOL/L — SIGNIFICANT CHANGE UP (ref 96–108)
CO2 SERPL-SCNC: 24 MMOL/L — SIGNIFICANT CHANGE UP (ref 22–31)
CREAT SERPL-MCNC: 0.77 MG/DL — SIGNIFICANT CHANGE UP (ref 0.5–1.3)
CULTURE RESULTS: SIGNIFICANT CHANGE UP
EOSINOPHIL # BLD AUTO: 0.04 K/UL — SIGNIFICANT CHANGE UP (ref 0–0.5)
EOSINOPHIL NFR BLD AUTO: 0.4 % — SIGNIFICANT CHANGE UP (ref 0–6)
GLUCOSE SERPL-MCNC: 75 MG/DL — SIGNIFICANT CHANGE UP (ref 70–99)
HCT VFR BLD CALC: 25.6 % — LOW (ref 39–50)
HGB BLD-MCNC: 8.4 G/DL — LOW (ref 13–17)
IMM GRANULOCYTES NFR BLD AUTO: 1 % — SIGNIFICANT CHANGE UP (ref 0–1.5)
LYMPHOCYTES # BLD AUTO: 1.3 K/UL — SIGNIFICANT CHANGE UP (ref 1–3.3)
LYMPHOCYTES # BLD AUTO: 12.3 % — LOW (ref 13–44)
MAGNESIUM SERPL-MCNC: 1.9 MG/DL — SIGNIFICANT CHANGE UP (ref 1.6–2.6)
MCHC RBC-ENTMCNC: 28.2 PG — SIGNIFICANT CHANGE UP (ref 27–34)
MCHC RBC-ENTMCNC: 32.8 GM/DL — SIGNIFICANT CHANGE UP (ref 32–36)
MCV RBC AUTO: 85.9 FL — SIGNIFICANT CHANGE UP (ref 80–100)
MONOCYTES # BLD AUTO: 1.01 K/UL — HIGH (ref 0–0.9)
MONOCYTES NFR BLD AUTO: 9.6 % — SIGNIFICANT CHANGE UP (ref 2–14)
NEUTROPHILS # BLD AUTO: 8.05 K/UL — HIGH (ref 1.8–7.4)
NEUTROPHILS NFR BLD AUTO: 76.3 % — SIGNIFICANT CHANGE UP (ref 43–77)
PLATELET # BLD AUTO: 351 K/UL — SIGNIFICANT CHANGE UP (ref 150–400)
POTASSIUM SERPL-MCNC: 3.5 MMOL/L — SIGNIFICANT CHANGE UP (ref 3.5–5.3)
POTASSIUM SERPL-SCNC: 3.5 MMOL/L — SIGNIFICANT CHANGE UP (ref 3.5–5.3)
RBC # BLD: 2.98 M/UL — LOW (ref 4.2–5.8)
RBC # FLD: 14.2 % — SIGNIFICANT CHANGE UP (ref 10.3–14.5)
SODIUM SERPL-SCNC: 138 MMOL/L — SIGNIFICANT CHANGE UP (ref 135–145)
SPECIMEN SOURCE: SIGNIFICANT CHANGE UP
WBC # BLD: 10.55 K/UL — HIGH (ref 3.8–10.5)
WBC # FLD AUTO: 10.55 K/UL — HIGH (ref 3.8–10.5)

## 2021-02-03 PROCEDURE — 99232 SBSQ HOSP IP/OBS MODERATE 35: CPT

## 2021-02-03 PROCEDURE — 99233 SBSQ HOSP IP/OBS HIGH 50: CPT

## 2021-02-03 PROCEDURE — 93458 L HRT ARTERY/VENTRICLE ANGIO: CPT | Mod: 26,59

## 2021-02-03 PROCEDURE — 92941 PRQ TRLML REVSC TOT OCCL AMI: CPT | Mod: LD

## 2021-02-03 PROCEDURE — 93010 ELECTROCARDIOGRAM REPORT: CPT | Mod: 76,59

## 2021-02-03 RX ORDER — POTASSIUM CHLORIDE 20 MEQ
40 PACKET (EA) ORAL ONCE
Refills: 0 | Status: COMPLETED | OUTPATIENT
Start: 2021-02-03 | End: 2021-02-03

## 2021-02-03 RX ORDER — FERROUS SULFATE 325(65) MG
325 TABLET ORAL DAILY
Refills: 0 | Status: DISCONTINUED | OUTPATIENT
Start: 2021-02-03 | End: 2021-02-10

## 2021-02-03 RX ORDER — FUROSEMIDE 40 MG
40 TABLET ORAL
Refills: 0 | Status: DISCONTINUED | OUTPATIENT
Start: 2021-02-03 | End: 2021-02-05

## 2021-02-03 RX ORDER — CLOPIDOGREL BISULFATE 75 MG/1
375 TABLET, FILM COATED ORAL ONCE
Refills: 0 | Status: COMPLETED | OUTPATIENT
Start: 2021-02-03 | End: 2021-02-03

## 2021-02-03 RX ORDER — SODIUM CHLORIDE 9 MG/ML
1000 INJECTION INTRAMUSCULAR; INTRAVENOUS; SUBCUTANEOUS
Refills: 0 | Status: DISCONTINUED | OUTPATIENT
Start: 2021-02-03 | End: 2021-02-04

## 2021-02-03 RX ORDER — INSULIN GLARGINE 100 [IU]/ML
42 INJECTION, SOLUTION SUBCUTANEOUS AT BEDTIME
Refills: 0 | Status: DISCONTINUED | OUTPATIENT
Start: 2021-02-03 | End: 2021-02-04

## 2021-02-03 RX ADMIN — Medication 250 MILLIGRAM(S): at 06:30

## 2021-02-03 RX ADMIN — Medication 40 MILLIGRAM(S): at 09:54

## 2021-02-03 RX ADMIN — CEFEPIME 100 MILLIGRAM(S): 1 INJECTION, POWDER, FOR SOLUTION INTRAMUSCULAR; INTRAVENOUS at 12:24

## 2021-02-03 RX ADMIN — CARVEDILOL PHOSPHATE 12.5 MILLIGRAM(S): 80 CAPSULE, EXTENDED RELEASE ORAL at 22:34

## 2021-02-03 RX ADMIN — Medication 40 MILLIEQUIVALENT(S): at 12:25

## 2021-02-03 RX ADMIN — Medication 40 MILLIEQUIVALENT(S): at 17:59

## 2021-02-03 RX ADMIN — CARVEDILOL PHOSPHATE 12.5 MILLIGRAM(S): 80 CAPSULE, EXTENDED RELEASE ORAL at 12:25

## 2021-02-03 RX ADMIN — Medication 100 MILLIGRAM(S): at 22:35

## 2021-02-03 RX ADMIN — RANOLAZINE 500 MILLIGRAM(S): 500 TABLET, FILM COATED, EXTENDED RELEASE ORAL at 12:25

## 2021-02-03 RX ADMIN — Medication 7 UNIT(S): at 12:27

## 2021-02-03 RX ADMIN — SODIUM CHLORIDE 50 MILLILITER(S): 9 INJECTION INTRAMUSCULAR; INTRAVENOUS; SUBCUTANEOUS at 09:51

## 2021-02-03 RX ADMIN — Medication 325 MILLIGRAM(S): at 17:59

## 2021-02-03 RX ADMIN — CLOPIDOGREL BISULFATE 375 MILLIGRAM(S): 75 TABLET, FILM COATED ORAL at 10:19

## 2021-02-03 RX ADMIN — RANOLAZINE 500 MILLIGRAM(S): 500 TABLET, FILM COATED, EXTENDED RELEASE ORAL at 22:34

## 2021-02-03 RX ADMIN — HEPARIN SODIUM 5000 UNIT(S): 5000 INJECTION INTRAVENOUS; SUBCUTANEOUS at 22:34

## 2021-02-03 RX ADMIN — CLOPIDOGREL BISULFATE 75 MILLIGRAM(S): 75 TABLET, FILM COATED ORAL at 05:36

## 2021-02-03 RX ADMIN — ATORVASTATIN CALCIUM 40 MILLIGRAM(S): 80 TABLET, FILM COATED ORAL at 22:34

## 2021-02-03 RX ADMIN — CEFEPIME 100 MILLIGRAM(S): 1 INJECTION, POWDER, FOR SOLUTION INTRAMUSCULAR; INTRAVENOUS at 01:06

## 2021-02-03 RX ADMIN — Medication 2: at 12:27

## 2021-02-03 RX ADMIN — Medication 40 MILLIGRAM(S): at 17:59

## 2021-02-03 RX ADMIN — Medication 100 MILLIGRAM(S): at 05:02

## 2021-02-03 RX ADMIN — INSULIN GLARGINE 42 UNIT(S): 100 INJECTION, SOLUTION SUBCUTANEOUS at 22:43

## 2021-02-03 RX ADMIN — Medication 250 MILLIGRAM(S): at 17:59

## 2021-02-03 RX ADMIN — Medication 81 MILLIGRAM(S): at 22:34

## 2021-02-03 RX ADMIN — Medication 100 MILLIGRAM(S): at 14:22

## 2021-02-03 NOTE — CHART NOTE - NSCHARTNOTEFT_GEN_A_CORE
66 yo M with a PMH CAD, PAD, HTN, HLD, DM2 (on insulin) c/b neuropathy, and BPH who presents with SOB. CE mildly elevated. Known TVD from 2012.  -Consent obtained for cardiac catheterization w/ coronary angiogram and possible stent placement. Pt is competent, has capacity, and understands risks and benefits of procedure. Risks and benefits discussed. Risk discussed included, but not limited to MI, stroke, mortality, major bleeding, arrythmia, or infection. All questions answered     ASA class:  Creatinine:  GFR:  Bleeding  Risk score: 64 yo M with a PMH CAD, PAD, HTN, HLD, DM2 (on insulin) c/b neuropathy, and BPH who presents with SOB. CE mildly elevated. Known TVD from 2012.  -Consent obtained for cardiac catheterization w/ coronary angiogram and possible stent placement. Pt is competent, has capacity, and understands risks and benefits of procedure. Risks and benefits discussed. Risk discussed included, but not limited to MI, stroke, mortality, major bleeding, arrythmia, or infection. All questions answered     ASA class: III  Creatinine: 0.89  GFR: 88  Bleeding  Risk score: 4.2%

## 2021-02-03 NOTE — CHART NOTE - NSCHARTNOTEFT_GEN_A_CORE
Pt was seen and examined at the bedside. Pt denies chest pain, sob or palpitation, denies Rt arm pain/ numbness or tingling sensation.   VSS, pressure dressing on Rt radial access site intact; no hematoma or bleeding.   +Rt radial pulse, capillary refill , 2 sec.   Cath team will follow in am.

## 2021-02-03 NOTE — PROGRESS NOTE ADULT - SUBJECTIVE AND OBJECTIVE BOX
PCP:    REQUESTING PHYSICIAN:    REASON FOR CONSULT:    CHIEF COMPLAINT:    HPI:  HPI: 65 year old man with a history of 3V CAD (cath in  - not amenable to revascularization), PAD, HTN, HLD, DM2 (on insulin), neuropathy, BPH, and recently diagnosed foot osteomyelitis (treated with long-course of IV antibiotics via PICC) admitted on 2021 with decompensated HF; found to have severe LV dysfunction, mild elevation of troponin.    : dyspnea improved substantially near baseline levels.  2021:  Comfortable; feels "50% better."  No chest pain today.  Less SOB.  2/3/21: Post cath and PCI. Pt denies chest pain. Less short of breath    PAST MEDICAL & SURGICAL HISTORY:  Insulin dependent type 2 diabetes mellitus    Basal cell carcinoma  Of face   s/p excision Oct 2017    PAD (peripheral artery disease)    CAD (coronary atherosclerotic disease)    Hypercholesteremia    Hypertension    BPH (Benign Prostatic Hyperplasia)    Status post debridement  right foot ulcer, with I&amp;D    History of amputation of toe  L foot 2nd toe amputation        SOCIAL HISTORY:    FAMILY HISTORY:  Family history of hyperlipidemia    Family history of hypertension    FH: myocardial infarction  Father had MI,  age 47        ALLERGIES:  Allergies    No Known Allergies    Intolerances        MEDICATIONS:    MEDICATIONS  (STANDING):  aspirin enteric coated 81 milliGRAM(s) Oral at bedtime  atorvastatin 40 milliGRAM(s) Oral at bedtime  carvedilol 12.5 milliGRAM(s) Oral every 12 hours  cefepime   IVPB 2000 milliGRAM(s) IV Intermittent every 12 hours  clopidogrel Tablet 75 milliGRAM(s) Oral daily  dextrose 40% Gel 15 Gram(s) Oral once  dextrose 5%. 1000 milliLiter(s) (100 mL/Hr) IV Continuous <Continuous>  dextrose 50% Injectable 25 Gram(s) IV Push once  dextrose 50% Injectable 12.5 Gram(s) IV Push once  dextrose 50% Injectable 25 Gram(s) IV Push once  furosemide   Injectable 40 milliGRAM(s) IV Push two times a day  glucagon  Injectable 1 milliGRAM(s) IntraMuscular once  heparin   Injectable 5000 Unit(s) SubCutaneous every 8 hours  insulin glargine Injectable (LANTUS) 42 Unit(s) SubCutaneous at bedtime  insulin lispro (ADMELOG) corrective regimen sliding scale   SubCutaneous three times a day before meals  insulin lispro (ADMELOG) corrective regimen sliding scale   SubCutaneous at bedtime  insulin lispro Injectable (ADMELOG) 7 Unit(s) SubCutaneous three times a day before meals  metroNIDAZOLE  IVPB      metroNIDAZOLE  IVPB 500 milliGRAM(s) IV Intermittent every 8 hours  potassium chloride    Tablet ER 40 milliEquivalent(s) Oral daily  ranolazine 500 milliGRAM(s) Oral two times a day  sodium chloride 0.9%. 1000 milliLiter(s) (50 mL/Hr) IV Continuous <Continuous>  vancomycin  IVPB 750 milliGRAM(s) IV Intermittent every 12 hours    MEDICATIONS  (PRN):        Vital Signs Last 24 Hrs  T(C): 36.8 (2021 12:01), Max: 37.8 (2021 20:36)  T(F): 98.2 (2021 12:), Max: 100 (2021 20:36)  HR: 81 (2021 12:) (66 - 81)  BP: 127/69 (2021 12:) (93/54 - 127/69)  BP(mean): --  RR: 17 (2021 12:01) (16 - 18)  SpO2: 97% (2021 12:01) (94% - 100%)Daily     Daily Weight in k.1 (2021 06:00)I&O's Summary    2021 07:01  -  2021 12:35  --------------------------------------------------------  IN: 170 mL / OUT: 800 mL / NET: -630 mL        PHYSICAL EXAM:    Constitutional: NAD, awake and alert, well-developed  HEENT: PERR, EOMI,  No oral cyananosis.  Neck:  supple,  No JVD  Respiratory: Breath sounds are clear bilaterally, No wheezing, rales or rhonchi  Cardiovascular: S1 and S2, regular rate and rhythm, no Murmurs, gallops or rubs  Gastrointestinal: Bowel Sounds present, soft, nontender.   Extremities: Right lower extremity dressing  Vascular: 2+ peripheral pulses  Neurological: A/O x 3, no focal deficits  Musculoskeletal: no calf tenderness.  Skin: No rashes.      LABS: All Labs Reviewed:                        8.4    10.55 )-----------( 351      ( 2021 08:50 )             25.6                         8.5    11.03 )-----------( 357      ( 2021 08:09 )             25.5                         8.7    13.54 )-----------( 391      ( 2021 17:59 )             26.8     2021 08:50    138    |  105    |  15     ----------------------------<  75     3.5     |  24     |  0.77   2021 08:09    136    |  104    |  17     ----------------------------<  97     3.3     |  24     |  0.89   2021 17:59    134    |  100    |  21     ----------------------------<  117    3.7     |  25     |  1.08     Ca    8.3        2021 08:50  Ca    8.4        2021 08:09  Ca    8.2        2021 17:59  Mg     1.9       2021 08:50  Mg     2.0       2021 08:09    TPro  5.9    /  Alb  2.0    /  TBili  0.4    /  DBili  0.1    /  AST  74     /  ALT  141    /  AlkPhos  177    2021 17:59          Blood Culture: Organism --  Gram Stain Blood -- Gram Stain --  Specimen Source .Urine None  Culture-Blood --    Organism Blood Culture PCR  Gram Stain Blood -- Gram Stain   Growth in anaerobic bottle: Gram Negative Rods  Specimen Source .Blood None  Culture-Blood --            RADIOLOGY/EKG:    < from: 12 Lead ECG (21 @ 09:07) >    Diagnosis Line *** Poor data quality, interpretationmay be adversely affected  Probable  Sinus rhythm  Left axis deviation  Low voltage QRS (limb leads)  Poor R wave progression. Possible  Anterior infarct  Inferior infarct , age undetermined  Abnormal ECG  Confirmed by GUI IBRAHIM MD ([id number]) on 2/3/2021 12:38:04 PM    < end of copied text >    ECHO/CARDIAC CATHTERIZATION/STRESS TEST:  < from: TTE Echo Complete w/o Contrast w/ Doppler (21 @ 07:55) >  Impression     Summary     Left ventricle systolic function appears moderately impaired;   segmental wall motion abnormalities noted   consistent with Takotsubo's cardiomyopathy.   Estimated Ejection Fraction is 30%.   The left atrium is moderately dilated.   Mild mitral regurgitation     Signature     ----------------------------------------------------------------   Electronically signed byCarlos Zacarias MD(Interpreting   physician) on 2021 07:03 PM   ----------------------------------------------------------------    < end of copied text >

## 2021-02-03 NOTE — PACU DISCHARGE NOTE - COMMENTS
pt a+o x4. vital signs stable. Pt safety maintained. Rt radial site benign, DGS dry and intact, no hematoma or bleeding noted. Site soft & nontender, +2 palpable pulses bilaterally. Bilateral upper extremities warm/intact/mobile. PIVL site benign. Skin intact. Report given to accepting RN: Mikki , Transferred via stretcher with CM and RN in attendance in stable condition.

## 2021-02-03 NOTE — PROGRESS NOTE ADULT - SUBJECTIVE AND OBJECTIVE BOX
HPI:  66 yo M with a PMH CAD, PAD, HTN, HLD, DM2 (on insulin) c/b neuropathy, and BPH who presents with SOB. Over the past day, he has had worsening SOB, particularly on exertion but also at rest and while lying down in bed. He also endorses a chest tightness with the dyspnea and has palpitations on exertion, but denies wheezing. During the hospital course, Pt noted to have mildly elevated in troponin, peak at 1.39.   Pt brought to cath lab today for further ischemic evaluation.     Pt is now s/p proximal LAD stent x1.     Cath Report <from: Cardiac Cath Lab - Adult (02.03.21 @ 08:23) >  Impression     Diagnostic Conclusions   New proximal LAD stenosis. Multiple small branch stenoses previously seen.   LVEDP very elevated.     Interventional Conclusions     Successful DARCIE of proximal LAD     Recommendations  ASA and Plavix, guideline directed medical therapy for heart failure  < end of copied text >      ROS: denies chest pain/ pressure, SOB or palpitation. O2 sat 90-91% with 3L NC, then 99% with 4L.     Vital Signs;  T(C): 36.7 (02-03-21 @ 07:20), Max: 37.8 (02-02-21 @ 20:36)  HR: 66 (02-03-21 @ 09:45) (66 - 79)  BP: 115/70 (02-03-21 @ 09:45) (93/54 - 125/74)  RR: 16 (02-03-21 @ 09:45) (16 - 18)  SpO2: 100% (02-03-21 @ 09:45) (94% - 100%)    Physical Exam:    General: awake, no acute distress   HEENT: NCAT, neck supple   CV: RRR, normal S1S2, no murmur/ rub   Pulmonary: clear, no wheezing or rales   GI: +BS, soft, non-tender, non-distended   : voiding freely   Extremities: no edema, doppler pulses B/L, Rt Leg with dressing; clean and intact, PICC line in Rt upper arm   Skin: no rashes or lesion. Rt. radial access site with radial band (to be removed at 11:15 am): no hematoma or bleeding     Labs:  LABS: All Labs Reviewed:                        8.5    11.03 )-----------( 357      ( 02 Feb 2021 08:09 )             25.5     02-02    136  |  104  |  17  ----------------------------<  97  3.3<L>   |  24  |  0.89    Ca    8.4<L>      02 Feb 2021 08:09  Mg     2.0     02-02    TPro  5.9<L>  /  Alb  2.0<L>  /  TBili  0.4  /  DBili  0.1  /  AST  74<H>  /  ALT  141<H>  /  AlkPhos  177<H>  02-01  Blood Culture: 01-31 @ 04:58  Organism --  Gram Stain Blood -- Gram Stain --  Specimen Source .Urine None  Culture-Blood --    01-31 @ 00:23  Organism Blood Culture PCR  Gram Stain Blood -- Gram Stain   Growth in anaerobic bottle: Gram Negative Rods  Specimen Source .Blood None  Culture-Blood --    Medications:  aspirin enteric coated 81 milliGRAM(s) Oral at bedtime  atorvastatin 40 milliGRAM(s) Oral at bedtime  carvedilol 12.5 milliGRAM(s) Oral every 12 hours  cefepime   IVPB 2000 milliGRAM(s) IV Intermittent every 12 hours  clopidogrel Tablet 75 milliGRAM(s) Oral daily  dextrose 40% Gel 15 Gram(s) Oral once  dextrose 5%. 1000 milliLiter(s) IV Continuous <Continuous>  dextrose 50% Injectable 25 Gram(s) IV Push once  dextrose 50% Injectable 12.5 Gram(s) IV Push once  dextrose 50% Injectable 25 Gram(s) IV Push once  furosemide   Injectable 40 milliGRAM(s) IV Push daily  glucagon  Injectable 1 milliGRAM(s) IntraMuscular once  heparin   Injectable 5000 Unit(s) SubCutaneous every 8 hours  insulin lispro (ADMELOG) corrective regimen sliding scale   SubCutaneous three times a day before meals  insulin lispro (ADMELOG) corrective regimen sliding scale   SubCutaneous at bedtime  insulin lispro Injectable (ADMELOG) 7 Unit(s) SubCutaneous three times a day before meals  metroNIDAZOLE  IVPB      metroNIDAZOLE  IVPB 500 milliGRAM(s) IV Intermittent every 8 hours  potassium chloride    Tablet ER 40 milliEquivalent(s) Oral daily  ranolazine 500 milliGRAM(s) Oral two times a day  sodium chloride 0.9%. 1000 milliLiter(s) IV Continuous <Continuous>  vancomycin  IVPB 750 milliGRAM(s) IV Intermittent every 12 hours    # CAD: s/p proximal LAD stent x1  - tele monitor   - gentle IV hydration  - Labs and EKG in am   - continue ASA and Plavix (pt only got 3 dose of Plavix so far, thus given 375 mg extra dose in cath lab holding)  - continue BB  - continue statin  - post procedure, outcome and follow up care reviewed with patient/MD  - follow up with Dr. Franco in 1 week  HPI:  66 yo M with a PMH CAD, PAD, HTN, HLD, DM2 (on insulin) c/b neuropathy, and BPH who presents with SOB. Over the past day, he has had worsening SOB, particularly on exertion but also at rest and while lying down in bed. He also endorses a chest tightness with the dyspnea and has palpitations on exertion, but denies wheezing. During the hospital course, Pt noted to have mildly elevated in troponin, peak at 1.39.   Pt brought to cath lab today for further ischemic evaluation.     Pt is now s/p proximal LAD stent x1.     Cath Report <from: Cardiac Cath Lab - Adult (02.03.21 @ 08:23) >  Impression     Diagnostic Conclusions   New proximal LAD stenosis. Multiple small branch stenoses previously seen.   LVEDP very elevated.     Interventional Conclusions     Successful DARCIE of proximal LAD     Recommendations  ASA and Plavix, guideline directed medical therapy for heart failure  < end of copied text >      ROS: denies chest pain/ pressure, SOB or palpitation. O2 sat 90-91% with 3L NC, then 99% with 4L.     Vital Signs;  T(C): 36.7 (02-03-21 @ 07:20), Max: 37.8 (02-02-21 @ 20:36)  HR: 66 (02-03-21 @ 09:45) (66 - 79)  BP: 115/70 (02-03-21 @ 09:45) (93/54 - 125/74)  RR: 16 (02-03-21 @ 09:45) (16 - 18)  SpO2: 100% (02-03-21 @ 09:45) (94% - 100%)    Physical Exam:    General: awake, no acute distress   HEENT: NCAT, neck supple   CV: RRR, normal S1S2, no murmur/ rub   Pulmonary: clear, no wheezing or rales   GI: +BS, soft, non-tender, non-distended   : voiding freely   Extremities: no edema, doppler pulses B/L, Rt Leg with dressing; clean and intact, PICC line in Rt upper arm   Skin: no rashes or lesion. Rt. radial access site with radial band (to be removed at 11:15 am): no hematoma or bleeding     Labs:  LABS: All Labs Reviewed:                        8.5    11.03 )-----------( 357      ( 02 Feb 2021 08:09 )             25.5     02-02    136  |  104  |  17  ----------------------------<  97  3.3<L>   |  24  |  0.89    Ca    8.4<L>      02 Feb 2021 08:09  Mg     2.0     02-02    TPro  5.9<L>  /  Alb  2.0<L>  /  TBili  0.4  /  DBili  0.1  /  AST  74<H>  /  ALT  141<H>  /  AlkPhos  177<H>  02-01  Blood Culture: 01-31 @ 04:58  Organism --  Gram Stain Blood -- Gram Stain --  Specimen Source .Urine None  Culture-Blood --    01-31 @ 00:23  Organism Blood Culture PCR  Gram Stain Blood -- Gram Stain   Growth in anaerobic bottle: Gram Negative Rods  Specimen Source .Blood None  Culture-Blood --    Medications:  aspirin enteric coated 81 milliGRAM(s) Oral at bedtime  atorvastatin 40 milliGRAM(s) Oral at bedtime  carvedilol 12.5 milliGRAM(s) Oral every 12 hours  cefepime   IVPB 2000 milliGRAM(s) IV Intermittent every 12 hours  clopidogrel Tablet 75 milliGRAM(s) Oral daily  dextrose 40% Gel 15 Gram(s) Oral once  dextrose 5%. 1000 milliLiter(s) IV Continuous <Continuous>  dextrose 50% Injectable 25 Gram(s) IV Push once  dextrose 50% Injectable 12.5 Gram(s) IV Push once  dextrose 50% Injectable 25 Gram(s) IV Push once  furosemide   Injectable 40 milliGRAM(s) IV Push daily  glucagon  Injectable 1 milliGRAM(s) IntraMuscular once  heparin   Injectable 5000 Unit(s) SubCutaneous every 8 hours  insulin lispro (ADMELOG) corrective regimen sliding scale   SubCutaneous three times a day before meals  insulin lispro (ADMELOG) corrective regimen sliding scale   SubCutaneous at bedtime  insulin lispro Injectable (ADMELOG) 7 Unit(s) SubCutaneous three times a day before meals  metroNIDAZOLE  IVPB      metroNIDAZOLE  IVPB 500 milliGRAM(s) IV Intermittent every 8 hours  potassium chloride    Tablet ER 40 milliEquivalent(s) Oral daily  ranolazine 500 milliGRAM(s) Oral two times a day  sodium chloride 0.9%. 1000 milliLiter(s) IV Continuous <Continuous>  vancomycin  IVPB 750 milliGRAM(s) IV Intermittent every 12 hours    # CAD: s/p proximal LAD stent x1  - tele monitor   - gentle IV hydration  - Labs and EKG in am   - continue ASA and Plavix (pt only got 3 dose of Plavix so far, thus given 375 mg extra dose in cath lab holding)  - continue BB  - continue statin  - post procedure, outcome and follow up care reviewed with patient/MD  - follow up with Dr. Franco in 1 week     Plan was discussed with Dr. Smith, Pt and Cath RN.     Addendum at 11: 23am: Rt radial band removed and applied manual compression for 10 min. Hemostasis obtained. Pressure dressing applied on Rt radial access site. VSS, O2 sat at 96% with 3L NC denies chest pain, sob or palpitation.

## 2021-02-03 NOTE — PROGRESS NOTE ADULT - SUBJECTIVE AND OBJECTIVE BOX
CHIEF COMPLAINT/DIAGNOSIS: SOB    HPI: 64 yo M with a PMH CAD, PAD, HTN, HLD, DM2 (on insulin) c/b neuropathy, and BPH who presents with SOB. Over the past day, he has had worsening SOB, particularly on exertion but also at rest and while lying down in bed. He also endorses a chest tightness with the dyspnea and has palpitations on exertion, but denies wheezing. He denies fevers but endorsed some chills today. He has had a chronic nonproductive cough for he past month.    He was recently admitted from 1/21/21-1/27/21 for a right foot cellulitis and osteomyelitis involving the right medial hallux sesamoid and 1st metatarsal. Wound cultures showed rare MSSA, bacteroides fragilis, and strep constellatus. He had a debridement by podiatry on 1/25. He had a PICC line placed and was started on Cefepime and Vancomycin for a planned course of 4-6 weeks. His course was also complicated by hematuria, for which he saw Urology who felt there was no inpatient workup required, but should have an outpatient CT urography/cystography. Since discharge, he has had some loose stools, about one BM per day, and not fully liquid. He denies abdominal pain, nausea, vomiting, current hematuria, or dysuria.    His right foot is doing better. Sometimes he has purulent/bloody drainage, but very little. He has an appointment with Dr. Bullock on Monday 2/1. Of note, he was also stopped off of his HCTZ 25 mg QD and his Losartan was decreased from 100 to 50 mg QD. His Amlodipine 10 mg QD was not restarted upon discharge as well. In the ED, he was given Furosemide 40 mg IV x1, Meropenem 1 g IV x1, and Vancomycin 750 mg IV x1.      2/1: sob improved. no further chest tightness of pain. no c/o of foot pain noted.  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 reports breathing improved. no further CP or tightness.   2/3:    REVIEW OF SYSTEMS: All other review of systems is negative unless indicated above    PHYSICAL EXAM:  Constitutional: Awake and alert, well-developed  HEENT: PERR, MMM  Neck:  supple,  No JVD  Respiratory: Breath sounds are diminished bilaterally, No wheezing   Cardiovascular: S1 and S2, regular rate and rhythm, no Murmurs,    Gastrointestinal: Bowel Sounds present, soft, nontender.   Extremities: mod. b/l peripheral edema.    Vascular: 2+ peripheral pulses  Neurological: A/O x 3, no focal deficits  Musculoskeletal: no calf tenderness.  Skin:  RT foot ulcer  Lines: PICC line in place.    Vital Signs Last 24 Hrs  T(C): 36.7 (03 Feb 2021 07:20), Max: 37.8 (02 Feb 2021 20:36)  T(F): 98.1 (03 Feb 2021 07:20), Max: 100 (02 Feb 2021 20:36)  HR: 76 (03 Feb 2021 10:45) (66 - 79)  BP: 122/75 (03 Feb 2021 10:45) (93/54 - 125/74)  BP(mean): --  RR: 16 (03 Feb 2021 10:45) (16 - 18)  SpO2: 98% (03 Feb 2021 10:45) (94% - 100%) -- room air     LABS: All Labs Reviewed:                        8.4    10.55 )-----------( 351      ( 03 Feb 2021 08:50 )             25.6     02-03    138  |  105  |  15  ----------------------------<  75  3.5   |  24  |  0.77    Ca    8.3<L>      03 Feb 2021 08:50  Mg     1.9     02-03    TPro  5.9<L>  /  Alb  2.0<L>  /  TBili  0.4  /  DBili  0.1  /  AST  74<H>  /  ALT  141<H>  /  AlkPhos  177<H>  02-01    RADIOLOGY:  < from: MR Foot w/wo IV Cont, Right (02.01.21 @ 09:57) >  IMPRESSION:  1.  Patient status post sesamoidectomy and surgical debridement with postsurgical changes as noted above.  2.  No abscess or other focal drainable fluid collection.  3.  No acute osteomyelitis.  < end of copied text >    < from: Xray Chest 1 View-PORTABLE IMMEDIATE (Xray Chest 1 View-PORTABLE IMMEDIATE .) (01.31.21 @ 01:53) >  IMPRESSION: The cardiac silhouette is enlarged. There is mild CHF with bilateral patchy airspace opacities third small bilateral pleural effusions are present. The bones are intact.  < end of copied text >    CARDIAC CATH:  < from: Cardiac Cath Lab - Adult (02.03.21 @ 08:23) >   Interventional Conclusions   Successful DARCIE of proximal LAD   Recommendations   ASA and Plavix, guideline directed medical therapy for heart failure  < end of copied text >    ECHOCARDIOGRAM:    < from: TTE Echo Complete w/o Contrast w/ Doppler (02.01.21 @ 07:55) >   Left ventricle systolic function appears moderately impaired;   segmental wall motion abnormalities noted   consistent with Takotsubo's cardiomyopathy.   Estimated Ejection Fraction is 30%.   The left atrium is moderately dilated.   Mild mitral regurgitation  < end of copied text >    MEDICATIONS  (STANDING):  aspirin enteric coated 81 milliGRAM(s) Oral at bedtime  atorvastatin 40 milliGRAM(s) Oral at bedtime  carvedilol 12.5 milliGRAM(s) Oral every 12 hours  cefepime   IVPB 2000 milliGRAM(s) IV Intermittent every 12 hours  clopidogrel Tablet 75 milliGRAM(s) Oral daily  dextrose 40% Gel 15 Gram(s) Oral once  dextrose 5%. 1000 milliLiter(s) (100 mL/Hr) IV Continuous <Continuous>  dextrose 50% Injectable 25 Gram(s) IV Push once  dextrose 50% Injectable 12.5 Gram(s) IV Push once  dextrose 50% Injectable 25 Gram(s) IV Push once  furosemide   Injectable 40 milliGRAM(s) IV Push two times a day  glucagon  Injectable 1 milliGRAM(s) IntraMuscular once  heparin   Injectable 5000 Unit(s) SubCutaneous every 8 hours  insulin glargine Injectable (LANTUS) 42 Unit(s) SubCutaneous at bedtime  insulin lispro (ADMELOG) corrective regimen sliding scale   SubCutaneous three times a day before meals  insulin lispro (ADMELOG) corrective regimen sliding scale   SubCutaneous at bedtime  insulin lispro Injectable (ADMELOG) 5 Unit(s) SubCutaneous three times a day before meals  vancomycin  IVPB 750 milliGRAM(s) IV Intermittent every 12 hours    ASSESSMENT AND PLAN:    64 yo M with a PMH CAD, PAD, HTN, HLD, DM2 (on insulin) c/b neuropathy, and BPH who presents with SOB.    1) Acute respiratory failure/Acute pulmonary edema due acute systolic HF, suspected Takasubos Cardiomyopathy   - echo EF >> EF 30% consistent w/ takasubos CM   - COIVD-19 PCR and RVP negative  - cont. tele monitoring. NSR on monitor. off supplemental 02 - 02 sats stable on RA  - Cont. IV Lasix (switch to BID) add 40meq K daily  - Cont. Coreg BID (reduced to 12.5mg BID). defer starting ACE or ARB for now will readdress later this week.  - Cardio f/u appreciated     2) Chest Tightness | Elevated troponin | s/p DARCIE to LAD  - Cont. ASA, Plavix, Statin  - currently asymptomatic- no CP. ECG for AM  - S/p cardiac cath 2/3 >  Successful DARCIE of proximal LAD.   - Cardio f/u appreciated.     3) Right foot plantar ulcer with probable underlying acute on chronic OM and surrounding foot cellulitis s/p surgery. PVD.  - recent wound cultures showed rare MSSA, bacteroides fragilis, and strep constellatus  - S/p debridement 1/25  - F/u blood >> sensitivities w/ Bacteroides fragilis. d/w ID start Flagyl.  - MRI as above. No abscess, drainable fluid collection or osteo  - Cont. Cefepime/ Vancomycin / Flagyl via PICC line   - ID and podiatry consults appreciated     4) Uncontrolled Type 2 diabetes mellitus with insulin therapy  - Cont. ISS, Lantus HS, increase pre-meal to 7U  - Hold outpatient PO meds  - diabetic restricted diet  - A1C 8.8    5) HTN  - Medications adjusted prior admission. HCTZ stopped.   - Norvasc stopped however patient continued to take at home. Hold Norvasc given ble edema. continue coreg.  - BP optimal currently.    6) Leukocytosis  - May be reactive to pulmonary edema. repeat labs improved  - on ABX course as above for OM.    7) Transaminitis -- improving.  - Likely from congestive hepatopathy; with perihepatic ascites  - No abdominal pain  - repeat labs.      8) DVT ppx  - SQ Heparin    CHIEF COMPLAINT/DIAGNOSIS: SOB    HPI: 64 yo M with a PMH CAD, PAD, HTN, HLD, DM2 (on insulin) c/b neuropathy, and BPH who presents with SOB. Over the past day, he has had worsening SOB, particularly on exertion but also at rest and while lying down in bed. He also endorses a chest tightness with the dyspnea and has palpitations on exertion, but denies wheezing. He denies fevers but endorsed some chills today. He has had a chronic nonproductive cough for he past month.    He was recently admitted from 1/21/21-1/27/21 for a right foot cellulitis and osteomyelitis involving the right medial hallux sesamoid and 1st metatarsal. Wound cultures showed rare MSSA, bacteroides fragilis, and strep constellatus. He had a debridement by podiatry on 1/25. He had a PICC line placed and was started on Cefepime and Vancomycin for a planned course of 4-6 weeks. His course was also complicated by hematuria, for which he saw Urology who felt there was no inpatient workup required, but should have an outpatient CT urography/cystography. Since discharge, he has had some loose stools, about one BM per day, and not fully liquid. He denies abdominal pain, nausea, vomiting, current hematuria, or dysuria.    His right foot is doing better. Sometimes he has purulent/bloody drainage, but very little. He has an appointment with Dr. Bullock on Monday 2/1. Of note, he was also stopped off of his HCTZ 25 mg QD and his Losartan was decreased from 100 to 50 mg QD. His Amlodipine 10 mg QD was not restarted upon discharge as well. In the ED, he was given Furosemide 40 mg IV x1, Meropenem 1 g IV x1, and Vancomycin 750 mg IV x1.      2/1: sob improved. no further chest tightness of pain. no c/o of foot pain noted.  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 reports breathing improved. no further CP or tightness.   2/3: tired, did not sleep well. sob improved.    REVIEW OF SYSTEMS: All other review of systems is negative unless indicated above    PHYSICAL EXAM:  Constitutional: Awake and alert, well-developed  HEENT: PERR, MMM  Neck:  supple,  No JVD  Respiratory: Breath sounds are diminished bilaterally, No wheezing   Cardiovascular: S1 and S2, regular rate and rhythm, no Murmurs,    Gastrointestinal: Bowel Sounds present, soft, nontender.   Extremities: mod. b/l peripheral edema.    Vascular: 2+ peripheral pulses  Neurological: A/O x 3, no focal deficits  Musculoskeletal: no calf tenderness.  Skin:  RT foot ulcer  Lines: PICC line in place.    Vital Signs Last 24 Hrs  T(C): 36.8 (03 Feb 2021 12:01), Max: 37.8 (02 Feb 2021 20:36)  T(F): 98.2 (03 Feb 2021 12:01), Max: 100 (02 Feb 2021 20:36)  HR: 81 (03 Feb 2021 12:01) (66 - 81)  BP: 127/69 (03 Feb 2021 12:01) (93/54 - 127/69)  BP(mean): --  RR: 17 (03 Feb 2021 12:01) (16 - 18)  SpO2: 97% (03 Feb 2021 12:01) (94% - 100%) -- room air     LABS: All Labs Reviewed:                        8.4    10.55 )-----------( 351      ( 03 Feb 2021 08:50 )             25.6     02-03    138  |  105  |  15  ----------------------------<  75  3.5   |  24  |  0.77    Ca    8.3<L>      03 Feb 2021 08:50  Mg     1.9     02-03    TPro  5.9<L>  /  Alb  2.0<L>  /  TBili  0.4  /  DBili  0.1  /  AST  74<H>  /  ALT  141<H>  /  AlkPhos  177<H>  02-01    RADIOLOGY:  < from: MR Foot w/wo IV Cont, Right (02.01.21 @ 09:57) >  IMPRESSION:  1.  Patient status post sesamoidectomy and surgical debridement with postsurgical changes as noted above.  2.  No abscess or other focal drainable fluid collection.  3.  No acute osteomyelitis.  < end of copied text >    < from: Xray Chest 1 View-PORTABLE IMMEDIATE (Xray Chest 1 View-PORTABLE IMMEDIATE .) (01.31.21 @ 01:53) >  IMPRESSION: The cardiac silhouette is enlarged. There is mild CHF with bilateral patchy airspace opacities third small bilateral pleural effusions are present. The bones are intact.  < end of copied text >    CARDIAC CATH:  < from: Cardiac Cath Lab - Adult (02.03.21 @ 08:23) >   Interventional Conclusions   Successful DARCIE of proximal LAD   Recommendations   ASA and Plavix, guideline directed medical therapy for heart failure  < end of copied text >    ECHOCARDIOGRAM:    < from: TTE Echo Complete w/o Contrast w/ Doppler (02.01.21 @ 07:55) >   Left ventricle systolic function appears moderately impaired;   segmental wall motion abnormalities noted   consistent with Takotsubo's cardiomyopathy.   Estimated Ejection Fraction is 30%.   The left atrium is moderately dilated.   Mild mitral regurgitation  < end of copied text >    MEDICATIONS  (STANDING):  aspirin enteric coated 81 milliGRAM(s) Oral at bedtime  atorvastatin 40 milliGRAM(s) Oral at bedtime  carvedilol 12.5 milliGRAM(s) Oral every 12 hours  cefepime   IVPB 2000 milliGRAM(s) IV Intermittent every 12 hours  clopidogrel Tablet 75 milliGRAM(s) Oral daily  dextrose 40% Gel 15 Gram(s) Oral once  dextrose 5%. 1000 milliLiter(s) (100 mL/Hr) IV Continuous <Continuous>  dextrose 50% Injectable 25 Gram(s) IV Push once  dextrose 50% Injectable 12.5 Gram(s) IV Push once  dextrose 50% Injectable 25 Gram(s) IV Push once  furosemide   Injectable 40 milliGRAM(s) IV Push two times a day  glucagon  Injectable 1 milliGRAM(s) IntraMuscular once  heparin   Injectable 5000 Unit(s) SubCutaneous every 8 hours  insulin glargine Injectable (LANTUS) 42 Unit(s) SubCutaneous at bedtime  insulin lispro (ADMELOG) corrective regimen sliding scale   SubCutaneous three times a day before meals  insulin lispro (ADMELOG) corrective regimen sliding scale   SubCutaneous at bedtime  insulin lispro Injectable (ADMELOG) 5 Unit(s) SubCutaneous three times a day before meals  vancomycin  IVPB 750 milliGRAM(s) IV Intermittent every 12 hours    ASSESSMENT AND PLAN:    64 yo M with a PMH CAD, PAD, HTN, HLD, DM2 (on insulin) c/b neuropathy, and BPH who presents with SOB.    1) Acute respiratory failure/Acute pulmonary edema due acute systolic HF, suspected Takasubos Cardiomyopathy   - echo EF >> EF 30% consistent w/ takasubos CM   - COIVD-19 PCR and RVP negative  - cont. tele monitoring. NSR on monitor. off supplemental 02 - 02 sats stable on RA  - Cont. IV Lasix (switch to BID) add 40meq K daily  - Cont. Coreg BID (reduced to 12.5mg BID). defer starting ACE or ARB for now will readdress later this week.  - Cardio f/u appreciated     2) Chest Tightness | Elevated troponin | s/p DARCIE to LAD  - Cont. ASA, Plavix, Statin  - currently asymptomatic- no CP. ECG for AM  - S/p cardiac cath 2/3 >  Successful DARCIE of proximal LAD.   - Cardio f/u appreciated.     3) Right foot plantar ulcer with probable underlying acute on chronic OM and surrounding foot cellulitis s/p surgery. PVD.  - recent wound cultures showed rare MSSA, bacteroides fragilis, and strep constellatus  - S/p debridement 1/25  - F/u blood >> sensitivities w/ Bacteroides fragilis. d/w ID start Flagyl.  - MRI as above. No abscess, drainable fluid collection or osteo  - Cont. Cefepime/ Vancomycin / Flagyl via PICC line   - ID and podiatry consults appreciated     4) Uncontrolled Type 2 diabetes mellitus with insulin therapy  - Cont. ISS, Lantus HS, increase pre-meal to 7U  - Hold outpatient PO meds  - diabetic restricted diet  - A1C 8.8    5) HTN  - Medications adjusted prior admission. HCTZ stopped.   - Norvasc stopped however patient continued to take at home. Hold Norvasc given ble edema. continue coreg.  - BP optimal currently.    6) Leukocytosis  - May be reactive to pulmonary edema. repeat labs improved  - on ABX course as above for OM.    7) Transaminitis -- improving.  - Likely from congestive hepatopathy; with perihepatic ascites  - No abdominal pain  - repeat labs.      8) DVT ppx  - SQ Heparin    CHIEF COMPLAINT/DIAGNOSIS: SOB    HPI: 66 yo M with a PMH CAD, PAD, HTN, HLD, DM2 (on insulin) c/b neuropathy, and BPH who presents with SOB. Over the past day, he has had worsening SOB, particularly on exertion but also at rest and while lying down in bed. He also endorses a chest tightness with the dyspnea and has palpitations on exertion, but denies wheezing. He denies fevers but endorsed some chills today. He has had a chronic nonproductive cough for he past month.    He was recently admitted from 1/21/21-1/27/21 for a right foot cellulitis and osteomyelitis involving the right medial hallux sesamoid and 1st metatarsal. Wound cultures showed rare MSSA, bacteroides fragilis, and strep constellatus. He had a debridement by podiatry on 1/25. He had a PICC line placed and was started on Cefepime and Vancomycin for a planned course of 4-6 weeks. His course was also complicated by hematuria, for which he saw Urology who felt there was no inpatient workup required, but should have an outpatient CT urography/cystography. Since discharge, he has had some loose stools, about one BM per day, and not fully liquid. He denies abdominal pain, nausea, vomiting, current hematuria, or dysuria.    His right foot is doing better. Sometimes he has purulent/bloody drainage, but very little. He has an appointment with Dr. Bullock on Monday 2/1. Of note, he was also stopped off of his HCTZ 25 mg QD and his Losartan was decreased from 100 to 50 mg QD. His Amlodipine 10 mg QD was not restarted upon discharge as well. In the ED, he was given Furosemide 40 mg IV x1, Meropenem 1 g IV x1, and Vancomycin 750 mg IV x1.      2/1: sob improved. no further chest tightness of pain. no c/o of foot pain noted.  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 reports breathing improved. no further CP or tightness.   2/3: tired, did not sleep well. sob improved.    REVIEW OF SYSTEMS: All other review of systems is negative unless indicated above    PHYSICAL EXAM:  Constitutional: Awake and alert, well-developed  HEENT: PERR, MMM  Neck:  supple,  No JVD  Respiratory: Breath sounds are diminished bilaterally, No wheezing   Cardiovascular: S1 and S2, regular rate and rhythm, no Murmurs,    Gastrointestinal: Bowel Sounds present, soft, nontender.   Extremities: mod. b/l peripheral edema.    Vascular: 2+ peripheral pulses  Neurological: A/O x 3, no focal deficits  Musculoskeletal: no calf tenderness.  Skin:  RT foot ulcer  Lines: PICC line in place.    Vital Signs Last 24 Hrs  T(C): 36.8 (03 Feb 2021 12:01), Max: 37.8 (02 Feb 2021 20:36)  T(F): 98.2 (03 Feb 2021 12:01), Max: 100 (02 Feb 2021 20:36)  HR: 81 (03 Feb 2021 12:01) (66 - 81)  BP: 127/69 (03 Feb 2021 12:01) (93/54 - 127/69)  BP(mean): --  RR: 17 (03 Feb 2021 12:01) (16 - 18)  SpO2: 97% (03 Feb 2021 12:01) (94% - 100%) -- room air     LABS: All Labs Reviewed:                        8.4    10.55 )-----------( 351      ( 03 Feb 2021 08:50 )             25.6     02-03    138  |  105  |  15  ----------------------------<  75  3.5   |  24  |  0.77    Ca    8.3<L>      03 Feb 2021 08:50  Mg     1.9     02-03    TPro  5.9<L>  /  Alb  2.0<L>  /  TBili  0.4  /  DBili  0.1  /  AST  74<H>  /  ALT  141<H>  /  AlkPhos  177<H>  02-01    RADIOLOGY:  < from: MR Foot w/wo IV Cont, Right (02.01.21 @ 09:57) >  IMPRESSION:  1.  Patient status post sesamoidectomy and surgical debridement with postsurgical changes as noted above.  2.  No abscess or other focal drainable fluid collection.  3.  No acute osteomyelitis.  < end of copied text >    < from: Xray Chest 1 View-PORTABLE IMMEDIATE (Xray Chest 1 View-PORTABLE IMMEDIATE .) (01.31.21 @ 01:53) >  IMPRESSION: The cardiac silhouette is enlarged. There is mild CHF with bilateral patchy airspace opacities third small bilateral pleural effusions are present. The bones are intact.  < end of copied text >    CARDIAC CATH:  < from: Cardiac Cath Lab - Adult (02.03.21 @ 08:23) >   Interventional Conclusions   Successful DARCIE of proximal LAD   Recommendations   ASA and Plavix, guideline directed medical therapy for heart failure  < end of copied text >    ECHOCARDIOGRAM:    < from: TTE Echo Complete w/o Contrast w/ Doppler (02.01.21 @ 07:55) >   Left ventricle systolic function appears moderately impaired;   segmental wall motion abnormalities noted   consistent with Takotsubo's cardiomyopathy.   Estimated Ejection Fraction is 30%.   The left atrium is moderately dilated.   Mild mitral regurgitation  < end of copied text >    MEDICATIONS  (STANDING):  aspirin enteric coated 81 milliGRAM(s) Oral at bedtime  atorvastatin 40 milliGRAM(s) Oral at bedtime  carvedilol 12.5 milliGRAM(s) Oral every 12 hours  cefepime   IVPB 2000 milliGRAM(s) IV Intermittent every 12 hours  clopidogrel Tablet 75 milliGRAM(s) Oral daily  dextrose 40% Gel 15 Gram(s) Oral once  dextrose 5%. 1000 milliLiter(s) (100 mL/Hr) IV Continuous <Continuous>  dextrose 50% Injectable 25 Gram(s) IV Push once  dextrose 50% Injectable 12.5 Gram(s) IV Push once  dextrose 50% Injectable 25 Gram(s) IV Push once  furosemide   Injectable 40 milliGRAM(s) IV Push two times a day  glucagon  Injectable 1 milliGRAM(s) IntraMuscular once  heparin   Injectable 5000 Unit(s) SubCutaneous every 8 hours  insulin glargine Injectable (LANTUS) 42 Unit(s) SubCutaneous at bedtime  insulin lispro (ADMELOG) corrective regimen sliding scale   SubCutaneous three times a day before meals  insulin lispro (ADMELOG) corrective regimen sliding scale   SubCutaneous at bedtime  insulin lispro Injectable (ADMELOG) 5 Unit(s) SubCutaneous three times a day before meals  vancomycin  IVPB 750 milliGRAM(s) IV Intermittent every 12 hours    ASSESSMENT AND PLAN:    66 yo M with a PMH CAD, PAD, HTN, HLD, DM2 (on insulin) c/b neuropathy, and BPH who presents with SOB.    1) Acute respiratory failure/Acute pulmonary edema due acute systolic HF, suspected Takasubos Cardiomyopathy   - echo EF >> EF 30% consistent w/ takasubos CM   - COIVD-19 PCR and RVP negative  - cont. tele monitoring. NSR on monitor. off supplemental 02 - 02 sats stable on RA  - Cont. IV Lasix (switch to BID) add 40meq K daily  - Cont. Coreg BID (reduced to 12.5mg BID). defer starting ACE or ARB for now will readdress later this week.  - Cardio f/u appreciated     2) Chest Tightness | Elevated troponin | s/p DARCIE to LAD  - Cont. ASA, Plavix, Statin  - currently asymptomatic- no CP. ECG for AM  - S/p cardiac cath 2/3 >  Successful DARCIE of proximal LAD.   - Cardio f/u appreciated.     3) Right foot plantar ulcer with probable underlying acute on chronic OM and surrounding foot cellulitis s/p surgery. PVD.  - recent wound cultures showed rare MSSA, bacteroides fragilis, and strep constellatus  - S/p debridement 1/25  - F/u blood >> sensitivities w/ Bacteroides fragilis. d/w ID start Flagyl.  - MRI as above. No abscess, drainable fluid collection or osteo  - Cont. Cefepime/ Vancomycin / Flagyl via PICC line   - ID and podiatry consults appreciated     4) Uncontrolled Type 2 diabetes mellitus with insulin therapy  - Cont. ISS, Lantus HS, increase pre-meal to 7U  - Hold outpatient PO meds  - diabetic restricted diet  - A1C 8.8    5) HTN  - Medications adjusted prior admission. HCTZ stopped.   - Norvasc stopped however patient continued to take at home. Hold Norvasc given ble edema. continue coreg.  - BP optimal currently.    6) Leukocytosis  - May be reactive to pulmonary edema. repeat labs improved  - on ABX course as above for OM.    7) Transaminitis -- improving.  - Likely from congestive hepatopathy; with perihepatic ascites  - No abdominal pain  - repeat labs.      8) Normocytic Anemia  - f/u iron studies. fobt. b12, folate    8) DVT ppx  - SQ Heparin

## 2021-02-03 NOTE — PROGRESS NOTE ADULT - SUBJECTIVE AND OBJECTIVE BOX
Patient is a 65y old  Male who presents with a chief complaint of pulmonary edema (02 Feb 2021 12:48)      HPI:  64 yo M with a PMH CAD, PAD, HTN, HLD, DM2 (on insulin) c/b neuropathy, and BPH who presents with SOB. Over the past day, he has had worsening SOB, particularly on exertion but also at rest and while lying down in bed. He also endorses a chest tightness with the dyspnea and has palpitations on exertion, but denies wheezing. He denies fevers but endorsed some chills today. He has had a chronic nonproductive cough for he past month.    He was recently admitted from 1/21/21-1/27/21 for a right foot cellulitis and osteomyelitis involving the right medial hallux sesamoid and 1st metatarsal. Wound cultures showed rare MSSA, bacteroides fragilis, and strep constellatus. He had a debridement by podiatry on 1/25. He had a PICC line placed and was started on Cefepime and Vancomycin for a planned course of 4-6 weeks. His course was also complicated by hematuria, for which he saw Urology who felt there was no inpatient workup required, but should have an outpatient CT urography/cystography. Since discharge, he has had some loose stools, about one BM per day, and not fully liquid. He denies abdominal pain, nausea, vomiting, current hematuria, or dysuria.  His right foot is doing better. Sometimes he has purulent/bloody drainage, but very little. He has an appointment with Dr. Bullock on Monday 2/1.    Of note, he was also stopped off of his HCTZ 25 mg QD and his Losartan was decreased from 100 to 50 mg QD. His Amlodipine 10 mg QD was not restarted upon discharge as well.    In the ED, he was given Furosemide 40 mg IV x1, Meropenem 1 g IV x1, and Vancomycin 750 mg IV x1. (31 Jan 2021 05:52)      Allergies    No Known Allergies    Intolerances        MEDICATIONS  (STANDING):  aspirin enteric coated 81 milliGRAM(s) Oral at bedtime  atorvastatin 40 milliGRAM(s) Oral at bedtime  carvedilol 12.5 milliGRAM(s) Oral every 12 hours  cefepime   IVPB 2000 milliGRAM(s) IV Intermittent every 12 hours  clopidogrel Tablet 75 milliGRAM(s) Oral daily  dextrose 40% Gel 15 Gram(s) Oral once  dextrose 5%. 1000 milliLiter(s) (100 mL/Hr) IV Continuous <Continuous>  dextrose 50% Injectable 25 Gram(s) IV Push once  dextrose 50% Injectable 12.5 Gram(s) IV Push once  dextrose 50% Injectable 25 Gram(s) IV Push once  furosemide   Injectable 40 milliGRAM(s) IV Push daily  glucagon  Injectable 1 milliGRAM(s) IntraMuscular once  heparin   Injectable 5000 Unit(s) SubCutaneous every 8 hours  insulin lispro (ADMELOG) corrective regimen sliding scale   SubCutaneous three times a day before meals  insulin lispro (ADMELOG) corrective regimen sliding scale   SubCutaneous at bedtime  insulin lispro Injectable (ADMELOG) 7 Unit(s) SubCutaneous three times a day before meals  metroNIDAZOLE  IVPB      metroNIDAZOLE  IVPB 500 milliGRAM(s) IV Intermittent every 8 hours  potassium chloride    Tablet ER 40 milliEquivalent(s) Oral daily  ranolazine 500 milliGRAM(s) Oral two times a day  vancomycin  IVPB 750 milliGRAM(s) IV Intermittent every 12 hours    MEDICATIONS  (PRN):        RADIOLOGY    CBC Full  -  ( 02 Feb 2021 08:09 )  WBC Count : 11.03 K/uL  RBC Count : 2.97 M/uL  Hemoglobin : 8.5 g/dL  Hematocrit : 25.5 %  Platelet Count - Automated : 357 K/uL  Mean Cell Volume : 85.9 fl  Mean Cell Hemoglobin : 28.6 pg  Mean Cell Hemoglobin Concentration : 33.3 gm/dL  Auto Neutrophil # : x  Auto Lymphocyte # : x  Auto Monocyte # : x  Auto Eosinophil # : x  Auto Basophil # : x  Auto Neutrophil % : x  Auto Lymphocyte % : x  Auto Monocyte % : x  Auto Eosinophil % : x  Auto Basophil % : x      02-02    136  |  104  |  17  ----------------------------<  97  3.3<L>   |  24  |  0.89    Ca    8.4<L>      02 Feb 2021 08:09  Mg     2.0     02-02    TPro  5.9<L>  /  Alb  2.0<L>  /  TBili  0.4  /  DBili  0.1  /  AST  74<H>  /  ALT  141<H>  /  AlkPhos  177<H>  02-01

## 2021-02-04 DIAGNOSIS — I25.5 ISCHEMIC CARDIOMYOPATHY: ICD-10-CM

## 2021-02-04 LAB
ANION GAP SERPL CALC-SCNC: 9 MMOL/L — SIGNIFICANT CHANGE UP (ref 5–17)
BASOPHILS # BLD AUTO: 0.04 K/UL — SIGNIFICANT CHANGE UP (ref 0–0.2)
BASOPHILS NFR BLD AUTO: 0.5 % — SIGNIFICANT CHANGE UP (ref 0–2)
BUN SERPL-MCNC: 13 MG/DL — SIGNIFICANT CHANGE UP (ref 7–23)
CALCIUM SERPL-MCNC: 8.8 MG/DL — SIGNIFICANT CHANGE UP (ref 8.5–10.1)
CHLORIDE SERPL-SCNC: 104 MMOL/L — SIGNIFICANT CHANGE UP (ref 96–108)
CO2 SERPL-SCNC: 25 MMOL/L — SIGNIFICANT CHANGE UP (ref 22–31)
CREAT SERPL-MCNC: 0.88 MG/DL — SIGNIFICANT CHANGE UP (ref 0.5–1.3)
EOSINOPHIL # BLD AUTO: 0.02 K/UL — SIGNIFICANT CHANGE UP (ref 0–0.5)
EOSINOPHIL NFR BLD AUTO: 0.2 % — SIGNIFICANT CHANGE UP (ref 0–6)
FERRITIN SERPL-MCNC: 414 NG/ML — HIGH (ref 30–400)
FOLATE SERPL-MCNC: 8.2 NG/ML — SIGNIFICANT CHANGE UP
GLUCOSE SERPL-MCNC: 33 MG/DL — CRITICAL LOW (ref 70–99)
HCT VFR BLD CALC: 30 % — LOW (ref 39–50)
HGB BLD-MCNC: 9.7 G/DL — LOW (ref 13–17)
IMM GRANULOCYTES NFR BLD AUTO: 0.8 % — SIGNIFICANT CHANGE UP (ref 0–1.5)
IRON SATN MFR SERPL: 11 % — LOW (ref 16–55)
IRON SATN MFR SERPL: 24 UG/DL — LOW (ref 45–165)
LYMPHOCYTES # BLD AUTO: 1.16 K/UL — SIGNIFICANT CHANGE UP (ref 1–3.3)
LYMPHOCYTES # BLD AUTO: 14 % — SIGNIFICANT CHANGE UP (ref 13–44)
MCHC RBC-ENTMCNC: 28 PG — SIGNIFICANT CHANGE UP (ref 27–34)
MCHC RBC-ENTMCNC: 32.3 GM/DL — SIGNIFICANT CHANGE UP (ref 32–36)
MCV RBC AUTO: 86.7 FL — SIGNIFICANT CHANGE UP (ref 80–100)
MONOCYTES # BLD AUTO: 0.84 K/UL — SIGNIFICANT CHANGE UP (ref 0–0.9)
MONOCYTES NFR BLD AUTO: 10.2 % — SIGNIFICANT CHANGE UP (ref 2–14)
NEUTROPHILS # BLD AUTO: 6.13 K/UL — SIGNIFICANT CHANGE UP (ref 1.8–7.4)
NEUTROPHILS NFR BLD AUTO: 74.3 % — SIGNIFICANT CHANGE UP (ref 43–77)
PLATELET # BLD AUTO: 391 K/UL — SIGNIFICANT CHANGE UP (ref 150–400)
POTASSIUM SERPL-MCNC: 3.5 MMOL/L — SIGNIFICANT CHANGE UP (ref 3.5–5.3)
POTASSIUM SERPL-SCNC: 3.5 MMOL/L — SIGNIFICANT CHANGE UP (ref 3.5–5.3)
RBC # BLD: 3.46 M/UL — LOW (ref 4.2–5.8)
RBC # FLD: 14.2 % — SIGNIFICANT CHANGE UP (ref 10.3–14.5)
SODIUM SERPL-SCNC: 138 MMOL/L — SIGNIFICANT CHANGE UP (ref 135–145)
TIBC SERPL-MCNC: 214 UG/DL — LOW (ref 220–430)
UIBC SERPL-MCNC: 190 UG/DL — SIGNIFICANT CHANGE UP (ref 110–370)
VIT B12 SERPL-MCNC: 1262 PG/ML — HIGH (ref 232–1245)
WBC # BLD: 8.26 K/UL — SIGNIFICANT CHANGE UP (ref 3.8–10.5)
WBC # FLD AUTO: 8.26 K/UL — SIGNIFICANT CHANGE UP (ref 3.8–10.5)

## 2021-02-04 PROCEDURE — 99232 SBSQ HOSP IP/OBS MODERATE 35: CPT

## 2021-02-04 PROCEDURE — 93010 ELECTROCARDIOGRAM REPORT: CPT

## 2021-02-04 PROCEDURE — 99233 SBSQ HOSP IP/OBS HIGH 50: CPT

## 2021-02-04 RX ORDER — DEXTROSE 50 % IN WATER 50 %
25 SYRINGE (ML) INTRAVENOUS ONCE
Refills: 0 | Status: COMPLETED | OUTPATIENT
Start: 2021-02-04 | End: 2021-02-04

## 2021-02-04 RX ORDER — LOSARTAN POTASSIUM 100 MG/1
25 TABLET, FILM COATED ORAL DAILY
Refills: 0 | Status: DISCONTINUED | OUTPATIENT
Start: 2021-02-04 | End: 2021-02-10

## 2021-02-04 RX ORDER — INSULIN GLARGINE 100 [IU]/ML
20 INJECTION, SOLUTION SUBCUTANEOUS AT BEDTIME
Refills: 0 | Status: DISCONTINUED | OUTPATIENT
Start: 2021-02-04 | End: 2021-02-05

## 2021-02-04 RX ADMIN — Medication 100 MILLIGRAM(S): at 22:27

## 2021-02-04 RX ADMIN — Medication 325 MILLIGRAM(S): at 12:11

## 2021-02-04 RX ADMIN — HEPARIN SODIUM 5000 UNIT(S): 5000 INJECTION INTRAVENOUS; SUBCUTANEOUS at 05:25

## 2021-02-04 RX ADMIN — Medication 40 MILLIGRAM(S): at 23:59

## 2021-02-04 RX ADMIN — LOSARTAN POTASSIUM 25 MILLIGRAM(S): 100 TABLET, FILM COATED ORAL at 13:23

## 2021-02-04 RX ADMIN — ATORVASTATIN CALCIUM 40 MILLIGRAM(S): 80 TABLET, FILM COATED ORAL at 22:26

## 2021-02-04 RX ADMIN — CLOPIDOGREL BISULFATE 75 MILLIGRAM(S): 75 TABLET, FILM COATED ORAL at 12:11

## 2021-02-04 RX ADMIN — RANOLAZINE 500 MILLIGRAM(S): 500 TABLET, FILM COATED, EXTENDED RELEASE ORAL at 22:26

## 2021-02-04 RX ADMIN — CARVEDILOL PHOSPHATE 12.5 MILLIGRAM(S): 80 CAPSULE, EXTENDED RELEASE ORAL at 23:59

## 2021-02-04 RX ADMIN — Medication 7 UNIT(S): at 17:21

## 2021-02-04 RX ADMIN — HEPARIN SODIUM 5000 UNIT(S): 5000 INJECTION INTRAVENOUS; SUBCUTANEOUS at 13:20

## 2021-02-04 RX ADMIN — INSULIN GLARGINE 20 UNIT(S): 100 INJECTION, SOLUTION SUBCUTANEOUS at 22:27

## 2021-02-04 RX ADMIN — CARVEDILOL PHOSPHATE 12.5 MILLIGRAM(S): 80 CAPSULE, EXTENDED RELEASE ORAL at 12:11

## 2021-02-04 RX ADMIN — CEFEPIME 100 MILLIGRAM(S): 1 INJECTION, POWDER, FOR SOLUTION INTRAMUSCULAR; INTRAVENOUS at 13:19

## 2021-02-04 RX ADMIN — Medication 6: at 12:13

## 2021-02-04 RX ADMIN — Medication 40 MILLIGRAM(S): at 12:12

## 2021-02-04 RX ADMIN — Medication 81 MILLIGRAM(S): at 22:26

## 2021-02-04 RX ADMIN — CEFEPIME 100 MILLIGRAM(S): 1 INJECTION, POWDER, FOR SOLUTION INTRAMUSCULAR; INTRAVENOUS at 01:02

## 2021-02-04 RX ADMIN — Medication 4: at 17:21

## 2021-02-04 RX ADMIN — RANOLAZINE 500 MILLIGRAM(S): 500 TABLET, FILM COATED, EXTENDED RELEASE ORAL at 12:11

## 2021-02-04 RX ADMIN — Medication 25 GRAM(S): at 08:42

## 2021-02-04 RX ADMIN — Medication 250 MILLIGRAM(S): at 17:19

## 2021-02-04 RX ADMIN — Medication 40 MILLIEQUIVALENT(S): at 12:11

## 2021-02-04 RX ADMIN — HEPARIN SODIUM 5000 UNIT(S): 5000 INJECTION INTRAVENOUS; SUBCUTANEOUS at 22:27

## 2021-02-04 RX ADMIN — Medication 7 UNIT(S): at 12:13

## 2021-02-04 RX ADMIN — Medication 250 MILLIGRAM(S): at 05:25

## 2021-02-04 RX ADMIN — Medication 100 MILLIGRAM(S): at 13:19

## 2021-02-04 NOTE — PROGRESS NOTE ADULT - SUBJECTIVE AND OBJECTIVE BOX
Patient is a 65y old  Male who presents with a chief complaint of pulmonary edema (03 Feb 2021 12:35)      HPI:  64 yo M with a PMH CAD, PAD, HTN, HLD, DM2 (on insulin) c/b neuropathy, and BPH who presents with SOB. Over the past day, he has had worsening SOB, particularly on exertion but also at rest and while lying down in bed. He also endorses a chest tightness with the dyspnea and has palpitations on exertion, but denies wheezing. He denies fevers but endorsed some chills today. He has had a chronic nonproductive cough for he past month.    He was recently admitted from 1/21/21-1/27/21 for a right foot cellulitis and osteomyelitis involving the right medial hallux sesamoid and 1st metatarsal. Wound cultures showed rare MSSA, bacteroides fragilis, and strep constellatus. He had a debridement by podiatry on 1/25. He had a PICC line placed and was started on Cefepime and Vancomycin for a planned course of 4-6 weeks. His course was also complicated by hematuria, for which he saw Urology who felt there was no inpatient workup required, but should have an outpatient CT urography/cystography. Since discharge, he has had some loose stools, about one BM per day, and not fully liquid. He denies abdominal pain, nausea, vomiting, current hematuria, or dysuria.  His right foot is doing better. Sometimes he has purulent/bloody drainage, but very little. He has an appointment with Dr. Bullock on Monday 2/1.    Of note, he was also stopped off of his HCTZ 25 mg QD and his Losartan was decreased from 100 to 50 mg QD. His Amlodipine 10 mg QD was not restarted upon discharge as well.    In the ED, he was given Furosemide 40 mg IV x1, Meropenem 1 g IV x1, and Vancomycin 750 mg IV x1. (31 Jan 2021 05:52)      Allergies    No Known Allergies    Intolerances        MEDICATIONS  (STANDING):  aspirin enteric coated 81 milliGRAM(s) Oral at bedtime  atorvastatin 40 milliGRAM(s) Oral at bedtime  carvedilol 12.5 milliGRAM(s) Oral every 12 hours  cefepime   IVPB 2000 milliGRAM(s) IV Intermittent every 12 hours  clopidogrel Tablet 75 milliGRAM(s) Oral daily  dextrose 40% Gel 15 Gram(s) Oral once  dextrose 5%. 1000 milliLiter(s) (100 mL/Hr) IV Continuous <Continuous>  dextrose 50% Injectable 25 Gram(s) IV Push once  dextrose 50% Injectable 12.5 Gram(s) IV Push once  dextrose 50% Injectable 25 Gram(s) IV Push once  ferrous    sulfate 325 milliGRAM(s) Oral daily  furosemide   Injectable 40 milliGRAM(s) IV Push two times a day  glucagon  Injectable 1 milliGRAM(s) IntraMuscular once  heparin   Injectable 5000 Unit(s) SubCutaneous every 8 hours  insulin glargine Injectable (LANTUS) 42 Unit(s) SubCutaneous at bedtime  insulin lispro (ADMELOG) corrective regimen sliding scale   SubCutaneous three times a day before meals  insulin lispro (ADMELOG) corrective regimen sliding scale   SubCutaneous at bedtime  insulin lispro Injectable (ADMELOG) 7 Unit(s) SubCutaneous three times a day before meals  metroNIDAZOLE  IVPB      metroNIDAZOLE  IVPB 500 milliGRAM(s) IV Intermittent every 8 hours  potassium chloride    Tablet ER 40 milliEquivalent(s) Oral daily  ranolazine 500 milliGRAM(s) Oral two times a day  sodium chloride 0.9%. 1000 milliLiter(s) (50 mL/Hr) IV Continuous <Continuous>  vancomycin  IVPB 750 milliGRAM(s) IV Intermittent every 12 hours    MEDICATIONS  (PRN):        RADIOLOGY    CBC Full  -  ( 03 Feb 2021 08:50 )  WBC Count : 10.55 K/uL  RBC Count : 2.98 M/uL  Hemoglobin : 8.4 g/dL  Hematocrit : 25.6 %  Platelet Count - Automated : 351 K/uL  Mean Cell Volume : 85.9 fl  Mean Cell Hemoglobin : 28.2 pg  Mean Cell Hemoglobin Concentration : 32.8 gm/dL  Auto Neutrophil # : 8.05 K/uL  Auto Lymphocyte # : 1.30 K/uL  Auto Monocyte # : 1.01 K/uL  Auto Eosinophil # : 0.04 K/uL  Auto Basophil # : 0.04 K/uL  Auto Neutrophil % : 76.3 %  Auto Lymphocyte % : 12.3 %  Auto Monocyte % : 9.6 %  Auto Eosinophil % : 0.4 %  Auto Basophil % : 0.4 %      02-03    138  |  105  |  15  ----------------------------<  75  3.5   |  24  |  0.77    Ca    8.3<L>      03 Feb 2021 08:50  Mg     1.9     02-03    < from: MR Foot w/wo IV Cont, Right (02.01.21 @ 09:57) >  EXAM:  MR FOOT WAW IC RT                            PROCEDURE DATE:  02/01/2021          INTERPRETATION:  RIGHT FOOT MRI    CLINICAL INFORMATION: Foot infection status post debridement on 25 January 2021. Question persistent osteomyelitis or abscess.  TECHNIQUE: Multiplanar, multisequence MRI was obtained of the RIGHT foot before and after the intravenous administration of 7.5 ml Gadavist (0 ml discarded) .  COMPARISON: Right foot radiographs 31 January 2021. Right foot MRI 21 January 2021.    FINDINGS:    PERIPHERAL SOFT TISSUES: Evidence of prior surgical debridement is identified along the medial aspect of the hallux and in the first webspace. Surgical packing material is seen in the debridement sites. No focal drainable fluid collections or abscesses are identified. Areas of devascularization are seen along the plantar aspect of the foot in the postsurgical bed. There is extensive subcutaneous edema and skin thickening throughout the imaged foot.  BONE MARROW: Patient status post sesamoidectomy. No focal increased STIR signal or loss of T1 hyperintense signal to suggest acute osteomyelitis. No acute fracture.    MUSCLES AND TENDONS: Diffuse edema in the intrinsic musculature. Tendons are intact without tear or tenosynovitis.  LIGAMENTS AND CAPSULAR STRUCTURES: Lisfranc ligament is intact.  CARTILAGE AND SUBCHONDRAL BONE: Osteoarthritis of the first MTP joint.  SYNOVIUM/JOINT FLUID: No large joint fluid collection.      IMPRESSION:  1.  Patient status post sesamoidectomy and surgical debridement with postsurgical changes as noted above.  2.  No abscess or other focal drainable fluid collection.  3.  No acute osteomyelitis.            ANDRIA REN MD; Attending Radiologist  This document has been electronically signed. Feb 1 2021 10:51AM    < end of copied text >  Culture - Blood (01.31.21 @ 00:23)   Gram Stain:   Growth in anaerobic bottle: Gram Negative Rods   - Bacteroides fragilis: Detec   Specimen Source: .Blood None   Organism: Blood Culture PCR   Culture Results:   Growth in anaerobic bottle: Bacteroides fragilis   "Susceptibilities not performed"   ***Blood Panel PCR results on this specimen are available   approximately 3 hours after the Gram stain result.***   Gram stain, PCR, and/or culture results may not always   correspond due to difference in methodologies.   ************************************************************   This PCR assay was performed by multiplex PCR. This   Assay tests for 66 bacterial and resistance gene targets.   Please refer to the [facility] test directory   at [email or web address] for details.   Organism Identification: Blood Culture PCR   Method Type: PCR       Historical Values  Culture - Blood (01.31.21 @ 00:23)   Gram Stain:   Growth in anaerobic bottle: Gram Negative Rods   - Bacteroides fragilis: Detec   < from: Cardiac Cath Lab - Adult (02.03.21 @ 08:23) >   Impression     Diagnostic Conclusions   New proximal LAD stenosis. Multiple small branch stenoses previously seen.   LVEDP very elevated.     Interventional Conclusions     Successful DARCIE of proximal LAD     Recommendations     ASA and Plavix, guideline directed medical therapy for heart failure    Estimated Blood Loss:6ml.     Signatures     ----------------------------------------------------------------   Electronically signed by Donovan Smith MD(Performing   Physician) on 02/03/2021 09:02   ----------------------------------------------------------------        < end of copied text >

## 2021-02-04 NOTE — PROGRESS NOTE ADULT - SUBJECTIVE AND OBJECTIVE BOX
REASON FOR VISIT: Pulmonary edema, CHF, Elev troponin    HPI: 65 year old man with a history of 3V CAD (cath in 2012 - not amenable to revascularization), PAD, HTN, HLD, DM2 (on insulin), neuropathy, BPH, and recently diagnosed foot osteomyelitis (treated with long-course of IV antibiotics via PICC) admitted on 1/31/2021 with decompensated HF; found to have severe LV dysfunction, mild elevation of troponin.  Cath on 2/4 revealed a critical stenosis of proximal LAD (now s/p PCI) and elevated LVEDP.  He is also being treated for a R foot osteomyelitis / cellulitis.    2/4/21:  Symptomatic hypoglycemia earlier today -- now better; less dyspnea; still with occasional cough.    MEDICATIONS  (STANDING):  aspirin enteric coated 81 milliGRAM(s) Oral at bedtime  atorvastatin 40 milliGRAM(s) Oral at bedtime  carvedilol 12.5 milliGRAM(s) Oral every 12 hours  cefepime   IVPB 2000 milliGRAM(s) IV Intermittent every 12 hours  clopidogrel Tablet 75 milliGRAM(s) Oral daily  ferrous    sulfate 325 milliGRAM(s) Oral daily  furosemide   Injectable 40 milliGRAM(s) IV Push two times a day  glucagon  Injectable 1 milliGRAM(s) IntraMuscular once  heparin   Injectable 5000 Unit(s) SubCutaneous every 8 hours  insulin glargine Injectable (LANTUS) 42 Unit(s) SubCutaneous at bedtime  insulin lispro (ADMELOG) corrective regimen sliding scale   SubCutaneous three times a day before meals  insulin lispro (ADMELOG) corrective regimen sliding scale   SubCutaneous at bedtime  insulin lispro Injectable (ADMELOG) 7 Unit(s) SubCutaneous three times a day before meals  metroNIDAZOLE  IVPB      metroNIDAZOLE  IVPB 500 milliGRAM(s) IV Intermittent every 8 hours  potassium chloride    Tablet ER 40 milliEquivalent(s) Oral daily  ranolazine 500 milliGRAM(s) Oral two times a day  sodium chloride 0.9%. 1000 milliLiter(s) (50 mL/Hr) IV Continuous <Continuous>  vancomycin  IVPB 750 milliGRAM(s) IV Intermittent every 12 hours    Vital Signs Last 24 Hrs  T(C): 36.6 (04 Feb 2021 08:08), Max: 37.5 (03 Feb 2021 20:19)  T(F): 97.8 (04 Feb 2021 08:08), Max: 99.5 (03 Feb 2021 20:19)  HR: 63 (04 Feb 2021 08:08) (63 - 81)  BP: 124/68 (04 Feb 2021 08:08) (101/62 - 127/69)  RR: 18 (04 Feb 2021 08:08) (16 - 18)  SpO2: 96% (04 Feb 2021 08:08) (96% - 100%)    PHYSICAL EXAM:  Constitutional: Awake and alert, no distress  Respiratory: nonlabored, no crackles/wheeze   Cardiovascular: S1 and S2, regular rate and rhythm, pedal edema  + R radial pulse with normal distal motor/sensory function  Gastrointestinal: Bowel Sounds present, abdomen is soft, nontender.   Psych: Appropriate mood and affect    LABS:                 9.7    8.26  )-----------( 391      ( 04 Feb 2021 07:14 )             30.0     138  |  104  |  13  ----------------------------<  33<LL>  3.5   |  25  |  0.88    Ca    8.8      04 Feb 2021 07:14  Mg     1.9     02-03    Cardiac Cath Lab - Adult (02.03.21 @ 08:23):   New proximal LAD stenosis. Multiple small branch stenoses previously seen.   LVEDP very elevated.   Interventional Conclusions:  Successful DARCIE of proximal LAD    12 Lead ECG (01.30.21 @ 23:48):  Normal sinus rhythm, Low voltage QRS, Cannot rule out Anterior infarct , age undetermined. When compared with ECG of 21-JAN-2021 15:41, No significant change was found    CT Angio Chest PE Protocol w/ IV Cont (01.31.21 @ 01:45):  No evidence of pulmonary embolism followed to the segmental pulmonary arterial branches.  Moderate sized bilateral pleural effusions with overlying compressive atelectasis.  Moderate pulmonary vascular congestion.  Small volume perihepatic ascites.    TTE Echo Complete w/o Contrast w/ Doppler (02.01.21 @ 07:55):   Left ventricle systolic function appears moderately impaired; segmental wall motion abnormalities noted consistent with Takotsubo's cardiomyopathy. Estimated Ejection Fraction is 30%.   The left atrium is moderately dilated.   Mild mitral regurgitation    Tele: SR

## 2021-02-04 NOTE — PROGRESS NOTE ADULT - SUBJECTIVE AND OBJECTIVE BOX
CHIEF COMPLAINT/DIAGNOSIS: SOB    HPI: 66 yo M with a PMH CAD, PAD, HTN, HLD, DM2 (on insulin) c/b neuropathy, and BPH who presents with SOB. Over the past day, he has had worsening SOB, particularly on exertion but also at rest and while lying down in bed. He also endorses a chest tightness with the dyspnea and has palpitations on exertion, but denies wheezing. He denies fevers but endorsed some chills today. He has had a chronic nonproductive cough for he past month.    He was recently admitted from 1/21/21-1/27/21 for a right foot cellulitis and osteomyelitis involving the right medial hallux sesamoid and 1st metatarsal. Wound cultures showed rare MSSA, bacteroides fragilis, and strep constellatus. He had a debridement by podiatry on 1/25. He had a PICC line placed and was started on Cefepime and Vancomycin for a planned course of 4-6 weeks. His course was also complicated by hematuria, for which he saw Urology who felt there was no inpatient workup required, but should have an outpatient CT urography/cystography. Since discharge, he has had some loose stools, about one BM per day, and not fully liquid. He denies abdominal pain, nausea, vomiting, current hematuria, or dysuria.    His right foot is doing better. Sometimes he has purulent/bloody drainage, but very little. He has an appointment with Dr. Bullock on Monday 2/1. Of note, he was also stopped off of his HCTZ 25 mg QD and his Losartan was decreased from 100 to 50 mg QD. His Amlodipine 10 mg QD was not restarted upon discharge as well. In the ED, he was given Furosemide 40 mg IV x1, Meropenem 1 g IV x1, and Vancomycin 750 mg IV x1.      2/1: sob improved. no further chest tightness of pain. no c/o of foot pain noted.  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 reports breathing improved. no further CP or tightness.   2/3: tired, did not sleep well. sob improved.  2/4:    REVIEW OF SYSTEMS: All other review of systems is negative unless indicated above    PHYSICAL EXAM:  Constitutional: Awake and alert, well-developed  HEENT: PERR, MMM  Neck:  supple,  No JVD  Respiratory: Breath sounds are diminished bilaterally, No wheezing   Cardiovascular: S1 and S2, regular rate and rhythm, no Murmurs,    Gastrointestinal: Bowel Sounds present, soft, nontender.   Extremities: mod. b/l peripheral edema.    Vascular: 2+ peripheral pulses  Neurological: A/O x 3, no focal deficits  Musculoskeletal: no calf tenderness.  Skin:  RT foot ulcer  Lines: PICC line in place.    Vital Signs Last 24 Hrs  T(C): 36.6 (04 Feb 2021 08:08), Max: 37.5 (03 Feb 2021 20:19)  T(F): 97.8 (04 Feb 2021 08:08), Max: 99.5 (03 Feb 2021 20:19)  HR: 54 (04 Feb 2021 10:19) (54 - 81)  BP: 106/59 (04 Feb 2021 10:19) (101/62 - 127/69)  BP(mean): --  RR: 18 (04 Feb 2021 08:08) (16 - 18)  SpO2: 96% (04 Feb 2021 08:08) (96% - 100%) -- room air     LABS: All Labs Reviewed:                        9.7    8.26  )-----------( 391      ( 04 Feb 2021 07:14 )             30.0     02-04    138  |  104  |  13  ----------------------------<  33<LL>  3.5   |  25  |  0.88    Ca    8.8      04 Feb 2021 07:14  Mg     1.9     02-03    RADIOLOGY:  < from: MR Foot w/wo IV Cont, Right (02.01.21 @ 09:57) >  IMPRESSION:  1.  Patient status post sesamoidectomy and surgical debridement with postsurgical changes as noted above.  2.  No abscess or other focal drainable fluid collection.  3.  No acute osteomyelitis.  < end of copied text >    < from: Xray Chest 1 View-PORTABLE IMMEDIATE (Xray Chest 1 View-PORTABLE IMMEDIATE .) (01.31.21 @ 01:53) >  IMPRESSION: The cardiac silhouette is enlarged. There is mild CHF with bilateral patchy airspace opacities third small bilateral pleural effusions are present. The bones are intact.  < end of copied text >    CARDIAC CATH:  < from: Cardiac Cath Lab - Adult (02.03.21 @ 08:23) >   Interventional Conclusions   Successful DARCIE of proximal LAD   Recommendations   ASA and Plavix, guideline directed medical therapy for heart failure  < end of copied text >    ECHOCARDIOGRAM:    < from: TTE Echo Complete w/o Contrast w/ Doppler (02.01.21 @ 07:55) >   Left ventricle systolic function appears moderately impaired;   segmental wall motion abnormalities noted   consistent with Takotsubo's cardiomyopathy.   Estimated Ejection Fraction is 30%.   The left atrium is moderately dilated.   Mild mitral regurgitation  < end of copied text >    MEDICATIONS  (STANDING):  aspirin enteric coated 81 milliGRAM(s) Oral at bedtime  atorvastatin 40 milliGRAM(s) Oral at bedtime  carvedilol 12.5 milliGRAM(s) Oral every 12 hours  cefepime   IVPB 2000 milliGRAM(s) IV Intermittent every 12 hours  clopidogrel Tablet 75 milliGRAM(s) Oral daily  dextrose 40% Gel 15 Gram(s) Oral once  dextrose 5%. 1000 milliLiter(s) (100 mL/Hr) IV Continuous <Continuous>  dextrose 50% Injectable 25 Gram(s) IV Push once  dextrose 50% Injectable 12.5 Gram(s) IV Push once  dextrose 50% Injectable 25 Gram(s) IV Push once  dextrose 50% Injectable 25 Gram(s) IV Push once  ferrous    sulfate 325 milliGRAM(s) Oral daily  furosemide   Injectable 40 milliGRAM(s) IV Push two times a day  glucagon  Injectable 1 milliGRAM(s) IntraMuscular once  heparin   Injectable 5000 Unit(s) SubCutaneous every 8 hours  insulin glargine Injectable (LANTUS) 42 Unit(s) SubCutaneous at bedtime  insulin lispro (ADMELOG) corrective regimen sliding scale   SubCutaneous three times a day before meals  insulin lispro (ADMELOG) corrective regimen sliding scale   SubCutaneous at bedtime  insulin lispro Injectable (ADMELOG) 7 Unit(s) SubCutaneous three times a day before meals  losartan 25 milliGRAM(s) Oral daily  metroNIDAZOLE  IVPB      metroNIDAZOLE  IVPB 500 milliGRAM(s) IV Intermittent every 8 hours  potassium chloride    Tablet ER 40 milliEquivalent(s) Oral daily  ranolazine 500 milliGRAM(s) Oral two times a day  sodium chloride 0.9%. 1000 milliLiter(s) (50 mL/Hr) IV Continuous <Continuous>  vancomycin  IVPB 750 milliGRAM(s) IV Intermittent every 12 hours      ASSESSMENT AND PLAN:    66 yo M with a PMH CAD, PAD, HTN, HLD, DM2 (on insulin) c/b neuropathy, and BPH who presents with SOB.    1) Acute respiratory failure/Acute pulmonary edema due acute systolic HF, suspected Takasubos Cardiomyopathy   - echo EF >> EF 30% consistent w/ takasubos CM   - COIVD-19 PCR and RVP negative  - cont. tele monitoring. NSR on monitor. off supplemental 02 - 02 sats stable on RA  - Cont. IV Lasix (switch to BID) add 40meq K daily  - Cont. Coreg BID (reduced to 12.5mg BID). defer starting ACE or ARB for now will readdress later this week.  - Cardio f/u appreciated     2) Chest Tightness | Elevated troponin | s/p DARCIE to LAD  - Cont. ASA, Plavix, Statin  - currently asymptomatic- no CP. ECG for AM  - S/p cardiac cath 2/3 >  Successful DARCIE of proximal LAD.   - Cardio f/u appreciated.     3) Right foot plantar ulcer with probable underlying acute on chronic OM and surrounding foot cellulitis s/p surgery. PVD.  - recent wound cultures showed rare MSSA, bacteroides fragilis, and strep constellatus  - S/p debridement 1/25  - F/u blood >> sensitivities w/ Bacteroides fragilis. d/w ID start Flagyl.  - MRI as above. No abscess, drainable fluid collection or osteo  - Cont. Cefepime/ Vancomycin / Flagyl via PICC line   - ID and podiatry consults appreciated     4) Uncontrolled Type 2 diabetes mellitus with insulin therapy  - Cont. ISS, Lantus HS, increase pre-meal to 7U  - Hold outpatient PO meds  - diabetic restricted diet  - A1C 8.8  2/4: hypoglycemia episode this AM. adjust PM Lantus.    5) HTN  - Medications adjusted prior admission. HCTZ stopped.   - Norvasc stopped however patient continued to take at home. Hold Norvasc given ble edema. continue coreg.  - BP optimal currently.    6) Leukocytosis  - May be reactive to pulmonary edema. repeat labs improved  - on ABX course as above for OM.    7) Transaminitis -- improving.  - Likely from congestive hepatopathy; with perihepatic ascites  - No abdominal pain  - repeat labs.      8) Normocytic Anemia  - f/u iron studies. fobt. b12, folate    8) DVT ppx  - SQ Heparin    CHIEF COMPLAINT/DIAGNOSIS: SOB    HPI: 64 yo M with a PMH CAD, PAD, HTN, HLD, DM2 (on insulin) c/b neuropathy, and BPH who presents with SOB. Over the past day, he has had worsening SOB, particularly on exertion but also at rest and while lying down in bed. He also endorses a chest tightness with the dyspnea and has palpitations on exertion, but denies wheezing. He denies fevers but endorsed some chills today. He has had a chronic nonproductive cough for he past month.    He was recently admitted from 1/21/21-1/27/21 for a right foot cellulitis and osteomyelitis involving the right medial hallux sesamoid and 1st metatarsal. Wound cultures showed rare MSSA, bacteroides fragilis, and strep constellatus. He had a debridement by podiatry on 1/25. He had a PICC line placed and was started on Cefepime and Vancomycin for a planned course of 4-6 weeks. His course was also complicated by hematuria, for which he saw Urology who felt there was no inpatient workup required, but should have an outpatient CT urography/cystography. Since discharge, he has had some loose stools, about one BM per day, and not fully liquid. He denies abdominal pain, nausea, vomiting, current hematuria, or dysuria.    His right foot is doing better. Sometimes he has purulent/bloody drainage, but very little. He has an appointment with Dr. Bullock on Monday 2/1. Of note, he was also stopped off of his HCTZ 25 mg QD and his Losartan was decreased from 100 to 50 mg QD. His Amlodipine 10 mg QD was not restarted upon discharge as well. In the ED, he was given Furosemide 40 mg IV x1, Meropenem 1 g IV x1, and Vancomycin 750 mg IV x1.      2/1: sob improved. no further chest tightness of pain. no c/o of foot pain noted.  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 reports breathing improved. no further CP or tightness.   2/3: tired, did not sleep well. sob improved.  2/4: symptomatic hypoglycemia this AM. insulin adjusted. less dyspnea, + cough     REVIEW OF SYSTEMS: All other review of systems is negative unless indicated above    PHYSICAL EXAM:  Constitutional: Awake and alert, well-developed  HEENT: PERR, MMM  Neck:  supple,  No JVD  Respiratory: Breath sounds are diminished bilaterally, No wheezing   Cardiovascular: S1 and S2, regular rate and rhythm, no Murmurs,    Gastrointestinal: Bowel Sounds present, soft, nontender.   Extremities: mod. b/l peripheral edema.    Vascular: 2+ peripheral pulses  Neurological: A/O x 3, no focal deficits  Musculoskeletal: no calf tenderness.  Skin:  RT foot ulcer  Lines: PICC line in place.    Vital Signs Last 24 Hrs  T(C): 36.6 (04 Feb 2021 08:08), Max: 37.5 (03 Feb 2021 20:19)  T(F): 97.8 (04 Feb 2021 08:08), Max: 99.5 (03 Feb 2021 20:19)  HR: 79 (04 Feb 2021 12:10) (54 - 79)  BP: 131/77 (04 Feb 2021 12:10) (101/62 - 131/77)  BP(mean): --  RR: 18 (04 Feb 2021 08:08) (18 - 18)  SpO2: 96% (04 Feb 2021 08:08) (96% - 96%) -- room air     LABS: All Labs Reviewed:                        9.7    8.26  )-----------( 391      ( 04 Feb 2021 07:14 )             30.0     02-04    138  |  104  |  13  ----------------------------<  33<LL>  3.5   |  25  |  0.88    Ca    8.8      04 Feb 2021 07:14  Mg     1.9     02-03    RADIOLOGY:  < from: MR Foot w/wo IV Cont, Right (02.01.21 @ 09:57) >  IMPRESSION:  1.  Patient status post sesamoidectomy and surgical debridement with postsurgical changes as noted above.  2.  No abscess or other focal drainable fluid collection.  3.  No acute osteomyelitis.  < end of copied text >    < from: Xray Chest 1 View-PORTABLE IMMEDIATE (Xray Chest 1 View-PORTABLE IMMEDIATE .) (01.31.21 @ 01:53) >  IMPRESSION: The cardiac silhouette is enlarged. There is mild CHF with bilateral patchy airspace opacities third small bilateral pleural effusions are present. The bones are intact.  < end of copied text >    CARDIAC CATH:  < from: Cardiac Cath Lab - Adult (02.03.21 @ 08:23) >   Interventional Conclusions   Successful DARCIE of proximal LAD   Recommendations   ASA and Plavix, guideline directed medical therapy for heart failure  < end of copied text >    ECHOCARDIOGRAM:    < from: TTE Echo Complete w/o Contrast w/ Doppler (02.01.21 @ 07:55) >   Left ventricle systolic function appears moderately impaired;   segmental wall motion abnormalities noted   consistent with Takotsubo's cardiomyopathy.   Estimated Ejection Fraction is 30%.   The left atrium is moderately dilated.   Mild mitral regurgitation  < end of copied text >    MEDICATIONS  (STANDING):  aspirin enteric coated 81 milliGRAM(s) Oral at bedtime  atorvastatin 40 milliGRAM(s) Oral at bedtime  carvedilol 12.5 milliGRAM(s) Oral every 12 hours  cefepime   IVPB 2000 milliGRAM(s) IV Intermittent every 12 hours  clopidogrel Tablet 75 milliGRAM(s) Oral daily  dextrose 40% Gel 15 Gram(s) Oral once  dextrose 5%. 1000 milliLiter(s) (100 mL/Hr) IV Continuous <Continuous>  dextrose 50% Injectable 25 Gram(s) IV Push once  dextrose 50% Injectable 12.5 Gram(s) IV Push once  dextrose 50% Injectable 25 Gram(s) IV Push once  dextrose 50% Injectable 25 Gram(s) IV Push once  ferrous    sulfate 325 milliGRAM(s) Oral daily  furosemide   Injectable 40 milliGRAM(s) IV Push two times a day  glucagon  Injectable 1 milliGRAM(s) IntraMuscular once  heparin   Injectable 5000 Unit(s) SubCutaneous every 8 hours  insulin glargine Injectable (LANTUS) 20 Unit(s) SubCutaneous at bedtime  insulin lispro (ADMELOG) corrective regimen sliding scale   SubCutaneous three times a day before meals  insulin lispro (ADMELOG) corrective regimen sliding scale   SubCutaneous at bedtime  insulin lispro Injectable (ADMELOG) 7 Unit(s) SubCutaneous three times a day before meals  losartan 25 milliGRAM(s) Oral daily  metroNIDAZOLE  IVPB      metroNIDAZOLE  IVPB 500 milliGRAM(s) IV Intermittent every 8 hours  potassium chloride    Tablet ER 40 milliEquivalent(s) Oral daily  ranolazine 500 milliGRAM(s) Oral two times a day  vancomycin  IVPB 750 milliGRAM(s) IV Intermittent every 12 hours      ASSESSMENT AND PLAN:    64 yo M with a PMH CAD, PAD, HTN, HLD, DM2 (on insulin) c/b neuropathy, and BPH who presents with SOB.    1) Acute respiratory failure/Acute pulmonary edema due acute systolic HF, suspected Takasubos Cardiomyopathy   - echo EF >> EF 30% consistent w/ takasubos CM   - COIVD-19 PCR and RVP negative  - cont. tele monitoring. NSR on monitor. off supplemental 02 - 02 sats stable on RA  - Cont. IV Lasix (switch to BID) add 40meq K daily  - Cont. Coreg BID (reduced to 12.5mg BID)  - Cardio f/u appreciated  2/4: started Losartan 50mg QD     2) Chest Tightness | Elevated troponin | s/p DARCIE to LAD  - Cont. ASA, Plavix, Statin  - currently asymptomatic- no CP. ECG for AM  - S/p cardiac cath 2/3 >  Successful DARCIE of proximal LAD.   - Cardio f/u appreciated.     3) Right foot plantar ulcer with probable underlying acute on chronic OM and surrounding foot cellulitis s/p surgery. PVD.  - recent wound cultures showed rare MSSA, bacteroides fragilis, and strep constellatus  - S/p debridement 1/25  - F/u blood >> sensitivities w/ Bacteroides fragilis. d/w ID start Flagyl. repeat BCx >>  - MRI as above. No abscess, drainable fluid collection or osteo  - Cont. Cefepime/ Vancomycin / Flagyl via PICC line   - ID and podiatry consults appreciated     4) Uncontrolled Type 2 diabetes mellitus with insulin therapy  - Cont. ISS, Lantus HS, increase pre-meal to 7U  - Hold outpatient PO meds  - diabetic restricted diet  - A1C 8.8  2/4: hypoglycemia episode this AM. adjust PM Lantus to 20U. monitor.    5) HTN  - Medications adjusted prior admission. HCTZ stopped.   - Norvasc stopped however patient continued to take at home. Hold Norvasc given ble edema. continue coreg, ARB.  - BP optimal currently.    6) Leukocytosis  - May be reactive to pulmonary edema. repeat labs improved  - on ABX course as above for OM.    7) Transaminitis -- improving.  - Likely from congestive hepatopathy; with perihepatic ascites  - No abdominal pain  - repeat labs.      8) Normocytic Anemia  - f/u iron studies. fobt. b12, folate    9) DVT ppx  - SQ Heparin     Dispo: Cont. IV Lasix, IV ABX. repeat BCx   CHIEF COMPLAINT/DIAGNOSIS: SOB    HPI: 64 yo M with a PMH CAD, PAD, HTN, HLD, DM2 (on insulin) c/b neuropathy, and BPH who presents with SOB. Over the past day, he has had worsening SOB, particularly on exertion but also at rest and while lying down in bed. He also endorses a chest tightness with the dyspnea and has palpitations on exertion, but denies wheezing. He denies fevers but endorsed some chills today. He has had a chronic nonproductive cough for he past month.    He was recently admitted from 1/21/21-1/27/21 for a right foot cellulitis and osteomyelitis involving the right medial hallux sesamoid and 1st metatarsal. Wound cultures showed rare MSSA, bacteroides fragilis, and strep constellatus. He had a debridement by podiatry on 1/25. He had a PICC line placed and was started on Cefepime and Vancomycin for a planned course of 4-6 weeks. His course was also complicated by hematuria, for which he saw Urology who felt there was no inpatient workup required, but should have an outpatient CT urography/cystography. Since discharge, he has had some loose stools, about one BM per day, and not fully liquid. He denies abdominal pain, nausea, vomiting, current hematuria, or dysuria.    His right foot is doing better. Sometimes he has purulent/bloody drainage, but very little. He has an appointment with Dr. Bullock on Monday 2/1. Of note, he was also stopped off of his HCTZ 25 mg QD and his Losartan was decreased from 100 to 50 mg QD. His Amlodipine 10 mg QD was not restarted upon discharge as well. In the ED, he was given Furosemide 40 mg IV x1, Meropenem 1 g IV x1, and Vancomycin 750 mg IV x1.      2/1: sob improved. no further chest tightness of pain. no c/o of foot pain noted.  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 reports breathing improved. no further CP or tightness.   2/3: tired, did not sleep well. sob improved.  2/4: symptomatic hypoglycemia this AM. insulin adjusted. less dyspnea, + cough     REVIEW OF SYSTEMS: All other review of systems is negative unless indicated above    PHYSICAL EXAM:  Constitutional: Awake and alert, well-developed  HEENT: PERR, MMM  Neck:  supple,  No JVD  Respiratory: Breath sounds are diminished bilaterally, No wheezing   Cardiovascular: S1 and S2, regular rate and rhythm, no Murmurs,    Gastrointestinal: Bowel Sounds present, soft, nontender.   Extremities: mod. b/l peripheral edema.    Vascular: 2+ peripheral pulses  Neurological: A/O x 3, no focal deficits  Musculoskeletal: no calf tenderness.  Skin:  RT foot ulcer  Lines: PICC line in place.    Vital Signs Last 24 Hrs  T(C): 36.6 (04 Feb 2021 08:08), Max: 37.5 (03 Feb 2021 20:19)  T(F): 97.8 (04 Feb 2021 08:08), Max: 99.5 (03 Feb 2021 20:19)  HR: 79 (04 Feb 2021 12:10) (54 - 79)  BP: 131/77 (04 Feb 2021 12:10) (101/62 - 131/77)  BP(mean): --  RR: 18 (04 Feb 2021 08:08) (18 - 18)  SpO2: 96% (04 Feb 2021 08:08) (96% - 96%) -- room air     LABS: All Labs Reviewed:                        9.7    8.26  )-----------( 391      ( 04 Feb 2021 07:14 )             30.0     02-04    138  |  104  |  13  ----------------------------<  33<LL>  3.5   |  25  |  0.88    Ca    8.8      04 Feb 2021 07:14  Mg     1.9     02-03    RADIOLOGY:  < from: MR Foot w/wo IV Cont, Right (02.01.21 @ 09:57) >  IMPRESSION:  1.  Patient status post sesamoidectomy and surgical debridement with postsurgical changes as noted above.  2.  No abscess or other focal drainable fluid collection.  3.  No acute osteomyelitis.  < end of copied text >    < from: Xray Chest 1 View-PORTABLE IMMEDIATE (Xray Chest 1 View-PORTABLE IMMEDIATE .) (01.31.21 @ 01:53) >  IMPRESSION: The cardiac silhouette is enlarged. There is mild CHF with bilateral patchy airspace opacities third small bilateral pleural effusions are present. The bones are intact.  < end of copied text >    CARDIAC CATH:  < from: Cardiac Cath Lab - Adult (02.03.21 @ 08:23) >   Interventional Conclusions   Successful DARCIE of proximal LAD   Recommendations   ASA and Plavix, guideline directed medical therapy for heart failure  < end of copied text >    ECHOCARDIOGRAM:    < from: TTE Echo Complete w/o Contrast w/ Doppler (02.01.21 @ 07:55) >   Left ventricle systolic function appears moderately impaired;   segmental wall motion abnormalities noted   consistent with Takotsubo's cardiomyopathy.   Estimated Ejection Fraction is 30%.   The left atrium is moderately dilated.   Mild mitral regurgitation  < end of copied text >    MEDICATIONS  (STANDING):  aspirin enteric coated 81 milliGRAM(s) Oral at bedtime  atorvastatin 40 milliGRAM(s) Oral at bedtime  carvedilol 12.5 milliGRAM(s) Oral every 12 hours  cefepime   IVPB 2000 milliGRAM(s) IV Intermittent every 12 hours  clopidogrel Tablet 75 milliGRAM(s) Oral daily  dextrose 40% Gel 15 Gram(s) Oral once  dextrose 5%. 1000 milliLiter(s) (100 mL/Hr) IV Continuous <Continuous>  dextrose 50% Injectable 25 Gram(s) IV Push once  dextrose 50% Injectable 12.5 Gram(s) IV Push once  dextrose 50% Injectable 25 Gram(s) IV Push once  dextrose 50% Injectable 25 Gram(s) IV Push once  ferrous    sulfate 325 milliGRAM(s) Oral daily  furosemide   Injectable 40 milliGRAM(s) IV Push two times a day  glucagon  Injectable 1 milliGRAM(s) IntraMuscular once  heparin   Injectable 5000 Unit(s) SubCutaneous every 8 hours  insulin glargine Injectable (LANTUS) 20 Unit(s) SubCutaneous at bedtime  insulin lispro (ADMELOG) corrective regimen sliding scale   SubCutaneous three times a day before meals  insulin lispro (ADMELOG) corrective regimen sliding scale   SubCutaneous at bedtime  insulin lispro Injectable (ADMELOG) 7 Unit(s) SubCutaneous three times a day before meals  losartan 25 milliGRAM(s) Oral daily  metroNIDAZOLE  IVPB      metroNIDAZOLE  IVPB 500 milliGRAM(s) IV Intermittent every 8 hours  potassium chloride    Tablet ER 40 milliEquivalent(s) Oral daily  ranolazine 500 milliGRAM(s) Oral two times a day  vancomycin  IVPB 750 milliGRAM(s) IV Intermittent every 12 hours      ASSESSMENT AND PLAN:    64 yo M with a PMH CAD, PAD, HTN, HLD, DM2 (on insulin) c/b neuropathy, and BPH who presents with SOB.    1) Acute respiratory failure/Acute pulmonary edema due acute systolic HF, suspected Takasubos Cardiomyopathy   - echo EF >> EF 30% consistent w/ takasubos CM   - COIVD-19 PCR and RVP negative  - cont. tele monitoring. NSR on monitor. off supplemental 02 - 02 sats stable on RA  - Cont. IV Lasix (switch to BID) add 40meq K daily  - Cont. Coreg BID (reduced to 12.5mg BID)  - Cardio f/u appreciated  2/4: started Losartan 50mg QD     2) Chest Tightness | Elevated troponin | s/p DARCIE to LAD  - Cont. ASA, Plavix, Statin  - currently asymptomatic- no CP. ECG for AM  - S/p cardiac cath 2/3 >  Successful DARCIE of proximal LAD.   - Cardio f/u appreciated.     3) Right foot plantar ulcer with probable underlying acute on chronic OM and surrounding foot cellulitis s/p surgery. PVD.  - recent wound cultures showed rare MSSA, bacteroides fragilis, and strep constellatus  - S/p debridement 1/25  - F/u blood >> sensitivities w/ Bacteroides fragilis. prior tissue culture with same bacteria. d/w ID start Flagyl. repeat BCx from 2/4 pending >>  - MRI as above. No abscess, drainable fluid collection or osteo  - Cont. Cefepime/ Vancomycin / Flagyl via PICC line   - ID and podiatry consults appreciated     4) Uncontrolled Type 2 diabetes mellitus with insulin therapy  - Cont. ISS, Lantus HS, increase pre-meal to 7U  - Hold outpatient PO meds  - diabetic restricted diet  - A1C 8.8  2/4: hypoglycemia episode this AM. adjust PM Lantus to 20U. monitor.    5) HTN  - Medications adjusted prior admission. HCTZ stopped.   - Norvasc stopped however patient continued to take at home. Hold Norvasc given ble edema. continue coreg, ARB.  - BP optimal currently.    6) Leukocytosis  - May be reactive to pulmonary edema. repeat labs improved  - on ABX course as above for OM.    7) Transaminitis -- improving.  - Likely from congestive hepatopathy; with perihepatic ascites  - No abdominal pain  - repeat labs.      8) Normocytic Anemia  - f/u iron studies. fobt. b12, folate    9) DVT ppx  - SQ Heparin     Dispo: Cont. IV Lasix, IV ABX. repeat BCx

## 2021-02-04 NOTE — PROGRESS NOTE ADULT - PROBLEM SELECTOR PLAN 2
Moderate -  severe LV dysfunction with markedly elevated LVEDP; continue to diurese with IV lasix; add low-dose ARB.

## 2021-02-04 NOTE — CHART NOTE - NSCHARTNOTEFT_GEN_A_CORE
Nurse Practitioner Progress note:   65 year old male presented with SOB  s/p PCI on 2/3. Denies chest pain, shortness of breath, dizziness or palpitations at this time. Symptomatic hypoglycemia earlier. Corrected and ate breakfast    PHYSICAL EXAM:    Constitutional: NAD, well-groomed, well-developed  Neuro: Alert and oriented x 3 Speech clear No focal deficits  Respiratory: CTAB  Cardiovascular: S1 and S2, RRR,   Gastrointestinal: BS+, soft, NT/ND  Extremities: No clubbing cyanosis . +edema No varicosities  Vascular: 2+ peripheral pulses  Psychiatric: Normal mood, normal affect  Musculoskeletal: 5/5 strength b/l upper and lower extremities  Right radial dsg removed.  Procedure site CDI, no bleeding, no hematoma, able to move all digits with capillary refill < 3 seconds, fingers warm      T(C): 36.6 (02-04-21 @ 08:08), Max: 37.5 (02-03-21 @ 20:19)  HR: 54 (02-04-21 @ 10:19) (54 - 81)  BP: 106/59 (02-04-21 @ 10:19) (101/62 - 127/69)  RR: 18 (02-04-21 @ 08:08) (16 - 18)  SpO2: 96% (02-04-21 @ 08:08) (96% - 100%)  Wt(kg): --  CBC Full  -  ( 04 Feb 2021 07:14 )  WBC Count : 8.26 K/uL  RBC Count : 3.46 M/uL  Hemoglobin : 9.7 g/dL  Hematocrit : 30.0 %  Platelet Count - Automated : 391 K/uL  Mean Cell Volume : 86.7 fl  Mean Cell Hemoglobin : 28.0 pg  Mean Cell Hemoglobin Concentration : 32.3 gm/dL  Auto Neutrophil # : 6.13 K/uL  Auto Lymphocyte # : 1.16 K/uL  Auto Monocyte # : 0.84 K/uL  Auto Eosinophil # : 0.02 K/uL  Auto Basophil # : 0.04 K/uL  Auto Neutrophil % : 74.3 %  Auto Lymphocyte % : 14.0 %  Auto Monocyte % : 10.2 %  Auto Eosinophil % : 0.2 %  Auto Basophil % : 0.5 %    02-04    138  |  104  |  13  ----------------------------<  33<LL>  3.5   |  25  |  0.88    Ca    8.8      04 Feb 2021 07:14  Mg     1.9     02-03              MEDICATIONS  (STANDING):  aspirin enteric coated 81 milliGRAM(s) Oral at bedtime  atorvastatin 40 milliGRAM(s) Oral at bedtime  carvedilol 12.5 milliGRAM(s) Oral every 12 hours  cefepime   IVPB 2000 milliGRAM(s) IV Intermittent every 12 hours  clopidogrel Tablet 75 milliGRAM(s) Oral daily  dextrose 40% Gel 15 Gram(s) Oral once  dextrose 5%. 1000 milliLiter(s) (100 mL/Hr) IV Continuous <Continuous>  dextrose 50% Injectable 25 Gram(s) IV Push once  dextrose 50% Injectable 12.5 Gram(s) IV Push once  dextrose 50% Injectable 25 Gram(s) IV Push once  dextrose 50% Injectable 25 Gram(s) IV Push once  ferrous    sulfate 325 milliGRAM(s) Oral daily  furosemide   Injectable 40 milliGRAM(s) IV Push two times a day  glucagon  Injectable 1 milliGRAM(s) IntraMuscular once  heparin   Injectable 5000 Unit(s) SubCutaneous every 8 hours  insulin glargine Injectable (LANTUS) 20 Unit(s) SubCutaneous at bedtime  insulin lispro (ADMELOG) corrective regimen sliding scale   SubCutaneous three times a day before meals  insulin lispro (ADMELOG) corrective regimen sliding scale   SubCutaneous at bedtime  insulin lispro Injectable (ADMELOG) 7 Unit(s) SubCutaneous three times a day before meals  losartan 25 milliGRAM(s) Oral daily  metroNIDAZOLE  IVPB      metroNIDAZOLE  IVPB 500 milliGRAM(s) IV Intermittent every 8 hours  potassium chloride    Tablet ER 40 milliEquivalent(s) Oral daily  ranolazine 500 milliGRAM(s) Oral two times a day  vancomycin  IVPB 750 milliGRAM(s) IV Intermittent every 12 hours    MEDICATIONS  (PRN):        HPI:  64 yo M with a PMH CAD, PAD, HTN, HLD, DM2 (on insulin) c/b neuropathy, and BPH who presents with SOB. Over the past day, he has had worsening SOB, particularly on exertion but also at rest and while lying down in bed. He also endorses a chest tightness with the dyspnea and has palpitations on exertion, but denies wheezing. He denies fevers but endorsed some chills today. He has had a chronic nonproductive cough for he past month.  Troponin 1.39   s/p PCI of LAD    ASSESSMENT/PLAN:    Coronary artery disease  -Encourage PO intake  -Aspirin 81mg PO daily  -Plavix 75mg PO daily  -atorvastatin 40mg PO QHS   -Plan of care discussed with patient  -Follow-up with attending MD   within 1 week  -Discussed therapeutic lifestyle changes to reduce risk factors such as following a cardiac diet, weight loss, maintaining a healthy weight, exercise, smoking cessation, medication compliance, and regular follow-up  with MD Nurse Practitioner Progress note:   65 year old male presented with SOB  s/p PCI on 2/3. Denies chest pain, shortness of breath, dizziness or palpitations at this time. Symptomatic hypoglycemia earlier. Corrected and ate breakfast    PHYSICAL EXAM:    Constitutional: NAD, well-groomed, well-developed  Neuro: Alert and oriented x 3 Speech clear No focal deficits  Respiratory: CTAB  Cardiovascular: S1 and S2, RRR,   Gastrointestinal: BS+, soft, NT/ND  Extremities: No clubbing cyanosis . +edema   Vascular: 2+ peripheral pulses  Psychiatric: Normal mood, normal affect  Musculoskeletal: 5/5 strength b/l upper and lower extremities  Right radial dsg removed.  Procedure site CDI, no bleeding, no hematoma, able to move all digits with capillary refill < 3 seconds, fingers warm      T(C): 36.6 (02-04-21 @ 08:08), Max: 37.5 (02-03-21 @ 20:19)  HR: 54 (02-04-21 @ 10:19) (54 - 81)  BP: 106/59 (02-04-21 @ 10:19) (101/62 - 127/69)  RR: 18 (02-04-21 @ 08:08) (16 - 18)  SpO2: 96% (02-04-21 @ 08:08) (96% - 100%)  Wt(kg): --  CBC Full  -  ( 04 Feb 2021 07:14 )  WBC Count : 8.26 K/uL  RBC Count : 3.46 M/uL  Hemoglobin : 9.7 g/dL  Hematocrit : 30.0 %  Platelet Count - Automated : 391 K/uL  Mean Cell Volume : 86.7 fl  Mean Cell Hemoglobin : 28.0 pg  Mean Cell Hemoglobin Concentration : 32.3 gm/dL  Auto Neutrophil # : 6.13 K/uL  Auto Lymphocyte # : 1.16 K/uL  Auto Monocyte # : 0.84 K/uL  Auto Eosinophil # : 0.02 K/uL  Auto Basophil # : 0.04 K/uL  Auto Neutrophil % : 74.3 %  Auto Lymphocyte % : 14.0 %  Auto Monocyte % : 10.2 %  Auto Eosinophil % : 0.2 %  Auto Basophil % : 0.5 %    02-04    138  |  104  |  13  ----------------------------<  33<LL>  3.5   |  25  |  0.88    Ca    8.8      04 Feb 2021 07:14  Mg     1.9     02-03              MEDICATIONS  (STANDING):  aspirin enteric coated 81 milliGRAM(s) Oral at bedtime  atorvastatin 40 milliGRAM(s) Oral at bedtime  carvedilol 12.5 milliGRAM(s) Oral every 12 hours  cefepime   IVPB 2000 milliGRAM(s) IV Intermittent every 12 hours  clopidogrel Tablet 75 milliGRAM(s) Oral daily  dextrose 40% Gel 15 Gram(s) Oral once  dextrose 5%. 1000 milliLiter(s) (100 mL/Hr) IV Continuous <Continuous>  dextrose 50% Injectable 25 Gram(s) IV Push once  dextrose 50% Injectable 12.5 Gram(s) IV Push once  dextrose 50% Injectable 25 Gram(s) IV Push once  dextrose 50% Injectable 25 Gram(s) IV Push once  ferrous    sulfate 325 milliGRAM(s) Oral daily  furosemide   Injectable 40 milliGRAM(s) IV Push two times a day  glucagon  Injectable 1 milliGRAM(s) IntraMuscular once  heparin   Injectable 5000 Unit(s) SubCutaneous every 8 hours  insulin glargine Injectable (LANTUS) 20 Unit(s) SubCutaneous at bedtime  insulin lispro (ADMELOG) corrective regimen sliding scale   SubCutaneous three times a day before meals  insulin lispro (ADMELOG) corrective regimen sliding scale   SubCutaneous at bedtime  insulin lispro Injectable (ADMELOG) 7 Unit(s) SubCutaneous three times a day before meals  losartan 25 milliGRAM(s) Oral daily  metroNIDAZOLE  IVPB      metroNIDAZOLE  IVPB 500 milliGRAM(s) IV Intermittent every 8 hours  potassium chloride    Tablet ER 40 milliEquivalent(s) Oral daily  ranolazine 500 milliGRAM(s) Oral two times a day  vancomycin  IVPB 750 milliGRAM(s) IV Intermittent every 12 hours    MEDICATIONS  (PRN):        HPI:  64 yo M with a PMH CAD, PAD, HTN, HLD, DM2 (on insulin) c/b neuropathy, and BPH who presents with SOB. Over the past day, he has had worsening SOB, particularly on exertion but also at rest and while lying down in bed. He also endorses a chest tightness with the dyspnea and has palpitations on exertion, but denies wheezing. He denies fevers but endorsed some chills today. He has had a chronic nonproductive cough for he past month.  Troponin 1.39   s/p PCI of LAD    ASSESSMENT/PLAN:    Coronary artery disease  -Encourage PO intake  -Aspirin 81mg PO daily  -Plavix 75mg PO daily  -atorvastatin 40mg PO QHS   -Plan of care discussed with patient  -Follow-up with attending MD   within 1 week  -Discussed therapeutic lifestyle changes to reduce risk factors such as following a cardiac diet, weight loss, maintaining a healthy weight, exercise, smoking cessation, medication compliance, and regular follow-up  with MD Nurse Practitioner Progress note:   65 year old male presented with SOB  s/p PCI on 2/3. Denies chest pain, shortness of breath, dizziness or palpitations at this time. Symptomatic hypoglycemia earlier. Corrected and ate breakfast    PHYSICAL EXAM:    Constitutional: NAD, well-groomed, well-developed  Neuro: Alert and oriented x 3 Speech clear No focal deficits  Respiratory: CTAB  Cardiovascular: S1 and S2, RRR,   Gastrointestinal: BS+, soft, NT/ND  Extremities: No clubbing cyanosis . +edema   Vascular: 2+ peripheral pulses  Psychiatric: Normal mood, normal affect  Musculoskeletal: 5/5 strength b/l upper and lower extremities  Right radial dsg removed.  Procedure site CDI, no bleeding, no hematoma, able to move all digits with capillary refill < 3 seconds, fingers warm      T(C): 36.6 (02-04-21 @ 08:08), Max: 37.5 (02-03-21 @ 20:19)  HR: 54 (02-04-21 @ 10:19) (54 - 81)  BP: 106/59 (02-04-21 @ 10:19) (101/62 - 127/69)  RR: 18 (02-04-21 @ 08:08) (16 - 18)  SpO2: 96% (02-04-21 @ 08:08) (96% - 100%)  Wt(kg): --  CBC Full  -  ( 04 Feb 2021 07:14 )  WBC Count : 8.26 K/uL  RBC Count : 3.46 M/uL  Hemoglobin : 9.7 g/dL  Hematocrit : 30.0 %  Platelet Count - Automated : 391 K/uL  Mean Cell Volume : 86.7 fl  Mean Cell Hemoglobin : 28.0 pg  Mean Cell Hemoglobin Concentration : 32.3 gm/dL  Auto Neutrophil # : 6.13 K/uL  Auto Lymphocyte # : 1.16 K/uL  Auto Monocyte # : 0.84 K/uL  Auto Eosinophil # : 0.02 K/uL  Auto Basophil # : 0.04 K/uL  Auto Neutrophil % : 74.3 %  Auto Lymphocyte % : 14.0 %  Auto Monocyte % : 10.2 %  Auto Eosinophil % : 0.2 %  Auto Basophil % : 0.5 %    02-04    138  |  104  |  13  ----------------------------<  33<LL>  3.5   |  25  |  0.88    Ca    8.8      04 Feb 2021 07:14  Mg     1.9     02-03              MEDICATIONS  (STANDING):  aspirin enteric coated 81 milliGRAM(s) Oral at bedtime  atorvastatin 40 milliGRAM(s) Oral at bedtime  carvedilol 12.5 milliGRAM(s) Oral every 12 hours  cefepime   IVPB 2000 milliGRAM(s) IV Intermittent every 12 hours  clopidogrel Tablet 75 milliGRAM(s) Oral daily  dextrose 40% Gel 15 Gram(s) Oral once  dextrose 5%. 1000 milliLiter(s) (100 mL/Hr) IV Continuous <Continuous>  dextrose 50% Injectable 25 Gram(s) IV Push once  dextrose 50% Injectable 12.5 Gram(s) IV Push once  dextrose 50% Injectable 25 Gram(s) IV Push once  dextrose 50% Injectable 25 Gram(s) IV Push once  ferrous    sulfate 325 milliGRAM(s) Oral daily  furosemide   Injectable 40 milliGRAM(s) IV Push two times a day  glucagon  Injectable 1 milliGRAM(s) IntraMuscular once  heparin   Injectable 5000 Unit(s) SubCutaneous every 8 hours  insulin glargine Injectable (LANTUS) 20 Unit(s) SubCutaneous at bedtime  insulin lispro (ADMELOG) corrective regimen sliding scale   SubCutaneous three times a day before meals  insulin lispro (ADMELOG) corrective regimen sliding scale   SubCutaneous at bedtime  insulin lispro Injectable (ADMELOG) 7 Unit(s) SubCutaneous three times a day before meals  losartan 25 milliGRAM(s) Oral daily  metroNIDAZOLE  IVPB      metroNIDAZOLE  IVPB 500 milliGRAM(s) IV Intermittent every 8 hours  potassium chloride    Tablet ER 40 milliEquivalent(s) Oral daily  ranolazine 500 milliGRAM(s) Oral two times a day  vancomycin  IVPB 750 milliGRAM(s) IV Intermittent every 12 hours    MEDICATIONS  (PRN):        HPI:  64 yo M with a PMH CAD, PAD, HTN, HLD, DM2 (on insulin) c/b neuropathy, and BPH who presents with SOB. Over the past day PTA he has had worsening SOB, particularly on exertion but also at rest and while lying down in bed. He also endorses a chest tightness with the dyspnea and has palpitations on exertion, but denies wheezing. He denies fevers but endorsed some chills  on day of admission. He has had a chronic nonproductive cough for the past month.  Troponin 1.39   s/p PCI of LAD on 2/3    ASSESSMENT/PLAN:    Coronary artery disease  -Encourage PO intake  -Aspirin 81mg PO daily  -Plavix 75mg PO daily  -atorvastatin 40mg PO QHS   -Plan of care discussed with patient  -Post PCI instructions reviewed  -Follow-up with attending MD   within 1 week after discharge  -Discussed therapeutic lifestyle changes to reduce risk factors such as following a cardiac diet, weight loss, maintaining a healthy weight, exercise, smoking cessation, medication compliance, and regular follow-up  with MD  -Cardiac rehab recommended to pt who refused at this time. Written information given

## 2021-02-04 NOTE — PHARMACOTHERAPY INTERVENTION NOTE - COMMENTS
Recommended re-starting consistent carbohydrate diet
recommended Flagyl for Blood CX growing Bacteroides
recommended checking repeat blood cultures
Recommended continuing Recommended initiating patient's home medications as per medication reconciliation. medication
Recommended decreasing dose of Lantus due to NPO status

## 2021-02-04 NOTE — PROGRESS NOTE ADULT - ASSESSMENT
"I am out of it" Pt confused s/p cardiac cath. See report. Right foot with draining 1st MTP site & necrotic I&D site at Right 1st Interspace. BC + for GM - rods/ Bacteroides. Early demarcation of right second toe medial aspect. Local erythema to lesser toes.  MRI No osteomyelitis or abscess. Will schedule for debridement in future. Med eval for change in MS. THX

## 2021-02-05 ENCOUNTER — RX RENEWAL (OUTPATIENT)
Age: 66
End: 2021-02-05

## 2021-02-05 LAB
ALBUMIN SERPL ELPH-MCNC: 2 G/DL — LOW (ref 3.3–5)
ALP SERPL-CCNC: 113 U/L — SIGNIFICANT CHANGE UP (ref 40–120)
ALT FLD-CCNC: 65 U/L — SIGNIFICANT CHANGE UP (ref 12–78)
ANION GAP SERPL CALC-SCNC: 4 MMOL/L — LOW (ref 5–17)
AST SERPL-CCNC: 15 U/L — SIGNIFICANT CHANGE UP (ref 15–37)
BASOPHILS # BLD AUTO: 0.05 K/UL — SIGNIFICANT CHANGE UP (ref 0–0.2)
BASOPHILS NFR BLD AUTO: 0.5 % — SIGNIFICANT CHANGE UP (ref 0–2)
BILIRUB SERPL-MCNC: 0.5 MG/DL — SIGNIFICANT CHANGE UP (ref 0.2–1.2)
BUN SERPL-MCNC: 13 MG/DL — SIGNIFICANT CHANGE UP (ref 7–23)
CALCIUM SERPL-MCNC: 8.4 MG/DL — LOW (ref 8.5–10.1)
CHLORIDE SERPL-SCNC: 99 MMOL/L — SIGNIFICANT CHANGE UP (ref 96–108)
CO2 SERPL-SCNC: 32 MMOL/L — HIGH (ref 22–31)
CREAT SERPL-MCNC: 0.99 MG/DL — SIGNIFICANT CHANGE UP (ref 0.5–1.3)
EOSINOPHIL # BLD AUTO: 0.06 K/UL — SIGNIFICANT CHANGE UP (ref 0–0.5)
EOSINOPHIL NFR BLD AUTO: 0.6 % — SIGNIFICANT CHANGE UP (ref 0–6)
GLUCOSE SERPL-MCNC: 108 MG/DL — HIGH (ref 70–99)
HCT VFR BLD CALC: 28.9 % — LOW (ref 39–50)
HGB BLD-MCNC: 9.2 G/DL — LOW (ref 13–17)
IMM GRANULOCYTES NFR BLD AUTO: 0.5 % — SIGNIFICANT CHANGE UP (ref 0–1.5)
LYMPHOCYTES # BLD AUTO: 1.44 K/UL — SIGNIFICANT CHANGE UP (ref 1–3.3)
LYMPHOCYTES # BLD AUTO: 15.5 % — SIGNIFICANT CHANGE UP (ref 13–44)
MCHC RBC-ENTMCNC: 27.7 PG — SIGNIFICANT CHANGE UP (ref 27–34)
MCHC RBC-ENTMCNC: 31.8 GM/DL — LOW (ref 32–36)
MCV RBC AUTO: 87 FL — SIGNIFICANT CHANGE UP (ref 80–100)
MONOCYTES # BLD AUTO: 0.79 K/UL — SIGNIFICANT CHANGE UP (ref 0–0.9)
MONOCYTES NFR BLD AUTO: 8.5 % — SIGNIFICANT CHANGE UP (ref 2–14)
NEUTROPHILS # BLD AUTO: 6.92 K/UL — SIGNIFICANT CHANGE UP (ref 1.8–7.4)
NEUTROPHILS NFR BLD AUTO: 74.4 % — SIGNIFICANT CHANGE UP (ref 43–77)
PLATELET # BLD AUTO: 324 K/UL — SIGNIFICANT CHANGE UP (ref 150–400)
POTASSIUM SERPL-MCNC: 4.5 MMOL/L — SIGNIFICANT CHANGE UP (ref 3.5–5.3)
POTASSIUM SERPL-SCNC: 4.5 MMOL/L — SIGNIFICANT CHANGE UP (ref 3.5–5.3)
PROT SERPL-MCNC: 5.7 GM/DL — LOW (ref 6–8.3)
RBC # BLD: 3.32 M/UL — LOW (ref 4.2–5.8)
RBC # FLD: 14 % — SIGNIFICANT CHANGE UP (ref 10.3–14.5)
SARS-COV-2 RNA SPEC QL NAA+PROBE: SIGNIFICANT CHANGE UP
SODIUM SERPL-SCNC: 135 MMOL/L — SIGNIFICANT CHANGE UP (ref 135–145)
WBC # BLD: 9.31 K/UL — SIGNIFICANT CHANGE UP (ref 3.8–10.5)
WBC # FLD AUTO: 9.31 K/UL — SIGNIFICANT CHANGE UP (ref 3.8–10.5)

## 2021-02-05 PROCEDURE — 99233 SBSQ HOSP IP/OBS HIGH 50: CPT

## 2021-02-05 PROCEDURE — 99232 SBSQ HOSP IP/OBS MODERATE 35: CPT

## 2021-02-05 RX ORDER — INSULIN GLARGINE 100 [IU]/ML
10 INJECTION, SOLUTION SUBCUTANEOUS AT BEDTIME
Refills: 0 | Status: DISCONTINUED | OUTPATIENT
Start: 2021-02-05 | End: 2021-02-10

## 2021-02-05 RX ORDER — CARVEDILOL PHOSPHATE 80 MG/1
6.25 CAPSULE, EXTENDED RELEASE ORAL EVERY 12 HOURS
Refills: 0 | Status: DISCONTINUED | OUTPATIENT
Start: 2021-02-05 | End: 2021-02-10

## 2021-02-05 RX ORDER — FUROSEMIDE 40 MG
40 TABLET ORAL DAILY
Refills: 0 | Status: DISCONTINUED | OUTPATIENT
Start: 2021-02-05 | End: 2021-02-10

## 2021-02-05 RX ORDER — SODIUM CHLORIDE 9 MG/ML
3 INJECTION INTRAMUSCULAR; INTRAVENOUS; SUBCUTANEOUS EVERY 8 HOURS
Refills: 0 | Status: DISCONTINUED | OUTPATIENT
Start: 2021-02-05 | End: 2021-02-10

## 2021-02-05 RX ADMIN — Medication 100 MILLIGRAM(S): at 06:56

## 2021-02-05 RX ADMIN — CEFEPIME 100 MILLIGRAM(S): 1 INJECTION, POWDER, FOR SOLUTION INTRAMUSCULAR; INTRAVENOUS at 00:55

## 2021-02-05 RX ADMIN — RANOLAZINE 500 MILLIGRAM(S): 500 TABLET, FILM COATED, EXTENDED RELEASE ORAL at 21:43

## 2021-02-05 RX ADMIN — HEPARIN SODIUM 5000 UNIT(S): 5000 INJECTION INTRAVENOUS; SUBCUTANEOUS at 05:10

## 2021-02-05 RX ADMIN — Medication 40 MILLIEQUIVALENT(S): at 10:29

## 2021-02-05 RX ADMIN — HEPARIN SODIUM 5000 UNIT(S): 5000 INJECTION INTRAVENOUS; SUBCUTANEOUS at 21:43

## 2021-02-05 RX ADMIN — CLOPIDOGREL BISULFATE 75 MILLIGRAM(S): 75 TABLET, FILM COATED ORAL at 10:28

## 2021-02-05 RX ADMIN — Medication 4: at 12:32

## 2021-02-05 RX ADMIN — Medication 7 UNIT(S): at 17:24

## 2021-02-05 RX ADMIN — CARVEDILOL PHOSPHATE 6.25 MILLIGRAM(S): 80 CAPSULE, EXTENDED RELEASE ORAL at 21:43

## 2021-02-05 RX ADMIN — HEPARIN SODIUM 5000 UNIT(S): 5000 INJECTION INTRAVENOUS; SUBCUTANEOUS at 14:15

## 2021-02-05 RX ADMIN — Medication 250 MILLIGRAM(S): at 17:24

## 2021-02-05 RX ADMIN — Medication 250 MILLIGRAM(S): at 05:09

## 2021-02-05 RX ADMIN — Medication 100 MILLIGRAM(S): at 14:14

## 2021-02-05 RX ADMIN — Medication 325 MILLIGRAM(S): at 10:28

## 2021-02-05 RX ADMIN — CEFEPIME 100 MILLIGRAM(S): 1 INJECTION, POWDER, FOR SOLUTION INTRAMUSCULAR; INTRAVENOUS at 14:09

## 2021-02-05 RX ADMIN — Medication 100 MILLIGRAM(S): at 21:44

## 2021-02-05 RX ADMIN — RANOLAZINE 500 MILLIGRAM(S): 500 TABLET, FILM COATED, EXTENDED RELEASE ORAL at 10:28

## 2021-02-05 RX ADMIN — Medication 81 MILLIGRAM(S): at 21:43

## 2021-02-05 RX ADMIN — SODIUM CHLORIDE 3 MILLILITER(S): 9 INJECTION INTRAMUSCULAR; INTRAVENOUS; SUBCUTANEOUS at 14:16

## 2021-02-05 RX ADMIN — Medication 4: at 17:24

## 2021-02-05 RX ADMIN — SODIUM CHLORIDE 3 MILLILITER(S): 9 INJECTION INTRAMUSCULAR; INTRAVENOUS; SUBCUTANEOUS at 21:39

## 2021-02-05 RX ADMIN — INSULIN GLARGINE 10 UNIT(S): 100 INJECTION, SOLUTION SUBCUTANEOUS at 22:02

## 2021-02-05 RX ADMIN — ATORVASTATIN CALCIUM 40 MILLIGRAM(S): 80 TABLET, FILM COATED ORAL at 21:43

## 2021-02-05 RX ADMIN — Medication 7 UNIT(S): at 12:32

## 2021-02-05 NOTE — PROGRESS NOTE ADULT - PROBLEM SELECTOR PLAN 7
Receiving Abx; plan for debridement surgery tomorrow.  Pt has multiple comorbidities (reduced EF, CAD s/p PCI) that increase periop risk.  However, revascularzed 2/5.  Overall at intermediate risk for cardiac events for low risk procedure. May proceed w/ surgery w/o further cardiac testing. Plavix & ASA should NOT be stopped as he just had stent yesterday. Receiving Abx; plan for debridement surgery tomorrow.  Pt has multiple comorbidities (reduced EF, CAD s/p PCI) that increase periop risk.  However, revascularzed 2/5.  Overall at intermediate risk for cardiac events for low risk procedure. May proceed w/ surgery w/o further cardiac testing. Plavix & ASA should NOT be stopped as he just had stent yesterday.  Given low EF avoid excessive fluid administration intraop.

## 2021-02-05 NOTE — PROGRESS NOTE ADULT - SUBJECTIVE AND OBJECTIVE BOX
REASON FOR VISIT: Pulmonary edema, CHF, Elev troponin    HPI: 65 year old man with a history of 3V CAD (cath in  - not amenable to revascularization), PAD, HTN, HLD, DM2 (on insulin), neuropathy, BPH, and recently diagnosed foot osteomyelitis (treated with long-course of IV antibiotics via PICC) admitted on 2021 with decompensated HF; found to have severe LV dysfunction, mild elevation of troponin.  Cath on  revealed a critical stenosis of proximal LAD (now s/p PCI) and elevated LVEDP.  He is also being treated for a R foot osteomyelitis / cellulitis.    21:  Symptomatic hypoglycemia earlier today -- now better; less dyspnea; still with occasional cough.  : no complaints.  Plan for debridement of foot.    MEDICATIONS:  OUTPATIENT  Home Medications:  amLODIPine 10 mg oral tablet: 1 tab(s) orally once a day    **was not discharged on med, but patient states he is taking (2021 07:40)  Aspirin Low Dose 81 mg oral delayed release tablet: 1 tab(s) orally once a day (2021 07:40)  atorvastatin 40 mg oral tablet: 1 tab(s) orally once a day (2021 07:40)  carvedilol 25 mg oral tablet: 1 tab(s) orally 2 times a day (2021 07:40)  cefepime 2 g intravenous injection: 2 gram(s) intravenous every 12 hours (2021 07:40)  Cinnamon 500 mg oral capsule: 2 cap(s) orally once a day (2021 07:40)  fluticasone 50 mcg/inh nasal spray: 1 spray(s) in each nostril 2 times a day (2021 07:40)  Glucosamine Chondroitin oral capsule: 3 cap(s) orally once a day (2021 07:40)  insulin glargine: 42 unit(s) subcutaneous once a day (at bedtime)    **per d/c paperwork, patient was decreased to 30 units, but patient states he has been taking 42 (2021 07:40)  metFORMIN 1000 mg oral tablet: 1 tab(s) orally 2 times a day (2021 07:40)  ranolazine 500 mg oral tablet, extended release: 1 tab(s) orally 2 times a day (2021 07:40)  vancomycin 750 mg intravenous injection: 750 milligram(s) intravenous every 12 hours (2021 07:40)  Vitamin B-12 1000 mcg oral tablet: 1 tab(s) orally once a day (2021 07:40)  Vitamin C 1000 mg oral tablet: 1 tab(s) orally once a day (2021 07:40)  Vitamin D3 1000 intl units (25 mcg) oral tablet: 1 tab(s) orally once a day (2021 07:40)      INPATIENT  MEDICATIONS  (STANDING):  aspirin enteric coated 81 milliGRAM(s) Oral at bedtime  atorvastatin 40 milliGRAM(s) Oral at bedtime  carvedilol 6.25 milliGRAM(s) Oral every 12 hours  cefepime   IVPB 2000 milliGRAM(s) IV Intermittent every 12 hours  clopidogrel Tablet 75 milliGRAM(s) Oral daily  dextrose 40% Gel 15 Gram(s) Oral once  dextrose 5%. 1000 milliLiter(s) (100 mL/Hr) IV Continuous <Continuous>  dextrose 50% Injectable 25 Gram(s) IV Push once  dextrose 50% Injectable 12.5 Gram(s) IV Push once  dextrose 50% Injectable 25 Gram(s) IV Push once  ferrous    sulfate 325 milliGRAM(s) Oral daily  furosemide    Tablet 40 milliGRAM(s) Oral daily  glucagon  Injectable 1 milliGRAM(s) IntraMuscular once  heparin   Injectable 5000 Unit(s) SubCutaneous every 8 hours  insulin glargine Injectable (LANTUS) 10 Unit(s) SubCutaneous at bedtime  insulin lispro (ADMELOG) corrective regimen sliding scale   SubCutaneous three times a day before meals  insulin lispro (ADMELOG) corrective regimen sliding scale   SubCutaneous at bedtime  insulin lispro Injectable (ADMELOG) 7 Unit(s) SubCutaneous three times a day before meals  losartan 25 milliGRAM(s) Oral daily  metroNIDAZOLE  IVPB      metroNIDAZOLE  IVPB 500 milliGRAM(s) IV Intermittent every 8 hours  potassium chloride    Tablet ER 40 milliEquivalent(s) Oral daily  ranolazine 500 milliGRAM(s) Oral two times a day  sodium chloride 0.9% lock flush 3 milliLiter(s) IV Push every 8 hours  vancomycin  IVPB 750 milliGRAM(s) IV Intermittent every 12 hours    MEDICATIONS  (PRN):    Vital Signs Last 24 Hrs  T(C): 36.4 (2021 10:31), Max: 36.7 (2021 22:33)  T(F): 97.5 (2021 10:), Max: 98 (2021 22:33)  HR: 73 (2021 10:) (70 - 73)  BP: 90/54 (2021 10:31) (90/54 - 109/62)  BP(mean): --  RR: 18 (2021 10:) (18 - 18)  SpO2: 96% (2021 10:) (96% - 96%)Daily     Daily Weight in k.1 (2021 06:00)I&O's Summary    2021 07:01  -  2021 07:00  --------------------------------------------------------  IN: 500 mL / OUT: 1400 mL / NET: -900 mL      PHYSICAL EXAM:  Constitutional: Awake and alert, no distress  Respiratory: nonlabored, no crackles/wheeze   Cardiovascular: S1 and S2, regular rate and rhythm, pedal edema  + R radial pulse with normal distal motor/sensory function  Gastrointestinal: Bowel Sounds present, abdomen is soft, nontender.   Psych: Appropriate mood and affect      LABS: All Labs Reviewed:                        9.2    9.31  )-----------( 324      ( 2021 08:04 )             28.9                         9.7    8.26  )-----------( 391      ( 2021 07:14 )             30.0                         8.4    10.55 )-----------( 351      ( 2021 08:50 )             25.6     2021 08:04    135    |  99     |  13     ----------------------------<  108    4.5     |  32     |  0.99   2021 07:14    138    |  104    |  13     ----------------------------<  33     3.5     |  25     |  0.88   2021 08:50    138    |  105    |  15     ----------------------------<  75     3.5     |  24     |  0.77     Ca    8.4        2021 08:04  Ca    8.8        2021 07:14  Ca    8.3        2021 08:50  Mg     1.9       2021 08:50    TPro  5.7    /  Alb  2.0    /  TBili  0.5    /  DBili  x      /  AST  15     /  ALT  65     /  AlkPhos  113    2021 08:04      Cardiac Cath Lab - Adult (21 @ 08:23):   New proximal LAD stenosis. Multiple small branch stenoses previously seen.   LVEDP very elevated.   Interventional Conclusions:  Successful DARCIE of proximal LAD    12 Lead ECG (21 @ 23:48):  Normal sinus rhythm, Low voltage QRS, Cannot rule out Anterior infarct , age undetermined. When compared with ECG of 2021 15:41, No significant change was found    CT Angio Chest PE Protocol w/ IV Cont (21 @ 01:45):  No evidence of pulmonary embolism followed to the segmental pulmonary arterial branches.  Moderate sized bilateral pleural effusions with overlying compressive atelectasis.  Moderate pulmonary vascular congestion.  Small volume perihepatic ascites.    TTE Echo Complete w/o Contrast w/ Doppler (21 @ 07:55):   Left ventricle systolic function appears moderately impaired; segmental wall motion abnormalities noted consistent with Takotsubo's cardiomyopathy. Estimated Ejection Fraction is 30%.   The left atrium is moderately dilated.   Mild mitral regurgitation    Tele: SR

## 2021-02-05 NOTE — PROGRESS NOTE ADULT - SUBJECTIVE AND OBJECTIVE BOX
CHIEF COMPLAINT/DIAGNOSIS: SOB    HPI: 66 yo M with a PMH CAD, PAD, HTN, HLD, DM2 (on insulin) c/b neuropathy, and BPH who presents with SOB. Over the past day, he has had worsening SOB, particularly on exertion but also at rest and while lying down in bed. He also endorses a chest tightness with the dyspnea and has palpitations on exertion, but denies wheezing. He denies fevers but endorsed some chills today. He has had a chronic nonproductive cough for he past month.    He was recently admitted from 1/21/21-1/27/21 for a right foot cellulitis and osteomyelitis involving the right medial hallux sesamoid and 1st metatarsal. Wound cultures showed rare MSSA, bacteroides fragilis, and strep constellatus. He had a debridement by podiatry on 1/25. He had a PICC line placed and was started on Cefepime and Vancomycin for a planned course of 4-6 weeks. His course was also complicated by hematuria, for which he saw Urology who felt there was no inpatient workup required, but should have an outpatient CT urography/cystography. Since discharge, he has had some loose stools, about one BM per day, and not fully liquid. He denies abdominal pain, nausea, vomiting, current hematuria, or dysuria.    His right foot is doing better. Sometimes he has purulent/bloody drainage, but very little. He has an appointment with Dr. Bullock on Monday 2/1. Of note, he was also stopped off of his HCTZ 25 mg QD and his Losartan was decreased from 100 to 50 mg QD. His Amlodipine 10 mg QD was not restarted upon discharge as well. In the ED, he was given Furosemide 40 mg IV x1, Meropenem 1 g IV x1, and Vancomycin 750 mg IV x1.      2/1: sob improved. no further chest tightness of pain. no c/o of foot pain noted.  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 reports breathing improved. no further CP or tightness.   2/3: tired, did not sleep well. sob improved.  2/4: symptomatic hypoglycemia this AM. insulin adjusted. less dyspnea, + cough   2/5: feeling well. still w/ dry cough.    REVIEW OF SYSTEMS: All other review of systems is negative unless indicated above    PHYSICAL EXAM:  Constitutional: Awake and alert, well-developed  HEENT: PERR, MMM  Neck:  supple,  No JVD  Respiratory: Breath sounds are diminished bilaterally, No wheezing   Cardiovascular: S1 and S2, regular rate and rhythm, no Murmurs,    Gastrointestinal: Bowel Sounds present, soft, nontender.   Extremities: mod. b/l peripheral edema.    Vascular: 2+ peripheral pulses  Neurological: A/O x 3, no focal deficits  Musculoskeletal: no calf tenderness.  Skin:  RT foot ulcer  Lines: PICC line in place.    Vital Signs Last 24 Hrs  T(C): 36.4 (05 Feb 2021 10:31), Max: 36.7 (04 Feb 2021 22:33)  T(F): 97.5 (05 Feb 2021 10:31), Max: 98 (04 Feb 2021 22:33)  HR: 73 (05 Feb 2021 10:31) (70 - 79)  BP: 90/54 (05 Feb 2021 10:31) (90/54 - 131/77)  BP(mean): --  RR: 18 (05 Feb 2021 10:31) (18 - 18)  SpO2: 96% (05 Feb 2021 10:31) (96% - 96%) -- room air    LABS: All Labs Reviewed:                        9.2    9.31  )-----------( 324      ( 05 Feb 2021 08:04 )             28.9     02-05    135  |  99  |  13  ----------------------------<  108<H>  4.5   |  32<H>  |  0.99    Ca    8.4<L>      05 Feb 2021 08:04    TPro  5.7<L>  /  Alb  2.0<L>  /  TBili  0.5  /  DBili  x   /  AST  15  /  ALT  65  /  AlkPhos  113  02-05    RADIOLOGY:  < from: MR Foot w/wo IV Cont, Right (02.01.21 @ 09:57) >  IMPRESSION:  1.  Patient status post sesamoidectomy and surgical debridement with postsurgical changes as noted above.  2.  No abscess or other focal drainable fluid collection.  3.  No acute osteomyelitis.  < end of copied text >    < from: Xray Chest 1 View-PORTABLE IMMEDIATE (Xray Chest 1 View-PORTABLE IMMEDIATE .) (01.31.21 @ 01:53) >  IMPRESSION: The cardiac silhouette is enlarged. There is mild CHF with bilateral patchy airspace opacities third small bilateral pleural effusions are present. The bones are intact.  < end of copied text >    CARDIAC CATH:  < from: Cardiac Cath Lab - Adult (02.03.21 @ 08:23) >   Interventional Conclusions   Successful DARCIE of proximal LAD   Recommendations   ASA and Plavix, guideline directed medical therapy for heart failure  < end of copied text >    ECHOCARDIOGRAM:    < from: TTE Echo Complete w/o Contrast w/ Doppler (02.01.21 @ 07:55) >   Left ventricle systolic function appears moderately impaired;   segmental wall motion abnormalities noted   consistent with Takotsubo's cardiomyopathy.   Estimated Ejection Fraction is 30%.   The left atrium is moderately dilated.   Mild mitral regurgitation  < end of copied text >    MEDICATIONS  (STANDING):  aspirin enteric coated 81 milliGRAM(s) Oral at bedtime  atorvastatin 40 milliGRAM(s) Oral at bedtime  carvedilol 12.5 milliGRAM(s) Oral every 12 hours  cefepime   IVPB 2000 milliGRAM(s) IV Intermittent every 12 hours  clopidogrel Tablet 75 milliGRAM(s) Oral daily  dextrose 40% Gel 15 Gram(s) Oral once  dextrose 5%. 1000 milliLiter(s) (100 mL/Hr) IV Continuous <Continuous>  dextrose 50% Injectable 25 Gram(s) IV Push once  dextrose 50% Injectable 12.5 Gram(s) IV Push once  dextrose 50% Injectable 25 Gram(s) IV Push once  ferrous    sulfate 325 milliGRAM(s) Oral daily  furosemide   Injectable 40 milliGRAM(s) IV Push two times a day  glucagon  Injectable 1 milliGRAM(s) IntraMuscular once  heparin   Injectable 5000 Unit(s) SubCutaneous every 8 hours  insulin glargine Injectable (LANTUS) 20 Unit(s) SubCutaneous at bedtime  insulin lispro (ADMELOG) corrective regimen sliding scale   SubCutaneous three times a day before meals  insulin lispro (ADMELOG) corrective regimen sliding scale   SubCutaneous at bedtime  insulin lispro Injectable (ADMELOG) 7 Unit(s) SubCutaneous three times a day before meals  losartan 25 milliGRAM(s) Oral daily  metroNIDAZOLE  IVPB      metroNIDAZOLE  IVPB 500 milliGRAM(s) IV Intermittent every 8 hours  potassium chloride    Tablet ER 40 milliEquivalent(s) Oral daily  ranolazine 500 milliGRAM(s) Oral two times a day  sodium chloride 0.9% lock flush 3 milliLiter(s) IV Push every 8 hours  vancomycin  IVPB 750 milliGRAM(s) IV Intermittent every 12 hours    ECG:  < from: 12 Lead ECG (02.04.21 @ 07:50) >  Diagnosis Line Sinus bradycardia  Low voltage QRS (limb leads)  Poor precordial R wave progression, consider anterior infarct  Abnormal ECG  When compared with ECG of 03-FEB-2021 13:42,  No significant change was found  Confirmed by GUI IBRAHIM MD ([id number]) on 2/4/2021 3:55:27 PM  < end of copied text >    ASSESSMENT AND PLAN:    66 yo M with a PMH CAD, PAD, HTN, HLD, DM2 (on insulin) c/b neuropathy, and BPH who presents with SOB.    1) Acute respiratory failure/Acute pulmonary edema due ischemic cardiomyopathy    - echo EF >> EF 30%    - COIVD-19 PCR and RVP negative  - cont. tele monitoring. NSR on monitor. off supplemental 02 - 02 sats stable on RA  - Cont. Coreg BID, PO Lasix, Losartan w/ parameters.  - S/p cardiac cath 2/3 >  Successful DARCIE of proximal LAD.   - Cont. ASA, Plavix, Statin  - Cardio f/u appreciated  2/4: started Losartan 25mg QD   2/5: switch to PO Lasix, reduce BB dose - patient hypotensive this AM (meds held)    2) Right foot plantar ulcer with acute on chronic OM and surrounding foot cellulitis s/p surgery | PVD  - S/p debridement 1/25. Wound cultures showed rare MSSA, bacteroides fragilis, and strep constellatus  - Cont. Cefepime/ Vancomycin / Flagyl via PICC line. Repeat BCx from 2/4 pending >>  - MRI as above. No abscess, drainable fluid collection or osteo  - ID and podiatry consults appreciated   2/5: Plan for OR in AM. patient intermediate risk for low risk procedure. medically optimized. NPO at midnight.    3) Uncontrolled Type 2 diabetes mellitus with insulin therapy  - Cont. ISS, Lantus HS, increase pre-meal to 7U  - Hold outpatient PO meds  - diabetic restricted diet  - A1C 8.8  2/4: hypoglycemia episode this AM. adjust PM Lantus to 20U. monitor.  2/5: will reduce Lantus tn to 10U, patient NPO for OR tm.    4) HTN  - Medications adjusted as above. episodes of hypotension noted.  - On coreg, Lasix, ARB    5) Transaminitis -- resolved    6) Normocytic Anemia  - iron studies reviewed. start PO iron.     7) DVT ppx  - SQ Heparin     Full Code.

## 2021-02-05 NOTE — PROGRESS NOTE ADULT - ASSESSMENT
Doing better. Alert afebrile. Multiple, necrotic forefoot wounds will attempt debridement tomorrow pending Med eval. Discussed with patients wife yesterday and with patient this AM the procedure R/B/A/C to said procedures witnessed by RN including poor or delayed wound healing Hba1c > 8 and [ossible need for future Sx or proximal amputation including BKA/AKA. Pt freely consents to said procedures and signed consents. For OR tomorrow for debridement VIA SEDATION/LOCAL ANESTHESIA pending MED/Cardio eval. THX

## 2021-02-05 NOTE — PROGRESS NOTE ADULT - SUBJECTIVE AND OBJECTIVE BOX
Patient is a 65y old  Male who presents with a chief complaint of pulmonary edema (04 Feb 2021 10:25)      HPI:  66 yo M with a PMH CAD, PAD, HTN, HLD, DM2 (on insulin) c/b neuropathy, and BPH who presents with SOB. Over the past day, he has had worsening SOB, particularly on exertion but also at rest and while lying down in bed. He also endorses a chest tightness with the dyspnea and has palpitations on exertion, but denies wheezing. He denies fevers but endorsed some chills today. He has had a chronic nonproductive cough for he past month.    He was recently admitted from 1/21/21-1/27/21 for a right foot cellulitis and osteomyelitis involving the right medial hallux sesamoid and 1st metatarsal. Wound cultures showed rare MSSA, bacteroides fragilis, and strep constellatus. He had a debridement by podiatry on 1/25. He had a PICC line placed and was started on Cefepime and Vancomycin for a planned course of 4-6 weeks. His course was also complicated by hematuria, for which he saw Urology who felt there was no inpatient workup required, but should have an outpatient CT urography/cystography. Since discharge, he has had some loose stools, about one BM per day, and not fully liquid. He denies abdominal pain, nausea, vomiting, current hematuria, or dysuria.  His right foot is doing better. Sometimes he has purulent/bloody drainage, but very little. He has an appointment with Dr. Bullock on Monday 2/1.    Of note, he was also stopped off of his HCTZ 25 mg QD and his Losartan was decreased from 100 to 50 mg QD. His Amlodipine 10 mg QD was not restarted upon discharge as well.    In the ED, he was given Furosemide 40 mg IV x1, Meropenem 1 g IV x1, and Vancomycin 750 mg IV x1. (31 Jan 2021 05:52)      Allergies    No Known Allergies    Intolerances        MEDICATIONS  (STANDING):  aspirin enteric coated 81 milliGRAM(s) Oral at bedtime  atorvastatin 40 milliGRAM(s) Oral at bedtime  carvedilol 12.5 milliGRAM(s) Oral every 12 hours  cefepime   IVPB 2000 milliGRAM(s) IV Intermittent every 12 hours  clopidogrel Tablet 75 milliGRAM(s) Oral daily  dextrose 40% Gel 15 Gram(s) Oral once  dextrose 5%. 1000 milliLiter(s) (100 mL/Hr) IV Continuous <Continuous>  dextrose 50% Injectable 25 Gram(s) IV Push once  dextrose 50% Injectable 12.5 Gram(s) IV Push once  dextrose 50% Injectable 25 Gram(s) IV Push once  ferrous    sulfate 325 milliGRAM(s) Oral daily  furosemide   Injectable 40 milliGRAM(s) IV Push two times a day  glucagon  Injectable 1 milliGRAM(s) IntraMuscular once  heparin   Injectable 5000 Unit(s) SubCutaneous every 8 hours  insulin glargine Injectable (LANTUS) 20 Unit(s) SubCutaneous at bedtime  insulin lispro (ADMELOG) corrective regimen sliding scale   SubCutaneous three times a day before meals  insulin lispro (ADMELOG) corrective regimen sliding scale   SubCutaneous at bedtime  insulin lispro Injectable (ADMELOG) 7 Unit(s) SubCutaneous three times a day before meals  losartan 25 milliGRAM(s) Oral daily  metroNIDAZOLE  IVPB      metroNIDAZOLE  IVPB 500 milliGRAM(s) IV Intermittent every 8 hours  potassium chloride    Tablet ER 40 milliEquivalent(s) Oral daily  ranolazine 500 milliGRAM(s) Oral two times a day  vancomycin  IVPB 750 milliGRAM(s) IV Intermittent every 12 hours    MEDICATIONS  (PRN):        RADIOLOGY    CBC Full  -  ( 04 Feb 2021 07:14 )  WBC Count : 8.26 K/uL  RBC Count : 3.46 M/uL  Hemoglobin : 9.7 g/dL  Hematocrit : 30.0 %  Platelet Count - Automated : 391 K/uL  Mean Cell Volume : 86.7 fl  Mean Cell Hemoglobin : 28.0 pg  Mean Cell Hemoglobin Concentration : 32.3 gm/dL  Auto Neutrophil # : 6.13 K/uL  Auto Lymphocyte # : 1.16 K/uL  Auto Monocyte # : 0.84 K/uL  Auto Eosinophil # : 0.02 K/uL  Auto Basophil # : 0.04 K/uL  Auto Neutrophil % : 74.3 %  Auto Lymphocyte % : 14.0 %  Auto Monocyte % : 10.2 %  Auto Eosinophil % : 0.2 %  Auto Basophil % : 0.5 %      02-04    138  |  104  |  13  ----------------------------<  33<LL>  3.5   |  25  |  0.88    Ca    8.8      04 Feb 2021 07:14  Mg     1.9     02-03

## 2021-02-05 NOTE — PROGRESS NOTE ADULT - PROBLEM SELECTOR PLAN 2
Moderate -  severe LV dysfunction with markedly elevated LVEDP; continue to diurese with IV lasix; cont. low-dose ARB, BB.  Will consider adding ARB postop.

## 2021-02-06 ENCOUNTER — RESULT REVIEW (OUTPATIENT)
Age: 66
End: 2021-02-06

## 2021-02-06 PROCEDURE — 99232 SBSQ HOSP IP/OBS MODERATE 35: CPT

## 2021-02-06 PROCEDURE — 99233 SBSQ HOSP IP/OBS HIGH 50: CPT

## 2021-02-06 PROCEDURE — 73620 X-RAY EXAM OF FOOT: CPT | Mod: 26,RT

## 2021-02-06 PROCEDURE — 88304 TISSUE EXAM BY PATHOLOGIST: CPT | Mod: 26

## 2021-02-06 PROCEDURE — 88305 TISSUE EXAM BY PATHOLOGIST: CPT | Mod: 26

## 2021-02-06 PROCEDURE — 88311 DECALCIFY TISSUE: CPT | Mod: 26

## 2021-02-06 RX ORDER — ONDANSETRON 8 MG/1
4 TABLET, FILM COATED ORAL ONCE
Refills: 0 | Status: DISCONTINUED | OUTPATIENT
Start: 2021-02-06 | End: 2021-02-06

## 2021-02-06 RX ORDER — SODIUM CHLORIDE 9 MG/ML
1000 INJECTION INTRAMUSCULAR; INTRAVENOUS; SUBCUTANEOUS
Refills: 0 | Status: DISCONTINUED | OUTPATIENT
Start: 2021-02-06 | End: 2021-02-06

## 2021-02-06 RX ORDER — FENTANYL CITRATE 50 UG/ML
50 INJECTION INTRAVENOUS
Refills: 0 | Status: DISCONTINUED | OUTPATIENT
Start: 2021-02-06 | End: 2021-02-06

## 2021-02-06 RX ORDER — OXYCODONE HYDROCHLORIDE 5 MG/1
5 TABLET ORAL ONCE
Refills: 0 | Status: DISCONTINUED | OUTPATIENT
Start: 2021-02-06 | End: 2021-02-06

## 2021-02-06 RX ADMIN — Medication 100 MILLIGRAM(S): at 18:20

## 2021-02-06 RX ADMIN — LOSARTAN POTASSIUM 25 MILLIGRAM(S): 100 TABLET, FILM COATED ORAL at 11:45

## 2021-02-06 RX ADMIN — HEPARIN SODIUM 5000 UNIT(S): 5000 INJECTION INTRAVENOUS; SUBCUTANEOUS at 22:21

## 2021-02-06 RX ADMIN — INSULIN GLARGINE 10 UNIT(S): 100 INJECTION, SOLUTION SUBCUTANEOUS at 22:22

## 2021-02-06 RX ADMIN — CARVEDILOL PHOSPHATE 6.25 MILLIGRAM(S): 80 CAPSULE, EXTENDED RELEASE ORAL at 11:44

## 2021-02-06 RX ADMIN — SODIUM CHLORIDE 3 MILLILITER(S): 9 INJECTION INTRAMUSCULAR; INTRAVENOUS; SUBCUTANEOUS at 22:22

## 2021-02-06 RX ADMIN — SODIUM CHLORIDE 3 MILLILITER(S): 9 INJECTION INTRAMUSCULAR; INTRAVENOUS; SUBCUTANEOUS at 06:40

## 2021-02-06 RX ADMIN — Medication 250 MILLIGRAM(S): at 04:26

## 2021-02-06 RX ADMIN — CEFEPIME 100 MILLIGRAM(S): 1 INJECTION, POWDER, FOR SOLUTION INTRAMUSCULAR; INTRAVENOUS at 17:42

## 2021-02-06 RX ADMIN — FENTANYL CITRATE 50 MICROGRAM(S): 50 INJECTION INTRAVENOUS at 15:30

## 2021-02-06 RX ADMIN — Medication 81 MILLIGRAM(S): at 22:21

## 2021-02-06 RX ADMIN — Medication 7 UNIT(S): at 18:02

## 2021-02-06 RX ADMIN — Medication 40 MILLIEQUIVALENT(S): at 17:40

## 2021-02-06 RX ADMIN — Medication 250 MILLIGRAM(S): at 19:19

## 2021-02-06 RX ADMIN — RANOLAZINE 500 MILLIGRAM(S): 500 TABLET, FILM COATED, EXTENDED RELEASE ORAL at 17:41

## 2021-02-06 RX ADMIN — CEFEPIME 100 MILLIGRAM(S): 1 INJECTION, POWDER, FOR SOLUTION INTRAMUSCULAR; INTRAVENOUS at 01:39

## 2021-02-06 RX ADMIN — SODIUM CHLORIDE 3 MILLILITER(S): 9 INJECTION INTRAMUSCULAR; INTRAVENOUS; SUBCUTANEOUS at 16:37

## 2021-02-06 RX ADMIN — ATORVASTATIN CALCIUM 40 MILLIGRAM(S): 80 TABLET, FILM COATED ORAL at 22:21

## 2021-02-06 RX ADMIN — Medication 40 MILLIGRAM(S): at 17:39

## 2021-02-06 RX ADMIN — Medication 100 MILLIGRAM(S): at 06:41

## 2021-02-06 RX ADMIN — Medication 325 MILLIGRAM(S): at 17:40

## 2021-02-06 RX ADMIN — CLOPIDOGREL BISULFATE 75 MILLIGRAM(S): 75 TABLET, FILM COATED ORAL at 17:39

## 2021-02-06 RX ADMIN — CARVEDILOL PHOSPHATE 6.25 MILLIGRAM(S): 80 CAPSULE, EXTENDED RELEASE ORAL at 22:22

## 2021-02-06 NOTE — BRIEF OPERATIVE NOTE - NSICDXBRIEFPREOP_GEN_ALL_CORE_FT
PRE-OP DIAGNOSIS:  Foot osteomyelitis 06-Feb-2021 15:23:25  BROWN HERNANDEZ  Abscess of right foot 06-Feb-2021 15:23:05  BROWN HERNANDEZ  Abscess of third toe of right foot 06-Feb-2021 15:22:50  BROWN HERNANDEZ  Diabetic foot ulcers 06-Feb-2021 15:22:34  BROWN HERNANDEZ

## 2021-02-06 NOTE — BRIEF OPERATIVE NOTE - NSICDXBRIEFPOSTOP_GEN_ALL_CORE_FT
POST-OP DIAGNOSIS:  Abscess of right foot 06-Feb-2021 15:24:27  BROWN HERNANDEZ  Diabetic ulcer of right foot 06-Feb-2021 15:24:07  BROWN HERNANDEZ  Osteomyelitis of right foot 06-Feb-2021 15:23:55  BROWN HERNANDEZ

## 2021-02-06 NOTE — BRIEF OPERATIVE NOTE - NSEVIDNCEINFORABSCESSFT_GEN_ALL_CORE
Liquefactive necrosis of fat layer, multiple interspace abscess, poorly healing wounds all right foot.

## 2021-02-06 NOTE — PROGRESS NOTE ADULT - ASSESSMENT
Al;ert afebrile Pt / fetid right foot for debridement today. Cardiology note noted & appreciated. Discussed / NP Farideh yesterday. Will do Sx at 13:30 via JAY/Local anesthesia.

## 2021-02-06 NOTE — BRIEF OPERATIVE NOTE - NSICDXBRIEFPROCEDURE_GEN_ALL_CORE_FT
PROCEDURES:  Incision and drainage, bone abscess or osteomyelitis, foot 06-Feb-2021 15:24:56  BROWN HERNANDEZ  Excisional debridement of ulcer of right foot 06-Feb-2021 15:22:12  BROWN HERNANDEZ  Incision and drainage, foot, bursa 06-Feb-2021 15:22:00  BROWN HERNANDEZ  Excision, bone, foot, with debridement 06-Feb-2021 15:21:29  BROWN HERNANDEZ

## 2021-02-06 NOTE — PROGRESS NOTE ADULT - SUBJECTIVE AND OBJECTIVE BOX
Patient is a 65y old  Male who presents with a chief complaint of pulmonary edema (05 Feb 2021 19:31)      HPI:  64 yo M with a PMH CAD, PAD, HTN, HLD, DM2 (on insulin) c/b neuropathy, and BPH who presents with SOB. Over the past day, he has had worsening SOB, particularly on exertion but also at rest and while lying down in bed. He also endorses a chest tightness with the dyspnea and has palpitations on exertion, but denies wheezing. He denies fevers but endorsed some chills today. He has had a chronic nonproductive cough for he past month.    He was recently admitted from 1/21/21-1/27/21 for a right foot cellulitis and osteomyelitis involving the right medial hallux sesamoid and 1st metatarsal. Wound cultures showed rare MSSA, bacteroides fragilis, and strep constellatus. He had a debridement by podiatry on 1/25. He had a PICC line placed and was started on Cefepime and Vancomycin for a planned course of 4-6 weeks. His course was also complicated by hematuria, for which he saw Urology who felt there was no inpatient workup required, but should have an outpatient CT urography/cystography. Since discharge, he has had some loose stools, about one BM per day, and not fully liquid. He denies abdominal pain, nausea, vomiting, current hematuria, or dysuria.  His right foot is doing better. Sometimes he has purulent/bloody drainage, but very little. He has an appointment with Dr. Bullock on Monday 2/1.    Of note, he was also stopped off of his HCTZ 25 mg QD and his Losartan was decreased from 100 to 50 mg QD. His Amlodipine 10 mg QD was not restarted upon discharge as well.    In the ED, he was given Furosemide 40 mg IV x1, Meropenem 1 g IV x1, and Vancomycin 750 mg IV x1. (31 Jan 2021 05:52)      Allergies    No Known Allergies    Intolerances        MEDICATIONS  (STANDING):  aspirin enteric coated 81 milliGRAM(s) Oral at bedtime  atorvastatin 40 milliGRAM(s) Oral at bedtime  carvedilol 6.25 milliGRAM(s) Oral every 12 hours  cefepime   IVPB 2000 milliGRAM(s) IV Intermittent every 12 hours  clopidogrel Tablet 75 milliGRAM(s) Oral daily  dextrose 40% Gel 15 Gram(s) Oral once  dextrose 5%. 1000 milliLiter(s) (100 mL/Hr) IV Continuous <Continuous>  dextrose 50% Injectable 25 Gram(s) IV Push once  dextrose 50% Injectable 12.5 Gram(s) IV Push once  dextrose 50% Injectable 25 Gram(s) IV Push once  ferrous    sulfate 325 milliGRAM(s) Oral daily  furosemide    Tablet 40 milliGRAM(s) Oral daily  glucagon  Injectable 1 milliGRAM(s) IntraMuscular once  heparin   Injectable 5000 Unit(s) SubCutaneous every 8 hours  insulin glargine Injectable (LANTUS) 10 Unit(s) SubCutaneous at bedtime  insulin lispro (ADMELOG) corrective regimen sliding scale   SubCutaneous three times a day before meals  insulin lispro (ADMELOG) corrective regimen sliding scale   SubCutaneous at bedtime  insulin lispro Injectable (ADMELOG) 7 Unit(s) SubCutaneous three times a day before meals  losartan 25 milliGRAM(s) Oral daily  metroNIDAZOLE  IVPB      metroNIDAZOLE  IVPB 500 milliGRAM(s) IV Intermittent every 8 hours  potassium chloride    Tablet ER 40 milliEquivalent(s) Oral daily  ranolazine 500 milliGRAM(s) Oral two times a day  sodium chloride 0.9% lock flush 3 milliLiter(s) IV Push every 8 hours  vancomycin  IVPB 750 milliGRAM(s) IV Intermittent every 12 hours    MEDICATIONS  (PRN):        RADIOLOGY    CBC Full  -  ( 05 Feb 2021 08:04 )  WBC Count : 9.31 K/uL  RBC Count : 3.32 M/uL  Hemoglobin : 9.2 g/dL  Hematocrit : 28.9 %  Platelet Count - Automated : 324 K/uL  Mean Cell Volume : 87.0 fl  Mean Cell Hemoglobin : 27.7 pg  Mean Cell Hemoglobin Concentration : 31.8 gm/dL  Auto Neutrophil # : 6.92 K/uL  Auto Lymphocyte # : 1.44 K/uL  Auto Monocyte # : 0.79 K/uL  Auto Eosinophil # : 0.06 K/uL  Auto Basophil # : 0.05 K/uL  Auto Neutrophil % : 74.4 %  Auto Lymphocyte % : 15.5 %  Auto Monocyte % : 8.5 %  Auto Eosinophil % : 0.6 %  Auto Basophil % : 0.5 %      02-05    135  |  99  |  13  ----------------------------<  108<H>  4.5   |  32<H>  |  0.99    Ca    8.4<L>      05 Feb 2021 08:04    TPro  5.7<L>  /  Alb  2.0<L>  /  TBili  0.5  /  DBili  x   /  AST  15  /  ALT  65  /  AlkPhos  113  02-05

## 2021-02-06 NOTE — PROGRESS NOTE ADULT - SUBJECTIVE AND OBJECTIVE BOX
REASON FOR VISIT: Pulmonary edema, CHF, Elev troponin    HPI: 65 year old man with a history of 3V CAD (cath in 2012 - not amenable to revascularization), PAD, HTN, HLD, DM2 (on insulin), neuropathy, BPH, and recently diagnosed foot osteomyelitis (treated with long-course of IV antibiotics via PICC) admitted on 1/31/2021 with decompensated HF; found to have severe LV dysfunction, mild elevation of troponin.  Cath on 2/4 revealed a critical stenosis of proximal LAD (now s/p PCI) and elevated LVEDP.  He is also being treated for a R foot osteomyelitis / cellulitis.    2/4/21:  Symptomatic hypoglycemia earlier today -- now better; less dyspnea; still with occasional cough.  2/5/'21: no complaints.  Plan for debridement of foot.  2/6/21 awake alert comfortable Tele SR, S/P LAD DARCIE      MEDICATIONS  (STANDING):  aspirin enteric coated 81 milliGRAM(s) Oral at bedtime  atorvastatin 40 milliGRAM(s) Oral at bedtime  carvedilol 6.25 milliGRAM(s) Oral every 12 hours  cefepime   IVPB 2000 milliGRAM(s) IV Intermittent every 12 hours  clopidogrel Tablet 75 milliGRAM(s) Oral daily  dextrose 40% Gel 15 Gram(s) Oral once  dextrose 5%. 1000 milliLiter(s) (100 mL/Hr) IV Continuous <Continuous>  dextrose 50% Injectable 25 Gram(s) IV Push once  dextrose 50% Injectable 12.5 Gram(s) IV Push once  dextrose 50% Injectable 25 Gram(s) IV Push once  ferrous    sulfate 325 milliGRAM(s) Oral daily  furosemide    Tablet 40 milliGRAM(s) Oral daily  glucagon  Injectable 1 milliGRAM(s) IntraMuscular once  heparin   Injectable 5000 Unit(s) SubCutaneous every 8 hours  insulin glargine Injectable (LANTUS) 10 Unit(s) SubCutaneous at bedtime  insulin lispro (ADMELOG) corrective regimen sliding scale   SubCutaneous three times a day before meals  insulin lispro (ADMELOG) corrective regimen sliding scale   SubCutaneous at bedtime  insulin lispro Injectable (ADMELOG) 7 Unit(s) SubCutaneous three times a day before meals  losartan 25 milliGRAM(s) Oral daily  metroNIDAZOLE  IVPB      metroNIDAZOLE  IVPB 500 milliGRAM(s) IV Intermittent every 8 hours  potassium chloride    Tablet ER 40 milliEquivalent(s) Oral daily  ranolazine 500 milliGRAM(s) Oral two times a day  sodium chloride 0.9% lock flush 3 milliLiter(s) IV Push every 8 hours  vancomycin  IVPB 750 milliGRAM(s) IV Intermittent every 12 hours    MEDICATIONS  (PRN):    Vital Signs Last 24 Hrs  T(C): 36.7 (06 Feb 2021 09:45), Max: 37.4 (05 Feb 2021 20:38)  T(F): 98 (06 Feb 2021 09:45), Max: 99.4 (05 Feb 2021 20:38)  HR: 67 (06 Feb 2021 09:45) (66 - 75)  BP: 116/69 (06 Feb 2021 09:45) (98/61 - 118/69)  BP(mean): --  RR: 18 (06 Feb 2021 09:45) (18 - 18)  SpO2: 97% (06 Feb 2021 09:45) (94% - 97%)      PHYSICAL EXAM:  Constitutional: Awake and alert, no distress  Respiratory: nonlabored, Lungs clear B/L  Cardiovascular: S1 and S2, regular, S/P CC Right radial access site with 2+ R radial pulse extremity warm no discomfort   Gastrointestinal: Bowel  abdomen is soft, nontender.   Psych: A&O       LABS: All Labs Reviewed:                        9.2    9.31  )-----------( 324      ( 05 Feb 2021 08:04 )             28.9                         9.7    8.26  )-----------( 391      ( 04 Feb 2021 07:14 )             30.0                         8.4    10.55 )-----------( 351      ( 03 Feb 2021 08:50 )             25.6     05 Feb 2021 08:04    135    |  99     |  13     ----------------------------<  108    4.5     |  32     |  0.99   04 Feb 2021 07:14    138    |  104    |  13     ----------------------------<  33     3.5     |  25     |  0.88   03 Feb 2021 08:50    138    |  105    |  15     ----------------------------<  75     3.5     |  24     |  0.77     Ca    8.4        05 Feb 2021 08:04  Ca    8.8        04 Feb 2021 07:14  Ca    8.3        03 Feb 2021 08:50  Mg     1.9       03 Feb 2021 08:50    TPro  5.7    /  Alb  2.0    /  TBili  0.5    /  DBili  x      /  AST  15     /  ALT  65     /  AlkPhos  113    05 Feb 2021 08:04      Cardiac Cath Lab - Adult (02.03.21 @ 08:23):   New proximal LAD stenosis. Multiple small branch stenoses previously seen.   LVEDP very elevated.   Interventional Conclusions:  Successful DARCIE of proximal LAD    12 Lead ECG (01.30.21 @ 23:48):  Normal sinus rhythm, Low voltage QRS, Cannot rule out Anterior infarct , age undetermined. When compared with ECG of 21-JAN-2021 15:41, No significant change was found    CT Angio Chest PE Protocol w/ IV Cont (01.31.21 @ 01:45):  No evidence of pulmonary embolism followed to the segmental pulmonary arterial branches.  Moderate sized bilateral pleural effusions with overlying compressive atelectasis.  Moderate pulmonary vascular congestion.  Small volume perihepatic ascites.    TTE Echo Complete w/o Contrast w/ Doppler (02.01.21 @ 07:55):   Left ventricle systolic function appears moderately impaired; segmental wall motion abnormalities noted consistent with Takotsubo's cardiomyopathy. Estimated Ejection Fraction is 30%.   The left atrium is moderately dilated.   Mild mitral regurgitation    Tele: SR

## 2021-02-06 NOTE — PROGRESS NOTE ADULT - SUBJECTIVE AND OBJECTIVE BOX
CHIEF COMPLAINT/DIAGNOSIS: SOB    HPI: 64 yo M with a PMH CAD, PAD, HTN, HLD, DM2 (on insulin) c/b neuropathy, and BPH who presents with SOB. Over the past day, he has had worsening SOB, particularly on exertion but also at rest and while lying down in bed. He also endorses a chest tightness with the dyspnea and has palpitations on exertion, but denies wheezing. He denies fevers but endorsed some chills today. He has had a chronic nonproductive cough for he past month.    He was recently admitted from 1/21/21-1/27/21 for a right foot cellulitis and osteomyelitis involving the right medial hallux sesamoid and 1st metatarsal. Wound cultures showed rare MSSA, bacteroides fragilis, and strep constellatus. He had a debridement by podiatry on 1/25. He had a PICC line placed and was started on Cefepime and Vancomycin for a planned course of 4-6 weeks. His course was also complicated by hematuria, for which he saw Urology who felt there was no inpatient workup required, but should have an outpatient CT urography/cystography. Since discharge, he has had some loose stools, about one BM per day, and not fully liquid. He denies abdominal pain, nausea, vomiting, current hematuria, or dysuria.    His right foot is doing better. Sometimes he has purulent/bloody drainage, but very little. He has an appointment with Dr. Bullock on Monday 2/1. Of note, he was also stopped off of his HCTZ 25 mg QD and his Losartan was decreased from 100 to 50 mg QD. His Amlodipine 10 mg QD was not restarted upon discharge as well. In the ED, he was given Furosemide 40 mg IV x1, Meropenem 1 g IV x1, and Vancomycin 750 mg IV x1.    2/6: feeling well. still w/ dry cough.  REVIEW OF SYSTEMS: All other review of systems is negative unless indicated above    PHYSICAL EXAM:  Constitutional: Awake and alert, well-developed  HEENT: PERR, MMM  Neck:  supple,  No JVD  Respiratory: Breath sounds are diminished bilaterally, No wheezing   Cardiovascular: S1 and S2, regular rate and rhythm, no Murmurs,    Gastrointestinal: Bowel Sounds present, soft, nontender.   Extremities: mod. b/l peripheral edema.    Vascular: 2+ peripheral pulses  Neurological: A/O x 3, no focal deficits  Musculoskeletal: no calf tenderness.  Skin:  RT foot ulcer, dressing in place.  Lines: PICC line in place.    Vital Signs Last 24 Hrs  T(C): 36.7 (06 Feb 2021 09:45), Max: 37.4 (05 Feb 2021 20:38)  T(F): 98 (06 Feb 2021 09:45), Max: 99.4 (05 Feb 2021 20:38)  HR: 67 (06 Feb 2021 09:45) (66 - 75)  BP: 116/69 (06 Feb 2021 09:45) (90/54 - 118/69)  BP(mean): --  RR: 18 (06 Feb 2021 09:45) (18 - 18)  SpO2: 97% (06 Feb 2021 09:45) (94% - 97%) -- room air    LABS: All Labs Reviewed:                        9.2    9.31  )-----------( 324      ( 05 Feb 2021 08:04 )             28.9     02-05    135  |  99  |  13  ----------------------------<  108<H>  4.5   |  32<H>  |  0.99    Ca    8.4<L>      05 Feb 2021 08:04    TPro  5.7<L>  /  Alb  2.0<L>  /  TBili  0.5  /  DBili  x   /  AST  15  /  ALT  65  /  AlkPhos  113  02-05    RADIOLOGY:  < from: MR Foot w/wo IV Cont, Right (02.01.21 @ 09:57) >  IMPRESSION:  1.  Patient status post sesamoidectomy and surgical debridement with postsurgical changes as noted above.  2.  No abscess or other focal drainable fluid collection.  3.  No acute osteomyelitis.  < end of copied text >    < from: Xray Chest 1 View-PORTABLE IMMEDIATE (Xray Chest 1 View-PORTABLE IMMEDIATE .) (01.31.21 @ 01:53) >  IMPRESSION: The cardiac silhouette is enlarged. There is mild CHF with bilateral patchy airspace opacities third small bilateral pleural effusions are present. The bones are intact.  < end of copied text >    CARDIAC CATH:  < from: Cardiac Cath Lab - Adult (02.03.21 @ 08:23) >   Interventional Conclusions   Successful DARCIE of proximal LAD   Recommendations   ASA and Plavix, guideline directed medical therapy for heart failure  < end of copied text >    ECHOCARDIOGRAM:    < from: TTE Echo Complete w/o Contrast w/ Doppler (02.01.21 @ 07:55) >   Left ventricle systolic function appears moderately impaired;   segmental wall motion abnormalities noted   consistent with Takotsubo's cardiomyopathy.   Estimated Ejection Fraction is 30%.   The left atrium is moderately dilated.   Mild mitral regurgitation  < end of copied text >    MEDICATIONS  (STANDING):  aspirin enteric coated 81 milliGRAM(s) Oral at bedtime  atorvastatin 40 milliGRAM(s) Oral at bedtime  carvedilol 6.25 milliGRAM(s) Oral every 12 hours  cefepime   IVPB 2000 milliGRAM(s) IV Intermittent every 12 hours  clopidogrel Tablet 75 milliGRAM(s) Oral daily  dextrose 40% Gel 15 Gram(s) Oral once  dextrose 5%. 1000 milliLiter(s) (100 mL/Hr) IV Continuous <Continuous>  dextrose 50% Injectable 25 Gram(s) IV Push once  dextrose 50% Injectable 12.5 Gram(s) IV Push once  dextrose 50% Injectable 25 Gram(s) IV Push once  ferrous    sulfate 325 milliGRAM(s) Oral daily  furosemide    Tablet 40 milliGRAM(s) Oral daily  glucagon  Injectable 1 milliGRAM(s) IntraMuscular once  heparin   Injectable 5000 Unit(s) SubCutaneous every 8 hours  insulin glargine Injectable (LANTUS) 10 Unit(s) SubCutaneous at bedtime  insulin lispro (ADMELOG) corrective regimen sliding scale   SubCutaneous three times a day before meals  insulin lispro (ADMELOG) corrective regimen sliding scale   SubCutaneous at bedtime  insulin lispro Injectable (ADMELOG) 7 Unit(s) SubCutaneous three times a day before meals  losartan 25 milliGRAM(s) Oral daily  metroNIDAZOLE  IVPB      metroNIDAZOLE  IVPB 500 milliGRAM(s) IV Intermittent every 8 hours  potassium chloride    Tablet ER 40 milliEquivalent(s) Oral daily  ranolazine 500 milliGRAM(s) Oral two times a day  sodium chloride 0.9% lock flush 3 milliLiter(s) IV Push every 8 hours  vancomycin  IVPB 750 milliGRAM(s) IV Intermittent every 12 hours    MEDICATIONS  (PRN):    ECG:  < from: 12 Lead ECG (02.04.21 @ 07:50) >  Diagnosis Line Sinus bradycardia  Low voltage QRS (limb leads)  Poor precordial R wave progression, consider anterior infarct  Abnormal ECG  When compared with ECG of 03-FEB-2021 13:42,  No significant change was found  Confirmed by GUI IBRAHIM MD ([id number]) on 2/4/2021 3:55:27 PM  < end of copied text >    ASSESSMENT AND PLAN:    64 yo M with a PMH CAD, PAD, HTN, HLD, DM2 (on insulin) c/b neuropathy, and BPH who presents with SOB.    1) Acute respiratory failure/Acute pulmonary edema due ischemic cardiomyopathy    - echo EF >> EF 30%    - COIVD-19 PCR and RVP negative  - Cont. tele monitoring. NSR on monitor. off supplemental 02 - 02 sats stable on RA  - Cont. Coreg BID, PO Lasix, Losartan w/ parameters.  - S/p cardiac cath 2/3 >  Successful DARCIE of proximal LAD.   - Cont. ASA, Plavix, Statin  - Cardio f/u appreciated  2/4: started Losartan 25mg QD   2/5: switch to PO Lasix, reduce BB dose - patient hypotensive this AM (meds held)  2/6: BP stable. cont. current meds.    2) Right foot plantar ulcer with acute on chronic OM and surrounding foot cellulitis s/p surgery | PVD  - S/p debridement 1/25. Wound cultures showed rare MSSA, bacteroides fragilis, and strep constellatus  - Cont. Cefepime/ Vancomycin / Flagyl via PICC line. Repeat BCx from 2/4 pending >> NGTD  - MRI as above. No abscess, drainable fluid collection or osteo  - OR td (2/6) for RT foot debridement   - ID and podiatry consults appreciated     3) Uncontrolled Type 2 diabetes mellitus with insulin therapy  - Cont. ISS, Lantus HS, increase pre-meal to 7U  - Hold outpatient PO meds  - diabetic restricted diet  - A1C 8.8  2/4: hypoglycemia episode this AM. adjust PM Lantus to 20U. monitor.  2/5: Will reduce Lantus tn to 10U, patient NPO for OR tm.  2/6: Cont. reduced Lantus dose. BGMs stable on lower dose.    4) HTN  - Medications adjusted as above. BP stable now.  - On coreg, Lasix, ARB    5) Transaminitis -- resolved    6) Normocytic Anemia  - iron studies reviewed. start PO iron.     7) DVT ppx  - SQ Heparin     Full Code.    Dispo: OR today for RT foot debridement.  S/p cardiac cath 2/3 >  Successful DARCIE of proximal LAD. On GMDT. + BC on admission now NGTD. has PICC RT arm. poss d/c to [facility] vs home w/ home care. BGM's more controlled Lantus/ insulin being adjusted.

## 2021-02-06 NOTE — BRIEF OPERATIVE NOTE - OPERATION/FINDINGS
Poorly healing wounds right foot at 1st metatarsal heal / liquefactive necrosis of fat, necrotic looking base of proximal phalanx of Hallux & 1st metatarsal head, abscess plantar to 2nd metatarsal head tracking to 2nd interspace, abscess over PIPJ R 3rd toe, with local erythema necrosis to medial aspect of 2nd toe.

## 2021-02-06 NOTE — PROGRESS NOTE ADULT - PROBLEM SELECTOR PLAN 7
Plan for debridement surgery today Plan for debridement surgery today.  Pt has multiple comorbidities (reduced EF, CAD s/p PCI) that increase periop risk.  However, revascularzed 2/5.  Overall at intermediate risk for cardiac events for low risk procedure. May proceed w/ surgery w/o further cardiac testing. Plavix & ASA should NOT be stopped as he just had stent yesterday.  Given low EF avoid excessive fluid administration intraop.

## 2021-02-06 NOTE — PROGRESS NOTE ADULT - PROBLEM SELECTOR PLAN 2
Moderate -  severe LV dysfunction  continue to diurese with IV lasix; cont. low-dose ARB, BB.  Will consider adding ARB postop. Moderate -  severe LV dysfunction  continue to diurese with IV lasix; cont. low-dose ARB, BB.

## 2021-02-07 LAB
ANION GAP SERPL CALC-SCNC: 6 MMOL/L — SIGNIFICANT CHANGE UP (ref 5–17)
BUN SERPL-MCNC: 12 MG/DL — SIGNIFICANT CHANGE UP (ref 7–23)
CALCIUM SERPL-MCNC: 8.7 MG/DL — SIGNIFICANT CHANGE UP (ref 8.5–10.1)
CHLORIDE SERPL-SCNC: 99 MMOL/L — SIGNIFICANT CHANGE UP (ref 96–108)
CO2 SERPL-SCNC: 28 MMOL/L — SIGNIFICANT CHANGE UP (ref 22–31)
CREAT SERPL-MCNC: 0.9 MG/DL — SIGNIFICANT CHANGE UP (ref 0.5–1.3)
GLUCOSE SERPL-MCNC: 120 MG/DL — HIGH (ref 70–99)
HCT VFR BLD CALC: 29.1 % — LOW (ref 39–50)
HGB BLD-MCNC: 9.2 G/DL — LOW (ref 13–17)
MCHC RBC-ENTMCNC: 27.5 PG — SIGNIFICANT CHANGE UP (ref 27–34)
MCHC RBC-ENTMCNC: 31.6 GM/DL — LOW (ref 32–36)
MCV RBC AUTO: 86.9 FL — SIGNIFICANT CHANGE UP (ref 80–100)
PLATELET # BLD AUTO: 315 K/UL — SIGNIFICANT CHANGE UP (ref 150–400)
POTASSIUM SERPL-MCNC: 4.5 MMOL/L — SIGNIFICANT CHANGE UP (ref 3.5–5.3)
POTASSIUM SERPL-SCNC: 4.5 MMOL/L — SIGNIFICANT CHANGE UP (ref 3.5–5.3)
RBC # BLD: 3.35 M/UL — LOW (ref 4.2–5.8)
RBC # FLD: 13.9 % — SIGNIFICANT CHANGE UP (ref 10.3–14.5)
SODIUM SERPL-SCNC: 133 MMOL/L — LOW (ref 135–145)
WBC # BLD: 9.57 K/UL — SIGNIFICANT CHANGE UP (ref 3.8–10.5)
WBC # FLD AUTO: 9.57 K/UL — SIGNIFICANT CHANGE UP (ref 3.8–10.5)

## 2021-02-07 PROCEDURE — 99232 SBSQ HOSP IP/OBS MODERATE 35: CPT

## 2021-02-07 PROCEDURE — 99233 SBSQ HOSP IP/OBS HIGH 50: CPT

## 2021-02-07 RX ORDER — ENOXAPARIN SODIUM 100 MG/ML
40 INJECTION SUBCUTANEOUS DAILY
Refills: 0 | Status: DISCONTINUED | OUTPATIENT
Start: 2021-02-07 | End: 2021-02-10

## 2021-02-07 RX ADMIN — SODIUM CHLORIDE 3 MILLILITER(S): 9 INJECTION INTRAMUSCULAR; INTRAVENOUS; SUBCUTANEOUS at 22:06

## 2021-02-07 RX ADMIN — RANOLAZINE 500 MILLIGRAM(S): 500 TABLET, FILM COATED, EXTENDED RELEASE ORAL at 05:50

## 2021-02-07 RX ADMIN — Medication 81 MILLIGRAM(S): at 22:09

## 2021-02-07 RX ADMIN — Medication 250 MILLIGRAM(S): at 16:59

## 2021-02-07 RX ADMIN — CLOPIDOGREL BISULFATE 75 MILLIGRAM(S): 75 TABLET, FILM COATED ORAL at 11:25

## 2021-02-07 RX ADMIN — Medication 7 UNIT(S): at 11:27

## 2021-02-07 RX ADMIN — Medication 100 MILLIGRAM(S): at 02:21

## 2021-02-07 RX ADMIN — Medication 40 MILLIGRAM(S): at 11:25

## 2021-02-07 RX ADMIN — Medication 250 MILLIGRAM(S): at 06:49

## 2021-02-07 RX ADMIN — Medication 4: at 17:00

## 2021-02-07 RX ADMIN — ATORVASTATIN CALCIUM 40 MILLIGRAM(S): 80 TABLET, FILM COATED ORAL at 22:09

## 2021-02-07 RX ADMIN — Medication 325 MILLIGRAM(S): at 11:25

## 2021-02-07 RX ADMIN — CEFEPIME 100 MILLIGRAM(S): 1 INJECTION, POWDER, FOR SOLUTION INTRAMUSCULAR; INTRAVENOUS at 05:50

## 2021-02-07 RX ADMIN — INSULIN GLARGINE 10 UNIT(S): 100 INJECTION, SOLUTION SUBCUTANEOUS at 22:09

## 2021-02-07 RX ADMIN — Medication 100 MILLIGRAM(S): at 11:25

## 2021-02-07 RX ADMIN — HEPARIN SODIUM 5000 UNIT(S): 5000 INJECTION INTRAVENOUS; SUBCUTANEOUS at 05:50

## 2021-02-07 RX ADMIN — SODIUM CHLORIDE 3 MILLILITER(S): 9 INJECTION INTRAMUSCULAR; INTRAVENOUS; SUBCUTANEOUS at 12:54

## 2021-02-07 RX ADMIN — Medication 7 UNIT(S): at 08:32

## 2021-02-07 RX ADMIN — SODIUM CHLORIDE 3 MILLILITER(S): 9 INJECTION INTRAMUSCULAR; INTRAVENOUS; SUBCUTANEOUS at 05:53

## 2021-02-07 RX ADMIN — RANOLAZINE 500 MILLIGRAM(S): 500 TABLET, FILM COATED, EXTENDED RELEASE ORAL at 18:23

## 2021-02-07 RX ADMIN — CEFEPIME 100 MILLIGRAM(S): 1 INJECTION, POWDER, FOR SOLUTION INTRAMUSCULAR; INTRAVENOUS at 15:18

## 2021-02-07 RX ADMIN — Medication 7 UNIT(S): at 17:00

## 2021-02-07 RX ADMIN — ENOXAPARIN SODIUM 40 MILLIGRAM(S): 100 INJECTION SUBCUTANEOUS at 15:18

## 2021-02-07 RX ADMIN — CARVEDILOL PHOSPHATE 6.25 MILLIGRAM(S): 80 CAPSULE, EXTENDED RELEASE ORAL at 11:25

## 2021-02-07 RX ADMIN — Medication 100 MILLIGRAM(S): at 18:24

## 2021-02-07 RX ADMIN — LOSARTAN POTASSIUM 25 MILLIGRAM(S): 100 TABLET, FILM COATED ORAL at 11:25

## 2021-02-07 RX ADMIN — Medication 2: at 11:26

## 2021-02-07 RX ADMIN — Medication 40 MILLIEQUIVALENT(S): at 11:24

## 2021-02-07 NOTE — SWALLOW BEDSIDE ASSESSMENT ADULT - SWALLOW EVAL: CRITERIA FOR SKILLED INTERVENTION MET
DO NOT FEEL THAT ACUTE SPEECH PATHOLOGY FOLLOW UP IS CLINICALLY WARRANTED NOR WOULD IT CHANGE CLINICAL MANAGEMENT IN HOSPITAL. PT'S SPEECH-LANGUAGE ABILITIES AND OROPHARYNGEAL SWALLOWING ABILITIES ARE FELT TO BE FUNCTIONAL/AT USUAL STATE. GIVEN ABOVE, WILL NOT ACTIVELY FOLLOW. RECONSULT PRN SHOULD STATUS CHANGE AND CONDITION WARRANT.

## 2021-02-07 NOTE — SWALLOW BEDSIDE ASSESSMENT ADULT - NS SPL SWALLOW CLINIC TRIAL FT
Pt demonstrated Oropharyngeal Swallowing abilities which subjectively appeared to be within functional parameters for age on exam. Bolus formation/transfer are mechanically functional for age and laryngeal  lift on palpation during swallowing trials are functional for age as well. NO behavioral aspiration signs exhibited. Odynophagia was denied. However, it is noted that the pt periodically complained of feeling a sticking sensation, that is not food texture specific, in the posterior portion of his oral cavity. Of note is that areas of mild Thrush were evident at site of his periodic discomfort which can explain why he sometimes feels a focal sticking sensation in the affected region.

## 2021-02-07 NOTE — DIETITIAN INITIAL EVALUATION ADULT. - OTHER INFO
66 yo M with a PMH CAD, PAD, HTN, HLD, DM2 (on insulin) c/b neuropathy, and BPH who presents with SOB. Over the past day, he has had worsening SOB, particularly on exertion but also at rest and while lying down in bed. He also endorses a chest tightness with the dyspnea and has palpitations on exertion, but denies wheezing. He denies fevers but endorsed some chills today. He has had a chronic nonproductive cough for he past month.He was recently admitted from 1/21/21-1/27/21 for a right foot cellulitis and osteomyelitis involving the right medial hallux sesamoid and 1st metatarsal. Wound cultures showed rare MSSA, bacteroides fragilis, and strep constellatus. He had a debridement by podiatry on 1/25. He had a PICC line placed and was started on Cefepime and Vancomycin for a planned course of 4-6 weeks. His course was also complicated by hematuria, for which he saw Urology who felt there was no inpatient workup required, but should have an outpatient CT urography/cystography. Since discharge, he has had some loose stools, about one BM per day, and not fully liquid. He denies abdominal pain, nausea, vomiting, current hematuria, or dysuria. His right foot is doing better. Sometimes he has purulent/bloody drainage, but very little. He has an appointment with Dr. Bullock on Monday 2/1.\    Pt seen for DM diet education. 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 Pt reoprts long hours and skips lunch with large late dinner in evening. Pt states he experienced 1-2 episodes of hypoglycemia (on insulin), Recently saw Endo who wanted to modify medication (but it appears he never started new regimen. Pt overall knowledgeable about diet and DM but A1C is was 8.8% on last admission. Discussed with pt about CHO counting and DM and diet. Pt agreeable to education. Encourage Pt follow up with endo and start new medication, pt agreeable. Will continue to monitor as needed.

## 2021-02-07 NOTE — SWALLOW BEDSIDE ASSESSMENT ADULT - MUCOSAL QUALITY
intact but dry with areas of mild white plaque at site of posterior tongue near tonsillar region suspect for Thrush.

## 2021-02-07 NOTE — DIETITIAN INITIAL EVALUATION ADULT. - PERTINENT MEDS FT
MEDICATIONS  (STANDING):  aspirin enteric coated 81 milliGRAM(s) Oral at bedtime  atorvastatin 40 milliGRAM(s) Oral at bedtime  carvedilol 6.25 milliGRAM(s) Oral every 12 hours  cefepime   IVPB 2000 milliGRAM(s) IV Intermittent every 12 hours  clopidogrel Tablet 75 milliGRAM(s) Oral daily  dextrose 40% Gel 15 Gram(s) Oral once  dextrose 5%. 1000 milliLiter(s) (100 mL/Hr) IV Continuous <Continuous>  dextrose 50% Injectable 25 Gram(s) IV Push once  dextrose 50% Injectable 12.5 Gram(s) IV Push once  dextrose 50% Injectable 25 Gram(s) IV Push once  ferrous    sulfate 325 milliGRAM(s) Oral daily  furosemide    Tablet 40 milliGRAM(s) Oral daily  glucagon  Injectable 1 milliGRAM(s) IntraMuscular once  heparin   Injectable 5000 Unit(s) SubCutaneous every 8 hours  insulin glargine Injectable (LANTUS) 10 Unit(s) SubCutaneous at bedtime  insulin lispro (ADMELOG) corrective regimen sliding scale   SubCutaneous three times a day before meals  insulin lispro Injectable (ADMELOG) 7 Unit(s) SubCutaneous three times a day before meals  losartan 25 milliGRAM(s) Oral daily  metroNIDAZOLE  IVPB      metroNIDAZOLE  IVPB 500 milliGRAM(s) IV Intermittent every 8 hours  potassium chloride    Tablet ER 40 milliEquivalent(s) Oral daily  ranolazine 500 milliGRAM(s) Oral two times a day  sodium chloride 0.9% lock flush 3 milliLiter(s) IV Push every 8 hours  vancomycin  IVPB 750 milliGRAM(s) IV Intermittent every 12 hours    MEDICATIONS  (PRN):

## 2021-02-07 NOTE — PROGRESS NOTE ADULT - SUBJECTIVE AND OBJECTIVE BOX
Patient is a 65y old  Male who presents with a chief complaint of pulmonary edema (06 Feb 2021 11:13)      HPI:  64 yo M with a PMH CAD, PAD, HTN, HLD, DM2 (on insulin) c/b neuropathy, and BPH who presents with SOB. Over the past day, he has had worsening SOB, particularly on exertion but also at rest and while lying down in bed. He also endorses a chest tightness with the dyspnea and has palpitations on exertion, but denies wheezing. He denies fevers but endorsed some chills today. He has had a chronic nonproductive cough for he past month.    He was recently admitted from 1/21/21-1/27/21 for a right foot cellulitis and osteomyelitis involving the right medial hallux sesamoid and 1st metatarsal. Wound cultures showed rare MSSA, bacteroides fragilis, and strep constellatus. He had a debridement by podiatry on 1/25. He had a PICC line placed and was started on Cefepime and Vancomycin for a planned course of 4-6 weeks. His course was also complicated by hematuria, for which he saw Urology who felt there was no inpatient workup required, but should have an outpatient CT urography/cystography. Since discharge, he has had some loose stools, about one BM per day, and not fully liquid. He denies abdominal pain, nausea, vomiting, current hematuria, or dysuria.  His right foot is doing better. Sometimes he has purulent/bloody drainage, but very little. He has an appointment with Dr. Bullock on Monday 2/1.    Of note, he was also stopped off of his HCTZ 25 mg QD and his Losartan was decreased from 100 to 50 mg QD. His Amlodipine 10 mg QD was not restarted upon discharge as well.    In the ED, he was given Furosemide 40 mg IV x1, Meropenem 1 g IV x1, and Vancomycin 750 mg IV x1. (31 Jan 2021 05:52)      Allergies    No Known Allergies    Intolerances        MEDICATIONS  (STANDING):  aspirin enteric coated 81 milliGRAM(s) Oral at bedtime  atorvastatin 40 milliGRAM(s) Oral at bedtime  carvedilol 6.25 milliGRAM(s) Oral every 12 hours  cefepime   IVPB 2000 milliGRAM(s) IV Intermittent every 12 hours  clopidogrel Tablet 75 milliGRAM(s) Oral daily  dextrose 40% Gel 15 Gram(s) Oral once  dextrose 5%. 1000 milliLiter(s) (100 mL/Hr) IV Continuous <Continuous>  dextrose 50% Injectable 25 Gram(s) IV Push once  dextrose 50% Injectable 12.5 Gram(s) IV Push once  dextrose 50% Injectable 25 Gram(s) IV Push once  ferrous    sulfate 325 milliGRAM(s) Oral daily  furosemide    Tablet 40 milliGRAM(s) Oral daily  glucagon  Injectable 1 milliGRAM(s) IntraMuscular once  heparin   Injectable 5000 Unit(s) SubCutaneous every 8 hours  insulin glargine Injectable (LANTUS) 10 Unit(s) SubCutaneous at bedtime  insulin lispro (ADMELOG) corrective regimen sliding scale   SubCutaneous three times a day before meals  insulin lispro Injectable (ADMELOG) 7 Unit(s) SubCutaneous three times a day before meals  losartan 25 milliGRAM(s) Oral daily  metroNIDAZOLE  IVPB      metroNIDAZOLE  IVPB 500 milliGRAM(s) IV Intermittent every 8 hours  potassium chloride    Tablet ER 40 milliEquivalent(s) Oral daily  ranolazine 500 milliGRAM(s) Oral two times a day  sodium chloride 0.9% lock flush 3 milliLiter(s) IV Push every 8 hours  vancomycin  IVPB 750 milliGRAM(s) IV Intermittent every 12 hours    MEDICATIONS  (PRN):        RADIOLOGY    CBC Full  -  ( 07 Feb 2021 08:34 )  WBC Count : 9.57 K/uL  RBC Count : 3.35 M/uL  Hemoglobin : 9.2 g/dL  Hematocrit : 29.1 %  Platelet Count - Automated : 315 K/uL  Mean Cell Volume : 86.9 fl  Mean Cell Hemoglobin : 27.5 pg  Mean Cell Hemoglobin Concentration : 31.6 gm/dL  Auto Neutrophil # : x  Auto Lymphocyte # : x  Auto Monocyte # : x  Auto Eosinophil # : x  Auto Basophil # : x  Auto Neutrophil % : x  Auto Lymphocyte % : x  Auto Monocyte % : x  Auto Eosinophil % : x  Auto Basophil % : x      02-07    133<L>  |  99  |  12  ----------------------------<  120<H>  4.5   |  28  |  0.90    Ca    8.7      07 Feb 2021 08:34        < from: Xray Foot AP + Lateral, Right (01.31.21 @ 11:48) >    EXAM:  XR FOOT 2 VIEWS RT                            PROCEDURE DATE:  01/31/2021          INTERPRETATION:  Right foot    HISTORY: Post debridement    COMPARISON: Earlier the same morning     Three views of the right foot show no evidence of acute fracture nor destructive change. The joint spaces show similar arthritic changes of the first metatarsophalangeal joint. Soft tissue swelling is again seen along the medial foot. Soft tissue gas is seen near the head of the second metatarsal bone.    IMPRESSION: No evidence of bony destruction.    Thank you for this referral.            BROWN HUDSON MD; Attending Interventional Radiologist  This document has been electronically signed. Feb 1 2021 10:31AM    < end of copied text >  Culture - Tissue with Gram Stain (02.06.21 @ 14:02)   Gram Stain:   No polymorphonuclear leukocytes seen per low power field   No organisms seen per oil power field   Specimen Source: .Tissue great toe culture Culture - Tissue with Gram Stain (01.25.21 @ 15:15)   Gram Stain:   No polymorphonuclear cells seen per low power field   No organisms seen per oil power field   - Ampicillin/Sulbactam: S <=8/4   - Cefazolin: S <=4   - Clindamycin: R <=0.25 This isolate is presumed to be clindamycin resistant based on detection of inducible resistance. Clindamycin may still be effective in some patients.   - Erythromycin: R >4   - Gentamicin: S <=1 Should not be used as monotherapy   - Oxacillin: S <=0.25   - Penicillin: R 2   - RIF- Rifampin: S <=1 Should not be used as monotherapy   - Tetra/Doxy: S <=1   - Trimethoprim/Sulfamethoxazole: S <=0.5/9.5   - Vancomycin: S 2   Specimen Source: .Tissue #4- right medial aspect first metatarsal base of great toe   Culture Results:   Rare Staphylococcus aureus   Rare Bacteroides fragilis "Susceptibilities not performed"   Rare Streptococcus constellatus "Susceptibilities not performed"   Organism Identification: Staphylococcus aureus   Organism: Staphylococcus aureus   Method Type: ARTURO       Historical Values  Culture - Tissue with Gram Stain (02.06.21 @ 14:02)   Gram Stain:   Culture - Blood (02.04.21 @ 11:04)   Specimen Source: .Blood None   Culture Results:   No growth to date.       Historical Values

## 2021-02-07 NOTE — SWALLOW BEDSIDE ASSESSMENT ADULT - SWALLOW EVAL: DIAGNOSIS
1) Pt demonstrates Oropharyngeal Swallowing abilities which subjectively appears to be within functional parameters for age. NO behavioral aspiration signs exhibited. Odynophagia was denied. However, it is noted that the pt periodically complained of feeling a sticking sensation, that is not food texture specific, in the posterior portion of his oral cavity. Of note is that areas of mild Thrush were evident at site of his periodic discomfort which can explain why he sometimes feels a focal sticking sensation in the affected region.  2) The pt was alert, interactive and able to verbalize during communicative probes via intelligible, fluent, linguistically intact, contextually appropriate utterances. No primary speech-language pathology was evident. Pt able to verbalize needs and is at reported communicative baseline.

## 2021-02-07 NOTE — PROGRESS NOTE ADULT - ASSESSMENT
65M.  admitted 01/31/21.  presented to ED c/o SOB.  a/w ALFARO.  prior admission from 1/21/21-1/27/21 for a right foot cellulitis and osteomyelitis involving the right medial hallux sesamoid and 1st metatarsal.  wound Cx (+) polymicrobial.  01/25 debrided and a RUE PICC line was placed.  started on cefepime + vancomycin for 4-6 weeks.        PMHx:  CAD;  PAD;  DM;  HTN;  DM;  neuropathy;  BPH.     R foot plantar ulcer w/ probable acute upon chronic OM and surrounding foot cellulitis.  hx PAD.  - WCx:  rare MSSA, bacteroides fragilis and Strep constellatus.  - MR:  reported no OM, but clinically the suspicion is high.  - ABx:  vancomycin + cefepime via PICC line.  - local wound care.  - VSx consult.  - Podiatry.  - ID.     65M.  admitted 01/31/21.  presented to ED c/o SOB.  a/w ALFARO.      prior admission from 1/21/21-1/27/21 for a right foot cellulitis and osteomyelitis involving the right medial hallux sesamoid and 1st metatarsal.  wound Cx (+) polymicrobial.  01/25 debrided and a RUE PICC line was placed.  started on cefepime + vancomycin for 4-6 weeks.      PMHx:  CAD;  PAD;  DM;  HTN;  DM;  neuropathy;  BPH.     R foot plantar ulcer w/ probable acute upon chronic OM and surrounding foot cellulitis.  hx PAD.  - WCx:  rare MSSA, bacteroides fragilis and Strep constellatus.  - MR:  reported no OM, but clinically the suspicion is high.  - ABx:  vancomycin + cefepime via PICC line.  - local wound care.  - VSx consult.  - Podiatry.  - ID.    acute upon chronic HFrEF.  hx ischemic CM.  - EF:  35%.  - Lasix 40mg po qd.  - ARB.  - BB.  - Cardiology f/u to repeat TTE in 1 month, if no improvement +/- Entresto.    hx CAD.  - stable.  - DAPT + BB + statin.    hx DM.  - A1C:  8.8%.  - target -180mg/dL.  - CHO diet.  - Lantus 10units sq qhs.  - insulin lispro 7units qac.  - correction insulin coverage.    DVT prophylaxis.  - UFH sq q8.    disposition.  - 2S.    communication.  - RN.  - Podiatry.  - ID.    SIGN OUT:  - VSx consult requested by podiatry.    - Cardiac issue are currently stable. 65M.  admitted 01/31/21.  presented to ED c/o SOB.  a/w ALFARO.      prior admission from 1/21/21-1/27/21 for a right foot cellulitis and osteomyelitis involving the right medial hallux sesamoid and 1st metatarsal.  wound Cx (+) polymicrobial.  01/25 debrided and a RUE PICC line was placed.  started on cefepime + vancomycin for 4-6 weeks.      PMHx:  CAD;  PAD;  DM;  HTN;  DM;  neuropathy;  BPH.     R foot plantar ulcer w/ probable acute upon chronic OM and surrounding foot cellulitis.  hx PAD.  - WCx:  rare MSSA, bacteroides fragilis and Strep constellatus.  - MR:  reported no OM, but clinically the suspicion is high.  - ABx:  vancomycin + cefepime via PICC line.  - local wound care.  - VSx consult.  - Podiatry.  - ID.    acute upon chronic HFrEF.  hx ischemic CM.  - r/o Takotsubo CM.  - EF:  35%.  - Lasix 40mg po qd.  - ARB.  - BB.  - Cardiology f/u to repeat TTE in 1 month, if no improvement +/- Entresto.    hx CAD.  - stable.  - DAPT + BB + statin.    hx DM.  - A1C:  8.8%.  - target -180mg/dL.  - CHO diet.  - Lantus 10units sq qhs.  - insulin lispro 7units qac.  - correction insulin coverage.    DVT prophylaxis.  - UFH sq q8.    disposition.  - 2S.    communication.  - RN.  - Podiatry.  - ID.    SIGN OUT:  - VSx consult requested by podiatry.    - Cardiac issue are currently stable.  Cardiology f/u to repeat TTE in 1 month, if no improvement +/- Entresto.

## 2021-02-07 NOTE — PROGRESS NOTE ADULT - SUBJECTIVE AND OBJECTIVE BOX
REASON FOR VISIT: Pulmonary edema, CHF, Elev troponin    HPI: 65 year old man with a history of 3V CAD (cath in 2012 - not amenable to revascularization), PAD, HTN, HLD, DM2 (on insulin), neuropathy, BPH, and recently diagnosed foot osteomyelitis (treated with long-course of IV antibiotics via PICC) admitted on 1/31/2021 with decompensated HF; found to have severe LV dysfunction, mild elevation of troponin.  Cath on 2/4 revealed a critical stenosis of proximal LAD (now s/p PCI) and elevated LVEDP.  He is also being treated for a R foot osteomyelitis / cellulitis.    2/4/21:  Symptomatic hypoglycemia earlier today -- now better; less dyspnea; still with occasional cough.  2/5/'21: no complaints.  Plan for debridement of foot.  2/6/21 awake alert comfortable Tele SR, S/P LAD DARCIE   2/7/21 feeling well no cardiac complaints      MEDICATIONS  (STANDING):  aspirin enteric coated 81 milliGRAM(s) Oral at bedtime  atorvastatin 40 milliGRAM(s) Oral at bedtime  carvedilol 6.25 milliGRAM(s) Oral every 12 hours  cefepime   IVPB 2000 milliGRAM(s) IV Intermittent every 12 hours  clopidogrel Tablet 75 milliGRAM(s) Oral daily  dextrose 40% Gel 15 Gram(s) Oral once  dextrose 5%. 1000 milliLiter(s) (100 mL/Hr) IV Continuous <Continuous>  dextrose 50% Injectable 25 Gram(s) IV Push once  dextrose 50% Injectable 12.5 Gram(s) IV Push once  dextrose 50% Injectable 25 Gram(s) IV Push once  ferrous    sulfate 325 milliGRAM(s) Oral daily  furosemide    Tablet 40 milliGRAM(s) Oral daily  glucagon  Injectable 1 milliGRAM(s) IntraMuscular once  heparin   Injectable 5000 Unit(s) SubCutaneous every 8 hours  insulin glargine Injectable (LANTUS) 10 Unit(s) SubCutaneous at bedtime  insulin lispro (ADMELOG) corrective regimen sliding scale   SubCutaneous three times a day before meals  insulin lispro Injectable (ADMELOG) 7 Unit(s) SubCutaneous three times a day before meals  losartan 25 milliGRAM(s) Oral daily  metroNIDAZOLE  IVPB      metroNIDAZOLE  IVPB 500 milliGRAM(s) IV Intermittent every 8 hours  potassium chloride    Tablet ER 40 milliEquivalent(s) Oral daily  ranolazine 500 milliGRAM(s) Oral two times a day  sodium chloride 0.9% lock flush 3 milliLiter(s) IV Push every 8 hours  vancomycin  IVPB 750 milliGRAM(s) IV Intermittent every 12 hours    MEDICATIONS  (PRN):      Vital Signs Last 24 Hrs  T(C): 37.2 (07 Feb 2021 07:38), Max: 37.6 (07 Feb 2021 06:09)  T(F): 99 (07 Feb 2021 07:38), Max: 99.6 (07 Feb 2021 06:09)  HR: 70 (07 Feb 2021 11:20) (56 - 70)  BP: 113/66 (07 Feb 2021 11:20) (97/58 - 127/72)  BP(mean): --  RR: 19 (07 Feb 2021 07:38) (12 - 19)  SpO2: 94% (07 Feb 2021 07:38) (94% - 99%)      PHYSICAL EXAM:  Constitutional: Awake and alert, no distress  Respiratory: Lungs clear bilaterally  Cardiovascular: S1 and S2, regular, S/P CC Right radial access site unchanged   Gastrointestinal:  Abdomen is soft, nontender.   Psych: A&O no focal deficits       LABS: All Labs Reviewed:                        9.2    9.31  )-----------( 324      ( 05 Feb 2021 08:04 )             28.9                         9.7    8.26  )-----------( 391      ( 04 Feb 2021 07:14 )             30.0                         8.4    10.55 )-----------( 351      ( 03 Feb 2021 08:50 )             25.6     05 Feb 2021 08:04    135    |  99     |  13     ----------------------------<  108    4.5     |  32     |  0.99   04 Feb 2021 07:14    138    |  104    |  13     ----------------------------<  33     3.5     |  25     |  0.88   03 Feb 2021 08:50    138    |  105    |  15     ----------------------------<  75     3.5     |  24     |  0.77     Ca    8.4        05 Feb 2021 08:04  Ca    8.8        04 Feb 2021 07:14  Ca    8.3        03 Feb 2021 08:50  Mg     1.9       03 Feb 2021 08:50    TPro  5.7    /  Alb  2.0    /  TBili  0.5    /  DBili  x      /  AST  15     /  ALT  65     /  AlkPhos  113    05 Feb 2021 08:04      Cardiac Cath Lab - Adult (02.03.21 @ 08:23):   New proximal LAD stenosis. Multiple small branch stenoses previously seen.   LVEDP very elevated.   Interventional Conclusions:  Successful DARCIE of proximal LAD    12 Lead ECG (01.30.21 @ 23:48):  Normal sinus rhythm, Low voltage QRS, Cannot rule out Anterior infarct , age undetermined. When compared with ECG of 21-JAN-2021 15:41, No significant change was found    CT Angio Chest PE Protocol w/ IV Cont (01.31.21 @ 01:45):  No evidence of pulmonary embolism followed to the segmental pulmonary arterial branches.  Moderate sized bilateral pleural effusions with overlying compressive atelectasis.  Moderate pulmonary vascular congestion.  Small volume perihepatic ascites.    TTE Echo Complete w/o Contrast w/ Doppler (02.01.21 @ 07:55):   Left ventricle systolic function appears moderately impaired; segmental wall motion abnormalities noted consistent with Takotsubo's cardiomyopathy. Estimated Ejection Fraction is 30%.   The left atrium is moderately dilated.   Mild mitral regurgitation    Tele: SR

## 2021-02-07 NOTE — SWALLOW BEDSIDE ASSESSMENT ADULT - COMMENTS
The pt was admitted to  with SOB. Hospital course is notable for pulmonary edema, obstructive coronary dz s/p placement of a stent in proximal LAD, anemia, transaminitis, and OM of right foot status post debridement. This profile is superimposed upon a history of BPH, CAD, HTN, HLD, DM type 2, PAD s/p left toe amputation,, and prior removal of Basal Cell skin cancer on face,

## 2021-02-07 NOTE — SWALLOW BEDSIDE ASSESSMENT ADULT - SLP GENERAL OBSERVATIONS
The pt was alert, interactive and able to verbalize during communicative probes via intelligible, fluent, linguistically intact, contextually appropriate utterances. No primary speech-language pathology was evident. Pt able to verbalize needs and is at reported communicative baseline.

## 2021-02-07 NOTE — DIETITIAN INITIAL EVALUATION ADULT. - PERTINENT LABORATORY DATA
02-07    133<L>  |  99  |  12  ----------------------------<  120<H>  4.5   |  28  |  0.90    Ca    8.7      07 Feb 2021 08:34    BMI: BMI (kg/m2): 24.4 (01-30-21 @ 23:46)  HbA1c: A1C with Estimated Average Glucose Result: 8.8 % (01-22-21 @ 06:35)    Glucose: POCT Blood Glucose.: 135 mg/dL (02-07-21 @ 08:18)    BP: 102/50 (02-07-21 @ 07:38) (90/54 - 131/77)  Lipid Panel:

## 2021-02-07 NOTE — SWALLOW BEDSIDE ASSESSMENT ADULT - SWALLOW EVAL: RECOMMENDED DIET
SUGGEST A REGULAR TEXTURE DIET WITH THIN LIQUID CONSISTENCIES AS THE PATIENT TOLERATES THESE FOOD CONSISTENCIES FROM AN OROPHARYNGEAL SWALLOWING PERSPECTIVE ON CLINICAL EXAM.

## 2021-02-07 NOTE — PROGRESS NOTE ADULT - SUBJECTIVE AND OBJECTIVE BOX
CHIEF COMPLAINT/DIAGNOSIS: SOB    HPI: 64 yo M with a PMH CAD, PAD, HTN, HLD, DM2 (on insulin) c/b neuropathy, and BPH who presents with SOB. Over the past day, he has had worsening SOB, particularly on exertion but also at rest and while lying down in bed. He also endorses a chest tightness with the dyspnea and has palpitations on exertion, but denies wheezing. He denies fevers but endorsed some chills today. He has had a chronic nonproductive cough for he past month.    He was recently admitted from 1/21/21-1/27/21 for a right foot cellulitis and osteomyelitis involving the right medial hallux sesamoid and 1st metatarsal. Wound cultures showed rare MSSA, bacteroides fragilis, and strep constellatus. He had a debridement by podiatry on 1/25. He had a PICC line placed and was started on Cefepime and Vancomycin for a planned course of 4-6 weeks. His course was also complicated by hematuria, for which he saw Urology who felt there was no inpatient workup required, but should have an outpatient CT urography/cystography. Since discharge, he has had some loose stools, about one BM per day, and not fully liquid. He denies abdominal pain, nausea, vomiting, current hematuria, or dysuria.    His right foot is doing better. Sometimes he has purulent/bloody drainage, but very little. He has an appointment with Dr. Bullock on Monday 2/1. Of note, he was also stopped off of his HCTZ 25 mg QD and his Losartan was decreased from 100 to 50 mg QD. His Amlodipine 10 mg QD was not restarted upon discharge as well. In the ED, he was given Furosemide 40 mg IV x1, Meropenem 1 g IV x1, and Vancomycin 750 mg IV x1.    2/6: feeling well. still w/ dry cough.  REVIEW OF SYSTEMS: All other review of systems is negative unless indicated above    PHYSICAL EXAM:  Constitutional: Awake and alert, well-developed  HEENT: PERR, MMM  Neck:  supple,  No JVD  Respiratory: Breath sounds are diminished bilaterally, No wheezing   Cardiovascular: S1 and S2, regular rate and rhythm, no Murmurs,    Gastrointestinal: Bowel Sounds present, soft, nontender.   Extremities: mod. b/l peripheral edema.    Vascular: 2+ peripheral pulses  Neurological: A/O x 3, no focal deficits  Musculoskeletal: no calf tenderness.  Skin:  RT foot ulcer, dressing in place.  Lines: PICC line in place.    Vital Signs Last 24 Hrs  T(C): 36.7 (06 Feb 2021 09:45), Max: 37.4 (05 Feb 2021 20:38)  T(F): 98 (06 Feb 2021 09:45), Max: 99.4 (05 Feb 2021 20:38)  HR: 67 (06 Feb 2021 09:45) (66 - 75)  BP: 116/69 (06 Feb 2021 09:45) (90/54 - 118/69)  BP(mean): --  RR: 18 (06 Feb 2021 09:45) (18 - 18)  SpO2: 97% (06 Feb 2021 09:45) (94% - 97%) -- room air    LABS: All Labs Reviewed:                        9.2    9.31  )-----------( 324      ( 05 Feb 2021 08:04 )             28.9     02-05    135  |  99  |  13  ----------------------------<  108<H>  4.5   |  32<H>  |  0.99    Ca    8.4<L>      05 Feb 2021 08:04    TPro  5.7<L>  /  Alb  2.0<L>  /  TBili  0.5  /  DBili  x   /  AST  15  /  ALT  65  /  AlkPhos  113  02-05    RADIOLOGY:  < from: MR Foot w/wo IV Cont, Right (02.01.21 @ 09:57) >  IMPRESSION:  1.  Patient status post sesamoidectomy and surgical debridement with postsurgical changes as noted above.  2.  No abscess or other focal drainable fluid collection.  3.  No acute osteomyelitis.  < end of copied text >    < from: Xray Chest 1 View-PORTABLE IMMEDIATE (Xray Chest 1 View-PORTABLE IMMEDIATE .) (01.31.21 @ 01:53) >  IMPRESSION: The cardiac silhouette is enlarged. There is mild CHF with bilateral patchy airspace opacities third small bilateral pleural effusions are present. The bones are intact.  < end of copied text >    CARDIAC CATH:  < from: Cardiac Cath Lab - Adult (02.03.21 @ 08:23) >   Interventional Conclusions   Successful DARCIE of proximal LAD   Recommendations   ASA and Plavix, guideline directed medical therapy for heart failure  < end of copied text >    ECHOCARDIOGRAM:    < from: TTE Echo Complete w/o Contrast w/ Doppler (02.01.21 @ 07:55) >   Left ventricle systolic function appears moderately impaired;   segmental wall motion abnormalities noted   consistent with Takotsubo's cardiomyopathy.   Estimated Ejection Fraction is 30%.   The left atrium is moderately dilated.   Mild mitral regurgitation  < end of copied text >    MEDICATIONS  (STANDING):  aspirin enteric coated 81 milliGRAM(s) Oral at bedtime  atorvastatin 40 milliGRAM(s) Oral at bedtime  carvedilol 6.25 milliGRAM(s) Oral every 12 hours  cefepime   IVPB 2000 milliGRAM(s) IV Intermittent every 12 hours  clopidogrel Tablet 75 milliGRAM(s) Oral daily  dextrose 40% Gel 15 Gram(s) Oral once  dextrose 5%. 1000 milliLiter(s) (100 mL/Hr) IV Continuous <Continuous>  dextrose 50% Injectable 25 Gram(s) IV Push once  dextrose 50% Injectable 12.5 Gram(s) IV Push once  dextrose 50% Injectable 25 Gram(s) IV Push once  ferrous    sulfate 325 milliGRAM(s) Oral daily  furosemide    Tablet 40 milliGRAM(s) Oral daily  glucagon  Injectable 1 milliGRAM(s) IntraMuscular once  heparin   Injectable 5000 Unit(s) SubCutaneous every 8 hours  insulin glargine Injectable (LANTUS) 10 Unit(s) SubCutaneous at bedtime  insulin lispro (ADMELOG) corrective regimen sliding scale   SubCutaneous three times a day before meals  insulin lispro (ADMELOG) corrective regimen sliding scale   SubCutaneous at bedtime  insulin lispro Injectable (ADMELOG) 7 Unit(s) SubCutaneous three times a day before meals  losartan 25 milliGRAM(s) Oral daily  metroNIDAZOLE  IVPB      metroNIDAZOLE  IVPB 500 milliGRAM(s) IV Intermittent every 8 hours  potassium chloride    Tablet ER 40 milliEquivalent(s) Oral daily  ranolazine 500 milliGRAM(s) Oral two times a day  sodium chloride 0.9% lock flush 3 milliLiter(s) IV Push every 8 hours  vancomycin  IVPB 750 milliGRAM(s) IV Intermittent every 12 hours    MEDICATIONS  (PRN):    ECG:  < from: 12 Lead ECG (02.04.21 @ 07:50) >  Diagnosis Line Sinus bradycardia  Low voltage QRS (limb leads)  Poor precordial R wave progression, consider anterior infarct  Abnormal ECG  When compared with ECG of 03-FEB-2021 13:42,  No significant change was found  Confirmed by GUI IBRAHIM MD ([id number]) on 2/4/2021 3:55:27 PM  < end of copied text >    ASSESSMENT AND PLAN:    64 yo M with a PMH CAD, PAD, HTN, HLD, DM2 (on insulin) c/b neuropathy, and BPH who presents with SOB.    1) Acute respiratory failure/Acute pulmonary edema due ischemic cardiomyopathy    - echo EF >> EF 30%    - COIVD-19 PCR and RVP negative  - Cont. tele monitoring. NSR on monitor. off supplemental 02 - 02 sats stable on RA  - Cont. Coreg BID, PO Lasix, Losartan w/ parameters.  - S/p cardiac cath 2/3 >  Successful DARCIE of proximal LAD.   - Cont. ASA, Plavix, Statin  - Cardio f/u appreciated  2/4: started Losartan 25mg QD   2/5: switch to PO Lasix, reduce BB dose - patient hypotensive this AM (meds held)  2/6: BP stable. cont. current meds.    2) Right foot plantar ulcer with acute on chronic OM and surrounding foot cellulitis s/p surgery | PVD  - S/p debridement 1/25. Wound cultures showed rare MSSA, bacteroides fragilis, and strep constellatus  - Cont. Cefepime/ Vancomycin / Flagyl via PICC line. Repeat BCx from 2/4 pending >> NGTD  - MRI as above. No abscess, drainable fluid collection or osteo  - OR td (2/6) for RT foot debridement   - ID and podiatry consults appreciated     3) Uncontrolled Type 2 diabetes mellitus with insulin therapy  - Cont. ISS, Lantus HS, increase pre-meal to 7U  - Hold outpatient PO meds  - diabetic restricted diet  - A1C 8.8  2/4: hypoglycemia episode this AM. adjust PM Lantus to 20U. monitor.  2/5: Will reduce Lantus tn to 10U, patient NPO for OR tm.  2/6: Cont. reduced Lantus dose. BGMs stable on lower dose.    4) HTN  - Medications adjusted as above. BP stable now.  - On coreg, Lasix, ARB    5) Transaminitis -- resolved    6) Normocytic Anemia  - iron studies reviewed. start PO iron.     7) DVT ppx  - SQ Heparin     Full Code.    Dispo: OR today for RT foot debridement.  S/p cardiac cath 2/3 >  Successful DARCIE of proximal LAD. On GMDT. + BC on admission now NGTD. has PICC RT arm. poss d/c to [facility] vs home w/ home care. BGM's more controlled Lantus/ insulin being adjusted. CC:  Patient is a 65y old  Male who presents with a chief complaint of pulmonary edema (07 Feb 2021 14:05)    SUBJECTIVE:     -no new complaints or issues at current time.    ROS:  all other review of systems are negative unless indicated above.    aspirin enteric coated 81 milliGRAM(s) Oral at bedtime  atorvastatin 40 milliGRAM(s) Oral at bedtime  carvedilol 6.25 milliGRAM(s) Oral every 12 hours  cefepime   IVPB 2000 milliGRAM(s) IV Intermittent every 12 hours  clopidogrel Tablet 75 milliGRAM(s) Oral daily  ferrous    sulfate 325 milliGRAM(s) Oral daily  furosemide    Tablet 40 milliGRAM(s) Oral daily  heparin   Injectable 5000 Unit(s) SubCutaneous every 8 hours  insulin glargine Injectable (LANTUS) 10 Unit(s) SubCutaneous at bedtime  insulin lispro (ADMELOG) corrective regimen sliding scale   SubCutaneous three times a day before meals  insulin lispro Injectable (ADMELOG) 7 Unit(s) SubCutaneous three times a day before meals  losartan 25 milliGRAM(s) Oral daily  metroNIDAZOLE  IVPB      metroNIDAZOLE  IVPB 500 milliGRAM(s) IV Intermittent every 8 hours  potassium chloride    Tablet ER 40 milliEquivalent(s) Oral daily  ranolazine 500 milliGRAM(s) Oral two times a day  sodium chloride 0.9% lock flush 3 milliLiter(s) IV Push every 8 hours  vancomycin  IVPB 750 milliGRAM(s) IV Intermittent every 12 hours    T(C): 37.2 (02-07-21 @ 07:38), Max: 37.6 (02-07-21 @ 06:09)  HR: 70 (02-07-21 @ 11:20) (56 - 70)  BP: 113/66 (02-07-21 @ 11:20) (97/58 - 127/72)  RR: 19 (02-07-21 @ 07:38) (12 - 19)  SpO2: 94% (02-07-21 @ 07:38) (94% - 99%)    Constitutional: NAD.   HEENT: PERRL, EOMI, MMM.  Neck: Soft and supple, No carotid bruit, No JVD  Respiratory: Breath sounds are clear bilaterally, No wheezing, rales or rhonchi  Cardiovascular: S1 and S2, regular rate and rhythm, no murmur, rub or gallop.  Gastrointestinal: Bowel Sounds present, soft, nontender, nondistended, no guarding, no rebound, no mass.  Extremities: No peripheral edema  Vascular: 2+ peripheral pulses  Neurological: A/O x , no focal deficits  Musculoskeletal: 5/5 strength b/l upper and lower extremities  Skin:  no visible rashes.                         9.2    9.57  )-----------( 315      ( 07 Feb 2021 08:34 )             29.1       02-07    133<L>  |  99  |  12  ----------------------------<  120<H>  4.5   |  28  |  0.90    Ca    8.7      07 Feb 2021 08:34     CC:  Patient is a 65y old  Male who presents with a chief complaint of pulmonary edema (07 Feb 2021 14:05)    SUBJECTIVE:     -no new complaints or issues at current time.    ROS:  all other review of systems are negative unless indicated above.    aspirin enteric coated 81 milliGRAM(s) Oral at bedtime  atorvastatin 40 milliGRAM(s) Oral at bedtime  carvedilol 6.25 milliGRAM(s) Oral every 12 hours  cefepime   IVPB 2000 milliGRAM(s) IV Intermittent every 12 hours  clopidogrel Tablet 75 milliGRAM(s) Oral daily  ferrous    sulfate 325 milliGRAM(s) Oral daily  furosemide    Tablet 40 milliGRAM(s) Oral daily  heparin   Injectable 5000 Unit(s) SubCutaneous every 8 hours  insulin glargine Injectable (LANTUS) 10 Unit(s) SubCutaneous at bedtime  insulin lispro (ADMELOG) corrective regimen sliding scale   SubCutaneous three times a day before meals  insulin lispro Injectable (ADMELOG) 7 Unit(s) SubCutaneous three times a day before meals  losartan 25 milliGRAM(s) Oral daily  metroNIDAZOLE  IVPB      metroNIDAZOLE  IVPB 500 milliGRAM(s) IV Intermittent every 8 hours  potassium chloride    Tablet ER 40 milliEquivalent(s) Oral daily  ranolazine 500 milliGRAM(s) Oral two times a day  sodium chloride 0.9% lock flush 3 milliLiter(s) IV Push every 8 hours  vancomycin  IVPB 750 milliGRAM(s) IV Intermittent every 12 hours    T(C): 37.2 (02-07-21 @ 07:38), Max: 37.6 (02-07-21 @ 06:09)  HR: 70 (02-07-21 @ 11:20) (56 - 70)  BP: 113/66 (02-07-21 @ 11:20) (97/58 - 127/72)  RR: 19 (02-07-21 @ 07:38) (12 - 19)  SpO2: 94% (02-07-21 @ 07:38) (94% - 99%)    Constitutional: NAD.   HEENT: PERRL, EOMI, MMM.  Neck: Soft and supple, No carotid bruit, No JVD  Respiratory: Breath sounds are clear bilaterally, No wheezing, rales or rhonchi  Cardiovascular: S1 and S2, regular rate and rhythm, no murmur, rub or gallop.  Gastrointestinal: Bowel Sounds present, soft, nontender, nondistended, no guarding, no rebound, no mass.  Extremities: No peripheral edema  Vascular: 2+ peripheral pulses  Neurological: A/O x , no focal deficits  Musculoskeletal: 5/5 strength b/l upper and lower extremities  Skin:  no visible rashes.                         9.2    9.57  )-----------( 315      ( 07 Feb 2021 08:34 )             29.1       02-07    133<L>  |  99  |  12  ----------------------------<  120<H>  4.5   |  28  |  0.90    Ca    8.7      07 Feb 2021 08:34    < from: TTE Echo Complete w/o Contrast w/ Doppler (02.01.21 @ 07:55) >  Impression     Summary     Left ventricle systolic function appears moderately impaired;   segmental wall motion abnormalities noted   consistent with Takotsubo's cardiomyopathy.   Estimated Ejection Fraction is 30%.   The left atrium is moderately dilated.   Mild mitral regurgitation    < end of copied text >

## 2021-02-07 NOTE — PROGRESS NOTE ADULT - PROBLEM SELECTOR PLAN 2
Moderate -  severe LV dysfunction  continue to diurese with IV lasix; cont. low-dose ARB, BB. F/U Echo one month reassess LV FX consider Entresto

## 2021-02-07 NOTE — PROGRESS NOTE ADULT - ASSESSMENT
Alert Afebrile POD # 1 Sx # 2 Right foot. Foot looks somewhat improved medially with suture line in place / NuGauze drain exiting proximally to distally. Right 2/3 toes with stage III-IV wounds / early demarcation of third digit. No cellulitis. Drain mobilized. Cultures from Sx # 1 Staph/Strep/Bacteroides X-ray "gas" air under 2nd metatarsal head S/P I&D abscess at site. Pt had scant bleeding intraoperatively although was on PLAVIX & ASA consider revisit by DR Arndt for F/U vascular evaluation. Would appreciate placing consult for him today. Will more than likely need future debridement/amputations of right 2/3 toes. Apply Betadine Sol & SDS / Light ACE cover. Pt can weight bear for NECESSITY to BR  / assist if necessary. THX

## 2021-02-07 NOTE — SWALLOW BEDSIDE ASSESSMENT ADULT - ADDITIONAL RECOMMENDATIONS
1) Hospitalist follow. Pt demonstrated Oropharyngeal Swallowing abilities which subjectively appeared to be within functional parameters for age on exam. NO behavioral aspiration signs exhibited. Odynophagia was denied. However, it is noted that the pt periodically complained of feeling a sticking sensation, that is not food texture specific, in the posterior portion of his oral cavity. Of note is that areas of mild Thrush were evident at site of his periodic discomfort which can explain why he sometimes feels a focal sticking sensation in the affected region. CONSIDER TREATING THRUSH.    2) Nutrition f/u. Pt with CAD, HTN, HLD and DM

## 2021-02-08 LAB
ANION GAP SERPL CALC-SCNC: 6 MMOL/L — SIGNIFICANT CHANGE UP (ref 5–17)
BUN SERPL-MCNC: 12 MG/DL — SIGNIFICANT CHANGE UP (ref 7–23)
CALCIUM SERPL-MCNC: 8.9 MG/DL — SIGNIFICANT CHANGE UP (ref 8.5–10.1)
CHLORIDE SERPL-SCNC: 99 MMOL/L — SIGNIFICANT CHANGE UP (ref 96–108)
CO2 SERPL-SCNC: 30 MMOL/L — SIGNIFICANT CHANGE UP (ref 22–31)
CREAT SERPL-MCNC: 0.93 MG/DL — SIGNIFICANT CHANGE UP (ref 0.5–1.3)
GLUCOSE SERPL-MCNC: 105 MG/DL — HIGH (ref 70–99)
HCT VFR BLD CALC: 30.5 % — LOW (ref 39–50)
HGB BLD-MCNC: 9.6 G/DL — LOW (ref 13–17)
MCHC RBC-ENTMCNC: 27.7 PG — SIGNIFICANT CHANGE UP (ref 27–34)
MCHC RBC-ENTMCNC: 31.5 GM/DL — LOW (ref 32–36)
MCV RBC AUTO: 87.9 FL — SIGNIFICANT CHANGE UP (ref 80–100)
PLATELET # BLD AUTO: 292 K/UL — SIGNIFICANT CHANGE UP (ref 150–400)
POTASSIUM SERPL-MCNC: 4.4 MMOL/L — SIGNIFICANT CHANGE UP (ref 3.5–5.3)
POTASSIUM SERPL-SCNC: 4.4 MMOL/L — SIGNIFICANT CHANGE UP (ref 3.5–5.3)
RBC # BLD: 3.47 M/UL — LOW (ref 4.2–5.8)
RBC # FLD: 14.2 % — SIGNIFICANT CHANGE UP (ref 10.3–14.5)
SODIUM SERPL-SCNC: 135 MMOL/L — SIGNIFICANT CHANGE UP (ref 135–145)
WBC # BLD: 7.94 K/UL — SIGNIFICANT CHANGE UP (ref 3.8–10.5)
WBC # FLD AUTO: 7.94 K/UL — SIGNIFICANT CHANGE UP (ref 3.8–10.5)

## 2021-02-08 PROCEDURE — 99232 SBSQ HOSP IP/OBS MODERATE 35: CPT

## 2021-02-08 RX ADMIN — RANOLAZINE 500 MILLIGRAM(S): 500 TABLET, FILM COATED, EXTENDED RELEASE ORAL at 18:07

## 2021-02-08 RX ADMIN — CEFEPIME 100 MILLIGRAM(S): 1 INJECTION, POWDER, FOR SOLUTION INTRAMUSCULAR; INTRAVENOUS at 18:07

## 2021-02-08 RX ADMIN — CLOPIDOGREL BISULFATE 75 MILLIGRAM(S): 75 TABLET, FILM COATED ORAL at 09:54

## 2021-02-08 RX ADMIN — LOSARTAN POTASSIUM 25 MILLIGRAM(S): 100 TABLET, FILM COATED ORAL at 09:54

## 2021-02-08 RX ADMIN — Medication 40 MILLIGRAM(S): at 09:53

## 2021-02-08 RX ADMIN — ATORVASTATIN CALCIUM 40 MILLIGRAM(S): 80 TABLET, FILM COATED ORAL at 21:09

## 2021-02-08 RX ADMIN — Medication 40 MILLIEQUIVALENT(S): at 09:54

## 2021-02-08 RX ADMIN — SODIUM CHLORIDE 3 MILLILITER(S): 9 INJECTION INTRAMUSCULAR; INTRAVENOUS; SUBCUTANEOUS at 21:08

## 2021-02-08 RX ADMIN — Medication 250 MILLIGRAM(S): at 06:24

## 2021-02-08 RX ADMIN — Medication 100 MILLIGRAM(S): at 16:36

## 2021-02-08 RX ADMIN — Medication 7 UNIT(S): at 16:37

## 2021-02-08 RX ADMIN — SODIUM CHLORIDE 3 MILLILITER(S): 9 INJECTION INTRAMUSCULAR; INTRAVENOUS; SUBCUTANEOUS at 05:37

## 2021-02-08 RX ADMIN — Medication 100 MILLIGRAM(S): at 23:51

## 2021-02-08 RX ADMIN — CARVEDILOL PHOSPHATE 6.25 MILLIGRAM(S): 80 CAPSULE, EXTENDED RELEASE ORAL at 09:54

## 2021-02-08 RX ADMIN — Medication 100 MILLIGRAM(S): at 01:20

## 2021-02-08 RX ADMIN — Medication 7 UNIT(S): at 12:24

## 2021-02-08 RX ADMIN — INSULIN GLARGINE 10 UNIT(S): 100 INJECTION, SOLUTION SUBCUTANEOUS at 21:09

## 2021-02-08 RX ADMIN — CARVEDILOL PHOSPHATE 6.25 MILLIGRAM(S): 80 CAPSULE, EXTENDED RELEASE ORAL at 21:09

## 2021-02-08 RX ADMIN — Medication 81 MILLIGRAM(S): at 21:09

## 2021-02-08 RX ADMIN — Medication 7 UNIT(S): at 08:21

## 2021-02-08 RX ADMIN — Medication 100 MILLIGRAM(S): at 08:24

## 2021-02-08 RX ADMIN — CEFEPIME 100 MILLIGRAM(S): 1 INJECTION, POWDER, FOR SOLUTION INTRAMUSCULAR; INTRAVENOUS at 05:32

## 2021-02-08 RX ADMIN — RANOLAZINE 500 MILLIGRAM(S): 500 TABLET, FILM COATED, EXTENDED RELEASE ORAL at 05:32

## 2021-02-08 RX ADMIN — Medication 325 MILLIGRAM(S): at 09:54

## 2021-02-08 RX ADMIN — SODIUM CHLORIDE 3 MILLILITER(S): 9 INJECTION INTRAMUSCULAR; INTRAVENOUS; SUBCUTANEOUS at 12:08

## 2021-02-08 RX ADMIN — ENOXAPARIN SODIUM 40 MILLIGRAM(S): 100 INJECTION SUBCUTANEOUS at 09:54

## 2021-02-08 NOTE — PROGRESS NOTE ADULT - SUBJECTIVE AND OBJECTIVE BOX
Cheif complaints and Diagnosis: NSTEMI/ right foot ulcer/ acute on chronic OM/ acute on chronic chf     Subjective: no complaints      REVIEW OF SYSTEMS:    CONSTITUTIONAL: No weakness, fevers or chills  EYES/ENT: No visual changes;  No vertigo or throat pain   NECK: No pain or stiffness  RESPIRATORY: No cough, wheezing, hemoptysis; No shortness of breath  CARDIOVASCULAR: No chest pain or palpitations  GASTROINTESTINAL: No abdominal or epigastric pain. No nausea, vomiting, or hematemesis; No diarrhea or constipation. No melena or hematochezia.  GENITOURINARY: No dysuria, frequency or hematuria  NEUROLOGICAL: No numbness or weakness  SKIN: No itching, burning, rashes, or lesions   All other review of systems is negative unless indicated above      Vital Signs Last 24 Hrs  T(C): 36.9 (08 Feb 2021 07:50), Max: 36.9 (08 Feb 2021 07:50)  T(F): 98.4 (08 Feb 2021 07:50), Max: 98.4 (08 Feb 2021 07:50)  HR: 67 (08 Feb 2021 07:50) (67 - 69)  BP: 123/67 (08 Feb 2021 07:50) (98/54 - 123/67)  BP(mean): --  RR: 18 (08 Feb 2021 07:50) (18 - 18)  SpO2: 96% (08 Feb 2021 07:50) (96% - 99%)    HEENT:   pupils equal and reactive, EOMI, no oropharyngeal lesions, erythema, exudates, oral thrush    NECK:   supple, no carotid bruits, no palpable lymph nodes, no thyromegaly    CV:  +S1, +S2, regular, no murmurs or rubs    RESP:   lungs clear to auscultation bilaterally, no wheezing, rales, rhonchi, good air entry bilaterally    BREAST:  not examined    GI:  abdomen soft, non-tender, non-distended, normal BS, no bruits, no abdominal masses, no palpable masses    RECTAL:  not examined    :  not examined    MSK:   normal muscle tone, no atrophy, no rigidity, no contractions    EXT:   no clubbing, no cyanosis, no edema, no calf pain, swelling or erythema    VASCULAR:  pulses equal and symmetric in the upper and lower extremities    NEURO:  AAOX3, no focal neurological deficits, follows all commands, able to move extremities spontaneously    SKIN:  no ulcers, lesions or rashes    MEDICATIONS  (STANDING):  aspirin enteric coated 81 milliGRAM(s) Oral at bedtime  atorvastatin 40 milliGRAM(s) Oral at bedtime  carvedilol 6.25 milliGRAM(s) Oral every 12 hours  cefepime   IVPB 2000 milliGRAM(s) IV Intermittent every 12 hours  clopidogrel Tablet 75 milliGRAM(s) Oral daily  dextrose 40% Gel 15 Gram(s) Oral once  dextrose 5%. 1000 milliLiter(s) (100 mL/Hr) IV Continuous <Continuous>  dextrose 50% Injectable 25 Gram(s) IV Push once  dextrose 50% Injectable 12.5 Gram(s) IV Push once  dextrose 50% Injectable 25 Gram(s) IV Push once  enoxaparin Injectable 40 milliGRAM(s) SubCutaneous daily  ferrous    sulfate 325 milliGRAM(s) Oral daily  furosemide    Tablet 40 milliGRAM(s) Oral daily  glucagon  Injectable 1 milliGRAM(s) IntraMuscular once  insulin glargine Injectable (LANTUS) 10 Unit(s) SubCutaneous at bedtime  insulin lispro (ADMELOG) corrective regimen sliding scale   SubCutaneous three times a day before meals  insulin lispro Injectable (ADMELOG) 7 Unit(s) SubCutaneous three times a day before meals  losartan 25 milliGRAM(s) Oral daily  metroNIDAZOLE  IVPB      metroNIDAZOLE  IVPB 500 milliGRAM(s) IV Intermittent every 8 hours  potassium chloride    Tablet ER 40 milliEquivalent(s) Oral daily  ranolazine 500 milliGRAM(s) Oral two times a day  sodium chloride 0.9% lock flush 3 milliLiter(s) IV Push every 8 hours    MEDICATIONS  (PRN):      08 Feb 2021 08:43    135    |  99     |  12     ----------------------------<  105    4.4     |  30     |  0.93     Ca    8.9        08 Feb 2021 08:43    CBC Full  -  ( 08 Feb 2021 08:43 )  WBC Count : 7.94 K/uL  Hemoglobin : 9.6 g/dL  Hematocrit : 30.5 %  Platelet Count - Automated : 292 K/uL  Mean Cell Volume : 87.9 fl  Mean Cell Hemoglobin : 27.7 pg  Mean Cell Hemoglobin Concentration : 31.5 gm/dL  Auto Neutrophil # : x  Auto Lymphocyte # : x  Auto Monocyte # : x  Auto Eosinophil # : x  Auto Basophil # : x  Auto Neutrophil % : x  Auto Lymphocyte % : x  Auto Monocyte % : x  Auto Eosinophil % : x  Auto Basophil % : x            Assessment and Plan:     65M.  admitted 01/31/21.  presented to ED c/o SOB.  a/w ALFARO.      prior admission from 1/21/21-1/27/21 for a right foot cellulitis and osteomyelitis involving the right medial hallux sesamoid and 1st metatarsal.  wound Cx (+) polymicrobial.  01/25 debrided and a RUE PICC line was placed.  started on cefepime + vancomycin for 4-6 weeks.      PMHx:  CAD;  PAD;  DM;  HTN;  DM;  neuropathy;  BPH.     R foot plantar ulcer w/ probable acute upon chronic OM and surrounding foot cellulitis.  hx PAD.  - WCx:  rare MSSA, bacteroides fragilis and Strep constellatus.  - MR:  reported no OM, but clinically the suspicion is high.  -will need 6 week iv thearpy  - ABx:  vancomycin + cefepime via PICC line.  - local wound care.  - VSx consult.  - Podiatry.  - ID.    acute upon chronic HFrEF.  hx ischemic CM.  - r/o Takotsubo CM.  - EF:  35%.  - Lasix 40mg po qd.  - ARB.  - BB.  - Cardiology f/u to repeat TTE in 1 month, if no improvement +/- Entresto.    NSTEMI on admission s/p stent  - stable.  - DAPT + BB + statin.    hx DM.  - A1C:  8.8%.  - target -180mg/dL.  - CHO diet.  - Lantus 10units sq qhs.  - insulin lispro 7units qac.  - correction insulin coverage.    DVT prophylaxis.  - UFH sq q8.    disposition.  - 2S.    communication.  - RN.  - Podiatry.  - ID.    SIGN OUT:  - VSx consult requested by podiatry.  - Cardiac issue are currently stable.  Cardiology f/u to repeat TTE in 1 month, if no improvement +/- Entresto.    Anticiapte DC [place] with home care after evaluation and clearance for dc by vascular

## 2021-02-08 NOTE — PROGRESS NOTE ADULT - SUBJECTIVE AND OBJECTIVE BOX
Date of service: 21 @ 12:51    Lying in bed in NAD  Weak looking   Right foot feels better  Mild local pain    ROS: no fever or chills; denies dizziness, no HA, no SOB or cough, no abdominal pain, no diarrhea or constipation; no dysuria    MEDICATIONS  (STANDING):  aspirin enteric coated 81 milliGRAM(s) Oral at bedtime  atorvastatin 40 milliGRAM(s) Oral at bedtime  carvedilol 6.25 milliGRAM(s) Oral every 12 hours  cefepime   IVPB 2000 milliGRAM(s) IV Intermittent every 12 hours  clopidogrel Tablet 75 milliGRAM(s) Oral daily  dextrose 40% Gel 15 Gram(s) Oral once  dextrose 5%. 1000 milliLiter(s) (100 mL/Hr) IV Continuous <Continuous>  dextrose 50% Injectable 25 Gram(s) IV Push once  dextrose 50% Injectable 12.5 Gram(s) IV Push once  dextrose 50% Injectable 25 Gram(s) IV Push once  enoxaparin Injectable 40 milliGRAM(s) SubCutaneous daily  ferrous    sulfate 325 milliGRAM(s) Oral daily  furosemide    Tablet 40 milliGRAM(s) Oral daily  glucagon  Injectable 1 milliGRAM(s) IntraMuscular once  insulin glargine Injectable (LANTUS) 10 Unit(s) SubCutaneous at bedtime  insulin lispro (ADMELOG) corrective regimen sliding scale   SubCutaneous three times a day before meals  insulin lispro Injectable (ADMELOG) 7 Unit(s) SubCutaneous three times a day before meals  losartan 25 milliGRAM(s) Oral daily  metroNIDAZOLE  IVPB      metroNIDAZOLE  IVPB 500 milliGRAM(s) IV Intermittent every 8 hours  potassium chloride    Tablet ER 40 milliEquivalent(s) Oral daily  ranolazine 500 milliGRAM(s) Oral two times a day  sodium chloride 0.9% lock flush 3 milliLiter(s) IV Push every 8 hours  vancomycin  IVPB 750 milliGRAM(s) IV Intermittent every 12 hours    Vital Signs Last 24 Hrs  T(C): 36.9 (2021 07:50), Max: 37.1 (2021 15:30)  T(F): 98.4 (2021 07:50), Max: 98.8 (2021 15:30)  HR: 67 (2021 07:50) (67 - 69)  BP: 123/67 (2021 07:50) (98/54 - 123/67)  BP(mean): --  RR: 18 (2021 07:50) (18 - 19)  SpO2: 96% (2021 07:50) (93% - 99%)     Physical exam:    Constitutional:  No acute distress  HEENT: NC/AT, EOMI, PERRLA, conjunctivae clear  Neck: supple; thyroid not palpable  Back: no tenderness  Respiratory: respiratory effort normal; clear to auscultation  Cardiovascular: S1S2 regular, no murmurs  Abdomen: soft, not tender, not distended, positive BS  Genitourinary: no suprapubic tenderness  Lymphatic: no LN palpable  Musculoskeletal: no muscle tenderness, no joint swelling or tenderness  Extremities: no pedal edema  Right foot first metatarsal plantar ulcer s/p further surgery  Neurological/ Psychiatric: AxOx3, moving all extremities  Skin: no rashes; no palpable lesions    Labs: reviewed                        9.6    7.94  )-----------( 292      ( 2021 08:43 )             30.5         135  |  99  |  12  ----------------------------<  105<H>  4.4   |  30  |  0.93    Ca    8.9      2021 08:43      Ferritin, Serum: 414 ng/mL (21 @ 07:14)  C-Reactive Protein, Serum: 6.95 mg/dL (21 @ 08:03)                          9.0    20.08 )-----------( 389      ( 2021 08:03 )             27.3         129<L>  |  99  |  20  ----------------------------<  264<H>  4.2   |  18<L>  |  1.15    Ca    8.7      2021 08:03  Phos  3.0       Mg     2.1         TPro  6.2  /  Alb  2.2<L>  /  TBili  0.6  /  DBili  x   /  AST  46<H>  /  ALT  135<H>  /  AlkPhos  227<H>       LIVER FUNCTIONS - ( 2021 08:03 )  Alb: 2.2 g/dL / Pro: 6.2 gm/dL / ALK PHOS: 227 U/L / ALT: 135 U/L / AST: 46 U/L / GGT: x           Urinalysis Basic - ( 2021 04:58 )    Color: Yellow / Appearance: Clear / S.010 / pH: x  Gluc: x / Ketone: Negative  / Bili: Negative / Urobili: Negative mg/dL   Blood: x / Protein: 30 mg/dL / Nitrite: Negative   Leuk Esterase: Negative / RBC: 0-5 /HPF / WBC 3-5   Sq Epi: x / Non Sq Epi: Few / Bacteria: Few    COVID ( @ 00:23) NotDetec      Culture - Tissue with Gram Stain (collected 2021 14:02)  Source: .Tissue great toe culture  Gram Stain (2021 07:37):    No polymorphonuclear leukocytes seen per low power field    No organisms seen per oil power field  Preliminary Report (2021 07:42):    No growth    Culture - Blood (collected 2021 11:04)  Source: .Blood None  Preliminary Report (2021 17:01):    No growth to date.    Culture - Blood (collected 2021 11:03)  Source: .Blood None  Preliminary Report (2021 17:01):    No growth to date.    Culture - Urine (collected 2021 04:58)  Source: .Urine None  Final Report (2021 08:29):    No growth    Culture - Blood (collected 2021 00:23)  Source: .Blood None  Gram Stain (2021 00:51):    Growth in anaerobic bottle: Gram Negative Rods  Final Report (2021 23:25):    Growth in anaerobic bottle: Bacteroides fragilis    "Susceptibilities not performed"  Organism: Blood Culture PCR (2021 23:25)      -  Bacteroides fragilis: Detec      Method Type: PCR    Culture - Blood (collected 2021 00:23)  Source: .Blood None  Gram Stain (2021 10:00):    Growth in anaerobic bottle: Gram Negative Rods  Final Report (2021 11:52):    Growth in anaerobic bottle: Bacteroides fragilis "Susceptibilities not    performed"        Radiology: all available radiological tests reviewed    < from: MR Foot w/wo IV Cont, Right (21 @ 09:57) >  1.  Patient status post sesamoidectomy and surgical debridement with postsurgical changes as noted above.  2.  No abscess or other focal drainable fluid collection.  3.  No acute osteomyelitis.    < end of copied text >      Advanced directives addressed: full resuscitation

## 2021-02-08 NOTE — PROGRESS NOTE ADULT - SUBJECTIVE AND OBJECTIVE BOX
Patient is a 65y old  Male who presents with a chief complaint of pulmonary edema (07 Feb 2021 14:05)      HPI:  64 yo M with a PMH CAD, PAD, HTN, HLD, DM2 (on insulin) c/b neuropathy, and BPH who presents with SOB. Over the past day, he has had worsening SOB, particularly on exertion but also at rest and while lying down in bed. He also endorses a chest tightness with the dyspnea and has palpitations on exertion, but denies wheezing. He denies fevers but endorsed some chills today. He has had a chronic nonproductive cough for he past month.    He was recently admitted from 1/21/21-1/27/21 for a right foot cellulitis and osteomyelitis involving the right medial hallux sesamoid and 1st metatarsal. Wound cultures showed rare MSSA, bacteroides fragilis, and strep constellatus. He had a debridement by podiatry on 1/25. He had a PICC line placed and was started on Cefepime and Vancomycin for a planned course of 4-6 weeks. His course was also complicated by hematuria, for which he saw Urology who felt there was no inpatient workup required, but should have an outpatient CT urography/cystography. Since discharge, he has had some loose stools, about one BM per day, and not fully liquid. He denies abdominal pain, nausea, vomiting, current hematuria, or dysuria.  His right foot is doing better. Sometimes he has purulent/bloody drainage, but very little. He has an appointment with Dr. Bullock on Monday 2/1.    Of note, he was also stopped off of his HCTZ 25 mg QD and his Losartan was decreased from 100 to 50 mg QD. His Amlodipine 10 mg QD was not restarted upon discharge as well.    In the ED, he was given Furosemide 40 mg IV x1, Meropenem 1 g IV x1, and Vancomycin 750 mg IV x1. (31 Jan 2021 05:52)      Allergies    No Known Allergies    Intolerances        MEDICATIONS  (STANDING):  aspirin enteric coated 81 milliGRAM(s) Oral at bedtime  atorvastatin 40 milliGRAM(s) Oral at bedtime  carvedilol 6.25 milliGRAM(s) Oral every 12 hours  cefepime   IVPB 2000 milliGRAM(s) IV Intermittent every 12 hours  clopidogrel Tablet 75 milliGRAM(s) Oral daily  dextrose 40% Gel 15 Gram(s) Oral once  dextrose 5%. 1000 milliLiter(s) (100 mL/Hr) IV Continuous <Continuous>  dextrose 50% Injectable 25 Gram(s) IV Push once  dextrose 50% Injectable 12.5 Gram(s) IV Push once  dextrose 50% Injectable 25 Gram(s) IV Push once  enoxaparin Injectable 40 milliGRAM(s) SubCutaneous daily  ferrous    sulfate 325 milliGRAM(s) Oral daily  furosemide    Tablet 40 milliGRAM(s) Oral daily  glucagon  Injectable 1 milliGRAM(s) IntraMuscular once  insulin glargine Injectable (LANTUS) 10 Unit(s) SubCutaneous at bedtime  insulin lispro (ADMELOG) corrective regimen sliding scale   SubCutaneous three times a day before meals  insulin lispro Injectable (ADMELOG) 7 Unit(s) SubCutaneous three times a day before meals  losartan 25 milliGRAM(s) Oral daily  metroNIDAZOLE  IVPB      metroNIDAZOLE  IVPB 500 milliGRAM(s) IV Intermittent every 8 hours  potassium chloride    Tablet ER 40 milliEquivalent(s) Oral daily  ranolazine 500 milliGRAM(s) Oral two times a day  sodium chloride 0.9% lock flush 3 milliLiter(s) IV Push every 8 hours  vancomycin  IVPB 750 milliGRAM(s) IV Intermittent every 12 hours    MEDICATIONS  (PRN):        RADIOLOGY    CBC Full  -  ( 07 Feb 2021 08:34 )  WBC Count : 9.57 K/uL  RBC Count : 3.35 M/uL  Hemoglobin : 9.2 g/dL  Hematocrit : 29.1 %  Platelet Count - Automated : 315 K/uL  Mean Cell Volume : 86.9 fl  Mean Cell Hemoglobin : 27.5 pg  Mean Cell Hemoglobin Concentration : 31.6 gm/dL  Auto Neutrophil # : x  Auto Lymphocyte # : x  Auto Monocyte # : x  Auto Eosinophil # : x  Auto Basophil # : x  Auto Neutrophil % : x  Auto Lymphocyte % : x  Auto Monocyte % : x  Auto Eosinophil % : x  Auto Basophil % : x      02-07    133<L>  |  99  |  12  ----------------------------<  120<H>  4.5   |  28  |  0.90    Ca    8.7      07 Feb 2021 08:34    Culture - Tissue with Gram Stain (02.06.21 @ 14:02)   Gram Stain:   No polymorphonuclear leukocytes seen per low power field   No organisms seen per oil power field   Specimen Source: .Tissue great toe culture Culture - Blood (02.04.21 @ 11:04)   Specimen Source: .Blood None   Culture Results:   No growth to date. Culture - Blood (02.04.21 @ 11:03)   Specimen Source: .Blood None   Culture Results:   No growth to date.

## 2021-02-08 NOTE — PROGRESS NOTE ADULT - ASSESSMENT
66 y/o Male with h/o HTN, HLD, T2DM, PAD, CAD, BPH, nonhealing foot ulcer with probable underlying acute on chronic OM on IV vancomycin and cefepime via PICC line since 1/21 was admitted on 1/30 for persistent foot wound and increased weakness and SOB.     1. Right foot plantar ulcer with probable underlying acute on chronic OM and surrounding foot cellulitis s/p surgery. PVD.  -sepsis with BAFR  -recent wound cultures showed rare MSSA, bacteroides fragilis, and strep constellatus  -on vancomycin 750 mg IV q12h and cefepime 2 gm IV q12h since 1/21 and metronidazole IV # 7  -tolerating abx well so far; no side effects noted  -d/c vancomycin   -MRI foot reviewed - no OM reported, but clinically the suspicion for underlying OM is high  -PICC line site clean  -podiatry evaluation appreciated  -local wound care  -continue IV abx coverage with cefepime and metronidazole  -will need another 6 weeks of IV abx threapy  -monitor temps  -f/u CBC  -supportive care  2. Other issues:   -care per medicine

## 2021-02-08 NOTE — PROGRESS NOTE ADULT - ASSESSMENT
SX # 2 POD # 2 Alert afebrile. Right foot Nugauze drains out to 3rd toe, 1st interspace & medial 1st metatarsal head. Penrose at base of 1/2 metatarsal in place. Less edeam, no cellulitis to forefoot. Medial 1st MTP wound appears clean / suture line in place. NO bone exposed. 1st interspace with greyish looking base. NO pus/odor. Right 2/3 toes seem viable with less erythema 3rd toe + cap fill. BC neg to date. Irrigate wounds / NS. Repack 1st interspace / Xeroform. DSD applied. AWait OR cultures & Vascular eval DR Arndt. THX

## 2021-02-09 ENCOUNTER — APPOINTMENT (OUTPATIENT)
Dept: CARDIOLOGY | Facility: CLINIC | Age: 66
End: 2021-02-09

## 2021-02-09 ENCOUNTER — TRANSCRIPTION ENCOUNTER (OUTPATIENT)
Age: 66
End: 2021-02-09

## 2021-02-09 PROCEDURE — 99233 SBSQ HOSP IP/OBS HIGH 50: CPT

## 2021-02-09 PROCEDURE — 99232 SBSQ HOSP IP/OBS MODERATE 35: CPT

## 2021-02-09 RX ORDER — METRONIDAZOLE 500 MG
500 TABLET ORAL
Qty: 0 | Refills: 0 | DISCHARGE
Start: 2021-02-09

## 2021-02-09 RX ORDER — LOSARTAN POTASSIUM 100 MG/1
1 TABLET, FILM COATED ORAL
Qty: 0 | Refills: 0 | DISCHARGE
Start: 2021-02-09

## 2021-02-09 RX ORDER — CLOPIDOGREL BISULFATE 75 MG/1
1 TABLET, FILM COATED ORAL
Qty: 0 | Refills: 0 | DISCHARGE
Start: 2021-02-09

## 2021-02-09 RX ORDER — LORATADINE 10 MG/1
1 TABLET ORAL
Qty: 0 | Refills: 0 | DISCHARGE
Start: 2021-02-09

## 2021-02-09 RX ORDER — LORATADINE 10 MG/1
10 TABLET ORAL DAILY
Refills: 0 | Status: DISCONTINUED | OUTPATIENT
Start: 2021-02-09 | End: 2021-02-10

## 2021-02-09 RX ORDER — FERROUS SULFATE 325(65) MG
1 TABLET ORAL
Qty: 0 | Refills: 0 | DISCHARGE
Start: 2021-02-09

## 2021-02-09 RX ORDER — FUROSEMIDE 40 MG
1 TABLET ORAL
Qty: 0 | Refills: 0 | DISCHARGE
Start: 2021-02-09

## 2021-02-09 RX ORDER — CARVEDILOL PHOSPHATE 80 MG/1
1 CAPSULE, EXTENDED RELEASE ORAL
Qty: 60 | Refills: 0
Start: 2021-02-09 | End: 2021-03-10

## 2021-02-09 RX ORDER — CEFEPIME 1 G/1
2 INJECTION, POWDER, FOR SOLUTION INTRAMUSCULAR; INTRAVENOUS
Qty: 0 | Refills: 0 | DISCHARGE
Start: 2021-02-09

## 2021-02-09 RX ADMIN — Medication 40 MILLIGRAM(S): at 09:33

## 2021-02-09 RX ADMIN — Medication 325 MILLIGRAM(S): at 09:33

## 2021-02-09 RX ADMIN — Medication 2: at 08:43

## 2021-02-09 RX ADMIN — LOSARTAN POTASSIUM 25 MILLIGRAM(S): 100 TABLET, FILM COATED ORAL at 09:34

## 2021-02-09 RX ADMIN — CLOPIDOGREL BISULFATE 75 MILLIGRAM(S): 75 TABLET, FILM COATED ORAL at 09:33

## 2021-02-09 RX ADMIN — Medication 100 MILLIGRAM(S): at 18:51

## 2021-02-09 RX ADMIN — Medication 100 MILLIGRAM(S): at 09:30

## 2021-02-09 RX ADMIN — CEFEPIME 100 MILLIGRAM(S): 1 INJECTION, POWDER, FOR SOLUTION INTRAMUSCULAR; INTRAVENOUS at 05:45

## 2021-02-09 RX ADMIN — CEFEPIME 100 MILLIGRAM(S): 1 INJECTION, POWDER, FOR SOLUTION INTRAMUSCULAR; INTRAVENOUS at 18:51

## 2021-02-09 RX ADMIN — INSULIN GLARGINE 10 UNIT(S): 100 INJECTION, SOLUTION SUBCUTANEOUS at 20:34

## 2021-02-09 RX ADMIN — Medication 100 MILLIGRAM(S): at 17:56

## 2021-02-09 RX ADMIN — SODIUM CHLORIDE 3 MILLILITER(S): 9 INJECTION INTRAMUSCULAR; INTRAVENOUS; SUBCUTANEOUS at 13:35

## 2021-02-09 RX ADMIN — Medication 2: at 12:40

## 2021-02-09 RX ADMIN — ATORVASTATIN CALCIUM 40 MILLIGRAM(S): 80 TABLET, FILM COATED ORAL at 20:34

## 2021-02-09 RX ADMIN — Medication 7 UNIT(S): at 17:56

## 2021-02-09 RX ADMIN — RANOLAZINE 500 MILLIGRAM(S): 500 TABLET, FILM COATED, EXTENDED RELEASE ORAL at 18:53

## 2021-02-09 RX ADMIN — SODIUM CHLORIDE 3 MILLILITER(S): 9 INJECTION INTRAMUSCULAR; INTRAVENOUS; SUBCUTANEOUS at 20:26

## 2021-02-09 RX ADMIN — RANOLAZINE 500 MILLIGRAM(S): 500 TABLET, FILM COATED, EXTENDED RELEASE ORAL at 05:45

## 2021-02-09 RX ADMIN — Medication 40 MILLIEQUIVALENT(S): at 09:33

## 2021-02-09 RX ADMIN — Medication 100 MILLIGRAM(S): at 23:09

## 2021-02-09 RX ADMIN — Medication 7 UNIT(S): at 08:43

## 2021-02-09 RX ADMIN — Medication 7 UNIT(S): at 12:40

## 2021-02-09 RX ADMIN — LORATADINE 10 MILLIGRAM(S): 10 TABLET ORAL at 18:52

## 2021-02-09 RX ADMIN — ENOXAPARIN SODIUM 40 MILLIGRAM(S): 100 INJECTION SUBCUTANEOUS at 09:33

## 2021-02-09 RX ADMIN — Medication 81 MILLIGRAM(S): at 20:34

## 2021-02-09 RX ADMIN — CARVEDILOL PHOSPHATE 6.25 MILLIGRAM(S): 80 CAPSULE, EXTENDED RELEASE ORAL at 09:33

## 2021-02-09 RX ADMIN — Medication 2: at 17:56

## 2021-02-09 RX ADMIN — SODIUM CHLORIDE 3 MILLILITER(S): 9 INJECTION INTRAMUSCULAR; INTRAVENOUS; SUBCUTANEOUS at 05:43

## 2021-02-09 RX ADMIN — CARVEDILOL PHOSPHATE 6.25 MILLIGRAM(S): 80 CAPSULE, EXTENDED RELEASE ORAL at 20:34

## 2021-02-09 NOTE — PHYSICAL THERAPY INITIAL EVALUATION ADULT - DIAGNOSIS, PT EVAL
1/2/3) Acute respiratory failure/Acute pulmonary edema/Elevated troponin, NSTEMI on admission s/p stent/ right foot ulcer/ acute on chronic OM/ acute on chronic CHF

## 2021-02-09 NOTE — PROGRESS NOTE ADULT - PROBLEM SELECTOR PROBLEM 4
Hypercholesteremia
Hypertension
Hypercholesteremia
Hypertension
Hypercholesteremia
Hypertension
Hypercholesteremia
Hypercholesteremia

## 2021-02-09 NOTE — CONSULT NOTE ADULT - ASSESSMENT
Physical Exam: Vital Signs Last 24 Hrs  T(C): 36.7 (31 Jan 2021 05:30), Max: 37.2 (30 Jan 2021 23:57)  T(F): 98.1 (31 Jan 2021 05:30), Max: 98.9 (30 Jan 2021 23:57)  HR: 83 (31 Jan 2021 05:30) (83 - 98)  BP: 105/74 (31 Jan 2021 05:30) (105/73 - 122/81)  BP(mean): 82 (31 Jan 2021 05:30) (82 - 94)  RR: 18 (31 Jan 2021 05:30) (18 - 23)  SpO2: 96% (31 Jan 2021 05:30) (94% - 96%)    GENERAL: No acute distress  HEENT: PERRL, EOMI, MM dry, no oropharyngeal lesions  NECK: supple, no stiffness, no JVD, no thyromegaly  PULM: respirations non-labored, clear to auscultation bilaterally bu decreased breath sounds at the bilateral bases, no rales, rhonchi, or wheezes  CV: regular rate and rhythm, no murmurs, gallops, or rubs  GI: abdomen soft, nontender, nondistended, no masses felt, normal bowel sounds  MSK: strength 5/5 bilateral upper/lower extremities. Right foot ulcer wrapped in bandage with slightly bloody drainage.  LYMPH: no anterior cervical, posterior cervical, supraclavicular, or inguinal lymphadenopathy  NEURO: A&Ox3, no tremors, sensation decreased in bilateral feet below heel  SKIN: + trace bilateral LE edema. RUE PICC line seen, non-tender, no drainage. No rashes or lesions other than as noted above    2+ R PT pulse; foot bandaged         Labs and Results:  Labs, Radiology, Cardiology, and Other Results: Labs personally reviewed and interpreted. Notable for leukocytosis (WBC 21.38, increased from 11 three days prior) with 90.5% neutrophils and lymphopenia (0.69). Hb 9.1 (baseline ~ 10), plt 422, INR 1.47, lactate normal at 1.9, Na low at 128, K 4.7, Cl 98, HCO3 21, BUN/creatinine 22/1.17 (at baseline creatinine), BG markedly elevated at 280, calcium 8.4 with albumin 2.2, Mg 1.9, alk phos/AST/ALT all elevated at 246/69/149, troponin elevated at 0.070 x1 and then 0.217 x1, proBNP elevated at 5141.  UA shows moderate blood, 3-5 WBCs, 0-5 RBCs, few bacteria and epithelial cells, SG 1.010, and 30 protein.  COVID-19 PCR and RVP results negative.    CXR personally reviewed and interpreted. Notable for bilateral pleural effusions and lower lobe infiltrates, right more than left, increased from before. No pneumothorax or obvious cardiomegaly. Right sided PICC line seen in place.  CTA chest personally reviewed and interpreted. Notable for moderate sized bilateral pleural effusions with overlying compressive atelectasis. Interlobular septal thickening and scattered ground-glass opacities suggesting pulmonary vascular congestion. There is no focal consolidation, parenchymal mass or suspicious pulmonary nodule. The visualized portions of the upper abdomen demonstrates small volume perihepatic ascites.  Right foot XR seen. No obvious osteomyelitis or foot lesions seen. Awaiting formal radiology read.    EKG personally reviewed. Normal sinus rhythm, normal axis. TWI isolated only to V2. Poor R wave progression. No pathological Q waves or significant ST changes. Rate 98, , QTc 431.    Assessment and Plan:    Assessment:  · Assessment	  64 yo M with a PMH CAD, PAD, HTN, HLD, DM2 (on insulin) c/b neuropathy, and BPH who presents with SOB.    1/2/3) Acute respiratory failure/Acute pulmonary edema/Elevated troponin  - With borderline hypoxia to 92% on RA  - May be in the setting of IVFs from past admission (although not given much) with stopping HCTZ +/- new diagnosis of CHF  - ProBNP elevated at 5141  - Troponin elevated from 0.070 x1, then 0.217 x1  - Despite marked increase in leukocytosis, no obvious evidence of pneumonia based on imaging and patient's history; COIVD-19 PCR and RVP negative  - Would give Lasix 40 mg IV QD  - Trend troponin at least x3 to r/o ACS as a cause, monitor on telemetry  - Cardiology consult  - Check TTE  - Will give loading dose of 243 mg (pt took 81 mg last night) aspirin  - C/w Atorvastatin 40 mg QD, Carvedilol 25 mg BID, and Ranolazine 500 mg BID for CAD  - If trending upward, consider starting heparin gtt    4) Uncontrolled Type 2 diabetes mellitus with insulin therapy  - Patient states his sugars at home have been in the 200s since recent discharge  - Patient is on antibiotics via PICC with dextrose  - Will continue Lantus 42 units QHS (will give NPH since did not take Lantus last night), give standing insulin 5 units QAC, plus give moderate dose SSI QAC + HS  - Avoid fluid/antibiotics with dextrose  - Check FS QAC + HS; goal 140-180  - Hold outpatient PO meds  - A1C 8.8    5) HTN  - Restart Amlodipine 10 mg QD  - Was recently dosed lower Losartan from 100 to 50 mg QD; will continue at lower dose (50 mg) for now due to soft BPs  - Once patient's symptoms improve, would change Lasix back to patient's prior home HCTZ at 25 mg QD (or 12.5 mg)    6) Osteomyelitis of right foot  - S/p debridement 1/25; re debridement Feb 6  - On Cefepime and Vancomycin via PICC line for planned 6 week course from 1/27  - Despite leukocytosis, no need to change antibiotics at this time  - C/w Cefepime 2 g IV Q12H and Vancomycin 750 mg IV Q12H; avoid antibiotics with dextrose  - ID and podiatry consults for continued f/u    7) Leukocytosis  - WBC was 10-15 during last admission, now 21 on current admission  - May be reactive to pulmonary edema  - Patient is not having worsening foot symptoms, less likely patient has pneumonia, UA unremarkable, and is not obviously septic  - No obvious need to upgrade antibiotics at this point, no history of resistant organisms  - No obvious reason to d/c PICC line at this time  - Check Vanc trough  - Continue to trend CBC with diff  - ID and podiatry consults  - F/u blood and urine cx    8) Transaminitis  - Likely from congestive hepatopathy; with perihepatic ascites  - No abdominal pain  - Recheck and trend CMPs; if LFTs worsening, consider imaging    9) Prophylactic measure  - DVT PPX: IMPROVE score of 1, but will give SQH Q8H  - Diet: consistent carb/DASH  - Dispo: pending improvement in sx, consultant hortencia

## 2021-02-09 NOTE — CONSULT NOTE ADULT - SUBJECTIVE AND OBJECTIVE BOX
History of Present Illness:  Reason for Admission: pulmonary edema  History of Present Illness:   64 yo M with a PMH CAD, PAD, HTN, HLD, DM2 (on insulin) c/b neuropathy, and BPH who presents with SOB. Over the past day, he has had worsening SOB, particularly on exertion but also at rest and while lying down in bed. He also endorses a chest tightness with the dyspnea and has palpitations on exertion, but denies wheezing. He denies fevers but endorsed some chills today. He has had a chronic nonproductive cough for he past month.    He was recently admitted from 21-21 for a right foot cellulitis and osteomyelitis involving the right medial hallux sesamoid and 1st metatarsal. Wound cultures showed rare MSSA, bacteroides fragilis, and strep constellatus. He had a debridement by podiatry on . He had a PICC line placed and was started on Cefepime and Vancomycin for a planned course of 4-6 weeks. His course was also complicated by hematuria, for which he saw Urology who felt there was no inpatient workup required, but should have an outpatient CT urography/cystography. Since discharge, he has had some loose stools, about one BM per day, and not fully liquid. He denies abdominal pain, nausea, vomiting, current hematuria, or dysuria.  His right foot is doing better. Sometimes he has purulent/bloody drainage, but very little. He has an appointment with Dr. Bullock on .    Of note, he was also stopped off of his HCTZ 25 mg QD and his Losartan was decreased from 100 to 50 mg QD. His Amlodipine 10 mg QD was not restarted upon discharge as well.    In the ED, he was given Furosemide 40 mg IV x1, Meropenem 1 g IV x1, and Vancomycin 750 mg IV x1.    History as above.  He was seen during a previous hospitalization on  and found on examination to have a 2/4 R PT pulse.  He was operated by Dr. Jeffrey Bullock  and underwent debridement of an abscess and infected bone.  He has been afebrile and his WBC is normal.   Operative cultures grew out Candida parapsilosis.     Review of Systems:  Review of Systems: Gen: + chills, malaise, fatigue. Negative for fevers, weight loss, or weight gain  Eyes: no blurred vision or lacrimation  ENT: no tinnitus, vertigo, or decreased hearing  Resp: + cough, dyspnea, orthopnea. No wheezing, hemoptysis, or orthopnea  CV: + chest pain, dyspnea on exertion, or palpitations  GI: + loose stools. No nausea, vomiting, abdominal pain, constipation, melena, or hematochezia  : no dysuria, hematuria, or incontinence  MSK: no arthralgias, joint swelling, or myalgias  Neuro: no focal deficits, confusion, dizziness, tremors, or seizures  Skin: no rash or lesions      Allergies and Intolerances:        Allergies:  	No Known Allergies:     Home Medications:   * Patient Currently Takes Medications as of 2021 05:50 documented in Structured Notes  · 	insulin glargine: Last Dose Taken:  , 30 unit(s) subcutaneous once a day (at bedtime)  · 	Cozaar 50 mg oral tablet: Last Dose Taken:  , 1 tab(s) orally once a day  · 	vancomycin 750 mg intravenous injection: Last Dose Taken:  , 750 milligram(s) intravenous every 12 hours  · 	cefepime 2 g intravenous injection: Last Dose Taken:  , 2 gram(s) intravenous every 12 hours  · 	Aspirin Low Dose 81 mg oral delayed release tablet: Last Dose Taken:  , 1 tab(s) orally once a day  · 	atorvastatin 40 mg oral tablet: Last Dose Taken:  , 1 tab(s) orally once a day  · 	carvedilol 25 mg oral tablet: Last Dose Taken:  , 1 tab(s) orally 2 times a day  · 	ranolazine 500 mg oral tablet, extended release: Last Dose Taken:  , 1 tab(s) orally 2 times a day  · 	Vitamin C 1000 mg oral tablet: Last Dose Taken:  , 1 tab(s) orally once a day  · 	Vitamin D3 1000 intl units (25 mcg) oral tablet: Last Dose Taken:  , 1 tab(s) orally once a day  · 	Vitamin B-12 1000 mcg oral tablet: Last Dose Taken:  , 1 tab(s) orally once a day  · 	metFORMIN 1000 mg oral tablet: Last Dose Taken:  , 1 tab(s) orally 2 times a day  · 	fluticasone 50 mcg/inh nasal spray: Last Dose Taken:  , 1 spray(s) in each nostril 2 times a day  · 	Cinnamon 500 mg oral capsule: Last Dose Taken:  , 2 cap(s) orally once a day  · 	Glucosamine Chondroitin oral capsule: Last Dose Taken:  , 3 cap(s) orally once a day    Patient History:    Past Medical, Past Surgical, and Family History:  PAST MEDICAL HISTORY:  Basal cell carcinoma Of face   s/p excision Oct 2017    BPH (Benign Prostatic Hyperplasia)     CAD (coronary atherosclerotic disease)     Hypercholesteremia     Hypertension     Insulin dependent type 2 diabetes mellitus     PAD (peripheral artery disease).     PAST SURGICAL HISTORY:  History of amputation of toe L foot 2nd toe amputation    Status post debridement right foot ulcer, with I&D.     FAMILY HISTORY:  Family history of hyperlipidemia  Family history of hypertension  FH: myocardial infarction, Father had MI,  age 47.     Social History:  Social History (marital status, living situation, occupation, tobacco use, alcohol and drug use, and sexual history): , lives with wife.  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  Denies alcohol use.  Used to smoke in his 20s, has not since then .  Denies recreational drug use.     Tobacco Screening:  · Core Measure Site	Yes  · Has the patient used tobacco in the past 30 days?	No    Risk Assessment:    Present on Admission:  Deep Venous Thrombosis	no  Pulmonary Embolus	no     Heart Failure:  Does this patient have a history of or has been diagnosed with heart failure? no.

## 2021-02-09 NOTE — PROGRESS NOTE ADULT - PROBLEM SELECTOR PROBLEM 2
CAD (coronary atherosclerotic disease)
CAD (coronary atherosclerotic disease)
Ischemic cardiomyopathy
CAD (coronary atherosclerotic disease)
Ischemic cardiomyopathy

## 2021-02-09 NOTE — PROGRESS NOTE ADULT - PROBLEM SELECTOR PLAN 2
Moderate -  severe LV dysfunction  on po lasix 40 po & kcl, cont. low-dose ARB, BB. F/U Echo one month reassess LV FX consider Entresto

## 2021-02-09 NOTE — PROGRESS NOTE ADULT - PROBLEM SELECTOR PROBLEM 6
Diabetes mellitus
Diabetes mellitus
Foot infection
Foot infection
Diabetes mellitus
Diabetes mellitus
Foot infection
Diabetes mellitus

## 2021-02-09 NOTE — PROGRESS NOTE ADULT - PROBLEM SELECTOR PROBLEM 5
Hypertension
Diabetes mellitus
Hypertension
Diabetes mellitus
Hypertension
Hypertension
Diabetes mellitus
Hypertension

## 2021-02-09 NOTE — DISCHARGE NOTE PROVIDER - CARE PROVIDERS DIRECT ADDRESSES
,[email or web address],DirectAddress_Unknown,DirectAddress_Unknown,DirectAddress_Unknown

## 2021-02-09 NOTE — PROGRESS NOTE ADULT - PROBLEM SELECTOR PLAN 1
Acute systolic & diastolic heart failure suspected; clinically improved; continue to diurese with IV Lasix. Cath report reviewed. Elevated LVEDP. Continue diuresis
Breathing improved,  Dyspnea likely 2/2 acute systolic & diastolic heart failure
Dyspnea / pulmonary edema due to acute systolic & diastolic heart failure -- improved symptoms.
No respiratory distress breathing comfortably. Dyspnea likely 2/2 acute systolic & diastolic heart failure
No respiratory distress breathing comfortably. Dyspnea likely 2/2 acute systolic & diastolic heart failure
diastolic heart failure  Pro Bnp 5141. CT w/ moderate pulm congestion and b/l effusions, no PE.    cont. lasix 40 IV daily.  fluid restrict 1.5 L/24 hrs, strict Is & Os, daily wts, keep K>4, Mg>2. Still awaiting Echo.
Acute systolic & diastolic heart failure suspected; clinically improved; continue to diurese with IV Lasix.
Dyspnea / pulmonary edema due to acute systolic & diastolic heart failure -- improved symptoms.

## 2021-02-09 NOTE — PROGRESS NOTE ADULT - ATTENDING COMMENTS
Carlos Zacarias M.D.  Cardiology, [facility]  Cell: [phone]  Offices:    ([facility] Office)  [phone] ([facility] Office)
Carlos Zacarias M.D.  Cardiology, [facility]  Cell: [phone]  Offices:    ([facility] Office)  [phone] ([facility] Office)
Carlos Zacarias M.D.  Cardiology, [facility]  Cell: [phone]  Offices:    ([facility] Office)  [phone] ([facility] Office)
Will sign off please call if questions.

## 2021-02-09 NOTE — PROGRESS NOTE ADULT - PROBLEM SELECTOR PLAN 5
Cont. ISS. LA insulin. f/u a1c.
Uncontrolled with HgbA1C 8.8%; optimization as per medical service.
BP controlled; do not resume amlodipine.
BP controlled
Uncontrolled with HgbA1C 8.8%; optimization as per medical service.
BP controlled
BP controlled
BP controlled; do not resume amlodipine.

## 2021-02-09 NOTE — PHYSICAL THERAPY INITIAL EVALUATION ADULT - GENERAL OBSERVATIONS, REHAB EVAL
Pt seen on 2S, NAD, R ft bandages +, pt denies pain due to neuropathy unable to feel as per pt. Pt stated he will go to rehab for medical care.

## 2021-02-09 NOTE — PROGRESS NOTE ADULT - PROBLEM SELECTOR PLAN 3
Continue atorvastatin.
Continue atorvastatin.
New severe proximal LAD stenosis on cath (2/4) and s/p LAD stent; continue medical management with dual antiplt therapy, Coreg, statin.
S/P CC DARCIE/LAD continue DAPT, Statin/Beat blocker
continue DAPT, Statin/Beat blocker
New severe proximal LAD stenosis on cath (2/4) and now s/p LAD stent; continue medical management with dual antiplt therapy, Coreg, statin.
continue DAPT, Statin/Beat blocker
cont. statin.

## 2021-02-09 NOTE — PHYSICAL THERAPY INITIAL EVALUATION ADULT - PERTINENT HX OF CURRENT PROBLEM, REHAB EVAL
Pt is a 66 y/o M, presents with SOB, He was recently admitted from 1/21/21-1/27/21 for a right foot cellulitis and osteomyelitis involving the right medial hallux sesamoid and 1st metatarsal. Pt is a 64 y/o M, presents with SOB, He was recently admitted from 1/21/21-1/27/21 for a right foot cellulitis and osteomyelitis involving the right medial hallux sesamoid and 1st metatarsal.s/p I and d 2/6

## 2021-02-09 NOTE — PHYSICAL THERAPY INITIAL EVALUATION ADULT - IMPAIRMENTS CONTRIBUTING TO GAIT DEVIATIONS, PT EVAL
Pt has R ft bandages that increase height on the R, Pt instructed to use sx shoe (provided) and L shoe to balance height.

## 2021-02-09 NOTE — PROGRESS NOTE ADULT - PROBLEM SELECTOR PROBLEM 3
CAD (coronary atherosclerotic disease)
Hypercholesteremia
CAD (coronary atherosclerotic disease)
Hypercholesteremia
CAD (coronary atherosclerotic disease)
Hypercholesteremia

## 2021-02-09 NOTE — DISCHARGE NOTE PROVIDER - HOSPITAL COURSE
Hospital Course:    65M.  admitted 01/31/21.  presented to ED c/o SOB.  a/w ALFARO.      prior admission from 1/21/21-1/27/21 for a right foot cellulitis and osteomyelitis involving the right medial hallux sesamoid and 1st metatarsal.  wound Cx (+) polymicrobial.  01/25 debrided and a RUE PICC line was placed.  started on cefepime + vancomycin for 4-6 weeks.      PMHx:  CAD;  PAD;  DM;  HTN;  DM;  neuropathy;  BPH.     R foot plantar ulcer w/ probable acute upon chronic OM and surrounding foot cellulitis.  hx PAD.  - WCx:  rare MSSA, bacteroides fragilis and Strep constellatus.  - MR:  reported no OM, but clinically the suspicion is high.  -will need 6 week iv thearpy  - ABx:  vancomycin + cefepime via PICC line.  - local wound care.  - VSx consult.  - Podiatry.  - ID.    acute upon chronic HFrEF.  hx ischemic CM.  - r/o Takotsubo CM.  - EF:  35%.  - Lasix 40mg po qd.  - ARB.  - BB.  - Cardiology f/u to repeat TTE in 1 month, if no improvement +/- Entresto.    NSTEMI on admission s/p stent  - stable.  - DAPT + BB + statin.    hx DM.  - A1C:  8.8%.  - target -180mg/dL.  - CHO diet.  - Lantus 10units sq qhs.  - insulin lispro 7units qac.  - correction insulin coverage.    DVT prophylaxis.  - UFH sq q8.    disposition.  - 2S.    communication.  - RN.  - Podiatry.  - ID.    SIGN OUT:  - VSx consult requested by podiatry.  - Cardiac issue are currently stable.  Cardiology f/u to repeat TTE in 1 month, if no improvement +/- Entresto.    Anticiapte DC tommarrow with home care after evaluation and clearance for dc by vascular     Hospital Course:    65M.  admitted 01/31/21.  presented to ED c/o SOB.  a/w ALFARO.      prior admission from 1/21/21-1/27/21 for a right foot cellulitis and osteomyelitis involving the right medial hallux sesamoid and 1st metatarsal.  wound Cx (+) polymicrobial.  01/25 debrided and a RUE PICC line was placed.  started on cefepime + vancomycin for 4-6 weeks.      PMHx:  CAD;  PAD;  DM;  HTN;  DM;  neuropathy;  BPH.     R foot plantar ulcer w/ probable acute upon chronic OM and surrounding foot cellulitis.  hx PAD.  - WCx:  rare MSSA, bacteroides fragilis and Strep constellatus.  - MR:  reported no OM, but clinically the suspicion is high.  -will need 6 week iv thearpy  - ABx:  vancomycin + cefepime via PICC line.  - local wound care.  - VSx consult.  - Podiatry.  - ID.    acute upon chronic HFrEF.  hx ischemic CM.  - r/o Takotsubo CM.  - EF:  35%.  - Lasix 40mg po qd.  - ARB.  - BB.  - Cardiology f/u to repeat TTE in 1 month, if no improvement +/- Entresto.    NSTEMI on admission s/p stent  - stable.  - DAPT + BB + statin.    hx DM.  - A1C:  8.8%.  - target -180mg/dL.  - CHO diet.  - Lantus 10units sq qhs.  - insulin lispro 7units qac.  - correction insulin coverage.      SIGN OUT:  - VSx consult requested by podiatry.  - Cardiac issue are currently stable.  Cardiology f/u to repeat TTE in 1 month, if no improvement +/- Entresto.    Anticiapte DC tommarrow with home care after evaluation and clearance for dc by vascular     Vital Signs Last 24 Hrs  T(C): 36.9 (09 Feb 2021 07:32), Max: 37.2 (09 Feb 2021 00:01)  T(F): 98.4 (09 Feb 2021 07:32), Max: 98.9 (09 Feb 2021 00:01)  HR: 71 (09 Feb 2021 07:32) (69 - 71)  BP: 128/85 (09 Feb 2021 07:32) (113/58 - 128/85)  BP(mean): --  RR: 18 (09 Feb 2021 07:32) (18 - 18)  SpO2: 98% (09 Feb 2021 07:32) (98% - 100%)    HEENT:   pupils equal and reactive, EOMI, no oropharyngeal lesions, erythema, exudates, oral thrush    NECK:   supple, no carotid bruits, no palpable lymph nodes, no thyromegaly    CV:  +S1, +S2, regular, no murmurs or rubs    RESP:   lungs clear to auscultation bilaterally, no wheezing, rales, rhonchi, good air entry bilaterally    BREAST:  not examined    GI:  abdomen soft, non-tender, non-distended, normal BS, no bruits, no abdominal masses, no palpable masses    RECTAL:  not examined    :  not examined    MSK:   normal muscle tone, no atrophy, no rigidity, no contractions    EXT:   no clubbing, no cyanosis, no edema, no calf pain, swelling or erythema    VASCULAR:  pulses equal and symmetric in the upper and lower extremities    NEURO:  AAOX3, no focal neurological deficits, follows all commands, able to move extremities spontaneously    SKIN:  no ulcers, lesions or rashes    08 Feb 2021 08:43    135    |  99     |  12     ----------------------------<  105    4.4     |  30     |  0.93     Ca    8.9        08 Feb 2021 08:43    CBC Full  -  ( 08 Feb 2021 08:43 )  WBC Count : 7.94 K/uL  Hemoglobin : 9.6 g/dL  Hematocrit : 30.5 %  Platelet Count - Automated : 292 K/uL  Mean Cell Volume : 87.9 fl  Mean Cell Hemoglobin : 27.7 pg  Mean Cell Hemoglobin Concentration : 31.5 gm/dL      Hospital Course:    65M.  admitted 01/31/21.  presented to ED c/o SOB.  a/w ALFARO.    prior admission from 1/21/21-1/27/21 for a right foot cellulitis and osteomyelitis involving the right medial hallux sesamoid and 1st metatarsal.  wound Cx (+) polymicrobial.  01/25 debrided and a RUE PICC line was placed.  started on cefepime + vancomycin for 4-6 weeks.      PMHx:  CAD;  PAD;  DM;  HTN;  DM;  neuropathy;  BPH.     R foot plantar ulcer w/ probable acute upon chronic OM and surrounding foot cellulitis.   - WCx:  rare MSSA, bacteroides fragilis and Strep constellatus.  - MR:  reported no OM, but clinically the suspicion is high.  -will need 6 week iv thearpy  - ABx:  vancomycin + cefepime via PICC line.  - local wound care.  -evaluated b y vascular surgery, no intervention at this time.     acute upon chronic HFrEF.  hx ischemic CM.  - r/o Takotsubo CM.  - EF:  35%.  - Lasix 40mg po qd.  - ARB.  - BB.  - Cardiology f/u to repeat TTE in 1 month, if no improvement +/- Entresto.    NSTEMI on admission s/p stent  - stable.  - DAPT + BB + statin.

## 2021-02-09 NOTE — PROGRESS NOTE ADULT - PROBLEM SELECTOR PLAN 6
Medical management
Receiving Abx; I discussed case with Dr Bullock.
Medical management
Receiving Abx; I discussed case with Dr Bullock.
Uncontrolled with HgbA1C 8.8% but also with hypoglycemia at times; optimization as per medical service.
Uncontrolled with HgbA1C 8.8% but also with hypoglycemia at times; optimization as per medical service.
Medical management
management per podiatry. ID consulted for ABX management.

## 2021-02-09 NOTE — DISCHARGE NOTE PROVIDER - NSDCCPCAREPLAN_GEN_ALL_CORE_FT
PRINCIPAL DISCHARGE DIAGNOSIS  Diagnosis: Acute CHF  Assessment and Plan of Treatment:   *continue with meds as written  *follow up outpatient with cardiology with in one week.      SECONDARY DISCHARGE DIAGNOSES  Diagnosis: Non-ST elevation MI (NSTEMI)  Assessment and Plan of Treatment:   *Please continue with all meds as written  *Dual anti-platlet therapy with aspirin and plavix.   *follow up outpatient with cardiology with in one week of discharge.    Diagnosis: Foot infection  Assessment and Plan of Treatment: *continue with IV cefepime and iv Flagyl as written til 3/23/21  *weekly CBC, CrP, BMP while on antibiotics.       PRINCIPAL DISCHARGE DIAGNOSIS  Diagnosis: Acute CHF  Assessment and Plan of Treatment:   *continue with meds as written  *follow up outpatient with cardiology with in one week.      SECONDARY DISCHARGE DIAGNOSES  Diagnosis: Non-ST elevation MI (NSTEMI)  Assessment and Plan of Treatment:   *Please continue with all meds as written  *Dual anti-platlet therapy with aspirin and plavix.   *follow up outpatient with cardiology with in one week of discharge.    Diagnosis: Foot infection  Assessment and Plan of Treatment: *continue with IV cefepime and iv Flagyl as written til 3/23/21  *weekly CBC, CrP, BMP while on antibiotics.  *wound care:   I&D sites look clean to 3rd toe.   NO pus or odor. Irrigate ALL wounds / 1:5 NS:Betadine Sol rinse / NS. Repack 1st interspace wound / sterile 4x4 soaked in NS. Apply DSD / 4x4 & ABDs & Afia 4". Advise off load. L eft foot no issue.

## 2021-02-09 NOTE — PROGRESS NOTE ADULT - SUBJECTIVE AND OBJECTIVE BOX
REASON FOR VISIT: Pulmonary edema, CHF, Elev troponin    HPI: 65 year old man with a history of 3V CAD (cath in 2012 - not amenable to revascularization), PAD, HTN, HLD, DM2 (on insulin), neuropathy, BPH, and recently diagnosed foot osteomyelitis (treated with long-course of IV antibiotics via PICC) admitted on 1/31/2021 with decompensated HF; found to have severe LV dysfunction, mild elevation of troponin.  Cath on 2/4 revealed a critical stenosis of proximal LAD (now s/p PCI) and elevated LVEDP.  He is also being treated for a R foot osteomyelitis / cellulitis.    2/4/21:  Symptomatic hypoglycemia earlier today -- now better; less dyspnea; still with occasional cough.  2/5/'21: no complaints.  Plan for debridement of foot.  2/6/21 awake alert comfortable Tele SR, S/P LAD DARCIE    2/9/'21: wife asked cardiology to reassess pt.  pt denies any cardiac symptoms: chest pain, dyspnea, palpitations.  ambulating w/o difficulty.    MEDICATIONS:  OUTPATIENT  Home Medications:  Aspirin Low Dose 81 mg oral delayed release tablet: 1 tab(s) orally once a day (31 Jan 2021 07:40)  atorvastatin 40 mg oral tablet: 1 tab(s) orally once a day (31 Jan 2021 07:40)  cefepime 2 g intravenous injection: 2 gram(s) intravenous every 12 hours (09 Feb 2021 14:51)  clopidogrel 75 mg oral tablet: 1 tab(s) orally once a day (09 Feb 2021 14:51)  ferrous sulfate 325 mg (65 mg elemental iron) oral tablet: 1 tab(s) orally once a day (09 Feb 2021 14:51)  fluticasone 50 mcg/inh nasal spray: 1 spray(s) in each nostril 2 times a day (31 Jan 2021 07:40)  furosemide 40 mg oral tablet: 1 tab(s) orally once a day (09 Feb 2021 14:51)  Glucosamine Chondroitin oral capsule: 3 cap(s) orally once a day (31 Jan 2021 07:40)  guaiFENesin 100 mg/5 mL oral liquid: 5 milliliter(s) orally every 6 hours, As needed, Cough (09 Feb 2021 14:51)  insulin glargine: 42 unit(s) subcutaneous once a day (at bedtime)    **per d/c paperwork, patient was decreased to 30 units, but patient states he has been taking 42 (31 Jan 2021 07:40)  loratadine 10 mg oral tablet: 1 tab(s) orally once a day (09 Feb 2021 14:51)  losartan 25 mg oral tablet: 1 tab(s) orally once a day (09 Feb 2021 14:51)  metFORMIN 1000 mg oral tablet: 1 tab(s) orally 2 times a day (31 Jan 2021 07:40)  metroNIDAZOLE 500 mg/100 mL intravenous solution: 500 milligram(s) intravenous every 8 hours (09 Feb 2021 14:51)  potassium chloride 20 mEq oral tablet, extended release: 1 tab(s) orally once a day (09 Feb 2021 14:51)  ranolazine 500 mg oral tablet, extended release: 1 tab(s) orally 2 times a day (31 Jan 2021 07:40)  Vitamin B-12 1000 mcg oral tablet: 1 tab(s) orally once a day (31 Jan 2021 07:40)  Vitamin C 1000 mg oral tablet: 1 tab(s) orally once a day (31 Jan 2021 07:40)  Vitamin D3 1000 intl units (25 mcg) oral tablet: 1 tab(s) orally once a day (31 Jan 2021 07:40)      INPATIENT  MEDICATIONS  (STANDING):  aspirin enteric coated 81 milliGRAM(s) Oral at bedtime  atorvastatin 40 milliGRAM(s) Oral at bedtime  carvedilol 6.25 milliGRAM(s) Oral every 12 hours  cefepime   IVPB 2000 milliGRAM(s) IV Intermittent every 12 hours  clopidogrel Tablet 75 milliGRAM(s) Oral daily  dextrose 40% Gel 15 Gram(s) Oral once  dextrose 5%. 1000 milliLiter(s) (100 mL/Hr) IV Continuous <Continuous>  dextrose 50% Injectable 25 Gram(s) IV Push once  dextrose 50% Injectable 12.5 Gram(s) IV Push once  dextrose 50% Injectable 25 Gram(s) IV Push once  enoxaparin Injectable 40 milliGRAM(s) SubCutaneous daily  ferrous    sulfate 325 milliGRAM(s) Oral daily  furosemide    Tablet 40 milliGRAM(s) Oral daily  glucagon  Injectable 1 milliGRAM(s) IntraMuscular once  insulin glargine Injectable (LANTUS) 10 Unit(s) SubCutaneous at bedtime  insulin lispro (ADMELOG) corrective regimen sliding scale   SubCutaneous three times a day before meals  insulin lispro Injectable (ADMELOG) 7 Unit(s) SubCutaneous three times a day before meals  loratadine 10 milliGRAM(s) Oral daily  losartan 25 milliGRAM(s) Oral daily  metroNIDAZOLE  IVPB      metroNIDAZOLE  IVPB 500 milliGRAM(s) IV Intermittent every 8 hours  potassium chloride    Tablet ER 40 milliEquivalent(s) Oral daily  ranolazine 500 milliGRAM(s) Oral two times a day  sodium chloride 0.9% lock flush 3 milliLiter(s) IV Push every 8 hours    MEDICATIONS  (PRN):  guaiFENesin   Syrup  (Sugar-Free) 100 milliGRAM(s) Oral every 6 hours PRN Cough          Vital Signs Last 24 Hrs  T(C): 36.8 (09 Feb 2021 15:18), Max: 37.2 (09 Feb 2021 00:01)  T(F): 98.2 (09 Feb 2021 15:18), Max: 98.9 (09 Feb 2021 00:01)  HR: 71 (09 Feb 2021 15:18) (69 - 71)  BP: 100/63 (09 Feb 2021 15:18) (100/63 - 128/85)  BP(mean): --  RR: 18 (09 Feb 2021 15:18) (18 - 18)  SpO2: 98% (09 Feb 2021 15:18) (98% - 100%)Daily     Daily I&O's Summary    08 Feb 2021 07:01  -  09 Feb 2021 07:00  --------------------------------------------------------  IN: 0 mL / OUT: 600 mL / NET: -600 mL        PHYSICAL EXAM:  Constitutional: Awake and alert, no distress  Respiratory: nonlabored, Lungs clear B/L  Cardiovascular: S1 and S2, RRR, no murmurs  Gastrointestinal: Bowel  abdomen is soft, nontender.   Psych: A&O       LABS: All Labs Reviewed:                        9.6    7.94  )-----------( 292      ( 08 Feb 2021 08:43 )             30.5                         9.2    9.57  )-----------( 315      ( 07 Feb 2021 08:34 )             29.1     08 Feb 2021 08:43    135    |  99     |  12     ----------------------------<  105    4.4     |  30     |  0.93   07 Feb 2021 08:34    133    |  99     |  12     ----------------------------<  120    4.5     |  28     |  0.90     Ca    8.9        08 Feb 2021 08:43  Ca    8.7        07 Feb 2021 08:34        Cardiac Cath Lab - Adult (02.03.21 @ 08:23):   New proximal LAD stenosis. Multiple small branch stenoses previously seen.   LVEDP very elevated.   Interventional Conclusions:  Successful DARCIE of proximal LAD    12 Lead ECG (01.30.21 @ 23:48):  Normal sinus rhythm, Low voltage QRS, Cannot rule out Anterior infarct , age undetermined. When compared with ECG of 21-JAN-2021 15:41, No significant change was found    CT Angio Chest PE Protocol w/ IV Cont (01.31.21 @ 01:45):  No evidence of pulmonary embolism followed to the segmental pulmonary arterial branches.  Moderate sized bilateral pleural effusions with overlying compressive atelectasis.  Moderate pulmonary vascular congestion.  Small volume perihepatic ascites.    TTE Echo Complete w/o Contrast w/ Doppler (02.01.21 @ 07:55):   Left ventricle systolic function appears moderately impaired; segmental wall motion abnormalities noted consistent with Takotsubo's cardiomyopathy. Estimated Ejection Fraction is 30%.   The left atrium is moderately dilated.   Mild mitral regurgitation    Tele: SR

## 2021-02-09 NOTE — DISCHARGE NOTE PROVIDER - CARE PROVIDER_API CALL
Genaro Luna)  Internal Medicine  [address] [place] [address]  Phone: [phone]  Fax: [phone]  Follow Up Time:     Jeffrey Bullock  PODIATRIC MEDICINE AND SURGERY  [address] [place] [address]  Phone: [phone]  Fax: [phone]  Follow Up Time:     Moi Arndt)  Vascular Surgery  [address] [facility], [place] [address]  Phone: [phone]  Fax: [phone]  Follow Up Time:     Carlos Zacarias)  Cardiology  [address] [place] [address]  Phone: [phone]  Fax: [phone]  Follow Up Time:

## 2021-02-09 NOTE — DISCHARGE NOTE PROVIDER - NSDCMRMEDTOKEN_GEN_ALL_CORE_FT
amLODIPine 10 mg oral tablet: 1 tab(s) orally once a day    **was not discharged on med, but patient states he is taking  Aspirin Low Dose 81 mg oral delayed release tablet: 1 tab(s) orally once a day  atorvastatin 40 mg oral tablet: 1 tab(s) orally once a day  carvedilol 25 mg oral tablet: 1 tab(s) orally 2 times a day  cefepime 2 g intravenous injection: 2 gram(s) intravenous every 12 hours  Cinnamon 500 mg oral capsule: 2 cap(s) orally once a day  Cozaar 50 mg oral tablet: 1 tab(s) orally once a day  fluticasone 50 mcg/inh nasal spray: 1 spray(s) in each nostril 2 times a day  Glucosamine Chondroitin oral capsule: 3 cap(s) orally once a day  insulin glargine: 42 unit(s) subcutaneous once a day (at bedtime)    **per d/c paperwork, patient was decreased to 30 units, but patient states he has been taking 42  metFORMIN 1000 mg oral tablet: 1 tab(s) orally 2 times a day  ranolazine 500 mg oral tablet, extended release: 1 tab(s) orally 2 times a day  vancomycin 750 mg intravenous injection: 750 milligram(s) intravenous every 12 hours  Vitamin B-12 1000 mcg oral tablet: 1 tab(s) orally once a day  Vitamin C 1000 mg oral tablet: 1 tab(s) orally once a day  Vitamin D3 1000 intl units (25 mcg) oral tablet: 1 tab(s) orally once a day   Aspirin Low Dose 81 mg oral delayed release tablet: 1 tab(s) orally once a day  atorvastatin 40 mg oral tablet: 1 tab(s) orally once a day  carvedilol 6.25 mg oral tablet: 1 tab(s) orally every 12 hours  cefepime 2 g intravenous injection: 2 gram(s) intravenous every 12 hours  clopidogrel 75 mg oral tablet: 1 tab(s) orally once a day  ferrous sulfate 325 mg (65 mg elemental iron) oral tablet: 1 tab(s) orally once a day  fluticasone 50 mcg/inh nasal spray: 1 spray(s) in each nostril 2 times a day  furosemide 40 mg oral tablet: 1 tab(s) orally once a day  Glucosamine Chondroitin oral capsule: 3 cap(s) orally once a day  guaiFENesin 100 mg/5 mL oral liquid: 5 milliliter(s) orally every 6 hours, As needed, Cough  insulin glargine: 42 unit(s) subcutaneous once a day (at bedtime)    **per d/c paperwork, patient was decreased to 30 units, but patient states he has been taking 42  loratadine 10 mg oral tablet: 1 tab(s) orally once a day  losartan 25 mg oral tablet: 1 tab(s) orally once a day  metFORMIN 1000 mg oral tablet: 1 tab(s) orally 2 times a day  metroNIDAZOLE 500 mg/100 mL intravenous solution: 500 milligram(s) intravenous every 8 hours  potassium chloride 20 mEq oral tablet, extended release: 1 tab(s) orally once a day  ranolazine 500 mg oral tablet, extended release: 1 tab(s) orally 2 times a day  Vitamin B-12 1000 mcg oral tablet: 1 tab(s) orally once a day  Vitamin C 1000 mg oral tablet: 1 tab(s) orally once a day  Vitamin D3 1000 intl units (25 mcg) oral tablet: 1 tab(s) orally once a day

## 2021-02-09 NOTE — PROGRESS NOTE ADULT - PROBLEM SELECTOR PLAN 4
BP controlled - OP norvasc held given edema.
BP controlled  / occasionally low; continue to hold amlodipine.
Continue Statin
Continue Statin
Continue atorvastatin.
Continue Statin
BP controlled  / occasionally low; continue to hold amlodipine.
Continue atorvastatin.

## 2021-02-09 NOTE — PROGRESS NOTE ADULT - PROBLEM SELECTOR PROBLEM 1
Acute pulmonary edema

## 2021-02-09 NOTE — PROGRESS NOTE ADULT - SUBJECTIVE AND OBJECTIVE BOX
Patient is a 65y old  Male who presents with a chief complaint of pulmonary edema (08 Feb 2021 16:04)      HPI:  66 yo M with a PMH CAD, PAD, HTN, HLD, DM2 (on insulin) c/b neuropathy, and BPH who presents with SOB. Over the past day, he has had worsening SOB, particularly on exertion but also at rest and while lying down in bed. He also endorses a chest tightness with the dyspnea and has palpitations on exertion, but denies wheezing. He denies fevers but endorsed some chills today. He has had a chronic nonproductive cough for he past month.    He was recently admitted from 1/21/21-1/27/21 for a right foot cellulitis and osteomyelitis involving the right medial hallux sesamoid and 1st metatarsal. Wound cultures showed rare MSSA, bacteroides fragilis, and strep constellatus. He had a debridement by podiatry on 1/25. He had a PICC line placed and was started on Cefepime and Vancomycin for a planned course of 4-6 weeks. His course was also complicated by hematuria, for which he saw Urology who felt there was no inpatient workup required, but should have an outpatient CT urography/cystography. Since discharge, he has had some loose stools, about one BM per day, and not fully liquid. He denies abdominal pain, nausea, vomiting, current hematuria, or dysuria.  His right foot is doing better. Sometimes he has purulent/bloody drainage, but very little. He has an appointment with Dr. Bullock on Monday 2/1.    Of note, he was also stopped off of his HCTZ 25 mg QD and his Losartan was decreased from 100 to 50 mg QD. His Amlodipine 10 mg QD was not restarted upon discharge as well.    In the ED, he was given Furosemide 40 mg IV x1, Meropenem 1 g IV x1, and Vancomycin 750 mg IV x1. (31 Jan 2021 05:52)      Allergies    No Known Allergies    Intolerances        MEDICATIONS  (STANDING):  aspirin enteric coated 81 milliGRAM(s) Oral at bedtime  atorvastatin 40 milliGRAM(s) Oral at bedtime  carvedilol 6.25 milliGRAM(s) Oral every 12 hours  cefepime   IVPB 2000 milliGRAM(s) IV Intermittent every 12 hours  clopidogrel Tablet 75 milliGRAM(s) Oral daily  dextrose 40% Gel 15 Gram(s) Oral once  dextrose 5%. 1000 milliLiter(s) (100 mL/Hr) IV Continuous <Continuous>  dextrose 50% Injectable 25 Gram(s) IV Push once  dextrose 50% Injectable 12.5 Gram(s) IV Push once  dextrose 50% Injectable 25 Gram(s) IV Push once  enoxaparin Injectable 40 milliGRAM(s) SubCutaneous daily  ferrous    sulfate 325 milliGRAM(s) Oral daily  furosemide    Tablet 40 milliGRAM(s) Oral daily  glucagon  Injectable 1 milliGRAM(s) IntraMuscular once  insulin glargine Injectable (LANTUS) 10 Unit(s) SubCutaneous at bedtime  insulin lispro (ADMELOG) corrective regimen sliding scale   SubCutaneous three times a day before meals  insulin lispro Injectable (ADMELOG) 7 Unit(s) SubCutaneous three times a day before meals  losartan 25 milliGRAM(s) Oral daily  metroNIDAZOLE  IVPB      metroNIDAZOLE  IVPB 500 milliGRAM(s) IV Intermittent every 8 hours  potassium chloride    Tablet ER 40 milliEquivalent(s) Oral daily  ranolazine 500 milliGRAM(s) Oral two times a day  sodium chloride 0.9% lock flush 3 milliLiter(s) IV Push every 8 hours    MEDICATIONS  (PRN):        RADIOLOGY    CBC Full  -  ( 08 Feb 2021 08:43 )  WBC Count : 7.94 K/uL  RBC Count : 3.47 M/uL  Hemoglobin : 9.6 g/dL  Hematocrit : 30.5 %  Platelet Count - Automated : 292 K/uL  Mean Cell Volume : 87.9 fl  Mean Cell Hemoglobin : 27.7 pg  Mean Cell Hemoglobin Concentration : 31.5 gm/dL  Auto Neutrophil # : x  Auto Lymphocyte # : x  Auto Monocyte # : x  Auto Eosinophil # : x  Auto Basophil # : x  Auto Neutrophil % : x  Auto Lymphocyte % : x  Auto Monocyte % : x  Auto Eosinophil % : x  Auto Basophil % : x      02-08    135  |  99  |  12  ----------------------------<  105<H>  4.4   |  30  |  0.93    Ca    8.9      08 Feb 2021 08:43        Culture - Other (02.06.21 @ 14:03)   Specimen Source: .Other deep wound first toe right foot   Culture Results:   No growth Culture - Other (02.06.21 @ 14:03)   Specimen Source: .Other deep wound first toe right foot   Culture Results:   No growth Culture - Tissue with Gram Stain (02.06.21 @ 14:02)   Gram Stain:   No polymorphonuclear leukocytes seen per low power field   No organisms seen per oil power field   Specimen Source: .Tissue great toe culture   Culture Results:   No growth Culture - Blood (02.04.21 @ 11:04)   Specimen Source: .Blood None   Culture Results:   No growth to date.

## 2021-02-10 ENCOUNTER — TRANSCRIPTION ENCOUNTER (OUTPATIENT)
Age: 66
End: 2021-02-10

## 2021-02-10 VITALS
DIASTOLIC BLOOD PRESSURE: 65 MMHG | TEMPERATURE: 98 F | OXYGEN SATURATION: 100 % | RESPIRATION RATE: 18 BRPM | SYSTOLIC BLOOD PRESSURE: 123 MMHG | HEART RATE: 72 BPM

## 2021-02-10 PROCEDURE — 99239 HOSP IP/OBS DSCHRG MGMT >30: CPT

## 2021-02-10 RX ADMIN — SODIUM CHLORIDE 3 MILLILITER(S): 9 INJECTION INTRAMUSCULAR; INTRAVENOUS; SUBCUTANEOUS at 05:25

## 2021-02-10 RX ADMIN — Medication 7 UNIT(S): at 12:32

## 2021-02-10 RX ADMIN — Medication 7 UNIT(S): at 16:27

## 2021-02-10 RX ADMIN — INSULIN GLARGINE 10 UNIT(S): 100 INJECTION, SOLUTION SUBCUTANEOUS at 20:15

## 2021-02-10 RX ADMIN — ATORVASTATIN CALCIUM 40 MILLIGRAM(S): 80 TABLET, FILM COATED ORAL at 20:15

## 2021-02-10 RX ADMIN — ENOXAPARIN SODIUM 40 MILLIGRAM(S): 100 INJECTION SUBCUTANEOUS at 09:18

## 2021-02-10 RX ADMIN — Medication 100 MILLIGRAM(S): at 16:26

## 2021-02-10 RX ADMIN — RANOLAZINE 500 MILLIGRAM(S): 500 TABLET, FILM COATED, EXTENDED RELEASE ORAL at 05:28

## 2021-02-10 RX ADMIN — CEFEPIME 100 MILLIGRAM(S): 1 INJECTION, POWDER, FOR SOLUTION INTRAMUSCULAR; INTRAVENOUS at 05:28

## 2021-02-10 RX ADMIN — Medication 40 MILLIEQUIVALENT(S): at 09:17

## 2021-02-10 RX ADMIN — Medication 100 MILLIGRAM(S): at 09:16

## 2021-02-10 RX ADMIN — CARVEDILOL PHOSPHATE 6.25 MILLIGRAM(S): 80 CAPSULE, EXTENDED RELEASE ORAL at 09:18

## 2021-02-10 RX ADMIN — Medication 7 UNIT(S): at 09:16

## 2021-02-10 RX ADMIN — SODIUM CHLORIDE 3 MILLILITER(S): 9 INJECTION INTRAMUSCULAR; INTRAVENOUS; SUBCUTANEOUS at 20:14

## 2021-02-10 RX ADMIN — SODIUM CHLORIDE 3 MILLILITER(S): 9 INJECTION INTRAMUSCULAR; INTRAVENOUS; SUBCUTANEOUS at 12:20

## 2021-02-10 RX ADMIN — RANOLAZINE 500 MILLIGRAM(S): 500 TABLET, FILM COATED, EXTENDED RELEASE ORAL at 17:22

## 2021-02-10 RX ADMIN — Medication 100 MILLIGRAM(S): at 05:28

## 2021-02-10 RX ADMIN — LORATADINE 10 MILLIGRAM(S): 10 TABLET ORAL at 09:19

## 2021-02-10 RX ADMIN — Medication 325 MILLIGRAM(S): at 09:17

## 2021-02-10 RX ADMIN — Medication 8: at 16:26

## 2021-02-10 RX ADMIN — LOSARTAN POTASSIUM 25 MILLIGRAM(S): 100 TABLET, FILM COATED ORAL at 09:17

## 2021-02-10 RX ADMIN — CEFEPIME 100 MILLIGRAM(S): 1 INJECTION, POWDER, FOR SOLUTION INTRAMUSCULAR; INTRAVENOUS at 18:15

## 2021-02-10 RX ADMIN — Medication 2: at 12:31

## 2021-02-10 RX ADMIN — Medication 81 MILLIGRAM(S): at 20:15

## 2021-02-10 RX ADMIN — CARVEDILOL PHOSPHATE 6.25 MILLIGRAM(S): 80 CAPSULE, EXTENDED RELEASE ORAL at 20:15

## 2021-02-10 RX ADMIN — Medication 40 MILLIGRAM(S): at 09:17

## 2021-02-10 RX ADMIN — CLOPIDOGREL BISULFATE 75 MILLIGRAM(S): 75 TABLET, FILM COATED ORAL at 09:17

## 2021-02-10 NOTE — PROGRESS NOTE ADULT - NSHPATTENDINGPLANDISCUSS_GEN_ALL_CORE
patient, nursing, staff
patient, his wife, Dr Smith, LIU Hickey, Dr Bullock, Dr Palla
Dr. Bullock and medical team
patient, nursing, staff
Kenisha Sandoval

## 2021-02-10 NOTE — PROGRESS NOTE ADULT - SUBJECTIVE AND OBJECTIVE BOX
Patient is a 65y old  Male who presents with a chief complaint of pulmonary edema (09 Feb 2021 15:45)      HPI:  66 yo M with a PMH CAD, PAD, HTN, HLD, DM2 (on insulin) c/b neuropathy, and BPH who presents with SOB. Over the past day, he has had worsening SOB, particularly on exertion but also at rest and while lying down in bed. He also endorses a chest tightness with the dyspnea and has palpitations on exertion, but denies wheezing. He denies fevers but endorsed some chills today. He has had a chronic nonproductive cough for he past month.    He was recently admitted from 1/21/21-1/27/21 for a right foot cellulitis and osteomyelitis involving the right medial hallux sesamoid and 1st metatarsal. Wound cultures showed rare MSSA, bacteroides fragilis, and strep constellatus. He had a debridement by podiatry on 1/25. He had a PICC line placed and was started on Cefepime and Vancomycin for a planned course of 4-6 weeks. His course was also complicated by hematuria, for which he saw Urology who felt there was no inpatient workup required, but should have an outpatient CT urography/cystography. Since discharge, he has had some loose stools, about one BM per day, and not fully liquid. He denies abdominal pain, nausea, vomiting, current hematuria, or dysuria.  His right foot is doing better. Sometimes he has purulent/bloody drainage, but very little. He has an appointment with Dr. Bullock on Monday 2/1.    Of note, he was also stopped off of his HCTZ 25 mg QD and his Losartan was decreased from 100 to 50 mg QD. His Amlodipine 10 mg QD was not restarted upon discharge as well.    In the ED, he was given Furosemide 40 mg IV x1, Meropenem 1 g IV x1, and Vancomycin 750 mg IV x1. (31 Jan 2021 05:52)      Allergies    No Known Allergies    Intolerances        MEDICATIONS  (STANDING):  aspirin enteric coated 81 milliGRAM(s) Oral at bedtime  atorvastatin 40 milliGRAM(s) Oral at bedtime  carvedilol 6.25 milliGRAM(s) Oral every 12 hours  cefepime   IVPB 2000 milliGRAM(s) IV Intermittent every 12 hours  clopidogrel Tablet 75 milliGRAM(s) Oral daily  dextrose 40% Gel 15 Gram(s) Oral once  dextrose 5%. 1000 milliLiter(s) (100 mL/Hr) IV Continuous <Continuous>  dextrose 50% Injectable 25 Gram(s) IV Push once  dextrose 50% Injectable 12.5 Gram(s) IV Push once  dextrose 50% Injectable 25 Gram(s) IV Push once  enoxaparin Injectable 40 milliGRAM(s) SubCutaneous daily  ferrous    sulfate 325 milliGRAM(s) Oral daily  furosemide    Tablet 40 milliGRAM(s) Oral daily  glucagon  Injectable 1 milliGRAM(s) IntraMuscular once  insulin glargine Injectable (LANTUS) 10 Unit(s) SubCutaneous at bedtime  insulin lispro (ADMELOG) corrective regimen sliding scale   SubCutaneous three times a day before meals  insulin lispro Injectable (ADMELOG) 7 Unit(s) SubCutaneous three times a day before meals  loratadine 10 milliGRAM(s) Oral daily  losartan 25 milliGRAM(s) Oral daily  metroNIDAZOLE  IVPB      metroNIDAZOLE  IVPB 500 milliGRAM(s) IV Intermittent every 8 hours  potassium chloride    Tablet ER 40 milliEquivalent(s) Oral daily  ranolazine 500 milliGRAM(s) Oral two times a day  sodium chloride 0.9% lock flush 3 milliLiter(s) IV Push every 8 hours    MEDICATIONS  (PRN):  guaiFENesin   Syrup  (Sugar-Free) 100 milliGRAM(s) Oral every 6 hours PRN Cough        RADIOLOGY        Culture - Other (02.06.21 @ 14:03)   Specimen Source: .Other deep wound first toe right foot   Culture Results:   No growth Culture - Other (02.06.21 @ 14:02)   Specimen Source: .Other second interspace toe   Culture Results:   Few Candida parapsilosis   "Susceptibilities not performed" Culture - Blood (02.04.21 @ 11:04)   Specimen Source: .Blood None   Culture Results:   No Growth Final       Historical Values  Culture - Blood (02.04.21 @ 11:04)

## 2021-02-10 NOTE — PROGRESS NOTE ADULT - ASSESSMENT
Alert afebrile. Right foot / less edema no erythema. Large dorsal wound to interspace # 1 early demarcation to 2/3 toes. OR cult Candida Await [facility] to [facility] [place]. For wound care & LT ABX / PICC. Will F/U THX

## 2021-02-10 NOTE — PROGRESS NOTE ADULT - PROVIDER SPECIALTY LIST ADULT
Hospitalist
Intervent Cardiology
Podiatry
Podiatry
Cardiology
Hospitalist
Hospitalist
Infectious Disease
Podiatry
Cardiology

## 2021-02-10 NOTE — PROGRESS NOTE ADULT - REASON FOR ADMISSION
pulmonary edema

## 2021-02-10 NOTE — DISCHARGE NOTE NURSING/CASE MANAGEMENT/SOCIAL WORK - PATIENT PORTAL LINK FT
You can access the FollowMyHealth Patient Portal offered by [facility] by registering at the following website: [email or web address]. By joining [facility]’s FollowMyHealth portal, you will also be able to view your health information using other applications (apps) compatible with our system.

## 2021-02-10 NOTE — PROGRESS NOTE ADULT - SUBJECTIVE AND OBJECTIVE BOX
Cheif complaints and Diagnosis:  right foot plantar ulcer w/ OM    Subjective:  no complaints      REVIEW OF SYSTEMS:    CONSTITUTIONAL: No weakness, fevers or chills  EYES/ENT: No visual changes;  No vertigo or throat pain   NECK: No pain or stiffness  RESPIRATORY: No cough, wheezing, hemoptysis; No shortness of breath  CARDIOVASCULAR: No chest pain or palpitations  GASTROINTESTINAL: No abdominal or epigastric pain. No nausea, vomiting, or hematemesis; No diarrhea or constipation. No melena or hematochezia.  GENITOURINARY: No dysuria, frequency or hematuria  NEUROLOGICAL: No numbness or weakness  SKIN: No itching, burning, rashes, or lesions   All other review of systems is negative unless indicated above      Vital Signs Last 24 Hrs  T(C): 36.5 (10 Feb 2021 15:15), Max: 37.1 (09 Feb 2021 23:28)  T(F): 97.7 (10 Feb 2021 15:15), Max: 98.8 (09 Feb 2021 23:28)  HR: 72 (10 Feb 2021 15:15) (66 - 75)  BP: 123/65 (10 Feb 2021 15:15) (113/71 - 123/65)  BP(mean): --  RR: 18 (10 Feb 2021 15:15) (18 - 18)  SpO2: 100% (10 Feb 2021 15:15) (99% - 100%)    HEENT:   pupils equal and reactive, EOMI, no oropharyngeal lesions, erythema, exudates, oral thrush    NECK:   supple, no carotid bruits, no palpable lymph nodes, no thyromegaly    CV:  +S1, +S2, regular, no murmurs or rubs    RESP:   lungs clear to auscultation bilaterally, no wheezing, rales, rhonchi, good air entry bilaterally    BREAST:  not examined    GI:  abdomen soft, non-tender, non-distended, normal BS, no bruits, no abdominal masses, no palpable masses    RECTAL:  not examined    :  not examined    MSK:   normal muscle tone, no atrophy, no rigidity, no contractions    EXT:   no clubbing, no cyanosis, no edema, no calf pain, swelling or erythema    VASCULAR:  pulses equal and symmetric in the upper and lower extremities    NEURO:  AAOX3, no focal neurological deficits, follows all commands, able to move extremities spontaneously    SKIN:  no ulcers, lesions or rashes    MEDICATIONS  (STANDING):  aspirin enteric coated 81 milliGRAM(s) Oral at bedtime  atorvastatin 40 milliGRAM(s) Oral at bedtime  carvedilol 6.25 milliGRAM(s) Oral every 12 hours  cefepime   IVPB 2000 milliGRAM(s) IV Intermittent every 12 hours  clopidogrel Tablet 75 milliGRAM(s) Oral daily  dextrose 40% Gel 15 Gram(s) Oral once  dextrose 5%. 1000 milliLiter(s) (100 mL/Hr) IV Continuous <Continuous>  dextrose 50% Injectable 25 Gram(s) IV Push once  dextrose 50% Injectable 12.5 Gram(s) IV Push once  dextrose 50% Injectable 25 Gram(s) IV Push once  enoxaparin Injectable 40 milliGRAM(s) SubCutaneous daily  ferrous    sulfate 325 milliGRAM(s) Oral daily  furosemide    Tablet 40 milliGRAM(s) Oral daily  glucagon  Injectable 1 milliGRAM(s) IntraMuscular once  insulin glargine Injectable (LANTUS) 10 Unit(s) SubCutaneous at bedtime  insulin lispro (ADMELOG) corrective regimen sliding scale   SubCutaneous three times a day before meals  insulin lispro Injectable (ADMELOG) 7 Unit(s) SubCutaneous three times a day before meals  loratadine 10 milliGRAM(s) Oral daily  losartan 25 milliGRAM(s) Oral daily  metroNIDAZOLE  IVPB      metroNIDAZOLE  IVPB 500 milliGRAM(s) IV Intermittent every 8 hours  potassium chloride    Tablet ER 40 milliEquivalent(s) Oral daily  ranolazine 500 milliGRAM(s) Oral two times a day  sodium chloride 0.9% lock flush 3 milliLiter(s) IV Push every 8 hours    MEDICATIONS  (PRN):  guaiFENesin   Syrup  (Sugar-Free) 100 milliGRAM(s) Oral every 6 hours PRN Cough            Hospital Course:    65M.  admitted 01/31/21.  presented to ED c/o SOB.  a/w ALFARO.    prior admission from 1/21/21-1/27/21 for a right foot cellulitis and osteomyelitis involving the right medial hallux sesamoid and 1st metatarsal.  wound Cx (+) polymicrobial.  01/25 debrided and a RUE PICC line was placed.  started on cefepime + vancomycin for 4-6 weeks.      PMHx:  CAD;  PAD;  DM;  HTN;  DM;  neuropathy;  BPH.     R foot plantar ulcer w/ probable acute upon chronic OM and surrounding foot cellulitis.   - WCx:  rare MSSA, bacteroides fragilis and Strep constellatus.  - MR:  reported no OM, but clinically the suspicion is high.  -will need 6 week iv thearpy  - ABx:  vancomycin + cefepime via PICC line.  - local wound care.  -evaluated b y vascular surgery, no intervention at this time.     acute upon chronic HFrEF.  hx ischemic CM.  - r/o Takotsubo CM.  - EF:  35%.  - Lasix 40mg po qd.  - ARB.  - BB.  - Cardiology f/u to repeat TTE in 1 month, if no improvement +/- Entresto.    NSTEMI on admission s/p stent  - stable.  - DAPT + BB + statin.       Cheif complaints and Diagnosis:  right foot plantar ulcer w/ OM    Subjective:  no complaints      REVIEW OF SYSTEMS:    CONSTITUTIONAL: No weakness, fevers or chills  EYES/ENT: No visual changes;  No vertigo or throat pain   NECK: No pain or stiffness  RESPIRATORY: No cough, wheezing, hemoptysis; No shortness of breath  CARDIOVASCULAR: No chest pain or palpitations  GASTROINTESTINAL: No abdominal or epigastric pain. No nausea, vomiting, or hematemesis; No diarrhea or constipation. No melena or hematochezia.  GENITOURINARY: No dysuria, frequency or hematuria  NEUROLOGICAL: No numbness or weakness  SKIN: No itching, burning, rashes, or lesions   All other review of systems is negative unless indicated above      Vital Signs Last 24 Hrs  T(C): 36.5 (10 Feb 2021 15:15), Max: 37.1 (09 Feb 2021 23:28)  T(F): 97.7 (10 Feb 2021 15:15), Max: 98.8 (09 Feb 2021 23:28)  HR: 72 (10 Feb 2021 15:15) (66 - 75)  BP: 123/65 (10 Feb 2021 15:15) (113/71 - 123/65)  BP(mean): --  RR: 18 (10 Feb 2021 15:15) (18 - 18)  SpO2: 100% (10 Feb 2021 15:15) (99% - 100%)    HEENT:   pupils equal and reactive, EOMI, no oropharyngeal lesions, erythema, exudates, oral thrush    NECK:   supple, no carotid bruits, no palpable lymph nodes, no thyromegaly    CV:  +S1, +S2, regular, no murmurs or rubs    RESP:   lungs clear to auscultation bilaterally, no wheezing, rales, rhonchi, good air entry bilaterally    BREAST:  not examined    GI:  abdomen soft, non-tender, non-distended, normal BS, no bruits, no abdominal masses, no palpable masses    RECTAL:  not examined    :  not examined    MSK:   normal muscle tone, no atrophy, no rigidity, no contractions    EXT:   no clubbing, no cyanosis, no edema, no calf pain, swelling or erythema    VASCULAR:  pulses equal and symmetric in the upper and lower extremities    NEURO:  AAOX3, no focal neurological deficits, follows all commands, able to move extremities spontaneously    SKIN:  no ulcers, lesions or rashes    MEDICATIONS  (STANDING):  aspirin enteric coated 81 milliGRAM(s) Oral at bedtime  atorvastatin 40 milliGRAM(s) Oral at bedtime  carvedilol 6.25 milliGRAM(s) Oral every 12 hours  cefepime   IVPB 2000 milliGRAM(s) IV Intermittent every 12 hours  clopidogrel Tablet 75 milliGRAM(s) Oral daily  dextrose 40% Gel 15 Gram(s) Oral once  dextrose 5%. 1000 milliLiter(s) (100 mL/Hr) IV Continuous <Continuous>  dextrose 50% Injectable 25 Gram(s) IV Push once  dextrose 50% Injectable 12.5 Gram(s) IV Push once  dextrose 50% Injectable 25 Gram(s) IV Push once  enoxaparin Injectable 40 milliGRAM(s) SubCutaneous daily  ferrous    sulfate 325 milliGRAM(s) Oral daily  furosemide    Tablet 40 milliGRAM(s) Oral daily  glucagon  Injectable 1 milliGRAM(s) IntraMuscular once  insulin glargine Injectable (LANTUS) 10 Unit(s) SubCutaneous at bedtime  insulin lispro (ADMELOG) corrective regimen sliding scale   SubCutaneous three times a day before meals  insulin lispro Injectable (ADMELOG) 7 Unit(s) SubCutaneous three times a day before meals  loratadine 10 milliGRAM(s) Oral daily  losartan 25 milliGRAM(s) Oral daily  metroNIDAZOLE  IVPB      metroNIDAZOLE  IVPB 500 milliGRAM(s) IV Intermittent every 8 hours  potassium chloride    Tablet ER 40 milliEquivalent(s) Oral daily  ranolazine 500 milliGRAM(s) Oral two times a day  sodium chloride 0.9% lock flush 3 milliLiter(s) IV Push every 8 hours    MEDICATIONS  (PRN):  guaiFENesin   Syrup  (Sugar-Free) 100 milliGRAM(s) Oral every 6 hours PRN Cough            Hospital Course:    65M.  admitted 01/31/21.  presented to ED c/o SOB.  a/w ALFARO.    prior admission from 1/21/21-1/27/21 for a right foot cellulitis and osteomyelitis involving the right medial hallux sesamoid and 1st metatarsal.  wound Cx (+) polymicrobial.  01/25 debrided and a RUE PICC line was placed.  started on cefepime + vancomycin for 4-6 weeks.      PMHx:  CAD;  PAD;  DM;  HTN;  DM;  neuropathy;  BPH.     R foot plantar ulcer w/ probable acute upon chronic OM and surrounding foot cellulitis.   - WCx:  rare MSSA, bacteroides fragilis and Strep constellatus.  - MR:  reported no OM, but clinically the suspicion is high.  -will need 6 week iv thearpy  - ABx:  vancomycin + cefepime via PICC line.  - local wound care.  -evaluated by vascular surgery, no intervention at this time.     acute upon chronic HFrEF.  hx ischemic CM.  - r/o Takotsubo CM.  - EF:  35%.  - Lasix 40mg po qd.  - ARB.  - BB.  - Cardiology f/u to repeat TTE in 1 month, if no improvement +/- Entresto.    NSTEMI on admission s/p stent  - stable.  - DAPT + BB + statin.      waiting for DC to rehab

## 2021-02-11 ENCOUNTER — NON-APPOINTMENT (OUTPATIENT)
Age: 66
End: 2021-02-11

## 2021-02-16 DIAGNOSIS — Z82.49 FAMILY HISTORY OF ISCHEMIC HEART DISEASE AND OTHER DISEASES OF THE CIRCULATORY SYSTEM: ICD-10-CM

## 2021-02-16 DIAGNOSIS — E78.00 PURE HYPERCHOLESTEROLEMIA, UNSPECIFIED: ICD-10-CM

## 2021-02-16 DIAGNOSIS — A41.4 SEPSIS DUE TO ANAEROBES: ICD-10-CM

## 2021-02-16 DIAGNOSIS — I21.4 NON-ST ELEVATION (NSTEMI) MYOCARDIAL INFARCTION: ICD-10-CM

## 2021-02-16 DIAGNOSIS — E11.621 TYPE 2 DIABETES MELLITUS WITH FOOT ULCER: ICD-10-CM

## 2021-02-16 DIAGNOSIS — I11.0 HYPERTENSIVE HEART DISEASE WITH HEART FAILURE: ICD-10-CM

## 2021-02-16 DIAGNOSIS — E11.649 TYPE 2 DIABETES MELLITUS WITH HYPOGLYCEMIA WITHOUT COMA: ICD-10-CM

## 2021-02-16 DIAGNOSIS — J96.00 ACUTE RESPIRATORY FAILURE, UNSPECIFIED WHETHER WITH HYPOXIA OR HYPERCAPNIA: ICD-10-CM

## 2021-02-16 DIAGNOSIS — K76.1 CHRONIC PASSIVE CONGESTION OF LIVER: ICD-10-CM

## 2021-02-16 DIAGNOSIS — J91.8 PLEURAL EFFUSION IN OTHER CONDITIONS CLASSIFIED ELSEWHERE: ICD-10-CM

## 2021-02-16 DIAGNOSIS — R18.8 OTHER ASCITES: ICD-10-CM

## 2021-02-16 DIAGNOSIS — E78.5 HYPERLIPIDEMIA, UNSPECIFIED: ICD-10-CM

## 2021-02-16 DIAGNOSIS — Z79.4 LONG TERM (CURRENT) USE OF INSULIN: ICD-10-CM

## 2021-02-16 DIAGNOSIS — E11.42 TYPE 2 DIABETES MELLITUS WITH DIABETIC POLYNEUROPATHY: ICD-10-CM

## 2021-02-16 DIAGNOSIS — I27.20 PULMONARY HYPERTENSION, UNSPECIFIED: ICD-10-CM

## 2021-02-16 DIAGNOSIS — Z87.891 PERSONAL HISTORY OF NICOTINE DEPENDENCE: ICD-10-CM

## 2021-02-16 DIAGNOSIS — Z89.422 ACQUIRED ABSENCE OF OTHER LEFT TOE(S): ICD-10-CM

## 2021-02-16 DIAGNOSIS — Z79.2 LONG TERM (CURRENT) USE OF ANTIBIOTICS: ICD-10-CM

## 2021-02-16 DIAGNOSIS — I50.21 ACUTE SYSTOLIC (CONGESTIVE) HEART FAILURE: ICD-10-CM

## 2021-02-16 DIAGNOSIS — L02.611 CUTANEOUS ABSCESS OF RIGHT FOOT: ICD-10-CM

## 2021-02-16 DIAGNOSIS — Z79.82 LONG TERM (CURRENT) USE OF ASPIRIN: ICD-10-CM

## 2021-02-16 DIAGNOSIS — E11.51 TYPE 2 DIABETES MELLITUS WITH DIABETIC PERIPHERAL ANGIOPATHY WITHOUT GANGRENE: ICD-10-CM

## 2021-02-16 DIAGNOSIS — I25.10 ATHEROSCLEROTIC HEART DISEASE OF NATIVE CORONARY ARTERY WITHOUT ANGINA PECTORIS: ICD-10-CM

## 2021-02-16 DIAGNOSIS — L97.518 NON-PRESSURE CHRONIC ULCER OF OTHER PART OF RIGHT FOOT WITH OTHER SPECIFIED SEVERITY: ICD-10-CM

## 2021-02-16 DIAGNOSIS — J98.11 ATELECTASIS: ICD-10-CM

## 2021-02-16 DIAGNOSIS — N40.0 BENIGN PROSTATIC HYPERPLASIA WITHOUT LOWER URINARY TRACT SYMPTOMS: ICD-10-CM

## 2021-02-16 DIAGNOSIS — L03.115 CELLULITIS OF RIGHT LOWER LIMB: ICD-10-CM

## 2021-02-26 ENCOUNTER — INPATIENT (INPATIENT)
Facility: HOSPITAL | Age: 66
LOS: 7 days | Discharge: HOME HEALTH | DRG: 616 | End: 2021-03-06
Attending: INTERNAL MEDICINE | Admitting: FAMILY MEDICINE
Payer: MEDICARE

## 2021-02-26 ENCOUNTER — APPOINTMENT (OUTPATIENT)
Dept: CARDIOLOGY | Facility: CLINIC | Age: 66
End: 2021-02-26

## 2021-02-26 VITALS — HEIGHT: 71 IN | WEIGHT: 164.02 LBS

## 2021-02-26 DIAGNOSIS — I25.10 ATHEROSCLEROTIC HEART DISEASE OF NATIVE CORONARY ARTERY WITHOUT ANGINA PECTORIS: ICD-10-CM

## 2021-02-26 DIAGNOSIS — E11.9 TYPE 2 DIABETES MELLITUS WITHOUT COMPLICATIONS: ICD-10-CM

## 2021-02-26 DIAGNOSIS — Z98.890 OTHER SPECIFIED POSTPROCEDURAL STATES: Chronic | ICD-10-CM

## 2021-02-26 DIAGNOSIS — M86.9 OSTEOMYELITIS, UNSPECIFIED: ICD-10-CM

## 2021-02-26 DIAGNOSIS — Z89.429 ACQUIRED ABSENCE OF OTHER TOE(S), UNSPECIFIED SIDE: Chronic | ICD-10-CM

## 2021-02-26 DIAGNOSIS — I50.22 CHRONIC SYSTOLIC (CONGESTIVE) HEART FAILURE: ICD-10-CM

## 2021-02-26 DIAGNOSIS — R09.89 OTHER SPECIFIED SYMPTOMS AND SIGNS INVOLVING THE CIRCULATORY AND RESPIRATORY SYSTEMS: ICD-10-CM

## 2021-02-26 DIAGNOSIS — Z01.810 ENCOUNTER FOR PREPROCEDURAL CARDIOVASCULAR EXAMINATION: ICD-10-CM

## 2021-02-26 LAB
ALBUMIN SERPL ELPH-MCNC: 3.1 G/DL — LOW (ref 3.3–5)
ALP SERPL-CCNC: 69 U/L — SIGNIFICANT CHANGE UP (ref 40–120)
ALT FLD-CCNC: 29 U/L — SIGNIFICANT CHANGE UP (ref 12–78)
ANION GAP SERPL CALC-SCNC: 9 MMOL/L — SIGNIFICANT CHANGE UP (ref 5–17)
APTT BLD: 34.6 SEC — SIGNIFICANT CHANGE UP (ref 27.5–35.5)
AST SERPL-CCNC: 15 U/L — SIGNIFICANT CHANGE UP (ref 15–37)
BASOPHILS # BLD AUTO: 0.06 K/UL — SIGNIFICANT CHANGE UP (ref 0–0.2)
BASOPHILS NFR BLD AUTO: 1.2 % — SIGNIFICANT CHANGE UP (ref 0–2)
BILIRUB SERPL-MCNC: 0.3 MG/DL — SIGNIFICANT CHANGE UP (ref 0.2–1.2)
BUN SERPL-MCNC: 14 MG/DL — SIGNIFICANT CHANGE UP (ref 7–23)
CALCIUM SERPL-MCNC: 9 MG/DL — SIGNIFICANT CHANGE UP (ref 8.5–10.1)
CHLORIDE SERPL-SCNC: 100 MMOL/L — SIGNIFICANT CHANGE UP (ref 96–108)
CO2 SERPL-SCNC: 21 MMOL/L — LOW (ref 22–31)
CREAT SERPL-MCNC: 0.83 MG/DL — SIGNIFICANT CHANGE UP (ref 0.5–1.3)
CRP SERPL-MCNC: 0.22 MG/DL — SIGNIFICANT CHANGE UP (ref 0–0.4)
EOSINOPHIL # BLD AUTO: 0.09 K/UL — SIGNIFICANT CHANGE UP (ref 0–0.5)
EOSINOPHIL NFR BLD AUTO: 1.8 % — SIGNIFICANT CHANGE UP (ref 0–6)
ERYTHROCYTE [SEDIMENTATION RATE] IN BLOOD: 30 MM/HR — HIGH (ref 0–20)
GLUCOSE SERPL-MCNC: 130 MG/DL — HIGH (ref 70–99)
HCT VFR BLD CALC: 33.2 % — LOW (ref 39–50)
HGB BLD-MCNC: 11 G/DL — LOW (ref 13–17)
IMM GRANULOCYTES NFR BLD AUTO: 1 % — SIGNIFICANT CHANGE UP (ref 0–1.5)
INR BLD: 1.21 RATIO — HIGH (ref 0.88–1.16)
LYMPHOCYTES # BLD AUTO: 0.93 K/UL — LOW (ref 1–3.3)
LYMPHOCYTES # BLD AUTO: 18.9 % — SIGNIFICANT CHANGE UP (ref 13–44)
MCHC RBC-ENTMCNC: 27 PG — SIGNIFICANT CHANGE UP (ref 27–34)
MCHC RBC-ENTMCNC: 33.1 GM/DL — SIGNIFICANT CHANGE UP (ref 32–36)
MCV RBC AUTO: 81.4 FL — SIGNIFICANT CHANGE UP (ref 80–100)
MONOCYTES # BLD AUTO: 0.64 K/UL — SIGNIFICANT CHANGE UP (ref 0–0.9)
MONOCYTES NFR BLD AUTO: 13 % — SIGNIFICANT CHANGE UP (ref 2–14)
NEUTROPHILS # BLD AUTO: 3.16 K/UL — SIGNIFICANT CHANGE UP (ref 1.8–7.4)
NEUTROPHILS NFR BLD AUTO: 64.1 % — SIGNIFICANT CHANGE UP (ref 43–77)
PLATELET # BLD AUTO: 297 K/UL — SIGNIFICANT CHANGE UP (ref 150–400)
POTASSIUM SERPL-MCNC: 3.9 MMOL/L — SIGNIFICANT CHANGE UP (ref 3.5–5.3)
POTASSIUM SERPL-SCNC: 3.9 MMOL/L — SIGNIFICANT CHANGE UP (ref 3.5–5.3)
PROT SERPL-MCNC: 6.5 GM/DL — SIGNIFICANT CHANGE UP (ref 6–8.3)
PROTHROM AB SERPL-ACNC: 13.9 SEC — HIGH (ref 10.6–13.6)
RBC # BLD: 4.08 M/UL — LOW (ref 4.2–5.8)
RBC # FLD: 14.5 % — SIGNIFICANT CHANGE UP (ref 10.3–14.5)
SARS-COV-2 RNA SPEC QL NAA+PROBE: SIGNIFICANT CHANGE UP
SODIUM SERPL-SCNC: 130 MMOL/L — LOW (ref 135–145)
WBC # BLD: 4.93 K/UL — SIGNIFICANT CHANGE UP (ref 3.8–10.5)
WBC # FLD AUTO: 4.93 K/UL — SIGNIFICANT CHANGE UP (ref 3.8–10.5)

## 2021-02-26 PROCEDURE — 85027 COMPLETE CBC AUTOMATED: CPT

## 2021-02-26 PROCEDURE — 99223 1ST HOSP IP/OBS HIGH 75: CPT

## 2021-02-26 PROCEDURE — 87075 CULTR BACTERIA EXCEPT BLOOD: CPT

## 2021-02-26 PROCEDURE — U0003: CPT

## 2021-02-26 PROCEDURE — 73720 MRI LWR EXTREMITY W/O&W/DYE: CPT | Mod: RT

## 2021-02-26 PROCEDURE — 87070 CULTURE OTHR SPECIMN AEROBIC: CPT

## 2021-02-26 PROCEDURE — 87077 CULTURE AEROBIC IDENTIFY: CPT

## 2021-02-26 PROCEDURE — 93925 LOWER EXTREMITY STUDY: CPT

## 2021-02-26 PROCEDURE — 93005 ELECTROCARDIOGRAM TRACING: CPT

## 2021-02-26 PROCEDURE — 73620 X-RAY EXAM OF FOOT: CPT | Mod: 26,RT

## 2021-02-26 PROCEDURE — 86901 BLOOD TYPING SEROLOGIC RH(D): CPT

## 2021-02-26 PROCEDURE — 86900 BLOOD TYPING SEROLOGIC ABO: CPT

## 2021-02-26 PROCEDURE — 83735 ASSAY OF MAGNESIUM: CPT

## 2021-02-26 PROCEDURE — 97164 PT RE-EVAL EST PLAN CARE: CPT | Mod: GP

## 2021-02-26 PROCEDURE — 97116 GAIT TRAINING THERAPY: CPT | Mod: GP

## 2021-02-26 PROCEDURE — 73620 X-RAY EXAM OF FOOT: CPT | Mod: RT

## 2021-02-26 PROCEDURE — 80048 BASIC METABOLIC PNL TOTAL CA: CPT

## 2021-02-26 PROCEDURE — 87186 SC STD MICRODIL/AGAR DIL: CPT

## 2021-02-26 PROCEDURE — 87102 FUNGUS ISOLATION CULTURE: CPT

## 2021-02-26 PROCEDURE — 73720 MRI LWR EXTREMITY W/O&W/DYE: CPT | Mod: 26,RT

## 2021-02-26 PROCEDURE — 80053 COMPREHEN METABOLIC PANEL: CPT

## 2021-02-26 PROCEDURE — 71045 X-RAY EXAM CHEST 1 VIEW: CPT | Mod: 26

## 2021-02-26 PROCEDURE — 94640 AIRWAY INHALATION TREATMENT: CPT

## 2021-02-26 PROCEDURE — 87116 MYCOBACTERIA CULTURE: CPT

## 2021-02-26 PROCEDURE — 71045 X-RAY EXAM CHEST 1 VIEW: CPT

## 2021-02-26 PROCEDURE — 82962 GLUCOSE BLOOD TEST: CPT

## 2021-02-26 PROCEDURE — 97110 THERAPEUTIC EXERCISES: CPT | Mod: GP

## 2021-02-26 PROCEDURE — U0005: CPT

## 2021-02-26 PROCEDURE — 93971 EXTREMITY STUDY: CPT | Mod: 26,RT

## 2021-02-26 PROCEDURE — 88311 DECALCIFY TISSUE: CPT

## 2021-02-26 PROCEDURE — 86850 RBC ANTIBODY SCREEN: CPT

## 2021-02-26 PROCEDURE — 36415 COLL VENOUS BLD VENIPUNCTURE: CPT

## 2021-02-26 PROCEDURE — 87206 SMEAR FLUORESCENT/ACID STAI: CPT

## 2021-02-26 PROCEDURE — 87015 SPECIMEN INFECT AGNT CONCNTJ: CPT

## 2021-02-26 PROCEDURE — 93925 LOWER EXTREMITY STUDY: CPT | Mod: 26

## 2021-02-26 PROCEDURE — 88307 TISSUE EXAM BY PATHOLOGIST: CPT

## 2021-02-26 PROCEDURE — 87040 BLOOD CULTURE FOR BACTERIA: CPT

## 2021-02-26 PROCEDURE — 99497 ADVNCD CARE PLAN 30 MIN: CPT | Mod: 25

## 2021-02-26 PROCEDURE — 93971 EXTREMITY STUDY: CPT | Mod: RT

## 2021-02-26 PROCEDURE — A9579: CPT

## 2021-02-26 RX ORDER — MAGNESIUM HYDROXIDE 400 MG/1
30 TABLET, CHEWABLE ORAL AT BEDTIME
Refills: 0 | Status: DISCONTINUED | OUTPATIENT
Start: 2021-02-26 | End: 2021-03-01

## 2021-02-26 RX ORDER — ASCORBIC ACID 60 MG
1000 TABLET,CHEWABLE ORAL DAILY
Refills: 0 | Status: DISCONTINUED | OUTPATIENT
Start: 2021-02-26 | End: 2021-03-01

## 2021-02-26 RX ORDER — CARVEDILOL PHOSPHATE 80 MG/1
6.25 CAPSULE, EXTENDED RELEASE ORAL EVERY 12 HOURS
Refills: 0 | Status: DISCONTINUED | OUTPATIENT
Start: 2021-02-26 | End: 2021-03-01

## 2021-02-26 RX ORDER — LORATADINE 10 MG/1
10 TABLET ORAL DAILY
Refills: 0 | Status: DISCONTINUED | OUTPATIENT
Start: 2021-02-26 | End: 2021-03-01

## 2021-02-26 RX ORDER — INSULIN LISPRO 100/ML
VIAL (ML) SUBCUTANEOUS AT BEDTIME
Refills: 0 | Status: DISCONTINUED | OUTPATIENT
Start: 2021-02-26 | End: 2021-03-01

## 2021-02-26 RX ORDER — GLUCAGON INJECTION, SOLUTION 0.5 MG/.1ML
1 INJECTION, SOLUTION SUBCUTANEOUS ONCE
Refills: 0 | Status: DISCONTINUED | OUTPATIENT
Start: 2021-02-26 | End: 2021-03-01

## 2021-02-26 RX ORDER — CLOPIDOGREL BISULFATE 75 MG/1
75 TABLET, FILM COATED ORAL DAILY
Refills: 0 | Status: DISCONTINUED | OUTPATIENT
Start: 2021-02-26 | End: 2021-03-01

## 2021-02-26 RX ORDER — CHOLECALCIFEROL (VITAMIN D3) 125 MCG
1000 CAPSULE ORAL DAILY
Refills: 0 | Status: DISCONTINUED | OUTPATIENT
Start: 2021-02-26 | End: 2021-03-01

## 2021-02-26 RX ORDER — INSULIN GLARGINE 100 [IU]/ML
30 INJECTION, SOLUTION SUBCUTANEOUS AT BEDTIME
Refills: 0 | Status: DISCONTINUED | OUTPATIENT
Start: 2021-02-26 | End: 2021-02-28

## 2021-02-26 RX ORDER — ACETAMINOPHEN 500 MG
650 TABLET ORAL EVERY 6 HOURS
Refills: 0 | Status: DISCONTINUED | OUTPATIENT
Start: 2021-02-26 | End: 2021-03-01

## 2021-02-26 RX ORDER — ATORVASTATIN CALCIUM 80 MG/1
40 TABLET, FILM COATED ORAL AT BEDTIME
Refills: 0 | Status: DISCONTINUED | OUTPATIENT
Start: 2021-02-26 | End: 2021-03-01

## 2021-02-26 RX ORDER — SENNA PLUS 8.6 MG/1
2 TABLET ORAL AT BEDTIME
Refills: 0 | Status: DISCONTINUED | OUTPATIENT
Start: 2021-02-26 | End: 2021-03-01

## 2021-02-26 RX ORDER — ASPIRIN/CALCIUM CARB/MAGNESIUM 324 MG
81 TABLET ORAL DAILY
Refills: 0 | Status: DISCONTINUED | OUTPATIENT
Start: 2021-02-26 | End: 2021-03-01

## 2021-02-26 RX ORDER — FUROSEMIDE 40 MG
20 TABLET ORAL DAILY
Refills: 0 | Status: DISCONTINUED | OUTPATIENT
Start: 2021-02-26 | End: 2021-02-27

## 2021-02-26 RX ORDER — CEFEPIME 1 G/1
2000 INJECTION, POWDER, FOR SOLUTION INTRAMUSCULAR; INTRAVENOUS EVERY 12 HOURS
Refills: 0 | Status: DISCONTINUED | OUTPATIENT
Start: 2021-02-26 | End: 2021-03-01

## 2021-02-26 RX ORDER — RANOLAZINE 500 MG/1
500 TABLET, FILM COATED, EXTENDED RELEASE ORAL
Refills: 0 | Status: DISCONTINUED | OUTPATIENT
Start: 2021-02-26 | End: 2021-03-01

## 2021-02-26 RX ORDER — POTASSIUM CHLORIDE 20 MEQ
20 PACKET (EA) ORAL DAILY
Refills: 0 | Status: DISCONTINUED | OUTPATIENT
Start: 2021-02-26 | End: 2021-03-01

## 2021-02-26 RX ORDER — FLUTICASONE PROPIONATE 50 MCG
1 SPRAY, SUSPENSION NASAL
Refills: 0 | Status: DISCONTINUED | OUTPATIENT
Start: 2021-02-26 | End: 2021-03-01

## 2021-02-26 RX ORDER — LOSARTAN POTASSIUM 100 MG/1
25 TABLET, FILM COATED ORAL DAILY
Refills: 0 | Status: DISCONTINUED | OUTPATIENT
Start: 2021-02-26 | End: 2021-02-28

## 2021-02-26 RX ORDER — INSULIN LISPRO 100/ML
VIAL (ML) SUBCUTANEOUS
Refills: 0 | Status: DISCONTINUED | OUTPATIENT
Start: 2021-02-26 | End: 2021-03-01

## 2021-02-26 RX ORDER — ONDANSETRON 8 MG/1
4 TABLET, FILM COATED ORAL EVERY 6 HOURS
Refills: 0 | Status: DISCONTINUED | OUTPATIENT
Start: 2021-02-26 | End: 2021-03-01

## 2021-02-26 RX ORDER — METRONIDAZOLE 500 MG
500 TABLET ORAL EVERY 8 HOURS
Refills: 0 | Status: DISCONTINUED | OUTPATIENT
Start: 2021-02-26 | End: 2021-03-01

## 2021-02-26 RX ORDER — PREGABALIN 225 MG/1
1000 CAPSULE ORAL DAILY
Refills: 0 | Status: DISCONTINUED | OUTPATIENT
Start: 2021-02-26 | End: 2021-03-01

## 2021-02-26 RX ORDER — DEXTROSE 50 % IN WATER 50 %
15 SYRINGE (ML) INTRAVENOUS ONCE
Refills: 0 | Status: DISCONTINUED | OUTPATIENT
Start: 2021-02-26 | End: 2021-03-01

## 2021-02-26 RX ORDER — FERROUS SULFATE 325(65) MG
325 TABLET ORAL DAILY
Refills: 0 | Status: DISCONTINUED | OUTPATIENT
Start: 2021-02-26 | End: 2021-03-01

## 2021-02-26 RX ADMIN — Medication 6: at 17:06

## 2021-02-26 RX ADMIN — CLOPIDOGREL BISULFATE 75 MILLIGRAM(S): 75 TABLET, FILM COATED ORAL at 13:14

## 2021-02-26 RX ADMIN — CEFEPIME 100 MILLIGRAM(S): 1 INJECTION, POWDER, FOR SOLUTION INTRAMUSCULAR; INTRAVENOUS at 20:48

## 2021-02-26 RX ADMIN — Medication 81 MILLIGRAM(S): at 13:14

## 2021-02-26 RX ADMIN — PREGABALIN 1000 MICROGRAM(S): 225 CAPSULE ORAL at 17:07

## 2021-02-26 RX ADMIN — Medication 100 MILLIGRAM(S): at 20:49

## 2021-02-26 RX ADMIN — Medication 1: at 20:51

## 2021-02-26 RX ADMIN — Medication 100 MILLIGRAM(S): at 13:14

## 2021-02-26 RX ADMIN — CARVEDILOL PHOSPHATE 6.25 MILLIGRAM(S): 80 CAPSULE, EXTENDED RELEASE ORAL at 13:14

## 2021-02-26 RX ADMIN — Medication 325 MILLIGRAM(S): at 13:14

## 2021-02-26 RX ADMIN — Medication 1000 MILLIGRAM(S): at 13:14

## 2021-02-26 RX ADMIN — ATORVASTATIN CALCIUM 40 MILLIGRAM(S): 80 TABLET, FILM COATED ORAL at 20:49

## 2021-02-26 RX ADMIN — LORATADINE 10 MILLIGRAM(S): 10 TABLET ORAL at 13:14

## 2021-02-26 RX ADMIN — INSULIN GLARGINE 30 UNIT(S): 100 INJECTION, SOLUTION SUBCUTANEOUS at 21:56

## 2021-02-26 RX ADMIN — RANOLAZINE 500 MILLIGRAM(S): 500 TABLET, FILM COATED, EXTENDED RELEASE ORAL at 21:51

## 2021-02-26 RX ADMIN — LOSARTAN POTASSIUM 25 MILLIGRAM(S): 100 TABLET, FILM COATED ORAL at 13:14

## 2021-02-26 RX ADMIN — CARVEDILOL PHOSPHATE 6.25 MILLIGRAM(S): 80 CAPSULE, EXTENDED RELEASE ORAL at 20:48

## 2021-02-26 RX ADMIN — Medication 20 MILLIEQUIVALENT(S): at 13:14

## 2021-02-26 RX ADMIN — Medication 1000 UNIT(S): at 13:14

## 2021-02-26 NOTE — ED ADULT NURSE NOTE - OBJECTIVE STATEMENT
Pt presents to er as per his MD request, states he is on home abx with picc line to RUE and pink banded for right foot osteomyelitis, as per pt, his podiatrist wants to remove his smaller toes for necrosis, pt denies fevers, afebrile upon presentation to E.R, pt denies any other complaints at this time, safety maintained with stretcher in lowest position and call bell at side, will continue to monitor.

## 2021-02-26 NOTE — H&P ADULT - NSHPPHYSICALEXAM_GEN_ALL_CORE
PHYSICAL EXAM:    Constitutional: NAD, awake and alert, well-developed  HEENT: PERR, EOMI, Normal Hearing, MMM  Neck: Soft and supple, No LAD, No JVD  Respiratory: Breath sounds are clear bilaterally, No wheezing, rales or rhonchi  Cardiovascular: S1 and S2, regular rate and rhythm, no Murmurs, gallops or rubs  Gastrointestinal: Bowel Sounds present, soft, nontender, nondistended, no guarding, no rebound  Extremities: No peripheral edema  Palpable PT pulses, hair scant, R foot edema, hair absent, No cyanosis. DTRs/STRs muted to LE's, SWM 5.07 muted to 4 plantar sites. Right foot with large, necrotic looking wounds to medial 1st metatarsal head, 1st intermetatarsal space, bone exposed over medial 2nd toe at proximal phalanx, dry gangrene to 3rd toe to PIPJ,   Vascular: 2+ peripheral pulses  Neurological: A/O x 3, no focal deficits  Musculoskeletal: 5/5 strength b/l upper and lower extremities   Skin: No rashes  rectal : deferred, not indicated

## 2021-02-26 NOTE — ED PROVIDER NOTE - OBJECTIVE STATEMENT
65M with hx of HTN, DM, Covid-19, OM of right foot on long standing IV abx via RUE PICC line sent in by his podiatrist, Dr. Bullock for possible surgical debridement and imaging. no fevers or pain. .

## 2021-02-26 NOTE — ED PROVIDER NOTE - PMH
Basal cell carcinoma  Of face   s/p excision Oct 2017  BPH (Benign Prostatic Hyperplasia)    CAD (coronary atherosclerotic disease)    Hypercholesteremia    Hypertension    Insulin dependent type 2 diabetes mellitus    PAD (peripheral artery disease)

## 2021-02-26 NOTE — ED ADULT NURSE NOTE - CHIEF COMPLAINT QUOTE
worsening right foot osteomyelitis.. sent to meet dr. kwan in ED- xray right foot,MRI right foot, wound cultures.   patient ambulatory with cane. on antibiotics currenently via picc line.   flagyl 500mg every 8 hours; last dose 05:00. cefepime 2g twice daily; last dose 21:00.

## 2021-02-26 NOTE — CONSULT NOTE ADULT - PROBLEM SELECTOR RECOMMENDATION 9
65 year old male with above hx of DM with complication , PAD , CAD recent acute systolic heart failure improved after LAD stent , without active cardiac symptoms who appears optimal for low risk surgery ( amputation toes ) , patient is intermediate to high risk due to multiple comorbid conditions  continue coreg , asa plavix without holding it as patient had recent LAD stent 2/3/21

## 2021-02-26 NOTE — ED PROVIDER NOTE - MUSCULOSKELETAL, MLM
Spine appears normal, range of motion is not limited, no muscle or joint tenderness. s/p amputation of left 2nd toe.

## 2021-02-26 NOTE — H&P ADULT - ASSESSMENT
65y old  Male  with PMH  of CAD s/p LAD stent on 2/23/21, HFrEF, PVD, DM2, h/o left 2nd toe amputation, recent admission due to OM of the right foot on long standing via PICC line admitted from [facility] for possible surgical intervention for not healing left foot ulcers.  He had a debridement on 1/25/21 and F/U debridement on 1/31/21. He had a PICC placed for LT ABX & was sent to [facility] for wound care, DM control & ABX. He was seen in the office for F/U and his wound are progressively degrading and is readmitted for MRI R foot W/WO KUMAR & will need future surgery possible TMA or partial TMA. Pt has seen Dr Arndt on last admit and had adequate perfusion.   Denies cp, dyspnea, cough, abdominal pain, problems with urination or bowel problems. Has peripheral neuropathy in both feet.     in Ed -  T(F): 98.5 Max: 98.5 HR: 85 (85 - 85) BP: 103/72  (103/72 - 103/72) RR: 15  (15 - 15) SpO2: 100%  (100% - 100%) ESR 30, HB 11, Cr 0.83, Na 130   seen by podiatrist and cardiology in ED      Right foot non-healing, diabetic ulcer with OM on IV abx  3 right toe Gangrene , with underlying PAD  - med -surg  - c/w IV cefepime and metronidazole   - ID consult  - has RUE PICC line   - podiatry consult  - doppler venous and arterial  - MR of the foot, X ray   CAD s/p LAD stent ( 2/3/21) , HFrEF of 30 %   - c/w DAPT, Statins, ARB, ranexa  - cardiologu consult for clearance noted - patient is intermediate to high risk due to multiple comorbid conditions  continue coreg , asa plavix without holding it   - baseline EKG and CXR pending   Hyponatremia, hypovolemic  - will lower dose of lasix 40--> 20, repeat BMP in am  - monitor, check BNP  DM2   - c/w lantus 30, ISS, diet, hold metformin  Mild anemia, iron deficiency  - c/w iron, vitamin C     Advanced directives  - discussed advanced directive for 17 min  - FULL code    DVT proph -IPC   IMPROVE VTE Individual Risk Assessment  RISK                                                                Points  [  ] Previous VTE                                                  3  [  ] Thrombophilia                                               2  [  ] Lower limb paralysis                                      2        (unable to hold up >15 seconds)    [  ] Current Cancer                                              2         (within 6 months)  [  ] Immobilization > 24 hrs                                1  [  ] ICU/CCU stay > 24 hours                              1  [ x ] Age > 60                                                      1  IMPROVE VTE Score ______1__    IMPROVE Score 0-1: Low Risk, No VTE prophylaxis required for most patients, encourage ambulation.   IMPROVE Score 2-3: At risk, pharmacologic VTE prophylaxis is indicated for most patients (in the absence of a contraindication)  IMPROVE Score > or = 4: High Risk, pharmacologic VTE prophylaxis is indicated for most patients (in the absence of a contraindication)    Time spend 72 minutes

## 2021-02-26 NOTE — PROGRESS NOTE ADULT - SUBJECTIVE AND OBJECTIVE BOX
PODIATRIC SX ED H&PE: Patient is a 65y old  Male who has a progressively degrading right forefoot. No fever or chills. He had a long term nontreated infective process to the right foot and was seen in the office & admitted to [facility]. He had a debridement on 1/25/21 and F/U debridement on 1/31/21. He had a PICC placed for LT ABX & was sent to [facility] for wound care, DM control & ABX. He was seen in the office for F/U and his wound are progressively degrading and is readmitted for MRI R foot W/WO KUMAR & will need future Sx possible TMA or partial TMA. PMHX includes Amputation of the L 2nd toe, T2DM Osteomyelitis, HLD, HTN ASHD CHF PVD has seen Dr Arndt on last admit and had adequate perfusion.       HPI: SEE above      Allergies    No Known Allergies    Intolerances        MEDICATIONS  (STANDING):    MEDICATIONS  (PRN): Carvedilol Tabs 6.25 Q12H, Cefepime 2 G Q12h, Clopidrogel 75 mg qday, Ferrous Sulfate 325 mg q day Flluticasone Prop Yenny 50 mcg/act one spray in east nostril twice a day, Furosemide 40 mg tabs one po q day, Glucosaamine 3 caps po qday, guaifenesin 100mg/5ml q6h for cough, Loratadine 10 mg    one tab po qday for allergy, Losartan Potassium Tab 25 mg one tab po Q day, Metformin HCL 1000 one tab po twice a day, Metronidazole in NaCl Sol 500-0.74 MG/100 ml Use 500 mg IV q 8 H until 3/23/21  mg 30 ml po for constipation NaCl flush 10 ml every shift to PICC line NovoFine inject 1ml SQ four times a day KCL Shanda ER tabs Extended release 20 Meq one tablet a day po, Ranolazine ER Extented release Tabs 500 mg one tab po q12h for CAD, NaCl tab 1 Gm one po qday for hyponatremia, Vit B12 Tabs ER 1000 mcg one tab a day po, Vitamin C 1000 mg one tab a day po, Vit D3 25 MCG one tab a day po.      PHYSICAL EXAM: 66 YO W M Palpable PT pulses, hair scant, R foot edema, hair absent, No cyanosis. DTRs/STRs muted to LE's, SWM 5.07 muted to 4 plantar sites. Right foot with large, necrotic looking wounds to medial 1st metatarsal head, 1st intermetatarsal space, bone exp[osed over medial 2nd toe at proximal phalanx, dry gangrene to 3rd toe to PIPJ,       Constitutional: SEE HPI    Eyes:clear moist    ENMT:no tinnitus    Neck:no DJV    Breasts:defer    Back:No LBP    Respiratory:  transient cough  Cardiovascular:No overt SOB    Gastrointestinal:no nausea    Genitourinary:no frequency    Rectal:defer    Extremities: DFU R / Osteomyelitis muted sensorium    Vascular: microangiopathy    Neurological: PSN    Skin:DFU atrophy    Lymph Nodes:no    Musculoskeletal:weakness    Psychiatric:?        RADIOLOGY    CBC Full  -  ( 26 Feb 2021 07:23 )  WBC Count : 4.93 K/uL  RBC Count : 4.08 M/uL  Hemoglobin : 11.0 g/dL  Hematocrit : 33.2 %  Platelet Count - Automated : 297 K/uL  Mean Cell Volume : 81.4 fl  Mean Cell Hemoglobin : 27.0 pg  Mean Cell Hemoglobin Concentration : 33.1 gm/dL  Auto Neutrophil # : 3.16 K/uL  Auto Lymphocyte # : 0.93 K/uL  Auto Monocyte # : 0.64 K/uL  Auto Eosinophil # : 0.09 K/uL  Auto Basophil # : 0.06 K/uL  Auto Neutrophil % : 64.1 %  Auto Lymphocyte % : 18.9 %  Auto Monocyte % : 13.0 %  Auto Eosinophil % : 1.8 %  Auto Basophil % : 1.2 %      02-26    130<L>  |  100  |  14  ----------------------------<  130<H>  3.9   |  21<L>  |  0.83    Ca    9.0      26 Feb 2021 07:23    TPro  6.5  /  Alb  3.1<L>  /  TBili  0.3  /  DBili  x   /  AST  15  /  ALT  29  /  AlkPhos  69  02-26

## 2021-02-26 NOTE — ED PROVIDER NOTE - NSTIMEPROVIDERCAREINITIATE_GEN_ER
Increase fluids with unflavored Pedialyte  Use cool mist humidifier  May give saline treatment via nebulizer if coughing spasms since you already have a nebulizer  May give Tylenol if needed for pain or fever  Call if symptoms are worsening or not improving  26-Feb-2021 07:07

## 2021-02-26 NOTE — CONSULT NOTE ADULT - PROBLEM SELECTOR RECOMMENDATION 3
multi vessel disease ,  prox LAD stent , residual disease OM1 OM2 , distal LAD , D1 disease ,continue ranexa , coreg , antiplate agents without holding it

## 2021-02-26 NOTE — H&P ADULT - NSICDXPASTMEDICALHX_GEN_ALL_CORE_FT
PAST MEDICAL HISTORY:  Basal cell carcinoma Of face   s/p excision Oct 2017    BPH (Benign Prostatic Hyperplasia)     CAD (coronary atherosclerotic disease)     Hypercholesteremia     Hypertension     Insulin dependent type 2 diabetes mellitus     PAD (peripheral artery disease)     
Not applicable

## 2021-02-26 NOTE — H&P ADULT - HISTORY OF PRESENT ILLNESS
65y old  Male  with PMH  of CAD s/p LAD stent on 2/23/21, HFrEF, PVD, DM2, h/o left 2nd toe amputation, recent admission due to OM of the right foot on long standing via PICC line admitted from [facility] for possible surgical intervention for not healing left foot ulcers.  He had a debridement on 1/25/21 and F/U debridement on 1/31/21. He had a PICC placed for LT ABX & was sent to [facility] for wound care, DM control & ABX. He was seen in the office for F/U and his wound are progressively degrading and is readmitted for MRI R foot W/WO KUMAR & will need future surgery possible TMA or partial TMA. Pt has seen Dr Arndt on last admit and had adequate perfusion.   Denies cp, dyspnea, cough, abdominal pain, problems with urination or bowel problems. Has peripheral neuropathy in both feet.     in Ed -  T(F): 98.5 Max: 98.5 HR: 85 (85 - 85) BP: 103/72  (103/72 - 103/72) RR: 15  (15 - 15) SpO2: 100%  (100% - 100%) ESR 30, HB 11, Cr 0.83, Na 130   seen by podiatrist and cardiology in ED

## 2021-02-26 NOTE — H&P ADULT - NSHPREVIEWOFSYSTEMS_GEN_ALL_CORE
REVIEW OF SYSTEMS:    CONSTITUTIONAL: No weakness, fevers or chills  EYES/ENT: No visual changes;  No vertigo or throat pain   NECK: No pain or stiffness  RESPIRATORY: No cough, wheezing, hemoptysis; No shortness of breath  CARDIOVASCULAR: No chest pain or palpitations  GASTROINTESTINAL: No abdominal or epigastric pain. No nausea, vomiting, or hematemesis; No diarrhea or constipation. No melena or hematochezia.  GENITOURINARY: No dysuria, frequency or hematuria  NEUROLOGICAL: + foot numbness, no  weakness  SKIN: No itching, burning, rashes, or lesions   All other review of systems is negative unless indicated above.

## 2021-02-26 NOTE — ED PROVIDER NOTE - SKIN, MLM
black discoloration or right 3rd toe and portion of 2nd toe with extensive interdigit debridement between the first and second toe of right foot.

## 2021-02-26 NOTE — ED ADULT TRIAGE NOTE - CHIEF COMPLAINT QUOTE
sent by dr. kwan for right foot osteomyelitis, worsening right foot infection. sent to meet dr. kwan in ED, xray right foot,MRI right foot, wound cultures. ambulatory with crutches sent by dr. kwan for right foot osteomyelitis, worsening right foot infection. sent to meet dr. kwan in ED, xray right foot,MRI right foot, wound cultures. ambulatory with crutches.   patient on antibiotics currenently via picc line. flagyl 500mg every 8 hours; last dose 0500. cefepime 2g twice daily; last dose 21:00. worsening right foot osteomyelitis.. sent to meet dr. kwan in ED- xray right foot,MRI right foot, wound cultures.   patient ambulatory with cane. on antibiotics currenently via picc line.   flagyl 500mg every 8 hours; last dose 05:00. cefepime 2g twice daily; last dose 21:00.

## 2021-02-26 NOTE — PHARMACOTHERAPY INTERVENTION NOTE - COMMENTS
med history complete, reviewed medications with patients med list from [facility] and confirmed with doctor first med profile

## 2021-02-26 NOTE — ED ADULT NURSE REASSESSMENT NOTE - NS ED NURSE REASSESS COMMENT FT1
Patient received from previous nurse. Pt is A&Ox4, VSS on RA. Pt is resting comfortably in their bed at this time, denies any pain or discomfort at this time. Pt went for MRI and US without any complications.  Safety and comfort measures in place. Hourly rounding will be done on my time. Will continue to monitor.

## 2021-02-27 LAB
ANION GAP SERPL CALC-SCNC: 5 MMOL/L — SIGNIFICANT CHANGE UP (ref 5–17)
BUN SERPL-MCNC: 13 MG/DL — SIGNIFICANT CHANGE UP (ref 7–23)
CALCIUM SERPL-MCNC: 9.3 MG/DL — SIGNIFICANT CHANGE UP (ref 8.5–10.1)
CHLORIDE SERPL-SCNC: 96 MMOL/L — SIGNIFICANT CHANGE UP (ref 96–108)
CO2 SERPL-SCNC: 28 MMOL/L — SIGNIFICANT CHANGE UP (ref 22–31)
CREAT SERPL-MCNC: 0.78 MG/DL — SIGNIFICANT CHANGE UP (ref 0.5–1.3)
GLUCOSE BLDC GLUCOMTR-MCNC: 178 MG/DL — HIGH (ref 70–99)
GLUCOSE BLDC GLUCOMTR-MCNC: 230 MG/DL — HIGH (ref 70–99)
GLUCOSE BLDC GLUCOMTR-MCNC: 258 MG/DL — HIGH (ref 70–99)
GLUCOSE SERPL-MCNC: 158 MG/DL — HIGH (ref 70–99)
POTASSIUM SERPL-MCNC: 4.3 MMOL/L — SIGNIFICANT CHANGE UP (ref 3.5–5.3)
POTASSIUM SERPL-SCNC: 4.3 MMOL/L — SIGNIFICANT CHANGE UP (ref 3.5–5.3)
SODIUM SERPL-SCNC: 129 MMOL/L — LOW (ref 135–145)

## 2021-02-27 PROCEDURE — 99233 SBSQ HOSP IP/OBS HIGH 50: CPT

## 2021-02-27 PROCEDURE — 93010 ELECTROCARDIOGRAM REPORT: CPT

## 2021-02-27 RX ORDER — FLUCONAZOLE 150 MG/1
100 TABLET ORAL DAILY
Refills: 0 | Status: DISCONTINUED | OUTPATIENT
Start: 2021-02-27 | End: 2021-03-01

## 2021-02-27 RX ADMIN — Medication 100 MILLIGRAM(S): at 05:24

## 2021-02-27 RX ADMIN — Medication 100 MILLIGRAM(S): at 20:49

## 2021-02-27 RX ADMIN — INSULIN GLARGINE 30 UNIT(S): 100 INJECTION, SOLUTION SUBCUTANEOUS at 20:46

## 2021-02-27 RX ADMIN — Medication 20 MILLIEQUIVALENT(S): at 09:00

## 2021-02-27 RX ADMIN — Medication 2: at 11:53

## 2021-02-27 RX ADMIN — Medication 81 MILLIGRAM(S): at 09:00

## 2021-02-27 RX ADMIN — CEFEPIME 100 MILLIGRAM(S): 1 INJECTION, POWDER, FOR SOLUTION INTRAMUSCULAR; INTRAVENOUS at 20:46

## 2021-02-27 RX ADMIN — CARVEDILOL PHOSPHATE 6.25 MILLIGRAM(S): 80 CAPSULE, EXTENDED RELEASE ORAL at 20:48

## 2021-02-27 RX ADMIN — CARVEDILOL PHOSPHATE 6.25 MILLIGRAM(S): 80 CAPSULE, EXTENDED RELEASE ORAL at 09:00

## 2021-02-27 RX ADMIN — PREGABALIN 1000 MICROGRAM(S): 225 CAPSULE ORAL at 09:00

## 2021-02-27 RX ADMIN — Medication 325 MILLIGRAM(S): at 09:00

## 2021-02-27 RX ADMIN — CEFEPIME 100 MILLIGRAM(S): 1 INJECTION, POWDER, FOR SOLUTION INTRAMUSCULAR; INTRAVENOUS at 09:00

## 2021-02-27 RX ADMIN — LOSARTAN POTASSIUM 25 MILLIGRAM(S): 100 TABLET, FILM COATED ORAL at 09:00

## 2021-02-27 RX ADMIN — Medication 1: at 20:46

## 2021-02-27 RX ADMIN — ATORVASTATIN CALCIUM 40 MILLIGRAM(S): 80 TABLET, FILM COATED ORAL at 20:48

## 2021-02-27 RX ADMIN — Medication 20 MILLIGRAM(S): at 09:00

## 2021-02-27 RX ADMIN — Medication 1000 MILLIGRAM(S): at 09:00

## 2021-02-27 RX ADMIN — FLUCONAZOLE 100 MILLIGRAM(S): 150 TABLET ORAL at 17:11

## 2021-02-27 RX ADMIN — Medication 4: at 17:11

## 2021-02-27 RX ADMIN — RANOLAZINE 500 MILLIGRAM(S): 500 TABLET, FILM COATED, EXTENDED RELEASE ORAL at 09:00

## 2021-02-27 RX ADMIN — LORATADINE 10 MILLIGRAM(S): 10 TABLET ORAL at 09:00

## 2021-02-27 RX ADMIN — CLOPIDOGREL BISULFATE 75 MILLIGRAM(S): 75 TABLET, FILM COATED ORAL at 09:00

## 2021-02-27 RX ADMIN — Medication 100 MILLIGRAM(S): at 13:39

## 2021-02-27 RX ADMIN — Medication 1000 UNIT(S): at 11:53

## 2021-02-27 RX ADMIN — RANOLAZINE 500 MILLIGRAM(S): 500 TABLET, FILM COATED, EXTENDED RELEASE ORAL at 20:48

## 2021-02-27 NOTE — CONSULT NOTE ADULT - ASSESSMENT
66 y/o Male with h/o HTN, HLD, T2DM, PAD, CAD, BPH, prior left 2nd toe amputation, nonhealing foot ulcer with probable underlying acute on chronic OM on IV vancomycin and cefepime via PICC line since 1/21, then hospitalized on 1/30 for sepsis with bacteroides fragilis and persistent foot wound s/p debridement, IV abx changed to cefepime and metronidazloe via PICC line was admitted on 2/26 for poorly heeling right foot ulcer. He was seen in the podiatrist office for F/U and his wound are progressively degrading and is readmitted for MRI R foot, poorly healing foot wound and likely need for future surgery possible TMA or partial TMA. Pt has seen Dr Arndt on last admit and had adequate perfusion.     1. Right foot plantar ulcer with probable underlying acute on chronic OM with recent BAFR and CAPA s/p surgery. PVD.  -recent sepsis with BAFR  -rprior wound cultures showed rare MSSA, bacteroides fragilis, and strep constellatus  -on cefepime 2 gm IV q12h since 1/21 and metronidazole 500 mg IV q8h  -tolerating abx well so far; no side effects noted  -PICC line site clean  -podiatry evaluation appreciated  -plan for further surgery ?TMA  -continue IV abx coverage with cefepime and metronidazole  -add fulconazole 100 mg PO qd for antifungal coverage  -monitor temps  -f/u CBC  -supportive care  2. Other issues:   -care per medicine

## 2021-02-27 NOTE — PROGRESS NOTE ADULT - SUBJECTIVE AND OBJECTIVE BOX
Patient is a 65y old  Male who presents with a chief complaint of foot ulcers (26 Feb 2021 10:42)      HPI:    65y old  Male  with PMH  of CAD s/p LAD stent on 2/23/21, HFrEF, PVD, DM2, h/o left 2nd toe amputation, recent admission due to OM of the right foot on long standing via PICC line admitted from [facility] for possible surgical intervention for not healing left foot ulcers.  He had a debridement on 1/25/21 and F/U debridement on 1/31/21. He had a PICC placed for LT ABX & was sent to [facility] for wound care, DM control & ABX. He was seen in the office for F/U and his wound are progressively degrading and is readmitted for MRI R foot W/WO KUMAR & will need future surgery possible TMA or partial TMA. Pt has seen Dr Arndt on last admit and had adequate perfusion.   Denies cp, dyspnea, cough, abdominal pain, problems with urination or bowel problems. Has peripheral neuropathy in both feet.     in Ed -  T(F): 98.5 Max: 98.5 HR: 85 (85 - 85) BP: 103/72  (103/72 - 103/72) RR: 15  (15 - 15) SpO2: 100%  (100% - 100%) ESR 30, HB 11, Cr 0.83, Na 130   seen by podiatrist and cardiology in ED       (26 Feb 2021 10:42)      Allergies    No Known Allergies    Intolerances        MEDICATIONS  (STANDING):  ascorbic acid 1000 milliGRAM(s) Oral daily  aspirin enteric coated 81 milliGRAM(s) Oral daily  atorvastatin 40 milliGRAM(s) Oral at bedtime  bisacodyl Suppository 10 milliGRAM(s) Rectal daily  carvedilol 6.25 milliGRAM(s) Oral every 12 hours  cefepime   IVPB 2000 milliGRAM(s) IV Intermittent every 12 hours  cholecalciferol 1000 Unit(s) Oral daily  clopidogrel Tablet 75 milliGRAM(s) Oral daily  cyanocobalamin 1000 MICROGram(s) Oral daily  dextrose 40% Gel 15 Gram(s) Oral once  ferrous    sulfate 325 milliGRAM(s) Oral daily  fluticasone propionate 50 MICROgram(s)/spray Nasal Spray 1 Spray(s) Both Nostrils two times a day  furosemide    Tablet 20 milliGRAM(s) Oral daily  glucagon  Injectable 1 milliGRAM(s) IntraMuscular once  insulin glargine Injectable (LANTUS) 30 Unit(s) SubCutaneous at bedtime  insulin lispro (ADMELOG) corrective regimen sliding scale   SubCutaneous three times a day before meals  insulin lispro (ADMELOG) corrective regimen sliding scale   SubCutaneous at bedtime  loratadine 10 milliGRAM(s) Oral daily  losartan 25 milliGRAM(s) Oral daily  metroNIDAZOLE  IVPB 500 milliGRAM(s) IV Intermittent every 8 hours  potassium chloride    Tablet ER 20 milliEquivalent(s) Oral daily  ranolazine 500 milliGRAM(s) Oral two times a day    MEDICATIONS  (PRN):  acetaminophen   Tablet .. 650 milliGRAM(s) Oral every 6 hours PRN Mild Pain (1 - 3)  aluminum hydroxide/magnesium hydroxide/simethicone Suspension 30 milliLiter(s) Oral every 4 hours PRN Dyspepsia  guaiFENesin   Syrup  (Sugar-Free) 100 milliGRAM(s) Oral every 6 hours PRN Cough  magnesium hydroxide Suspension 30 milliLiter(s) Oral at bedtime PRN Constipation  ondansetron Injectable 4 milliGRAM(s) IV Push every 6 hours PRN Nausea  senna 2 Tablet(s) Oral at bedtime PRN Constipation        RADIOLOGY    CBC Full  -  ( 26 Feb 2021 07:23 )  WBC Count : 4.93 K/uL  RBC Count : 4.08 M/uL  Hemoglobin : 11.0 g/dL  Hematocrit : 33.2 %  Platelet Count - Automated : 297 K/uL  Mean Cell Volume : 81.4 fl  Mean Cell Hemoglobin : 27.0 pg  Mean Cell Hemoglobin Concentration : 33.1 gm/dL  Auto Neutrophil # : 3.16 K/uL  Auto Lymphocyte # : 0.93 K/uL  Auto Monocyte # : 0.64 K/uL  Auto Eosinophil # : 0.09 K/uL  Auto Basophil # : 0.06 K/uL  Auto Neutrophil % : 64.1 %  Auto Lymphocyte % : 18.9 %  Auto Monocyte % : 13.0 %  Auto Eosinophil % : 1.8 %  Auto Basophil % : 1.2 %      02-26    130<L>  |  100  |  14  ----------------------------<  130<H>  3.9   |  21<L>  |  0.83    Ca    9.0      26 Feb 2021 07:23    TPro  6.5  /  Alb  3.1<L>  /  TBili  0.3  /  DBili  x   /  AST  15  /  ALT  29  /  AlkPhos  69  02-26      Sedimentation Rate, Erythrocyte: 30 mm/hr (02-26 @ 07:23)    < from: US Duplex Arterial Lower Ext Compl, Bilateral (02.26.21 @ 14:10) >  EXAM:  US DPLX LWR EXT ARTS COMPL BI                            PROCEDURE DATE:  02/26/2021          INTERPRETATION:  US LOWER EXTREMITY ARTERIAL DUPLEX COMPLETE BILATERAL    CLINICAL INDICATION: Peripheral vascular disease with non-healing foot ulcer    COMPARISON: None    Real-time sonography of the bilateral lower extremity arterial system was performed using a high-resolution linear array transducer and including color and spectral Doppler.    There is plaque in the lower extremity arteries.. No soft tissue abnormalities are demonstrated in either leg. Flow phase patterns and peak systolic velocity measurements (in cm/s) were observed as follows:    RIGHT:  CFA: Triphasic; 84  Proximal SFA: Triphasic; 80  Mid SFA: Triphasic; 74  Distal SFA: Triphasic;  60  Popliteal: Triphasic;  57  Anterior tibial: Monophasic; 60  Posterior tibial: Biphasic; 105  Peroneal: Triphasic;  95  Dorsalis pedis: Monophasic;  43    LEFT:  CFA: Triphasic; 92  Proximal SFA: Triphasic; 78  Mid SFA: Triphasic; 68  Distal SFA: Triphasic; 46  Popliteal: Triphasic;  49  Anterior tibial: Biphasic; 72  Posterior tibial: Triphasic; 94  Peroneal: Biphasic; 56  Dorsalis pedis: Monophasic;  31    IMPRESSION:    No significant stenosis or occlusion in either leg.    Atherosclerosis of the native arteries of the right and left lower extremities is present            JUNETIFFANY GARVEY MD; Attending Radiologist  This document has been electronically signed. Feb 26 2021  4:15PM    < end of copied text >  < from: MR Foot w/wo IV Cont, Right (02.26.21 @ 12:36) >  EXAM:  MR FOOT WAW IC RT                            PROCEDURE DATE:  02/26/2021          INTERPRETATION:  EXAMINATION: MRI of the right forefoot with and without contrast    CLINICAL INFORMATION: Diabetic foot ulcer. Osteomyelitis.    TECHNIQUE: Multiplanar, multisequential MR imaging was performed. This includes T1-weighted images after the administration of 7.5 mL of intravenous Gadavist.    Comparison is made to a prior MRI of the foot from 2/1/2021    FINDINGS: There is a wound along the medial aspect of the first ray at the level of the first metatarsal-phalangeal joint. There is adjacent high T2 and low T1 marrow signal in the distal aspect of the first metatarsal and throughout the first proximal phalanx, consistent with osteomyelitis. There is an associated moderate-sized first metatarsal-phalangeal joint effusion. There is also a wound in the first web space which also extends to involve the dorsal soft tissues overlying the second metatarsophalangeal joint. There is high T2 and low T1 marrow signal in the distal aspect of the second metatarsal as well as within the second proximal phalanx and second middle phalanx, consistent with osteomyelitis. There also appears to be a wound along the dorsal aspect of the third toe at the level of the middle phalanx with high T2 signal in the third digit proximal and middle phalanges that may be secondary to osteomyelitis. High T2 signal in the head of the third and fourth metatarsal and in the fourth proximal phalanx is also nonspecific and may be ischemic change or less likely osteomyelitis. There is diffuse fatty atrophy and high T2 signal of the musculature, consistent with denervation. There is dorsal subcutaneous soft tissue edema. There are multifocal areas of nonenhancement involving the soft tissues about the first through fifth metatarsophalangeal joints, consistent with devitalized soft tissue. There is navicular-cuneiform arthrosis and second tarsometatarsal arthrosis with marginating subchondral cystic change and edema at these joints.      IMPRESSION: Wound along the medial aspect of the distal first digit with adjacent findings consistent with osteomyelitis of the distal aspect of the first metatarsal and first proximal phalanx.  Additional wound in the first web space which extends to involve the dorsal soft tissues overlying the second digit with adjacent findings consistent with osteomyelitis in the distal aspect of the second metatarsal and second digit proximal and middle phalanges.  Additional wound along the dorsal aspect of the digit at the level of the middle phalanx with possible osteomyelitis of the third digit middle and proximal phalanges.  Nonspecific signal alteration in the head of the third and fourth metatarsals and in the fourth proximal phalanx which may be ischemic change and/or osteomyelitis  Multifocal devitalized soft tissue about the first through fifth metatarsophalangeal joints.            VERONICA RODGERS MD; Attending Radiologist  This document has been electronically signed. Feb 26 2021  2:00PM    < end of copied text >  Uric Acid, Serum (02.26.21 @ 07:23)   Uric Acid, Serum: 4.5 mg/dL

## 2021-02-27 NOTE — CONSULT NOTE ADULT - SUBJECTIVE AND OBJECTIVE BOX
Patient is a 65y old  Male who presents with a chief complaint of foot ulcers (2021 11:17)    HPI:  64 y/o Male with h/o HTN, HLD, T2DM, PAD, CAD, BPH, prior left 2nd toe amputation, nonhealing foot ulcer with probable underlying acute on chronic OM on IV vancomycin and cefepime via PICC line since , then hospitalized on  for sepsis with bacteroides fragilis and persistent foot wound s/p debridement, IV abx changed to cefepime and metronidazloe via PICC line was admitted on  for poorly heeling right foot ulcer. He was seen in the podiatrist office for F/U and his wound are progressively degrading and is readmitted for MRI R foot, poorly healing foot wound and likely need for future surgery possible TMA or partial TMA. Pt has seen Dr Arndt on last admit and had adequate perfusion.     PMH: as above  PSH: as above  Meds: per reconciliation sheet, noted below  MEDICATIONS  (STANDING):  ascorbic acid 1000 milliGRAM(s) Oral daily  aspirin enteric coated 81 milliGRAM(s) Oral daily  atorvastatin 40 milliGRAM(s) Oral at bedtime  bisacodyl Suppository 10 milliGRAM(s) Rectal daily  carvedilol 6.25 milliGRAM(s) Oral every 12 hours  cefepime   IVPB 2000 milliGRAM(s) IV Intermittent every 12 hours  cholecalciferol 1000 Unit(s) Oral daily  clopidogrel Tablet 75 milliGRAM(s) Oral daily  cyanocobalamin 1000 MICROGram(s) Oral daily  dextrose 40% Gel 15 Gram(s) Oral once  ferrous    sulfate 325 milliGRAM(s) Oral daily  fluconAZOLE   Tablet 100 milliGRAM(s) Oral daily  fluticasone propionate 50 MICROgram(s)/spray Nasal Spray 1 Spray(s) Both Nostrils two times a day  furosemide    Tablet 20 milliGRAM(s) Oral daily  glucagon  Injectable 1 milliGRAM(s) IntraMuscular once  insulin glargine Injectable (LANTUS) 30 Unit(s) SubCutaneous at bedtime  insulin lispro (ADMELOG) corrective regimen sliding scale   SubCutaneous three times a day before meals  insulin lispro (ADMELOG) corrective regimen sliding scale   SubCutaneous at bedtime  loratadine 10 milliGRAM(s) Oral daily  losartan 25 milliGRAM(s) Oral daily  metroNIDAZOLE  IVPB 500 milliGRAM(s) IV Intermittent every 8 hours  potassium chloride    Tablet ER 20 milliEquivalent(s) Oral daily  ranolazine 500 milliGRAM(s) Oral two times a day    MEDICATIONS  (PRN):  acetaminophen   Tablet .. 650 milliGRAM(s) Oral every 6 hours PRN Mild Pain (1 - 3)  aluminum hydroxide/magnesium hydroxide/simethicone Suspension 30 milliLiter(s) Oral every 4 hours PRN Dyspepsia  guaiFENesin   Syrup  (Sugar-Free) 100 milliGRAM(s) Oral every 6 hours PRN Cough  magnesium hydroxide Suspension 30 milliLiter(s) Oral at bedtime PRN Constipation  ondansetron Injectable 4 milliGRAM(s) IV Push every 6 hours PRN Nausea  senna 2 Tablet(s) Oral at bedtime PRN Constipation    Allergies    No Known Allergies    Intolerances      Social: no smoking, no alcohol, no illegal drugs; no recent travel, no exposure to TB  FAMILY HISTORY:  Family history of hyperlipidemia    Family history of hypertension    FH: myocardial infarction  Father had MI,  age 47      no history of premature cardiovascular disease in first degree relatives    ROS: the patient denies fever, no chills, no HA, no seizures, no dizziness, no sore throat, no nasal congestion, no blurry vision, no CP, no palpitations, no SOB, no cough, no abdominal pain, no diarrhea, no N/V, no dysuria, no leg pain, no claudication, no rash, no joint aches, no rectal pain or bleeding, no night sweats  All other systems reviewed and are negative    Vital Signs Last 24 Hrs  T(C): 36.5 (2021 08:18), Max: 36.7 (2021 14:46)  T(F): 97.7 (2021 08:18), Max: 98 (2021 14:46)  HR: 67 (2021 08:18) (67 - 86)  BP: 137/74 (2021 08:18) (132/78 - 138/91)  BP(mean): --  RR: 17 (2021 14:53) (16 - 17)  SpO2: 100% (2021 08:18) (100% - 100%)  Daily     Daily     PE:    Constitutional:  No acute distress  HEENT: NC/AT, EOMI, PERRLA, conjunctivae clear; ears and nose atraumatic; pharynx benign  Neck: supple; thyroid not palpable  Back: no tenderness  Respiratory: respiratory effort normal; clear to auscultation  Cardiovascular: S1S2 regular, no murmurs  Abdomen: soft, not tender, not distended, positive BS; no liver or spleen organomegaly  Genitourinary: no suprapubic tenderness  Lymphatic: no LN palpable  Musculoskeletal: no muscle tenderness, no joint swelling or tenderness  Extremities: no pedal edema  Right arm PICC Line in place  Right foot nonhealing ulcers  Necrotic wounds to medial !st metatarsal head, 1st intermetatarsal space Stage II-IV ulcer, bone exposed to medial Right 2nd toe & 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  Neurological/ Psychiatric: AxOx3, judgement and insight normal; moving all extremities  Skin: no rashes; no palpable lesions    Labs: all available labs reviewed                        11.0   4.93  )-----------( 297      ( 2021 07:23 )             33.2     02-    129<L>  |  96  |  13  ----------------------------<  158<H>  4.3   |  28  |  0.78    Ca    9.3      2021 12:11    TPro  6.5  /  Alb  3.1<L>  /  TBili  0.3  /  DBili  x   /  AST  15  /  ALT  29  /  AlkPhos  69       LIVER FUNCTIONS - ( 2021 07:23 )  Alb: 3.1 g/dL / Pro: 6.5 gm/dL / ALK PHOS: 69 U/L / ALT: 29 U/L / AST: 15 U/L / GGT: x           COVID-19 PCR: NotDetec (21 @ 15:20)    Culture Results:   Few Candida parapsilosis   "Susceptibilities not performed" (21 @ 14:02) Culture - Blood (21 @ 00:23)   Gram Stain:   Growth in anaerobic bottle: Gram Negative Rods   - Bacteroides fragilis: Detec     Radiology: all available radiological tests reviewed    Advanced directives addressed: full resuscitation
    CHIEF COMPLAINT: was sent in his podiatrist for right toes gangrene     HPI: 65 year old man with a history of CAD who recent LAD stent ,after presenting with acute  systolic heart failure ,  PAD, HTN, HLD, DM2 (on insulin), neuropathy, BPH, who was  diagnosed to have  foot osteomyelitis (treated with long-course of IV antibiotics via PICC) who is being admitted for further management of right foot toes gangrene and cellulitis treatment , possible amputation of toes ,     Patient was recently hospitalized with acute systolic heart failure ,subsequent LAD stent on dual antiplatelet agent , patient currently feeling well cardiac wise though he has limited physical activity denies any chest pain or shortness of breath or dizziness or palpitation ,     Patient says  he does mild swollen right LE than left which is normal for  him         PAST MEDICAL & SURGICAL HISTORY:  Insulin dependent type 2 diabetes mellitus    Basal cell carcinoma  Of face   s/p excision Oct 2017    PAD (peripheral artery disease)    CAD (coronary atherosclerotic disease)    Hypercholesteremia    Hypertension    BPH (Benign Prostatic Hyperplasia)    Status post debridement  right foot ulcer, with I&amp;D    History of amputation of toe  L foot 2nd toe amputation        Allergies    No Known Allergies    Intolerances        SOCIAL HISTORY:  , lives with wife.  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  Denies alcohol use.  Used to smoke in his 20s, has not since then .  Denies recreational drug use.      FAMILY HISTORY:  Family history of hyperlipidemia    Family history of hypertension    FH: myocardial infarction  Father had MI,  age 47        MEDICATIONS:Home Medications:  Aspirin Low Dose 81 mg oral delayed release tablet: 1 tab(s) orally once a day (2021 08:44)  atorvastatin 40 mg oral tablet: 1 tab(s) orally once a day (2021 08:44)  bisacodyl 10 mg rectal suppository: 1 suppository(ies) rectal once a day (2021 08:44)  cefepime 2 g intravenous injection: 2 gram(s) intravenous every 12 hours until 3/23/21 (2021 08:44)  clopidogrel 75 mg oral tablet: 1 tab(s) orally once a day (2021 08:44)  ferrous sulfate 325 mg (65 mg elemental iron) oral tablet: 1 tab(s) orally once a day (2021 08:44)  fluticasone 50 mcg/inh nasal spray: 1 spray(s) in each nostril 2 times a day (:44)  furosemide 40 mg oral tablet: 1 tab(s) orally once a day (:44)  Glucosamine Chondroitin oral capsule: 3 cap(s) orally once a day (:44)  guaiFENesin 100 mg/5 mL oral liquid: 5 milliliter(s) orally every 6 hours, As needed, Cough (:44)  insulin glargine: 42 unit(s) subcutaneous once a day (at bedtime)    **per d/c paperwork, patient was decreased to 30 units, but patient states he has been taking 42 (:44)  loratadine 10 mg oral tablet: 1 tab(s) orally once a day (:44)  losartan 25 mg oral tablet: 1 tab(s) orally once a day (:44)  metFORMIN 1000 mg oral tablet: 1 tab(s) orally 2 times a day (:44)  metroNIDAZOLE 500 mg/100 mL intravenous solution: 500 milligram(s) intravenous every 8 hours (:44)  Milk of Magnesia 8% oral suspension: 30 milliliter(s) orally once a day (at bedtime), As Needed (:44)  potassium chloride 20 mEq oral tablet, extended release: 1 tab(s) orally once a day (:44)  ranolazine 500 mg oral tablet, extended release: 1 tab(s) orally 2 times a day (:44)  Vitamin B-12 1000 mcg oral tablet: 1 tab(s) orally once a day (:44)  Vitamin C 1000 mg oral tablet: 1 tab(s) orally once a day (2021 08:44)  Vitamin D3 1000 intl units (25 mcg) oral tablet: 1 tab(s) orally once a day (:44)    COREG 6.25 mg po BID missing in the list     MEDICATIONS  (STANDING):    MEDICATIONS  (PRN):      REVIEW OF SYSTEMS:    CONSTITUTIONAL: No weakness, fevers or chills  EYES/ENT: No visual changes;  No vertigo or throat pain   NECK: No pain or stiffness  RESPIRATORY: No cough, wheezing, hemoptysis; No shortness of breath  CARDIOVASCULAR: No chest pain or palpitations  GASTROINTESTINAL: No abdominal or epigastric pain. No nausea, vomiting, or hematemesis; No diarrhea or constipation. No melena or hematochezia.  GENITOURINARY: No dysuria, frequency or hematuria  NEUROLOGICAL: No numbness or weakness  SKIN: gangrene of right foot toes ,   All other review of systems is negative unless indicated above    Vital Signs Last 24 Hrs  T(C): 36.9 (2021 07:00), Max: 36.9 (2021 07:00)  T(F): 98.5 (2021 07:00), Max: 98.5 (2021 07:00)  HR: 85 (:00) (85 - 85)  BP: 103/72 (2021 07:00) (103/72 - 103/72)  BP(mean): 81 (:) (81 - 81)  RR: 15 (:) (15 - 15)  SpO2: 100% (:) (100% - 100%)    I&O's Summary      PHYSICAL EXAM:    Constitutional: NAD, awake and alert, well-developed  HEENT: PERR, EOMI,  No oral cyananosis.  Neck:  supple,  No JVD  Respiratory: Breath sounds are clear bilaterally, No wheezing, rales or rhonchi  Cardiovascular: S1 and S2, regular rate and rhythm, no Murmurs, gallops or rubs  Gastrointestinal: Bowel Sounds present, soft, nontender.   Extremities: mild right le swelling than left   Vascular: 1+ peripheral pulses  Neurological: A/O x 3, no focal deficits  Musculoskeletal: no calf tenderness.  Skin:  toes 3rd  toes gangrene      LABS: All Labs Reviewed:                        11.0   4.93  )-----------( 297      ( 2021 07:23 )             33.2     2021 07:23    130    |  100    |  14     ----------------------------<  130    3.9     |  21     |  0.83     Ca    9.0        2021 07:23    TPro  6.5    /  Alb  3.1    /  TBili  0.3    /  DBili  x      /  AST  15     /  ALT  29     /  AlkPhos  69     2021 07:23    PT/INR - ( 2021 07:23 )   PT: 13.9 sec;   INR: 1.21 ratio         PTT - ( 2021 07:23 )  PTT:34.6 sec      Blood Culture:         :Cardiac Cath Lab - Adult (21 @ 08:23): residual  D1 distal LAD , OM1 Om2 disease    New proximal LAD stenosis. Multiple small branch stenoses previously seen.   LVEDP very elevated.   Interventional Conclusions:  Successful DARCIE of proximal LAD    12 Lead ECG (21 @ 23:48):  Normal sinus rhythm, Low voltage QRS, Cannot rule out Anterior infarct , age undetermined. When compared with ECG of 2021 15:41, No significant change was found    CT Angio Chest PE Protocol w/ IV Cont (21 @ 01:45):  No evidence of pulmonary embolism followed to the segmental pulmonary arterial branches.  Moderate sized bilateral pleural effusions with overlying compressive atelectasis.  Moderate pulmonary vascular congestion.  Small volume perihepatic ascites.    TTE Echo Complete w/o Contrast w/ Doppler (21 @ 07:55):   Left ventricle systolic function appears moderately impaired; segmental wall motion abnormalities noted consistent with Takotsubo's cardiomyopathy. Estimated Ejection Fraction is 30%.   The left atrium is moderately dilated.   Mild mitral regurgitation

## 2021-02-27 NOTE — PROGRESS NOTE ADULT - SUBJECTIVE AND OBJECTIVE BOX
HOSPITALIST ATTENDING PROGRESS NOTE    Chart and meds reviewed.  Patient seen and examined.    CC: Foot ulcers    Subjective:  2/27/21: Admitted yesterday for nonhealing R foot ulcers, for further imaging and likely surgical management. Pt denies foot pain. Understands plan for likely OR Monday.    All 10 systems reviewed and found to be negative with the exception of what has been described above.    MEDICATIONS  (STANDING):  ascorbic acid 1000 milliGRAM(s) Oral daily  aspirin enteric coated 81 milliGRAM(s) Oral daily  atorvastatin 40 milliGRAM(s) Oral at bedtime  bisacodyl Suppository 10 milliGRAM(s) Rectal daily  carvedilol 6.25 milliGRAM(s) Oral every 12 hours  cefepime   IVPB 2000 milliGRAM(s) IV Intermittent every 12 hours  cholecalciferol 1000 Unit(s) Oral daily  clopidogrel Tablet 75 milliGRAM(s) Oral daily  cyanocobalamin 1000 MICROGram(s) Oral daily  dextrose 40% Gel 15 Gram(s) Oral once  ferrous    sulfate 325 milliGRAM(s) Oral daily  fluconAZOLE   Tablet 100 milliGRAM(s) Oral daily  fluticasone propionate 50 MICROgram(s)/spray Nasal Spray 1 Spray(s) Both Nostrils two times a day  glucagon  Injectable 1 milliGRAM(s) IntraMuscular once  insulin glargine Injectable (LANTUS) 30 Unit(s) SubCutaneous at bedtime  insulin lispro (ADMELOG) corrective regimen sliding scale   SubCutaneous three times a day before meals  insulin lispro (ADMELOG) corrective regimen sliding scale   SubCutaneous at bedtime  loratadine 10 milliGRAM(s) Oral daily  losartan 25 milliGRAM(s) Oral daily  metroNIDAZOLE  IVPB 500 milliGRAM(s) IV Intermittent every 8 hours  potassium chloride    Tablet ER 20 milliEquivalent(s) Oral daily  ranolazine 500 milliGRAM(s) Oral two times a day    MEDICATIONS  (PRN):  acetaminophen   Tablet .. 650 milliGRAM(s) Oral every 6 hours PRN Mild Pain (1 - 3)  aluminum hydroxide/magnesium hydroxide/simethicone Suspension 30 milliLiter(s) Oral every 4 hours PRN Dyspepsia  guaiFENesin   Syrup  (Sugar-Free) 100 milliGRAM(s) Oral every 6 hours PRN Cough  magnesium hydroxide Suspension 30 milliLiter(s) Oral at bedtime PRN Constipation  ondansetron Injectable 4 milliGRAM(s) IV Push every 6 hours PRN Nausea  senna 2 Tablet(s) Oral at bedtime PRN Constipation      VITALS:  T(F): 97.7 (02-27-21 @ 08:18), Max: 98 (02-26-21 @ 14:46)  HR: 67 (02-27-21 @ 08:18) (67 - 86)  BP: 137/74 (02-27-21 @ 08:18) (132/78 - 138/91)  RR: 17 (02-26-21 @ 14:53) (16 - 17)  SpO2: 100% (02-27-21 @ 08:18) (100% - 100%)  Wt(kg): --    I&O's Summary      CAPILLARY BLOOD GLUCOSE      POCT Blood Glucose.: 178 mg/dL (27 Feb 2021 11:52)  POCT Blood Glucose.: 120 mg/dL (27 Feb 2021 08:30)  POCT Blood Glucose.: 237 mg/dL (26 Feb 2021 21:54)  POCT Blood Glucose.: 271 mg/dL (26 Feb 2021 20:46)  POCT Blood Glucose.: 297 mg/dL (26 Feb 2021 16:23)      PHYSICAL EXAM:    HEENT:  pupils equal and reactive, EOMI, no oropharyngeal lesions, erythema, exudates, oral thrush  NECK:   supple, no carotid bruits, no palpable lymph nodes, no thyromegaly  CV:  +S1, +S2, regular, no murmurs or rubs  RESP:   lungs clear to auscultation bilaterally, no wheezing, rales, rhonchi, good air entry bilaterally  BREAST:  not examined  GI:  abdomen soft, non-tender, non-distended, normal BS, no bruits, no abdominal masses, no palpable masses  RECTAL:  not examined  :  not examined  MSK:   normal muscle tone, no atrophy, no rigidity, no contractions  EXT:  no clubbing, no cyanosis, no edema, no calf pain, swelling or erythema, R foot wrapped  VASCULAR:  pulses equal and symmetric in the upper and lower extremities  NEURO:  AAOX3, no focal neurological deficits, follows all commands, able to move extremities spontaneously  SKIN:  no ulcers, lesions or rashes    LABS:                            11.0   4.93  )-----------( 297      ( 26 Feb 2021 07:23 )             33.2     02-27    129<L>  |  96  |  13  ----------------------------<  158<H>  4.3   |  28  |  0.78    Ca    9.3      27 Feb 2021 12:11    TPro  6.5  /  Alb  3.1<L>  /  TBili  0.3  /  DBili  x   /  AST  15  /  ALT  29  /  AlkPhos  69  02-26        LIVER FUNCTIONS - ( 26 Feb 2021 07:23 )  Alb: 3.1 g/dL / Pro: 6.5 gm/dL / ALK PHOS: 69 U/L / ALT: 29 U/L / AST: 15 U/L / GGT: x           PT/INR - ( 26 Feb 2021 07:23 )   PT: 13.9 sec;   INR: 1.21 ratio         PTT - ( 26 Feb 2021 07:23 )  PTT:34.6 sec      CULTURES:  BCx pending    Additional results/Imaging:  ESR 30  CRP nl  A1c 8.8% on 1/22/21    Foot xray, right 2/26/21: RIGHT hallux first metatarsophalangeal joint findings concerning for osteomyelitis.    LE doppler, right, 2/26/21: No evidence of right lower extremity deep venous thrombosis.    MRI R foot, 2/26/21: Wound along the medial aspect of the distal first digit with adjacent findings consistent with osteomyelitis of the distal aspect of the first metatarsal and first proximal phalanx.  Additional wound in the first web space which extends to involve the dorsal soft tissues overlying the second digit with adjacent findings consistent with osteomyelitis in the distal aspect of the second metatarsal and second digit proximal and middle phalanges.  Additional wound along the dorsal aspect of the digit at the level of the middle phalanx with possible osteomyelitis of the third digit middle and proximal phalanges.  Nonspecific signal alteration in the head of the third and fourth metatarsals and in the fourth proximal phalanx which may be ischemic change and/or osteomyelitis  Multifocal devitalized soft tissue about the first through fifth metatarsophalangeal joints.    LE arterial doppler 2/26/21: No significant stenosis or occlusion in either leg. Atherosclerosis of the native arteries of the right and left lower extremities is present

## 2021-02-27 NOTE — PROGRESS NOTE ADULT - SUBJECTIVE AND OBJECTIVE BOX
CHIEF COMPLAINT: was sent in his podiatrist for right toes gangrene     HPI: 65 year old man with a history of CAD who recent LAD stent ,after presenting with acute  systolic heart failure ,  PAD, HTN, HLD, DM2 (on insulin), neuropathy, BPH, who was  diagnosed to have  foot osteomyelitis (treated with long-course of IV antibiotics via PICC) who is being admitted for further management of right foot toes gangrene and cellulitis treatment , possible amputation of toes ,     Patient was recently hospitalized with acute systolic heart failure ,subsequent LAD stent on dual antiplatelet agent , patient currently feeling well cardiac wise though he has limited physical activity denies any chest pain or shortness of breath or dizziness or palpitation ,     Patient says  he does mild swollen right LE than left which is normal for  him         PAST MEDICAL & SURGICAL HISTORY:  Insulin dependent type 2 diabetes mellitus    Basal cell carcinoma  Of face   s/p excision Oct 2017    PAD (peripheral artery disease)    CAD (coronary atherosclerotic disease)    Hypercholesteremia    Hypertension    BPH (Benign Prostatic Hyperplasia)    Status post debridement  right foot ulcer, with I&amp;D    History of amputation of toe  L foot 2nd toe amputation        Allergies    No Known Allergies    Intolerances        SOCIAL HISTORY:  , lives with wife.  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  Denies alcohol use.  Used to smoke in his 20s, has not since then .  Denies recreational drug use.      FAMILY HISTORY:  Family history of hyperlipidemia    Family history of hypertension    FH: myocardial infarction  Father had MI,  age 47        MEDICATIONS:Home Medications:  Aspirin Low Dose 81 mg oral delayed release tablet: 1 tab(s) orally once a day (2021 08:44)  atorvastatin 40 mg oral tablet: 1 tab(s) orally once a day (2021 08:44)  bisacodyl 10 mg rectal suppository: 1 suppository(ies) rectal once a day (2021 08:44)  cefepime 2 g intravenous injection: 2 gram(s) intravenous every 12 hours until 3/23/21 (2021 08:44)  clopidogrel 75 mg oral tablet: 1 tab(s) orally once a day (2021 08:44)  ferrous sulfate 325 mg (65 mg elemental iron) oral tablet: 1 tab(s) orally once a day (2021 08:44)  fluticasone 50 mcg/inh nasal spray: 1 spray(s) in each nostril 2 times a day (:44)  furosemide 40 mg oral tablet: 1 tab(s) orally once a day (:44)  Glucosamine Chondroitin oral capsule: 3 cap(s) orally once a day (:44)  guaiFENesin 100 mg/5 mL oral liquid: 5 milliliter(s) orally every 6 hours, As needed, Cough (:44)  insulin glargine: 42 unit(s) subcutaneous once a day (at bedtime)    **per d/c paperwork, patient was decreased to 30 units, but patient states he has been taking 42 (:44)  loratadine 10 mg oral tablet: 1 tab(s) orally once a day (:44)  losartan 25 mg oral tablet: 1 tab(s) orally once a day (:44)  metFORMIN 1000 mg oral tablet: 1 tab(s) orally 2 times a day (:44)  metroNIDAZOLE 500 mg/100 mL intravenous solution: 500 milligram(s) intravenous every 8 hours (:44)  Milk of Magnesia 8% oral suspension: 30 milliliter(s) orally once a day (at bedtime), As Needed (:44)  potassium chloride 20 mEq oral tablet, extended release: 1 tab(s) orally once a day (:44)  ranolazine 500 mg oral tablet, extended release: 1 tab(s) orally 2 times a day (:44)  Vitamin B-12 1000 mcg oral tablet: 1 tab(s) orally once a day (:44)  Vitamin C 1000 mg oral tablet: 1 tab(s) orally once a day (2021 08:44)  Vitamin D3 1000 intl units (25 mcg) oral tablet: 1 tab(s) orally once a day (:44)    COREG 6.25 mg po BID missing in the list     MEDICATIONS  (STANDING):    MEDICATIONS  (PRN):      REVIEW OF SYSTEMS:    CONSTITUTIONAL: No weakness, fevers or chills  EYES/ENT: No visual changes;  No vertigo or throat pain   NECK: No pain or stiffness  RESPIRATORY: No cough, wheezing, hemoptysis; No shortness of breath  CARDIOVASCULAR: No chest pain or palpitations  GASTROINTESTINAL: No abdominal or epigastric pain. No nausea, vomiting, or hematemesis; No diarrhea or constipation. No melena or hematochezia.  GENITOURINARY: No dysuria, frequency or hematuria  NEUROLOGICAL: No numbness or weakness  SKIN: gangrene of right foot toes ,   All other review of systems is negative unless indicated above    Vital Signs Last 24 Hrs  T(C): 36.9 (2021 07:00), Max: 36.9 (2021 07:00)  T(F): 98.5 (2021 07:00), Max: 98.5 (2021 07:00)  HR: 85 (:00) (85 - 85)  BP: 103/72 (2021 07:00) (103/72 - 103/72)  BP(mean): 81 (:) (81 - 81)  RR: 15 (:) (15 - 15)  SpO2: 100% (:) (100% - 100%)    I&O's Summary      PHYSICAL EXAM:    Constitutional: NAD, awake and alert, well-developed  HEENT: PERR, EOMI,  No oral cyananosis.  Neck:  supple,  No JVD  Respiratory: Breath sounds are clear bilaterally, No wheezing, rales or rhonchi  Cardiovascular: S1 and S2, regular rate and rhythm, no Murmurs, gallops or rubs  Gastrointestinal: Bowel Sounds present, soft, nontender.   Extremities: mild right le swelling than left   Vascular: 1+ peripheral pulses  Neurological: A/O x 3, no focal deficits  Musculoskeletal: no calf tenderness.  Skin:  toes 3rd  toes gangrene      LABS: All Labs Reviewed:                        11.0   4.93  )-----------( 297      ( 2021 07:23 )             33.2     2021 07:23    130    |  100    |  14     ----------------------------<  130    3.9     |  21     |  0.83     Ca    9.0        2021 07:23    TPro  6.5    /  Alb  3.1    /  TBili  0.3    /  DBili  x      /  AST  15     /  ALT  29     /  AlkPhos  69     2021 07:23    PT/INR - ( 2021 07:23 )   PT: 13.9 sec;   INR: 1.21 ratio         PTT - ( 2021 07:23 )  PTT:34.6 sec      Blood Culture:         :Cardiac Cath Lab - Adult (21 @ 08:23): residual  D1 distal LAD , OM1 Om2 disease    New proximal LAD stenosis. Multiple small branch stenoses previously seen.   LVEDP very elevated.   Interventional Conclusions:  Successful DARCIE of proximal LAD    12 Lead ECG (21 @ 23:48):  Normal sinus rhythm, Low voltage QRS, Cannot rule out Anterior infarct , age undetermined. When compared with ECG of 2021 15:41, No significant change was found    CT Angio Chest PE Protocol w/ IV Cont (21 @ 01:45):  No evidence of pulmonary embolism followed to the segmental pulmonary arterial branches.  Moderate sized bilateral pleural effusions with overlying compressive atelectasis.  Moderate pulmonary vascular congestion.  Small volume perihepatic ascites.    TTE Echo Complete w/o Contrast w/ Doppler (21 @ 07:55):   Left ventricle systolic function appears moderately impaired; segmental wall motion abnormalities noted consistent with Takotsubo's cardiomyopathy. Estimated Ejection Fraction is 30%.   The left atrium is moderately dilated.   Mild mitral regurgitation               CHIEF COMPLAINT: was sent in his podiatrist for right toes gangrene     HPI: 65 year old man with a history of CAD who recent LAD stent ,after presenting with acute  systolic heart failure ,  PAD, HTN, HLD, DM2 (on insulin), neuropathy, BPH, who was  diagnosed to have  foot osteomyelitis (treated with long-course of IV antibiotics via PICC) who is being admitted for further management of right foot toes gangrene and cellulitis treatment , possible amputation of toes ,     Patient was recently hospitalized with acute systolic heart failure ,subsequent LAD stent on dual antiplatelet agent , patient currently feeling well cardiac wise though he has limited physical activity denies any chest pain or shortness of breath or dizziness or palpitation ,     Patient says  he does mild swollen right LE than left which is normal for  him     2/27/21 patient  is feeling fine , scheduled  to have transmetatarsal amputation         PAST MEDICAL & SURGICAL HISTORY:  Insulin dependent type 2 diabetes mellitus    Basal cell carcinoma  Of face   s/p excision Oct 2017    PAD (peripheral artery disease)    CAD (coronary atherosclerotic disease)    Hypercholesteremia    Hypertension    BPH (Benign Prostatic Hyperplasia)    Status post debridement  right foot ulcer, with I&amp;D    History of amputation of toe  L foot 2nd toe amputation    MEDICATIONS  (STANDING):  ascorbic acid 1000 milliGRAM(s) Oral daily  aspirin enteric coated 81 milliGRAM(s) Oral daily  atorvastatin 40 milliGRAM(s) Oral at bedtime  bisacodyl Suppository 10 milliGRAM(s) Rectal daily  carvedilol 6.25 milliGRAM(s) Oral every 12 hours  cefepime   IVPB 2000 milliGRAM(s) IV Intermittent every 12 hours  cholecalciferol 1000 Unit(s) Oral daily  clopidogrel Tablet 75 milliGRAM(s) Oral daily  cyanocobalamin 1000 MICROGram(s) Oral daily  dextrose 40% Gel 15 Gram(s) Oral once  ferrous    sulfate 325 milliGRAM(s) Oral daily  fluconAZOLE   Tablet 100 milliGRAM(s) Oral daily  fluticasone propionate 50 MICROgram(s)/spray Nasal Spray 1 Spray(s) Both Nostrils two times a day  glucagon  Injectable 1 milliGRAM(s) IntraMuscular once  insulin glargine Injectable (LANTUS) 30 Unit(s) SubCutaneous at bedtime  insulin lispro (ADMELOG) corrective regimen sliding scale   SubCutaneous three times a day before meals  insulin lispro (ADMELOG) corrective regimen sliding scale   SubCutaneous at bedtime  loratadine 10 milliGRAM(s) Oral daily  losartan 25 milliGRAM(s) Oral daily  metroNIDAZOLE  IVPB 500 milliGRAM(s) IV Intermittent every 8 hours  potassium chloride    Tablet ER 20 milliEquivalent(s) Oral daily  ranolazine 500 milliGRAM(s) Oral two times a day    MEDICATIONS  (PRN):  acetaminophen   Tablet .. 650 milliGRAM(s) Oral every 6 hours PRN Mild Pain (1 - 3)  aluminum hydroxide/magnesium hydroxide/simethicone Suspension 30 milliLiter(s) Oral every 4 hours PRN Dyspepsia  guaiFENesin   Syrup  (Sugar-Free) 100 milliGRAM(s) Oral every 6 hours PRN Cough  magnesium hydroxide Suspension 30 milliLiter(s) Oral at bedtime PRN Constipation  ondansetron Injectable 4 milliGRAM(s) IV Push every 6 hours PRN Nausea  senna 2 Tablet(s) Oral at bedtime PRN Constipation      REVIEW OF SYSTEMS:    All other review of systems is negative unless indicated above    Vital Signs Last 24 Hrs  T(C): 36.5 (27 Feb 2021 08:18), Max: 36.5 (27 Feb 2021 08:18)  T(F): 97.7 (27 Feb 2021 08:18), Max: 97.7 (27 Feb 2021 08:18)  HR: 67 (27 Feb 2021 08:18) (67 - 67)  BP: 137/74 (27 Feb 2021 08:18) (137/74 - 137/74)  BP(mean): --  RR: --  SpO2: 100% (27 Feb 2021 08:18) (100% - 100%)    I&O's Summary      PHYSICAL EXAM:    Constitutional: NAD, awake and alert, well-developed  HEENT: PERR, EOMI,  No oral cyananosis.  Neck:  supple,  No JVD  Respiratory: Breath sounds are clear bilaterally, No wheezing, rales or rhonchi  Cardiovascular: S1 and S2, regular rate and rhythm, no Murmurs, gallops or rubs  Gastrointestinal: Bowel Sounds present, soft, nontender.   Extremities: mild right le swelling than left   Vascular: 1+ peripheral pulses  Neurological: A/O x 3, no focal deficits  Musculoskeletal: no calf tenderness.  Skin:  toes 3rd  toes gangrene      LABS: All Labs Reviewed:                                      11.0   4.93  )-----------( 297      ( 26 Feb 2021 07:23 )             33.2     02-27    129<L>  |  96  |  13  ----------------------------<  158<H>  4.3   |  28  |  0.78    Ca    9.3      27 Feb 2021 12:11    TPro  6.5  /  Alb  3.1<L>  /  TBili  0.3  /  DBili  x   /  AST  15  /  ALT  29  /  AlkPhos  69  02-26        LIVER FUNCTIONS - ( 26 Feb 2021 07:23 )  Alb: 3.1 g/dL / Pro: 6.5 gm/dL / ALK PHOS: 69 U/L / ALT: 29 U/L / AST: 15 U/L / GGT: x           PT/INR - ( 26 Feb 2021 07:23 )   PT: 13.9 sec;   INR: 1.21 ratio         PTT - ( 26 Feb 2021 07:23 )  PTT:34.6 sec            :Cardiac Cath Lab - Adult (02.03.21 @ 08:23): residual  D1 distal LAD , OM1 Om2 disease    New proximal LAD stenosis. Multiple small branch stenoses previously seen.   LVEDP very elevated.   Interventional Conclusions:  Successful DARCIE of proximal LAD    12 Lead ECG (01.30.21 @ 23:48):  Normal sinus rhythm, Low voltage QRS, Cannot rule out Anterior infarct , age undetermined. When compared with ECG of 21-JAN-2021 15:41, No significant change was found    CT Angio Chest PE Protocol w/ IV Cont (01.31.21 @ 01:45):  No evidence of pulmonary embolism followed to the segmental pulmonary arterial branches.  Moderate sized bilateral pleural effusions with overlying compressive atelectasis.  Moderate pulmonary vascular congestion.  Small volume perihepatic ascites.    TTE Echo Complete w/o Contrast w/ Doppler (02.01.21 @ 07:55):   Left ventricle systolic function appears moderately impaired; segmental wall motion abnormalities noted consistent with Takotsubo's cardiomyopathy. Estimated Ejection Fraction is 30%.   The left atrium is moderately dilated.   Mild mitral regurgitation    < from: US Duplex Venous Lower Ext Ltd, Right (02.26.21 @ 10:36) >    IMPRESSION:  No evidence of right lower extremity deep venous thrombosis.    < end of copied text >

## 2021-02-28 LAB
ANION GAP SERPL CALC-SCNC: 6 MMOL/L — SIGNIFICANT CHANGE UP (ref 5–17)
BUN SERPL-MCNC: 16 MG/DL — SIGNIFICANT CHANGE UP (ref 7–23)
CALCIUM SERPL-MCNC: 8.8 MG/DL — SIGNIFICANT CHANGE UP (ref 8.5–10.1)
CHLORIDE SERPL-SCNC: 99 MMOL/L — SIGNIFICANT CHANGE UP (ref 96–108)
CO2 SERPL-SCNC: 25 MMOL/L — SIGNIFICANT CHANGE UP (ref 22–31)
CREAT SERPL-MCNC: 0.82 MG/DL — SIGNIFICANT CHANGE UP (ref 0.5–1.3)
GLUCOSE BLDC GLUCOMTR-MCNC: 172 MG/DL — HIGH (ref 70–99)
GLUCOSE BLDC GLUCOMTR-MCNC: 179 MG/DL — HIGH (ref 70–99)
GLUCOSE BLDC GLUCOMTR-MCNC: 228 MG/DL — HIGH (ref 70–99)
GLUCOSE BLDC GLUCOMTR-MCNC: 284 MG/DL — HIGH (ref 70–99)
GLUCOSE SERPL-MCNC: 201 MG/DL — HIGH (ref 70–99)
HCT VFR BLD CALC: 31.9 % — LOW (ref 39–50)
HGB BLD-MCNC: 10.4 G/DL — LOW (ref 13–17)
MCHC RBC-ENTMCNC: 26.8 PG — LOW (ref 27–34)
MCHC RBC-ENTMCNC: 32.6 GM/DL — SIGNIFICANT CHANGE UP (ref 32–36)
MCV RBC AUTO: 82.2 FL — SIGNIFICANT CHANGE UP (ref 80–100)
PLATELET # BLD AUTO: 251 K/UL — SIGNIFICANT CHANGE UP (ref 150–400)
POTASSIUM SERPL-MCNC: 4.1 MMOL/L — SIGNIFICANT CHANGE UP (ref 3.5–5.3)
POTASSIUM SERPL-SCNC: 4.1 MMOL/L — SIGNIFICANT CHANGE UP (ref 3.5–5.3)
RBC # BLD: 3.88 M/UL — LOW (ref 4.2–5.8)
RBC # FLD: 14.6 % — HIGH (ref 10.3–14.5)
SARS-COV-2 IGG SERPL QL IA: NEGATIVE — SIGNIFICANT CHANGE UP
SARS-COV-2 IGM SERPL IA-ACNC: 1.12 INDEX — SIGNIFICANT CHANGE UP
SODIUM SERPL-SCNC: 130 MMOL/L — LOW (ref 135–145)
WBC # BLD: 4.48 K/UL — SIGNIFICANT CHANGE UP (ref 3.8–10.5)
WBC # FLD AUTO: 4.48 K/UL — SIGNIFICANT CHANGE UP (ref 3.8–10.5)

## 2021-02-28 PROCEDURE — 99233 SBSQ HOSP IP/OBS HIGH 50: CPT

## 2021-02-28 PROCEDURE — 99232 SBSQ HOSP IP/OBS MODERATE 35: CPT

## 2021-02-28 RX ORDER — INSULIN GLARGINE 100 [IU]/ML
15 INJECTION, SOLUTION SUBCUTANEOUS AT BEDTIME
Refills: 0 | Status: DISCONTINUED | OUTPATIENT
Start: 2021-02-28 | End: 2021-03-01

## 2021-02-28 RX ORDER — LOSARTAN POTASSIUM 100 MG/1
25 TABLET, FILM COATED ORAL ONCE
Refills: 0 | Status: COMPLETED | OUTPATIENT
Start: 2021-02-28 | End: 2021-02-28

## 2021-02-28 RX ORDER — SODIUM CHLORIDE 9 MG/ML
3 INJECTION INTRAMUSCULAR; INTRAVENOUS; SUBCUTANEOUS EVERY 8 HOURS
Refills: 0 | Status: DISCONTINUED | OUTPATIENT
Start: 2021-02-28 | End: 2021-03-01

## 2021-02-28 RX ADMIN — Medication 100 MILLIGRAM(S): at 13:17

## 2021-02-28 RX ADMIN — SODIUM CHLORIDE 3 MILLILITER(S): 9 INJECTION INTRAMUSCULAR; INTRAVENOUS; SUBCUTANEOUS at 21:05

## 2021-02-28 RX ADMIN — Medication 1000 MILLIGRAM(S): at 10:03

## 2021-02-28 RX ADMIN — FLUCONAZOLE 100 MILLIGRAM(S): 150 TABLET ORAL at 09:54

## 2021-02-28 RX ADMIN — Medication 1: at 21:34

## 2021-02-28 RX ADMIN — CARVEDILOL PHOSPHATE 6.25 MILLIGRAM(S): 80 CAPSULE, EXTENDED RELEASE ORAL at 09:54

## 2021-02-28 RX ADMIN — CLOPIDOGREL BISULFATE 75 MILLIGRAM(S): 75 TABLET, FILM COATED ORAL at 10:04

## 2021-02-28 RX ADMIN — PREGABALIN 1000 MICROGRAM(S): 225 CAPSULE ORAL at 09:54

## 2021-02-28 RX ADMIN — Medication 100 MILLIGRAM(S): at 05:02

## 2021-02-28 RX ADMIN — Medication 81 MILLIGRAM(S): at 10:03

## 2021-02-28 RX ADMIN — RANOLAZINE 500 MILLIGRAM(S): 500 TABLET, FILM COATED, EXTENDED RELEASE ORAL at 20:56

## 2021-02-28 RX ADMIN — CEFEPIME 100 MILLIGRAM(S): 1 INJECTION, POWDER, FOR SOLUTION INTRAMUSCULAR; INTRAVENOUS at 20:56

## 2021-02-28 RX ADMIN — Medication 4: at 16:53

## 2021-02-28 RX ADMIN — Medication 20 MILLIEQUIVALENT(S): at 10:04

## 2021-02-28 RX ADMIN — SODIUM CHLORIDE 3 MILLILITER(S): 9 INJECTION INTRAMUSCULAR; INTRAVENOUS; SUBCUTANEOUS at 10:17

## 2021-02-28 RX ADMIN — Medication 2: at 12:45

## 2021-02-28 RX ADMIN — Medication 100 MILLIGRAM(S): at 20:57

## 2021-02-28 RX ADMIN — Medication 325 MILLIGRAM(S): at 10:04

## 2021-02-28 RX ADMIN — Medication 2: at 08:04

## 2021-02-28 RX ADMIN — CARVEDILOL PHOSPHATE 6.25 MILLIGRAM(S): 80 CAPSULE, EXTENDED RELEASE ORAL at 20:56

## 2021-02-28 RX ADMIN — INSULIN GLARGINE 15 UNIT(S): 100 INJECTION, SOLUTION SUBCUTANEOUS at 21:34

## 2021-02-28 RX ADMIN — RANOLAZINE 500 MILLIGRAM(S): 500 TABLET, FILM COATED, EXTENDED RELEASE ORAL at 09:54

## 2021-02-28 RX ADMIN — CEFEPIME 100 MILLIGRAM(S): 1 INJECTION, POWDER, FOR SOLUTION INTRAMUSCULAR; INTRAVENOUS at 10:06

## 2021-02-28 RX ADMIN — ATORVASTATIN CALCIUM 40 MILLIGRAM(S): 80 TABLET, FILM COATED ORAL at 21:00

## 2021-02-28 RX ADMIN — Medication 1000 UNIT(S): at 10:12

## 2021-02-28 RX ADMIN — LORATADINE 10 MILLIGRAM(S): 10 TABLET ORAL at 09:54

## 2021-02-28 RX ADMIN — LOSARTAN POTASSIUM 25 MILLIGRAM(S): 100 TABLET, FILM COATED ORAL at 09:54

## 2021-02-28 NOTE — PROGRESS NOTE ADULT - SUBJECTIVE AND OBJECTIVE BOX
CHIEF COMPLAINT: was sent in his podiatrist for right toes gangrene     HPI: 65 year old man with a history of CAD who recent LAD stent ,after presenting with acute  systolic heart failure ,  PAD, HTN, HLD, DM2 (on insulin), neuropathy, BPH, who was  diagnosed to have  foot osteomyelitis (treated with long-course of IV antibiotics via PICC) who is being admitted for further management of right foot toes gangrene and cellulitis treatment , possible amputation of toes ,     Patient was recently hospitalized with acute systolic heart failure ,subsequent LAD stent on dual antiplatelet agent , patient currently feeling well cardiac wise though he has limited physical activity denies any chest pain or shortness of breath or dizziness or palpitation ,     Patient says  he does mild swollen right LE than left which is normal for  him     2/27/21 patient  is feeling fine , scheduled  to have transmetatarsal amputation   2/28/21 Patient is feeling ok ,  denies any chest pain or shortness of breath or dizziness  afebrile         PAST MEDICAL & SURGICAL HISTORY:  Insulin dependent type 2 diabetes mellitus    Basal cell carcinoma  Of face   s/p excision Oct 2017    PAD (peripheral artery disease)    CAD (coronary atherosclerotic disease)    Hypercholesteremia    Hypertension    BPH (Benign Prostatic Hyperplasia)    Status post debridement  right foot ulcer, with I&amp;D    History of amputation of toe  L foot 2nd toe amputation    MEDICATIONS  (STANDING):  ascorbic acid 1000 milliGRAM(s) Oral daily  aspirin enteric coated 81 milliGRAM(s) Oral daily  atorvastatin 40 milliGRAM(s) Oral at bedtime  bisacodyl Suppository 10 milliGRAM(s) Rectal daily  carvedilol 6.25 milliGRAM(s) Oral every 12 hours  cefepime   IVPB 2000 milliGRAM(s) IV Intermittent every 12 hours  cholecalciferol 1000 Unit(s) Oral daily  clopidogrel Tablet 75 milliGRAM(s) Oral daily  cyanocobalamin 1000 MICROGram(s) Oral daily  dextrose 40% Gel 15 Gram(s) Oral once  ferrous    sulfate 325 milliGRAM(s) Oral daily  fluconAZOLE   Tablet 100 milliGRAM(s) Oral daily  fluticasone propionate 50 MICROgram(s)/spray Nasal Spray 1 Spray(s) Both Nostrils two times a day  glucagon  Injectable 1 milliGRAM(s) IntraMuscular once  insulin glargine Injectable (LANTUS) 30 Unit(s) SubCutaneous at bedtime  insulin lispro (ADMELOG) corrective regimen sliding scale   SubCutaneous three times a day before meals  insulin lispro (ADMELOG) corrective regimen sliding scale   SubCutaneous at bedtime  loratadine 10 milliGRAM(s) Oral daily  losartan 25 milliGRAM(s) Oral daily  metroNIDAZOLE  IVPB 500 milliGRAM(s) IV Intermittent every 8 hours  potassium chloride    Tablet ER 20 milliEquivalent(s) Oral daily  ranolazine 500 milliGRAM(s) Oral two times a day    MEDICATIONS  (PRN):  acetaminophen   Tablet .. 650 milliGRAM(s) Oral every 6 hours PRN Mild Pain (1 - 3)  aluminum hydroxide/magnesium hydroxide/simethicone Suspension 30 milliLiter(s) Oral every 4 hours PRN Dyspepsia  guaiFENesin   Syrup  (Sugar-Free) 100 milliGRAM(s) Oral every 6 hours PRN Cough  magnesium hydroxide Suspension 30 milliLiter(s) Oral at bedtime PRN Constipation  ondansetron Injectable 4 milliGRAM(s) IV Push every 6 hours PRN Nausea  senna 2 Tablet(s) Oral at bedtime PRN Constipation      REVIEW OF SYSTEMS:    Vital Signs Last 24 Hrs  T(C): 36.7 (28 Feb 2021 08:28), Max: 36.9 (27 Feb 2021 20:30)  T(F): 98 (28 Feb 2021 08:28), Max: 98.4 (27 Feb 2021 20:30)  HR: 71 (28 Feb 2021 08:28) (69 - 74)  BP: 120/71 (28 Feb 2021 08:28) (108/68 - 144/79)  BP(mean): --  RR: 18 (28 Feb 2021 08:28) (18 - 18)  SpO2: 100% (28 Feb 2021 08:28) (96% - 100%)    I&O's Summary    27 Feb 2021 07:01  -  28 Feb 2021 07:00  --------------------------------------------------------  IN: 1075 mL / OUT: 0 mL / NET: 1075 mL      PHYSICAL EXAM:    Constitutional: NAD, awake and alert, well-developed  HEENT: PERR, EOMI,  No oral cyananosis.  Neck:  supple,  No JVD  Respiratory: Breath sounds are clear bilaterally, No wheezing, rales or rhonchi  Cardiovascular: S1 and S2, regular rate and rhythm, no Murmurs, gallops or rubs  Gastrointestinal: Bowel Sounds present, soft, nontender.   Extremities: mild right le swelling than left   Vascular: 1+ peripheral pulses  Neurological: A/O x 3, no focal deficits  Musculoskeletal: no calf tenderness.  Skin:  toes 3rd  toes gangrene      LABS: All Labs Reviewed:                                   10.4   4.48  )-----------( 251      ( 28 Feb 2021 09:03 )             31.9     02-28    130<L>  |  99  |  16  ----------------------------<  201<H>  4.1   |  25  |  0.82    Ca    8.8      28 Feb 2021 09:03      Culture Results:   No growth to date. (02-26-21 @ 10:41)  Culture Results:   No growth to date. (02-26-21 @ 10:41)      :Cardiac Cath Lab - Adult (02.03.21 @ 08:23): residual  D1 distal LAD , OM1 Om2 disease    New proximal LAD stenosis. Multiple small branch stenoses previously seen.   LVEDP very elevated.   Interventional Conclusions:  Successful DARCIE of proximal LAD    12 Lead ECG (01.30.21 @ 23:48):  Normal sinus rhythm, Low voltage QRS, Cannot rule out Anterior infarct , age undetermined. When compared with ECG of 21-JAN-2021 15:41, No significant change was found    CT Angio Chest PE Protocol w/ IV Cont (01.31.21 @ 01:45):  No evidence of pulmonary embolism followed to the segmental pulmonary arterial branches.  Moderate sized bilateral pleural effusions with overlying compressive atelectasis.  Moderate pulmonary vascular congestion.  Small volume perihepatic ascites.    TTE Echo Complete w/o Contrast w/ Doppler (02.01.21 @ 07:55):   Left ventricle systolic function appears moderately impaired; segmental wall motion abnormalities noted consistent with Takotsubo's cardiomyopathy. Estimated Ejection Fraction is 30%.   The left atrium is moderately dilated.   Mild mitral regurgitation    < from: US Duplex Venous Lower Ext Ltd, Right (02.26.21 @ 10:36) >    IMPRESSION:  No evidence of right lower extremity deep venous thrombosis.    < end of copied text >

## 2021-02-28 NOTE — PROGRESS NOTE ADULT - SUBJECTIVE AND OBJECTIVE BOX
HOSPITALIST ATTENDING PROGRESS NOTE    Chart and meds reviewed.  Patient seen and examined.    CC: Foot ulcers    Subjective:  2/27/21: Admitted yesterday for nonhealing R foot ulcers, for further imaging and likely surgical management. Pt denies foot pain. Understands plan for likely OR Monday.  2/28/21: Plan for OR tmrw. No new complaints.    All 10 systems reviewed and found to be negative with the exception of what has been described above.    MEDICATIONS  (STANDING):  ascorbic acid 1000 milliGRAM(s) Oral daily  aspirin enteric coated 81 milliGRAM(s) Oral daily  atorvastatin 40 milliGRAM(s) Oral at bedtime  bisacodyl Suppository 10 milliGRAM(s) Rectal daily  carvedilol 6.25 milliGRAM(s) Oral every 12 hours  cefepime   IVPB 2000 milliGRAM(s) IV Intermittent every 12 hours  cholecalciferol 1000 Unit(s) Oral daily  clopidogrel Tablet 75 milliGRAM(s) Oral daily  cyanocobalamin 1000 MICROGram(s) Oral daily  dextrose 40% Gel 15 Gram(s) Oral once  ferrous    sulfate 325 milliGRAM(s) Oral daily  fluconAZOLE   Tablet 100 milliGRAM(s) Oral daily  fluticasone propionate 50 MICROgram(s)/spray Nasal Spray 1 Spray(s) Both Nostrils two times a day  glucagon  Injectable 1 milliGRAM(s) IntraMuscular once  insulin glargine Injectable (LANTUS) 15 Unit(s) SubCutaneous at bedtime  insulin lispro (ADMELOG) corrective regimen sliding scale   SubCutaneous three times a day before meals  insulin lispro (ADMELOG) corrective regimen sliding scale   SubCutaneous at bedtime  loratadine 10 milliGRAM(s) Oral daily  metroNIDAZOLE  IVPB 500 milliGRAM(s) IV Intermittent every 8 hours  potassium chloride    Tablet ER 20 milliEquivalent(s) Oral daily  ranolazine 500 milliGRAM(s) Oral two times a day  sodium chloride 0.9% lock flush 3 milliLiter(s) IV Push every 8 hours    MEDICATIONS  (PRN):  acetaminophen   Tablet .. 650 milliGRAM(s) Oral every 6 hours PRN Mild Pain (1 - 3)  aluminum hydroxide/magnesium hydroxide/simethicone Suspension 30 milliLiter(s) Oral every 4 hours PRN Dyspepsia  guaiFENesin   Syrup  (Sugar-Free) 100 milliGRAM(s) Oral every 6 hours PRN Cough  magnesium hydroxide Suspension 30 milliLiter(s) Oral at bedtime PRN Constipation  ondansetron Injectable 4 milliGRAM(s) IV Push every 6 hours PRN Nausea  senna 2 Tablet(s) Oral at bedtime PRN Constipation    VITALS:  T(F): 98 (02-28-21 @ 08:28), Max: 98.4 (02-27-21 @ 20:30)  HR: 71 (02-28-21 @ 08:28) (69 - 74)  BP: 120/71 (02-28-21 @ 08:28) (108/68 - 144/79)  RR: 18 (02-28-21 @ 08:28) (18 - 18)  SpO2: 100% (02-28-21 @ 08:28) (96% - 100%)  Wt(kg): --    I&O's Summary    27 Feb 2021 07:01  -  28 Feb 2021 07:00  --------------------------------------------------------  IN: 1075 mL / OUT: 0 mL / NET: 1075 mL    CAPILLARY BLOOD GLUCOSE    POCT Blood Glucose.: 172 mg/dL (28 Feb 2021 12:04)  POCT Blood Glucose.: 179 mg/dL (28 Feb 2021 07:58)  POCT Blood Glucose.: 258 mg/dL (27 Feb 2021 20:38)  POCT Blood Glucose.: 230 mg/dL (27 Feb 2021 17:10)    PHYSICAL EXAM:  General: NAD, lying in bed  HEENT:  pupils equal and reactive, EOMI, no oropharyngeal lesions, erythema, exudates, oral thrush  NECK:   supple, no carotid bruits, no palpable lymph nodes, no thyromegaly  CV:  +S1, +S2, regular, no murmurs or rubs  RESP:   lungs clear to auscultation bilaterally, no wheezing, rales, rhonchi, good air entry bilaterally  BREAST:  not examined  GI:  abdomen soft, non-tender, non-distended, normal BS, no bruits, no abdominal masses, no palpable masses  RECTAL:  not examined  :  not examined  MSK:   normal muscle tone, no atrophy, no rigidity, no contractions  EXT:  no clubbing, no cyanosis, no edema, no calf pain, swelling or erythema, R foot wrapped  VASCULAR:  pulses equal and symmetric in the upper and lower extremities  NEURO:  AAOX3, no focal neurological deficits, follows all commands, able to move extremities spontaneously  SKIN:  no ulcers, lesions or rashes                            10.4   4.48  )-----------( 251      ( 28 Feb 2021 09:03 )             31.9     02-28    130<L>  |  99  |  16  ----------------------------<  201<H>  4.1   |  25  |  0.82    Ca    8.8      28 Feb 2021 09:03    CULTURES:  BCx pending    Additional results/Imaging:  ESR 30  CRP nl  A1c 8.8% on 1/22/21    Foot xray, right 2/26/21: RIGHT hallux first metatarsophalangeal joint findings concerning for osteomyelitis.    LE doppler, right, 2/26/21: No evidence of right lower extremity deep venous thrombosis.    MRI R foot, 2/26/21: Wound along the medial aspect of the distal first digit with adjacent findings consistent with osteomyelitis of the distal aspect of the first metatarsal and first proximal phalanx.  Additional wound in the first web space which extends to involve the dorsal soft tissues overlying the second digit with adjacent findings consistent with osteomyelitis in the distal aspect of the second metatarsal and second digit proximal and middle phalanges.  Additional wound along the dorsal aspect of the digit at the level of the middle phalanx with possible osteomyelitis of the third digit middle and proximal phalanges.  Nonspecific signal alteration in the head of the third and fourth metatarsals and in the fourth proximal phalanx which may be ischemic change and/or osteomyelitis  Multifocal devitalized soft tissue about the first through fifth metatarsophalangeal joints.    LE arterial doppler 2/26/21: No significant stenosis or occlusion in either leg. Atherosclerosis of the native arteries of the right and left lower extremities is present

## 2021-03-01 ENCOUNTER — RESULT REVIEW (OUTPATIENT)
Age: 66
End: 2021-03-01

## 2021-03-01 DIAGNOSIS — I96 GANGRENE, NOT ELSEWHERE CLASSIFIED: ICD-10-CM

## 2021-03-01 DIAGNOSIS — M86.9 OSTEOMYELITIS, UNSPECIFIED: ICD-10-CM

## 2021-03-01 LAB
BLD GP AB SCN SERPL QL: SIGNIFICANT CHANGE UP
GLUCOSE BLDC GLUCOMTR-MCNC: 216 MG/DL — HIGH (ref 70–99)
MAGNESIUM SERPL-MCNC: 1.8 MG/DL — SIGNIFICANT CHANGE UP (ref 1.6–2.6)

## 2021-03-01 PROCEDURE — 99233 SBSQ HOSP IP/OBS HIGH 50: CPT

## 2021-03-01 PROCEDURE — 73620 X-RAY EXAM OF FOOT: CPT | Mod: 26,RT

## 2021-03-01 PROCEDURE — 88311 DECALCIFY TISSUE: CPT | Mod: 26

## 2021-03-01 PROCEDURE — 88307 TISSUE EXAM BY PATHOLOGIST: CPT | Mod: 26

## 2021-03-01 RX ORDER — ATORVASTATIN CALCIUM 80 MG/1
40 TABLET, FILM COATED ORAL AT BEDTIME
Refills: 0 | Status: DISCONTINUED | OUTPATIENT
Start: 2021-03-01 | End: 2021-03-06

## 2021-03-01 RX ORDER — INSULIN LISPRO 100/ML
VIAL (ML) SUBCUTANEOUS AT BEDTIME
Refills: 0 | Status: DISCONTINUED | OUTPATIENT
Start: 2021-03-01 | End: 2021-03-06

## 2021-03-01 RX ORDER — OXYCODONE HYDROCHLORIDE 5 MG/1
5 TABLET ORAL ONCE
Refills: 0 | Status: DISCONTINUED | OUTPATIENT
Start: 2021-03-01 | End: 2021-03-01

## 2021-03-01 RX ORDER — INSULIN GLARGINE 100 [IU]/ML
30 INJECTION, SOLUTION SUBCUTANEOUS AT BEDTIME
Refills: 0 | Status: DISCONTINUED | OUTPATIENT
Start: 2021-03-01 | End: 2021-03-06

## 2021-03-01 RX ORDER — CEFEPIME 1 G/1
2000 INJECTION, POWDER, FOR SOLUTION INTRAMUSCULAR; INTRAVENOUS EVERY 12 HOURS
Refills: 0 | Status: DISCONTINUED | OUTPATIENT
Start: 2021-03-01 | End: 2021-03-06

## 2021-03-01 RX ORDER — INSULIN LISPRO 100/ML
VIAL (ML) SUBCUTANEOUS
Refills: 0 | Status: DISCONTINUED | OUTPATIENT
Start: 2021-03-01 | End: 2021-03-06

## 2021-03-01 RX ORDER — GLUCAGON INJECTION, SOLUTION 0.5 MG/.1ML
1 INJECTION, SOLUTION SUBCUTANEOUS ONCE
Refills: 0 | Status: DISCONTINUED | OUTPATIENT
Start: 2021-03-01 | End: 2021-03-06

## 2021-03-01 RX ORDER — CARVEDILOL PHOSPHATE 80 MG/1
6.25 CAPSULE, EXTENDED RELEASE ORAL EVERY 12 HOURS
Refills: 0 | Status: DISCONTINUED | OUTPATIENT
Start: 2021-03-01 | End: 2021-03-06

## 2021-03-01 RX ORDER — DEXTROSE 50 % IN WATER 50 %
15 SYRINGE (ML) INTRAVENOUS ONCE
Refills: 0 | Status: DISCONTINUED | OUTPATIENT
Start: 2021-03-01 | End: 2021-03-06

## 2021-03-01 RX ORDER — LORATADINE 10 MG/1
10 TABLET ORAL DAILY
Refills: 0 | Status: DISCONTINUED | OUTPATIENT
Start: 2021-03-01 | End: 2021-03-06

## 2021-03-01 RX ORDER — SENNA PLUS 8.6 MG/1
2 TABLET ORAL AT BEDTIME
Refills: 0 | Status: DISCONTINUED | OUTPATIENT
Start: 2021-03-01 | End: 2021-03-06

## 2021-03-01 RX ORDER — SODIUM CHLORIDE 9 MG/ML
3 INJECTION INTRAMUSCULAR; INTRAVENOUS; SUBCUTANEOUS EVERY 8 HOURS
Refills: 0 | Status: DISCONTINUED | OUTPATIENT
Start: 2021-03-01 | End: 2021-03-06

## 2021-03-01 RX ORDER — INSULIN GLARGINE 100 [IU]/ML
30 INJECTION, SOLUTION SUBCUTANEOUS AT BEDTIME
Refills: 0 | Status: DISCONTINUED | OUTPATIENT
Start: 2021-03-01 | End: 2021-03-01

## 2021-03-01 RX ORDER — LOSARTAN POTASSIUM 100 MG/1
25 TABLET, FILM COATED ORAL DAILY
Refills: 0 | Status: DISCONTINUED | OUTPATIENT
Start: 2021-03-01 | End: 2021-03-06

## 2021-03-01 RX ORDER — FENTANYL CITRATE 50 UG/ML
50 INJECTION INTRAVENOUS
Refills: 0 | Status: DISCONTINUED | OUTPATIENT
Start: 2021-03-01 | End: 2021-03-01

## 2021-03-01 RX ORDER — ONDANSETRON 8 MG/1
4 TABLET, FILM COATED ORAL EVERY 6 HOURS
Refills: 0 | Status: DISCONTINUED | OUTPATIENT
Start: 2021-03-01 | End: 2021-03-01

## 2021-03-01 RX ORDER — LOSARTAN POTASSIUM 100 MG/1
25 TABLET, FILM COATED ORAL DAILY
Refills: 0 | Status: CANCELLED | OUTPATIENT
Start: 2021-03-02 | End: 2021-03-01

## 2021-03-01 RX ORDER — ACETAMINOPHEN 500 MG
650 TABLET ORAL EVERY 6 HOURS
Refills: 0 | Status: DISCONTINUED | OUTPATIENT
Start: 2021-03-01 | End: 2021-03-06

## 2021-03-01 RX ORDER — FLUCONAZOLE 150 MG/1
100 TABLET ORAL DAILY
Refills: 0 | Status: DISCONTINUED | OUTPATIENT
Start: 2021-03-01 | End: 2021-03-04

## 2021-03-01 RX ORDER — ASCORBIC ACID 60 MG
1000 TABLET,CHEWABLE ORAL DAILY
Refills: 0 | Status: DISCONTINUED | OUTPATIENT
Start: 2021-03-01 | End: 2021-03-06

## 2021-03-01 RX ORDER — FLUTICASONE PROPIONATE 50 MCG
1 SPRAY, SUSPENSION NASAL
Refills: 0 | Status: DISCONTINUED | OUTPATIENT
Start: 2021-03-01 | End: 2021-03-06

## 2021-03-01 RX ORDER — FUROSEMIDE 40 MG
20 TABLET ORAL DAILY
Refills: 0 | Status: DISCONTINUED | OUTPATIENT
Start: 2021-03-02 | End: 2021-03-06

## 2021-03-01 RX ORDER — SODIUM CHLORIDE 9 MG/ML
1000 INJECTION, SOLUTION INTRAVENOUS
Refills: 0 | Status: DISCONTINUED | OUTPATIENT
Start: 2021-03-01 | End: 2021-03-01

## 2021-03-01 RX ORDER — ONDANSETRON 8 MG/1
4 TABLET, FILM COATED ORAL ONCE
Refills: 0 | Status: COMPLETED | OUTPATIENT
Start: 2021-03-01 | End: 2021-03-01

## 2021-03-01 RX ORDER — MAGNESIUM HYDROXIDE 400 MG/1
30 TABLET, CHEWABLE ORAL DAILY
Refills: 0 | Status: DISCONTINUED | OUTPATIENT
Start: 2021-03-01 | End: 2021-03-06

## 2021-03-01 RX ORDER — RANOLAZINE 500 MG/1
500 TABLET, FILM COATED, EXTENDED RELEASE ORAL
Refills: 0 | Status: DISCONTINUED | OUTPATIENT
Start: 2021-03-01 | End: 2021-03-06

## 2021-03-01 RX ORDER — CLOPIDOGREL BISULFATE 75 MG/1
75 TABLET, FILM COATED ORAL DAILY
Refills: 0 | Status: DISCONTINUED | OUTPATIENT
Start: 2021-03-02 | End: 2021-03-06

## 2021-03-01 RX ORDER — METRONIDAZOLE 500 MG
500 TABLET ORAL EVERY 8 HOURS
Refills: 0 | Status: DISCONTINUED | OUTPATIENT
Start: 2021-03-01 | End: 2021-03-04

## 2021-03-01 RX ORDER — CHOLECALCIFEROL (VITAMIN D3) 125 MCG
1000 CAPSULE ORAL DAILY
Refills: 0 | Status: DISCONTINUED | OUTPATIENT
Start: 2021-03-01 | End: 2021-03-06

## 2021-03-01 RX ORDER — ASPIRIN/CALCIUM CARB/MAGNESIUM 324 MG
81 TABLET ORAL DAILY
Refills: 0 | Status: DISCONTINUED | OUTPATIENT
Start: 2021-03-01 | End: 2021-03-06

## 2021-03-01 RX ORDER — POTASSIUM CHLORIDE 20 MEQ
20 PACKET (EA) ORAL ONCE
Refills: 0 | Status: COMPLETED | OUTPATIENT
Start: 2021-03-01 | End: 2021-03-01

## 2021-03-01 RX ADMIN — SODIUM CHLORIDE 3 MILLILITER(S): 9 INJECTION INTRAMUSCULAR; INTRAVENOUS; SUBCUTANEOUS at 14:05

## 2021-03-01 RX ADMIN — SODIUM CHLORIDE 3 MILLILITER(S): 9 INJECTION INTRAMUSCULAR; INTRAVENOUS; SUBCUTANEOUS at 21:14

## 2021-03-01 RX ADMIN — CEFEPIME 100 MILLIGRAM(S): 1 INJECTION, POWDER, FOR SOLUTION INTRAMUSCULAR; INTRAVENOUS at 11:10

## 2021-03-01 RX ADMIN — ATORVASTATIN CALCIUM 40 MILLIGRAM(S): 80 TABLET, FILM COATED ORAL at 21:16

## 2021-03-01 RX ADMIN — Medication 100 MILLIGRAM(S): at 21:16

## 2021-03-01 RX ADMIN — Medication 4: at 05:56

## 2021-03-01 RX ADMIN — SODIUM CHLORIDE 3 MILLILITER(S): 9 INJECTION INTRAMUSCULAR; INTRAVENOUS; SUBCUTANEOUS at 05:34

## 2021-03-01 RX ADMIN — Medication 100 MILLIGRAM(S): at 14:09

## 2021-03-01 RX ADMIN — CARVEDILOL PHOSPHATE 6.25 MILLIGRAM(S): 80 CAPSULE, EXTENDED RELEASE ORAL at 11:10

## 2021-03-01 RX ADMIN — Medication 1000 MILLIGRAM(S): at 17:52

## 2021-03-01 RX ADMIN — LORATADINE 10 MILLIGRAM(S): 10 TABLET ORAL at 17:53

## 2021-03-01 RX ADMIN — Medication 81 MILLIGRAM(S): at 17:52

## 2021-03-01 RX ADMIN — Medication 20 MILLIEQUIVALENT(S): at 17:52

## 2021-03-01 RX ADMIN — CARVEDILOL PHOSPHATE 6.25 MILLIGRAM(S): 80 CAPSULE, EXTENDED RELEASE ORAL at 21:16

## 2021-03-01 RX ADMIN — Medication 100 MILLIGRAM(S): at 05:35

## 2021-03-01 RX ADMIN — Medication 1000 UNIT(S): at 17:53

## 2021-03-01 RX ADMIN — Medication 1 SPRAY(S): at 21:16

## 2021-03-01 RX ADMIN — INSULIN GLARGINE 30 UNIT(S): 100 INJECTION, SOLUTION SUBCUTANEOUS at 21:17

## 2021-03-01 RX ADMIN — FLUCONAZOLE 100 MILLIGRAM(S): 150 TABLET ORAL at 17:53

## 2021-03-01 RX ADMIN — RANOLAZINE 500 MILLIGRAM(S): 500 TABLET, FILM COATED, EXTENDED RELEASE ORAL at 11:10

## 2021-03-01 RX ADMIN — CEFEPIME 100 MILLIGRAM(S): 1 INJECTION, POWDER, FOR SOLUTION INTRAMUSCULAR; INTRAVENOUS at 21:16

## 2021-03-01 RX ADMIN — RANOLAZINE 500 MILLIGRAM(S): 500 TABLET, FILM COATED, EXTENDED RELEASE ORAL at 21:16

## 2021-03-01 NOTE — DIETITIAN NUTRITION RISK NOTIFICATION - TREATMENT: THE FOLLOWING DIET HAS BEEN RECOMMENDED
Diet, NPO after Midnight:      NPO Start Date: 28-Feb-2021,   NPO Start Time: 23:59 (02-28-21 @ 09:27) [Active]  Diet, NPO after Midnight:      NPO Start Date: 28-Feb-2021,   NPO Start Time: 23:59 (02-28-21 @ 07:51) [Active]  Diet, Consistent Carbohydrate w/Evening Snack:   DASH/TLC {Sodium & Cholesterol Restricted} (DASH) (02-26-21 @ 11:10) [Active]

## 2021-03-01 NOTE — BRIEF OPERATIVE NOTE - NSICDXBRIEFPREOP_GEN_ALL_CORE_FT
PRE-OP DIAGNOSIS:  Foot osteomyelitis 01-Mar-2021 15:53:21  BROWN HERNANDEZ  Diabetes mellitus with foot ulcer and gangrene 01-Mar-2021 15:53:07  BROWN HERNANDEZ

## 2021-03-01 NOTE — BRIEF OPERATIVE NOTE - NSICDXBRIEFPOSTOP_GEN_ALL_CORE_FT
POST-OP DIAGNOSIS:  Osteomyelitis of right foot 01-Mar-2021 15:53:55  BROWN HERNANDEZ  Gangrene of foot 01-Mar-2021 15:53:41  BROWN HERNANDEZ

## 2021-03-01 NOTE — PROGRESS NOTE ADULT - SUBJECTIVE AND OBJECTIVE BOX
Patient is a 65y old  Male who presents with a chief complaint of foot ulcers (28 Feb 2021 15:46)      HPI:    65y old  Male  with PMH  of CAD s/p LAD stent on 2/23/21, HFrEF, PVD, DM2, h/o left 2nd toe amputation, recent admission due to OM of the right foot on long standing via PICC line admitted from [facility] for possible surgical intervention for not healing left foot ulcers.  He had a debridement on 1/25/21 and F/U debridement on 1/31/21. He had a PICC placed for LT ABX & was sent to [facility] for wound care, DM control & ABX. He was seen in the office for F/U and his wound are progressively degrading and is readmitted for MRI R foot W/WO KUMAR & will need future surgery possible TMA or partial TMA. Pt has seen Dr Arndt on last admit and had adequate perfusion.   Denies cp, dyspnea, cough, abdominal pain, problems with urination or bowel problems. Has peripheral neuropathy in both feet.     in Ed -  T(F): 98.5 Max: 98.5 HR: 85 (85 - 85) BP: 103/72  (103/72 - 103/72) RR: 15  (15 - 15) SpO2: 100%  (100% - 100%) ESR 30, HB 11, Cr 0.83, Na 130   seen by podiatrist and cardiology in ED       (26 Feb 2021 10:42)      Allergies    No Known Allergies    Intolerances        MEDICATIONS  (STANDING):  ascorbic acid 1000 milliGRAM(s) Oral daily  aspirin enteric coated 81 milliGRAM(s) Oral daily  atorvastatin 40 milliGRAM(s) Oral at bedtime  bisacodyl Suppository 10 milliGRAM(s) Rectal daily  carvedilol 6.25 milliGRAM(s) Oral every 12 hours  cefepime   IVPB 2000 milliGRAM(s) IV Intermittent every 12 hours  cholecalciferol 1000 Unit(s) Oral daily  clopidogrel Tablet 75 milliGRAM(s) Oral daily  cyanocobalamin 1000 MICROGram(s) Oral daily  dextrose 40% Gel 15 Gram(s) Oral once  ferrous    sulfate 325 milliGRAM(s) Oral daily  fluconAZOLE   Tablet 100 milliGRAM(s) Oral daily  fluticasone propionate 50 MICROgram(s)/spray Nasal Spray 1 Spray(s) Both Nostrils two times a day  glucagon  Injectable 1 milliGRAM(s) IntraMuscular once  insulin glargine Injectable (LANTUS) 15 Unit(s) SubCutaneous at bedtime  insulin lispro (ADMELOG) corrective regimen sliding scale   SubCutaneous three times a day before meals  insulin lispro (ADMELOG) corrective regimen sliding scale   SubCutaneous at bedtime  loratadine 10 milliGRAM(s) Oral daily  metroNIDAZOLE  IVPB 500 milliGRAM(s) IV Intermittent every 8 hours  potassium chloride    Tablet ER 20 milliEquivalent(s) Oral daily  ranolazine 500 milliGRAM(s) Oral two times a day  sodium chloride 0.9% lock flush 3 milliLiter(s) IV Push every 8 hours    MEDICATIONS  (PRN):  acetaminophen   Tablet .. 650 milliGRAM(s) Oral every 6 hours PRN Mild Pain (1 - 3)  aluminum hydroxide/magnesium hydroxide/simethicone Suspension 30 milliLiter(s) Oral every 4 hours PRN Dyspepsia  guaiFENesin   Syrup  (Sugar-Free) 100 milliGRAM(s) Oral every 6 hours PRN Cough  magnesium hydroxide Suspension 30 milliLiter(s) Oral at bedtime PRN Constipation  ondansetron Injectable 4 milliGRAM(s) IV Push every 6 hours PRN Nausea  senna 2 Tablet(s) Oral at bedtime PRN Constipation        RADIOLOGY    CBC Full  -  ( 28 Feb 2021 09:03 )  WBC Count : 4.48 K/uL  RBC Count : 3.88 M/uL  Hemoglobin : 10.4 g/dL  Hematocrit : 31.9 %  Platelet Count - Automated : 251 K/uL  Mean Cell Volume : 82.2 fl  Mean Cell Hemoglobin : 26.8 pg  Mean Cell Hemoglobin Concentration : 32.6 gm/dL  Auto Neutrophil # : x  Auto Lymphocyte # : x  Auto Monocyte # : x  Auto Eosinophil # : x  Auto Basophil # : x  Auto Neutrophil % : x  Auto Lymphocyte % : x  Auto Monocyte % : x  Auto Eosinophil % : x  Auto Basophil % : x      02-28    130<L>  |  99  |  16  ----------------------------<  201<H>  4.1   |  25  |  0.82    Ca    8.8      28 Feb 2021 09:03

## 2021-03-01 NOTE — DIETITIAN INITIAL EVALUATION ADULT. - PERTINENT MEDS FT
MEDICATIONS  (STANDING):  ascorbic acid 1000 milliGRAM(s) Oral daily  aspirin enteric coated 81 milliGRAM(s) Oral daily  atorvastatin 40 milliGRAM(s) Oral at bedtime  bisacodyl Suppository 10 milliGRAM(s) Rectal daily  carvedilol 6.25 milliGRAM(s) Oral every 12 hours  cefepime   IVPB 2000 milliGRAM(s) IV Intermittent every 12 hours  cholecalciferol 1000 Unit(s) Oral daily  clopidogrel Tablet 75 milliGRAM(s) Oral daily  cyanocobalamin 1000 MICROGram(s) Oral daily  dextrose 40% Gel 15 Gram(s) Oral once  ferrous    sulfate 325 milliGRAM(s) Oral daily  fluconAZOLE   Tablet 100 milliGRAM(s) Oral daily  fluticasone propionate 50 MICROgram(s)/spray Nasal Spray 1 Spray(s) Both Nostrils two times a day  glucagon  Injectable 1 milliGRAM(s) IntraMuscular once  insulin glargine Injectable (LANTUS) 15 Unit(s) SubCutaneous at bedtime  insulin lispro (ADMELOG) corrective regimen sliding scale   SubCutaneous at bedtime  insulin lispro (ADMELOG) corrective regimen sliding scale   SubCutaneous three times a day before meals  loratadine 10 milliGRAM(s) Oral daily  metroNIDAZOLE  IVPB 500 milliGRAM(s) IV Intermittent every 8 hours  potassium chloride    Tablet ER 20 milliEquivalent(s) Oral daily  ranolazine 500 milliGRAM(s) Oral two times a day  sodium chloride 0.9% lock flush 3 milliLiter(s) IV Push every 8 hours

## 2021-03-01 NOTE — DIETITIAN INITIAL EVALUATION ADULT. - ADD RECOMMEND
1) continue with current therapeutic diet. Encourage protein enriched foods with all meals. 2) monitor lytes/hydration replete as needed 3) Suggest add MVI w/minerals, Vit C 500 mg BID, add Zinc Sulfate 220 mg x 10 days to promote wound healing. 4) Bowel regimen 2/2 no documented BM x 4 days.

## 2021-03-01 NOTE — DIETITIAN NUTRITION RISK NOTIFICATION - ADDITIONAL COMMENTS/DIETITIAN RECOMMENDATIONS
· Additional Recommendations  1) continue with current therapeutic diet. Encourage protein enriched foods with all meals.   2) monitor lytes/hydration replete as needed   3) Suggest add MVI w/minerals, Vit C 500 mg BID, add Zinc Sulfate 220 mg x 10 days to promote wound healing.   4) Bowel regimen 2/2 no documented BM x 4 days.

## 2021-03-01 NOTE — PROGRESS NOTE ADULT - SUBJECTIVE AND OBJECTIVE BOX
CC: Foot ulcers    Subjective:  2/27/21: Admitted yesterday for nonhealing R foot ulcers, for further imaging and likely surgical management. Pt denies foot pain. Understands plan for likely OR Monday.  2/28/21: Plan for OR tmrw. No new complaints.  3/1: At bedside comfortable, no new events. Awaiting surgical intervention for later today.  No fever, chills, n, v.    REVIEW OF SYSTEMS: All other review of systems is negative unless indicated above.    Vital Signs Last 24 Hrs  T(C): 36.9 (01 Mar 2021 09:02), Max: 36.9 (28 Feb 2021 21:00)  T(F): 98.4 (01 Mar 2021 09:02), Max: 98.4 (28 Feb 2021 21:00)  HR: 72 (01 Mar 2021 09:02) (70 - 78)  BP: 136/82 (01 Mar 2021 09:02) (119/73 - 144/84)  BP(mean): --  RR: 18 (01 Mar 2021 09:02) (18 - 18)  SpO2: 99% (01 Mar 2021 09:02) (99% - 100%)    PHYSICAL EXAM:  General: NAD, lying in bed  HEENT:  pupils equal and reactive, EOMI, no oropharyngeal lesions, erythema, exudates, oral thrush  NECK:   supple, no carotid bruits, no palpable lymph nodes, no thyromegaly  CV:  +S1, +S2, regular, no murmurs or rubs  RESP:   lungs clear to auscultation bilaterally, no wheezing, rales, rhonchi, good air entry bilaterally  GI:  abdomen soft, non-tender, non-distended, normal BS, no bruits, no abdominal masses, no palpable masses  MSK:   normal muscle tone, no atrophy, no rigidity, no contractions  EXT:  no clubbing, no cyanosis, no edema, no calf pain, swelling or erythema, R foot wrapped  VASCULAR:  pulses equal and symmetric in the upper and lower extremities  NEURO:  AAOX3, no focal neurological deficits, follows all commands, able to move extremities spontaneously  SKIN:  no ulcers, lesions or rashes    MEDICATIONS  (STANDING):  ascorbic acid 1000 milliGRAM(s) Oral daily  aspirin enteric coated 81 milliGRAM(s) Oral daily  atorvastatin 40 milliGRAM(s) Oral at bedtime  bisacodyl Suppository 10 milliGRAM(s) Rectal daily  carvedilol 6.25 milliGRAM(s) Oral every 12 hours  cefepime   IVPB 2000 milliGRAM(s) IV Intermittent every 12 hours  cholecalciferol 1000 Unit(s) Oral daily  clopidogrel Tablet 75 milliGRAM(s) Oral daily  cyanocobalamin 1000 MICROGram(s) Oral daily  dextrose 40% Gel 15 Gram(s) Oral once  ferrous    sulfate 325 milliGRAM(s) Oral daily  fluconAZOLE   Tablet 100 milliGRAM(s) Oral daily  fluticasone propionate 50 MICROgram(s)/spray Nasal Spray 1 Spray(s) Both Nostrils two times a day  glucagon  Injectable 1 milliGRAM(s) IntraMuscular once  insulin glargine Injectable (LANTUS) 15 Unit(s) SubCutaneous at bedtime  insulin lispro (ADMELOG) corrective regimen sliding scale   SubCutaneous three times a day before meals  insulin lispro (ADMELOG) corrective regimen sliding scale   SubCutaneous at bedtime  loratadine 10 milliGRAM(s) Oral daily  metroNIDAZOLE  IVPB 500 milliGRAM(s) IV Intermittent every 8 hours  potassium chloride    Tablet ER 20 milliEquivalent(s) Oral daily  ranolazine 500 milliGRAM(s) Oral two times a day  sodium chloride 0.9% lock flush 3 milliLiter(s) IV Push every 8 hours    MEDICATIONS  (PRN):  acetaminophen   Tablet .. 650 milliGRAM(s) Oral every 6 hours PRN Mild Pain (1 - 3)  aluminum hydroxide/magnesium hydroxide/simethicone Suspension 30 milliLiter(s) Oral every 4 hours PRN Dyspepsia  guaiFENesin   Syrup  (Sugar-Free) 100 milliGRAM(s) Oral every 6 hours PRN Cough  magnesium hydroxide Suspension 30 milliLiter(s) Oral at bedtime PRN Constipation  ondansetron Injectable 4 milliGRAM(s) IV Push every 6 hours PRN Nausea  senna 2 Tablet(s) Oral at bedtime PRN Constipation                              11.9   5.37  )-----------( 281      ( 01 Mar 2021 08:29 )             36.5     03-01    135  |  103  |  20  ----------------------------<  141<H>  4.6   |  24  |  0.91    Ca    9.7      01 Mar 2021 08:29  Mg     1.8     03-01    TPro  6.4  /  Alb  3.1<L>  /  TBili  0.3  /  DBili  x   /  AST  15  /  ALT  25  /  AlkPhos  72  03-01    CAPILLARY BLOOD GLUCOSE      POCT Blood Glucose.: 167 mg/dL (01 Mar 2021 12:48)  POCT Blood Glucose.: 216 mg/dL (01 Mar 2021 05:54)  POCT Blood Glucose.: 284 mg/dL (28 Feb 2021 21:09)  POCT Blood Glucose.: 228 mg/dL (28 Feb 2021 16:50)    LIVER FUNCTIONS - ( 01 Mar 2021 08:29 )  Alb: 3.1 g/dL / Pro: 6.4 gm/dL / ALK PHOS: 72 U/L / ALT: 25 U/L / AST: 15 U/L / GGT: x                       Assessment and Plan:  65M hx CAD s/p recent LAD stent on 2/3/21, HFrEF (30%), PVD, IDDM2, peripheral neuropathy, h/o left 2nd toe amputation, recent admission due to OM of the right foot on long standing via PICC line admitted from [facility] for possible surgical intervention for not healing left foot ulcers. Had had debridements on 1/25 and 1/31/21.    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 toe gangrene, underlying PAD  -MRI with OM  -doppler with no DVT  -arterial doppler with no overt stenosis/occulsion  -BCx pending, ESR 30, CRP wnl  -PICC line in from prior  -per ID, prior wound cx show rare MSSA, bacteroides fragilis and strep constellatus  -continue Cefepime 2g q12 and Flagyl 500 iv q8 (since 1/21)  -per ID, adding Fluconazole 100 po qd for antifungal coverage  -appreciate podiatry input, planned for TMA today     #CAD s/p recent LAD stent 2/3/21, HFrEF (30%)  -continue DAPT with ASA, Plavix, do not hold for OR  -resumeLosartan tomorrow  -continue Coreg  -continue Ranexa  -hold lasix  -appreciate cards input    #Hyponatremic, hypovolemic: RESOLVED  -pt appears mildly hypovolemic on exam  -home lasix on hold    #IDDM2  -resume lantus 30, SSI, monitor BG  -hold home oral agents  -A1c 8.8 last month    #Mild JIGNESH  -continue FeSO4, Vit C    #nutrition c/s    #PT eval    #ppx- SCDs    #dispo:  -Home vs [facility] with long term antibiotics

## 2021-03-01 NOTE — DIETITIAN INITIAL EVALUATION ADULT. - OTHER INFO
65M hx CAD s/p recent LAD stent on 2/3/21, HFrEF (30%), PVD, IDDM2, peripheral neuropathy, h/o left 2nd toe amputation, recent admission due to OM of the right foot on long standing via PICC line admitted from [facility] for possible surgical intervention for not healing left foot ulcers. Had had debridements on 1/25 and 1/31/21. Non healing DM ulcer w/ OM 3rd toe gangrene- cultures indicating rare MSSA. Pending surgical intervention TMA. Pt currently NPO for OR however last night Dinner recall: Broiled chicken, mac and cheese, tossed small salad, sugar free jello, and tea (100% consumption).  Last BM 2/26 x 4 days ago. No bowel meds ordered at this time. Pt stated UBW x 2 months go 174# current admission weight 164# indicating a 6% significant weight loss. Pt stated he is a picky eater and his po intake will fluctuate. Pt appears thin with severe/moderate upper body muscle/fat wasting. Type 2 DM and monitors carbohydrate intake at home. Current diet Consistent carbohydrate + DASH/TLC diet. Discussed diet and provided instruction along with RD contact information. See below for recommendations. Pt meets criteria for severe protein/calorie malnutrition.

## 2021-03-01 NOTE — DIETITIAN INITIAL EVALUATION ADULT. - PERTINENT LABORATORY DATA
03-01    135  |  103  |  20  ----------------------------<  141<H>  4.6   |  24  |  0.91    Ca    9.7      01 Mar 2021 08:29  Mg     1.8     03-01    TPro  6.4  /  Alb  3.1<L>  /  TBili  0.3  /  DBili  x   /  AST  15  /  ALT  25  /  AlkPhos  72  03-01

## 2021-03-02 ENCOUNTER — TRANSCRIPTION ENCOUNTER (OUTPATIENT)
Age: 66
End: 2021-03-02

## 2021-03-02 LAB
ANION GAP SERPL CALC-SCNC: 9 MMOL/L — SIGNIFICANT CHANGE UP (ref 5–17)
BUN SERPL-MCNC: 17 MG/DL — SIGNIFICANT CHANGE UP (ref 7–23)
CALCIUM SERPL-MCNC: 8.8 MG/DL — SIGNIFICANT CHANGE UP (ref 8.5–10.1)
CHLORIDE SERPL-SCNC: 99 MMOL/L — SIGNIFICANT CHANGE UP (ref 96–108)
CO2 SERPL-SCNC: 23 MMOL/L — SIGNIFICANT CHANGE UP (ref 22–31)
CREAT SERPL-MCNC: 0.84 MG/DL — SIGNIFICANT CHANGE UP (ref 0.5–1.3)
GLUCOSE SERPL-MCNC: 166 MG/DL — HIGH (ref 70–99)
HCT VFR BLD CALC: 30.9 % — LOW (ref 39–50)
HGB BLD-MCNC: 10 G/DL — LOW (ref 13–17)
MCHC RBC-ENTMCNC: 26.7 PG — LOW (ref 27–34)
MCHC RBC-ENTMCNC: 32.4 GM/DL — SIGNIFICANT CHANGE UP (ref 32–36)
MCV RBC AUTO: 82.6 FL — SIGNIFICANT CHANGE UP (ref 80–100)
PLATELET # BLD AUTO: 242 K/UL — SIGNIFICANT CHANGE UP (ref 150–400)
POTASSIUM SERPL-MCNC: 4.6 MMOL/L — SIGNIFICANT CHANGE UP (ref 3.5–5.3)
POTASSIUM SERPL-SCNC: 4.6 MMOL/L — SIGNIFICANT CHANGE UP (ref 3.5–5.3)
RBC # BLD: 3.74 M/UL — LOW (ref 4.2–5.8)
RBC # FLD: 14.7 % — HIGH (ref 10.3–14.5)
SODIUM SERPL-SCNC: 131 MMOL/L — LOW (ref 135–145)
WBC # BLD: 6.59 K/UL — SIGNIFICANT CHANGE UP (ref 3.8–10.5)
WBC # FLD AUTO: 6.59 K/UL — SIGNIFICANT CHANGE UP (ref 3.8–10.5)

## 2021-03-02 PROCEDURE — 99232 SBSQ HOSP IP/OBS MODERATE 35: CPT

## 2021-03-02 RX ADMIN — LORATADINE 10 MILLIGRAM(S): 10 TABLET ORAL at 09:35

## 2021-03-02 RX ADMIN — SODIUM CHLORIDE 3 MILLILITER(S): 9 INJECTION INTRAMUSCULAR; INTRAVENOUS; SUBCUTANEOUS at 05:26

## 2021-03-02 RX ADMIN — Medication 100 MILLIGRAM(S): at 05:32

## 2021-03-02 RX ADMIN — CEFEPIME 100 MILLIGRAM(S): 1 INJECTION, POWDER, FOR SOLUTION INTRAMUSCULAR; INTRAVENOUS at 09:37

## 2021-03-02 RX ADMIN — SODIUM CHLORIDE 3 MILLILITER(S): 9 INJECTION INTRAMUSCULAR; INTRAVENOUS; SUBCUTANEOUS at 20:58

## 2021-03-02 RX ADMIN — FLUCONAZOLE 100 MILLIGRAM(S): 150 TABLET ORAL at 09:35

## 2021-03-02 RX ADMIN — CARVEDILOL PHOSPHATE 6.25 MILLIGRAM(S): 80 CAPSULE, EXTENDED RELEASE ORAL at 20:55

## 2021-03-02 RX ADMIN — Medication 100 MILLIGRAM(S): at 12:40

## 2021-03-02 RX ADMIN — Medication 2: at 08:32

## 2021-03-02 RX ADMIN — Medication 8: at 12:42

## 2021-03-02 RX ADMIN — Medication 81 MILLIGRAM(S): at 09:42

## 2021-03-02 RX ADMIN — Medication 1 SPRAY(S): at 20:58

## 2021-03-02 RX ADMIN — Medication 1000 MILLIGRAM(S): at 09:42

## 2021-03-02 RX ADMIN — Medication 1000 UNIT(S): at 09:42

## 2021-03-02 RX ADMIN — INSULIN GLARGINE 30 UNIT(S): 100 INJECTION, SOLUTION SUBCUTANEOUS at 20:56

## 2021-03-02 RX ADMIN — SODIUM CHLORIDE 3 MILLILITER(S): 9 INJECTION INTRAMUSCULAR; INTRAVENOUS; SUBCUTANEOUS at 17:17

## 2021-03-02 RX ADMIN — LOSARTAN POTASSIUM 25 MILLIGRAM(S): 100 TABLET, FILM COATED ORAL at 17:48

## 2021-03-02 RX ADMIN — RANOLAZINE 500 MILLIGRAM(S): 500 TABLET, FILM COATED, EXTENDED RELEASE ORAL at 09:34

## 2021-03-02 RX ADMIN — Medication 6: at 17:49

## 2021-03-02 RX ADMIN — ATORVASTATIN CALCIUM 40 MILLIGRAM(S): 80 TABLET, FILM COATED ORAL at 20:55

## 2021-03-02 RX ADMIN — CEFEPIME 100 MILLIGRAM(S): 1 INJECTION, POWDER, FOR SOLUTION INTRAMUSCULAR; INTRAVENOUS at 20:56

## 2021-03-02 RX ADMIN — Medication 20 MILLIGRAM(S): at 09:42

## 2021-03-02 RX ADMIN — SENNA PLUS 2 TABLET(S): 8.6 TABLET ORAL at 20:57

## 2021-03-02 RX ADMIN — Medication 100 MILLIGRAM(S): at 21:35

## 2021-03-02 RX ADMIN — CARVEDILOL PHOSPHATE 6.25 MILLIGRAM(S): 80 CAPSULE, EXTENDED RELEASE ORAL at 09:35

## 2021-03-02 RX ADMIN — RANOLAZINE 500 MILLIGRAM(S): 500 TABLET, FILM COATED, EXTENDED RELEASE ORAL at 20:57

## 2021-03-02 RX ADMIN — CLOPIDOGREL BISULFATE 75 MILLIGRAM(S): 75 TABLET, FILM COATED ORAL at 09:42

## 2021-03-02 NOTE — DISCHARGE NOTE NURSING/CASE MANAGEMENT/SOCIAL WORK - PATIENT PORTAL LINK FT
You can access the FollowMyHealth Patient Portal offered by [facility] by registering at the following website: [email or web address]. By joining [employer]’s FollowMyHealth portal, you will also be able to view your health information using other applications (apps) compatible with our system.

## 2021-03-02 NOTE — PHYSICAL THERAPY INITIAL EVALUATION ADULT - IMPAIRMENTS FOUND, PT EVAL
aerobic capacity/endurance/gait, locomotion, and balance/gross motor/integumentary integrity/muscle strength/sensory integrity
gait, locomotion, and balance

## 2021-03-02 NOTE — PROGRESS NOTE ADULT - SUBJECTIVE AND OBJECTIVE BOX
Patient is a 65y old  Male who presents with a chief complaint of foot ulcers (01 Mar 2021 13:44)      HPI:    65y old  Male  with PMH  of CAD s/p LAD stent on 2/23/21, HFrEF, PVD, DM2, h/o left 2nd toe amputation, recent admission due to OM of the right foot on long standing via PICC line admitted from [facility] for possible surgical intervention for not healing left foot ulcers.  He had a debridement on 1/25/21 and F/U debridement on 1/31/21. He had a PICC placed for LT ABX & was sent to [facility] for wound care, DM control & ABX. He was seen in the office for F/U and his wound are progressively degrading and is readmitted for MRI R foot W/WO KUMAR & will need future surgery possible TMA or partial TMA. Pt has seen Dr Arndt on last admit and had adequate perfusion.   Denies cp, dyspnea, cough, abdominal pain, problems with urination or bowel problems. Has peripheral neuropathy in both feet.     in Ed -  T(F): 98.5 Max: 98.5 HR: 85 (85 - 85) BP: 103/72  (103/72 - 103/72) RR: 15  (15 - 15) SpO2: 100%  (100% - 100%) ESR 30, HB 11, Cr 0.83, Na 130   seen by podiatrist and cardiology in ED       (26 Feb 2021 10:42)      Allergies    No Known Allergies    Intolerances        MEDICATIONS  (STANDING):  ascorbic acid 1000 milliGRAM(s) Oral daily  aspirin enteric coated 81 milliGRAM(s) Oral daily  atorvastatin 40 milliGRAM(s) Oral at bedtime  carvedilol 6.25 milliGRAM(s) Oral every 12 hours  cefepime   IVPB 2000 milliGRAM(s) IV Intermittent every 12 hours  cholecalciferol 1000 Unit(s) Oral daily  clopidogrel Tablet 75 milliGRAM(s) Oral daily  dextrose 40% Gel 15 Gram(s) Oral once  fluconAZOLE   Tablet 100 milliGRAM(s) Oral daily  fluticasone propionate 50 MICROgram(s)/spray Nasal Spray 1 Spray(s) Both Nostrils two times a day  furosemide    Tablet 20 milliGRAM(s) Oral daily  glucagon  Injectable 1 milliGRAM(s) IntraMuscular once  insulin glargine Injectable (LANTUS) 30 Unit(s) SubCutaneous at bedtime  insulin lispro (ADMELOG) corrective regimen sliding scale   SubCutaneous three times a day before meals  insulin lispro (ADMELOG) corrective regimen sliding scale   SubCutaneous at bedtime  loratadine 10 milliGRAM(s) Oral daily  losartan 25 milliGRAM(s) Oral daily  losartan 25 milliGRAM(s) Oral daily  metroNIDAZOLE  IVPB 500 milliGRAM(s) IV Intermittent every 8 hours  ranolazine 500 milliGRAM(s) Oral two times a day  senna 2 Tablet(s) Oral at bedtime  sodium chloride 0.9% lock flush 3 milliLiter(s) IV Push every 8 hours    MEDICATIONS  (PRN):  acetaminophen   Tablet .. 650 milliGRAM(s) Oral every 6 hours PRN Mild Pain (1 - 3)  aluminum hydroxide/magnesium hydroxide/simethicone Suspension 30 milliLiter(s) Oral every 4 hours PRN Dyspepsia  bisacodyl Suppository 10 milliGRAM(s) Rectal daily PRN Constipation  guaiFENesin   Syrup  (Sugar-Free) 100 milliGRAM(s) Oral every 6 hours PRN Cough  magnesium hydroxide Suspension 30 milliLiter(s) Oral daily PRN Constipation        RADIOLOGY    CBC Full  -  ( 01 Mar 2021 08:29 )  WBC Count : 5.37 K/uL  RBC Count : 4.39 M/uL  Hemoglobin : 11.9 g/dL  Hematocrit : 36.5 %  Platelet Count - Automated : 281 K/uL  Mean Cell Volume : 83.1 fl  Mean Cell Hemoglobin : 27.1 pg  Mean Cell Hemoglobin Concentration : 32.6 gm/dL  Auto Neutrophil # : x  Auto Lymphocyte # : x  Auto Monocyte # : x  Auto Eosinophil # : x  Auto Basophil # : x  Auto Neutrophil % : x  Auto Lymphocyte % : x  Auto Monocyte % : x  Auto Eosinophil % : x  Auto Basophil % : x      03-01    135  |  103  |  20  ----------------------------<  141<H>  4.6   |  24  |  0.91    Ca    9.7      01 Mar 2021 08:29  Mg     1.8     03-01    TPro  6.4  /  Alb  3.1<L>  /  TBili  0.3  /  DBili  x   /  AST  15  /  ALT  25  /  AlkPhos  72  03-01    Culture - Fungal, Tissue (03.01.21 @ 15:13)   Specimen Source: .Tissue #2- bone right first metatarsal   Culture Results:   Testing in progress

## 2021-03-02 NOTE — DISCHARGE NOTE NURSING/CASE MANAGEMENT/SOCIAL WORK - NSSCTYPOFSERV_GEN_ALL_CORE
RN/PT                                          IV ANTIBIOTICS   IV ANTIBIOTICS start of care 3/6 PM dose

## 2021-03-02 NOTE — PHYSICAL THERAPY INITIAL EVALUATION ADULT - GENERAL OBSERVATIONS, REHAB EVAL
RN confirmed WBAT. Pt received resting comfortably in bed. Agreeable to PT. Planning for R foot amputation sx. L second toe amputated a few years back. L second metatarsal head callus. R foot in bandage. 4th toe dark brown. Pt denied Darco shoes. Pt was able to walk on the hallway.
R foot dressing / Ace wrap / cast boot donned; pt rec'd in bed supine; denied pain

## 2021-03-02 NOTE — PHYSICAL THERAPY INITIAL EVALUATION ADULT - PLANNED THERAPY INTERVENTIONS, PT EVAL
stair training/gait training/transfer training
balance training/bed mobility training/gait training/lumbar stabilization/manual therapy techniques/motor coordination training/neuromuscular re-education/ROM/strengthening/stretching/transfer training

## 2021-03-02 NOTE — PHYSICAL THERAPY INITIAL EVALUATION ADULT - GAIT DEVIATIONS NOTED, PT EVAL
decreased rasheeda/increased time in double stance/decreased velocity of limb motion/decreased step length/decreased stride length/decreased swing-to-stance ratio/decreased weight-shifting ability
decreased rasheeda/decreased step length

## 2021-03-02 NOTE — PHYSICAL THERAPY INITIAL EVALUATION ADULT - LIVES WITH, PROFILE
1 GENES[place]; 1 flight inside with handrail/children/spouse
wife is home, daughter going to college/children/spouse

## 2021-03-02 NOTE — PROGRESS NOTE ADULT - SUBJECTIVE AND OBJECTIVE BOX
CC: Foot ulcers    Subjective:  2/27/21: Admitted yesterday for nonhealing R foot ulcers, for further imaging and likely surgical management. Pt denies foot pain. Understands plan for likely OR Monday.  2/28/21: Plan for OR tmrw. No new complaints.  3/1: At bedside comfortable, no new events. Awaiting surgical intervention for later today.  No fever, chills, n, v.  3/2: Awake, alert, comfortable.  Pt is POD 1 TMA.  No complications, foot wrapped.    REVIEW OF SYSTEMS: All other review of systems is negative unless indicated above.    Vital Signs Last 24 Hrs  T(C): 36.9 (02 Mar 2021 08:04), Max: 36.9 (01 Mar 2021 21:30)  T(F): 98.4 (02 Mar 2021 08:04), Max: 98.4 (01 Mar 2021 21:30)  HR: 92 (02 Mar 2021 08:04) (68 - 92)  BP: 101/68 (02 Mar 2021 08:04) (98/64 - 144/88)  BP(mean): --  RR: 18 (02 Mar 2021 08:04) (15 - 18)  SpO2: 96% (02 Mar 2021 08:04) (96% - 100%)    PHYSICAL EXAM:  General: NAD, lying in bed  HEENT:  pupils equal and reactive, EOMI, no oropharyngeal lesions, erythema, exudates, oral thrush  NECK:   supple, no carotid bruits, no palpable lymph nodes, no thyromegaly  CV:  +S1, +S2, regular, no murmurs or rubs  RESP:   lungs clear to auscultation bilaterally, no wheezing, rales, rhonchi, good air entry bilaterally  GI:  abdomen soft, non-tender, non-distended, normal BS, no bruits, no abdominal masses, no palpable masses  MSK:   normal muscle tone, no atrophy, no rigidity, no contractions  EXT:  no clubbing, no cyanosis, no edema, no calf pain, swelling or erythema, R foot wrapped  VASCULAR:  pulses equal and symmetric in the upper and lower extremities  NEURO:  AAOX3, no focal neurological deficits, follows all commands, able to move extremities spontaneously  SKIN:  no ulcers, lesions or rashes    MEDICATIONS  (STANDING):  ascorbic acid 1000 milliGRAM(s) Oral daily  aspirin enteric coated 81 milliGRAM(s) Oral daily  atorvastatin 40 milliGRAM(s) Oral at bedtime  carvedilol 6.25 milliGRAM(s) Oral every 12 hours  cefepime   IVPB 2000 milliGRAM(s) IV Intermittent every 12 hours  cholecalciferol 1000 Unit(s) Oral daily  clopidogrel Tablet 75 milliGRAM(s) Oral daily  dextrose 40% Gel 15 Gram(s) Oral once  fluconAZOLE   Tablet 100 milliGRAM(s) Oral daily  fluticasone propionate 50 MICROgram(s)/spray Nasal Spray 1 Spray(s) Both Nostrils two times a day  furosemide    Tablet 20 milliGRAM(s) Oral daily  glucagon  Injectable 1 milliGRAM(s) IntraMuscular once  insulin glargine Injectable (LANTUS) 30 Unit(s) SubCutaneous at bedtime  insulin lispro (ADMELOG) corrective regimen sliding scale   SubCutaneous three times a day before meals  insulin lispro (ADMELOG) corrective regimen sliding scale   SubCutaneous at bedtime  loratadine 10 milliGRAM(s) Oral daily  losartan 25 milliGRAM(s) Oral daily  losartan 25 milliGRAM(s) Oral daily  metroNIDAZOLE  IVPB 500 milliGRAM(s) IV Intermittent every 8 hours  ranolazine 500 milliGRAM(s) Oral two times a day  senna 2 Tablet(s) Oral at bedtime  sodium chloride 0.9% lock flush 3 milliLiter(s) IV Push every 8 hours    MEDICATIONS  (PRN):  acetaminophen   Tablet .. 650 milliGRAM(s) Oral every 6 hours PRN Mild Pain (1 - 3)  aluminum hydroxide/magnesium hydroxide/simethicone Suspension 30 milliLiter(s) Oral every 4 hours PRN Dyspepsia  bisacodyl Suppository 10 milliGRAM(s) Rectal daily PRN Constipation  guaiFENesin   Syrup  (Sugar-Free) 100 milliGRAM(s) Oral every 6 hours PRN Cough  magnesium hydroxide Suspension 30 milliLiter(s) Oral daily PRN Constipation                              10.0   6.59  )-----------( 242      ( 02 Mar 2021 08:55 )             30.9     03-02    131<L>  |  99  |  17  ----------------------------<  166<H>  4.6   |  23  |  0.84    Ca    8.8      02 Mar 2021 08:55  Mg     1.8     03-01    TPro  6.4  /  Alb  3.1<L>  /  TBili  0.3  /  DBili  x   /  AST  15  /  ALT  25  /  AlkPhos  72  03-01    CAPILLARY BLOOD GLUCOSE      POCT Blood Glucose.: 307 mg/dL (02 Mar 2021 11:47)  POCT Blood Glucose.: 165 mg/dL (02 Mar 2021 07:56)  POCT Blood Glucose.: 190 mg/dL (01 Mar 2021 20:51)  POCT Blood Glucose.: 112 mg/dL (01 Mar 2021 17:35)    LIVER FUNCTIONS - ( 01 Mar 2021 08:29 )  Alb: 3.1 g/dL / Pro: 6.4 gm/dL / ALK PHOS: 72 U/L / ALT: 25 U/L / AST: 15 U/L / GGT: x                 Culture Results:   Testing in progress (03-01-21 @ 15:13)  Culture Results:   Testing in progress (03-01-21 @ 15:13              Assessment and Plan:  65M hx CAD s/p recent LAD stent on 2/3/21, HFrEF (30%), PVD, IDDM2, peripheral neuropathy, h/o left 2nd toe amputation, recent admission due to OM of the right foot on long standing via PICC line admitted from [facility] for possible surgical intervention for not healing left foot ulcers. Had had debridements on 1/25 and 1/31/21.    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 toe gangrene, underlying PAD  -MRI with OM  -doppler with no DVT  -arterial doppler with no overt stenosis/occulsion  -BCx no growth, ESR 30, CRP wnl  -PICC line in from prior  -per ID, prior wound cx show rare MSSA, bacteroides fragilis and strep constellatus  -continue Cefepime 2g q12 and Flagyl 500 iv q8 (since 1/21)  -per ID, adding Fluconazole 100 po qd for antifungal coverage  -appreciate podiatry input, s/p TMA POD # 1     #CAD s/p recent LAD stent 2/3/21, HFrEF (30%)  -DAPT with ASA  -Losartan   -continue Coreg  -continue Ranexa  -hold lasix  -appreciate cards input    #Hyponatremic, hypovolemic: RESOLVED  -pt appears mildly hypovolemic on exam  -home lasix on hold    #IDDM2  -resume lantus 30, SSI, monitor BG  -hold home oral agents  -A1c 8.8 last month    #Mild JIGNESH  -continue FeSO4, Vit C    #nutrition c/s    #PT eval    #ppx- SCDs    #dispo:  -Home vs [facility] with long term antibiotics

## 2021-03-02 NOTE — PHYSICAL THERAPY INITIAL EVALUATION ADULT - PERTINENT HX OF CURRENT PROBLEM, REHAB EVAL
Pt came from [facility] for R foot OM and wounds not healing. MRI showed R 1st to 4th OM. Podiatry planning complete transmetatarsal amputation on Monday.
non-healing R foot ulcers

## 2021-03-02 NOTE — PHYSICAL THERAPY INITIAL EVALUATION ADULT - MODALITIES TREATMENT COMMENTS
pt left seated in chair post Re-eval; R cast boot donned; callbell in reach; pt instructed not to get up alone; call nursing for S; yan well; denied pain; refused elevation of R LE; no chair alarm necessary as per RADHA Baez

## 2021-03-02 NOTE — DISCHARGE NOTE NURSING/CASE MANAGEMENT/SOCIAL WORK - NSSCNAMETXT_GEN_ALL_CORE
[facility]  [facility]              [facility]     [facility]

## 2021-03-03 LAB
CULTURE RESULTS: SIGNIFICANT CHANGE UP
CULTURE RESULTS: SIGNIFICANT CHANGE UP
SARS-COV-2 RNA SPEC QL NAA+PROBE: SIGNIFICANT CHANGE UP
SPECIMEN SOURCE: SIGNIFICANT CHANGE UP
SPECIMEN SOURCE: SIGNIFICANT CHANGE UP

## 2021-03-03 PROCEDURE — 99232 SBSQ HOSP IP/OBS MODERATE 35: CPT

## 2021-03-03 RX ADMIN — SODIUM CHLORIDE 3 MILLILITER(S): 9 INJECTION INTRAMUSCULAR; INTRAVENOUS; SUBCUTANEOUS at 05:47

## 2021-03-03 RX ADMIN — Medication 100 MILLIGRAM(S): at 21:44

## 2021-03-03 RX ADMIN — ATORVASTATIN CALCIUM 40 MILLIGRAM(S): 80 TABLET, FILM COATED ORAL at 21:05

## 2021-03-03 RX ADMIN — Medication 20 MILLIGRAM(S): at 10:53

## 2021-03-03 RX ADMIN — SODIUM CHLORIDE 3 MILLILITER(S): 9 INJECTION INTRAMUSCULAR; INTRAVENOUS; SUBCUTANEOUS at 21:06

## 2021-03-03 RX ADMIN — Medication 1000 UNIT(S): at 10:47

## 2021-03-03 RX ADMIN — CARVEDILOL PHOSPHATE 6.25 MILLIGRAM(S): 80 CAPSULE, EXTENDED RELEASE ORAL at 10:47

## 2021-03-03 RX ADMIN — Medication 100 MILLIGRAM(S): at 05:48

## 2021-03-03 RX ADMIN — Medication 1000 MILLIGRAM(S): at 10:47

## 2021-03-03 RX ADMIN — CEFEPIME 100 MILLIGRAM(S): 1 INJECTION, POWDER, FOR SOLUTION INTRAMUSCULAR; INTRAVENOUS at 10:53

## 2021-03-03 RX ADMIN — Medication 100 MILLIGRAM(S): at 15:17

## 2021-03-03 RX ADMIN — INSULIN GLARGINE 30 UNIT(S): 100 INJECTION, SOLUTION SUBCUTANEOUS at 21:05

## 2021-03-03 RX ADMIN — RANOLAZINE 500 MILLIGRAM(S): 500 TABLET, FILM COATED, EXTENDED RELEASE ORAL at 21:06

## 2021-03-03 RX ADMIN — RANOLAZINE 500 MILLIGRAM(S): 500 TABLET, FILM COATED, EXTENDED RELEASE ORAL at 10:47

## 2021-03-03 RX ADMIN — FLUCONAZOLE 100 MILLIGRAM(S): 150 TABLET ORAL at 10:47

## 2021-03-03 RX ADMIN — Medication 4: at 17:14

## 2021-03-03 RX ADMIN — CARVEDILOL PHOSPHATE 6.25 MILLIGRAM(S): 80 CAPSULE, EXTENDED RELEASE ORAL at 21:05

## 2021-03-03 RX ADMIN — LOSARTAN POTASSIUM 25 MILLIGRAM(S): 100 TABLET, FILM COATED ORAL at 10:47

## 2021-03-03 RX ADMIN — LORATADINE 10 MILLIGRAM(S): 10 TABLET ORAL at 10:47

## 2021-03-03 RX ADMIN — CEFEPIME 100 MILLIGRAM(S): 1 INJECTION, POWDER, FOR SOLUTION INTRAMUSCULAR; INTRAVENOUS at 21:05

## 2021-03-03 RX ADMIN — Medication 81 MILLIGRAM(S): at 10:45

## 2021-03-03 RX ADMIN — Medication 1 SPRAY(S): at 10:53

## 2021-03-03 RX ADMIN — SODIUM CHLORIDE 3 MILLILITER(S): 9 INJECTION INTRAMUSCULAR; INTRAVENOUS; SUBCUTANEOUS at 15:17

## 2021-03-03 RX ADMIN — CLOPIDOGREL BISULFATE 75 MILLIGRAM(S): 75 TABLET, FILM COATED ORAL at 10:45

## 2021-03-03 NOTE — PROGRESS NOTE ADULT - SUBJECTIVE AND OBJECTIVE BOX
Patient is a 65y old  Male who presents with a chief complaint of foot ulcers (02 Mar 2021 13:43)      HPI:    65y old  Male  with PMH  of CAD s/p LAD stent on 2/23/21, HFrEF, PVD, DM2, h/o left 2nd toe amputation, recent admission due to OM of the right foot on long standing via PICC line admitted from [facility] for possible surgical intervention for not healing left foot ulcers.  He had a debridement on 1/25/21 and F/U debridement on 1/31/21. He had a PICC placed for LT ABX & was sent to [facility] for wound care, DM control & ABX. He was seen in the office for F/U and his wound are progressively degrading and is readmitted for MRI R foot W/WO KUMAR & will need future surgery possible TMA or partial TMA. Pt has seen Dr Arndt on last admit and had adequate perfusion.   Denies cp, dyspnea, cough, abdominal pain, problems with urination or bowel problems. Has peripheral neuropathy in both feet.     in Ed -  T(F): 98.5 Max: 98.5 HR: 85 (85 - 85) BP: 103/72  (103/72 - 103/72) RR: 15  (15 - 15) SpO2: 100%  (100% - 100%) ESR 30, HB 11, Cr 0.83, Na 130   seen by podiatrist and cardiology in ED       (26 Feb 2021 10:42)      Allergies    No Known Allergies    Intolerances        MEDICATIONS  (STANDING):  ascorbic acid 1000 milliGRAM(s) Oral daily  aspirin enteric coated 81 milliGRAM(s) Oral daily  atorvastatin 40 milliGRAM(s) Oral at bedtime  carvedilol 6.25 milliGRAM(s) Oral every 12 hours  cefepime   IVPB 2000 milliGRAM(s) IV Intermittent every 12 hours  cholecalciferol 1000 Unit(s) Oral daily  clopidogrel Tablet 75 milliGRAM(s) Oral daily  dextrose 40% Gel 15 Gram(s) Oral once  fluconAZOLE   Tablet 100 milliGRAM(s) Oral daily  fluticasone propionate 50 MICROgram(s)/spray Nasal Spray 1 Spray(s) Both Nostrils two times a day  furosemide    Tablet 20 milliGRAM(s) Oral daily  glucagon  Injectable 1 milliGRAM(s) IntraMuscular once  insulin glargine Injectable (LANTUS) 30 Unit(s) SubCutaneous at bedtime  insulin lispro (ADMELOG) corrective regimen sliding scale   SubCutaneous at bedtime  insulin lispro (ADMELOG) corrective regimen sliding scale   SubCutaneous three times a day before meals  loratadine 10 milliGRAM(s) Oral daily  losartan 25 milliGRAM(s) Oral daily  losartan 25 milliGRAM(s) Oral daily  metroNIDAZOLE  IVPB 500 milliGRAM(s) IV Intermittent every 8 hours  ranolazine 500 milliGRAM(s) Oral two times a day  senna 2 Tablet(s) Oral at bedtime  sodium chloride 0.9% lock flush 3 milliLiter(s) IV Push every 8 hours    MEDICATIONS  (PRN):  acetaminophen   Tablet .. 650 milliGRAM(s) Oral every 6 hours PRN Mild Pain (1 - 3)  aluminum hydroxide/magnesium hydroxide/simethicone Suspension 30 milliLiter(s) Oral every 4 hours PRN Dyspepsia  bisacodyl Suppository 10 milliGRAM(s) Rectal daily PRN Constipation  guaiFENesin   Syrup  (Sugar-Free) 100 milliGRAM(s) Oral every 6 hours PRN Cough  magnesium hydroxide Suspension 30 milliLiter(s) Oral daily PRN Constipation        RADIOLOGY    CBC Full  -  ( 02 Mar 2021 08:55 )  WBC Count : 6.59 K/uL  RBC Count : 3.74 M/uL  Hemoglobin : 10.0 g/dL  Hematocrit : 30.9 %  Platelet Count - Automated : 242 K/uL  Mean Cell Volume : 82.6 fl  Mean Cell Hemoglobin : 26.7 pg  Mean Cell Hemoglobin Concentration : 32.4 gm/dL  Auto Neutrophil # : x  Auto Lymphocyte # : x  Auto Monocyte # : x  Auto Eosinophil # : x  Auto Basophil # : x  Auto Neutrophil % : x  Auto Lymphocyte % : x  Auto Monocyte % : x  Auto Eosinophil % : x  Auto Basophil % : x      03-02    131<L>  |  99  |  17  ----------------------------<  166<H>  4.6   |  23  |  0.84    Ca    8.8      02 Mar 2021 08:55  Mg     1.8     03-01    TPro  6.4  /  Alb  3.1<L>  /  TBili  0.3  /  DBili  x   /  AST  15  /  ALT  25  /  AlkPhos  72  03-01      Culture - Fungal, Tissue (03.01.21 @ 15:13)   Specimen Source: .Tissue #2- bone right first metatarsal   Culture Results:   Testing in progress Culture - Acid Fast - Tissue w/Smear (03.01.21 @ 15:13)   Specimen Source: .Tissue #2- bone right first metatarsal   Acid Fast Bacilli Smear:   No acid fast bacilli seen by fluorochrome stain Culture - Tissue with Gram Stain (03.01.21 @ 15:13)   Gram Stain:   No polymorphonuclear cells seen per low power field   No organisms seen per oil power field   Specimen Source: .Tissue #2- bone right first metatarsal   Culture Results:   No growth Culture - Surgical Swab (03.01.21 @ 15:13)   Specimen Source: .Surgical Swab #1- right foot deep wound   Culture Results:   Rare Pseudomonas aeruginosa Culture - Blood (02.26.21 @ 10:41)   Specimen Source: .Blood None   Culture Results:   No growth to date.

## 2021-03-03 NOTE — PROGRESS NOTE ADULT - SUBJECTIVE AND OBJECTIVE BOX
CC: Foot ulcers    Subjective:  2/27/21: Admitted yesterday for nonhealing R foot ulcers, for further imaging and likely surgical management. Pt denies foot pain. Understands plan for likely OR Monday.  2/28/21: Plan for OR tmrw. No new complaints.  3/1: At bedside comfortable, no new events. Awaiting surgical intervention for later today.  No fever, chills, n, v.  3/2: Awake, alert, comfortable.  Pt is POD 1 TMA.  No complications, foot wrapped.  3/3: POD 2, no complaints, feeling well.  Denies fever, chills, N, V, abd pain, CP, SOB.    REVIEW OF SYSTEMS: All other review of systems is negative unless indicated above.    Vital Signs Last 24 Hrs  T(C): 36.4 (03 Mar 2021 11:11), Max: 36.7 (02 Mar 2021 20:50)  T(F): 97.5 (03 Mar 2021 11:11), Max: 98 (02 Mar 2021 20:50)  HR: 81 (03 Mar 2021 11:11) (74 - 81)  BP: 145/78 (03 Mar 2021 11:11) (134/80 - 152/82)  BP(mean): --  RR: 18 (03 Mar 2021 11:11) (18 - 18)  SpO2: 100% (03 Mar 2021 11:11) (100% - 100%)    PHYSICAL EXAM:  General: NAD, lying in bed  HEENT:  pupils equal and reactive, EOMI, no oropharyngeal lesions, erythema, exudates, oral thrush  NECK:   supple, no carotid bruits, no palpable lymph nodes, no thyromegaly  CV:  +S1, +S2, regular, no murmurs or rubs  RESP:   lungs clear to auscultation bilaterally, no wheezing, rales, rhonchi, good air entry bilaterally  GI:  abdomen soft, non-tender, non-distended, normal BS, no bruits, no abdominal masses, no palpable masses  MSK:   normal muscle tone, no atrophy, no rigidity, no contractions  EXT:  no clubbing, no cyanosis, no edema, no calf pain, swelling or erythema, R foot wrapped  VASCULAR:  pulses equal and symmetric in the upper and lower extremities  NEURO:  AAOX3, no focal neurological deficits, follows all commands, able to move extremities spontaneously  SKIN:  no ulcers, lesions or rashes    MEDICATIONS  (STANDING):  ascorbic acid 1000 milliGRAM(s) Oral daily  aspirin enteric coated 81 milliGRAM(s) Oral daily  atorvastatin 40 milliGRAM(s) Oral at bedtime  carvedilol 6.25 milliGRAM(s) Oral every 12 hours  cefepime   IVPB 2000 milliGRAM(s) IV Intermittent every 12 hours  cholecalciferol 1000 Unit(s) Oral daily  clopidogrel Tablet 75 milliGRAM(s) Oral daily  dextrose 40% Gel 15 Gram(s) Oral once  fluconAZOLE   Tablet 100 milliGRAM(s) Oral daily  fluticasone propionate 50 MICROgram(s)/spray Nasal Spray 1 Spray(s) Both Nostrils two times a day  furosemide    Tablet 20 milliGRAM(s) Oral daily  glucagon  Injectable 1 milliGRAM(s) IntraMuscular once  insulin glargine Injectable (LANTUS) 30 Unit(s) SubCutaneous at bedtime  insulin lispro (ADMELOG) corrective regimen sliding scale   SubCutaneous three times a day before meals  insulin lispro (ADMELOG) corrective regimen sliding scale   SubCutaneous at bedtime  loratadine 10 milliGRAM(s) Oral daily  losartan 25 milliGRAM(s) Oral daily  losartan 25 milliGRAM(s) Oral daily  metroNIDAZOLE  IVPB 500 milliGRAM(s) IV Intermittent every 8 hours  ranolazine 500 milliGRAM(s) Oral two times a day  senna 2 Tablet(s) Oral at bedtime  sodium chloride 0.9% lock flush 3 milliLiter(s) IV Push every 8 hours    MEDICATIONS  (PRN):  acetaminophen   Tablet .. 650 milliGRAM(s) Oral every 6 hours PRN Mild Pain (1 - 3)  aluminum hydroxide/magnesium hydroxide/simethicone Suspension 30 milliLiter(s) Oral every 4 hours PRN Dyspepsia  bisacodyl Suppository 10 milliGRAM(s) Rectal daily PRN Constipation  guaiFENesin   Syrup  (Sugar-Free) 100 milliGRAM(s) Oral every 6 hours PRN Cough  magnesium hydroxide Suspension 30 milliLiter(s) Oral daily PRN Constipation                              10.0   6.59  )-----------( 242      ( 02 Mar 2021 08:55 )             30.9     03-02    131<L>  |  99  |  17  ----------------------------<  166<H>  4.6   |  23  |  0.84    Ca    8.8      02 Mar 2021 08:55      CAPILLARY BLOOD GLUCOSE      POCT Blood Glucose.: 150 mg/dL (03 Mar 2021 11:56)  POCT Blood Glucose.: 120 mg/dL (03 Mar 2021 08:16)  POCT Blood Glucose.: 183 mg/dL (02 Mar 2021 20:44)  POCT Blood Glucose.: 251 mg/dL (02 Mar 2021 17:41)              Assessment and Plan:  65M hx CAD s/p recent LAD stent on 2/3/21, HFrEF (30%), PVD, IDDM2, peripheral neuropathy, h/o left 2nd toe amputation, recent admission due to OM of the right foot on long standing via PICC line admitted from [facility] for possible surgical intervention for not healing left foot ulcers. Had had debridements on 1/25 and 1/31/21.    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 toe gangrene, underlying PAD  -MRI with OM  -doppler with no DVT  -arterial doppler with no overt stenosis/occulsion  -BCx no growth, ESR 30, CRP wnl  -PICC line in from prior  -per ID, prior wound cx show rare MSSA, bacteroides fragilis and strep constellatus  -continue Cefepime 2g q12 and Flagyl 500 iv q8 (since 1/21)  -per ID, adding Fluconazole 100 po qd for antifungal coverage  -appreciate podiatry input, s/p TMA POD # 2    #CAD s/p recent LAD stent 2/3/21, HFrEF (30%)  -DAPT with ASA  -Losartan   -continue Coreg  -continue Ranexa  -hold lasix  -appreciate cards input    #Hyponatremic, hypovolemic: RESOLVED  -pt appears mildly hypovolemic on exam  -home lasix on hold    #IDDM2  -resume lantus 30, SSI, monitor BG  -hold home oral agents  -A1c 8.8 last month    #Mild JIGNESH  -continue FeSO4, Vit C    #nutrition c/s    #PT eval    #ppx- SCDs    #dispo:  -Home vs [facility] with long term antibiotics

## 2021-03-03 NOTE — PROGRESS NOTE ADULT - SUBJECTIVE AND OBJECTIVE BOX
PCP:    REQUESTING PHYSICIAN:    REASON FOR CONSULT:    CHIEF COMPLAINT:    HPI:  HPI: 65 year old man with a history of CAD who recent LAD stent ,after presenting with acute  systolic heart failure ,  PAD, HTN, HLD, DM2 (on insulin), neuropathy, BPH, who was  diagnosed to have  foot osteomyelitis (treated with long-course of IV antibiotics via PICC) who is being admitted for further management of right foot toes gangrene and cellulitis treatment , possible amputation of toes ,     Patient was recently hospitalized with acute systolic heart failure ,subsequent LAD stent on dual antiplatelet agent , patient currently feeling well cardiac wise though he has limited physical activity denies any chest pain or shortness of breath or dizziness or palpitation ,     Patient says  he does mild swollen right LE than left which is normal for  him     21 patient  is feeling fine , scheduled  to have transmetatarsal amputation   21 Patient is feeling ok ,  denies any chest pain or shortness of breath or dizziness  afebrile   3/3/21 Pt feels improved. No chest pain or shortness    PAST MEDICAL & SURGICAL HISTORY:  Insulin dependent type 2 diabetes mellitus    Basal cell carcinoma  Of face   s/p excision Oct 2017    PAD (peripheral artery disease)    CAD (coronary atherosclerotic disease)    Hypercholesteremia    Hypertension    BPH (Benign Prostatic Hyperplasia)    Status post debridement  right foot ulcer, with I&amp;D    History of amputation of toe  L foot 2nd toe amputation        SOCIAL HISTORY:    FAMILY HISTORY:  Family history of hyperlipidemia    Family history of hypertension    FH: myocardial infarction  Father had MI,  age 47        ALLERGIES:  Allergies    No Known Allergies    Intolerances        MEDICATIONS:    MEDICATIONS  (STANDING):  ascorbic acid 1000 milliGRAM(s) Oral daily  aspirin enteric coated 81 milliGRAM(s) Oral daily  atorvastatin 40 milliGRAM(s) Oral at bedtime  carvedilol 6.25 milliGRAM(s) Oral every 12 hours  cefepime   IVPB 2000 milliGRAM(s) IV Intermittent every 12 hours  cholecalciferol 1000 Unit(s) Oral daily  clopidogrel Tablet 75 milliGRAM(s) Oral daily  dextrose 40% Gel 15 Gram(s) Oral once  fluconAZOLE   Tablet 100 milliGRAM(s) Oral daily  fluticasone propionate 50 MICROgram(s)/spray Nasal Spray 1 Spray(s) Both Nostrils two times a day  furosemide    Tablet 20 milliGRAM(s) Oral daily  glucagon  Injectable 1 milliGRAM(s) IntraMuscular once  insulin glargine Injectable (LANTUS) 30 Unit(s) SubCutaneous at bedtime  insulin lispro (ADMELOG) corrective regimen sliding scale   SubCutaneous three times a day before meals  insulin lispro (ADMELOG) corrective regimen sliding scale   SubCutaneous at bedtime  loratadine 10 milliGRAM(s) Oral daily  losartan 25 milliGRAM(s) Oral daily  losartan 25 milliGRAM(s) Oral daily  metroNIDAZOLE  IVPB 500 milliGRAM(s) IV Intermittent every 8 hours  ranolazine 500 milliGRAM(s) Oral two times a day  senna 2 Tablet(s) Oral at bedtime  sodium chloride 0.9% lock flush 3 milliLiter(s) IV Push every 8 hours    MEDICATIONS  (PRN):  acetaminophen   Tablet .. 650 milliGRAM(s) Oral every 6 hours PRN Mild Pain (1 - 3)  aluminum hydroxide/magnesium hydroxide/simethicone Suspension 30 milliLiter(s) Oral every 4 hours PRN Dyspepsia  bisacodyl Suppository 10 milliGRAM(s) Rectal daily PRN Constipation  guaiFENesin   Syrup  (Sugar-Free) 100 milliGRAM(s) Oral every 6 hours PRN Cough  magnesium hydroxide Suspension 30 milliLiter(s) Oral daily PRN Constipation        Vital Signs Last 24 Hrs  T(C): 36.1 (03 Mar 2021 16:15), Max: 36.7 (02 Mar 2021 20:50)  T(F): 97 (03 Mar 2021 16:15), Max: 98 (02 Mar 2021 20:50)  HR: 74 (03 Mar 2021 16:15) (74 - 81)  BP: 123/70 (03 Mar 2021 16:15) (123/70 - 145/78)  BP(mean): --  RR: 18 (03 Mar 2021 16:15) (18 - 18)  SpO2: 99% (03 Mar 2021 16:15) (99% - 100%)Daily     Daily I&O's Summary      PHYSICAL EXAM:    Constitutional: NAD, awake and alert, well-developed  HEENT: PERR, EOMI,  No oral cyananosis.  Neck:  supple,  No JVD  Respiratory: Breath sounds are clear bilaterally, No wheezing, rales or rhonchi  Cardiovascular: S1 and S2, regular rate and rhythm, no Murmurs, gallops or rubs  Gastrointestinal: Bowel Sounds present, soft, nontender.   Extremities: No peripheral edema. No clubbing or cyanosis.  Vascular: 2+ peripheral pulses  Neurological: A/O x 3, no focal deficits  Musculoskeletal: no calf tenderness.  Skin: No rashes.      LABS: All Labs Reviewed:                        10.0   6.59  )-----------( 242      ( 02 Mar 2021 08:55 )             30.9                         11.9   5.37  )-----------( 281      ( 01 Mar 2021 08:29 )             36.5     02 Mar 2021 08:55    131    |  99     |  17     ----------------------------<  166    4.6     |  23     |  0.84   01 Mar 2021 08:29    135    |  103    |  20     ----------------------------<  141    4.6     |  24     |  0.91     Ca    8.8        02 Mar 2021 08:55  Ca    9.7        01 Mar 2021 08:29  Mg     1.8       01 Mar 2021 08:29    TPro  6.4    /  Alb  3.1    /  TBili  0.3    /  DBili  x      /  AST  15     /  ALT  25     /  AlkPhos  72     01 Mar 2021 08:29          Blood Culture: Organism --  Gram Stain Blood -- Gram Stain   No polymorphonuclear cells seen per low power field  No organisms seen per oil power field  Specimen Source .Surgical Swab #1- right foot deep wound  Culture-Blood --            RADIOLOGY/EKG:< from: 12 Lead ECG (21 @ 07:48) >  Diagnosis Line Sinus rhythm with 1st degree A-V block  Low voltage QRS  Septal infarct , age undetermined  Abnormal ECG  When compared with ECG of 2021 07:50,  No significant change was found  Confirmed by MARY ANN KIM, KIMI MIKE [id number]) on 3/1/2021 9:09:05 AM    < end of copied text >        ECHO/CARDIAC CATHTERIZATION/STRESS TEST:

## 2021-03-03 NOTE — PROGRESS NOTE ADULT - PROBLEM SELECTOR PROBLEM 5
Osteomyelitis of right foot, unspecified type

## 2021-03-03 NOTE — PROGRESS NOTE ADULT - PROBLEM SELECTOR PLAN 5
scheduled to have TMA ,     no evidence of DVT on right LE
s/p amputation.
scheduled to have TMA ,     no evidence of DVT on right LE

## 2021-03-04 DIAGNOSIS — I10 ESSENTIAL (PRIMARY) HYPERTENSION: ICD-10-CM

## 2021-03-04 PROCEDURE — 99232 SBSQ HOSP IP/OBS MODERATE 35: CPT

## 2021-03-04 RX ADMIN — Medication 81 MILLIGRAM(S): at 09:52

## 2021-03-04 RX ADMIN — CARVEDILOL PHOSPHATE 6.25 MILLIGRAM(S): 80 CAPSULE, EXTENDED RELEASE ORAL at 21:27

## 2021-03-04 RX ADMIN — LOSARTAN POTASSIUM 25 MILLIGRAM(S): 100 TABLET, FILM COATED ORAL at 09:56

## 2021-03-04 RX ADMIN — SODIUM CHLORIDE 3 MILLILITER(S): 9 INJECTION INTRAMUSCULAR; INTRAVENOUS; SUBCUTANEOUS at 05:22

## 2021-03-04 RX ADMIN — SODIUM CHLORIDE 3 MILLILITER(S): 9 INJECTION INTRAMUSCULAR; INTRAVENOUS; SUBCUTANEOUS at 21:45

## 2021-03-04 RX ADMIN — ATORVASTATIN CALCIUM 40 MILLIGRAM(S): 80 TABLET, FILM COATED ORAL at 21:26

## 2021-03-04 RX ADMIN — Medication 1000 MILLIGRAM(S): at 09:52

## 2021-03-04 RX ADMIN — Medication 1 SPRAY(S): at 09:55

## 2021-03-04 RX ADMIN — CARVEDILOL PHOSPHATE 6.25 MILLIGRAM(S): 80 CAPSULE, EXTENDED RELEASE ORAL at 09:54

## 2021-03-04 RX ADMIN — CLOPIDOGREL BISULFATE 75 MILLIGRAM(S): 75 TABLET, FILM COATED ORAL at 09:53

## 2021-03-04 RX ADMIN — RANOLAZINE 500 MILLIGRAM(S): 500 TABLET, FILM COATED, EXTENDED RELEASE ORAL at 09:57

## 2021-03-04 RX ADMIN — INSULIN GLARGINE 30 UNIT(S): 100 INJECTION, SOLUTION SUBCUTANEOUS at 21:27

## 2021-03-04 RX ADMIN — FLUCONAZOLE 100 MILLIGRAM(S): 150 TABLET ORAL at 09:54

## 2021-03-04 RX ADMIN — Medication 1000 UNIT(S): at 09:53

## 2021-03-04 RX ADMIN — Medication 1 SPRAY(S): at 21:29

## 2021-03-04 RX ADMIN — LORATADINE 10 MILLIGRAM(S): 10 TABLET ORAL at 09:56

## 2021-03-04 RX ADMIN — Medication 4: at 16:49

## 2021-03-04 RX ADMIN — RANOLAZINE 500 MILLIGRAM(S): 500 TABLET, FILM COATED, EXTENDED RELEASE ORAL at 21:29

## 2021-03-04 RX ADMIN — CEFEPIME 100 MILLIGRAM(S): 1 INJECTION, POWDER, FOR SOLUTION INTRAMUSCULAR; INTRAVENOUS at 09:53

## 2021-03-04 RX ADMIN — Medication 100 MILLIGRAM(S): at 05:23

## 2021-03-04 RX ADMIN — Medication 4: at 12:09

## 2021-03-04 RX ADMIN — CEFEPIME 100 MILLIGRAM(S): 1 INJECTION, POWDER, FOR SOLUTION INTRAMUSCULAR; INTRAVENOUS at 21:27

## 2021-03-04 RX ADMIN — Medication 20 MILLIGRAM(S): at 09:56

## 2021-03-04 NOTE — PROGRESS NOTE ADULT - SUBJECTIVE AND OBJECTIVE BOX
REASON FOR VISIT: CAD, CHF, Recent LAD stent    HPI:  65 year old man with a history of 3V CAD (cath in 2012 - not amenable to revascularization) with recent progression treated with proximal LAD stent (2/23/21), ischemic cardiomyopathy, PAD, HTN, HLD, DM2 (on insulin), neuropathy, BPH, and recently diagnosed foot osteomyelitis -- now s/p transmetatarsal amputation on 3/1.    3/4/21:  Doing well from cardiac standpoint; no angina or dyspnea; tolerating Rx.    CARDIAC MEDICATIONS  (STANDING):  aspirin enteric coated 81 milliGRAM(s) Oral daily  atorvastatin 40 milliGRAM(s) Oral at bedtime  carvedilol 6.25 milliGRAM(s) Oral every 12 hours  clopidogrel Tablet 75 milliGRAM(s) Oral daily  furosemide    Tablet 20 milliGRAM(s) Oral daily  losartan 25 milliGRAM(s) Oral daily  ranolazine 500 milliGRAM(s) Oral two times a day    Vital Signs Last 24 Hrs  T(C): 36.5 (04 Mar 2021 07:20), Max: 36.9 (03 Mar 2021 20:57)  T(F): 97.7 (04 Mar 2021 07:20), Max: 98.4 (03 Mar 2021 20:57)  HR: 72 (04 Mar 2021 07:20) (72 - 77)  BP: 123/68 (04 Mar 2021 07:20) (123/68 - 132/73)  RR: 18 (04 Mar 2021 07:20) (18 - 18)  SpO2: 100% (04 Mar 2021 07:20) (99% - 100%)    PHYSICAL EXAM:  Constitutional: NAD, awake and alert  Eyes:  No oral cyanosis.  Pulmonary: Non-labored, breath sounds are clear bilaterally, No wheezing, rales or rhonchi  Cardiovascular: S1 and S2, regular rate and rhythm  Skin: R foot with clean bandage  Psych:  Mood & affect appropriate    LABS:                       10.0   6.59  )-----------( 242      ( 02 Mar 2021 08:55 )             30.9     131    |  99     |  17     ----------------------------<  166    4.6     |  23     |  0.84     Ca    8.8        02 Mar 2021 08:55

## 2021-03-04 NOTE — PROGRESS NOTE ADULT - PROBLEM SELECTOR PLAN 3
Appears euvolemic; continue Coreg and Losartan; I plan to reassess LV function ~ 3 months s/p LAD revascularization.
multi vessel disease ,  prox LAD stent , residual disease OM1 OM2 , distal LAD , D1 disease ,continue ranexa , coreg , antiplate agents without holding it.
multi vessel disease ,  prox LAD stent , residual disease OM1 OM2 , distal LAD , D1 disease ,continue ranexa , coreg , antiplate agents
multi vessel disease ,  prox LAD stent , residual disease OM1 OM2 , distal LAD , D1 disease ,continue ranexa , coreg , antiplate agents without holding it.

## 2021-03-04 NOTE — PROGRESS NOTE ADULT - SUBJECTIVE AND OBJECTIVE BOX
Date of service: 03-04-21 @ 13:16    Lying in bed in NAD  Right foot s/p surgery  Has mild local pain    ROS: no fever or chills; denies dizziness, no HA, no SOB or cough, no abdominal pain, no diarrhea or constipation; no dysuria    MEDICATIONS  (STANDING):  ascorbic acid 1000 milliGRAM(s) Oral daily  aspirin enteric coated 81 milliGRAM(s) Oral daily  atorvastatin 40 milliGRAM(s) Oral at bedtime  carvedilol 6.25 milliGRAM(s) Oral every 12 hours  cefepime   IVPB 2000 milliGRAM(s) IV Intermittent every 12 hours  cholecalciferol 1000 Unit(s) Oral daily  clopidogrel Tablet 75 milliGRAM(s) Oral daily  dextrose 40% Gel 15 Gram(s) Oral once  fluticasone propionate 50 MICROgram(s)/spray Nasal Spray 1 Spray(s) Both Nostrils two times a day  furosemide    Tablet 20 milliGRAM(s) Oral daily  glucagon  Injectable 1 milliGRAM(s) IntraMuscular once  insulin glargine Injectable (LANTUS) 30 Unit(s) SubCutaneous at bedtime  insulin lispro (ADMELOG) corrective regimen sliding scale   SubCutaneous three times a day before meals  insulin lispro (ADMELOG) corrective regimen sliding scale   SubCutaneous at bedtime  loratadine 10 milliGRAM(s) Oral daily  losartan 25 milliGRAM(s) Oral daily  losartan 25 milliGRAM(s) Oral daily  ranolazine 500 milliGRAM(s) Oral two times a day  senna 2 Tablet(s) Oral at bedtime  sodium chloride 0.9% lock flush 3 milliLiter(s) IV Push every 8 hours    Vital Signs Last 24 Hrs  T(C): 36.5 (04 Mar 2021 07:20), Max: 36.9 (03 Mar 2021 20:57)  T(F): 97.7 (04 Mar 2021 07:20), Max: 98.4 (03 Mar 2021 20:57)  HR: 72 (04 Mar 2021 07:20) (72 - 77)  BP: 123/68 (04 Mar 2021 07:20) (123/68 - 132/73)  BP(mean): --  RR: 18 (04 Mar 2021 07:20) (18 - 18)  SpO2: 100% (04 Mar 2021 07:20) (99% - 100%)     Physical exam:    Constitutional:  No acute distress  HEENT: NC/AT, EOMI, PERRLA, conjunctivae clear  Neck: supple; thyroid not palpable  Back: no tenderness  Respiratory: respiratory effort normal; clear to auscultation  Cardiovascular: S1S2 regular, no murmurs  Abdomen: soft, not tender, not distended, positive BS  Genitourinary: no suprapubic tenderness  Lymphatic: no LN palpable  Musculoskeletal: no muscle tenderness, no joint swelling or tenderness  Extremities: no pedal edema  Right arm PICC Line in place  Right foot s/p TMA  Neurological/ Psychiatric: AxOx3, moving all extremities  Skin: no rashes; no palpable lesions    Labs: reviewed    C-Reactive Protein, Serum: 0.22 mg/dL (02-26-21 @ 07:23)                        11.0   4.93  )-----------( 297      ( 26 Feb 2021 07:23 )             33.2     02-27    129<L>  |  96  |  13  ----------------------------<  158<H>  4.3   |  28  |  0.78    Ca    9.3      27 Feb 2021 12:11    TPro  6.5  /  Alb  3.1<L>  /  TBili  0.3  /  DBili  x   /  AST  15  /  ALT  29  /  AlkPhos  69  02-26     LIVER FUNCTIONS - ( 26 Feb 2021 07:23 )  Alb: 3.1 g/dL / Pro: 6.5 gm/dL / ALK PHOS: 69 U/L / ALT: 29 U/L / AST: 15 U/L / GGT: x           COVID-19 PCR: NotDetec (02-26-21 @ 15:20)    Culture Results:   Few Candida parapsilosis   "Susceptibilities not performed" (02.06.21 @ 14:02) Culture - Blood (01.31.21 @ 00:23)   Gram Stain:   Growth in anaerobic bottle: Gram Negative Rods   - Bacteroides fragilis: Detec     Culture - Fungal, Tissue (collected 01 Mar 2021 15:13)  Source: .Tissue #2- bone right first metatarsal  Preliminary Report (02 Mar 2021 07:25):    Testing in progress    Culture - Acid Fast - Tissue w/Smear (collected 01 Mar 2021 15:13)  Source: .Tissue #2- bone right first metatarsal  Preliminary Report (03 Mar 2021 15:07):    Culture is being performed.    Culture - Tissue with Gram Stain (collected 01 Mar 2021 15:13)  Source: .Tissue #2- bone right first metatarsal  Gram Stain (01 Mar 2021 22:55):    No polymorphonuclear cells seen per low power field    No organisms seen per oil power field  Preliminary Report (02 Mar 2021 16:34):    No growth    Culture - Fungal, Other (collected 01 Mar 2021 15:13)  Source: .Other #1- right foot deep wound  Preliminary Report (02 Mar 2021 07:25):    Testing in progress    Culture - Surgical Swab (collected 01 Mar 2021 15:13)  Source: .Surgical Swab #1- right foot deep wound  Preliminary Report (02 Mar 2021 23:04):    Rare Pseudomonas aeruginosa  Organism: Pseudomonas aeruginosa (03 Mar 2021 20:09)  Organism: Pseudomonas aeruginosa (03 Mar 2021 20:09)      -  Amikacin: S <=16      -  Aztreonam: S <=4      -  Cefepime: S <=2      -  Ceftazidime: S <=1      -  Ciprofloxacin: R >2      -  Gentamicin: S <=2      -  Imipenem: S <=1      -  Levofloxacin: R >4      -  Meropenem: S <=1      -  Piperacillin/Tazobactam: S <=8      -  Tobramycin: S <=2      Method Type: ARTURO    Culture - Blood (collected 26 Feb 2021 10:41)  Source: .Blood None  Final Report (03 Mar 2021 17:00):    No Growth Final    Culture - Blood (collected 26 Feb 2021 10:41)  Source: .Blood None  Final Report (03 Mar 2021 17:00):    No Growth Final        Radiology: all available radiological tests reviewed    Advanced directives addressed: full resuscitation

## 2021-03-04 NOTE — PROGRESS NOTE ADULT - PROBLEM SELECTOR PROBLEM 4
Insulin dependent type 2 diabetes mellitus
Insulin dependent type 2 diabetes mellitus
Hypertension
Insulin dependent type 2 diabetes mellitus

## 2021-03-04 NOTE — PROGRESS NOTE ADULT - SUBJECTIVE AND OBJECTIVE BOX
CC: Foot ulcers    Subjective:  2/27/21: Admitted yesterday for nonhealing R foot ulcers, for further imaging and likely surgical management. Pt denies foot pain. Understands plan for likely OR Monday.  2/28/21: Plan for OR tmrw. No new complaints.  3/1: At bedside comfortable, no new events. Awaiting surgical intervention for later today.  No fever, chills, n, v.  3/2: Awake, alert, comfortable.  Pt is POD 1 TMA.  No complications, foot wrapped.  3/3: POD 2, no complaints, feeling well.  Denies fever, chills, N, V, abd pain, CP, SOB.  3/4: POD 3, no complaints, feeling well.  WIll need extended antibiotic course.  No fever, chills, n, v.    REVIEW OF SYSTEMS: All other review of systems is negative unless indicated above.    Vital Signs Last 24 Hrs  T(C): 36.5 (04 Mar 2021 07:20), Max: 36.9 (03 Mar 2021 20:57)  T(F): 97.7 (04 Mar 2021 07:20), Max: 98.4 (03 Mar 2021 20:57)  HR: 72 (04 Mar 2021 07:20) (72 - 77)  BP: 123/68 (04 Mar 2021 07:20) (123/68 - 132/73)  BP(mean): --  RR: 18 (04 Mar 2021 07:20) (18 - 18)  SpO2: 100% (04 Mar 2021 07:20) (99% - 100%)    PHYSICAL EXAM:  General: NAD, lying in bed  HEENT:  pupils equal and reactive, EOMI, no oropharyngeal lesions, erythema, exudates, oral thrush  NECK:   supple, no carotid bruits, no palpable lymph nodes, no thyromegaly  CV:  +S1, +S2, regular, no murmurs or rubs  RESP:   lungs clear to auscultation bilaterally, no wheezing, rales, rhonchi, good air entry bilaterally  GI:  abdomen soft, non-tender, non-distended, normal BS, no bruits, no abdominal masses, no palpable masses  MSK:   normal muscle tone, no atrophy, no rigidity, no contractions  EXT:  no clubbing, no cyanosis, no edema, no calf pain, swelling or erythema, R foot wrapped  VASCULAR:  pulses equal and symmetric in the upper and lower extremities  NEURO:  AAOX3, no focal neurological deficits, follows all commands, able to move extremities spontaneously  SKIN:  no ulcers, lesions or rashes    MEDICATIONS  (STANDING):  ascorbic acid 1000 milliGRAM(s) Oral daily  aspirin enteric coated 81 milliGRAM(s) Oral daily  atorvastatin 40 milliGRAM(s) Oral at bedtime  carvedilol 6.25 milliGRAM(s) Oral every 12 hours  cefepime   IVPB 2000 milliGRAM(s) IV Intermittent every 12 hours  cholecalciferol 1000 Unit(s) Oral daily  clopidogrel Tablet 75 milliGRAM(s) Oral daily  dextrose 40% Gel 15 Gram(s) Oral once  fluconAZOLE   Tablet 100 milliGRAM(s) Oral daily  fluticasone propionate 50 MICROgram(s)/spray Nasal Spray 1 Spray(s) Both Nostrils two times a day  furosemide    Tablet 20 milliGRAM(s) Oral daily  glucagon  Injectable 1 milliGRAM(s) IntraMuscular once  insulin glargine Injectable (LANTUS) 30 Unit(s) SubCutaneous at bedtime  insulin lispro (ADMELOG) corrective regimen sliding scale   SubCutaneous three times a day before meals  insulin lispro (ADMELOG) corrective regimen sliding scale   SubCutaneous at bedtime  loratadine 10 milliGRAM(s) Oral daily  losartan 25 milliGRAM(s) Oral daily  losartan 25 milliGRAM(s) Oral daily  metroNIDAZOLE  IVPB 500 milliGRAM(s) IV Intermittent every 8 hours  ranolazine 500 milliGRAM(s) Oral two times a day  senna 2 Tablet(s) Oral at bedtime  sodium chloride 0.9% lock flush 3 milliLiter(s) IV Push every 8 hours    MEDICATIONS  (PRN):  acetaminophen   Tablet .. 650 milliGRAM(s) Oral every 6 hours PRN Mild Pain (1 - 3)  aluminum hydroxide/magnesium hydroxide/simethicone Suspension 30 milliLiter(s) Oral every 4 hours PRN Dyspepsia  bisacodyl Suppository 10 milliGRAM(s) Rectal daily PRN Constipation  guaiFENesin   Syrup  (Sugar-Free) 100 milliGRAM(s) Oral every 6 hours PRN Cough  magnesium hydroxide Suspension 30 milliLiter(s) Oral daily PRN Constipation        CAPILLARY BLOOD GLUCOSE      POCT Blood Glucose.: 218 mg/dL (04 Mar 2021 11:32)  POCT Blood Glucose.: 122 mg/dL (04 Mar 2021 08:37)  POCT Blood Glucose.: 200 mg/dL (03 Mar 2021 20:52)  POCT Blood Glucose.: 203 mg/dL (03 Mar 2021 16:41)                Assessment and Plan:  65M hx CAD s/p recent LAD stent on 2/3/21, HFrEF (30%), PVD, IDDM2, peripheral neuropathy, h/o left 2nd toe amputation, recent admission due to OM of the right foot on long standing via PICC line admitted from [facility] for possible surgical intervention for not healing left foot ulcers. Had had debridements on 1/25 and 1/31/21.    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 toe gangrene, underlying PAD  -MRI with OM  -doppler with no DVT  -arterial doppler with no overt stenosis/occlusion  -BCx no growth, ESR 30, CRP wnl  -PICC line in from prior  -per ID, prior wound cx show rare MSSA, bacteroides fragilis and strep constellatus  -continue Cefepime 2g q12 and Flagyl 500 iv q8 (since 1/21), per ID will need another 6 weeks of antibiotics.   -per ID, adding Fluconazole 100 po qd for antifungal coverage  -appreciate podiatry input, s/p TMA POD # 3    #CAD s/p recent LAD stent 2/3/21, HFrEF (30%)  -DAPT with ASA  -Losartan   -continue Coreg  -continue Ranexa  -hold lasix  -appreciate cards input    #Hyponatremic, hypovolemic: RESOLVED  -pt appears mildly hypovolemic on exam  -home lasix on hold    #IDDM2  -resume lantus 30, SSI, monitor BG  -hold home oral agents  -A1c 8.8 last month    #Mild JIGNESH  -continue FeSO4, Vit C    #nutrition c/s    #PT eval    #ppx- SCDs    #dispo:  -Home vs [facility] with long term antibiotics

## 2021-03-04 NOTE — PROGRESS NOTE ADULT - PROBLEM SELECTOR PROBLEM 2
Chronic systolic heart failure
Chronic systolic heart failure
CAD (coronary atherosclerotic disease)
Chronic systolic heart failure

## 2021-03-04 NOTE — PROGRESS NOTE ADULT - PROBLEM SELECTOR PLAN 2
Stable; no angina; recent LAD stent; continue dual antiplt therapy + Coreg + Ranexa
clinically compensated , continue coreg ,low dose losartan ,  monitor for clinical deterioration.

## 2021-03-04 NOTE — PROGRESS NOTE ADULT - PROBLEM SELECTOR PROBLEM 3
CAD (coronary atherosclerotic disease)
CAD (coronary atherosclerotic disease)
Chronic systolic heart failure
CAD (coronary atherosclerotic disease)

## 2021-03-04 NOTE — PROGRESS NOTE ADULT - SUBJECTIVE AND OBJECTIVE BOX
Patient is a 65y old  Male who presents with a chief complaint of foot ulcers (03 Mar 2021 19:12)      HPI:    65y old  Male  with PMH  of CAD s/p LAD stent on 2/23/21, HFrEF, PVD, DM2, h/o left 2nd toe amputation, recent admission due to OM of the right foot on long standing via PICC line admitted from [facility] for possible surgical intervention for not healing left foot ulcers.  He had a debridement on 1/25/21 and F/U debridement on 1/31/21. He had a PICC placed for LT ABX & was sent to [facility] for wound care, DM control & ABX. He was seen in the office for F/U and his wound are progressively degrading and is readmitted for MRI R foot W/WO KUMAR & will need future surgery possible TMA or partial TMA. Pt has seen Dr Arndt on last admit and had adequate perfusion.   Denies cp, dyspnea, cough, abdominal pain, problems with urination or bowel problems. Has peripheral neuropathy in both feet.     in Ed -  T(F): 98.5 Max: 98.5 HR: 85 (85 - 85) BP: 103/72  (103/72 - 103/72) RR: 15  (15 - 15) SpO2: 100%  (100% - 100%) ESR 30, HB 11, Cr 0.83, Na 130   seen by podiatrist and cardiology in ED       (26 Feb 2021 10:42)      Allergies    No Known Allergies    Intolerances        MEDICATIONS  (STANDING):  ascorbic acid 1000 milliGRAM(s) Oral daily  aspirin enteric coated 81 milliGRAM(s) Oral daily  atorvastatin 40 milliGRAM(s) Oral at bedtime  carvedilol 6.25 milliGRAM(s) Oral every 12 hours  cefepime   IVPB 2000 milliGRAM(s) IV Intermittent every 12 hours  cholecalciferol 1000 Unit(s) Oral daily  clopidogrel Tablet 75 milliGRAM(s) Oral daily  dextrose 40% Gel 15 Gram(s) Oral once  fluconAZOLE   Tablet 100 milliGRAM(s) Oral daily  fluticasone propionate 50 MICROgram(s)/spray Nasal Spray 1 Spray(s) Both Nostrils two times a day  furosemide    Tablet 20 milliGRAM(s) Oral daily  glucagon  Injectable 1 milliGRAM(s) IntraMuscular once  insulin glargine Injectable (LANTUS) 30 Unit(s) SubCutaneous at bedtime  insulin lispro (ADMELOG) corrective regimen sliding scale   SubCutaneous three times a day before meals  insulin lispro (ADMELOG) corrective regimen sliding scale   SubCutaneous at bedtime  loratadine 10 milliGRAM(s) Oral daily  losartan 25 milliGRAM(s) Oral daily  losartan 25 milliGRAM(s) Oral daily  metroNIDAZOLE  IVPB 500 milliGRAM(s) IV Intermittent every 8 hours  ranolazine 500 milliGRAM(s) Oral two times a day  senna 2 Tablet(s) Oral at bedtime  sodium chloride 0.9% lock flush 3 milliLiter(s) IV Push every 8 hours    MEDICATIONS  (PRN):  acetaminophen   Tablet .. 650 milliGRAM(s) Oral every 6 hours PRN Mild Pain (1 - 3)  aluminum hydroxide/magnesium hydroxide/simethicone Suspension 30 milliLiter(s) Oral every 4 hours PRN Dyspepsia  bisacodyl Suppository 10 milliGRAM(s) Rectal daily PRN Constipation  guaiFENesin   Syrup  (Sugar-Free) 100 milliGRAM(s) Oral every 6 hours PRN Cough  magnesium hydroxide Suspension 30 milliLiter(s) Oral daily PRN Constipation        RADIOLOGY    CBC Full  -  ( 02 Mar 2021 08:55 )  WBC Count : 6.59 K/uL  RBC Count : 3.74 M/uL  Hemoglobin : 10.0 g/dL  Hematocrit : 30.9 %  Platelet Count - Automated : 242 K/uL  Mean Cell Volume : 82.6 fl  Mean Cell Hemoglobin : 26.7 pg  Mean Cell Hemoglobin Concentration : 32.4 gm/dL  Auto Neutrophil # : x  Auto Lymphocyte # : x  Auto Monocyte # : x  Auto Eosinophil # : x  Auto Basophil # : x  Auto Neutrophil % : x  Auto Lymphocyte % : x  Auto Monocyte % : x  Auto Eosinophil % : x  Auto Basophil % : x      03-02    131<L>  |  99  |  17  ----------------------------<  166<H>  4.6   |  23  |  0.84    Ca    8.8      02 Mar 2021 08:55        Culture - Fungal, Tissue (03.01.21 @ 15:13)   Specimen Source: .Tissue #2- bone right first metatarsal   Culture Results:   Testing in progress     Culture - Acid Fast - Tissue w/Smear (03.01.21 @ 15:13)   Specimen Source: .Tissue #2- bone right first metatarsal   Acid Fast Bacilli Smear:   No acid fast bacilli seen by fluorochrome stain   Culture Results:   Culture is being performed.     Culture - Tissue with Gram Stain (03.01.21 @ 15:13)   Gram Stain:   No polymorphonuclear cells seen per low power field   No organisms seen per oil power field   Specimen Source: .Tissue #2- bone right first metatarsal   Culture Results:   No growth Culture - Surgical Swab (03.01.21 @ 15:13)   - Amikacin: S <=16   - Aztreonam: S <=4   - Piperacillin/Tazobactam: S <=8   - Tobramycin: S <=2   - Cefepime: S <=2   - Ceftazidime: S <=1   - Ciprofloxacin: R >2   - Gentamicin: S <=2   - Imipenem: S <=1   - Levofloxacin: R >4   - Meropenem: S <=1   Specimen Source: .Surgical Swab #1- right foot deep wound   Culture Results:   Rare Pseudomonas aeruginosa   Organism Identification: Pseudomonas aeruginosa   Organism: Pseudomonas aeruginosa   Method Type: ARTURO       Historical Values  Culture - Surgical Swab (03.01.21 @ 15:13)   - Amikacin: S <=16   - Aztreonam: S <=4   - Piperacillin/Tazobactam: S <=8   - Tobramycin: S <=2   - Cefepime: S <=2 < from: Xray Foot AP + Lateral, Right (03.01.21 @ 17:03) >  EXAM:  XR FOOT 2 VIEWS RT                            PROCEDURE DATE:  03/01/2021          INTERPRETATION:  Right foot    HISTORY: Postop    COMPARISON: 2/26/2021     Three views of the right foot show transmetatarsal amputation of all five toes. The joint spaces are maintained. Calcifications on or near the calcaneus are unchanged.    IMPRESSION: Postoperative changes.      Thank you for this referral.            BROWN HUDSON MD; Attending Interventional Radiologist  This document has been electronically signed. Mar  2 2021  8:22AM    < end of copied text >

## 2021-03-04 NOTE — PROGRESS NOTE ADULT - PROBLEM SELECTOR PROBLEM 1
Preop cardiovascular exam
Osteomyelitis of right foot

## 2021-03-05 PROCEDURE — 99232 SBSQ HOSP IP/OBS MODERATE 35: CPT

## 2021-03-05 RX ADMIN — SODIUM CHLORIDE 3 MILLILITER(S): 9 INJECTION INTRAMUSCULAR; INTRAVENOUS; SUBCUTANEOUS at 21:07

## 2021-03-05 RX ADMIN — SODIUM CHLORIDE 3 MILLILITER(S): 9 INJECTION INTRAMUSCULAR; INTRAVENOUS; SUBCUTANEOUS at 05:04

## 2021-03-05 RX ADMIN — Medication 2: at 17:06

## 2021-03-05 RX ADMIN — Medication 1 SPRAY(S): at 21:14

## 2021-03-05 RX ADMIN — SODIUM CHLORIDE 3 MILLILITER(S): 9 INJECTION INTRAMUSCULAR; INTRAVENOUS; SUBCUTANEOUS at 16:34

## 2021-03-05 RX ADMIN — RANOLAZINE 500 MILLIGRAM(S): 500 TABLET, FILM COATED, EXTENDED RELEASE ORAL at 10:40

## 2021-03-05 RX ADMIN — LOSARTAN POTASSIUM 25 MILLIGRAM(S): 100 TABLET, FILM COATED ORAL at 10:39

## 2021-03-05 RX ADMIN — CLOPIDOGREL BISULFATE 75 MILLIGRAM(S): 75 TABLET, FILM COATED ORAL at 10:25

## 2021-03-05 RX ADMIN — Medication 20 MILLIGRAM(S): at 10:26

## 2021-03-05 RX ADMIN — Medication 81 MILLIGRAM(S): at 10:39

## 2021-03-05 RX ADMIN — Medication 1000 MILLIGRAM(S): at 10:26

## 2021-03-05 RX ADMIN — LORATADINE 10 MILLIGRAM(S): 10 TABLET ORAL at 10:40

## 2021-03-05 RX ADMIN — Medication 4: at 12:16

## 2021-03-05 RX ADMIN — CARVEDILOL PHOSPHATE 6.25 MILLIGRAM(S): 80 CAPSULE, EXTENDED RELEASE ORAL at 10:39

## 2021-03-05 RX ADMIN — CEFEPIME 100 MILLIGRAM(S): 1 INJECTION, POWDER, FOR SOLUTION INTRAMUSCULAR; INTRAVENOUS at 21:09

## 2021-03-05 RX ADMIN — INSULIN GLARGINE 30 UNIT(S): 100 INJECTION, SOLUTION SUBCUTANEOUS at 21:12

## 2021-03-05 RX ADMIN — CARVEDILOL PHOSPHATE 6.25 MILLIGRAM(S): 80 CAPSULE, EXTENDED RELEASE ORAL at 21:09

## 2021-03-05 RX ADMIN — CEFEPIME 100 MILLIGRAM(S): 1 INJECTION, POWDER, FOR SOLUTION INTRAMUSCULAR; INTRAVENOUS at 10:47

## 2021-03-05 RX ADMIN — ATORVASTATIN CALCIUM 40 MILLIGRAM(S): 80 TABLET, FILM COATED ORAL at 21:08

## 2021-03-05 RX ADMIN — Medication 1000 UNIT(S): at 10:25

## 2021-03-05 RX ADMIN — Medication 1: at 21:12

## 2021-03-05 RX ADMIN — Medication 1 SPRAY(S): at 10:41

## 2021-03-05 RX ADMIN — RANOLAZINE 500 MILLIGRAM(S): 500 TABLET, FILM COATED, EXTENDED RELEASE ORAL at 21:09

## 2021-03-05 NOTE — PROGRESS NOTE ADULT - SUBJECTIVE AND OBJECTIVE BOX
CC: Foot ulcers    Subjective:  2/27/21: Admitted yesterday for nonhealing R foot ulcers, for further imaging and likely surgical management. Pt denies foot pain. Understands plan for likely OR Monday.  2/28/21: Plan for OR tmrw. No new complaints.  3/1: At bedside comfortable, no new events. Awaiting surgical intervention for later today.  No fever, chills, n, v.  3/2: Awake, alert, comfortable.  Pt is POD 1 TMA.  No complications, foot wrapped.  3/3: POD 2, no complaints, feeling well.  Denies fever, chills, N, V, abd pain, CP, SOB.  3/4: POD 3, no complaints, feeling well.  WIll need extended antibiotic course.  No fever, chills, n, v.  3/5: POD 4, tolerating anabiotics.  No fever, chills, n, v.    REVIEW OF SYSTEMS: All other review of systems is negative unless indicated above.    Vital Signs Last 24 Hrs  T(C): 36.8 (05 Mar 2021 10:01), Max: 36.8 (05 Mar 2021 10:01)  T(F): 98.3 (05 Mar 2021 10:01), Max: 98.3 (05 Mar 2021 10:01)  HR: 67 (05 Mar 2021 10:01) (67 - 72)  BP: 121/76 (05 Mar 2021 10:01) (90/54 - 161/72)  BP(mean): --  RR: 18 (05 Mar 2021 10:01) (18 - 18)  SpO2: 99% (05 Mar 2021 10:01) (99% - 100%)      PHYSICAL EXAM:  General: NAD, lying in bed  HEENT:  pupils equal and reactive, EOMI, no oropharyngeal lesions, erythema, exudates, oral thrush  NECK:   supple, no carotid bruits, no palpable lymph nodes, no thyromegaly  CV:  +S1, +S2, regular, no murmurs or rubs  RESP:   lungs clear to auscultation bilaterally, no wheezing, rales, rhonchi, good air entry bilaterally  GI:  abdomen soft, non-tender, non-distended, normal BS, no bruits, no abdominal masses, no palpable masses  MSK:   normal muscle tone, no atrophy, no rigidity, no contractions  EXT:  no clubbing, no cyanosis, no edema, no calf pain, swelling or erythema, R foot wrapped  VASCULAR:  pulses equal and symmetric in the upper and lower extremities  NEURO:  AAOX3, no focal neurological deficits, follows all commands, able to move extremities spontaneously  SKIN:  no ulcers, lesions or rashes    MEDICATIONS  (STANDING):  ascorbic acid 1000 milliGRAM(s) Oral daily  aspirin enteric coated 81 milliGRAM(s) Oral daily  atorvastatin 40 milliGRAM(s) Oral at bedtime  carvedilol 6.25 milliGRAM(s) Oral every 12 hours  cefepime   IVPB 2000 milliGRAM(s) IV Intermittent every 12 hours  cholecalciferol 1000 Unit(s) Oral daily  clopidogrel Tablet 75 milliGRAM(s) Oral daily  dextrose 40% Gel 15 Gram(s) Oral once  fluticasone propionate 50 MICROgram(s)/spray Nasal Spray 1 Spray(s) Both Nostrils two times a day  furosemide    Tablet 20 milliGRAM(s) Oral daily  glucagon  Injectable 1 milliGRAM(s) IntraMuscular once  insulin glargine Injectable (LANTUS) 30 Unit(s) SubCutaneous at bedtime  insulin lispro (ADMELOG) corrective regimen sliding scale   SubCutaneous three times a day before meals  insulin lispro (ADMELOG) corrective regimen sliding scale   SubCutaneous at bedtime  loratadine 10 milliGRAM(s) Oral daily  losartan 25 milliGRAM(s) Oral daily  losartan 25 milliGRAM(s) Oral daily  ranolazine 500 milliGRAM(s) Oral two times a day  senna 2 Tablet(s) Oral at bedtime  sodium chloride 0.9% lock flush 3 milliLiter(s) IV Push every 8 hours    MEDICATIONS  (PRN):  acetaminophen   Tablet .. 650 milliGRAM(s) Oral every 6 hours PRN Mild Pain (1 - 3)  aluminum hydroxide/magnesium hydroxide/simethicone Suspension 30 milliLiter(s) Oral every 4 hours PRN Dyspepsia  bisacodyl Suppository 10 milliGRAM(s) Rectal daily PRN Constipation  guaiFENesin   Syrup  (Sugar-Free) 100 milliGRAM(s) Oral every 6 hours PRN Cough  magnesium hydroxide Suspension 30 milliLiter(s) Oral daily PRN Constipation                CAPILLARY BLOOD GLUCOSE      POCT Blood Glucose.: 214 mg/dL (05 Mar 2021 12:15)  POCT Blood Glucose.: 133 mg/dL (05 Mar 2021 07:37)  POCT Blood Glucose.: 238 mg/dL (04 Mar 2021 21:22)  POCT Blood Glucose.: 244 mg/dL (04 Mar 2021 16:48)                  Assessment and Plan:  65M hx CAD s/p recent LAD stent on 2/3/21, HFrEF (30%), PVD, IDDM2, peripheral neuropathy, h/o left 2nd toe amputation, recent admission due to OM of the right foot on long standing via PICC line admitted from [facility] for possible surgical intervention for not healing left foot ulcers. Had had debridements on 1/25 and 1/31/21.    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 toe gangrene, underlying PAD  -MRI with OM  -doppler with no DVT  -arterial doppler with no overt stenosis/occlusion  -BCx no growth, ESR 30, CRP wnl  -PICC line in from prior  -per ID, prior wound cx show rare MSSA, bacteroides fragilis and strep constellatus  -continue Cefepime 2g q12 and Flagyl 500 iv q8 (since 1/21), per ID will need another 6 weeks of antibiotics with cefepime.   -per ID, adding Fluconazole 100 po qd for antifungal coverage  -appreciate podiatry input, s/p TMA POD # 4    #CAD s/p recent LAD stent 2/3/21, HFrEF (30%)  -DAPT with ASA  -Losartan   -continue Coreg  -continue Ranexa  -hold lasix  -appreciate cards input    #Hyponatremic, hypovolemic: RESOLVED  -pt appears mildly hypovolemic on exam  -home lasix on hold    #IDDM2  -resume lantus 30, SSI, monitor BG  -hold home oral agents  -A1c 8.8 last month    #Mild JIGNESH  -continue FeSO4, Vit C    #nutrition c/s    #PT eval    #ppx- SCDs    #dispo:  -Home  with long term antibiotics tomorrow

## 2021-03-05 NOTE — PROGRESS NOTE ADULT - NUTRITIONAL ASSESSMENT
This patient has been assessed with a concern for Malnutrition and has been determined to have a diagnosis/diagnoses of Severe protein-calorie malnutrition.    This patient is being managed with:   Diet Consistent Carbohydrate w/Evening Snack-  DASH/TLC {Sodium & Cholesterol Restricted} (DASH)  Entered: Mar  1 2021  5:29PM    

## 2021-03-05 NOTE — PROGRESS NOTE ADULT - SUBJECTIVE AND OBJECTIVE BOX
Patient is a 65y old  Male who presents with a chief complaint of foot ulcers (04 Mar 2021 13:15)      HPI:    65y old  Male  with PMH  of CAD s/p LAD stent on 2/23/21, HFrEF, PVD, DM2, h/o left 2nd toe amputation, recent admission due to OM of the right foot on long standing via PICC line admitted from [facility] for possible surgical intervention for not healing left foot ulcers.  He had a debridement on 1/25/21 and F/U debridement on 1/31/21. He had a PICC placed for LT ABX & was sent to [facility] for wound care, DM control & ABX. He was seen in the office for F/U and his wound are progressively degrading and is readmitted for MRI R foot W/WO KUMAR & will need future surgery possible TMA or partial TMA. Pt has seen Dr Arndt on last admit and had adequate perfusion.   Denies cp, dyspnea, cough, abdominal pain, problems with urination or bowel problems. Has peripheral neuropathy in both feet.     in Ed -  T(F): 98.5 Max: 98.5 HR: 85 (85 - 85) BP: 103/72  (103/72 - 103/72) RR: 15  (15 - 15) SpO2: 100%  (100% - 100%) ESR 30, HB 11, Cr 0.83, Na 130   seen by podiatrist and cardiology in ED       (26 Feb 2021 10:42)      Allergies    No Known Allergies    Intolerances        MEDICATIONS  (STANDING):  ascorbic acid 1000 milliGRAM(s) Oral daily  aspirin enteric coated 81 milliGRAM(s) Oral daily  atorvastatin 40 milliGRAM(s) Oral at bedtime  carvedilol 6.25 milliGRAM(s) Oral every 12 hours  cefepime   IVPB 2000 milliGRAM(s) IV Intermittent every 12 hours  cholecalciferol 1000 Unit(s) Oral daily  clopidogrel Tablet 75 milliGRAM(s) Oral daily  dextrose 40% Gel 15 Gram(s) Oral once  fluticasone propionate 50 MICROgram(s)/spray Nasal Spray 1 Spray(s) Both Nostrils two times a day  furosemide    Tablet 20 milliGRAM(s) Oral daily  glucagon  Injectable 1 milliGRAM(s) IntraMuscular once  insulin glargine Injectable (LANTUS) 30 Unit(s) SubCutaneous at bedtime  insulin lispro (ADMELOG) corrective regimen sliding scale   SubCutaneous three times a day before meals  insulin lispro (ADMELOG) corrective regimen sliding scale   SubCutaneous at bedtime  loratadine 10 milliGRAM(s) Oral daily  losartan 25 milliGRAM(s) Oral daily  losartan 25 milliGRAM(s) Oral daily  ranolazine 500 milliGRAM(s) Oral two times a day  senna 2 Tablet(s) Oral at bedtime  sodium chloride 0.9% lock flush 3 milliLiter(s) IV Push every 8 hours    MEDICATIONS  (PRN):  acetaminophen   Tablet .. 650 milliGRAM(s) Oral every 6 hours PRN Mild Pain (1 - 3)  aluminum hydroxide/magnesium hydroxide/simethicone Suspension 30 milliLiter(s) Oral every 4 hours PRN Dyspepsia  bisacodyl Suppository 10 milliGRAM(s) Rectal daily PRN Constipation  guaiFENesin   Syrup  (Sugar-Free) 100 milliGRAM(s) Oral every 6 hours PRN Cough  magnesium hydroxide Suspension 30 milliLiter(s) Oral daily PRN Constipation        RADIOLOGY    cu< from: US Duplex Venous Lower Ext Ltd, Right (02.26.21 @ 10:36) >  EXAM:  US DPLX LWR EXT VEINS LTD RT                            PROCEDURE DATE:  02/26/2021          INTERPRETATION:  CLINICAL INFORMATION: Right leg swelling    COMPARISON: 1/24/2021    TECHNIQUE: Duplex sonography of the RIGHT LOWER extremity veins with color and spectral Doppler, with and without compression.    FINDINGS:    There is normal compressibility of the right common femoral, femoral and popliteal veins.  The contralateral common femoral vein is patent.  Doppler examination shows normal spontaneous and phasic flow.    No calf vein thrombosis is detected.    IMPRESSION:  No evidence of right lower extremity deep venous thrombosis.                JUNE GARVEY MD; Attending Radiologist  This document has been electronically signed. Feb 26 2021 11:59AM    < end of copied text >  Culture - Other (02.06.21 @ 14:02)   Specimen Source: .Other second interspace toe   Culture Results:   Few Candida parapsilosis   "Susceptibilities not performed"     Culture - Blood (01.31.21 @ 00:23)   - Bacteroides fragilis: Detec   Gram Stain:   Growth in anaerobic bottle: Gram Negative Rods   Specimen Source: .Blood None   Organism: Blood Culture PCR   Culture Results:   Growth in anaerobic bottle: Bacteroides fragilis   "Susceptibilities not performed"   ***Blood Panel PCR results on this specimen are available   approximately 3 hours after the Gram stain result.***   Gram stain, PCR, and/or culture results may not always   correspond due to difference in methodologies.   ************************************************************   This PCR assay was performed by multiplex PCR. This   Assay tests for 66 bacterial and resistance gene targets.   Please refer to the [facility] test directory   at [email or web address] for details.   Organism Identification: Blood Culture PCR   Method Type: PCR       Historical Values  Culture - Blood (02.26.21 @ 10:41)   Specimen Source: .Blood None   Culture Results:   No Growth Final

## 2021-03-06 ENCOUNTER — TRANSCRIPTION ENCOUNTER (OUTPATIENT)
Age: 66
End: 2021-03-06

## 2021-03-06 VITALS — HEART RATE: 73 BPM | SYSTOLIC BLOOD PRESSURE: 137 MMHG | DIASTOLIC BLOOD PRESSURE: 83 MMHG

## 2021-03-06 LAB — SARS-COV-2 RNA SPEC QL NAA+PROBE: SIGNIFICANT CHANGE UP

## 2021-03-06 PROCEDURE — 99239 HOSP IP/OBS DSCHRG MGMT >30: CPT

## 2021-03-06 RX ORDER — CEFEPIME 1 G/1
2 INJECTION, POWDER, FOR SOLUTION INTRAMUSCULAR; INTRAVENOUS
Qty: 0 | Refills: 0 | DISCHARGE
Start: 2021-03-06

## 2021-03-06 RX ADMIN — CLOPIDOGREL BISULFATE 75 MILLIGRAM(S): 75 TABLET, FILM COATED ORAL at 11:20

## 2021-03-06 RX ADMIN — CEFEPIME 100 MILLIGRAM(S): 1 INJECTION, POWDER, FOR SOLUTION INTRAMUSCULAR; INTRAVENOUS at 11:20

## 2021-03-06 RX ADMIN — Medication 20 MILLIGRAM(S): at 11:21

## 2021-03-06 RX ADMIN — LOSARTAN POTASSIUM 25 MILLIGRAM(S): 100 TABLET, FILM COATED ORAL at 11:26

## 2021-03-06 RX ADMIN — LORATADINE 10 MILLIGRAM(S): 10 TABLET ORAL at 11:21

## 2021-03-06 RX ADMIN — Medication 81 MILLIGRAM(S): at 11:19

## 2021-03-06 RX ADMIN — Medication 4: at 12:10

## 2021-03-06 RX ADMIN — CARVEDILOL PHOSPHATE 6.25 MILLIGRAM(S): 80 CAPSULE, EXTENDED RELEASE ORAL at 11:20

## 2021-03-06 RX ADMIN — Medication 1000 MILLIGRAM(S): at 11:19

## 2021-03-06 RX ADMIN — Medication 1000 UNIT(S): at 11:20

## 2021-03-06 RX ADMIN — RANOLAZINE 500 MILLIGRAM(S): 500 TABLET, FILM COATED, EXTENDED RELEASE ORAL at 11:22

## 2021-03-06 RX ADMIN — Medication 2: at 08:33

## 2021-03-06 RX ADMIN — SODIUM CHLORIDE 3 MILLILITER(S): 9 INJECTION INTRAMUSCULAR; INTRAVENOUS; SUBCUTANEOUS at 06:06

## 2021-03-06 NOTE — DISCHARGE NOTE PROVIDER - HOSPITAL COURSE
CC: Foot ulcers  HPI:    65y old  Male  with PMH  of CAD s/p LAD stent on 2/23/21, HFrEF, PVD, DM2, h/o left 2nd toe amputation, recent admission due to OM of the right foot on long standing via PICC line admitted from [facility] for possible surgical intervention for not healing left foot ulcers.  He had a debridement on 1/25/21 and F/U debridement on 1/31/21. He had a PICC placed for LT ABX & was sent to [facility] for wound care, DM control & ABX. He was seen in the office for F/U and his wound are progressively degrading and is readmitted for MRI R foot W/WO KUMAR & will need future surgery possible TMA or partial TMA. Pt has seen Dr Arndt on last admit and had adequate perfusion.  Denies cp, dyspnea, cough, abdominal pain, problems with urination or bowel problems. Has peripheral neuropathy in both feet.  in Ed -  T(F): 98.5 Max: 98.5 HR: 85 (85 - 85) BP: 103/72  (103/72 - 103/72) RR: 15  (15 - 15) SpO2: 100%  (100% - 100%) ESR 30, HB 11, Cr 0.83, Na 130     Hospital course:   Pt was admitted, found to have right foot non healing diabetic ulcer with underlying osteomyelitis. third right toe gangrene with underlying PAD.  MRI confirmed OM.  Dopplers negative for DVT.  Artieral doppler with no significant occlusions.  Pt was continued on IV antibiotics, he completed 6 weeks of cefepime, flagyl.  He is now POD 5 for TMA.  Plan is to go home via PICC line and complete 6 more weeks of cefepime per ID recommendations.  Pt otherwise HD stable, afebrile.  He follows with Dr. Bullock for wound care and next visit should be Monday.     REVIEW OF SYSTEMS: All other review of systems is negative unless indicated above.    Vital Signs Last 24 Hrs  T(C): 36.8 (06 Mar 2021 08:25), Max: 36.8 (06 Mar 2021 08:25)  T(F): 98.2 (06 Mar 2021 08:25), Max: 98.2 (06 Mar 2021 08:25)  HR: 73 (06 Mar 2021 08:25) (66 - 73)  BP: 99/67 (06 Mar 2021 08:25) (99/67 - 148/83)  BP(mean): --  RR: 16 (06 Mar 2021 08:25) (16 - 18)  SpO2: 100% (06 Mar 2021 08:25) (100% - 100%)    PHYSICAL EXAM:  General: NAD, lying in bed  HEENT:  pupils equal and reactive, EOMI, no oropharyngeal lesions, erythema, exudates, oral thrush  NECK:   supple, no carotid bruits, no palpable lymph nodes, no thyromegaly  CV:  +S1, +S2, regular, no murmurs or rubs  RESP:   lungs clear to auscultation bilaterally, no wheezing, rales, rhonchi, good air entry bilaterally  GI:  abdomen soft, non-tender, non-distended, normal BS, no bruits, no abdominal masses, no palpable masses  MSK:   normal muscle tone, no atrophy, no rigidity, no contractions  EXT:  no clubbing, no cyanosis, no edema, no calf pain, swelling or erythema, R foot wrapped  VASCULAR:  pulses equal and symmetric in the upper and lower extremities  NEURO:  AAOX3, no focal neurological deficits, follows all commands, able to move extremities spontaneously  SKIN:  no ulcers, lesions or rashes    med/labs: Reviewed and interpreted     Assessment and Plan:  65M hx CAD s/p recent LAD stent on 2/3/21, HFrEF (30%), PVD, IDDM2, peripheral neuropathy, h/o left 2nd toe amputation, recent admission due to OM of the right foot on long standing via PICC line admitted from [facility] for possible surgical intervention for not healing left foot ulcers. Had had debridements on 1/25 and 1/31/21.    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 toe gangrene, underlying PAD  -MRI with OM  -doppler with no DVT  -arterial doppler with no overt stenosis/occlusion  -BCx no growth, ESR 30, CRP wnl  -PICC line in from prior  -per ID, prior wound cx show rare MSSA, bacteroides fragilis and strep constellatus  s/p Cefepime 2g q12 and Flagyl 500 iv q8 (since 1/21), per ID will need another 6 weeks of antibiotics with cefepime.   -per ID, adding Fluconazole 100 po qd for antifungal coverage but now stopped.   -appreciate podiatry input, s/p TMA POD # 5 - out patient wound care.     #CAD s/p recent LAD stent 2/3/21, HFrEF (30%)  -DAPT with ASA  -Losartan   -continue Coreg  -continue Ranexa  -hold lasix  -appreciate cards input    #Hyponatremic, hypovolemic: RESOLVED  -pt appears mildly hypovolemic on exam  -home lasix on hold    #IDDM2  -resume lantus 30, SSI, monitor BG  -hold home oral agents  -A1c 8.8 last month    #Mild JIGNESH  -continue FeSO4, Vit C    #nutrition c/s    #PT eval    #ppx- SCDs    #dispo:  -Home  with long term antibiotics today    Attending Statement: 40 minutes spent on total encounter and discharge planning.

## 2021-03-06 NOTE — DISCHARGE NOTE PROVIDER - NSDCHHNEEDSERVICE_GEN_ALL_CORE
Central venous access care/Medication teaching and assessment/Observation and assessment/Rehabilitation services

## 2021-03-06 NOTE — DISCHARGE NOTE PROVIDER - PROVIDER TOKENS
PROVIDER:[TOKEN:[[id number]],FOLLOWUP:[1-3 days]],FREE:[LAST:[follow up pcp 1 week],PHONE:[(   )    -],FAX:[(   )    -]]

## 2021-03-06 NOTE — PROGRESS NOTE ADULT - ASSESSMENT
65M hx CAD s/p recent LAD stent on 2/3/21, HFrEF (30%), PVD, IDDM2, peripheral neuropathy, h/o left 2nd toe amputation, recent admission due to OM of the right foot on long standing via PICC line admitted from [facility] for possible surgical intervention for not healing left foot ulcers. Had had debridements on 1/25 and 1/31/21.    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 toe gangrene, underlying PAD  -MRI with OM  -doppler with no DVT  -arterial doppler with no overt stenosis/occulsion  -BCx pending, ESR 30, CRP wnl  -PICC line in from prior  -per ID, prior wound cx show rare MSSA, bacteroides fragilis and strep constellatus  -continue Cefepime 2g q12 and Flagyl 500 iv q8 (since 1/21)  -per ID, adding Fluconazole 100 po qd for antifungal coverage  -appreciate podiatry input, planned for TMA, likely 3/1/21  -cardiology evaluating for clearance, per note from 2/26/21: Without active cardiac symptoms who appears optimal for low risk surgery (amputation toes), patient is intermediate to high risk due to multiple comorbid conditions. Continue coreg, asa, plavix without holding it as patient had recent LAD stent 2/3/21.    #CAD s/p recent LAD stent 2/3/21, HFrEF (30%)  -continue DAPT with ASA, Plavix, do not hold for OR  -continue Losartan, hold am of surgery  -continue Coreg  -continue Ranexa  -hold lasix as pt appears hypo/euvolemic and Na low  -appreciate cards input    #Hyponatremic, hypovolemic  -pt appears mildly hypovolemic on exam  -lasix 40qd at home, will hold for now, trend BMP  -if Na without improvement, will check urine lytes for FeUrea    #IDDM2  -home lantus 30, SSI, monitor BG  -hold home oral agents  -A1c 8.8 last month, will reduce lantus to 15u on night prior to OR    #Mild JIGNESH  -continue FeSO4, Vit C    #nutrition c/s    #PT eval    #ppx- SCDs    #dispo- pending surgical/abx plan for non healing LE ulcers  -from [facility]  -PCP: Dr Luna
Afebrile Dr Palla's note, noted & appreciated. For OR today. 
Alert afebrile Right foot progressing nicely. Wound is clean, suture line in place. No erythema, scant edema. Culture fro OR  PSEAER Apply Betadine / DSD/ ACE cover. ABX / PICC x 6 wks or as per ID. Weight bear for necessity / Cast boot / walker. Pt refusing tvr to REHAB. OK to D/C home. Keep dressing clean & dry F/U in office 48 hrs prn. THX
Alert afebrile Right foot without major change. 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  AA duplex of the Right LE reveals patent macro circulation to foot but atherosclerosis of the native arteries is noted. MRI Right foot suggests changes consistent with osteomyelitis of the distal 1 to 4 rays right foot. Discussed with him Tx options. Suggest complete transmetatarsal amputation that would present his best chances to heal and to conform to footwear. He is aware that that the wound may not heal & require future Sx including more proximal amputation AKA/BKA. Will schedule for Monday 03/01/21 pending medical/cardiac evaluation preop. Will do via JAY/LOCAL ANESTHESIA. THX
POD # 2 R TMA. T max 98 Alert no major complaints. TMA site clean, no erythema Penrose out. Apply Betadine Sol & DSD / ABD/ACE to site. OR Cult PSEAER 's Advise rest, keep dressing clean & dry.  ABX / PICC x 6 wks or as per ID. Encourage pt to go to rehab for ABX & local wound care / diail application of Betadine Sol & DSD / ACE cover. Suggest SS consult for placement, if pt agrees. THX 
Pt with poorly healing wounds to right forefoot / howard gangrene to #rd toe & bone exposedto 2nd toe & full thickness ulcer to medial 1st metatarsal head. Path was + for osteomyelitis to R 1st metatarsal head s/p debridement 1/31/21. His HbA1c had been > 8 and now AL low at 3.1. This is a pt / poorly controlled DMT2 using insulin/ microangiopathy & PSN with degrading wounds to the right foot after multiple debridements & 5 weeks of ABX / PICC. Spoke / him yesterday / his wife present. Advise admission ID F/U Admit Hospitalist Service for medical care & f/U Will re-image / MRI W/WO KUMAR to attempt to determine level of amputation probable TMA or partial TMA. He is aware that wounds may not heal & require future Sx, LT ABX and possible primary BKA/AKA. Will F/U THX
Afebrile S/P R TMA Wound is healing w/o event. No drainage, erythema or odor. Suture line in place.  Dr Lacey's note, noted & appreciated. Now for cefepime at home / PICC. OR cultures no change to date PSEAER. Discuss with pt AT LENGTH necessity to rest, keep foot clean & dry walk / walker for necessity. Advise see ENDO as OP for better control of glucose. Will F/U in office on MONDAY advised pt to call office today for appointment. THX 
POD # 1 TMA R Doing well VSS Right foot with Penrose in place. Flaps are viable. No cyanosis, odor or pus. OR cultures in progress. Mobilize drain. Apply DSD. Left foot with small abrasion / eschar apply Betadine sol. Pt may have injured L foot on bed. OK to weight bear for necessity / Cast Boot / assist & walker. Await cultures. ABX as per ID. X-ray Right foot clean TMA site. Will F/U in AM THX
66 y/o Male with h/o HTN, HLD, T2DM, PAD, CAD, BPH, prior left 2nd toe amputation, nonhealing foot ulcer with probable underlying acute on chronic OM on IV vancomycin and cefepime via PICC line since 1/21, then hospitalized on 1/30 for sepsis with bacteroides fragilis and persistent foot wound s/p debridement, IV abx changed to cefepime and metronidazloe via PICC line was admitted on 2/26 for poorly heeling right foot ulcer. He was seen in the podiatrist office for F/U and his wound are progressively degrading and is readmitted for MRI R foot, poorly healing foot wound and likely need for future surgery possible TMA or partial TMA. Pt has seen Dr Arndt on last admit and had adequate perfusion.     1. Right foot plantar ulcer s/p TMA with underlying acute on chronic OM with PSAE. PVD.  -prior sepsis with BAFR  -prior wound cultures showed rare MSSA, bacteroides fragilis, and strep constellatus  -most recent surgical c/s shows PSAE  -on cefepime 2 gm IV q12h since 1/21 and metronidazole 500 mg IV q8h  -tolerating abx well so far; no side effects noted  -PICC line site clean  -podiatry evaluation appreciated  -d/c metronidazole and d/c fluconazole  -continue IV abx coverage with cefepime for 6 weeks from last foot surgery  -weekly labs  -f/u podiatry  -monitor temps  -f/u CBC  -supportive care  2. Other issues:   -care per medicine
Alert afebrile Right TMA site looks clean no edema, erythema or drainage.  Apply Betadine / DSD / ACE cover. OK to weight bear / assist / walker FOR NECESSITY. Keep clean & dry. DR Lacey's note, noted & appreciated. OR Cult PSER/BAFR/Candida ABX as per ID. Request 6 more weeks of ABX / PICC at home. Awaiting D/C home tomorrow. THX
65M hx CAD s/p recent LAD stent on 2/3/21, HFrEF (30%), PVD, IDDM2, peripheral neuropathy, h/o left 2nd toe amputation, recent admission due to OM of the right foot on long standing via PICC line admitted from [facility] for possible surgical intervention for not healing left foot ulcers. Had had debridements on 1/25 and 1/31/21.    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 toe gangrene, underlying PAD  -MRI with OM  -doppler with no DVT  -arterial doppler with no overt stenosis/occulsion  -BCx pending, ESR 30, CRP wnl  -PICC line in from prior  -per ID, prior wound cx show rare MSSA, bacteroides fragilis and strep constellatus  -continue Cefepime 2g q12 and Flagyl 500 iv q8 (since 1/21)  -per ID, adding Fluconazole 100 po qd for antifungal coverage  -appreciate podiatry input, planned for TMA, 3/1/21  -cardiology evaluating for clearance, per note from 2/26/21: Without active cardiac symptoms who appears optimal for low risk surgery (amputation toes), patient is intermediate to high risk due to multiple comorbid conditions. Continue coreg, asa, plavix without holding it as patient had recent LAD stent 2/3/21.    #CAD s/p recent LAD stent 2/3/21, HFrEF (30%)  -continue DAPT with ASA, Plavix, do not hold for OR  -continue Losartan, hold am of surgery  -continue Coreg  -continue Ranexa  -hold lasix as pt appears hypo/euvolemic and Na low  -appreciate cards input    #Hyponatremic, hypovolemic  -pt appears mildly hypovolemic on exam  -lasix 40qd at home, will hold for now, trend BMP  -if Na without improvement, will check urine lytes for FeUrea    #IDDM2  -home lantus 30, SSI, monitor BG  -hold home oral agents  -A1c 8.8 last month, will reduce lantus to 15u on night prior to OR    #Mild JIGNESH  -continue FeSO4, Vit C    #nutrition c/s    #PT eval    #ppx- SCDs    #dispo- pending surgical/abx plan for non healing LE ulcers  -from [facility] [place]  -PCP: Dr Luna    Clearance: RCRI 3, pt is mod-high risk for low risk surgery. Per cards, Without active cardiac symptoms who appears optimal for low risk surgery (amputation toes), patient is intermediate to high risk due to multiple comorbid conditions. Continue coreg, asa, plavix without holding it as patient had recent LAD stent 2/3/21.  -recs:  -continue ASA, Plavix, BB, ranexa, statin  -hold losartan on am of surgery, resume post op  -reduce lantus by 50% on night prior to surgery, 30u to 15u, resume normal dose postop, assuming normal po  -continue abx  -medically optimized for OR

## 2021-03-06 NOTE — DISCHARGE NOTE PROVIDER - CARE PROVIDER_API CALL
Jeffrey Bullock  PODIATRIC MEDICINE AND SURGERY  [address] [place] [address]  Phone: [phone]  Fax: [phone]  Follow Up Time: 1-3 days    follow up pcp 1 week,   Phone: (   )    -  Fax: (   )    -  Follow Up Time:

## 2021-03-06 NOTE — PROGRESS NOTE ADULT - SUBJECTIVE AND OBJECTIVE BOX
Patient is a 65y old  Male who presents with a chief complaint of foot ulcers (05 Mar 2021 13:54)      HPI:    65y old  Male  with PMH  of CAD s/p LAD stent on 2/23/21, HFrEF, PVD, DM2, h/o left 2nd toe amputation, recent admission due to OM of the right foot on long standing via PICC line admitted from [facility] for possible surgical intervention for not healing left foot ulcers.  He had a debridement on 1/25/21 and F/U debridement on 1/31/21. He had a PICC placed for LT ABX & was sent to [facility] for wound care, DM control & ABX. He was seen in the office for F/U and his wound are progressively degrading and is readmitted for MRI R foot W/WO KUMAR & will need future surgery possible TMA or partial TMA. Pt has seen Dr Arndt on last admit and had adequate perfusion.   Denies cp, dyspnea, cough, abdominal pain, problems with urination or bowel problems. Has peripheral neuropathy in both feet.     in Ed -  T(F): 98.5 Max: 98.5 HR: 85 (85 - 85) BP: 103/72  (103/72 - 103/72) RR: 15  (15 - 15) SpO2: 100%  (100% - 100%) ESR 30, HB 11, Cr 0.83, Na 130   seen by podiatrist and cardiology in ED       (26 Feb 2021 10:42)      Allergies    No Known Allergies    Intolerances        MEDICATIONS  (STANDING):  ascorbic acid 1000 milliGRAM(s) Oral daily  aspirin enteric coated 81 milliGRAM(s) Oral daily  atorvastatin 40 milliGRAM(s) Oral at bedtime  carvedilol 6.25 milliGRAM(s) Oral every 12 hours  cefepime   IVPB 2000 milliGRAM(s) IV Intermittent every 12 hours  cholecalciferol 1000 Unit(s) Oral daily  clopidogrel Tablet 75 milliGRAM(s) Oral daily  dextrose 40% Gel 15 Gram(s) Oral once  fluticasone propionate 50 MICROgram(s)/spray Nasal Spray 1 Spray(s) Both Nostrils two times a day  furosemide    Tablet 20 milliGRAM(s) Oral daily  glucagon  Injectable 1 milliGRAM(s) IntraMuscular once  insulin glargine Injectable (LANTUS) 30 Unit(s) SubCutaneous at bedtime  insulin lispro (ADMELOG) corrective regimen sliding scale   SubCutaneous three times a day before meals  insulin lispro (ADMELOG) corrective regimen sliding scale   SubCutaneous at bedtime  loratadine 10 milliGRAM(s) Oral daily  losartan 25 milliGRAM(s) Oral daily  losartan 25 milliGRAM(s) Oral daily  ranolazine 500 milliGRAM(s) Oral two times a day  senna 2 Tablet(s) Oral at bedtime  sodium chloride 0.9% lock flush 3 milliLiter(s) IV Push every 8 hours    MEDICATIONS  (PRN):  acetaminophen   Tablet .. 650 milliGRAM(s) Oral every 6 hours PRN Mild Pain (1 - 3)  aluminum hydroxide/magnesium hydroxide/simethicone Suspension 30 milliLiter(s) Oral every 4 hours PRN Dyspepsia  bisacodyl Suppository 10 milliGRAM(s) Rectal daily PRN Constipation  guaiFENesin   Syrup  (Sugar-Free) 100 milliGRAM(s) Oral every 6 hours PRN Cough  magnesium hydroxide Suspension 30 milliLiter(s) Oral daily PRN Constipation        RADIOLOGY    < from: Xray Foot AP + Lateral, Right (03.01.21 @ 17:03) >  EXAM:  XR FOOT 2 VIEWS RT                            PROCEDURE DATE:  03/01/2021          INTERPRETATION:  Right foot    HISTORY: Postop    COMPARISON: 2/26/2021     Three views of the right foot show transmetatarsal amputation of all five toes. The joint spaces are maintained. Calcifications on or near the calcaneus are unchanged.    IMPRESSION: Postoperative changes.      Thank you for this referral.        < end of copied text >  < from: Xray Foot AP + Lateral, Right (03.01.21 @ 17:03) >    BROWN HUDSON MD; Attending Interventional Radiologist  This document has been electronically signed. Mar  2 2021  8:22AM    < end of copied text >  Culture - Surgical Swab (03.01.21 @ 15:13)   - Amikacin: S <=16   - Aztreonam: S <=4   - Piperacillin/Tazobactam: S <=8   - Tobramycin: S <=2   - Cefepime: S <=2   - Ceftazidime: S <=1   - Ciprofloxacin: R >2   - Gentamicin: S <=2   - Imipenem: S <=1   - Levofloxacin: R >4   - Meropenem: S <=1 Specimen Source: .Surgical Swab #1- right foot deep wound   Culture Results:   Rare Pseudomonas aeruginosa   Organism Identification: Pseudomonas aeruginosa   Organism: Pseudomonas aeruginosa   Method Type: ARTURO

## 2021-03-06 NOTE — PROGRESS NOTE ADULT - REASON FOR ADMISSION
foot ulcers

## 2021-03-06 NOTE — DISCHARGE NOTE PROVIDER - NSDCCPCAREPLAN_GEN_ALL_CORE_FT
PRINCIPAL DISCHARGE DIAGNOSIS  Diagnosis: Osteomyelitis  Assessment and Plan of Treatment: due to diabetic ulcer of foot - continue IV antibiotics for 6 weeks via PICC line per infectious disease recommendations.  Weekly labs CBC, BMP, ESR, CRP  follow up with Dr. Cisneros Monday for wound care.

## 2021-03-06 NOTE — PROGRESS NOTE ADULT - PROVIDER SPECIALTY LIST ADULT
Hospitalist
Podiatry
Hospitalist
Infectious Disease
Podiatry
Podiatry
Hospitalist
Hospitalist
Podiatry
Hospitalist
Hospitalist
Podiatry
Cardiology
Hospitalist
Cardiology

## 2021-03-06 NOTE — DISCHARGE NOTE PROVIDER - NSDCMRMEDTOKEN_GEN_ALL_CORE_FT
Aspirin Low Dose 81 mg oral delayed release tablet: 1 tab(s) orally once a day  atorvastatin 40 mg oral tablet: 1 tab(s) orally once a day  bisacodyl 10 mg rectal suppository: 1 suppository(ies) rectal once a day  carvedilol 6.25 mg oral tablet: 1 tab(s) orally every 12 hours  cefepime 2 g intravenous injection: 2 gram(s) intravenous every 12 hours  clopidogrel 75 mg oral tablet: 1 tab(s) orally once a day  ferrous sulfate 325 mg (65 mg elemental iron) oral tablet: 1 tab(s) orally once a day  fluticasone 50 mcg/inh nasal spray: 1 spray(s) in each nostril 2 times a day  furosemide 40 mg oral tablet: 1 tab(s) orally once a day  Glucosamine Chondroitin oral capsule: 3 cap(s) orally once a day  guaiFENesin 100 mg/5 mL oral liquid: 5 milliliter(s) orally every 6 hours, As needed, Cough  insulin glargine: 42 unit(s) subcutaneous once a day (at bedtime)    **per d/c paperwork, patient was decreased to 30 units, but patient states he has been taking 42  loratadine 10 mg oral tablet: 1 tab(s) orally once a day  losartan 25 mg oral tablet: 1 tab(s) orally once a day  metFORMIN 1000 mg oral tablet: 1 tab(s) orally 2 times a day  Milk of Magnesia 8% oral suspension: 30 milliliter(s) orally once a day (at bedtime), As Needed  potassium chloride 20 mEq oral tablet, extended release: 1 tab(s) orally once a day  ranolazine 500 mg oral tablet, extended release: 1 tab(s) orally 2 times a day  Vitamin B-12 1000 mcg oral tablet: 1 tab(s) orally once a day  Vitamin C 1000 mg oral tablet: 1 tab(s) orally once a day  Vitamin D3 1000 intl units (25 mcg) oral tablet: 1 tab(s) orally once a day

## 2021-03-07 RX ORDER — POTASSIUM CHLORIDE 20 MEQ
1 PACKET (EA) ORAL
Qty: 30 | Refills: 0
Start: 2021-03-07

## 2021-03-07 RX ORDER — FUROSEMIDE 40 MG
1 TABLET ORAL
Qty: 30 | Refills: 0
Start: 2021-03-07

## 2021-03-07 RX ORDER — CLOPIDOGREL BISULFATE 75 MG/1
1 TABLET, FILM COATED ORAL
Qty: 30 | Refills: 0
Start: 2021-03-07

## 2021-03-07 RX ORDER — LOSARTAN POTASSIUM 100 MG/1
1 TABLET, FILM COATED ORAL
Qty: 30 | Refills: 0
Start: 2021-03-07

## 2021-03-07 RX ORDER — CARVEDILOL PHOSPHATE 80 MG/1
1 CAPSULE, EXTENDED RELEASE ORAL
Qty: 60 | Refills: 0
Start: 2021-03-07 | End: 2021-04-05

## 2021-03-09 ENCOUNTER — APPOINTMENT (OUTPATIENT)
Dept: CARDIOLOGY | Facility: CLINIC | Age: 66
End: 2021-03-09
Payer: MEDICARE

## 2021-03-09 ENCOUNTER — NON-APPOINTMENT (OUTPATIENT)
Age: 66
End: 2021-03-09

## 2021-03-09 VITALS
DIASTOLIC BLOOD PRESSURE: 73 MMHG | TEMPERATURE: 97.6 F | HEART RATE: 71 BPM | SYSTOLIC BLOOD PRESSURE: 110 MMHG | BODY MASS INDEX: 22.96 KG/M2 | OXYGEN SATURATION: 100 % | WEIGHT: 164 LBS | HEIGHT: 71 IN

## 2021-03-09 DIAGNOSIS — R06.00 DYSPNEA, UNSPECIFIED: ICD-10-CM

## 2021-03-09 PROCEDURE — 93000 ELECTROCARDIOGRAM COMPLETE: CPT

## 2021-03-09 PROCEDURE — 99072 ADDL SUPL MATRL&STAF TM PHE: CPT

## 2021-03-09 PROCEDURE — 99215 OFFICE O/P EST HI 40 MIN: CPT

## 2021-03-09 RX ORDER — FLUTICASONE PROPIONATE 0.5 MG/G
0.05 CREAM TOPICAL TWICE DAILY
Qty: 1 | Refills: 1 | Status: DISCONTINUED | COMMUNITY
Start: 2021-01-19 | End: 2021-03-09

## 2021-03-09 RX ORDER — OXICONAZOLE NITRATE 10 MG/G
1 CREAM TOPICAL DAILY
Qty: 1 | Refills: 1 | Status: DISCONTINUED | COMMUNITY
Start: 2021-01-19 | End: 2021-03-09

## 2021-03-09 RX ORDER — BUTENAFINE HYDROCHLORIDE 10 MG/G
1 CREAM TOPICAL DAILY
Qty: 1 | Refills: 2 | Status: DISCONTINUED | COMMUNITY
Start: 2021-01-21 | End: 2021-03-09

## 2021-03-09 NOTE — REASON FOR VISIT
[Follow-Up - From Hospitalization] : follow-up of a recent hospitalization for [Cardiomyopathy] : cardiomyopathy [Coronary Artery Disease] : coronary artery disease [Hypertension] : hypertension [Medication Management] : Medication management [Spouse] : spouse

## 2021-03-09 NOTE — REVIEW OF SYSTEMS
[Joint Pain] : joint pain [Upper Back Pain] : upper back pain [Under Stress] : under stress [Fever] : no fever [Chills] : no chills [Recent Weight Loss (___ Lbs)] : recent [unfilled] ~Ulb weight loss [Shortness Of Breath] : no shortness of breath [Dyspnea on exertion] : not dyspnea during exertion [Chest  Pressure] : no chest pressure [Chest Pain] : no chest pain [Lower Ext Edema] : no extremity edema [Palpitations] : no palpitations [Cough] : no cough [Abdominal Pain] : no abdominal pain [see HPI] : see HPI [Change In Color Of Skin] : change in skin color [Dizziness] : no dizziness [Easy Bleeding] : no tendency for easy bleeding

## 2021-03-09 NOTE — HISTORY OF PRESENT ILLNESS
[FreeTextEntry1] : Jeffrey Cardenas is a 65 year old man with multiple medical problems, including coronary artery disease, hyperlipidemia, hypertension, diabetes mellitus, peripheral artery disease, and skin cancer (basal cell carcinoma of face s/p excision in October 2017), who returns for cardiac examination. He was hospitalized in late January 2021 with cellulitis and osteomyelitis of the right foot.  He was discharged with a PICC line for a 6 week course of vancomycin and cefepime.   He was again admitted on 1/31/2021 with decompensated HF; found to have severe LV dysfunction, mild elevation of troponin.  Cath on 2/4 revealed a critical stenosis of proximal LAD (now s/p PCI) and elevated LVEDP.  He returned to the hospital on 2/26/21 with worsening foot ulcer and underwent right transmetatarsal amputation (discharged 3/6/21) with plans for ~ 6 more weeks of IV antibiotics.  He presently feels well from a cardiac standpoint -- previous dyspnea is no longer present.  He is tolerating prescribed pharmacotherapy.\par \par ** In preparation for today's visit I reviewed his hospital chart (including imaging) and summarized findings in this note.

## 2021-03-09 NOTE — DISCUSSION/SUMMARY
[___ Month(s)] : [unfilled] month(s) [With Me] : with me [FreeTextEntry1] : \par Coronary artery disease: Status post recent proximal LAD stent with additional diffuse disease; no anginal symptoms; continue medical management with aspirin, clopidogrel, carvedilol, atorvastatin, and Ranexa.\par \par Cardiomyopathy: Recent onset cardiomyopathy -- echocardiogram had the appearance of a stress-induced cardiopathy but subsequent coronary angiography revealed a severe proximal LAD stenosis; appears euvolemic at the present time; given the presence of past renal dysfunction, I recommended decreasing the dose of furosemide to 20 mg once daily; I will reassess LV function with echocardiography in approximately 2 months to assess for interval improvement following revascularization.\par \par Dyspnea on exertion: Resolved with diuresis and LAD stent.\par \par Hypertension: Multiple recent titration of his antihypertensive regimen - blood pressure is now well controlled.

## 2021-03-09 NOTE — PHYSICAL EXAM
[Normal Appearance] : normal appearance [Well Groomed] : well groomed [No Oral Pallor] : no oral pallor [] : no respiratory distress [Respiration, Rhythm And Depth] : normal respiratory rhythm and effort [Auscultation Breath Sounds / Voice Sounds] : lungs were clear to auscultation bilaterally [Heart Rate And Rhythm] : heart rate and rhythm were normal [Heart Sounds] : normal S1 and S2 [Bowel Sounds] : normal bowel sounds [Oriented To Time, Place, And Person] : oriented to person, place, and time [Impaired Insight] : insight and judgment were intact [Affect] : the affect was normal [Mood] : the mood was normal [Eyelids - No Xanthelasma] : the eyelids demonstrated no xanthelasmas [Murmurs] : no murmurs present [FreeTextEntry1] : Warm, R arm PICC

## 2021-03-10 ENCOUNTER — NON-APPOINTMENT (OUTPATIENT)
Age: 66
End: 2021-03-10

## 2021-03-12 ENCOUNTER — NON-APPOINTMENT (OUTPATIENT)
Age: 66
End: 2021-03-12

## 2021-03-12 DIAGNOSIS — I50.22 CHRONIC SYSTOLIC (CONGESTIVE) HEART FAILURE: ICD-10-CM

## 2021-03-12 DIAGNOSIS — E11.621 TYPE 2 DIABETES MELLITUS WITH FOOT ULCER: ICD-10-CM

## 2021-03-12 DIAGNOSIS — E11.42 TYPE 2 DIABETES MELLITUS WITH DIABETIC POLYNEUROPATHY: ICD-10-CM

## 2021-03-12 DIAGNOSIS — Z79.82 LONG TERM (CURRENT) USE OF ASPIRIN: ICD-10-CM

## 2021-03-12 DIAGNOSIS — E11.69 TYPE 2 DIABETES MELLITUS WITH OTHER SPECIFIED COMPLICATION: ICD-10-CM

## 2021-03-12 DIAGNOSIS — N40.0 BENIGN PROSTATIC HYPERPLASIA WITHOUT LOWER URINARY TRACT SYMPTOMS: ICD-10-CM

## 2021-03-12 DIAGNOSIS — E78.5 HYPERLIPIDEMIA, UNSPECIFIED: ICD-10-CM

## 2021-03-12 DIAGNOSIS — E87.1 HYPO-OSMOLALITY AND HYPONATREMIA: ICD-10-CM

## 2021-03-12 DIAGNOSIS — M86.9 OSTEOMYELITIS, UNSPECIFIED: ICD-10-CM

## 2021-03-12 DIAGNOSIS — E43 UNSPECIFIED SEVERE PROTEIN-CALORIE MALNUTRITION: ICD-10-CM

## 2021-03-12 DIAGNOSIS — Z82.49 FAMILY HISTORY OF ISCHEMIC HEART DISEASE AND OTHER DISEASES OF THE CIRCULATORY SYSTEM: ICD-10-CM

## 2021-03-12 DIAGNOSIS — Z87.891 PERSONAL HISTORY OF NICOTINE DEPENDENCE: ICD-10-CM

## 2021-03-12 DIAGNOSIS — E11.52 TYPE 2 DIABETES MELLITUS WITH DIABETIC PERIPHERAL ANGIOPATHY WITH GANGRENE: ICD-10-CM

## 2021-03-12 DIAGNOSIS — D50.9 IRON DEFICIENCY ANEMIA, UNSPECIFIED: ICD-10-CM

## 2021-03-12 DIAGNOSIS — I11.0 HYPERTENSIVE HEART DISEASE WITH HEART FAILURE: ICD-10-CM

## 2021-03-12 DIAGNOSIS — Z79.4 LONG TERM (CURRENT) USE OF INSULIN: ICD-10-CM

## 2021-03-12 DIAGNOSIS — I25.10 ATHEROSCLEROTIC HEART DISEASE OF NATIVE CORONARY ARTERY WITHOUT ANGINA PECTORIS: ICD-10-CM

## 2021-03-12 DIAGNOSIS — Z95.5 PRESENCE OF CORONARY ANGIOPLASTY IMPLANT AND GRAFT: ICD-10-CM

## 2021-03-12 DIAGNOSIS — I25.5 ISCHEMIC CARDIOMYOPATHY: ICD-10-CM

## 2021-03-12 RX ORDER — POTASSIUM CHLORIDE 1500 MG/1
20 TABLET, EXTENDED RELEASE ORAL
Qty: 30 | Refills: 0 | Status: DISCONTINUED | COMMUNITY
Start: 2021-03-07 | End: 2021-03-12

## 2021-03-15 ENCOUNTER — EMERGENCY (EMERGENCY)
Facility: HOSPITAL | Age: 66
LOS: 0 days | Discharge: HOME | End: 2021-03-15
Attending: EMERGENCY MEDICINE
Payer: COMMERCIAL

## 2021-03-15 ENCOUNTER — APPOINTMENT (OUTPATIENT)
Dept: ENDOCRINOLOGY | Facility: CLINIC | Age: 66
End: 2021-03-15

## 2021-03-15 VITALS
DIASTOLIC BLOOD PRESSURE: 79 MMHG | SYSTOLIC BLOOD PRESSURE: 131 MMHG | HEART RATE: 79 BPM | RESPIRATION RATE: 16 BRPM | TEMPERATURE: 98 F | OXYGEN SATURATION: 99 %

## 2021-03-15 VITALS
WEIGHT: 169.98 LBS | OXYGEN SATURATION: 100 % | HEART RATE: 80 BPM | DIASTOLIC BLOOD PRESSURE: 84 MMHG | RESPIRATION RATE: 18 BRPM | HEIGHT: 71 IN | SYSTOLIC BLOOD PRESSURE: 137 MMHG | TEMPERATURE: 98 F

## 2021-03-15 DIAGNOSIS — Z79.4 LONG TERM (CURRENT) USE OF INSULIN: ICD-10-CM

## 2021-03-15 DIAGNOSIS — I50.20 UNSPECIFIED SYSTOLIC (CONGESTIVE) HEART FAILURE: ICD-10-CM

## 2021-03-15 DIAGNOSIS — Z95.828 PRESENCE OF OTHER VASCULAR IMPLANTS AND GRAFTS: ICD-10-CM

## 2021-03-15 DIAGNOSIS — E87.5 HYPERKALEMIA: ICD-10-CM

## 2021-03-15 DIAGNOSIS — E87.1 HYPO-OSMOLALITY AND HYPONATREMIA: ICD-10-CM

## 2021-03-15 DIAGNOSIS — E11.51 TYPE 2 DIABETES MELLITUS WITH DIABETIC PERIPHERAL ANGIOPATHY WITHOUT GANGRENE: ICD-10-CM

## 2021-03-15 DIAGNOSIS — I10 ESSENTIAL (PRIMARY) HYPERTENSION: ICD-10-CM

## 2021-03-15 DIAGNOSIS — R79.89 OTHER SPECIFIED ABNORMAL FINDINGS OF BLOOD CHEMISTRY: ICD-10-CM

## 2021-03-15 DIAGNOSIS — Z98.890 OTHER SPECIFIED POSTPROCEDURAL STATES: Chronic | ICD-10-CM

## 2021-03-15 DIAGNOSIS — I11.0 HYPERTENSIVE HEART DISEASE WITH HEART FAILURE: ICD-10-CM

## 2021-03-15 DIAGNOSIS — Z89.422 ACQUIRED ABSENCE OF OTHER LEFT TOE(S): ICD-10-CM

## 2021-03-15 DIAGNOSIS — M86.9 OSTEOMYELITIS, UNSPECIFIED: ICD-10-CM

## 2021-03-15 DIAGNOSIS — Z95.5 PRESENCE OF CORONARY ANGIOPLASTY IMPLANT AND GRAFT: ICD-10-CM

## 2021-03-15 DIAGNOSIS — N40.0 BENIGN PROSTATIC HYPERPLASIA WITHOUT LOWER URINARY TRACT SYMPTOMS: ICD-10-CM

## 2021-03-15 DIAGNOSIS — E11.69 TYPE 2 DIABETES MELLITUS WITH OTHER SPECIFIED COMPLICATION: ICD-10-CM

## 2021-03-15 DIAGNOSIS — I25.10 ATHEROSCLEROTIC HEART DISEASE OF NATIVE CORONARY ARTERY WITHOUT ANGINA PECTORIS: ICD-10-CM

## 2021-03-15 DIAGNOSIS — E78.5 HYPERLIPIDEMIA, UNSPECIFIED: ICD-10-CM

## 2021-03-15 DIAGNOSIS — Z89.429 ACQUIRED ABSENCE OF OTHER TOE(S), UNSPECIFIED SIDE: Chronic | ICD-10-CM

## 2021-03-15 LAB
ANION GAP SERPL CALC-SCNC: 5 MMOL/L — SIGNIFICANT CHANGE UP (ref 5–17)
BASOPHILS # BLD AUTO: 0.04 K/UL — SIGNIFICANT CHANGE UP (ref 0–0.2)
BASOPHILS NFR BLD AUTO: 1 % — SIGNIFICANT CHANGE UP (ref 0–2)
BUN SERPL-MCNC: 23 MG/DL — SIGNIFICANT CHANGE UP (ref 7–23)
CALCIUM SERPL-MCNC: 9.8 MG/DL — SIGNIFICANT CHANGE UP (ref 8.5–10.1)
CHLORIDE SERPL-SCNC: 92 MMOL/L — LOW (ref 96–108)
CO2 SERPL-SCNC: 28 MMOL/L — SIGNIFICANT CHANGE UP (ref 22–31)
CREAT SERPL-MCNC: 0.96 MG/DL — SIGNIFICANT CHANGE UP (ref 0.5–1.3)
EOSINOPHIL # BLD AUTO: 0.16 K/UL — SIGNIFICANT CHANGE UP (ref 0–0.5)
EOSINOPHIL NFR BLD AUTO: 4 % — SIGNIFICANT CHANGE UP (ref 0–6)
GLUCOSE SERPL-MCNC: 132 MG/DL — HIGH (ref 70–99)
HCT VFR BLD CALC: 33.1 % — LOW (ref 39–50)
HGB BLD-MCNC: 11.1 G/DL — LOW (ref 13–17)
LYMPHOCYTES # BLD AUTO: 0.68 K/UL — LOW (ref 1–3.3)
LYMPHOCYTES # BLD AUTO: 17 % — SIGNIFICANT CHANGE UP (ref 13–44)
MAGNESIUM SERPL-MCNC: 1.8 MG/DL — SIGNIFICANT CHANGE UP (ref 1.6–2.6)
MCHC RBC-ENTMCNC: 27.3 PG — SIGNIFICANT CHANGE UP (ref 27–34)
MCHC RBC-ENTMCNC: 33.5 GM/DL — SIGNIFICANT CHANGE UP (ref 32–36)
MCV RBC AUTO: 81.5 FL — SIGNIFICANT CHANGE UP (ref 80–100)
MONOCYTES # BLD AUTO: 0.6 K/UL — SIGNIFICANT CHANGE UP (ref 0–0.9)
MONOCYTES NFR BLD AUTO: 15 % — HIGH (ref 2–14)
NEUTROPHILS # BLD AUTO: 2.43 K/UL — SIGNIFICANT CHANGE UP (ref 1.8–7.4)
NEUTROPHILS NFR BLD AUTO: 61 % — SIGNIFICANT CHANGE UP (ref 43–77)
NRBC # BLD: SIGNIFICANT CHANGE UP /100 WBCS (ref 0–0)
PLATELET # BLD AUTO: 210 K/UL — SIGNIFICANT CHANGE UP (ref 150–400)
POTASSIUM SERPL-MCNC: 4.9 MMOL/L — SIGNIFICANT CHANGE UP (ref 3.5–5.3)
POTASSIUM SERPL-SCNC: 4.9 MMOL/L — SIGNIFICANT CHANGE UP (ref 3.5–5.3)
RBC # BLD: 4.06 M/UL — LOW (ref 4.2–5.8)
RBC # FLD: 15.5 % — HIGH (ref 10.3–14.5)
SODIUM SERPL-SCNC: 125 MMOL/L — LOW (ref 135–145)
WBC # BLD: 3.99 K/UL — SIGNIFICANT CHANGE UP (ref 3.8–10.5)
WBC # FLD AUTO: 3.99 K/UL — SIGNIFICANT CHANGE UP (ref 3.8–10.5)

## 2021-03-15 PROCEDURE — 36415 COLL VENOUS BLD VENIPUNCTURE: CPT

## 2021-03-15 PROCEDURE — 85025 COMPLETE CBC W/AUTO DIFF WBC: CPT

## 2021-03-15 PROCEDURE — 80048 BASIC METABOLIC PNL TOTAL CA: CPT

## 2021-03-15 PROCEDURE — 93010 ELECTROCARDIOGRAM REPORT: CPT

## 2021-03-15 PROCEDURE — 71045 X-RAY EXAM CHEST 1 VIEW: CPT | Mod: 26

## 2021-03-15 PROCEDURE — 93005 ELECTROCARDIOGRAM TRACING: CPT

## 2021-03-15 PROCEDURE — 83735 ASSAY OF MAGNESIUM: CPT

## 2021-03-15 PROCEDURE — 99284 EMERGENCY DEPT VISIT MOD MDM: CPT

## 2021-03-15 PROCEDURE — 99283 EMERGENCY DEPT VISIT LOW MDM: CPT | Mod: 25

## 2021-03-15 PROCEDURE — 71045 X-RAY EXAM CHEST 1 VIEW: CPT

## 2021-03-15 RX ORDER — SODIUM CHLORIDE 9 MG/ML
500 INJECTION INTRAMUSCULAR; INTRAVENOUS; SUBCUTANEOUS ONCE
Refills: 0 | Status: COMPLETED | OUTPATIENT
Start: 2021-03-15 | End: 2021-03-15

## 2021-03-15 RX ADMIN — SODIUM CHLORIDE 500 MILLILITER(S): 9 INJECTION INTRAMUSCULAR; INTRAVENOUS; SUBCUTANEOUS at 17:24

## 2021-03-15 NOTE — ED ADULT NURSE NOTE - NSIMPLEMENTINTERV_GEN_ALL_ED
Implemented All Universal Safety Interventions:  [place] to call system. Call bell, personal items and telephone within reach. Instruct patient to call for assistance. Room bathroom lighting operational. Non-slip footwear when patient is off stretcher. Physically safe environment: no spills, clutter or unnecessary equipment. Stretcher in lowest position, wheels locked, appropriate side rails in place.

## 2021-03-15 NOTE — ED STATDOCS - PSH
History of amputation of toe  L foot 2nd toe amputation  Status post debridement  right foot ulcer, with I&D

## 2021-03-15 NOTE — ED STATDOCS - CLINICAL SUMMARY MEDICAL DECISION MAKING FREE TEXT BOX
Na 122 K 5.4 Pt asymptomatic. Will reconfirm labs. Pt wants to go home as he is asymptomatic. Labs possible due to Lasix use. Pt was on oral potassium but recently taken off. Na 122 K 5.4 Pt asymptomatic. Will reconfirm labs. Pt eagerly wants to go home as he is asymptomatic. Labs possible due to Lasix use. Pt was on oral potassium but recently taken off. pt with chronic hyponatremia

## 2021-03-15 NOTE — ED STATDOCS - PROGRESS NOTE DETAILS
64 yo male with a PMH of dm, htn, hld, osteomyelitis s/p picc line receiving cefepime 2g BID was told to come to the ER by dr ingram office. Pt had visiting nurse see him firday and had blood work drawn. Pt was called today and told that his NA was 122 and K was 5.4. Pt currently asymptomatic. WIll recheck labs. Reeval. Potassium 4.9 and sodium 122. Will give the 500cc of NS and d/c to f/u with pmd. Ot aware and continues to be asymptomatic. -Esteban Currie PA-C Pt still asymptomatic. Will d/c the pt home and gave strict return precautions. -Esteban Currie PA-C

## 2021-03-15 NOTE — ED STATDOCS - ATTENDING CONTRIBUTION TO CARE
I, Stephon Lan MD,  performed the initial face to face bedside interview with this patient regarding history of present illness, review of symptoms and relevant past medical, social and family history.  I completed an independent physical examination.  I was the initial provider who evaluated this patient. I have signed out the follow up of any pending tests (i.e. labs, radiological studies) to the ACP.  The ACP will complete the work up, interpret tests, administer medications if necessary and reassess the patient for an appropriate disposition.  If questions arise the ACP is expected to contact me for consultation. In the current work-flow I usually don't get to speak to nor reassess patients for final disposition once I sign the case out to the ACP (Unless otherwise specifically documented by me).

## 2021-03-15 NOTE — ED ADULT NURSE NOTE - OBJECTIVE STATEMENT
Sent in by Dr. Recinos due to abnormal potassium and sodium. on routine blood work 2 days ago labs showed 122 NA+ and 5.4 K+. Pt has no complaints.

## 2021-03-15 NOTE — ED ADULT TRIAGE NOTE - CHIEF COMPLAINT QUOTE
Patient comes to ED for abnormal labs. Patient has a right arm PICC line with cefepime infusion BID. Patient had 5 toes amputated in January. denies any fevers. patient had blood work last week, NA and K abnormal, potassium medication D/C and blood redrawn on Saturday. patient got a call today from MD Lacey office for return to ED for abnormal labs. Patient was taken to room to preform EKG

## 2021-03-15 NOTE — ED STATDOCS - NSFOLLOWUPINSTRUCTIONS_ED_ALL_ED_FT
Hyponatremia    WHAT YOU NEED TO KNOW:    Hyponatremia occurs when the amount of sodium (salt) in your blood is lower than normal. Sodium is an electrolyte (mineral) that helps your muscles, heart, and digestive system work properly. It helps control blood pressure and fluid balance.     DISCHARGE INSTRUCTIONS:    Follow up with your healthcare provider as directed: You may need to return for more tests. Write down your questions so you remember to ask them during your visits.    Nutrition: You may need to increase your intake of sodium. Foods that are high in sodium include milk, packaged snacks such as pretzels, or processed meats (hill, sausage, and ham). Ask your dietitian to help you create a meal plan that is right for you.    Liquids: Follow your healthcare provider's advice if you need to limit the amount of liquid you drink. Ask how much liquid to drink each day and which liquids are best for you. You may be asked to drink liquids that have water, sugar, and salt, such as juices, milk, or sports drinks. These liquids help your body hold in fluid and prevent dehydration.     Contact your healthcare provider if:   •You have muscle cramps or twitching.      •You feel very weak or tired.      •You have nausea or are vomiting.      •You have questions or concerns about your condition or care.      Seek care immediately or call 911 if:   •You have a seizure.      •You have an irregular heartbeat.      •You have trouble breathing.      •You cannot move your arms and legs.      •You are confused or cannot think clearly.

## 2021-03-15 NOTE — ED STATDOCS - OBJECTIVE STATEMENT
Pertinent HPI/PMH/PSH/FHx/SHx and Review of Systems contained within  HPI:  66 y/o p/w sent in by Dr. Recinos due to abnormal potassium and sodium. on routine blood work 2 days ago labs showed 122 NA+ and 5.4 K+. Pt has no complaints. Pt has right arm picc line for which he is getting cefepime for osteomyelitis.   PMH/PSH relevant for: CAD s/p LAD stent on 2/23/21, HFrEF on Lasix, PVD, DM2, h/o left 2nd toe amputation, recent admission due to OM of the right foot on long standing via PICC line and toe amputation.   ROS negative for: fever, Chest pain, SOB, Nausea, vomiting, diarrhea, abdominal pain, dysuria    FamilyHx and SocialHx not otherwise contributory Pertinent HPI/PMH/PSH/FHx/SHx and Review of Systems contained within  HPI:  64 y/o p/w sent in by Dr. Lacey service due to abnormal potassium and sodium. on routine blood work 2 days ago labs showed 122 NA+ and 5.4 K+. Pt has no complaints. Pt has right arm picc line for which he is getting cefepime for osteomyelitis.   PMH/PSH relevant for: CAD s/p LAD stent on 2/23/21, HFrEF on Lasix, PVD, DM2, h/o left 2nd toe amputation, recent admission due to OM of the right foot on long standing via PICC line and toe amputation.   ROS negative for: fever, Chest pain, SOB, Nausea, vomiting, diarrhea, abdominal pain, dysuria    FamilyHx and SocialHx not otherwise contributory

## 2021-03-15 NOTE — ED STATDOCS - PATIENT PORTAL LINK FT
You can access the FollowMyHealth Patient Portal offered by [facility] by registering at the following website: [email or web address]. By joining [employer]’s FollowMyHealth portal, you will also be able to view your health information using other applications (apps) compatible with our system.

## 2021-03-17 ENCOUNTER — NON-APPOINTMENT (OUTPATIENT)
Age: 66
End: 2021-03-17

## 2021-04-01 ENCOUNTER — NON-APPOINTMENT (OUTPATIENT)
Age: 66
End: 2021-04-01

## 2021-04-04 ENCOUNTER — INPATIENT (INPATIENT)
Facility: HOSPITAL | Age: 66
LOS: 1 days | Discharge: HOME HEALTH | DRG: 861 | End: 2021-04-06
Attending: INTERNAL MEDICINE | Admitting: FAMILY MEDICINE
Payer: COMMERCIAL

## 2021-04-04 VITALS
HEART RATE: 88 BPM | RESPIRATION RATE: 16 BRPM | HEIGHT: 71 IN | OXYGEN SATURATION: 100 % | SYSTOLIC BLOOD PRESSURE: 128 MMHG | WEIGHT: 164.91 LBS | TEMPERATURE: 98 F | DIASTOLIC BLOOD PRESSURE: 90 MMHG

## 2021-04-04 DIAGNOSIS — Z98.890 OTHER SPECIFIED POSTPROCEDURAL STATES: Chronic | ICD-10-CM

## 2021-04-04 DIAGNOSIS — Z89.429 ACQUIRED ABSENCE OF OTHER TOE(S), UNSPECIFIED SIDE: Chronic | ICD-10-CM

## 2021-04-04 DIAGNOSIS — R53.1 WEAKNESS: ICD-10-CM

## 2021-04-04 LAB
ALBUMIN SERPL ELPH-MCNC: 3.3 G/DL — SIGNIFICANT CHANGE UP (ref 3.3–5)
ALP SERPL-CCNC: 67 U/L — SIGNIFICANT CHANGE UP (ref 40–120)
ALT FLD-CCNC: 51 U/L — SIGNIFICANT CHANGE UP (ref 12–78)
ANION GAP SERPL CALC-SCNC: 6 MMOL/L — SIGNIFICANT CHANGE UP (ref 5–17)
APPEARANCE UR: CLEAR — SIGNIFICANT CHANGE UP
APTT BLD: 31.7 SEC — SIGNIFICANT CHANGE UP (ref 27.5–35.5)
AST SERPL-CCNC: 27 U/L — SIGNIFICANT CHANGE UP (ref 15–37)
BASOPHILS # BLD AUTO: 0.03 K/UL — SIGNIFICANT CHANGE UP (ref 0–0.2)
BASOPHILS NFR BLD AUTO: 0.7 % — SIGNIFICANT CHANGE UP (ref 0–2)
BILIRUB SERPL-MCNC: 0.4 MG/DL — SIGNIFICANT CHANGE UP (ref 0.2–1.2)
BILIRUB UR-MCNC: NEGATIVE — SIGNIFICANT CHANGE UP
BUN SERPL-MCNC: 26 MG/DL — HIGH (ref 7–23)
CALCIUM SERPL-MCNC: 9.3 MG/DL — SIGNIFICANT CHANGE UP (ref 8.5–10.1)
CHLORIDE SERPL-SCNC: 98 MMOL/L — SIGNIFICANT CHANGE UP (ref 96–108)
CO2 SERPL-SCNC: 29 MMOL/L — SIGNIFICANT CHANGE UP (ref 22–31)
COLOR SPEC: YELLOW — SIGNIFICANT CHANGE UP
CREAT SERPL-MCNC: 0.94 MG/DL — SIGNIFICANT CHANGE UP (ref 0.5–1.3)
DIFF PNL FLD: ABNORMAL
EOSINOPHIL # BLD AUTO: 0.22 K/UL — SIGNIFICANT CHANGE UP (ref 0–0.5)
EOSINOPHIL NFR BLD AUTO: 5.1 % — SIGNIFICANT CHANGE UP (ref 0–6)
GLUCOSE SERPL-MCNC: 200 MG/DL — HIGH (ref 70–99)
GLUCOSE UR QL: NEGATIVE MG/DL — SIGNIFICANT CHANGE UP
HCT VFR BLD CALC: 32.9 % — LOW (ref 39–50)
HGB BLD-MCNC: 10.9 G/DL — LOW (ref 13–17)
IMM GRANULOCYTES NFR BLD AUTO: 0.9 % — SIGNIFICANT CHANGE UP (ref 0–1.5)
INR BLD: 1.09 RATIO — SIGNIFICANT CHANGE UP (ref 0.88–1.16)
KETONES UR-MCNC: NEGATIVE — SIGNIFICANT CHANGE UP
LEUKOCYTE ESTERASE UR-ACNC: NEGATIVE — SIGNIFICANT CHANGE UP
LYMPHOCYTES # BLD AUTO: 0.62 K/UL — LOW (ref 1–3.3)
LYMPHOCYTES # BLD AUTO: 14.4 % — SIGNIFICANT CHANGE UP (ref 13–44)
MCHC RBC-ENTMCNC: 27 PG — SIGNIFICANT CHANGE UP (ref 27–34)
MCHC RBC-ENTMCNC: 33.1 GM/DL — SIGNIFICANT CHANGE UP (ref 32–36)
MCV RBC AUTO: 81.6 FL — SIGNIFICANT CHANGE UP (ref 80–100)
MONOCYTES # BLD AUTO: 0.52 K/UL — SIGNIFICANT CHANGE UP (ref 0–0.9)
MONOCYTES NFR BLD AUTO: 12.1 % — SIGNIFICANT CHANGE UP (ref 2–14)
NEUTROPHILS # BLD AUTO: 2.88 K/UL — SIGNIFICANT CHANGE UP (ref 1.8–7.4)
NEUTROPHILS NFR BLD AUTO: 66.8 % — SIGNIFICANT CHANGE UP (ref 43–77)
NITRITE UR-MCNC: NEGATIVE — SIGNIFICANT CHANGE UP
PH UR: 5 — SIGNIFICANT CHANGE UP (ref 5–8)
PLATELET # BLD AUTO: 149 K/UL — LOW (ref 150–400)
POTASSIUM SERPL-MCNC: 4.8 MMOL/L — SIGNIFICANT CHANGE UP (ref 3.5–5.3)
POTASSIUM SERPL-SCNC: 4.8 MMOL/L — SIGNIFICANT CHANGE UP (ref 3.5–5.3)
PROT SERPL-MCNC: 6.8 GM/DL — SIGNIFICANT CHANGE UP (ref 6–8.3)
PROT UR-MCNC: 30 MG/DL
PROTHROM AB SERPL-ACNC: 12.6 SEC — SIGNIFICANT CHANGE UP (ref 10.6–13.6)
RAPID RVP RESULT: SIGNIFICANT CHANGE UP
RBC # BLD: 4.03 M/UL — LOW (ref 4.2–5.8)
RBC # FLD: 15.3 % — HIGH (ref 10.3–14.5)
SARS-COV-2 RNA SPEC QL NAA+PROBE: SIGNIFICANT CHANGE UP
SODIUM SERPL-SCNC: 133 MMOL/L — LOW (ref 135–145)
SP GR SPEC: 1.01 — SIGNIFICANT CHANGE UP (ref 1.01–1.02)
TROPONIN I SERPL-MCNC: <0.015 NG/ML — SIGNIFICANT CHANGE UP (ref 0.01–0.04)
UROBILINOGEN FLD QL: NEGATIVE MG/DL — SIGNIFICANT CHANGE UP
WBC # BLD: 4.31 K/UL — SIGNIFICANT CHANGE UP (ref 3.8–10.5)
WBC # FLD AUTO: 4.31 K/UL — SIGNIFICANT CHANGE UP (ref 3.8–10.5)

## 2021-04-04 PROCEDURE — 36415 COLL VENOUS BLD VENIPUNCTURE: CPT

## 2021-04-04 PROCEDURE — 71045 X-RAY EXAM CHEST 1 VIEW: CPT | Mod: 26

## 2021-04-04 PROCEDURE — 70450 CT HEAD/BRAIN W/O DYE: CPT | Mod: 26

## 2021-04-04 PROCEDURE — 82962 GLUCOSE BLOOD TEST: CPT

## 2021-04-04 PROCEDURE — 86769 SARS-COV-2 COVID-19 ANTIBODY: CPT

## 2021-04-04 PROCEDURE — 99285 EMERGENCY DEPT VISIT HI MDM: CPT

## 2021-04-04 PROCEDURE — 93306 TTE W/DOPPLER COMPLETE: CPT

## 2021-04-04 PROCEDURE — 97116 GAIT TRAINING THERAPY: CPT | Mod: GP

## 2021-04-04 PROCEDURE — 93010 ELECTROCARDIOGRAM REPORT: CPT

## 2021-04-04 PROCEDURE — 85652 RBC SED RATE AUTOMATED: CPT

## 2021-04-04 PROCEDURE — 84484 ASSAY OF TROPONIN QUANT: CPT

## 2021-04-04 PROCEDURE — 94640 AIRWAY INHALATION TREATMENT: CPT

## 2021-04-04 PROCEDURE — 83036 HEMOGLOBIN GLYCOSYLATED A1C: CPT

## 2021-04-04 PROCEDURE — 97163 PT EVAL HIGH COMPLEX 45 MIN: CPT | Mod: GP

## 2021-04-04 PROCEDURE — 80053 COMPREHEN METABOLIC PANEL: CPT

## 2021-04-04 PROCEDURE — 85027 COMPLETE CBC AUTOMATED: CPT

## 2021-04-04 PROCEDURE — 86140 C-REACTIVE PROTEIN: CPT

## 2021-04-04 PROCEDURE — 97530 THERAPEUTIC ACTIVITIES: CPT | Mod: GP

## 2021-04-04 PROCEDURE — G0378: CPT

## 2021-04-04 PROCEDURE — 99223 1ST HOSP IP/OBS HIGH 75: CPT

## 2021-04-04 RX ORDER — ENOXAPARIN SODIUM 100 MG/ML
40 INJECTION SUBCUTANEOUS DAILY
Refills: 0 | Status: DISCONTINUED | OUTPATIENT
Start: 2021-04-04 | End: 2021-04-06

## 2021-04-04 RX ORDER — ASPIRIN/CALCIUM CARB/MAGNESIUM 324 MG
81 TABLET ORAL DAILY
Refills: 0 | Status: DISCONTINUED | OUTPATIENT
Start: 2021-04-04 | End: 2021-04-06

## 2021-04-04 RX ORDER — CEFEPIME 1 G/1
2000 INJECTION, POWDER, FOR SOLUTION INTRAMUSCULAR; INTRAVENOUS ONCE
Refills: 0 | Status: COMPLETED | OUTPATIENT
Start: 2021-04-04 | End: 2021-04-04

## 2021-04-04 RX ORDER — CLOPIDOGREL BISULFATE 75 MG/1
75 TABLET, FILM COATED ORAL DAILY
Refills: 0 | Status: DISCONTINUED | OUTPATIENT
Start: 2021-04-04 | End: 2021-04-06

## 2021-04-04 RX ORDER — LORATADINE 10 MG/1
10 TABLET ORAL DAILY
Refills: 0 | Status: DISCONTINUED | OUTPATIENT
Start: 2021-04-04 | End: 2021-04-06

## 2021-04-04 RX ORDER — CARVEDILOL PHOSPHATE 80 MG/1
6.25 CAPSULE, EXTENDED RELEASE ORAL EVERY 12 HOURS
Refills: 0 | Status: DISCONTINUED | OUTPATIENT
Start: 2021-04-04 | End: 2021-04-06

## 2021-04-04 RX ORDER — ACETAMINOPHEN 500 MG
650 TABLET ORAL ONCE
Refills: 0 | Status: COMPLETED | OUTPATIENT
Start: 2021-04-04 | End: 2021-04-04

## 2021-04-04 RX ORDER — CEFEPIME 1 G/1
2000 INJECTION, POWDER, FOR SOLUTION INTRAMUSCULAR; INTRAVENOUS EVERY 12 HOURS
Refills: 0 | Status: DISCONTINUED | OUTPATIENT
Start: 2021-04-04 | End: 2021-04-06

## 2021-04-04 RX ORDER — INSULIN LISPRO 100/ML
VIAL (ML) SUBCUTANEOUS AT BEDTIME
Refills: 0 | Status: DISCONTINUED | OUTPATIENT
Start: 2021-04-04 | End: 2021-04-06

## 2021-04-04 RX ORDER — DEXTROSE 50 % IN WATER 50 %
25 SYRINGE (ML) INTRAVENOUS ONCE
Refills: 0 | Status: DISCONTINUED | OUTPATIENT
Start: 2021-04-04 | End: 2021-04-06

## 2021-04-04 RX ORDER — SODIUM CHLORIDE 9 MG/ML
1000 INJECTION, SOLUTION INTRAVENOUS
Refills: 0 | Status: DISCONTINUED | OUTPATIENT
Start: 2021-04-04 | End: 2021-04-06

## 2021-04-04 RX ORDER — RANOLAZINE 500 MG/1
500 TABLET, FILM COATED, EXTENDED RELEASE ORAL
Refills: 0 | Status: DISCONTINUED | OUTPATIENT
Start: 2021-04-04 | End: 2021-04-06

## 2021-04-04 RX ORDER — INSULIN GLARGINE 100 [IU]/ML
42 INJECTION, SOLUTION SUBCUTANEOUS AT BEDTIME
Refills: 0 | Status: DISCONTINUED | OUTPATIENT
Start: 2021-04-04 | End: 2021-04-06

## 2021-04-04 RX ORDER — ATORVASTATIN CALCIUM 80 MG/1
40 TABLET, FILM COATED ORAL AT BEDTIME
Refills: 0 | Status: DISCONTINUED | OUTPATIENT
Start: 2021-04-04 | End: 2021-04-06

## 2021-04-04 RX ORDER — FERROUS SULFATE 325(65) MG
325 TABLET ORAL DAILY
Refills: 0 | Status: DISCONTINUED | OUTPATIENT
Start: 2021-04-04 | End: 2021-04-06

## 2021-04-04 RX ORDER — GLUCAGON INJECTION, SOLUTION 0.5 MG/.1ML
1 INJECTION, SOLUTION SUBCUTANEOUS ONCE
Refills: 0 | Status: DISCONTINUED | OUTPATIENT
Start: 2021-04-04 | End: 2021-04-06

## 2021-04-04 RX ORDER — DEXTROSE 50 % IN WATER 50 %
12.5 SYRINGE (ML) INTRAVENOUS ONCE
Refills: 0 | Status: DISCONTINUED | OUTPATIENT
Start: 2021-04-04 | End: 2021-04-06

## 2021-04-04 RX ORDER — FLUTICASONE PROPIONATE 50 MCG
1 SPRAY, SUSPENSION NASAL
Refills: 0 | Status: DISCONTINUED | OUTPATIENT
Start: 2021-04-04 | End: 2021-04-06

## 2021-04-04 RX ORDER — INSULIN LISPRO 100/ML
VIAL (ML) SUBCUTANEOUS
Refills: 0 | Status: DISCONTINUED | OUTPATIENT
Start: 2021-04-04 | End: 2021-04-06

## 2021-04-04 RX ORDER — DEXTROSE 50 % IN WATER 50 %
15 SYRINGE (ML) INTRAVENOUS ONCE
Refills: 0 | Status: DISCONTINUED | OUTPATIENT
Start: 2021-04-04 | End: 2021-04-06

## 2021-04-04 RX ORDER — MAGNESIUM HYDROXIDE 400 MG/1
30 TABLET, CHEWABLE ORAL AT BEDTIME
Refills: 0 | Status: DISCONTINUED | OUTPATIENT
Start: 2021-04-04 | End: 2021-04-06

## 2021-04-04 RX ORDER — LOSARTAN POTASSIUM 100 MG/1
25 TABLET, FILM COATED ORAL DAILY
Refills: 0 | Status: DISCONTINUED | OUTPATIENT
Start: 2021-04-04 | End: 2021-04-06

## 2021-04-04 RX ADMIN — Medication 4: at 18:07

## 2021-04-04 RX ADMIN — CARVEDILOL PHOSPHATE 6.25 MILLIGRAM(S): 80 CAPSULE, EXTENDED RELEASE ORAL at 18:07

## 2021-04-04 RX ADMIN — INSULIN GLARGINE 42 UNIT(S): 100 INJECTION, SOLUTION SUBCUTANEOUS at 22:02

## 2021-04-04 RX ADMIN — CLOPIDOGREL BISULFATE 75 MILLIGRAM(S): 75 TABLET, FILM COATED ORAL at 18:06

## 2021-04-04 RX ADMIN — LOSARTAN POTASSIUM 25 MILLIGRAM(S): 100 TABLET, FILM COATED ORAL at 18:07

## 2021-04-04 RX ADMIN — CEFEPIME 100 MILLIGRAM(S): 1 INJECTION, POWDER, FOR SOLUTION INTRAMUSCULAR; INTRAVENOUS at 18:08

## 2021-04-04 RX ADMIN — LORATADINE 10 MILLIGRAM(S): 10 TABLET ORAL at 18:07

## 2021-04-04 RX ADMIN — CEFEPIME 100 MILLIGRAM(S): 1 INJECTION, POWDER, FOR SOLUTION INTRAMUSCULAR; INTRAVENOUS at 09:41

## 2021-04-04 RX ADMIN — RANOLAZINE 500 MILLIGRAM(S): 500 TABLET, FILM COATED, EXTENDED RELEASE ORAL at 22:00

## 2021-04-04 RX ADMIN — Medication 1 SPRAY(S): at 22:01

## 2021-04-04 RX ADMIN — ENOXAPARIN SODIUM 40 MILLIGRAM(S): 100 INJECTION SUBCUTANEOUS at 18:06

## 2021-04-04 RX ADMIN — Medication 81 MILLIGRAM(S): at 18:06

## 2021-04-04 RX ADMIN — Medication 325 MILLIGRAM(S): at 18:07

## 2021-04-04 RX ADMIN — ATORVASTATIN CALCIUM 40 MILLIGRAM(S): 80 TABLET, FILM COATED ORAL at 22:01

## 2021-04-04 NOTE — ED ADULT NURSE NOTE - PAIN: PRESENCE, MLM
Next appt on wait list  Last appt 5/24/2018    Refill request for  Labetalol and lisinopril. Pt. Requesting refills on BP meds but unsure. Read kay note and these are what pt. Is on.    Refilled per standing med protocol.     
Patient would like blood pressure med refilled, was unsure which one that was. Call with questions.   
denies pain/discomfort

## 2021-04-04 NOTE — ED ADULT NURSE REASSESSMENT NOTE - NS ED NURSE REASSESS COMMENT FT1
Patient observed walking with cane. States he feels knees are weak but can use a walker at home and thinks he will be able to get to the bathroom.

## 2021-04-04 NOTE — ED PROVIDER NOTE - CLINICAL SUMMARY MEDICAL DECISION MAKING FREE TEXT BOX
64 y/o male c/o weakness, will obtain labs including two troponin, ekg, chest XR, head CT. 64 y/o male with generalized weakness, pt with hx of osteomyelitis on iv abx, pt with hx of hyponatremia, ekg, chest XR, ct head, labs, urine.

## 2021-04-04 NOTE — ED PROVIDER NOTE - SKIN, MLM
Skin normal color for race, warm, dry and intact. No evidence of rash. Skin normal color for race, warm, dry and intact. No evidence of rash. right foot amputation of all toes, scant dried blood from the surgical site but the wound is clean and no erythema or warmth. Skin normal color for race; No evidence of rash. RIGHT FOOT: amputation of all toes, scant dried blood from the surgical site; wound is clean and no erythema or warmth.

## 2021-04-04 NOTE — H&P ADULT - ASSESSMENT
65 y.o. male with PMHx of CAD s/p PCI with DARCIE to prox LAD, Takotsubo's CMP with EF 30%, DMII, HTN, HLD, OM s/p right toes amputation on 2nd course of IV abx p/w weakness and fall 2 days after 2nd dose of COVID vaccination. Pt was attempted to be DC from ED but fell again on getting up.    1. Weakness/fall - likely vaccination related - observe on med surg, PT eval in am    2. Right foot OM s/p I&D on IV abx - cont abx, f/u ESR/CRP, podiatry reevaluation    3. CAD s/p PCI with DARCIE to proximal LAD with severe dx in other vessels with Takotsubo CMP   - CE neg x3, no EKG changes   - repeat TTE   - cardiology eval    4. DMII - lantus + HISS    5. Anemia of chronic dx on po iron + B12, folate    6. Mild hyponatremia on lasix - hold fo rnow    7. VTE proph - LMWH    8. Full code

## 2021-04-04 NOTE — H&P ADULT - HISTORY OF PRESENT ILLNESS
65 y.o. male with PMHx of CAD s/p PCI with DARCIE to prox LAD, Takotsubo's CMP with EF 30%, DMII, HTN, HLD, OM s/p right toes amputation on 2nd course of IV abx p/w weakness and fall 2 days after 2nd dose of COVID vaccination. Pt was attempted to be DC from ED but fell again on getting up.  C/o BL 5th fingers numbness since PCI, chronic diabetic neuropathy, fluctuating glucose, generalized weakness.

## 2021-04-04 NOTE — ED PROVIDER NOTE - PROGRESS NOTE DETAILS
Karishma DO: H&H at baseline; Na improved: 133; first trop neg; cxr neg; ct head neg; pending ua and second troponin at this time; right foot cleaned and dressing applied. Karishma DO: call placed to Dr. Cisneros regarding scant bleeding to right foot- resolved at this time- agrees with plan for dressing to site and will see patient tomorrow AM- patient made aware. Karishma DO: ua equivocal; no urinary complaints; would not treat; patient feeling better at this time; will ambulate prior to d/c home; to f/u with Dr. Bullock tomorrow without fail; strict return precautions given. Karishma DO: Patient able to ambulate in the ED with steady gait. Karishma HAIRSTON: Patient taken to car by nursing staff; got up out of wheelchair; legs gave out and patient fell to the ground; did not strike head; no injury; brought back into ED; patient to be admitted to medicine service in setting of inability to walk; generalized weakness; endorsed to Dr. Huggins. Karishma DO: Patient able to ambulate in the ED with walker.

## 2021-04-04 NOTE — ED PROVIDER NOTE - PATIENT PORTAL LINK FT
You can access the FollowMyHealth Patient Portal offered by [facility] by registering at the following website: [email or web address]. By joining [employer]’s FollowMyHealth portal, you will also be able to view your health information using other applications (apps) compatible with our system.

## 2021-04-04 NOTE — ED PROVIDER NOTE - OBJECTIVE STATEMENT
64 y/o male with pmhx of basal cell carcinoma of face, BPH, CAD, hypercholesteremia, HTN, DM2 insulin dependent, PAD presents to the ED c/o weakness. Dr Starr did sx on his right foot x1 month ago, pt gets iv abx twice a day. Pt received his second covid vaccine x2 days ago, pt endorses weakness this morning. He got up to walk to the bathroom and he couldn't support himself while using his cane. He fell in the bathroom due to weakness. Denies hitting head, LOC, chest pain. Pt states that he was unable to ge tup after fall. Pt is on daily anticoagulants. No other complaints at this time. 66 y/o male with pmhx of basal cell carcinoma of face, BPH, CAD, hypercholesteremia, HTN, DM2 insulin dependent, PAD presents to the ED c/o generalized weakness. Dr Starr did sx on his right foot x1 month ago, pt gets iv abx twice a day for hx of osteomyelitis- right foot. Pt received his second covid vaccine x2 days ago- developed chills afterwards; pt endorses weakness this morning. He got up to walk to the bathroom and he couldn't support himself while using his cane. patient lowered himself to the ground 2/2 to generalized weakness; Denies hitting head, LOC, chest pain, no sob, no abdominal pain; reports scant bleeding to right foot after fall; patient with no fever, no chills; no cough; no urinary complaints; was seen in ED 2 weeks ago for hyponatremia and had outpatient labs that were normal afterwards.

## 2021-04-04 NOTE — ED ADULT NURSE REASSESSMENT NOTE - NS ED NURSE REASSESS COMMENT FT1
Attempted to discharge patient. When wife arrived to pick him up patient noted to have weakness in his legs upon transferring to car and slid being caught by wife and myself and . Since this wasn't a safe discharge atient brought back into ED for admission.

## 2021-04-04 NOTE — H&P ADULT - NSHPPHYSICALEXAM_GEN_ALL_CORE
PHYSICAL EXAM:    General: male in no acute distress  Eyes: PERRLA, EOMI; conjunctiva and sclera clear  Head: Normocephalic; atraumatic  ENMT: No nasal discharge; airway clear  Neck: Supple; non tender; no masses  Respiratory: No wheezes, rales or rhonchi  Cardiovascular: S1, S2 reg  Gastrointestinal: Soft non-tender non-distended; Normal bowel sounds  Genitourinary: No costovertebral angle tenderness  Extremities: Normal range of motion, No clubbing, cyanosis or edema, right foot in dressing with dried out blood  Vascular: Peripheral pulses palpable 2+ bilaterally  Neurological: Alert and oriented x4  Skin: Warm and dry.   Musculoskeletal: Normal tone  Psychiatric: Cooperative and appropriate

## 2021-04-04 NOTE — ED PROVIDER NOTE - PRO INTERPRETER NEED 2
[FreeTextEntry1] : pt. continues with bl LE unna boot therapy. Pt. is doing better. no new symptoms. 
English

## 2021-04-04 NOTE — ED ADULT NURSE NOTE - NSFALLRSKASSESSDT_ED_ALL_ED
Chief complaint:   Chief Complaint   Patient presents with   • Eye Problem     L eye, noticed last night       Vitals:  Visit Vitals  /67   Pulse 81   Temp 99.1 °F (37.3 °C) (Oral)   Resp 18   SpO2 100%       HISTORY OF PRESENT ILLNESS     23 y.o. Female with gradual onset of left eye redness and pain and tearing and discharge since last night. Wears contact lenses that are changed monthly and she does not sleep in them. Denies blurred vision or changes. Denies fever  But has had nasal congestion last week. Denies headache or nausea or vomiting and denies photophobia.         Other significant problems:  Patient Active Problem List    Diagnosis Date Noted   • PCOS (polycystic ovarian syndrome) 05/11/2016     Priority: Low   • Oral contraceptive use 05/11/2016     Priority: Low   • H/O gestational diabetes mellitus, not currently pregnant 05/11/2016     Priority: Low     2015, insulin requiring         PAST MEDICAL, FAMILY AND SOCIAL HISTORY     Medications:  Current Outpatient Prescriptions   Medication   • meclizine HCl (ANTIVERT) 25 MG tablet   • norethindrone-ethinyl estradiol (MICROGESTIN 1/20) 1-20 MG-MCG per tablet   • Cholecalciferol (VITAMIN D) 2000 UNITS Cap   • Multiple Vitamins-Minerals (MULTIVITAMIN ADULT PO)   • metFORMIN (GLUCOPHAGE) 500 MG tablet   • ciprofloxacin (CILOXAN) 0.3 % ophthalmic solution     No current facility-administered medications for this visit.        Allergies:  ALLERGIES:  No Known Allergies    Past Medical  History/Surgeries:  Past Medical History:   Diagnosis Date   • Encounter for IUD removal     pargard removed 4/16 (heavy menses)       Past Surgical History:   Procedure Laterality Date   • NO PAST SURGERIES         Family History:  Family History   Problem Relation Age of Onset   • NEGATIVE FAMILY HX OF Other      no brst ov or col CA   • Heart Maternal Grandmother      onset 50s   • Heart Maternal Grandfather      onset 50s   • Thyroid Mother      underactive   •  Diabetes Other      mat and pat siblings       Social History:  Social History   Substance Use Topics   • Smoking status: Never Smoker   • Smokeless tobacco: Not on file   • Alcohol use No       REVIEW OF SYSTEMS     Review of Systems   Constitutional: Negative for appetite change, chills and fever.   HENT: Positive for congestion, sinus pain and sinus pressure. Negative for facial swelling and sore throat.    Eyes: Positive for pain, discharge and redness. Negative for photophobia and visual disturbance.   Gastrointestinal: Negative for abdominal pain, nausea and vomiting.   Skin: Negative for rash.   Neurological: Negative for dizziness, light-headedness and headaches.       PHYSICAL EXAM     Physical Exam   Constitutional: She is oriented to person, place, and time. She appears well-developed and well-nourished.   HENT:   Head: Normocephalic.   Right Ear: External ear normal.   Left Ear: External ear normal.   Nose: Nose normal.   Mouth/Throat: Oropharynx is clear and moist.   Eyes: EOM are normal. Pupils are equal, round, and reactive to light. Lids are everted and swept, no foreign bodies found. Right eye exhibits no chemosis, no discharge, no exudate and no hordeolum. No foreign body present in the right eye. Left eye exhibits discharge and exudate. Left eye exhibits no chemosis and no hordeolum. No foreign body present in the left eye. Right conjunctiva is not injected. Right conjunctiva has no hemorrhage. Left conjunctiva is injected. Left conjunctiva has a hemorrhage. No scleral icterus.   Slit lamp exam:       The left eye shows no corneal abrasion, no corneal ulcer, no foreign body, no hyphema, no fluorescein uptake and no anterior chamber bulge.   Neurological: She is alert and oriented to person, place, and time. No cranial nerve deficit.   Skin: Skin is warm and dry. No rash noted.   Psychiatric: She has a normal mood and affect. Her behavior is normal.   Vitals reviewed.      ASSESSMENT/PLAN      Assessment: Left eye conjunctivitis    Plan: Do not wear contact lenses until antibiotic drops finished and symptoms resolved completely. Cipro eye drops 1 drop to O.S. Every 2 hours while awake for 5 days. F/U with PMD/Optho?Opto if not improved in 2 days.   04-Apr-2021 09:06

## 2021-04-04 NOTE — ED ADULT TRIAGE NOTE - CHIEF COMPLAINT QUOTE
pt p/w c/o weakness, subjective chills s/p 2nd covid vaccine on Friday.  Pt is s/p toe amputation on right foot, picc line in place in RUE for IV antibiotics.

## 2021-04-05 ENCOUNTER — TRANSCRIPTION ENCOUNTER (OUTPATIENT)
Age: 66
End: 2021-04-05

## 2021-04-05 DIAGNOSIS — I51.9 HEART DISEASE, UNSPECIFIED: ICD-10-CM

## 2021-04-05 DIAGNOSIS — R53.1 WEAKNESS: ICD-10-CM

## 2021-04-05 LAB
A1C WITH ESTIMATED AVERAGE GLUCOSE RESULT: 6 % — HIGH (ref 4–5.6)
ALBUMIN SERPL ELPH-MCNC: 3.2 G/DL — LOW (ref 3.3–5)
ALP SERPL-CCNC: 57 U/L — SIGNIFICANT CHANGE UP (ref 40–120)
ALT FLD-CCNC: 54 U/L — SIGNIFICANT CHANGE UP (ref 12–78)
ANION GAP SERPL CALC-SCNC: 7 MMOL/L — SIGNIFICANT CHANGE UP (ref 5–17)
AST SERPL-CCNC: 26 U/L — SIGNIFICANT CHANGE UP (ref 15–37)
BILIRUB SERPL-MCNC: 0.4 MG/DL — SIGNIFICANT CHANGE UP (ref 0.2–1.2)
BUN SERPL-MCNC: 26 MG/DL — HIGH (ref 7–23)
CALCIUM SERPL-MCNC: 9.3 MG/DL — SIGNIFICANT CHANGE UP (ref 8.5–10.1)
CHLORIDE SERPL-SCNC: 102 MMOL/L — SIGNIFICANT CHANGE UP (ref 96–108)
CO2 SERPL-SCNC: 28 MMOL/L — SIGNIFICANT CHANGE UP (ref 22–31)
COVID-19 SPIKE DOMAIN AB INTERP: POSITIVE
COVID-19 SPIKE DOMAIN ANTIBODY RESULT: >250 U/ML — HIGH
CREAT SERPL-MCNC: 0.94 MG/DL — SIGNIFICANT CHANGE UP (ref 0.5–1.3)
CULTURE RESULTS: NO GROWTH — SIGNIFICANT CHANGE UP
ESTIMATED AVERAGE GLUCOSE: 126 MG/DL — HIGH (ref 68–114)
GLUCOSE SERPL-MCNC: 133 MG/DL — HIGH (ref 70–99)
HCT VFR BLD CALC: 32.8 % — LOW (ref 39–50)
HGB BLD-MCNC: 10.7 G/DL — LOW (ref 13–17)
MCHC RBC-ENTMCNC: 26.9 PG — LOW (ref 27–34)
MCHC RBC-ENTMCNC: 32.6 GM/DL — SIGNIFICANT CHANGE UP (ref 32–36)
MCV RBC AUTO: 82.4 FL — SIGNIFICANT CHANGE UP (ref 80–100)
PLATELET # BLD AUTO: 162 K/UL — SIGNIFICANT CHANGE UP (ref 150–400)
POTASSIUM SERPL-MCNC: 4.1 MMOL/L — SIGNIFICANT CHANGE UP (ref 3.5–5.3)
POTASSIUM SERPL-SCNC: 4.1 MMOL/L — SIGNIFICANT CHANGE UP (ref 3.5–5.3)
PROT SERPL-MCNC: 6.5 GM/DL — SIGNIFICANT CHANGE UP (ref 6–8.3)
RBC # BLD: 3.98 M/UL — LOW (ref 4.2–5.8)
RBC # FLD: 15.2 % — HIGH (ref 10.3–14.5)
SARS-COV-2 IGG+IGM SERPL QL IA: >250 U/ML — HIGH
SARS-COV-2 IGG+IGM SERPL QL IA: POSITIVE
SODIUM SERPL-SCNC: 137 MMOL/L — SIGNIFICANT CHANGE UP (ref 135–145)
SPECIMEN SOURCE: SIGNIFICANT CHANGE UP
WBC # BLD: 3.74 K/UL — LOW (ref 3.8–10.5)
WBC # FLD AUTO: 3.74 K/UL — LOW (ref 3.8–10.5)

## 2021-04-05 PROCEDURE — 93306 TTE W/DOPPLER COMPLETE: CPT | Mod: 26

## 2021-04-05 PROCEDURE — 99223 1ST HOSP IP/OBS HIGH 75: CPT

## 2021-04-05 PROCEDURE — 99232 SBSQ HOSP IP/OBS MODERATE 35: CPT

## 2021-04-05 RX ADMIN — LORATADINE 10 MILLIGRAM(S): 10 TABLET ORAL at 09:09

## 2021-04-05 RX ADMIN — Medication 325 MILLIGRAM(S): at 09:09

## 2021-04-05 RX ADMIN — INSULIN GLARGINE 42 UNIT(S): 100 INJECTION, SOLUTION SUBCUTANEOUS at 21:32

## 2021-04-05 RX ADMIN — Medication 2: at 08:14

## 2021-04-05 RX ADMIN — LOSARTAN POTASSIUM 25 MILLIGRAM(S): 100 TABLET, FILM COATED ORAL at 09:09

## 2021-04-05 RX ADMIN — Medication 81 MILLIGRAM(S): at 09:09

## 2021-04-05 RX ADMIN — Medication 2: at 12:08

## 2021-04-05 RX ADMIN — Medication 1 SPRAY(S): at 21:35

## 2021-04-05 RX ADMIN — CLOPIDOGREL BISULFATE 75 MILLIGRAM(S): 75 TABLET, FILM COATED ORAL at 09:09

## 2021-04-05 RX ADMIN — Medication 2: at 17:25

## 2021-04-05 RX ADMIN — RANOLAZINE 500 MILLIGRAM(S): 500 TABLET, FILM COATED, EXTENDED RELEASE ORAL at 09:09

## 2021-04-05 RX ADMIN — ATORVASTATIN CALCIUM 40 MILLIGRAM(S): 80 TABLET, FILM COATED ORAL at 21:32

## 2021-04-05 RX ADMIN — CARVEDILOL PHOSPHATE 6.25 MILLIGRAM(S): 80 CAPSULE, EXTENDED RELEASE ORAL at 06:37

## 2021-04-05 RX ADMIN — CEFEPIME 100 MILLIGRAM(S): 1 INJECTION, POWDER, FOR SOLUTION INTRAMUSCULAR; INTRAVENOUS at 06:37

## 2021-04-05 RX ADMIN — Medication 1 SPRAY(S): at 09:10

## 2021-04-05 RX ADMIN — Medication 2: at 21:33

## 2021-04-05 RX ADMIN — ENOXAPARIN SODIUM 40 MILLIGRAM(S): 100 INJECTION SUBCUTANEOUS at 09:09

## 2021-04-05 RX ADMIN — CEFEPIME 100 MILLIGRAM(S): 1 INJECTION, POWDER, FOR SOLUTION INTRAMUSCULAR; INTRAVENOUS at 17:25

## 2021-04-05 RX ADMIN — CARVEDILOL PHOSPHATE 6.25 MILLIGRAM(S): 80 CAPSULE, EXTENDED RELEASE ORAL at 17:25

## 2021-04-05 RX ADMIN — RANOLAZINE 500 MILLIGRAM(S): 500 TABLET, FILM COATED, EXTENDED RELEASE ORAL at 21:32

## 2021-04-05 NOTE — PROGRESS NOTE ADULT - ASSESSMENT
65 y.o. male with PMHx of CAD s/p PCI with DARCIE to prox LAD, Takotsubo's CMP with EF 30%, DMII, HTN, HLD, OM s/p right toes amputation on 2nd course of IV abx p/w weakness and fall 2 days after 2nd dose of COVID vaccination. Pt was attempted to be DC from ED but fell again on getting up.    1. Weakness/fall - likely vaccination related   - observe on med surg, PT eval in am  - will need home PT upon DC     2. Right foot OM s/p I&D on IV abx   - cont abx, f/u ESR/CRP, podiatry reevaluation    3. CAD s/p PCI with DARCIE to proximal LAD with severe dx in other vessels with Takotsubo CMP   - CE neg x3, no EKG changes   - repeat TTE results pending    - cardiology eval - no acute interventions fall likely secondary to covid vaccine and weakness should be resolved within 48 hours.     4. DMII   - lantus + HISS    5. Anemia of chronic dx    - cont w/ po iron + B12, folate    6. Mild hyponatremia on lasix - hold for now    7. VTE proph - LMWH    8. Full code     65 y.o. male with PMHx of CAD s/p PCI with DARCIE to prox LAD, Takotsubo's CMP with EF 30%, DMII, HTN, HLD, OM s/p right toes amputation on 2nd course of IV abx p/w weakness and fall 2 days after 2nd dose of COVID vaccination. Pt was attempted to be DC from ED but fell again on getting up.    1. Weakness/fall - likely vaccination related   - observe on med surg, PT eval in am  - will need home PT upon DC a    2. Right foot OM s/p I&D on IV abx   - cont abx, f/u ESR/CRP, podiatry reevaluation    3. CAD s/p PCI with DARCIE to proximal LAD with severe dx in other vessels with Takotsubo CMP   - CE neg x3, no EKG changes   - repeat TTE results pending    - cardiology eval - no acute interventions fall likely secondary to covid vaccine and weakness should be resolved within 48 hours.     4. DMII   - lantus + HISS    5. Anemia of chronic dx    - cont w/ po iron + B12, folate    6. Mild hyponatremia on lasix - hold for now    7. VTE proph - LMWH    8. Full code    dispo - dc home likely tomorrow home w/ home PT

## 2021-04-05 NOTE — PROGRESS NOTE ADULT - ASSESSMENT
S/P COVID Vaccination sustained a fall. Now local drainage to lateral aspect of R TMA. WBC 4.31  On Ceftriaxone. Apply DSD consider ID F/U. Pt refusing again going to rehab. Off load will F/U THX

## 2021-04-05 NOTE — CONSULT NOTE ADULT - PROBLEM SELECTOR RECOMMENDATION 2
Echo contractility consistent w/ stress induced v. LAD obstruction, s/p PCI Feb '21.  Echo at bedside w/ improved EF.  currently euvolemic.  cont. current meds for CHF & CAD.

## 2021-04-05 NOTE — CONSULT NOTE ADULT - SUBJECTIVE AND OBJECTIVE BOX
HPI:  65 y.o. male with PMHx of CAD s/p PCI with DARCIE to prox LAD, Takotsubo's CMP with EF 30%, DMII, HTN, HLD, OM s/p right toes amputation on 2nd course of IV abx p/w weakness and fall 2 days after 2nd dose of COVID vaccination. Pt was attempted to be DC from ED but fell again on getting up.  C/o BL 5th fingers numbness since PCI, chronic diabetic neuropathy, fluctuating glucose, generalized weakness. (2021 16:27)    65 year old man with a history of 3V CAD (cath in  - not amenable to revascularization) with recent progression treated with proximal LAD stent (21), ischemic cardiomyopathy, PAD, HTN, HLD, DM2 (on insulin), neuropathy, BPH, and recently diagnosed foot osteomyelitis -- now s/p transmetatarsal amputation on 3/1.    66 y/o w/    *CAD s/p PCI   *ischemic cardiomyopathy  *PAD  *HTN, HLP, DM  *neuropathy  *foot OM s/p transmetatarsal amputation R foot    Admitted for weakness & fall 2 days afer COVID vaccination.  specifically notes generalized weakness in legs, denies  lightheadness, dizziness, palpitations, chest pain, dyspnea or   other cardiac symptoms.  Serial trops & ECG unremarkable for   ACS.  Echo  w/ EF 30% & stress induced CM morphology but  had LAD stent postecho - reduced EF may be due to CAD.    PAST MEDICAL AND SURGICAL HISTORY:  BPH (Benign Prostatic Hyperplasia)c  Hypertension  Hypercholesteremia  CAD (coronary atherosclerotic disease)  PAD (peripheral artery disease)  Basal cell carcinoma  Of face   s/p excision Oct 2017  Insulin dependent type 2 diabetes mellitus  History of amputation of toe  L foot 2nd toe amputation  Status post debridement  right foot ulcer, with I&amp;D    ALLERGIES:  Allergies  No Known Allergies  Intolerances    SOCIAL HISTORY:  no tobacco, ETOH, IVDA (2021 16:27)      FAMILY  HISTORY:  FH: myocardial infarction  Father had MI,  age 47  Family history of hypertension  Family history of hyperlipidemia        MEDICATIONS:  OUTPATIENT:  Home Medications:  Aspirin Low Dose 81 mg oral delayed release tablet: 1 tab(s) orally once a day (07 Mar 2021 10:01)  atorvastatin 40 mg oral tablet: 1 tab(s) orally once a day (07 Mar 2021 10:01)  bisacodyl 10 mg rectal suppository: 1 suppository(ies) rectal once a day (07 Mar 2021 10:)  cefepime 2 g intravenous injection: 2 gram(s) intravenous every 12 hours (07 Mar 2021 10:)  ferrous sulfate 325 mg (65 mg elemental iron) oral tablet: 1 tab(s) orally once a day (07 Mar 2021 10:)  fluticasone 50 mcg/inh nasal spray: 1 spray(s) in each nostril 2 times a day (07 Mar 2021 10:)  Glucosamine Chondroitin oral capsule: 3 cap(s) orally once a day (07 Mar 2021 10:)  guaiFENesin 100 mg/5 mL oral liquid: 5 milliliter(s) orally every 6 hours, As needed, Cough (07 Mar 2021 10:)  insulin glargine: 42 unit(s) subcutaneous once a day (at bedtime)    **per d/c paperwork, patient was decreased to 30 units, but patient states he has been taking 42 (07 Mar 2021 10:)  loratadine 10 mg oral tablet: 1 tab(s) orally once a day (07 Mar 2021 10:)  metFORMIN 1000 mg oral tablet: 1 tab(s) orally 2 times a day (07 Mar 2021 10:)  Milk of Magnesia 8% oral suspension: 30 milliliter(s) orally once a day (at bedtime), As Needed (07 Mar 2021 10:)  ranolazine 500 mg oral tablet, extended release: 1 tab(s) orally 2 times a day (07 Mar 2021 10:)  Vitamin B-12 1000 mcg oral tablet: 1 tab(s) orally once a day (07 Mar 2021 10:)  Vitamin C 1000 mg oral tablet: 1 tab(s) orally once a day (07 Mar 2021 10:)  Vitamin D3 1000 intl units (25 mcg) oral tablet: 1 tab(s) orally once a day (07 Mar 2021 10:)        INPATIENT:  MEDICATIONS  (STANDING):  aspirin enteric coated 81 milliGRAM(s) Oral daily  atorvastatin 40 milliGRAM(s) Oral at bedtime  carvedilol 6.25 milliGRAM(s) Oral every 12 hours  cefepime   IVPB 2000 milliGRAM(s) IV Intermittent every 12 hours  clopidogrel Tablet 75 milliGRAM(s) Oral daily  dextrose 40% Gel 15 Gram(s) Oral once  dextrose 5%. 1000 milliLiter(s) (50 mL/Hr) IV Continuous <Continuous>  dextrose 5%. 1000 milliLiter(s) (100 mL/Hr) IV Continuous <Continuous>  dextrose 50% Injectable 25 Gram(s) IV Push once  dextrose 50% Injectable 12.5 Gram(s) IV Push once  dextrose 50% Injectable 25 Gram(s) IV Push once  enoxaparin Injectable 40 milliGRAM(s) SubCutaneous daily  ferrous    sulfate 325 milliGRAM(s) Oral daily  fluticasone propionate 50 MICROgram(s)/spray Nasal Spray 1 Spray(s) Both Nostrils two times a day  glucagon  Injectable 1 milliGRAM(s) IntraMuscular once  insulin glargine Injectable (LANTUS) 42 Unit(s) SubCutaneous at bedtime  insulin lispro (ADMELOG) corrective regimen sliding scale   SubCutaneous three times a day before meals  insulin lispro (ADMELOG) corrective regimen sliding scale   SubCutaneous at bedtime  loratadine 10 milliGRAM(s) Oral daily  losartan 25 milliGRAM(s) Oral daily  ranolazine 500 milliGRAM(s) Oral two times a day    MEDICATIONS  (PRN):  bisacodyl Suppository 10 milliGRAM(s) Rectal daily PRN Constipation  magnesium hydroxide Suspension 30 milliLiter(s) Oral at bedtime PRN Constipation    MEDICATIONS  (PRN):  bisacodyl Suppository 10 milliGRAM(s) Rectal daily PRN Constipation  magnesium hydroxide Suspension 30 milliLiter(s) Oral at bedtime PRN Constipation    REVIEW OF SYSTEMS:    CONSTITUTIONAL: No weakness, fevers or chills  EYES/ENT: No visual changes;  No vertigo or throat pain   NECK: No pain or stiffness  RESPIRATORY: No cough, wheezing, hemoptysis; No shortness of breath  CARDIOVASCULAR: No chest pain or palpitations  GASTROINTESTINAL: No abdominal or epigastric pain. No nausea, vomiting, or hematemesis; No diarrhea or constipation. No melena or hematochezia.  GENITOURINARY: No dysuria, frequency or hematuria  NEUROLOGICAL: No numbness or weakness  SKIN: No itching, burning, rashes, or lesions   All other review of systems is negative unless indicated above    Vital Signs Last 24 Hrs  T(C): 36.8 (2021 07:25), Max: 36.9 (2021 00:17)  T(F): 98.2 (2021 07:25), Max: 98.4 (2021 00:17)  HR: 56 (2021 07:25) (56 - 99)  BP: 118/66 (2021 07:25) (113/58 - 142/91)  BP(mean): --  RR: 18 (2021 07:25) (18 - 18)  SpO2: 98% (2021 07:25) (98% - 100%)        PHYSICAL EXAM:    Constitutional: NAD, awake and alert,   HEENT: PERR, EOMI,    Neck:  supple,  No JVD  Respiratory: Breath sounds are clear bilaterally, No wheezing, rales or rhonchi  Cardiovascular: S1 and S2, regular rate and rhythm, no Murmurs, gallops or rubs  Gastrointestinal: Bowel Sounds present, soft, nontender.   Extremities: No peripheral edema. No clubbing or cyanosis.  Vascular: 2+ peripheral pulses  Neurological: A/O x 3, no focal deficits        LABS: All Labs Reviewed:                        10.7   3.74  )-----------( 162      ( 2021 07:48 )             32.8                         10.9   4.31  )-----------( 149      ( 2021 09:32 )             32.9     2021 07:48    137    |  102    |  26     ----------------------------<  133    4.1     |  28     |  0.94   2021 09:32    133    |  98     |  26     ----------------------------<  200    4.8     |  29     |  0.94     Ca    9.3        2021 07:48  Ca    9.3        2021 09:32    TPro  6.5    /  Alb  3.2    /  TBili  0.4    /  DBili  x      /  AST  26     /  ALT  54     /  AlkPhos  57     2021 07:48  TPro  6.8    /  Alb  3.3    /  TBili  0.4    /  DBili  x      /  AST  27     /  ALT  51     /  AlkPhos  67     2021 09:32    PT/INR - ( 2021 09:32 )   PT: 12.6 sec;   INR: 1.09 ratio         PTT - ( 2021 09:32 )  PTT:31.7 sec  CARDIAC MARKERS ( 2021 15:06 )  <0.015 ng/mL / x     / x     / x     / x      CARDIAC MARKERS ( 2021 12:30 )  <0.015 ng/mL / x     / x     / x     / x      CARDIAC MARKERS ( 2021 09:32 )  <0.015 ng/mL / x     / x     / x     / x          Blood Culture: Organism --  Gram Stain Blood -- Gram Stain --  Specimen Source .Urine None  Culture-Blood --          ===============================  EKG: NSR no ischemic changes.  ===============================  RADIOLOGY:  < from: Xray Chest 1 View- PORTABLE-Urgent (Xray Chest 1 View- PORTABLE-Urgent .) (21 @ 09:44) >  COMPARISON: 3/15/2021 chest available for review.    FINDINGS:  RIGHT PICC line catheter tip in distal SVC RIGHT atrium junction.  The lungs are clear of airspace consolidations or effusions. No pneumothorax.  The heart and mediastinum are within normal limits.  Visualized osseous structures are intact.      IMPRESSION:   No evidence of active chest disease.    ===============================  ECHO:  < from: TTE Echo Complete w/o Contrast w/ Doppler (21 @ 07:55) >     Impression     Summary     Left ventricle systolic function appears moderately impaired;   segmental wall motion abnormalities noted   consistent with Takotsubo's cardiomyopathy.   Estimated Ejection Fraction is 30%.   The left atrium is moderately dilated.   Mild mitral regurgitation     Signature     ----------------------------------------------------------------   Electronically signed byCarlos Zacarias MD(Interpreting   physician) on 2021 07:03 PM   ----------------------------------------------------------------    < end of copied text >    ===============================    Carlos Zacarias M.D.  Cardiology, [facility]  Cell:   Office:   [phone] ([facility] Office)  [phone] ([facility] Office)     64 y/o w/     *CAD s/p PCI   *ischemic cardiomyopathy  *PAD  *HTN, HLP, DM  *neuropathy  *foot OM s/p transmetatarsal amputation R foot    Admitted for weakness & fall 2 days afer COVID vaccination.  specifically notes generalized weakness in legs, denies  lightheadness, dizziness, palpitations, chest pain, dyspnea or   other cardiac symptoms.  Serial trops & ECG unremarkable for   ACS.  Echo  w/ EF 30% & stress induced CM morphology but  had LAD stent postecho - reduced EF may be due to CAD.    PAST MEDICAL AND SURGICAL HISTORY:  BPH (Benign Prostatic Hyperplasia)c  Hypertension  Hypercholesteremia  CAD (coronary atherosclerotic disease)  PAD (peripheral artery disease)  Basal cell carcinoma  Of face   s/p excision Oct 2017  Insulin dependent type 2 diabetes mellitus  History of amputation of toe  L foot 2nd toe amputation  Status post debridement  right foot ulcer, with I&amp;D    ALLERGIES:  Allergies  No Known Allergies  Intolerances    SOCIAL HISTORY:  no tobacco, ETOH, IVDA (2021 16:27)      FAMILY  HISTORY:  FH: myocardial infarction  Father had MI,  age 47  Family history of hypertension  Family history of hyperlipidemia        MEDICATIONS:  OUTPATIENT:  Home Medications:  Aspirin Low Dose 81 mg oral delayed release tablet: 1 tab(s) orally once a day (07 Mar 2021 10:01)  atorvastatin 40 mg oral tablet: 1 tab(s) orally once a day (07 Mar 2021 10:01)  bisacodyl 10 mg rectal suppository: 1 suppository(ies) rectal once a day (07 Mar 2021 10:01)  cefepime 2 g intravenous injection: 2 gram(s) intravenous every 12 hours (07 Mar 2021 10:01)  ferrous sulfate 325 mg (65 mg elemental iron) oral tablet: 1 tab(s) orally once a day (07 Mar 2021 10:01)  fluticasone 50 mcg/inh nasal spray: 1 spray(s) in each nostril 2 times a day (07 Mar 2021 10:01)  Glucosamine Chondroitin oral capsule: 3 cap(s) orally once a day (07 Mar 2021 10:01)  guaiFENesin 100 mg/5 mL oral liquid: 5 milliliter(s) orally every 6 hours, As needed, Cough (07 Mar 2021 10:01)  insulin glargine: 42 unit(s) subcutaneous once a day (at bedtime)    **per d/c paperwork, patient was decreased to 30 units, but patient states he has been taking 42 (07 Mar 2021 10:01)  loratadine 10 mg oral tablet: 1 tab(s) orally once a day (07 Mar 2021 10:01)  metFORMIN 1000 mg oral tablet: 1 tab(s) orally 2 times a day (07 Mar 2021 10:01)  Milk of Magnesia 8% oral suspension: 30 milliliter(s) orally once a day (at bedtime), As Needed (07 Mar 2021 10:01)  ranolazine 500 mg oral tablet, extended release: 1 tab(s) orally 2 times a day (07 Mar 2021 10:01)  Vitamin B-12 1000 mcg oral tablet: 1 tab(s) orally once a day (07 Mar 2021 10:)  Vitamin C 1000 mg oral tablet: 1 tab(s) orally once a day (07 Mar 2021 10:01)  Vitamin D3 1000 intl units (25 mcg) oral tablet: 1 tab(s) orally once a day (07 Mar 2021 10:01)        INPATIENT:  MEDICATIONS  (STANDING):  aspirin enteric coated 81 milliGRAM(s) Oral daily  atorvastatin 40 milliGRAM(s) Oral at bedtime  carvedilol 6.25 milliGRAM(s) Oral every 12 hours  cefepime   IVPB 2000 milliGRAM(s) IV Intermittent every 12 hours  clopidogrel Tablet 75 milliGRAM(s) Oral daily  dextrose 40% Gel 15 Gram(s) Oral once  dextrose 5%. 1000 milliLiter(s) (50 mL/Hr) IV Continuous <Continuous>  dextrose 5%. 1000 milliLiter(s) (100 mL/Hr) IV Continuous <Continuous>  dextrose 50% Injectable 25 Gram(s) IV Push once  dextrose 50% Injectable 12.5 Gram(s) IV Push once  dextrose 50% Injectable 25 Gram(s) IV Push once  enoxaparin Injectable 40 milliGRAM(s) SubCutaneous daily  ferrous    sulfate 325 milliGRAM(s) Oral daily  fluticasone propionate 50 MICROgram(s)/spray Nasal Spray 1 Spray(s) Both Nostrils two times a day  glucagon  Injectable 1 milliGRAM(s) IntraMuscular once  insulin glargine Injectable (LANTUS) 42 Unit(s) SubCutaneous at bedtime  insulin lispro (ADMELOG) corrective regimen sliding scale   SubCutaneous three times a day before meals  insulin lispro (ADMELOG) corrective regimen sliding scale   SubCutaneous at bedtime  loratadine 10 milliGRAM(s) Oral daily  losartan 25 milliGRAM(s) Oral daily  ranolazine 500 milliGRAM(s) Oral two times a day    MEDICATIONS  (PRN):  bisacodyl Suppository 10 milliGRAM(s) Rectal daily PRN Constipation  magnesium hydroxide Suspension 30 milliLiter(s) Oral at bedtime PRN Constipation    MEDICATIONS  (PRN):  bisacodyl Suppository 10 milliGRAM(s) Rectal daily PRN Constipation  magnesium hydroxide Suspension 30 milliLiter(s) Oral at bedtime PRN Constipation    REVIEW OF SYSTEMS:    CONSTITUTIONAL: No weakness, fevers or chills  EYES/ENT: No visual changes;  No vertigo or throat pain   NECK: No pain or stiffness  RESPIRATORY: No cough, wheezing, hemoptysis; No shortness of breath  CARDIOVASCULAR: No chest pain or palpitations  GASTROINTESTINAL: No abdominal or epigastric pain. No nausea, vomiting, or hematemesis; No diarrhea or constipation. No melena or hematochezia.  GENITOURINARY: No dysuria, frequency or hematuria  NEUROLOGICAL: No numbness or weakness  SKIN: No itching, burning, rashes, or lesions   All other review of systems is negative unless indicated above    Vital Signs Last 24 Hrs  T(C): 36.8 (2021 07:25), Max: 36.9 (2021 00:17)  T(F): 98.2 (2021 07:25), Max: 98.4 (2021 00:17)  HR: 56 (2021 07:25) (56 - 99)  BP: 118/66 (2021 07:25) (113/58 - 142/91)  BP(mean): --  RR: 18 (2021 07:25) (18 - 18)  SpO2: 98% (2021 07:25) (98% - 100%)        PHYSICAL EXAM:    Constitutional: NAD, awake and alert,   HEENT: PERR, EOMI,    Neck:  supple,  No JVD  Respiratory: Breath sounds are clear bilaterally, No wheezing, rales or rhonchi  Cardiovascular: S1 and S2, regular rate and rhythm, no Murmurs, gallops or rubs  Gastrointestinal: Bowel Sounds present, soft, nontender.   Extremities: No peripheral edema. No clubbing or cyanosis.  Vascular: 2+ peripheral pulses  Neurological: A/O x 3, no focal deficits        LABS: All Labs Reviewed:                        10.7   3.74  )-----------( 162      ( 2021 07:48 )             32.8                         10.9   4.31  )-----------( 149      ( 2021 09:32 )             32.9     2021 07:48    137    |  102    |  26     ----------------------------<  133    4.1     |  28     |  0.94   2021 09:32    133    |  98     |  26     ----------------------------<  200    4.8     |  29     |  0.94     Ca    9.3        2021 07:48  Ca    9.3        2021 09:32    TPro  6.5    /  Alb  3.2    /  TBili  0.4    /  DBili  x      /  AST  26     /  ALT  54     /  AlkPhos  57     2021 07:48  TPro  6.8    /  Alb  3.3    /  TBili  0.4    /  DBili  x      /  AST  27     /  ALT  51     /  AlkPhos  67     2021 09:32    PT/INR - ( 2021 09:32 )   PT: 12.6 sec;   INR: 1.09 ratio         PTT - ( 2021 09:32 )  PTT:31.7 sec  CARDIAC MARKERS ( 2021 15:06 )  <0.015 ng/mL / x     / x     / x     / x      CARDIAC MARKERS ( 2021 12:30 )  <0.015 ng/mL / x     / x     / x     / x      CARDIAC MARKERS ( 2021 09:32 )  <0.015 ng/mL / x     / x     / x     / x          Blood Culture: Organism --  Gram Stain Blood -- Gram Stain --  Specimen Source .Urine None  Culture-Blood --          ===============================  EKG: NSR no ischemic changes.  ===============================  RADIOLOGY:  < from: Xray Chest 1 View- PORTABLE-Urgent (Xray Chest 1 View- PORTABLE-Urgent .) (21 @ 09:44) >  COMPARISON: 3/15/2021 chest available for review.    FINDINGS:  RIGHT PICC line catheter tip in distal SVC RIGHT atrium junction.  The lungs are clear of airspace consolidations or effusions. No pneumothorax.  The heart and mediastinum are within normal limits.  Visualized osseous structures are intact.      IMPRESSION:   No evidence of active chest disease.    ===============================  ECHO:  < from: TTE Echo Complete w/o Contrast w/ Doppler (21 @ 07:55) >     Impression     Summary     Left ventricle systolic function appears moderately impaired;   segmental wall motion abnormalities noted   consistent with Takotsubo's cardiomyopathy.   Estimated Ejection Fraction is 30%.   The left atrium is moderately dilated.   Mild mitral regurgitation     Signature     ----------------------------------------------------------------   Electronically signed byCarlos Zacarias MD(Interpreting   physician) on 2021 07:03 PM   ----------------------------------------------------------------    < end of copied text >    ===============================    Carlos Zacarias M.D.  Cardiology, [facility]  Cell: [phone]  Office:   [phone] ([facility] Office)  [phone] (Health system Office)

## 2021-04-05 NOTE — CONSULT NOTE ADULT - PROBLEM SELECTOR RECOMMENDATION 9
acute onset after COVID vaccine, may be due to this.  Observe over 48 hrs if vaccine related should resolve by then.

## 2021-04-05 NOTE — PHYSICAL THERAPY INITIAL EVALUATION ADULT - MANUAL MUSCLE TESTING RESULTS, REHAB EVAL
Except BLE Knee Ext 3+/5 and overall generally weak 4-/5 with BUE and BLE/no strength deficits were identified

## 2021-04-05 NOTE — DISCHARGE NOTE NURSING/CASE MANAGEMENT/SOCIAL WORK - PATIENT PORTAL LINK FT
You can access the FollowMyHealth Patient Portal offered by [facility] by registering at the following website: [email or web address]. By joining [employer]s FollowMyHealth portal, you will also be able to view your health information using other applications (apps) compatible with our system.

## 2021-04-05 NOTE — PHYSICAL THERAPY INITIAL EVALUATION ADULT - MODALITIES TREATMENT COMMENTS
Pt left OOB in chair in NAD with CBIR; Pt instructed to not get up alone; RADHA Baez present and aware

## 2021-04-06 ENCOUNTER — TRANSCRIPTION ENCOUNTER (OUTPATIENT)
Age: 66
End: 2021-04-06

## 2021-04-06 VITALS
RESPIRATION RATE: 16 BRPM | TEMPERATURE: 98 F | SYSTOLIC BLOOD PRESSURE: 161 MMHG | OXYGEN SATURATION: 99 % | DIASTOLIC BLOOD PRESSURE: 83 MMHG | HEART RATE: 79 BPM

## 2021-04-06 PROCEDURE — 99239 HOSP IP/OBS DSCHRG MGMT >30: CPT

## 2021-04-06 RX ADMIN — Medication 2: at 16:58

## 2021-04-06 RX ADMIN — CEFEPIME 100 MILLIGRAM(S): 1 INJECTION, POWDER, FOR SOLUTION INTRAMUSCULAR; INTRAVENOUS at 05:29

## 2021-04-06 RX ADMIN — CLOPIDOGREL BISULFATE 75 MILLIGRAM(S): 75 TABLET, FILM COATED ORAL at 09:16

## 2021-04-06 RX ADMIN — CEFEPIME 100 MILLIGRAM(S): 1 INJECTION, POWDER, FOR SOLUTION INTRAMUSCULAR; INTRAVENOUS at 16:59

## 2021-04-06 RX ADMIN — LORATADINE 10 MILLIGRAM(S): 10 TABLET ORAL at 09:16

## 2021-04-06 RX ADMIN — Medication 81 MILLIGRAM(S): at 09:16

## 2021-04-06 RX ADMIN — CARVEDILOL PHOSPHATE 6.25 MILLIGRAM(S): 80 CAPSULE, EXTENDED RELEASE ORAL at 16:59

## 2021-04-06 RX ADMIN — RANOLAZINE 500 MILLIGRAM(S): 500 TABLET, FILM COATED, EXTENDED RELEASE ORAL at 09:16

## 2021-04-06 RX ADMIN — ENOXAPARIN SODIUM 40 MILLIGRAM(S): 100 INJECTION SUBCUTANEOUS at 09:16

## 2021-04-06 RX ADMIN — LOSARTAN POTASSIUM 25 MILLIGRAM(S): 100 TABLET, FILM COATED ORAL at 09:16

## 2021-04-06 RX ADMIN — Medication 325 MILLIGRAM(S): at 09:16

## 2021-04-06 RX ADMIN — CARVEDILOL PHOSPHATE 6.25 MILLIGRAM(S): 80 CAPSULE, EXTENDED RELEASE ORAL at 05:29

## 2021-04-06 RX ADMIN — Medication 1 SPRAY(S): at 09:17

## 2021-04-06 RX ADMIN — Medication 4: at 12:22

## 2021-04-06 NOTE — DISCHARGE NOTE PROVIDER - NSDCCPCAREPLAN_GEN_ALL_CORE_FT
PRINCIPAL DISCHARGE DIAGNOSIS  Diagnosis: Weakness generalized  Assessment and Plan of Treatment:   Weakness is a loss of muscle strength. It may be caused by brain, nerve, or muscle problems. Physical and mental conditions such as heart problems, pregnancy, dehydration, or depression may also cause weakness. Reactions to certain drugs can cause weakness. Parts of your body may become weak if you need to wear a cast or splint or have been on bed rest for a long time.  DISCHARGE INSTRUCTIONS:  Call 911 for any of the following:   •You have any of the following signs of a stroke: ?Numbness or drooping on one side of your face   ?Weakness in an arm or leg  ?Confusion or difficulty speaking  ?Dizziness, a severe headache, or vision loss  •You lose feeling in your weakened body area.  •You have electric shock-like feelings down your arms and legs when you flex or move your neck.  •You have sudden or increased trouble speaking, swallowing, or breathing  •You have severe pain in your back, arms, or legs that worsens.  •You have sudden or worsened muscle weakness or loss of movement.  •You are not able to control when you urinate or have a bowel movement.  Manage weakness:   •Use assistive devices as directed. These help protect you from  Examples include a walker or cane. Have someone install handrails in your home. These will help you get out of a bathtub or stand up from a toilet. Use a shower chair so you can sit while you shower. Sit down on the toilet or another chair to dry off and put on your clothes. Get help going up and down stairs if your legs are weak.   •Go to physical or occupational therapy if directed. A physical therapist can teach you exercises to help strengthen weak muscles. An occupational therapist can show you ways to do your daily activities more easily. For example, light forks and spoons can be easier to use if you have hand weakness. You may also learn ways to organize your household items so you are not moving heavy items.  •Balance rest with exercise. Exercise can help increase your muscle strength and energy. Do not exercise for long periods at

## 2021-04-06 NOTE — DISCHARGE NOTE PROVIDER - PROVIDER TOKENS
PROVIDER:[TOKEN:[[id number]],FOLLOWUP:[2 weeks]],PROVIDER:[TOKEN:[[id number]],FOLLOWUP:[2 weeks]]

## 2021-04-06 NOTE — PROGRESS NOTE ADULT - SUBJECTIVE AND OBJECTIVE BOX
Patient is a 65y old  Male who presents with a chief complaint of weakness and falls (05 Apr 2021 13:25)      SUBJECTIVE:   HPI:  65 y.o. male with PMHx of CAD s/p PCI with DARCIE to prox LAD, Takotsubo's CMP with EF 30%, DMII, HTN, HLD, OM s/p right toes amputation on 2nd course of IV abx p/w weakness and fall 2 days after 2nd dose of COVID vaccination. Pt was attempted to be DC from ED but fell again on getting up.  C/o BL 5th fingers numbness since PCI, chronic diabetic neuropathy, fluctuating glucose, generalized weakness. (04 Apr 2021 16:27)    4/5- seen and examined sitting up in chair s/p PT eval. no pain or discomfort today. no weakness or dizziness.     REVIEW OF SYSTEMS:    CONSTITUTIONAL: No weakness, fevers or chills  EYES/ENT: No visual changes;  No vertigo or throat pain   NECK: No pain or stiffness  RESPIRATORY: No cough, wheezing, hemoptysis; No shortness of breath  CARDIOVASCULAR: No chest pain or palpitations  GASTROINTESTINAL: No abdominal or epigastric pain. No nausea, vomiting, or hematemesis; No diarrhea or constipation. No melena or hematochezia.  GENITOURINARY: No dysuria, frequency or hematuria  NEUROLOGICAL: No numbness or weakness  SKIN: No itching, burning, rashes, or lesions   All other review of systems is negative unless indicated above        Vital Signs Last 24 Hrs  T(C): 36.8 (05 Apr 2021 07:25), Max: 36.9 (05 Apr 2021 00:17)  T(F): 98.2 (05 Apr 2021 07:25), Max: 98.4 (05 Apr 2021 00:17)  HR: 56 (05 Apr 2021 07:25) (56 - 99)  BP: 118/66 (05 Apr 2021 07:25) (113/58 - 142/91)  BP(mean): --  RR: 18 (05 Apr 2021 07:25) (18 - 18)  SpO2: 98% (05 Apr 2021 07:25) (98% - 100%)    I&O's Summary      CAPILLARY BLOOD GLUCOSE      POCT Blood Glucose.: 187 mg/dL (05 Apr 2021 12:06)  POCT Blood Glucose.: 166 mg/dL (05 Apr 2021 08:05)  POCT Blood Glucose.: 207 mg/dL (04 Apr 2021 21:59)  POCT Blood Glucose.: 327 mg/dL (04 Apr 2021 21:58)  POCT Blood Glucose.: 209 mg/dL (04 Apr 2021 17:58)      PHYSICAL EXAM:    Constitutional: NAD, awake and alert, well-developed  HEENT: PERR, EOMI, Normal Hearing, MMM  Neck: Soft and supple, No LAD, No JVD  Respiratory: Breath sounds are clear bilaterally, No wheezing, rales or rhonchi  Cardiovascular: S1 and S2, regular rate and rhythm, no Murmurs, gallops or rubs  Gastrointestinal: Bowel Sounds present, soft, nontender, nondistended, no guarding, no rebound  Extremities: No peripheral edema. right foot in dsg from previous distal metatarsal amputation. right PICC line in place    Vascular: 2+ peripheral pulses  Neurological: A/O x 3, no focal deficits  Musculoskeletal: 5/5 strength b/l upper and lower extremities  Skin: No rashes    MEDICATIONS:  MEDICATIONS  (STANDING):  aspirin enteric coated 81 milliGRAM(s) Oral daily  atorvastatin 40 milliGRAM(s) Oral at bedtime  carvedilol 6.25 milliGRAM(s) Oral every 12 hours  cefepime   IVPB 2000 milliGRAM(s) IV Intermittent every 12 hours  clopidogrel Tablet 75 milliGRAM(s) Oral daily  dextrose 40% Gel 15 Gram(s) Oral once  dextrose 5%. 1000 milliLiter(s) (50 mL/Hr) IV Continuous <Continuous>  dextrose 5%. 1000 milliLiter(s) (100 mL/Hr) IV Continuous <Continuous>  dextrose 50% Injectable 25 Gram(s) IV Push once  dextrose 50% Injectable 12.5 Gram(s) IV Push once  dextrose 50% Injectable 25 Gram(s) IV Push once  enoxaparin Injectable 40 milliGRAM(s) SubCutaneous daily  ferrous    sulfate 325 milliGRAM(s) Oral daily  fluticasone propionate 50 MICROgram(s)/spray Nasal Spray 1 Spray(s) Both Nostrils two times a day  glucagon  Injectable 1 milliGRAM(s) IntraMuscular once  insulin glargine Injectable (LANTUS) 42 Unit(s) SubCutaneous at bedtime  insulin lispro (ADMELOG) corrective regimen sliding scale   SubCutaneous three times a day before meals  insulin lispro (ADMELOG) corrective regimen sliding scale   SubCutaneous at bedtime  loratadine 10 milliGRAM(s) Oral daily  losartan 25 milliGRAM(s) Oral daily  ranolazine 500 milliGRAM(s) Oral two times a day      LABS: All Labs Reviewed:                        10.7   3.74  )-----------( 162      ( 05 Apr 2021 07:48 )             32.8     04-05    137  |  102  |  26<H>  ----------------------------<  133<H>  4.1   |  28  |  0.94    Ca    9.3      05 Apr 2021 07:48    TPro  6.5  /  Alb  3.2<L>  /  TBili  0.4  /  DBili  x   /  AST  26  /  ALT  54  /  AlkPhos  57  04-05    PT/INR - ( 04 Apr 2021 09:32 )   PT: 12.6 sec;   INR: 1.09 ratio         PTT - ( 04 Apr 2021 09:32 )  PTT:31.7 sec  CARDIAC MARKERS ( 04 Apr 2021 15:06 )  <0.015 ng/mL / x     / x     / x     / x      CARDIAC MARKERS ( 04 Apr 2021 12:30 )  <0.015 ng/mL / x     / x     / x     / x      CARDIAC MARKERS ( 04 Apr 2021 09:32 )  <0.015 ng/mL / x     / x     / x     / x              Blood Culture: 04-04 @ 10:26  Organism --  Gram Stain Blood -- Gram Stain --  Specimen Source .Urine None  Culture-Blood --        RADIOLOGY/EKG:  < from: CT Head No Cont (04.04.21 @ 10:07) >  FINDINGS: There is no acute intracranial hemorrhage, mass effect, shift of the midline structures, herniation, extra-axial fluid collection, or hydrocephalus.    The paranasal sinuses and mastoid air cells are clear. The orbits are unremarkable. The calvarium is intact.    IMPRESSION: No acute intracranial hemorrhage, mass effect, or shift of the midline structures.    < end of copied text >          
Patient is a 65y old  Male who presents with a chief complaint of weakness and falls (05 Apr 2021 14:48)      HPI:  65 y.o. male with PMHx of CAD s/p PCI with DARCIE to prox LAD, Takotsubo's CMP with EF 30%, DMII, HTN, HLD, OM s/p right toes amputation on 2nd course of IV abx p/w weakness and fall 2 days after 2nd dose of COVID vaccination. Pt was attempted to be DC from ED but fell again on getting up.  C/o BL 5th fingers numbness since PCI, chronic diabetic neuropathy, fluctuating glucose, generalized weakness. (04 Apr 2021 16:27)      Allergies    No Known Allergies    Intolerances        MEDICATIONS  (STANDING):  aspirin enteric coated 81 milliGRAM(s) Oral daily  atorvastatin 40 milliGRAM(s) Oral at bedtime  carvedilol 6.25 milliGRAM(s) Oral every 12 hours  cefepime   IVPB 2000 milliGRAM(s) IV Intermittent every 12 hours  clopidogrel Tablet 75 milliGRAM(s) Oral daily  dextrose 40% Gel 15 Gram(s) Oral once  dextrose 5%. 1000 milliLiter(s) (50 mL/Hr) IV Continuous <Continuous>  dextrose 5%. 1000 milliLiter(s) (100 mL/Hr) IV Continuous <Continuous>  dextrose 50% Injectable 25 Gram(s) IV Push once  dextrose 50% Injectable 12.5 Gram(s) IV Push once  dextrose 50% Injectable 25 Gram(s) IV Push once  enoxaparin Injectable 40 milliGRAM(s) SubCutaneous daily  ferrous    sulfate 325 milliGRAM(s) Oral daily  fluticasone propionate 50 MICROgram(s)/spray Nasal Spray 1 Spray(s) Both Nostrils two times a day  glucagon  Injectable 1 milliGRAM(s) IntraMuscular once  insulin glargine Injectable (LANTUS) 42 Unit(s) SubCutaneous at bedtime  insulin lispro (ADMELOG) corrective regimen sliding scale   SubCutaneous three times a day before meals  insulin lispro (ADMELOG) corrective regimen sliding scale   SubCutaneous at bedtime  loratadine 10 milliGRAM(s) Oral daily  losartan 25 milliGRAM(s) Oral daily  ranolazine 500 milliGRAM(s) Oral two times a day    MEDICATIONS  (PRN):  bisacodyl Suppository 10 milliGRAM(s) Rectal daily PRN Constipation  magnesium hydroxide Suspension 30 milliLiter(s) Oral at bedtime PRN Constipation        RADIOLOGY    CBC Full  -  ( 05 Apr 2021 07:48 )  WBC Count : 3.74 K/uL  RBC Count : 3.98 M/uL  Hemoglobin : 10.7 g/dL  Hematocrit : 32.8 %  Platelet Count - Automated : 162 K/uL  Mean Cell Volume : 82.4 fl  Mean Cell Hemoglobin : 26.9 pg  Mean Cell Hemoglobin Concentration : 32.6 gm/dL  Auto Neutrophil # : x  Auto Lymphocyte # : x  Auto Monocyte # : x  Auto Eosinophil # : x  Auto Basophil # : x  Auto Neutrophil % : x  Auto Lymphocyte % : x  Auto Monocyte % : x  Auto Eosinophil % : x  Auto Basophil % : x      04-05    137  |  102  |  26<H>  ----------------------------<  133<H>  4.1   |  28  |  0.94    Ca    9.3      05 Apr 2021 07:48    TPro  6.5  /  Alb  3.2<L>  /  TBili  0.4  /  DBili  x   /  AST  26  /  ALT  54  /  AlkPhos  57  04-05      Sedimentation Rate, Erythrocyte: 13 mm/hr (04-05 @ 07:48)      
Patient is a 65y old  Male who presents with a chief complaint of weakness and falls (04 Apr 2021 16:27) He is s/p  R TMA 3/01/21 & poorly controlled T2DM / PSN Hb A1c >> 8 . He had his 2nd COVID Vaccination last Saturday. He sustained multiple falls in the past & yesterday his wife called me after a fall & was taken to the ED he was evaluated. I spoke / the ED MD. He was attempted to be D/c'd & sustained another fall.  Ke has a PICC on Ceftriaxone.      HPI:  65 y.o. male with PMHx of CAD s/p PCI with DARCIE to prox LAD, Takotsubo's CMP with EF 30%, DMII, HTN, HLD, OM s/p right toes amputation on 2nd course of IV abx p/w weakness and fall 2 days after 2nd dose of COVID vaccination. Pt was attempted to be DC from ED but fell again on getting up.  C/o BL 5th fingers numbness since PCI, chronic diabetic neuropathy, fluctuating glucose, generalized weakness. (04 Apr 2021 16:27)      Allergies    No Known Allergies    Intolerances        MEDICATIONS  (STANDING):  aspirin enteric coated 81 milliGRAM(s) Oral daily  atorvastatin 40 milliGRAM(s) Oral at bedtime  carvedilol 6.25 milliGRAM(s) Oral every 12 hours  cefepime   IVPB 2000 milliGRAM(s) IV Intermittent every 12 hours  clopidogrel Tablet 75 milliGRAM(s) Oral daily  dextrose 40% Gel 15 Gram(s) Oral once  dextrose 5%. 1000 milliLiter(s) (50 mL/Hr) IV Continuous <Continuous>  dextrose 5%. 1000 milliLiter(s) (100 mL/Hr) IV Continuous <Continuous>  dextrose 50% Injectable 25 Gram(s) IV Push once  dextrose 50% Injectable 12.5 Gram(s) IV Push once  dextrose 50% Injectable 25 Gram(s) IV Push once  enoxaparin Injectable 40 milliGRAM(s) SubCutaneous daily  ferrous    sulfate 325 milliGRAM(s) Oral daily  fluticasone propionate 50 MICROgram(s)/spray Nasal Spray 1 Spray(s) Both Nostrils two times a day  glucagon  Injectable 1 milliGRAM(s) IntraMuscular once  insulin glargine Injectable (LANTUS) 42 Unit(s) SubCutaneous at bedtime  insulin lispro (ADMELOG) corrective regimen sliding scale   SubCutaneous three times a day before meals  insulin lispro (ADMELOG) corrective regimen sliding scale   SubCutaneous at bedtime  loratadine 10 milliGRAM(s) Oral daily  losartan 25 milliGRAM(s) Oral daily  ranolazine 500 milliGRAM(s) Oral two times a day    MEDICATIONS  (PRN):  bisacodyl Suppository 10 milliGRAM(s) Rectal daily PRN Constipation  magnesium hydroxide Suspension 30 milliLiter(s) Oral at bedtime PRN Constipation      PHYSICAL EXAM:Alert + DP's hair scant, Muted DTRs/STRs SWM 5.07 not noted to 4 plantar sites. Right TMA site has mild drainage to lateral wound. No cellulitis or odor. Left foot with 2 x 3 mm eschar over dorsum of MTJ. No erythema.      Constitutional: See HPI    Eyes:cl;ear moist    ENMT:no tinnitus    Neck:no DJV    Breasts:defer    Back:no LBP    Respiratory:no cough    Cardiovascular:no SOB    Gastrointestinal:no nausea    Genitourinary:no frequency    Rectal:defer    Extremities:weakness TMA R / DFU    Vascular:microangiopathy    Neurological:Muted sensorium weakness    Skin:  TMA R / traumatic wound  Lymph Nodes:no    Musculoskeletal:weakness    Psychiatric:?        RADIOLOGY    CBC Full  -  ( 04 Apr 2021 09:32 )  WBC Count : 4.31 K/uL  RBC Count : 4.03 M/uL  Hemoglobin : 10.9 g/dL  Hematocrit : 32.9 %  Platelet Count - Automated : 149 K/uL  Mean Cell Volume : 81.6 fl  Mean Cell Hemoglobin : 27.0 pg  Mean Cell Hemoglobin Concentration : 33.1 gm/dL  Auto Neutrophil # : 2.88 K/uL  Auto Lymphocyte # : 0.62 K/uL  Auto Monocyte # : 0.52 K/uL  Auto Eosinophil # : 0.22 K/uL  Auto Basophil # : 0.03 K/uL  Auto Neutrophil % : 66.8 %  Auto Lymphocyte % : 14.4 %  Auto Monocyte % : 12.1 %  Auto Eosinophil % : 5.1 %  Auto Basophil % : 0.7 %      04-04    133<L>  |  98  |  26<H>  ----------------------------<  200<H>  4.8   |  29  |  0.94    Ca    9.3      04 Apr 2021 09:32    TPro  6.8  /  Alb  3.3  /  TBili  0.4  /  DBili  x   /  AST  27  /  ALT  51  /  AlkPhos  67  04-04

## 2021-04-06 NOTE — PROGRESS NOTE ADULT - ASSESSMENT
Alert afebrile S/P fall/TMA TR. Wound to medial aspect of TMA site with star like eschar at medial border. No erythema/ drainage.ESR 13. Wound L dorsum well healed. Apply DSD R foot. Keep clean & dry DONTRELL noted cardiomyopathy EF 30. OK for D/C home continue / PICC / ABX as per ID to end. Will re-eval in office 48 hrs prn.THX

## 2021-04-06 NOTE — DISCHARGE NOTE PROVIDER - CARE PROVIDER_API CALL
Theodore Franco)  Cardiovascular Disease; Internal Medicine  [address], [address]  [place] [address]  Phone: [phone]  Fax: [phone]  Follow Up Time: 2 weeks    Genaro Luna)  Internal Medicine  [address] [place] [address]  Phone: [phone]  Fax: [phone]  Follow Up Time: 2 weeks

## 2021-04-06 NOTE — PROVIDER CONTACT NOTE (OTHER) - SITUATION
Left message on the answering service's machine about this consult with all pertinent information
consult called in, spoke to  Elke
BERNARDA Galeano called and informed that pt lost peripheral IV access. Pt has Rt Picc line in place. requestign order to use line.
notified Dr Luna's office of admission please fax over d.c  paperwork to [phone]  once pt is d.c

## 2021-04-06 NOTE — DISCHARGE NOTE PROVIDER - CARE PROVIDERS DIRECT ADDRESSES
[email or web address]

## 2021-04-06 NOTE — DISCHARGE NOTE PROVIDER - HOSPITAL COURSE
pt is a 66 y/o male w/ pmhx of CAD w/ PCI and DARCIE to LAD, Takotsubos CMP w/ EF of 30%, HTN, HLD, OM of right toes s/p amputation of metatarsals on IV abx via PICC line, presented for weakness and fall after 2nd COVID vaccine as a treat and release in ED, but while leaving, he experienced a fall upon transitioning to car. pt work up negative, trop negative, ECHO estimated EF of 55%. pt seen by cardio - Dr. Zacarias - no cardiac causes of fall, and is likely secondary to recent COVID vaccine monitor for 48 hours and cont w/ current meds, can follow up out pt upon Dc.     Vital Signs Last 24 Hrs  T(C): 36.8 (2021 07:33), Max: 36.8 (2021 20:55)  T(F): 98.3 (2021 07:33), Max: 98.3 (2021 20:55)  HR: 74 (2021 07:33) (74 - 91)  BP: 114/73 (2021 07:33) (114/73 - 174/88)  BP(mean): --  RR: 18 (2021 07:33) (18 - 18)  SpO2: 100% (2021 07:33) (99% - 100%) --- room air       LABS                         10.7   3.74  )-----------( 162      ( 2021 07:48 )             32.8     04-    137  |  102  |  26<H>  ----------------------------<  133<H>  4.1   |  28  |  0.94    Ca    9.3      2021 07:48    TPro  6.5  /  Alb  3.2<L>  /  TBili  0.4  /  DBili  x   /  AST  26  /  ALT  54  /  AlkPhos  57  04-05    CARDIAC MARKERS ( 2021 15:06 )  <0.015 ng/mL / x     / x     / x     / x      CARDIAC MARKERS ( 2021 12:30 )  <0.015 ng/mL / x     / x     / x     / x      CARDIAC MARKERS ( 2021 09:32 )  <0.015 ng/mL / x     / x     / x     / x          LIVER FUNCTIONS - ( 2021 07:48 )  Alb: 3.2 g/dL / Pro: 6.5 gm/dL / ALK PHOS: 57 U/L / ALT: 54 U/L / AST: 26 U/L / GGT: x           PT/INR - ( 2021 09:32 )   PT: 12.6 sec;   INR: 1.09 ratio         PTT - ( 2021 09:32 )  PTT:31.7 sec  Urinalysis Basic - ( 2021 10:26 )    Color: Yellow / Appearance: Clear / S.010 / pH: x  Gluc: x / Ketone: Negative  / Bili: Negative / Urobili: Negative mg/dL   Blood: x / Protein: 30 mg/dL / Nitrite: Negative   Leuk Esterase: Negative / RBC: 6-10 /HPF / WBC 6-10   Sq Epi: x / Non Sq Epi: Moderate / Bacteria: Few         Constitutional: NAD, awake and alert, well-developed  HEENT: PERR, EOMI, Normal Hearing, MMM  Neck: Soft and supple, No LAD, No JVD  Respiratory: Breath sounds are clear bilaterally, No wheezing, rales or rhonchi  Cardiovascular: S1 and S2, regular rate and rhythm, no Murmurs, gallops or rubs  Gastrointestinal: Bowel Sounds present, soft, nontender, nondistended, no guarding, no rebound  Extremities: No peripheral edema. right foot in dsg from previous distal metatarsal amputation. right PICC line in place    Vascular: 2+ peripheral pulses  Neurological: A/O x 3, no focal deficits  Musculoskeletal: 5/5 strength b/l upper and lower extremities  Skin: No rashes        1. Weakness/fall - likely vaccination related   - observe on med surg, PT eval in am  - will need home PT upon DC a    2. Right foot OM s/p I&D on IV abx   - cont abx, f/u ESR/CRP, podiatry reevaluation    3. CAD s/p PCI with DARCIE to proximal LAD with severe dx in other vessels with Takotsubo CMP   - CE neg x3, no EKG changes   - repeat TTE results pending    - cardiology eval - no acute interventions fall likely secondary to covid vaccine and weakness should be resolved within 48 hours.     4. DMII   - lantus + HISS    5. Anemia of chronic dx    - cont w/ po iron + B12, folate    6. Mild hyponatremia on lasix - hold for now    7. VTE proph - LMWH    8. Full code    dispo - dc home likely tomorrow home w/ home PT     time spent preparing Dc 37 mins   above plan discussed with pt,  bedside RN and MD Watkins CC: Fall  HPI: 64 y/o male w/ pmhx of CAD w/ PCI and DARCIE to LAD, Takotsubos CMP w/ EF of 30%, HTN, HLD, OM of right toes s/p amputation of metatarsals on IV abx via PICC line, presented for weakness and fall after 2nd COVID vaccine as a treat and release in ED, but while leaving, he experienced a fall upon transitioning to car. pt work up negative, trop negative, ECHO estimated EF of 55%. pt seen by cardio - Dr. Zacarias - no cardiac causes of fall, and is likely secondary to recent COVID vaccine monitor for 48 hours and cont w/ current meds, can follow up out pt upon Dc.     REVIEW OF SYSTEMS: All other review of systems is negative unless indicated above.    Vital Signs Last 24 Hrs  T(C): 36.8 (2021 07:33), Max: 36.8 (2021 20:55)  T(F): 98.3 (2021 07:33), Max: 98.3 (2021 20:55)  HR: 74 (2021 07:33) (74 - 91)  BP: 114/73 (2021 07:33) (114/73 - 174/88)  BP(mean): --  RR: 18 (2021 07:33) (18 - 18)  SpO2: 100% (2021 07:33) (99% - 100%) --- room air       LABS                         10.7   3.74  )-----------( 162      ( 2021 07:48 )             32.8         137  |  102  |  26<H>  ----------------------------<  133<H>  4.1   |  28  |  0.94    Ca    9.3      2021 07:48    TPro  6.5  /  Alb  3.2<L>  /  TBili  0.4  /  DBili  x   /  AST  26  /  ALT  54  /  AlkPhos  57  04-05    CARDIAC MARKERS ( 2021 15:06 )  <0.015 ng/mL / x     / x     / x     / x      CARDIAC MARKERS ( 2021 12:30 )  <0.015 ng/mL / x     / x     / x     / x      CARDIAC MARKERS ( 2021 09:32 )  <0.015 ng/mL / x     / x     / x     / x          LIVER FUNCTIONS - ( 2021 07:48 )  Alb: 3.2 g/dL / Pro: 6.5 gm/dL / ALK PHOS: 57 U/L / ALT: 54 U/L / AST: 26 U/L / GGT: x           PT/INR - ( 2021 09:32 )   PT: 12.6 sec;   INR: 1.09 ratio         PTT - ( 2021 09:32 )  PTT:31.7 sec  Urinalysis Basic - ( 2021 10:26 )    Color: Yellow / Appearance: Clear / S.010 / pH: x  Gluc: x / Ketone: Negative  / Bili: Negative / Urobili: Negative mg/dL   Blood: x / Protein: 30 mg/dL / Nitrite: Negative   Leuk Esterase: Negative / RBC: 6-10 /HPF / WBC 6-10   Sq Epi: x / Non Sq Epi: Moderate / Bacteria: Few    Constitutional: NAD, awake and alert, well-developed  HEENT: PERR, EOMI, Normal Hearing, MMM  Neck: Soft and supple, No LAD, No JVD  Respiratory: Breath sounds are clear bilaterally, No wheezing, rales or rhonchi  Cardiovascular: S1 and S2, regular rate and rhythm, no Murmurs, gallops or rubs  Gastrointestinal: Bowel Sounds present, soft, nontender, nondistended, no guarding, no rebound  Extremities: No peripheral edema. right foot in dsg from previous distal metatarsal amputation. right PICC line in place    Vascular: 2+ peripheral pulses  Neurological: A/O x 3, no focal deficits  Musculoskeletal: 5/5 strength b/l upper and lower extremities  Skin: No rashes    a.p:   1. Weakness/fall - likely vaccination related   - will need home PT upon DC a    2. Right foot OM s/p I&D on IV abx   - cont abx, f/u ESR/CRP, podiatry reevaluation    3. CAD s/p PCI with DARCIE to proximal LAD with severe dx in other vessels with Takotsubo CMP   - CE neg x3, no EKG changes   - repeat TTE results pending    - cardiology eval - no acute interventions fall likely secondary to covid vaccine and weakness should be resolved within 48 hours.     4. DMII   - lantus + HISS    5. Anemia of chronic dx    - cont w/ po iron + B12, folate    6. Mild hyponatremia on lasix - hold for now    7. VTE proph - LMWH    8. Full code    dispo - dc home likely tomorrow home w/ home PT     time spent preparing Dc 37 mins . above plan discussed with pt,  bedside RN and MD Watkins   Attending Attestation:  I agree with the assessment and plan of LIU Lemus as stated and discussed.

## 2021-04-07 ENCOUNTER — NON-APPOINTMENT (OUTPATIENT)
Age: 66
End: 2021-04-07

## 2021-04-07 ENCOUNTER — RX RENEWAL (OUTPATIENT)
Age: 66
End: 2021-04-07

## 2021-04-08 RX ORDER — FLASH GLUCOSE SENSOR
KIT MISCELLANEOUS
Qty: 6 | Refills: 2 | Status: ACTIVE | COMMUNITY
Start: 2020-10-27 | End: 1900-01-01

## 2021-04-09 NOTE — ED ADULT NURSE NOTE - EXTENSIONS OF SELF_ADULT
-- DO NOT REPLY / DO NOT REPLY ALL --  -- Message is from the [facility]--    Referral Request  Name of Specialist: Dr Givens  Provider's specialty: Ophthalmology    Medical condition for referral:  Cataracs    Is this a NEW request?: yes      Referral ordered by: Dr Lara      Insurance type: [employer]      Payor:  [employer] ADVANTAGE / Plan:  BE /[id number] / Product Type:  MEDICARE ADVANTAGE      Preferred Delivery Method   Fax - number to send to: [phone]    Caller Information       Type Contact Phone    04/09/2021 03:35 PM CDT Phone (Incoming) Jarod Daniel (Self) [phone] (M)          Alternative phone number: none    Turnaround time given to caller:   \"This message will be sent to [state Provider's full name]. The clinical team will return your call as soon as they review your message. Typically, it takes 3 business days to process referral requests.\"  
Referral generated and faxed, called spoke with pt informed  
Eyeglasses

## 2021-04-13 ENCOUNTER — NON-APPOINTMENT (OUTPATIENT)
Age: 66
End: 2021-04-13

## 2021-04-13 ENCOUNTER — APPOINTMENT (OUTPATIENT)
Dept: CARDIOLOGY | Facility: CLINIC | Age: 66
End: 2021-04-13
Payer: MEDICARE

## 2021-04-13 VITALS
OXYGEN SATURATION: 99 % | BODY MASS INDEX: 22.96 KG/M2 | DIASTOLIC BLOOD PRESSURE: 86 MMHG | TEMPERATURE: 97.8 F | HEIGHT: 71 IN | SYSTOLIC BLOOD PRESSURE: 152 MMHG | HEART RATE: 78 BPM | WEIGHT: 164 LBS

## 2021-04-13 VITALS — SYSTOLIC BLOOD PRESSURE: 132 MMHG | DIASTOLIC BLOOD PRESSURE: 80 MMHG

## 2021-04-13 PROCEDURE — 99072 ADDL SUPL MATRL&STAF TM PHE: CPT

## 2021-04-13 PROCEDURE — 93000 ELECTROCARDIOGRAM COMPLETE: CPT

## 2021-04-13 PROCEDURE — 99214 OFFICE O/P EST MOD 30 MIN: CPT

## 2021-04-13 NOTE — PHYSICAL EXAM
[Normal Appearance] : normal appearance [Well Groomed] : well groomed [No Oral Pallor] : no oral pallor [] : no respiratory distress [Respiration, Rhythm And Depth] : normal respiratory rhythm and effort [Auscultation Breath Sounds / Voice Sounds] : lungs were clear to auscultation bilaterally [Heart Rate And Rhythm] : heart rate and rhythm were normal [Heart Sounds] : normal S1 and S2 [Murmurs] : no murmurs present [Bowel Sounds] : normal bowel sounds [Oriented To Time, Place, And Person] : oriented to person, place, and time [Impaired Insight] : insight and judgment were intact [Affect] : the affect was normal [Mood] : the mood was normal [General Appearance - In No Acute Distress] : no acute distress [FreeTextEntry1] : R arm PICC - site clean

## 2021-04-13 NOTE — DISCUSSION/SUMMARY
[With Me] : with me [___ Week(s)] : [unfilled] week(s) [FreeTextEntry1] : \par Coronary artery disease: No ischemic symptoms at present functional status followingrecent proximal LAD stent deployment (with residual diffuse disease); continue medical management with aspirin, clopidogrel, carvedilol, atorvastatin, and Ranexa.\par \par Cardiomyopathy: Recent onset cardiomyopathy with severe LV systolic dysfunction and EF approximately 30% earlier this year; a repeat echocardiogram revealed significant recovery of LV function (EF now estimated at 55%); discontinue furosemide; continue carvedilol and losartan.\par \par Hypertension: Satisfactory control.\par

## 2021-04-13 NOTE — REVIEW OF SYSTEMS
[Recent Weight Loss (___ Lbs)] : recent [unfilled] ~Ulb weight loss [Joint Pain] : joint pain [Upper Back Pain] : upper back pain [Change In Color Of Skin] : change in skin color [see HPI] : see HPI [Under Stress] : under stress [Fever] : no fever [Chills] : no chills [Shortness Of Breath] : no shortness of breath [Dyspnea on exertion] : not dyspnea during exertion [Chest  Pressure] : no chest pressure [Chest Pain] : no chest pain [Lower Ext Edema] : no extremity edema [Abdominal Pain] : no abdominal pain [Dizziness] : no dizziness [Easy Bleeding] : no tendency for easy bleeding [FreeTextEntry2] : 5th digits on both hands numb (intermittent)

## 2021-04-13 NOTE — HISTORY OF PRESENT ILLNESS
[FreeTextEntry1] : Jeffrey Cardenas is a 65 year old man with multiple medical problems, including coronary artery disease, hyperlipidemia, hypertension, diabetes mellitus, peripheral artery disease, and skin cancer (basal cell carcinoma of face s/p excision in October 2017), who returns for cardiac examination. He was hospitalized in late January 2021 with cellulitis and osteomyelitis of the right foot. He was discharged with a PICC line for a 6 week course of vancomycin and cefepime. He was again admitted on 1/31/2021 with decompensated HF; found to have severe LV dysfunction, mild elevation of troponin. Cath on 2/4 revealed a critical stenosis of proximal LAD (now s/p PCI) and elevated LVEDP. He returned to the hospital on 2/26/21 with worsening foot ulcer and underwent right transmetatarsal amputation (discharged 3/6/21).  During our last encounter approximately one month ago he was doing well from a cardiac standpoint. He subsequently experienced mild hyperkalemia (potassium supplements were discontinued) and constipation.  He was also readmitted to [facility] with side effects from COVID-19 vaccination - predominately weakness.  He has been doing well from a cardiac standpoint with no dyspnea, orthopnea, or PND.\par \par He now qualifies for handicapped parking permit because of his orthopedic disability -- status post partial right foot amputation.\par \par ** In preparation for today's visit I again reviewed his hospital chart (including imaging) and summarized findings in this note.

## 2021-04-14 ENCOUNTER — APPOINTMENT (OUTPATIENT)
Dept: ENDOCRINOLOGY | Facility: CLINIC | Age: 66
End: 2021-04-14

## 2021-04-14 DIAGNOSIS — E11.69 TYPE 2 DIABETES MELLITUS WITH OTHER SPECIFIED COMPLICATION: ICD-10-CM

## 2021-04-14 DIAGNOSIS — Z95.5 PRESENCE OF CORONARY ANGIOPLASTY IMPLANT AND GRAFT: ICD-10-CM

## 2021-04-14 DIAGNOSIS — I25.10 ATHEROSCLEROTIC HEART DISEASE OF NATIVE CORONARY ARTERY WITHOUT ANGINA PECTORIS: ICD-10-CM

## 2021-04-14 DIAGNOSIS — N40.0 BENIGN PROSTATIC HYPERPLASIA WITHOUT LOWER URINARY TRACT SYMPTOMS: ICD-10-CM

## 2021-04-14 DIAGNOSIS — Z79.51 LONG TERM (CURRENT) USE OF INHALED STEROIDS: ICD-10-CM

## 2021-04-14 DIAGNOSIS — I42.9 CARDIOMYOPATHY, UNSPECIFIED: ICD-10-CM

## 2021-04-14 DIAGNOSIS — T50.B95A ADVERSE EFFECT OF OTHER VIRAL VACCINES, INITIAL ENCOUNTER: ICD-10-CM

## 2021-04-14 DIAGNOSIS — Z85.828 PERSONAL HISTORY OF OTHER MALIGNANT NEOPLASM OF SKIN: ICD-10-CM

## 2021-04-14 DIAGNOSIS — Z91.81 HISTORY OF FALLING: ICD-10-CM

## 2021-04-14 DIAGNOSIS — I50.9 HEART FAILURE, UNSPECIFIED: ICD-10-CM

## 2021-04-14 DIAGNOSIS — Z79.02 LONG TERM (CURRENT) USE OF ANTITHROMBOTICS/ANTIPLATELETS: ICD-10-CM

## 2021-04-14 DIAGNOSIS — E78.00 PURE HYPERCHOLESTEROLEMIA, UNSPECIFIED: ICD-10-CM

## 2021-04-14 DIAGNOSIS — I11.0 HYPERTENSIVE HEART DISEASE WITH HEART FAILURE: ICD-10-CM

## 2021-04-14 DIAGNOSIS — E11.51 TYPE 2 DIABETES MELLITUS WITH DIABETIC PERIPHERAL ANGIOPATHY WITHOUT GANGRENE: ICD-10-CM

## 2021-04-14 DIAGNOSIS — R53.1 WEAKNESS: ICD-10-CM

## 2021-04-14 DIAGNOSIS — E87.1 HYPO-OSMOLALITY AND HYPONATREMIA: ICD-10-CM

## 2021-04-14 DIAGNOSIS — Z79.4 LONG TERM (CURRENT) USE OF INSULIN: ICD-10-CM

## 2021-04-14 DIAGNOSIS — E11.40 TYPE 2 DIABETES MELLITUS WITH DIABETIC NEUROPATHY, UNSPECIFIED: ICD-10-CM

## 2021-04-14 DIAGNOSIS — Z79.82 LONG TERM (CURRENT) USE OF ASPIRIN: ICD-10-CM

## 2021-04-14 DIAGNOSIS — D63.8 ANEMIA IN OTHER CHRONIC DISEASES CLASSIFIED ELSEWHERE: ICD-10-CM

## 2021-04-14 DIAGNOSIS — M86.8X7 OTHER OSTEOMYELITIS, ANKLE AND FOOT: ICD-10-CM

## 2021-04-14 DIAGNOSIS — Z89.421 ACQUIRED ABSENCE OF OTHER RIGHT TOE(S): ICD-10-CM

## 2021-04-15 ENCOUNTER — RX RENEWAL (OUTPATIENT)
Age: 66
End: 2021-04-15

## 2021-04-30 ENCOUNTER — NON-APPOINTMENT (OUTPATIENT)
Age: 66
End: 2021-04-30

## 2021-04-30 ENCOUNTER — APPOINTMENT (OUTPATIENT)
Dept: CARDIOLOGY | Facility: CLINIC | Age: 66
End: 2021-04-30
Payer: MEDICARE

## 2021-04-30 VITALS
HEART RATE: 91 BPM | OXYGEN SATURATION: 97 % | DIASTOLIC BLOOD PRESSURE: 92 MMHG | BODY MASS INDEX: 22.4 KG/M2 | HEIGHT: 71 IN | SYSTOLIC BLOOD PRESSURE: 187 MMHG | WEIGHT: 160 LBS

## 2021-04-30 VITALS — SYSTOLIC BLOOD PRESSURE: 170 MMHG | DIASTOLIC BLOOD PRESSURE: 90 MMHG

## 2021-04-30 VITALS — SYSTOLIC BLOOD PRESSURE: 164 MMHG | DIASTOLIC BLOOD PRESSURE: 86 MMHG

## 2021-04-30 PROCEDURE — 99072 ADDL SUPL MATRL&STAF TM PHE: CPT

## 2021-04-30 PROCEDURE — 99214 OFFICE O/P EST MOD 30 MIN: CPT

## 2021-04-30 PROCEDURE — 93000 ELECTROCARDIOGRAM COMPLETE: CPT

## 2021-04-30 NOTE — HISTORY OF PRESENT ILLNESS
[FreeTextEntry1] : Jeffrey Cardenas is a 65 year old man with multiple medical problems, including coronary artery disease, hyperlipidemia, hypertension, diabetes mellitus, peripheral artery disease, and skin cancer (basal cell carcinoma of face s/p excision in October 2017), who returns for cardiac examination. He was hospitalized in late January 2021 with cellulitis and osteomyelitis of the right foot. He was discharged with a PICC line for a 6 week course of vancomycin and cefepime. He was again admitted on 1/31/2021 with decompensated HF; found to have severe LV dysfunction, mild elevation of troponin. Cath on 2/4 revealed a critical stenosis of proximal LAD (now s/p PCI) and elevated LVEDP. He returned to the hospital on 2/26/21 with worsening foot ulcer and underwent right transmetatarsal amputation (discharged 3/6/21).  He returns to the office today because of multiple elevated blood pressure readings at home.  He describes generalized weakness - will be starting physical therapy soon; no angina or dyspnea.  He is under a lot of stress at home.

## 2021-04-30 NOTE — REVIEW OF SYSTEMS
[Feeling Fatigued] : feeling fatigued [Anxiety] : anxiety [Under Stress] : under stress [SOB] : no shortness of breath [Dyspnea on exertion] : not dyspnea during exertion [Palpitations] : no palpitations [de-identified] : fingertip numbness

## 2021-04-30 NOTE — DISCUSSION/SUMMARY
[With Me] : with me [___ Month(s)] : in [unfilled] month(s) [FreeTextEntry1] : \par Hypertension: Uncontrolled; blood pressure had been much lower for the past few months - stress may be contributing to recent rise (arguing with wife in exam room); increase losartan 50 mg once daily - I asked him to call me in one week to let me know how his blood pressure has responded to this change.\par \par Coronary artery disease: Severe but presently asymptomatic following revascularization and medical therapy.\par \par Ischemic cardiomyopathy: Appears euvolemic; LV function has improved; continue carvedilol.

## 2021-04-30 NOTE — PHYSICAL EXAM
[Well Developed] : well developed [No Acute Distress] : no acute distress [Normal S1, S2] : normal S1, S2 [Clear Lung Fields] : clear lung fields [Soft] : abdomen soft [Non Tender] : non-tender [No Edema] : no edema [No Rash] : no rash [Moves all extremities] : moves all extremities [Alert and Oriented] : alert and oriented [de-identified] : Abnormal gait; using a walker

## 2021-05-04 ENCOUNTER — APPOINTMENT (OUTPATIENT)
Dept: NEUROLOGY | Facility: CLINIC | Age: 66
End: 2021-05-04
Payer: MEDICARE

## 2021-05-04 VITALS
HEART RATE: 90 BPM | DIASTOLIC BLOOD PRESSURE: 80 MMHG | SYSTOLIC BLOOD PRESSURE: 140 MMHG | TEMPERATURE: 97.8 F | BODY MASS INDEX: 22.4 KG/M2 | WEIGHT: 160 LBS | HEIGHT: 71 IN

## 2021-05-04 DIAGNOSIS — E11.40 TYPE 2 DIABETES MELLITUS WITH DIABETIC NEUROPATHY, UNSPECIFIED: ICD-10-CM

## 2021-05-04 PROCEDURE — 99072 ADDL SUPL MATRL&STAF TM PHE: CPT

## 2021-05-04 PROCEDURE — 99204 OFFICE O/P NEW MOD 45 MIN: CPT

## 2021-05-04 NOTE — PHYSICAL EXAM
[General Appearance - Alert] : alert [General Appearance - In No Acute Distress] : in no acute distress [Oriented To Time, Place, And Person] : oriented to person, place, and time [Impaired Insight] : insight and judgment were intact [Affect] : the affect was normal [Person] : oriented to person [Place] : oriented to place [Time] : oriented to time [Concentration Intact] : normal concentrating ability [Visual Intact] : visual attention was ~T not ~L decreased [Fluency] : fluency intact [Reading] : reading intact [Past History] : adequate knowledge of personal past history [Cranial Nerves Optic (II)] : visual acuity intact bilaterally,  visual fields full to confrontation, pupils equal round and reactive to light [Cranial Nerves Trigeminal (V)] : facial sensation intact symmetrically [Cranial Nerves Oculomotor (III)] : extraocular motion intact [Cranial Nerves Facial (VII)] : face symmetrical [Cranial Nerves Vestibulocochlear (VIII)] : hearing was intact bilaterally [Cranial Nerves Glossopharyngeal (IX)] : tongue and palate midline [Cranial Nerves Accessory (XI - Cranial And Spinal)] : head turning and shoulder shrug symmetric [Cranial Nerves Hypoglossal (XII)] : there was no tongue deviation with protrusion [Motor Tone] : muscle tone was normal in all four extremities [Motor Handedness Right-Handed] : the patient is right hand dominant [Paresis Pronator Drift Right-Sided] : no pronator drift on the right [Tactile Decrease Hemisensory Entire Right Side] : diminished right side [Tactile Decrease Hemisensory Entire Left Side] : diminished left side [Tactile Decrease Hand] : diminished left hand [Tactile Decrease Entire Leg Right] : diminished right leg [Tactile Decrease Entire Leg Left] : diminished left leg [Tactile Decrease Distal Extremities (Glove And Stocking)] : diminished stocking/glove distribution [Pain / Temp Decrease Distal Extremities (Glove & Stocking)] : diminished stocking/glove distribution [All Four Limbs] : decreased in all four limbs [Past-pointing] : there was no past-pointing [Tremor] : no tremor present [Dysdiadochokinesia Bilaterally] : not present [1+] : Brachioradialis left 1+ [0] : Ankle jerk left 0 [Arterial Pulses Carotid] : carotid pulses were normal with no bruits [No Spinal Tenderness] : no spinal tenderness [Skin Color & Pigmentation] : normal skin color and pigmentation

## 2021-05-04 NOTE — REVIEW OF SYSTEMS
[As Noted in HPI] : as noted in HPI [Arm Weakness] : arm weakness [Hand Weakness] :  hand weakness [Leg Weakness] : leg weakness [Poor Coordination] : poor coordination [Difficulty Writing] : difficulty writing [Numbness] : numbness [Tingling] : tingling [Difficulty Walking] : difficulty walking [Negative] : Heme/Lymph

## 2021-05-04 NOTE — DISCUSSION/SUMMARY
[FreeTextEntry1] : 65-year-old man the history of peripheral diabetic neuropathy, status post bilateral toes amputations, complaining of one month of worsening numbness and weakness. On examination consistent with a peripheral neuropathy, symptoms reportedly worsened after second COVID vaccine, ? GBS. ule out cervical myelopathy. cT scan of the head early April, did not demonstrate any intracranial pathology. Doubt CVA.\par Plan: MRI of cervical spine. r/o myelopathy\par EMG/NCV

## 2021-05-04 NOTE — HISTORY OF PRESENT ILLNESS
[FreeTextEntry1] : 65-year-old man history of diabetic neuropathy, PVD, status post bilateral toes amputation, CHF, CAD, complaining of symptoms this past Easter of sudden worsening weakness and numbness of the extremities. History of a diabetic neuropathy, with numbness of the lower extremities,starting Easter, after the second COVID vaccine, wife reports the next day had sudden onset of inability and or greater difficulty walking,weakness and numbness of the hands. no history of neck pain or neck injury, no headaches. Was admitted to the hospital,coronary artery disease, a CAT scan of the head was performed, which did not show any acute findings.

## 2021-05-05 ENCOUNTER — RX RENEWAL (OUTPATIENT)
Age: 66
End: 2021-05-05

## 2021-05-08 ENCOUNTER — RX RENEWAL (OUTPATIENT)
Age: 66
End: 2021-05-08

## 2021-05-23 ENCOUNTER — RX RENEWAL (OUTPATIENT)
Age: 66
End: 2021-05-23

## 2021-05-25 ENCOUNTER — NON-APPOINTMENT (OUTPATIENT)
Age: 66
End: 2021-05-25

## 2021-05-28 ENCOUNTER — RX RENEWAL (OUTPATIENT)
Age: 66
End: 2021-05-28

## 2021-06-05 ENCOUNTER — RX RENEWAL (OUTPATIENT)
Age: 66
End: 2021-06-05

## 2021-06-14 ENCOUNTER — RX RENEWAL (OUTPATIENT)
Age: 66
End: 2021-06-14

## 2021-06-16 ENCOUNTER — RX RENEWAL (OUTPATIENT)
Age: 66
End: 2021-06-16

## 2021-07-01 ENCOUNTER — RX RENEWAL (OUTPATIENT)
Age: 66
End: 2021-07-01

## 2021-07-13 ENCOUNTER — RX RENEWAL (OUTPATIENT)
Age: 66
End: 2021-07-13

## 2021-07-14 ENCOUNTER — APPOINTMENT (OUTPATIENT)
Dept: ENDOCRINOLOGY | Facility: CLINIC | Age: 66
End: 2021-07-14
Payer: COMMERCIAL

## 2021-07-14 VITALS
DIASTOLIC BLOOD PRESSURE: 89 MMHG | BODY MASS INDEX: 22.68 KG/M2 | OXYGEN SATURATION: 99 % | SYSTOLIC BLOOD PRESSURE: 183 MMHG | HEART RATE: 79 BPM | WEIGHT: 162 LBS | HEIGHT: 71 IN

## 2021-07-14 PROCEDURE — 95251 CONT GLUC MNTR ANALYSIS I&R: CPT

## 2021-07-14 PROCEDURE — 82962 GLUCOSE BLOOD TEST: CPT

## 2021-07-14 PROCEDURE — 99214 OFFICE O/P EST MOD 30 MIN: CPT | Mod: 25

## 2021-07-14 NOTE — PHYSICAL EXAM
[Alert] : alert [Well Nourished] : well nourished [No Acute Distress] : no acute distress [Well Developed] : well developed [Normal Sclera/Conjunctiva] : normal sclera/conjunctiva [EOMI] : extra ocular movement intact [No Proptosis] : no proptosis [Normal Oropharynx] : the oropharynx was normal [Thyroid Not Enlarged] : the thyroid was not enlarged [No Thyroid Nodules] : no palpable thyroid nodules [No Respiratory Distress] : no respiratory distress [No Accessory Muscle Use] : no accessory muscle use [Clear to Auscultation] : lungs were clear to auscultation bilaterally [Normal S1, S2] : normal S1 and S2 [Normal Rate] : heart rate was normal [Regular Rhythm] : with a regular rhythm [No Edema] : no peripheral edema [Pedal Pulses Normal] : the pedal pulses are present [Normal Bowel Sounds] : normal bowel sounds [Not Tender] : non-tender [Not Distended] : not distended [Soft] : abdomen soft [Normal Anterior Cervical Nodes] : no anterior cervical lymphadenopathy [Normal Posterior Cervical Nodes] : no posterior cervical lymphadenopathy [No Spinal Tenderness] : no spinal tenderness [Spine Straight] : spine straight [No Stigmata of Cushings Syndrome] : no stigmata of Cushings Syndrome [Normal Gait] : normal gait [Normal Strength/Tone] : muscle strength and tone were normal [No Rash] : no rash [Right Foot Was Examined] : right foot ~C was examined [Left Foot Was Examined] : left foot ~C was examined [Normal] : normal [2+] : 2+ in the dorsalis pedis [#1 Diminished] : number 1 was diminished [#2 Diminished] : number 2 was diminished [#3 Diminished] : number 3 was diminished [#4 Diminished] : number 4 was diminished [#5 Diminished] : number 5 was diminished [#6 Diminished] : number 6 was diminished [Normal Reflexes] : deep tendon reflexes were 2+ and symmetric [No Tremors] : no tremors [Oriented x3] : oriented to person, place, and time [Acanthosis Nigricans] : no acanthosis nigricans [FreeTextEntry2] : s/p mettarsal amputation

## 2021-07-14 NOTE — ASSESSMENT
[FreeTextEntry1] : 1. DM 2- uncontrolled, complicated\par Patient with cardiac history and amputations. Need to have tighter diabetes control. Discussed GLP-1 risks and benefits.\par Start Ozempic\par Dec to Lantus 35 units QHS and \par Cont Metformin 1000 mg BID\par Stop Novolog\par Cont diabetic diet\par Cont exercise\par optho UTD\par labs today\par foot exam UTD\par \par 2. HLD- stable\par Cont Atorvastatin\par \par Follow up in 3 months\par \par \par

## 2021-07-14 NOTE — HISTORY OF PRESENT ILLNESS
[FreeTextEntry1] : Mr. BROWN HEATON  is a 64 year old male with past medical history of Diabetes Mellitus Type 2, HLD, CAD, HTN, PAD  who presents for follow-up of his diabetes. Patient is s/p Right metatarsal amputation and MI s/p DARCIE. \par \par \par POCT Glucose: 179  mg/dL, \par \par Diabetes first diagnosed: Over 7 years\par Type: 2\par \par Current diabetic regimen:\par Metformin 1000 mg BID Lantus 42 units QHS, Novolog 2 units TID\par \par Other relevant medications:\par Norvasc\par Coreg\par Lipitor\par Losartan\par \par Pt has freestyle wilfredo \par AGP Report: \par (6/17/21-7/14/21)\par High: 16%\par Target (): 81%\par Low: 2%\par \par \par \par Hypoglycemia: only 1 episode\par \par \par Diet:\par Breakfast: cereal, or eggs\par Lunch: deli sandwiches sometimes skip\par Dinner:deli sandwiches, chicken \par \par Exercise: None\par \par Urine micro: 56 mg/g 2/05/20\par lipid profile: LDL 91 (6/2020) 117 (1/21/20)\par last hba1c: 8.9% (11/2020), 12.2% 1/21/20, 9.9% 8/2017, 10.6% 2/2015\par eye exam: 7/2021\par diabetic foot exam/podiatry: WNL 2/5/20\par \par \par \par \par

## 2021-07-16 ENCOUNTER — NON-APPOINTMENT (OUTPATIENT)
Age: 66
End: 2021-07-16

## 2021-07-16 LAB
ALBUMIN SERPL ELPH-MCNC: 4.6 G/DL
ALP BLD-CCNC: 64 U/L
ALT SERPL-CCNC: 21 U/L
ANION GAP SERPL CALC-SCNC: 14 MMOL/L
AST SERPL-CCNC: 15 U/L
BASOPHILS # BLD AUTO: 0.04 K/UL
BASOPHILS NFR BLD AUTO: 0.8 %
BILIRUB SERPL-MCNC: 0.4 MG/DL
BUN SERPL-MCNC: 22 MG/DL
CALCIUM SERPL-MCNC: 10.1 MG/DL
CHLORIDE SERPL-SCNC: 104 MMOL/L
CHOLEST SERPL-MCNC: 142 MG/DL
CO2 SERPL-SCNC: 22 MMOL/L
CREAT SERPL-MCNC: 0.96 MG/DL
CREAT SPEC-SCNC: 76 MG/DL
EOSINOPHIL # BLD AUTO: 0.18 K/UL
EOSINOPHIL NFR BLD AUTO: 3.5 %
ESTIMATED AVERAGE GLUCOSE: 117 MG/DL
FRUCTOSAMINE SERPL-MCNC: 288 UMOL/L
GLUCOSE SERPL-MCNC: 151 MG/DL
HBA1C MFR BLD HPLC: 5.7 %
HCT VFR BLD CALC: 37.5 %
HDLC SERPL-MCNC: 46 MG/DL
HGB BLD-MCNC: 12.3 G/DL
IMM GRANULOCYTES NFR BLD AUTO: 0.6 %
LDLC SERPL CALC-MCNC: 66 MG/DL
LYMPHOCYTES # BLD AUTO: 1 K/UL
LYMPHOCYTES NFR BLD AUTO: 19.2 %
MAN DIFF?: NORMAL
MCHC RBC-ENTMCNC: 29.7 PG
MCHC RBC-ENTMCNC: 32.8 GM/DL
MCV RBC AUTO: 90.6 FL
MICROALBUMIN 24H UR DL<=1MG/L-MCNC: 10.7 MG/DL
MICROALBUMIN/CREAT 24H UR-RTO: 141 MG/G
MONOCYTES # BLD AUTO: 0.59 K/UL
MONOCYTES NFR BLD AUTO: 11.3 %
NEUTROPHILS # BLD AUTO: 3.36 K/UL
NEUTROPHILS NFR BLD AUTO: 64.6 %
NONHDLC SERPL-MCNC: 96 MG/DL
PLATELET # BLD AUTO: 194 K/UL
POTASSIUM SERPL-SCNC: 5.1 MMOL/L
PROT SERPL-MCNC: 6.7 G/DL
RBC # BLD: 4.14 M/UL
RBC # FLD: 15.7 %
SODIUM SERPL-SCNC: 140 MMOL/L
TRIGL SERPL-MCNC: 150 MG/DL
WBC # FLD AUTO: 5.2 K/UL

## 2021-07-20 ENCOUNTER — APPOINTMENT (OUTPATIENT)
Dept: CARDIOLOGY | Facility: CLINIC | Age: 66
End: 2021-07-20
Payer: SELF-PAY

## 2021-07-20 ENCOUNTER — NON-APPOINTMENT (OUTPATIENT)
Age: 66
End: 2021-07-20

## 2021-07-20 VITALS
HEART RATE: 84 BPM | BODY MASS INDEX: 23.66 KG/M2 | SYSTOLIC BLOOD PRESSURE: 208 MMHG | OXYGEN SATURATION: 99 % | HEIGHT: 71 IN | WEIGHT: 169 LBS | DIASTOLIC BLOOD PRESSURE: 90 MMHG

## 2021-07-20 VITALS — SYSTOLIC BLOOD PRESSURE: 180 MMHG | DIASTOLIC BLOOD PRESSURE: 90 MMHG

## 2021-07-20 DIAGNOSIS — I25.5 ISCHEMIC CARDIOMYOPATHY: ICD-10-CM

## 2021-07-20 PROCEDURE — 99214 OFFICE O/P EST MOD 30 MIN: CPT

## 2021-07-20 PROCEDURE — 93000 ELECTROCARDIOGRAM COMPLETE: CPT

## 2021-07-20 RX ORDER — GLUCAGON INJECTION, SOLUTION 0.5 MG/.1ML
0.5 INJECTION, SOLUTION SUBCUTANEOUS
Qty: 1 | Refills: 2 | Status: DISCONTINUED | COMMUNITY
Start: 2020-02-11 | End: 2021-07-20

## 2021-07-20 RX ORDER — CEFEPIME 1 G/50ML
INJECTION, SOLUTION INTRAVENOUS
Refills: 0 | Status: DISCONTINUED | COMMUNITY
End: 2021-07-20

## 2021-07-20 NOTE — PHYSICAL EXAM
[Normal Appearance] : normal appearance [Well Groomed] : well groomed [General Appearance - In No Acute Distress] : no acute distress [No Oral Pallor] : no oral pallor [] : no respiratory distress [Respiration, Rhythm And Depth] : normal respiratory rhythm and effort [Auscultation Breath Sounds / Voice Sounds] : lungs were clear to auscultation bilaterally [Heart Rate And Rhythm] : heart rate and rhythm were normal [Heart Sounds] : normal S1 and S2 [Murmurs] : no murmurs present [Bowel Sounds] : normal bowel sounds [Oriented To Time, Place, And Person] : oriented to person, place, and time [Impaired Insight] : insight and judgment were intact [Affect] : the affect was normal [Mood] : the mood was normal [FreeTextEntry1] : R arm PICC - site clean

## 2021-07-20 NOTE — REVIEW OF SYSTEMS
[Headache] : no headache [Weight Gain (___ Lbs)] : [unfilled] ~Ulb weight gain [SOB] : no shortness of breath [Chest Discomfort] : no chest discomfort [Syncope] : no syncope [Anxiety] : anxiety [Under Stress] : under stress

## 2021-07-20 NOTE — HISTORY OF PRESENT ILLNESS
[FreeTextEntry1] : Jeffrey Cardenas is a 65 year old man with multiple medical problems, including coronary artery disease, hyperlipidemia, hypertension, diabetes mellitus, peripheral artery disease, and skin cancer (basal cell carcinoma of face s/p excision in October 2017), who returns for cardiac examination. He was hospitalized in late January 2021 with cellulitis and osteomyelitis of the right foot. He was discharged with a PICC line for a 6 week course of vancomycin and cefepime. He was again admitted on 1/31/2021 with decompensated HF; found to have severe LV dysfunction, mild elevation of troponin. Cath on 2/4 revealed a critical stenosis of proximal LAD (now s/p PCI) and elevated LVEDP. He returned to the hospital on 2/26/21 with worsening foot ulcer and underwent right transmetatarsal amputation (discharged 3/6/21); also hospitalized in March with mild hyperkalemia and again with severe weakness and fatigue following COVID-19 vaccination.  Since our last encounter in mid April 2021 he has had suboptimally controlled hypertension.   He describes "incredible stress" -- says he will be losing his outpatient medical insurance in 10 days (will only have Medicare part A).  However, he says that he feels well -- no angina with activities of daily living.

## 2021-07-20 NOTE — DISCUSSION/SUMMARY
[With Me] : with me [___ Week(s)] : in [unfilled] week(s) [FreeTextEntry1] : \par Hypertension: Severe and uncontrolled - stress likely contributing; add amlodipine 5 mg once daily while continuing carvedilol and losartan; reevaluate in one week -- if persistently elevated I will likely uptitrate dose of carvedilol.\par \par Coronary artery disease: Stable; continue medical management with aspirin, clopidogrel, carvedilol, atorvastatin, and Ranexa.\par \par Cardiomyopathy: Left ventricular performance has improved; continue medical therapy with beta blocker and injured receptor blocker; appears euvolemic.\par \par

## 2021-07-20 NOTE — REASON FOR VISIT
[Coronary Artery Disease] : coronary artery disease [Cardiomyopathy] : cardiomyopathy [Hypertension] : hypertension [Medication Management] : Medication management [Follow-Up - Clinic] : a clinic follow-up of

## 2021-07-27 ENCOUNTER — APPOINTMENT (OUTPATIENT)
Dept: CARDIOLOGY | Facility: CLINIC | Age: 66
End: 2021-07-27
Payer: COMMERCIAL

## 2021-07-27 VITALS — DIASTOLIC BLOOD PRESSURE: 62 MMHG | SYSTOLIC BLOOD PRESSURE: 118 MMHG

## 2021-07-27 VITALS
BODY MASS INDEX: 23.66 KG/M2 | WEIGHT: 169 LBS | SYSTOLIC BLOOD PRESSURE: 112 MMHG | OXYGEN SATURATION: 98 % | HEART RATE: 78 BPM | DIASTOLIC BLOOD PRESSURE: 60 MMHG | HEIGHT: 71 IN

## 2021-07-27 PROCEDURE — 99214 OFFICE O/P EST MOD 30 MIN: CPT

## 2021-07-27 NOTE — REVIEW OF SYSTEMS
[Anxiety] : anxiety [Under Stress] : under stress [Headache] : no headache [SOB] : no shortness of breath [Chest Discomfort] : no chest discomfort [Syncope] : no syncope

## 2021-07-27 NOTE — PHYSICAL EXAM
[Well Groomed] : well groomed [General Appearance - In No Acute Distress] : no acute distress [No Oral Pallor] : no oral pallor [] : no respiratory distress [Respiration, Rhythm And Depth] : normal respiratory rhythm and effort [Auscultation Breath Sounds / Voice Sounds] : lungs were clear to auscultation bilaterally [Heart Rate And Rhythm] : heart rate and rhythm were normal [Heart Sounds] : normal S1 and S2 [Oriented To Time, Place, And Person] : oriented to person, place, and time [Impaired Insight] : insight and judgment were intact [Affect] : the affect was normal [Mood] : the mood was normal [FreeTextEntry1] : Wearing a face mask

## 2021-07-27 NOTE — DISCUSSION/SUMMARY
[With Me] : with me [___ Month(s)] : in [unfilled] month(s) [FreeTextEntry1] : \par Hypertension: Marked improvement in blood pressure with the addition of amlodipine 5 mg once daily; tolerating therapy with no edema; continue present antihypertensive regimen; encouraged periodic monitoring of blood pressure at home with his automatic cuff.\par \par Coronary artery disease: Stable with no angina at present functional status; continue aspirin, clopidogrel, carvedilol, atorvastatin, and Ranexa.  He is concerned about potentially losing his health insurance and will contact me telephone to let me know how he is doing in the upcoming months - ideally, I would like to see him in 2-3 months.\par \par Hypercholesterolemia: Tolerating atorvastatin with satisfactory control of cholesterol (a recent lipid panel revealed an LDL of 66).\par

## 2021-07-27 NOTE — HISTORY OF PRESENT ILLNESS
[FreeTextEntry1] : Jeffrey Cardenas is a 65 year old man with multiple medical problems, including coronary artery disease, proximal LAD stent (2/2021), ischemic cardiomyopathy, hyperlipidemia, hypertension, diabetes mellitus, peripheral artery disease, skin cancer (basal cell carcinoma of face s/p excision in October 2017), right foot osteomyelitis (treated with six-week course of vancomycin and cefepime followed by a right transmetatarsal amputation in 2021), who returns for cardiac examination. During his last visit with me one week ago his blood pressure was severely elevated (~180/90) in the setting of multiple severe stressors; I prescribed amlodipine 5 mg daily.  He returns predominantly for the reassessment of blood pressure.  He is tolerating prescribed pharmacotherapy; no new complaints -- still with significant stress.

## 2021-08-18 ENCOUNTER — APPOINTMENT (OUTPATIENT)
Dept: NEUROLOGY | Facility: CLINIC | Age: 66
End: 2021-08-18
Payer: MEDICARE

## 2021-08-18 PROCEDURE — 95885 MUSC TST DONE W/NERV TST LIM: CPT

## 2021-08-18 PROCEDURE — 95912 NRV CNDJ TEST 11-12 STUDIES: CPT

## 2021-08-18 NOTE — DISCUSSION/SUMMARY
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

## 2021-08-18 NOTE — PHYSICAL EXAM
[Person] : oriented to person [Place] : oriented to place [Time] : oriented to time [Cranial Nerves Optic (II)] : visual acuity intact bilaterally,  visual fields full to confrontation, pupils equal round and reactive to light [Cranial Nerves Oculomotor (III)] : extraocular motion intact [Cranial Nerves Trigeminal (V)] : facial sensation intact symmetrically [Cranial Nerves Facial (VII)] : face symmetrical [Cranial Nerves Vestibulocochlear (VIII)] : hearing was intact bilaterally [Cranial Nerves Glossopharyngeal (IX)] : tongue and palate midline [Cranial Nerves Accessory (XI - Cranial And Spinal)] : head turning and shoulder shrug symmetric [Cranial Nerves Hypoglossal (XII)] : there was no tongue deviation with protrusion [Motor Tone] : muscle tone was normal in all four extremities [Motor Handedness Right-Handed] : the patient is right hand dominant [Paresis Pronator Drift Right-Sided] : no pronator drift on the right [Tactile Decrease Hemisensory Entire Right Side] : diminished right side [Tactile Decrease Hemisensory Entire Left Side] : diminished left side [Tactile Decrease Hand] : diminished left hand [Tactile Decrease Entire Leg Right] : diminished right leg [Tactile Decrease Entire Leg Left] : diminished left leg [Tactile Decrease Distal Extremities (Glove And Stocking)] : diminished stocking/glove distribution [Pain / Temp Decrease Distal Extremities (Glove & Stocking)] : diminished stocking/glove distribution [All Four Limbs] : decreased in all four limbs [Past-pointing] : there was no past-pointing [Tremor] : no tremor present [Dysdiadochokinesia Bilaterally] : not present [1+] : Brachioradialis left 1+ [0] : Ankle jerk left 0 [Plantar Reflex Left Only] : normal on the left [FreeTextEntry9] : right toes amputation

## 2021-08-18 NOTE — PROCEDURE
[FreeTextEntry1] : electromyography and nerve conduction study of the bilateral upper extremity and left lower extremity demonstrates evidence of an abnormal type sensorimotor polyneuropathy, severe.

## 2021-09-28 ENCOUNTER — RX RENEWAL (OUTPATIENT)
Age: 66
End: 2021-09-28

## 2021-10-12 ENCOUNTER — RX RENEWAL (OUTPATIENT)
Age: 66
End: 2021-10-12

## 2021-10-18 ENCOUNTER — RX RENEWAL (OUTPATIENT)
Age: 66
End: 2021-10-18

## 2021-10-19 ENCOUNTER — APPOINTMENT (OUTPATIENT)
Dept: GASTROENTEROLOGY | Facility: CLINIC | Age: 66
End: 2021-10-19
Payer: MEDICARE

## 2021-10-19 VITALS
SYSTOLIC BLOOD PRESSURE: 114 MMHG | BODY MASS INDEX: 25.9 KG/M2 | HEART RATE: 66 BPM | DIASTOLIC BLOOD PRESSURE: 71 MMHG | WEIGHT: 185 LBS | HEIGHT: 71 IN

## 2021-10-19 DIAGNOSIS — R10.13 EPIGASTRIC PAIN: ICD-10-CM

## 2021-10-19 PROCEDURE — 99204 OFFICE O/P NEW MOD 45 MIN: CPT

## 2021-10-19 NOTE — PHYSICAL EXAM
[General Appearance - Alert] : alert [General Appearance - In No Acute Distress] : in no acute distress [Auscultation Breath Sounds / Voice Sounds] : lungs were clear to auscultation bilaterally [Heart Rate And Rhythm] : heart rate was normal and rhythm regular [Heart Sounds] : normal S1 and S2 [Heart Sounds Gallop] : no gallops [Murmurs] : no murmurs [Heart Sounds Pericardial Friction Rub] : no pericardial rub [Bowel Sounds] : normal bowel sounds [Abdomen Soft] : soft [] : no hepato-splenomegaly [Abdomen Mass (___ Cm)] : no abdominal mass palpated [Nail Clubbing] : no clubbing  or cyanosis of the fingernails [Musculoskeletal - Swelling] : no joint swelling seen [Motor Tone] : muscle strength and tone were normal [FreeTextEntry1] : walks with cane [Oriented To Time, Place, And Person] : oriented to person, place, and time [Impaired Insight] : insight and judgment were intact [Affect] : the affect was normal

## 2021-10-19 NOTE — ASSESSMENT
[FreeTextEntry1] : 67yo male with new gerd, dysphagia epiagstric pain\par \par Empiric omeprazole\par check egd with possible dilation\par Risks and benefits of procedure(s) discussed with patient in detail, including but not limited to, perforation, bleeding, reaction to anesthesia, missed lesions.\par

## 2021-10-19 NOTE — HISTORY OF PRESENT ILLNESS
[de-identified] : 65yo male with new onset "agita"\par Last several weeks with increased GERD symptoms, also with dysphagia\par He notes epigastric to LUQ pain exacerbated by eating. He is due for surveillance colonoscopy but had coronary stent in 2/21 and on plavix

## 2021-10-22 LAB
ALBUMIN SERPL ELPH-MCNC: 4.9 G/DL
ALP BLD-CCNC: 62 U/L
ALT SERPL-CCNC: 21 U/L
AMYLASE/CREAT SERPL: 55 U/L
ANION GAP SERPL CALC-SCNC: 15 MMOL/L
AST SERPL-CCNC: 11 U/L
BASOPHILS # BLD AUTO: 0.06 K/UL
BASOPHILS NFR BLD AUTO: 1 %
BILIRUB SERPL-MCNC: 0.4 MG/DL
BUN SERPL-MCNC: 31 MG/DL
CALCIUM SERPL-MCNC: 10.3 MG/DL
CHLORIDE SERPL-SCNC: 102 MMOL/L
CO2 SERPL-SCNC: 22 MMOL/L
CREAT SERPL-MCNC: 1.1 MG/DL
EOSINOPHIL # BLD AUTO: 0.16 K/UL
EOSINOPHIL NFR BLD AUTO: 2.5 %
GLUCOSE SERPL-MCNC: 153 MG/DL
HCT VFR BLD CALC: 38.3 %
HGB BLD-MCNC: 12.7 G/DL
IMM GRANULOCYTES NFR BLD AUTO: 0.8 %
LPL SERPL-CCNC: 27 U/L
LYMPHOCYTES # BLD AUTO: 1.44 K/UL
LYMPHOCYTES NFR BLD AUTO: 22.9 %
MAN DIFF?: NORMAL
MCHC RBC-ENTMCNC: 30.2 PG
MCHC RBC-ENTMCNC: 33.2 GM/DL
MCV RBC AUTO: 91.2 FL
MONOCYTES # BLD AUTO: 0.52 K/UL
MONOCYTES NFR BLD AUTO: 8.3 %
NEUTROPHILS # BLD AUTO: 4.07 K/UL
NEUTROPHILS NFR BLD AUTO: 64.5 %
PLATELET # BLD AUTO: 245 K/UL
POTASSIUM SERPL-SCNC: 4.8 MMOL/L
PROT SERPL-MCNC: 6.9 G/DL
RBC # BLD: 4.2 M/UL
RBC # FLD: 13.2 %
SODIUM SERPL-SCNC: 138 MMOL/L
WBC # FLD AUTO: 6.3 K/UL

## 2021-10-23 ENCOUNTER — APPOINTMENT (OUTPATIENT)
Dept: DISASTER EMERGENCY | Facility: CLINIC | Age: 66
End: 2021-10-23

## 2021-10-24 LAB — SARS-COV-2 N GENE NPH QL NAA+PROBE: NOT DETECTED

## 2021-10-25 ENCOUNTER — NON-APPOINTMENT (OUTPATIENT)
Age: 66
End: 2021-10-25

## 2021-10-27 ENCOUNTER — APPOINTMENT (OUTPATIENT)
Dept: GASTROENTEROLOGY | Facility: AMBULATORY MEDICAL SERVICES | Age: 66
End: 2021-10-27
Payer: MEDICARE

## 2021-10-27 PROCEDURE — 43239 EGD BIOPSY SINGLE/MULTIPLE: CPT

## 2021-11-17 ENCOUNTER — NON-APPOINTMENT (OUTPATIENT)
Age: 66
End: 2021-11-17

## 2021-11-18 ENCOUNTER — RX RENEWAL (OUTPATIENT)
Age: 66
End: 2021-11-18

## 2021-11-18 ENCOUNTER — APPOINTMENT (OUTPATIENT)
Dept: UROLOGY | Facility: CLINIC | Age: 66
End: 2021-11-18
Payer: MEDICARE

## 2021-11-18 VITALS
SYSTOLIC BLOOD PRESSURE: 154 MMHG | OXYGEN SATURATION: 99 % | WEIGHT: 185 LBS | HEIGHT: 71 IN | HEART RATE: 74 BPM | DIASTOLIC BLOOD PRESSURE: 84 MMHG | BODY MASS INDEX: 25.9 KG/M2

## 2021-11-18 PROCEDURE — 99213 OFFICE O/P EST LOW 20 MIN: CPT

## 2021-11-18 NOTE — HISTORY OF PRESENT ILLNESS
[FreeTextEntry1] : 64 year old man seen 02/06/2020  with complaint of elevated PSA. This began 1/21/20, where it was found to be 6.34.  Patient had his PSA checked on 2/3/2015 before this where it was 2.03 and does not recall if he ever had another PSA level since. \par It is not associated with LUTS. Pt reports over the years he has had some nocturia but it is not bothersome and would not like to try any medications at this time. He denies any recent sexual activity or bike riding prior to getting his PSA checked.No hematuria, no dysuria, no frequency, no urgency, no hesitancy, no straining. No incontinence. No fevers, no chills, no nausea, no vomiting, no flank pain.Pt denies any family hx of prostate cancer\par \par 12/10/2020: Patient presents for follow up. He reports feeling a lump on RIGHT groin. It is not painful, but can be bothersome. Lifting makes it worse. He also reports some LEFT flank pain, then some gross hematuria. It has since resolved. No new  symptoms and other medical  issues remain unchanged from above. No current hematuria, no dysuria, no frequency, no urgency, no hesitancy, no straining. No incontinence. No fevers, no chills, no nausea, no vomiting, no flank pain. Renal US from [facility] was negative for stones, masses, or hydronephrosis. Cr elevated to 1.6\par \par 11/18/2021: Patient presents for follow up. He reports recent episode of gross hematuria. He did not follow up for work up before. Of note, Cr is now wnl. No other new complaints.

## 2021-11-18 NOTE — ASSESSMENT
[FreeTextEntry1] : 63 yo M with gross  hematuria, we discussed that the differential diagnosis includes both benign and malignant conditions including renal stones, BPH, urinary tract infections, and cancer of the bladder or ureter or kidney. Cystoscopy was recommended to rule out pathology in the bladder. CT Urogram was recommended to evaluate for presence of nephroureteral stones or malignancies.. Pt agrees and understands.

## 2021-11-21 LAB
APPEARANCE: ABNORMAL
BACTERIA UR CULT: NORMAL
BACTERIA: NEGATIVE
BILIRUBIN URINE: ABNORMAL
BLOOD URINE: ABNORMAL
CALCIUM OXALATE CRYSTALS: NEGATIVE
COLOR: ABNORMAL
GLUCOSE QUALITATIVE U: NEGATIVE
GRANULAR CASTS: 0 /LPF
HYALINE CASTS: 0 /LPF
KETONES URINE: NORMAL
LEUKOCYTE ESTERASE URINE: NEGATIVE
MICROSCOPIC-UA: NORMAL
NITRITE URINE: POSITIVE
PH URINE: 6.5
PROTEIN URINE: ABNORMAL
RED BLOOD CELLS URINE: >720 /HPF
SPECIFIC GRAVITY URINE: >=1.03
SQUAMOUS EPITHELIAL CELLS: 0 /HPF
TRIPLE PHOSPHATE CRYSTALS: NEGATIVE
URIC ACID CRYSTALS: NEGATIVE
UROBILINOGEN URINE: NORMAL
WHITE BLOOD CELLS URINE: 4 /HPF

## 2021-11-30 ENCOUNTER — OUTPATIENT (OUTPATIENT)
Dept: OUTPATIENT SERVICES | Facility: HOSPITAL | Age: 66
LOS: 1 days | End: 2021-11-30
Payer: MEDICARE

## 2021-11-30 ENCOUNTER — APPOINTMENT (OUTPATIENT)
Dept: CT IMAGING | Facility: CLINIC | Age: 66
End: 2021-11-30
Payer: MEDICARE

## 2021-11-30 DIAGNOSIS — R31.0 GROSS HEMATURIA: ICD-10-CM

## 2021-11-30 DIAGNOSIS — Z89.429 ACQUIRED ABSENCE OF OTHER TOE(S), UNSPECIFIED SIDE: Chronic | ICD-10-CM

## 2021-11-30 DIAGNOSIS — Z98.890 OTHER SPECIFIED POSTPROCEDURAL STATES: Chronic | ICD-10-CM

## 2021-11-30 PROCEDURE — G1004: CPT

## 2021-11-30 PROCEDURE — 82565 ASSAY OF CREATININE: CPT

## 2021-11-30 PROCEDURE — 74178 CT ABD&PLV WO CNTR FLWD CNTR: CPT | Mod: ME

## 2021-11-30 PROCEDURE — 74178 CT ABD&PLV WO CNTR FLWD CNTR: CPT | Mod: 26,ME

## 2021-12-02 ENCOUNTER — NON-APPOINTMENT (OUTPATIENT)
Age: 66
End: 2021-12-02

## 2021-12-14 NOTE — PRE-OP CHECKLIST - ORDERS/MEDICATION ADMINISTRATION RECORD ON CHART
-- DO NOT REPLY / DO NOT REPLY ALL --  -- Message is from the [facility]--    Order Request  Lab: Blood work before appt    Message / reason: Appointment with pcp    Insurance type:   Payor: [employer] / Plan: CHOICE PLUS/2400 / Product Type: PPO MISC    Preferred Delivery Method   Call when ready for pickup - phone number to notify: [phone]     Caller Information       Type Contact Phone    12/14/2021 08:42 AM CST Phone (Incoming) Araceli Aly (Self) [phone] (M)          Alternative phone number: n/a    Turnaround time given to caller:   \"This message will be sent to [state Provider's name]. The clinical team will fulfill your request as soon as they review your message when the office opens tomorrow.\"   done

## 2021-12-16 ENCOUNTER — APPOINTMENT (OUTPATIENT)
Dept: UROLOGY | Facility: CLINIC | Age: 66
End: 2021-12-16
Payer: MEDICARE

## 2021-12-16 ENCOUNTER — RX RENEWAL (OUTPATIENT)
Age: 66
End: 2021-12-16

## 2021-12-16 VITALS
HEART RATE: 71 BPM | SYSTOLIC BLOOD PRESSURE: 160 MMHG | HEIGHT: 71 IN | DIASTOLIC BLOOD PRESSURE: 79 MMHG | BODY MASS INDEX: 25.06 KG/M2 | WEIGHT: 179 LBS | OXYGEN SATURATION: 99 %

## 2021-12-16 DIAGNOSIS — R31.0 GROSS HEMATURIA: ICD-10-CM

## 2021-12-16 DIAGNOSIS — N32.9 BLADDER DISORDER, UNSPECIFIED: ICD-10-CM

## 2021-12-16 PROCEDURE — 52000 CYSTOURETHROSCOPY: CPT

## 2022-01-03 NOTE — ED ADULT NURSE NOTE - CAS EDN DISCHARGE INTERVENTIONS
Chief Complaint   Patient presents with     Recheck Medication     6 month follow up     Imm/Inj     discuss immunizations       Ramandeep Frazier EMT at 12:25 PM on 1/3/2022  
Arm band on/IV intact

## 2022-01-06 ENCOUNTER — APPOINTMENT (OUTPATIENT)
Dept: ENDOCRINOLOGY | Facility: CLINIC | Age: 67
End: 2022-01-06
Payer: MEDICARE

## 2022-01-06 VITALS
OXYGEN SATURATION: 98 % | DIASTOLIC BLOOD PRESSURE: 83 MMHG | SYSTOLIC BLOOD PRESSURE: 164 MMHG | HEART RATE: 79 BPM | HEIGHT: 71 IN | WEIGHT: 191 LBS | BODY MASS INDEX: 26.74 KG/M2

## 2022-01-06 PROCEDURE — 83036 HEMOGLOBIN GLYCOSYLATED A1C: CPT | Mod: QW

## 2022-01-06 PROCEDURE — 99214 OFFICE O/P EST MOD 30 MIN: CPT

## 2022-01-06 PROCEDURE — 82962 GLUCOSE BLOOD TEST: CPT

## 2022-01-07 ENCOUNTER — RESULT CHARGE (OUTPATIENT)
Age: 67
End: 2022-01-07

## 2022-01-07 NOTE — HISTORY OF PRESENT ILLNESS
[FreeTextEntry1] : Mr. BROWN HEATON  is a 66 year old male with past medical history of Diabetes Mellitus Type 2, HLD, CAD, HTN, PAD  who presents for follow-up of his diabetes. Patient was found to have bladder mass and is planned for surgery next month. He did not tolerate Ozempic.\par \par \par POCT Glucose: 121 mg/dL, \par \par Diabetes first diagnosed: Over 7 years\par Type: 2\par \par Current diabetic regimen:\par Metformin 1000 mg BID, Lantus 42 units QHS\par Novolog 2 units TID (stopped)\par Ozempic (had abdominal pain)\par \par Other relevant medications:\par Norvasc\par Coreg\par Lipitor\par Losartan\par \par Pt has freestyle wilfredo \par AGP Report: \par (12/10/21-1/6/22)\par High: 34%\par Target (): 61%\par Low: 0%\par \par \par \par Hypoglycemia: only 1 episode\par \par \par Diet:\par Breakfast: cereal, or eggs\par Lunch: deli sandwiches sometimes skip\par Dinner:deli sandwiches, chicken \par \par Exercise: None\par \par Urine micro: 56 mg/g 2/05/20\par lipid profile: LDL 66 (7/2021), LDL 91 (6/2020) 117 (1/21/20)\par last hba1c:5.7% (7/2021),  8.9% (11/2020), 12.2% 1/21/20, 9.9% 8/2017, 10.6% 2/2015\par eye exam: 7/2021\par diabetic foot exam/podiatry: WNL 7/2021\par \par \par \par \par

## 2022-01-07 NOTE — ASSESSMENT
[FreeTextEntry1] : 1. DM 2- uncontrolled, complicated\par Patient with cardiac history and amputations. Overall sugars are not at goal. Need to have tighter diabetes control. Discussed GLP-1 risks and benefits.\par \par Start Jardiance 20 mg QD\par Cont Lantus 42 units QHS and \par Cont Metformin 1000 mg BID\par Cont diabetic diet\par Cont exercise\par optho UTD\par labs UTD\par foot exam UTD\par \par Hold Jardiance 2 days before surgery and decrease Lantus by 30%\par \par 2. HLD- stable\par Cont Atorvastatin\par \par Follow up in 3 months\par \par \par

## 2022-01-07 NOTE — PHYSICAL EXAM
[Alert] : alert [Well Nourished] : well nourished [Healthy Appearance] : healthy appearance [No Acute Distress] : no acute distress [Well Developed] : well developed

## 2022-02-08 ENCOUNTER — NON-APPOINTMENT (OUTPATIENT)
Age: 67
End: 2022-02-08

## 2022-02-08 ENCOUNTER — APPOINTMENT (OUTPATIENT)
Dept: CARDIOLOGY | Facility: CLINIC | Age: 67
End: 2022-02-08
Payer: MEDICARE

## 2022-02-08 VITALS — DIASTOLIC BLOOD PRESSURE: 70 MMHG | SYSTOLIC BLOOD PRESSURE: 140 MMHG

## 2022-02-08 VITALS
SYSTOLIC BLOOD PRESSURE: 152 MMHG | HEART RATE: 84 BPM | BODY MASS INDEX: 26.6 KG/M2 | DIASTOLIC BLOOD PRESSURE: 78 MMHG | WEIGHT: 190 LBS | OXYGEN SATURATION: 97 % | HEIGHT: 71 IN

## 2022-02-08 PROCEDURE — 99214 OFFICE O/P EST MOD 30 MIN: CPT

## 2022-02-08 PROCEDURE — 93000 ELECTROCARDIOGRAM COMPLETE: CPT

## 2022-02-08 NOTE — HISTORY OF PRESENT ILLNESS
[FreeTextEntry1] : Jeffrey Cardenas is a 66 year old man with multiple medical problems, including coronary artery disease, proximal LAD stent (2/2021), ischemic cardiomyopathy, hyperlipidemia, hypertension, diabetes mellitus, peripheral artery disease, right foot osteomyelitis, who returns for cardiac examination.  During our last encounter in July 2021 with blood pressure was markedly elevated and I prescribed amlodipine 5mg daily -- he subsequently had problems with his medical insurance and did not return until today.  He experienced hematuria in November 2021 and saw Dr Sohail Pagan; CT imaging revealed a lesion in the right bladder base suspicious for neoplasm -- scheduled for cystoscopy.  He has been doing well from a cardiovascular standpoint.  He describes a good baseline functional status and has not been experiencing angina.

## 2022-02-08 NOTE — DISCUSSION/SUMMARY
[With Me] : with me [___ Month(s)] : in [unfilled] month(s) [FreeTextEntry1] : Preoperative cardiac examination: Mr. Cardenas has multiple predictors of perioperative cardiac complications but appears stable for upcoming urologic procedure; I recommend continuing perioperative beta blockade with carvedilol 6.25 mg b.i.d.  I told him to discontinue clopidogrel 7 days prior to the procedure but to continue the uninterrupted use of aspirin 81 mg daily; clopidogrel should be resumed postoperatively once hemostasis has been achieved.\par \par Hypertension: Mild elevation of blood pressure -- if persistent elevation will consider increasing dose of carvedilol at his next visit.\par \par Coronary artery disease: Stable; continue medical management with aspirin, clopidogrel, carvedilol, atorvastatin, and Ranexa.  \par \par

## 2022-02-08 NOTE — PHYSICAL EXAM
[Normal S1, S2] : normal S1, S2 [Clear Lung Fields] : clear lung fields [Soft] : abdomen soft [Non Tender] : non-tender [No Rash] : no rash [Alert and Oriented] : alert and oriented [de-identified] : Appears his stated age [de-identified] : Extraocular muscles intact [de-identified] : Wearing a face mask [de-identified] : Abnormal gait [de-identified] : Minimal ankle edema

## 2022-02-08 NOTE — REVIEW OF SYSTEMS
[Under Stress] : under stress [Headache] : no headache [Weight Gain (___ Lbs)] : [unfilled] ~Ulb weight gain [SOB] : no shortness of breath [Chest Discomfort] : no chest discomfort [Palpitations] : no palpitations [Hematuria] : hematuria [FreeTextEntry9] : Ambulates with a cane

## 2022-02-17 ENCOUNTER — OUTPATIENT (OUTPATIENT)
Dept: OUTPATIENT SERVICES | Facility: HOSPITAL | Age: 67
LOS: 1 days | End: 2022-02-17
Payer: MEDICARE

## 2022-02-17 ENCOUNTER — NON-APPOINTMENT (OUTPATIENT)
Age: 67
End: 2022-02-17

## 2022-02-17 VITALS
DIASTOLIC BLOOD PRESSURE: 73 MMHG | RESPIRATION RATE: 18 BRPM | SYSTOLIC BLOOD PRESSURE: 129 MMHG | TEMPERATURE: 97 F | WEIGHT: 186.29 LBS | HEIGHT: 71 IN | HEART RATE: 76 BPM | OXYGEN SATURATION: 100 %

## 2022-02-17 DIAGNOSIS — Z01.818 ENCOUNTER FOR OTHER PREPROCEDURAL EXAMINATION: ICD-10-CM

## 2022-02-17 DIAGNOSIS — Z41.9 ENCOUNTER FOR PROCEDURE FOR PURPOSES OTHER THAN REMEDYING HEALTH STATE, UNSPECIFIED: Chronic | ICD-10-CM

## 2022-02-17 DIAGNOSIS — N32.9 BLADDER DISORDER, UNSPECIFIED: ICD-10-CM

## 2022-02-17 DIAGNOSIS — Z98.890 OTHER SPECIFIED POSTPROCEDURAL STATES: Chronic | ICD-10-CM

## 2022-02-17 DIAGNOSIS — Z89.429 ACQUIRED ABSENCE OF OTHER TOE(S), UNSPECIFIED SIDE: Chronic | ICD-10-CM

## 2022-02-17 LAB
A1C WITH ESTIMATED AVERAGE GLUCOSE RESULT: 7.5 % — HIGH (ref 4–5.6)
ANION GAP SERPL CALC-SCNC: 4 MMOL/L — LOW (ref 5–17)
APPEARANCE UR: ABNORMAL
APTT BLD: 30.2 SEC — SIGNIFICANT CHANGE UP (ref 27.5–35.5)
BASOPHILS # BLD AUTO: 0.03 K/UL — SIGNIFICANT CHANGE UP (ref 0–0.2)
BASOPHILS NFR BLD AUTO: 0.4 % — SIGNIFICANT CHANGE UP (ref 0–2)
BILIRUB UR-MCNC: NEGATIVE — SIGNIFICANT CHANGE UP
BUN SERPL-MCNC: 26 MG/DL — HIGH (ref 7–23)
CALCIUM SERPL-MCNC: 9.6 MG/DL — SIGNIFICANT CHANGE UP (ref 8.5–10.1)
CHLORIDE SERPL-SCNC: 110 MMOL/L — HIGH (ref 96–108)
CO2 SERPL-SCNC: 28 MMOL/L — SIGNIFICANT CHANGE UP (ref 22–31)
COLOR SPEC: YELLOW — SIGNIFICANT CHANGE UP
CREAT SERPL-MCNC: 1.2 MG/DL — SIGNIFICANT CHANGE UP (ref 0.5–1.3)
DIFF PNL FLD: ABNORMAL
EOSINOPHIL # BLD AUTO: 0.12 K/UL — SIGNIFICANT CHANGE UP (ref 0–0.5)
EOSINOPHIL NFR BLD AUTO: 1.6 % — SIGNIFICANT CHANGE UP (ref 0–6)
ESTIMATED AVERAGE GLUCOSE: 169 MG/DL — HIGH (ref 68–114)
GLUCOSE SERPL-MCNC: 122 MG/DL — HIGH (ref 70–99)
GLUCOSE UR QL: 100 MG/DL
HCT VFR BLD CALC: 38.2 % — LOW (ref 39–50)
HGB BLD-MCNC: 13.1 G/DL — SIGNIFICANT CHANGE UP (ref 13–17)
IMM GRANULOCYTES NFR BLD AUTO: 0.8 % — SIGNIFICANT CHANGE UP (ref 0–1.5)
INR BLD: 1.05 RATIO — SIGNIFICANT CHANGE UP (ref 0.88–1.16)
KETONES UR-MCNC: NEGATIVE — SIGNIFICANT CHANGE UP
LEUKOCYTE ESTERASE UR-ACNC: ABNORMAL
LYMPHOCYTES # BLD AUTO: 1.51 K/UL — SIGNIFICANT CHANGE UP (ref 1–3.3)
LYMPHOCYTES # BLD AUTO: 20.3 % — SIGNIFICANT CHANGE UP (ref 13–44)
MCHC RBC-ENTMCNC: 30.5 PG — SIGNIFICANT CHANGE UP (ref 27–34)
MCHC RBC-ENTMCNC: 34.3 GM/DL — SIGNIFICANT CHANGE UP (ref 32–36)
MCV RBC AUTO: 88.8 FL — SIGNIFICANT CHANGE UP (ref 80–100)
MONOCYTES # BLD AUTO: 0.53 K/UL — SIGNIFICANT CHANGE UP (ref 0–0.9)
MONOCYTES NFR BLD AUTO: 7.1 % — SIGNIFICANT CHANGE UP (ref 2–14)
NEUTROPHILS # BLD AUTO: 5.18 K/UL — SIGNIFICANT CHANGE UP (ref 1.8–7.4)
NEUTROPHILS NFR BLD AUTO: 69.8 % — SIGNIFICANT CHANGE UP (ref 43–77)
NITRITE UR-MCNC: NEGATIVE — SIGNIFICANT CHANGE UP
PH UR: 5 — SIGNIFICANT CHANGE UP (ref 5–8)
PLATELET # BLD AUTO: 246 K/UL — SIGNIFICANT CHANGE UP (ref 150–400)
POTASSIUM SERPL-MCNC: 4.7 MMOL/L — SIGNIFICANT CHANGE UP (ref 3.5–5.3)
POTASSIUM SERPL-SCNC: 4.7 MMOL/L — SIGNIFICANT CHANGE UP (ref 3.5–5.3)
PROT UR-MCNC: 30 MG/DL
PROTHROM AB SERPL-ACNC: 12.1 SEC — SIGNIFICANT CHANGE UP (ref 10.6–13.6)
RBC # BLD: 4.3 M/UL — SIGNIFICANT CHANGE UP (ref 4.2–5.8)
RBC # FLD: 13.1 % — SIGNIFICANT CHANGE UP (ref 10.3–14.5)
SODIUM SERPL-SCNC: 142 MMOL/L — SIGNIFICANT CHANGE UP (ref 135–145)
SP GR SPEC: 1.02 — SIGNIFICANT CHANGE UP (ref 1.01–1.02)
UROBILINOGEN FLD QL: NEGATIVE — SIGNIFICANT CHANGE UP
WBC # BLD: 7.43 K/UL — SIGNIFICANT CHANGE UP (ref 3.8–10.5)
WBC # FLD AUTO: 7.43 K/UL — SIGNIFICANT CHANGE UP (ref 3.8–10.5)

## 2022-02-17 PROCEDURE — 85730 THROMBOPLASTIN TIME PARTIAL: CPT

## 2022-02-17 PROCEDURE — 80048 BASIC METABOLIC PNL TOTAL CA: CPT

## 2022-02-17 PROCEDURE — 87086 URINE CULTURE/COLONY COUNT: CPT

## 2022-02-17 PROCEDURE — 36415 COLL VENOUS BLD VENIPUNCTURE: CPT

## 2022-02-17 PROCEDURE — 81001 URINALYSIS AUTO W/SCOPE: CPT

## 2022-02-17 PROCEDURE — 93005 ELECTROCARDIOGRAM TRACING: CPT

## 2022-02-17 PROCEDURE — 86901 BLOOD TYPING SEROLOGIC RH(D): CPT

## 2022-02-17 PROCEDURE — 85610 PROTHROMBIN TIME: CPT

## 2022-02-17 PROCEDURE — 86900 BLOOD TYPING SEROLOGIC ABO: CPT

## 2022-02-17 PROCEDURE — G0463: CPT | Mod: 25

## 2022-02-17 PROCEDURE — 85025 COMPLETE CBC W/AUTO DIFF WBC: CPT

## 2022-02-17 PROCEDURE — 86850 RBC ANTIBODY SCREEN: CPT

## 2022-02-17 PROCEDURE — 93010 ELECTROCARDIOGRAM REPORT: CPT

## 2022-02-17 PROCEDURE — 83036 HEMOGLOBIN GLYCOSYLATED A1C: CPT

## 2022-02-17 RX ORDER — MAGNESIUM HYDROXIDE 400 MG/1
30 TABLET, CHEWABLE ORAL
Qty: 0 | Refills: 0 | DISCHARGE

## 2022-02-17 RX ORDER — FLUTICASONE PROPIONATE 50 MCG
1 SPRAY, SUSPENSION NASAL
Qty: 0 | Refills: 0 | DISCHARGE

## 2022-02-17 NOTE — H&P PST ADULT - ASSESSMENT
This is a 65 y/o male with bladder tumor who is scheduled for a cystoscopy and TURB    Patient instructed on     1. To follow instructions as per ASU for NPO status   2. Aware that he needs medical (Cheryl) and cardiology clearance (Dr. Zamorano)  3. May take Ranolazine, Carvedilol Losartan and Amlodipine with a sip of water on morning of procedure   4. Instructed to ask PCP if he should decreased his dose of insulin the night before surgery  5. Reports he was instructed to hold Plavix 5 days before the procedure  6. Instructed to hold Metformin 48 hours before procedure   7. Instructed to call [phone] to confirm COVID 19 appointment which is edilia for February 22 This is a 67 y/o male with bladder tumor who is scheduled for a cystoscopy and TURB    Patient instructed on     1. To follow instructions as per ASU for NPO status   2. Aware that he needs medical (Dr. Luna) and cardiology clearance (Dr. Zamorano)  3. May take Ranolazine, Carvedilol Losartan and Amlodipine with a sip of water on morning of procedure   4. Instructed to ask PCP if he should decreased his dose of insulin the night before surgery  5. Reports he was instructed to hold Plavix 5 days before the procedure  6. Instructed to hold Metformin 48 hours before procedure   7. Instructed to call [phone] to confirm COVID 19 appointment which is edilia for February 22

## 2022-02-17 NOTE — H&P PST ADULT - NSICDXPASTSURGICALHX_GEN_ALL_CORE_FT
PAST SURGICAL HISTORY:  Elective surgery 1 cardiac stent place 3/2021    Elective surgery No digits on Right foot    History of amputation of toe L foot 2nd toe amputation    Status post debridement right foot ulcer, with I&D

## 2022-02-17 NOTE — H&P PST ADULT - NSANTHOSAYNRD_GEN_A_CORE
No. RAHAT screening performed.  STOP BANG Legend: 0-2 = LOW Risk; 3-4 = INTERMEDIATE Risk; 5-8 = HIGH Risk

## 2022-02-17 NOTE — H&P PST ADULT - NSICDXPASTMEDICALHX_GEN_ALL_CORE_FT
PAST MEDICAL HISTORY:  2019 novel coronavirus disease (COVID-19) 4/2020- was not hospitalized    Basal cell carcinoma Of face   s/p excision Oct 2017    BPH (Benign Prostatic Hyperplasia)     CAD (coronary atherosclerotic disease)     Hypercholesteremia     Hypertension     Insulin dependent type 2 diabetes mellitus     PAD (peripheral artery disease)

## 2022-02-18 ENCOUNTER — APPOINTMENT (OUTPATIENT)
Dept: INTERNAL MEDICINE | Facility: CLINIC | Age: 67
End: 2022-02-18
Payer: MEDICARE

## 2022-02-18 VITALS
RESPIRATION RATE: 14 BRPM | TEMPERATURE: 97.6 F | BODY MASS INDEX: 25.9 KG/M2 | DIASTOLIC BLOOD PRESSURE: 80 MMHG | HEIGHT: 71 IN | WEIGHT: 185 LBS | HEART RATE: 81 BPM | SYSTOLIC BLOOD PRESSURE: 132 MMHG | OXYGEN SATURATION: 98 %

## 2022-02-18 DIAGNOSIS — Z01.818 ENCOUNTER FOR OTHER PREPROCEDURAL EXAMINATION: ICD-10-CM

## 2022-02-18 DIAGNOSIS — D49.4 NEOPLASM OF UNSPECIFIED BEHAVIOR OF BLADDER: ICD-10-CM

## 2022-02-18 DIAGNOSIS — N32.9 BLADDER DISORDER, UNSPECIFIED: ICD-10-CM

## 2022-02-18 LAB
CULTURE RESULTS: SIGNIFICANT CHANGE UP
SPECIMEN SOURCE: SIGNIFICANT CHANGE UP

## 2022-02-18 PROCEDURE — 99214 OFFICE O/P EST MOD 30 MIN: CPT

## 2022-02-23 NOTE — PHYSICAL THERAPY INITIAL EVALUATION ADULT - LEVEL OF INDEPENDENCE: SIT/STAND, REHAB EVAL
Patient again counseled complications untreated Graves disease including risk arrhythmia, bone loss, fractures, thyroid storm etc   Discussed different options of treating Graves disease including medications, radioactive iodine treatment or surgery  Now she states that she will try taking medications  Advised to have labs done 1 month after she has been on a consistent dose  Have also advised her to discuss with her primary care and seek  a 2nd opinion    She will schedule a follow up appointment if she decides to follow-up with this office supervision

## 2022-02-24 ENCOUNTER — RX RENEWAL (OUTPATIENT)
Age: 67
End: 2022-02-24

## 2022-02-24 RX ORDER — SODIUM CHLORIDE 9 MG/ML
1000 INJECTION, SOLUTION INTRAVENOUS
Refills: 0 | Status: DISCONTINUED | OUTPATIENT
Start: 2022-02-25 | End: 2022-02-25

## 2022-02-24 RX ORDER — OXYCODONE HYDROCHLORIDE 5 MG/1
5 TABLET ORAL ONCE
Refills: 0 | Status: DISCONTINUED | OUTPATIENT
Start: 2022-02-25 | End: 2022-02-25

## 2022-02-24 RX ORDER — FENTANYL CITRATE 50 UG/ML
50 INJECTION INTRAVENOUS
Refills: 0 | Status: DISCONTINUED | OUTPATIENT
Start: 2022-02-25 | End: 2022-02-25

## 2022-02-24 RX ORDER — ONDANSETRON 8 MG/1
4 TABLET, FILM COATED ORAL ONCE
Refills: 0 | Status: DISCONTINUED | OUTPATIENT
Start: 2022-02-25 | End: 2022-02-25

## 2022-02-24 NOTE — ADDENDUM
[FreeTextEntry1] : PST results are within acceptable limits.\par There are no medical contraindications to proceeding with the planned surgery.\par Routine perioperative hemodynamic monitoring is recommended.
English

## 2022-02-24 NOTE — HISTORY OF PRESENT ILLNESS
[Coronary Artery Disease] : coronary artery disease [No Pertinent Pulmonary History] : no history of asthma, COPD, sleep apnea, or smoking [No Adverse Anesthesia Reaction] : no adverse anesthesia reaction in self or family member [Chronic Anticoagulation] : chronic anticoagulation [Diabetes] : diabetes [(Patient denies any chest pain, claudication, dyspnea on exertion, orthopnea, palpitations or syncope)] : Patient denies any chest pain, claudication, dyspnea on exertion, orthopnea, palpitations or syncope [Aortic Stenosis] : no aortic stenosis [Atrial Fibrillation] : no atrial fibrillation [Recent Myocardial Infarction] : no recent myocardial infarction [Implantable Device/Pacemaker] : no implantable device/pacemaker [Chronic Kidney Disease] : no chronic kidney disease [FreeTextEntry1] : bladder mass excision [FreeTextEntry2] : 2/25/22 [FreeTextEntry3] : Dr. Sohail Pagan [FreeTextEntry4] : BROWN HEATON,  51, is here for presurgical evaluation .\par Overall feeling well.\par Pt denies chest pain, dyspnea, lightheadedness, palpitations, fever, chills, or sweats.

## 2022-02-25 ENCOUNTER — OUTPATIENT (OUTPATIENT)
Dept: INPATIENT UNIT | Facility: HOSPITAL | Age: 67
LOS: 1 days | Discharge: HOME | End: 2022-02-25
Payer: MEDICARE

## 2022-02-25 ENCOUNTER — RESULT REVIEW (OUTPATIENT)
Age: 67
End: 2022-02-25

## 2022-02-25 ENCOUNTER — APPOINTMENT (OUTPATIENT)
Dept: UROLOGY | Facility: HOSPITAL | Age: 67
End: 2022-02-25

## 2022-02-25 VITALS
HEART RATE: 64 BPM | TEMPERATURE: 97 F | RESPIRATION RATE: 16 BRPM | SYSTOLIC BLOOD PRESSURE: 120 MMHG | OXYGEN SATURATION: 99 % | DIASTOLIC BLOOD PRESSURE: 74 MMHG

## 2022-02-25 VITALS
WEIGHT: 186.29 LBS | SYSTOLIC BLOOD PRESSURE: 155 MMHG | HEIGHT: 71 IN | OXYGEN SATURATION: 100 % | RESPIRATION RATE: 15 BRPM | TEMPERATURE: 97 F | HEART RATE: 74 BPM | DIASTOLIC BLOOD PRESSURE: 90 MMHG

## 2022-02-25 DIAGNOSIS — Z41.9 ENCOUNTER FOR PROCEDURE FOR PURPOSES OTHER THAN REMEDYING HEALTH STATE, UNSPECIFIED: Chronic | ICD-10-CM

## 2022-02-25 DIAGNOSIS — N32.9 BLADDER DISORDER, UNSPECIFIED: ICD-10-CM

## 2022-02-25 DIAGNOSIS — Z98.890 OTHER SPECIFIED POSTPROCEDURAL STATES: Chronic | ICD-10-CM

## 2022-02-25 DIAGNOSIS — Z89.429 ACQUIRED ABSENCE OF OTHER TOE(S), UNSPECIFIED SIDE: Chronic | ICD-10-CM

## 2022-02-25 PROCEDURE — 52235 CYSTOSCOPY AND TREATMENT: CPT

## 2022-02-25 PROCEDURE — 88341 IMHCHEM/IMCYTCHM EA ADD ANTB: CPT | Mod: 26

## 2022-02-25 PROCEDURE — 88307 TISSUE EXAM BY PATHOLOGIST: CPT

## 2022-02-25 PROCEDURE — 88342 IMHCHEM/IMCYTCHM 1ST ANTB: CPT | Mod: 26

## 2022-02-25 PROCEDURE — 88341 IMHCHEM/IMCYTCHM EA ADD ANTB: CPT

## 2022-02-25 PROCEDURE — 82962 GLUCOSE BLOOD TEST: CPT

## 2022-02-25 PROCEDURE — 88307 TISSUE EXAM BY PATHOLOGIST: CPT | Mod: 26

## 2022-02-25 PROCEDURE — 88342 IMHCHEM/IMCYTCHM 1ST ANTB: CPT

## 2022-02-25 RX ORDER — PHENAZOPYRIDINE HCL 100 MG
1 TABLET ORAL
Qty: 9 | Refills: 0
Start: 2022-02-25 | End: 2022-02-27

## 2022-02-25 RX ORDER — OXYBUTYNIN CHLORIDE 5 MG
1 TABLET ORAL
Qty: 21 | Refills: 0
Start: 2022-02-25 | End: 2022-03-03

## 2022-02-25 RX ORDER — TRAMADOL HYDROCHLORIDE 50 MG/1
1 TABLET ORAL
Qty: 12 | Refills: 0
Start: 2022-02-25 | End: 2022-02-27

## 2022-02-25 RX ORDER — PHENAZOPYRIDINE HCL 100 MG
200 TABLET ORAL ONCE
Refills: 0 | Status: DISCONTINUED | OUTPATIENT
Start: 2022-02-25 | End: 2022-02-25

## 2022-02-25 NOTE — BRIEF OPERATIVE NOTE - NSICDXBRIEFPROCEDURE_GEN_ALL_CORE_FT
PROCEDURES:  Cystourethroscopy, with excision of medium sized neoplasm of bladder 25-Feb-2022 11:02:02  Sohail Pagan

## 2022-02-25 NOTE — ASU PATIENT PROFILE, ADULT - FALL HARM RISK - UNIVERSAL INTERVENTIONS
Bed in lowest position, wheels locked, appropriate side rails in place/Call bell, personal items and telephone in reach/Instruct patient to call for assistance before getting out of bed or chair/Non-slip footwear when patient is out of bed/Ider to call system/Physically safe environment - no spills, clutter or unnecessary equipment/Purposeful Proactive Rounding/Room/bathroom lighting operational, light cord in reach

## 2022-02-25 NOTE — ASU DISCHARGE PLAN (ADULT/PEDIATRIC) - CARE PROVIDER_API CALL
Sohail Pagan)  Urology  [address], [address] [place] [address]  Phone: [phone]  Fax: [phone]  Follow Up Time: 1 week

## 2022-02-25 NOTE — ASU DISCHARGE PLAN (ADULT/PEDIATRIC) - NS MD DC FALL RISK RISK
For information on Fall & Injury Prevention, visit: [email or web address] OR  [email or web address] OR  https://www.cdc.gov/steadi/patient.html

## 2022-03-02 ENCOUNTER — NON-APPOINTMENT (OUTPATIENT)
Age: 67
End: 2022-03-02

## 2022-03-03 ENCOUNTER — APPOINTMENT (OUTPATIENT)
Dept: UROLOGY | Facility: CLINIC | Age: 67
End: 2022-03-03
Payer: MEDICARE

## 2022-03-03 DIAGNOSIS — I25.10 ATHEROSCLEROTIC HEART DISEASE OF NATIVE CORONARY ARTERY WITHOUT ANGINA PECTORIS: ICD-10-CM

## 2022-03-03 DIAGNOSIS — Z95.5 PRESENCE OF CORONARY ANGIOPLASTY IMPLANT AND GRAFT: ICD-10-CM

## 2022-03-03 DIAGNOSIS — Z85.828 PERSONAL HISTORY OF OTHER MALIGNANT NEOPLASM OF SKIN: ICD-10-CM

## 2022-03-03 DIAGNOSIS — Z87.891 PERSONAL HISTORY OF NICOTINE DEPENDENCE: ICD-10-CM

## 2022-03-03 DIAGNOSIS — K21.9 GASTRO-ESOPHAGEAL REFLUX DISEASE WITHOUT ESOPHAGITIS: ICD-10-CM

## 2022-03-03 DIAGNOSIS — I42.9 CARDIOMYOPATHY, UNSPECIFIED: ICD-10-CM

## 2022-03-03 DIAGNOSIS — Z79.82 LONG TERM (CURRENT) USE OF ASPIRIN: ICD-10-CM

## 2022-03-03 DIAGNOSIS — Z79.4 LONG TERM (CURRENT) USE OF INSULIN: ICD-10-CM

## 2022-03-03 DIAGNOSIS — N40.0 BENIGN PROSTATIC HYPERPLASIA WITHOUT LOWER URINARY TRACT SYMPTOMS: ICD-10-CM

## 2022-03-03 DIAGNOSIS — I25.5 ISCHEMIC CARDIOMYOPATHY: ICD-10-CM

## 2022-03-03 DIAGNOSIS — I10 ESSENTIAL (PRIMARY) HYPERTENSION: ICD-10-CM

## 2022-03-03 DIAGNOSIS — E78.5 HYPERLIPIDEMIA, UNSPECIFIED: ICD-10-CM

## 2022-03-03 DIAGNOSIS — Z79.01 LONG TERM (CURRENT) USE OF ANTICOAGULANTS: ICD-10-CM

## 2022-03-03 DIAGNOSIS — Z86.16 PERSONAL HISTORY OF COVID-19: ICD-10-CM

## 2022-03-03 DIAGNOSIS — E11.40 TYPE 2 DIABETES MELLITUS WITH DIABETIC NEUROPATHY, UNSPECIFIED: ICD-10-CM

## 2022-03-03 DIAGNOSIS — C67.2 MALIGNANT NEOPLASM OF LATERAL WALL OF BLADDER: ICD-10-CM

## 2022-03-03 DIAGNOSIS — Z79.84 LONG TERM (CURRENT) USE OF ORAL HYPOGLYCEMIC DRUGS: ICD-10-CM

## 2022-03-03 DIAGNOSIS — D49.4 NEOPLASM OF UNSPECIFIED BEHAVIOR OF BLADDER: ICD-10-CM

## 2022-03-03 DIAGNOSIS — E11.51 TYPE 2 DIABETES MELLITUS WITH DIABETIC PERIPHERAL ANGIOPATHY WITHOUT GANGRENE: ICD-10-CM

## 2022-03-03 DIAGNOSIS — E78.00 PURE HYPERCHOLESTEROLEMIA, UNSPECIFIED: ICD-10-CM

## 2022-03-03 PROCEDURE — 99213 OFFICE O/P EST LOW 20 MIN: CPT

## 2022-03-04 NOTE — PHYSICAL EXAM
[General Appearance - Well Developed] : well developed [General Appearance - Well Nourished] : well nourished [Normal Appearance] : normal appearance [Well Groomed] : well groomed [General Appearance - In No Acute Distress] : no acute distress [Abdomen Soft] : soft [Abdomen Tenderness] : non-tender [Urinary Bladder Findings] : the bladder was normal on palpation [Costovertebral Angle Tenderness] : no ~M costovertebral angle tenderness [Prostate Nodule] : did not have a nodule [Prostate Enlarged] : was enlarged [Prostate Tenderness] : was not tender [Prostate Fluctuant] : was not fluctuant [Edema] : no peripheral edema [] : no respiratory distress [Respiration, Rhythm And Depth] : normal respiratory rhythm and effort [Oriented To Time, Place, And Person] : oriented to person, place, and time [Exaggerated Use Of Accessory Muscles For Inspiration] : no accessory muscle use [Affect] : the affect was normal [Mood] : the mood was normal [Not Anxious] : not anxious [Normal Station and Gait] : the gait and station were normal for the patient's age [No Focal Deficits] : no focal deficits [No Palpable Adenopathy] : no palpable adenopathy

## 2022-03-04 NOTE — ASSESSMENT
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 Discussed that repeat TURBT to confirm stage ie lack of muscle invasion is indicated. If reTURBT confirms stage, will recommend BCG. Pt agrees. WIll book.

## 2022-03-04 NOTE — HISTORY OF PRESENT ILLNESS
[FreeTextEntry1] : 64 year old man seen 02/06/2020  with complaint of elevated PSA. This began 1/21/20, where it was found to be 6.34.  Patient had his PSA checked on 2/3/2015 before this where it was 2.03 and does not recall if he ever had another PSA level since. \par It is not associated with LUTS. Pt reports over the years he has had some nocturia but it is not bothersome and would not like to try any medications at this time. He denies any recent sexual activity or bike riding prior to getting his PSA checked.No hematuria, no dysuria, no frequency, no urgency, no hesitancy, no straining. No incontinence. No fevers, no chills, no nausea, no vomiting, no flank pain.Pt denies any family hx of prostate cancer\par \par 12/10/2020: Patient presents for follow up. He reports feeling a lump on RIGHT groin. It is not painful, but can be bothersome. Lifting makes it worse. He also reports some LEFT flank pain, then some gross hematuria. It has since resolved. No new  symptoms and other medical  issues remain unchanged from above. No current hematuria, no dysuria, no frequency, no urgency, no hesitancy, no straining. No incontinence. No fevers, no chills, no nausea, no vomiting, no flank pain. Renal US from [facility] was negative for stones, masses, or hydronephrosis. Cr elevated to 1.6\par \par 11/18/2021: Patient presents for follow up. He reports recent episode of gross hematuria. He did not follow up for work up before. Of note, Cr is now wnl. No other new complaints. \par \par 03/03/2022: Patient presents for follow up. He is s/p TURBT, here to review path. He had some hematuria post op, but now resolved. Path showed HG T1 UCC, muscle in specimen negative.

## 2022-03-08 PROBLEM — U07.1 COVID-19: Chronic | Status: ACTIVE | Noted: 2022-02-17

## 2022-03-21 NOTE — ED ADULT NURSE NOTE - CAS TRG GENERAL NORM CIRC DET
Provider E-Visit time total (minutes): 8  
Capillary refill less/equal to 2 seconds/Strong peripheral pulses

## 2022-03-24 ENCOUNTER — OUTPATIENT (OUTPATIENT)
Dept: OUTPATIENT SERVICES | Facility: HOSPITAL | Age: 67
LOS: 1 days | End: 2022-03-24
Payer: MEDICARE

## 2022-03-24 ENCOUNTER — RESULT REVIEW (OUTPATIENT)
Age: 67
End: 2022-03-24

## 2022-03-24 VITALS
DIASTOLIC BLOOD PRESSURE: 75 MMHG | TEMPERATURE: 98 F | SYSTOLIC BLOOD PRESSURE: 157 MMHG | HEART RATE: 70 BPM | OXYGEN SATURATION: 99 % | WEIGHT: 194.01 LBS | RESPIRATION RATE: 16 BRPM | HEIGHT: 69.5 IN

## 2022-03-24 DIAGNOSIS — Z89.429 ACQUIRED ABSENCE OF OTHER TOE(S), UNSPECIFIED SIDE: Chronic | ICD-10-CM

## 2022-03-24 DIAGNOSIS — Z41.9 ENCOUNTER FOR PROCEDURE FOR PURPOSES OTHER THAN REMEDYING HEALTH STATE, UNSPECIFIED: Chronic | ICD-10-CM

## 2022-03-24 DIAGNOSIS — C67.9 MALIGNANT NEOPLASM OF BLADDER, UNSPECIFIED: ICD-10-CM

## 2022-03-24 DIAGNOSIS — Z01.818 ENCOUNTER FOR OTHER PREPROCEDURAL EXAMINATION: ICD-10-CM

## 2022-03-24 DIAGNOSIS — Z98.890 OTHER SPECIFIED POSTPROCEDURAL STATES: Chronic | ICD-10-CM

## 2022-03-24 LAB
A1C WITH ESTIMATED AVERAGE GLUCOSE RESULT: 7.5 % — HIGH (ref 4–5.6)
ANION GAP SERPL CALC-SCNC: 4 MMOL/L — LOW (ref 5–17)
APPEARANCE UR: CLEAR — SIGNIFICANT CHANGE UP
APTT BLD: 32.6 SEC — SIGNIFICANT CHANGE UP (ref 27.5–35.5)
BASOPHILS # BLD AUTO: 0.04 K/UL — SIGNIFICANT CHANGE UP (ref 0–0.2)
BASOPHILS NFR BLD AUTO: 0.7 % — SIGNIFICANT CHANGE UP (ref 0–2)
BILIRUB UR-MCNC: NEGATIVE — SIGNIFICANT CHANGE UP
BUN SERPL-MCNC: 27 MG/DL — HIGH (ref 7–23)
CALCIUM SERPL-MCNC: 9.2 MG/DL — SIGNIFICANT CHANGE UP (ref 8.5–10.1)
CHLORIDE SERPL-SCNC: 109 MMOL/L — HIGH (ref 96–108)
CO2 SERPL-SCNC: 27 MMOL/L — SIGNIFICANT CHANGE UP (ref 22–31)
COLOR SPEC: YELLOW — SIGNIFICANT CHANGE UP
CREAT SERPL-MCNC: 1.17 MG/DL — SIGNIFICANT CHANGE UP (ref 0.5–1.3)
DIFF PNL FLD: ABNORMAL
EGFR: 68 ML/MIN/1.73M2 — SIGNIFICANT CHANGE UP
EOSINOPHIL # BLD AUTO: 0.12 K/UL — SIGNIFICANT CHANGE UP (ref 0–0.5)
EOSINOPHIL NFR BLD AUTO: 2 % — SIGNIFICANT CHANGE UP (ref 0–6)
ESTIMATED AVERAGE GLUCOSE: 169 MG/DL — HIGH (ref 68–114)
GLUCOSE SERPL-MCNC: 201 MG/DL — HIGH (ref 70–99)
GLUCOSE UR QL: NEGATIVE — SIGNIFICANT CHANGE UP
HCT VFR BLD CALC: 35.9 % — LOW (ref 39–50)
HGB BLD-MCNC: 12 G/DL — LOW (ref 13–17)
IMM GRANULOCYTES NFR BLD AUTO: 0.3 % — SIGNIFICANT CHANGE UP (ref 0–1.5)
INR BLD: 1 RATIO — SIGNIFICANT CHANGE UP (ref 0.88–1.16)
KETONES UR-MCNC: NEGATIVE — SIGNIFICANT CHANGE UP
LEUKOCYTE ESTERASE UR-ACNC: ABNORMAL
LYMPHOCYTES # BLD AUTO: 1.19 K/UL — SIGNIFICANT CHANGE UP (ref 1–3.3)
LYMPHOCYTES # BLD AUTO: 19.8 % — SIGNIFICANT CHANGE UP (ref 13–44)
MCHC RBC-ENTMCNC: 29.8 PG — SIGNIFICANT CHANGE UP (ref 27–34)
MCHC RBC-ENTMCNC: 33.4 GM/DL — SIGNIFICANT CHANGE UP (ref 32–36)
MCV RBC AUTO: 89.1 FL — SIGNIFICANT CHANGE UP (ref 80–100)
MONOCYTES # BLD AUTO: 0.49 K/UL — SIGNIFICANT CHANGE UP (ref 0–0.9)
MONOCYTES NFR BLD AUTO: 8.2 % — SIGNIFICANT CHANGE UP (ref 2–14)
NEUTROPHILS # BLD AUTO: 4.14 K/UL — SIGNIFICANT CHANGE UP (ref 1.8–7.4)
NEUTROPHILS NFR BLD AUTO: 69 % — SIGNIFICANT CHANGE UP (ref 43–77)
NITRITE UR-MCNC: NEGATIVE — SIGNIFICANT CHANGE UP
PH UR: 6 — SIGNIFICANT CHANGE UP (ref 5–8)
PLATELET # BLD AUTO: 207 K/UL — SIGNIFICANT CHANGE UP (ref 150–400)
POTASSIUM SERPL-MCNC: 4.4 MMOL/L — SIGNIFICANT CHANGE UP (ref 3.5–5.3)
POTASSIUM SERPL-SCNC: 4.4 MMOL/L — SIGNIFICANT CHANGE UP (ref 3.5–5.3)
PROT UR-MCNC: SIGNIFICANT CHANGE UP MG/DL
PROTHROM AB SERPL-ACNC: 11.6 SEC — SIGNIFICANT CHANGE UP (ref 10.5–13.4)
RBC # BLD: 4.03 M/UL — LOW (ref 4.2–5.8)
RBC # FLD: 13.4 % — SIGNIFICANT CHANGE UP (ref 10.3–14.5)
SODIUM SERPL-SCNC: 140 MMOL/L — SIGNIFICANT CHANGE UP (ref 135–145)
SP GR SPEC: 1.01 — SIGNIFICANT CHANGE UP (ref 1.01–1.02)
UROBILINOGEN FLD QL: NEGATIVE — SIGNIFICANT CHANGE UP
WBC # BLD: 6 K/UL — SIGNIFICANT CHANGE UP (ref 3.8–10.5)
WBC # FLD AUTO: 6 K/UL — SIGNIFICANT CHANGE UP (ref 3.8–10.5)

## 2022-03-24 PROCEDURE — 86901 BLOOD TYPING SEROLOGIC RH(D): CPT

## 2022-03-24 PROCEDURE — 81001 URINALYSIS AUTO W/SCOPE: CPT

## 2022-03-24 PROCEDURE — 36415 COLL VENOUS BLD VENIPUNCTURE: CPT

## 2022-03-24 PROCEDURE — 85610 PROTHROMBIN TIME: CPT

## 2022-03-24 PROCEDURE — 71046 X-RAY EXAM CHEST 2 VIEWS: CPT | Mod: 26

## 2022-03-24 PROCEDURE — 85025 COMPLETE CBC W/AUTO DIFF WBC: CPT

## 2022-03-24 PROCEDURE — 86850 RBC ANTIBODY SCREEN: CPT

## 2022-03-24 PROCEDURE — 71046 X-RAY EXAM CHEST 2 VIEWS: CPT

## 2022-03-24 PROCEDURE — 83036 HEMOGLOBIN GLYCOSYLATED A1C: CPT

## 2022-03-24 PROCEDURE — 86900 BLOOD TYPING SEROLOGIC ABO: CPT

## 2022-03-24 PROCEDURE — 85730 THROMBOPLASTIN TIME PARTIAL: CPT

## 2022-03-24 PROCEDURE — 80048 BASIC METABOLIC PNL TOTAL CA: CPT

## 2022-03-24 PROCEDURE — 87086 URINE CULTURE/COLONY COUNT: CPT

## 2022-03-24 NOTE — H&P PST ADULT - HISTORY OF PRESENT ILLNESS
This is a 65 y/o male with a PMH  of a cardiac stent placed 3/2021, HTN, HLD, DM, PAD and BPH who reports he had hematuria and work up revealed bladder tumors. He had a cystoscopy and bladder biopsy 2/ 2022. "It showed some cancer." Denies hematuria, dysuria, urinary frequency. Planned Cystoscopy, bladder biopsy.

## 2022-03-24 NOTE — H&P PST ADULT - NSICDXPASTMEDICALHX_GEN_ALL_CORE_FT
PAST MEDICAL HISTORY:  2019 novel coronavirus disease (COVID-19) 4/2020- was not hospitalized    Angina pectoris     Basal cell carcinoma Of face   s/p excision Oct 2017    Bladder cancer     BPH (Benign Prostatic Hyperplasia)     CAD (coronary atherosclerotic disease)     Cataract     Hypercholesteremia     Hypertension     Insulin dependent type 2 diabetes mellitus     PAD (peripheral artery disease)

## 2022-03-24 NOTE — H&P PST ADULT - NSICDXPASTSURGICALHX_GEN_ALL_CORE_FT
PAST SURGICAL HISTORY:  Elective surgery 1 cardiac stent place 3/2021    Elective surgery No digits on Right foot    History of amputation of toe L foot 2nd toe amputation. all from right foot    Status post debridement right foot ulcer, with I&D

## 2022-03-24 NOTE — H&P PST ADULT - ASSESSMENT
66 years old male present to [facility] prior to Cystoscopy and bladder biopsy with Dr. Pagan.    Plan   1. NPO as per ASU  2. Take the following medications with sips of water on the day of procedure: Ranolazine, Carvedilol, Amlodipine, Losartan.  3. Covid swab scheduled for 4/3/2022  4. Continue Aspirin as per cardiologist  5. Drink a quart of extra  fluids the day before your surgery.  6. CBC, BMP, CMP, PT/ INR and PTT, Urinalysis, Urine culture, Type and Screen, HGB AIC sent to lab  7. Chest x- ray done  8. EKG on chart

## 2022-03-25 DIAGNOSIS — C67.9 MALIGNANT NEOPLASM OF BLADDER, UNSPECIFIED: ICD-10-CM

## 2022-03-25 DIAGNOSIS — Z01.818 ENCOUNTER FOR OTHER PREPROCEDURAL EXAMINATION: ICD-10-CM

## 2022-03-25 LAB
CULTURE RESULTS: SIGNIFICANT CHANGE UP
SPECIMEN SOURCE: SIGNIFICANT CHANGE UP

## 2022-03-27 ENCOUNTER — RX RENEWAL (OUTPATIENT)
Age: 67
End: 2022-03-27

## 2022-04-06 ENCOUNTER — TRANSCRIPTION ENCOUNTER (OUTPATIENT)
Age: 67
End: 2022-04-06

## 2022-04-06 ENCOUNTER — APPOINTMENT (OUTPATIENT)
Dept: UROLOGY | Facility: HOSPITAL | Age: 67
End: 2022-04-06

## 2022-04-06 ENCOUNTER — RESULT REVIEW (OUTPATIENT)
Age: 67
End: 2022-04-06

## 2022-04-06 ENCOUNTER — OUTPATIENT (OUTPATIENT)
Dept: INPATIENT UNIT | Facility: HOSPITAL | Age: 67
LOS: 1 days | Discharge: HOME | End: 2022-04-06
Payer: MEDICARE

## 2022-04-06 VITALS
OXYGEN SATURATION: 100 % | RESPIRATION RATE: 15 BRPM | SYSTOLIC BLOOD PRESSURE: 157 MMHG | DIASTOLIC BLOOD PRESSURE: 91 MMHG | TEMPERATURE: 99 F | HEART RATE: 67 BPM

## 2022-04-06 VITALS
DIASTOLIC BLOOD PRESSURE: 89 MMHG | TEMPERATURE: 97 F | HEIGHT: 60 IN | OXYGEN SATURATION: 100 % | WEIGHT: 194.01 LBS | SYSTOLIC BLOOD PRESSURE: 161 MMHG | RESPIRATION RATE: 15 BRPM | HEART RATE: 64 BPM

## 2022-04-06 DIAGNOSIS — Z98.890 OTHER SPECIFIED POSTPROCEDURAL STATES: Chronic | ICD-10-CM

## 2022-04-06 DIAGNOSIS — Z89.429 ACQUIRED ABSENCE OF OTHER TOE(S), UNSPECIFIED SIDE: Chronic | ICD-10-CM

## 2022-04-06 DIAGNOSIS — Z41.9 ENCOUNTER FOR PROCEDURE FOR PURPOSES OTHER THAN REMEDYING HEALTH STATE, UNSPECIFIED: Chronic | ICD-10-CM

## 2022-04-06 DIAGNOSIS — C67.9 MALIGNANT NEOPLASM OF BLADDER, UNSPECIFIED: ICD-10-CM

## 2022-04-06 PROCEDURE — 82962 GLUCOSE BLOOD TEST: CPT

## 2022-04-06 PROCEDURE — 88305 TISSUE EXAM BY PATHOLOGIST: CPT

## 2022-04-06 PROCEDURE — 52204 CYSTOSCOPY W/BIOPSY(S): CPT

## 2022-04-06 PROCEDURE — 88305 TISSUE EXAM BY PATHOLOGIST: CPT | Mod: 26

## 2022-04-06 RX ORDER — PREGABALIN 225 MG/1
1 CAPSULE ORAL
Qty: 0 | Refills: 0 | DISCHARGE

## 2022-04-06 RX ORDER — OXYBUTYNIN CHLORIDE 5 MG
1 TABLET ORAL
Qty: 9 | Refills: 0
Start: 2022-04-06 | End: 2022-04-08

## 2022-04-06 RX ORDER — GLUCOSAMINE/CHONDROITIN/C/MANG 500-400 MG
3 CAPSULE ORAL
Qty: 0 | Refills: 0 | DISCHARGE

## 2022-04-06 RX ORDER — POTASSIUM CHLORIDE 20 MEQ
1 PACKET (EA) ORAL
Qty: 0 | Refills: 0 | DISCHARGE

## 2022-04-06 RX ORDER — INSULIN GLARGINE 100 [IU]/ML
40 INJECTION, SOLUTION SUBCUTANEOUS
Qty: 0 | Refills: 0 | DISCHARGE

## 2022-04-06 RX ORDER — INSULIN GLARGINE 100 [IU]/ML
42 INJECTION, SOLUTION SUBCUTANEOUS
Qty: 0 | Refills: 0 | DISCHARGE

## 2022-04-06 RX ORDER — IBUPROFEN 200 MG
1 TABLET ORAL
Qty: 12 | Refills: 0
Start: 2022-04-06 | End: 2022-04-08

## 2022-04-06 RX ORDER — LOSARTAN POTASSIUM 100 MG/1
1 TABLET, FILM COATED ORAL
Qty: 0 | Refills: 0 | DISCHARGE

## 2022-04-06 RX ORDER — CARVEDILOL PHOSPHATE 80 MG/1
1 CAPSULE, EXTENDED RELEASE ORAL
Qty: 0 | Refills: 0 | DISCHARGE

## 2022-04-06 RX ORDER — CINNAMON BARK 500 MG
2 CAPSULE ORAL
Qty: 0 | Refills: 0 | DISCHARGE

## 2022-04-06 RX ORDER — METFORMIN HYDROCHLORIDE 850 MG/1
1 TABLET ORAL
Qty: 0 | Refills: 0 | DISCHARGE

## 2022-04-06 RX ORDER — AMLODIPINE BESYLATE 2.5 MG/1
1 TABLET ORAL
Qty: 0 | Refills: 0 | DISCHARGE

## 2022-04-06 RX ORDER — PHENAZOPYRIDINE HCL 100 MG
1 TABLET ORAL
Qty: 9 | Refills: 0
Start: 2022-04-06 | End: 2022-04-08

## 2022-04-06 RX ORDER — INSULIN GLARGINE 100 [IU]/ML
35 INJECTION, SOLUTION SUBCUTANEOUS
Qty: 0 | Refills: 0 | DISCHARGE

## 2022-04-06 RX ORDER — ATORVASTATIN CALCIUM 80 MG/1
1 TABLET, FILM COATED ORAL
Qty: 0 | Refills: 0 | DISCHARGE

## 2022-04-06 RX ORDER — PHENAZOPYRIDINE HCL 100 MG
200 TABLET ORAL ONCE
Refills: 0 | Status: COMPLETED | OUTPATIENT
Start: 2022-04-06 | End: 2022-04-06

## 2022-04-06 RX ORDER — LEVOFLOXACIN 5 MG/ML
1 INJECTION, SOLUTION INTRAVENOUS
Qty: 3 | Refills: 0
Start: 2022-04-06 | End: 2022-04-08

## 2022-04-06 RX ORDER — OXYBUTYNIN CHLORIDE 5 MG
5 TABLET ORAL ONCE
Refills: 0 | Status: COMPLETED | OUTPATIENT
Start: 2022-04-06 | End: 2022-04-06

## 2022-04-06 RX ADMIN — Medication 200 MILLIGRAM(S): at 18:07

## 2022-04-06 RX ADMIN — Medication 5 MILLIGRAM(S): at 18:07

## 2022-04-06 NOTE — ASU PATIENT PROFILE, ADULT - AS SC BRADEN FRICTION
ASSESSMENT AND PLAN:  No Patient Care Coordination Note on file.        1. Squamous cell carcinoma of the tongue.  Clinically the patient is stable.  There is nothing on today's exam to suggest recurrent disease.  Patient will remain on observation.  He will keep his follow-up appointment with ENT in March.  We will plan on seeing him in April and he has follow-up with Radiation Oncology in June.  In the interim if he has any questions or concerns he will contact our office.  The patient still has venous access device in place and will make arrangements for IR to remove this.    Total time I spent today on this appointment is 32 minutes.    CRISTA Dolan    [facility]  [address] [place] [address]  Phone: [phone]  Fax:  [phone]       Current Outpatient Medications   Medication Sig   • isosorbide mononitrate (IMDUR) 60 MG 24 hr tablet Take 1 tablet by mouth daily.   • simvastatin (ZOCOR) 40 MG tablet Take 1 tablet by mouth nightly.   • hydroCORTisone (CORTIZONE) 1 % cream Apply to face, back and chest at bedtime for 6 weeks. May be discontinued after 6 weeks if no rash appears.   • metoPROLOL succinate (Toprol XL) 25 MG 24 hr tablet Take 1 tablet by mouth daily.   • cholecalciferol (VITAMIN D3) 1000 UNITS tablet Take 1 tablet by mouth daily.   • Omega-3 Fatty Acids (FISH OIL) 1200 MG capsule Take 1,200 mg by mouth daily.   • aspirin 81 MG tablet Take 81 mg by mouth daily.    • Zinc 50 MG Tab Take one tablet daily   • Ascorbic Acid (VITAMIN C) 500 MG Cap Take one tablet daily     Current Facility-Administered Medications   Medication   • heparin 100 UNIT/ML lock flush 500 Units       LABORATORY DATA:  Recent Labs   Lab 01/11/21  1035 11/16/20  1015 09/21/20  1007   WBC 4.8 6.2 7.6   RBC 4.26* 4.33* 4.06*   HGB 13.0 13.4 12.4*   HCT 38.9* 40.1 37.3*   MCV 91.3 92.6 91.9    161 232   Absolute Neutrophils 3.7 5.1 6.4   Absolute Lymphocytes 0.5* 0.5* 0.4*    Absolute Monocytes 0.5 0.6 0.7   Absolute Eosinophils  0.1 0.0 0.1   Absolute Basophils 0.0 0.0 0.0     Recent Labs   Lab 01/11/21  1035 11/16/20  1015 09/21/20  1007 09/14/20  0950   Glucose 141* 114* 135* 149*   Sodium 141 140 139 141   Potassium 4.0 4.2 4.1 4.0   Chloride 106 106 103 104   BUN 25* 29* 33* 28*   Creatinine 1.03 1.10 1.00 0.97   Calcium 8.8 8.6 8.5 8.8   Albumin 3.1* 3.4* 2.8* 2.7*   GOT/AST 31 24 27 28   Alkaline Phosphatase 93 103 102 102   GPT 28 31 31 33   Magnesium  --  1.8 1.8 1.8     Recent Labs   Lab 01/11/21  1035 11/16/20  1015 09/21/20  1007   Anion Gap 11 12 12   Globulin 3.7 3.7 4.3*     No results found for: AFET  No results found for: C125  No results found for:   No results found for:   No results found for:   No results found for: CEA  No results found for: HCGTM  No results found for: PSA     CHIEF COMPLAINT:  Chief Complaint   Patient presents with   • Office Visit     Squamous cell carcinoma of base of tongue    • Blood Draw     CBC, CMP       HISTORY OF PRESENT ILLNESS:  Indra is a 83 year old male who is seen in follow-up today for squamous cell carcinoma of the base of the left tongue.  Patient completed his prescribed course of cetuximab/radiation therapy in September 2020. He had a restaging PET-CT and December 2020 which revealed resolution of the FDG avid left tongue base cancer.  The patient was also seen by ENT, Dr. Dutta, on 01/05/2021 and no obvious residual disease was noted.  He plans to see the patient back in 3 months.  Apparently well at the patient's PCP he had some type of seizure activity and now is scheduled to see a neurologist in February.  Patient states that his appetite is good however his taste remains altered from treatment.  He denies any dysphagia, nausea/vomiting, changes in bowel/bladder function.  Despite his recent seizure activity the patient continues to ride his horse.    ALLERGIES:  No Known Allergies    PAST MEDICAL  HISTORY:    Colon polyp                                                   Diverticulosis                                                Vitreous detachment                                             Comment: right eye    Cataract                                                      Hyperlipidemia                                                Essential (primary) hypertension                              Osteoarthritis                                                  Comment: shoulder    CKD (chronic kidney disease)                                  Impaired glucose tolerance                                    S/P percutaneous endoscopic gastrostomy (PEG) * 2020     Malignant neoplasm (CMS/HCC)                                  Keratosis, actinic                                            PAST SURGICAL HISTORY:    COLONOSCOPY DIAGNOSTIC                          2003    TONSILLECTOMY AND ADENOIDECTOMY                               LARYNGOSCOPY,DIRCT,OP,BIOPSY                    2020      Comment: Dr. Roberto Dutta    GASTROSTOMY TUBE PLACEMENT                      2020    Family History   Problem Relation Age of Onset   • Cancer Daughter        Social History     Socioeconomic History   • Marital status: /Civil Union     Spouse name: Augusto   • Number of children: 2   • Years of education: 12   • Highest education level: High school graduate   Occupational History   • Occupation:      Comment: [employer]   Social Needs   • Financial resource strain: Not on file   • Food insecurity     Worry: Not on file     Inability: Not on file   • Transportation needs     Medical: Not on file     Non-medical: Not on file   Tobacco Use   • Smoking status: Former Smoker     Packs/day: 0.00     Quit date: 1959     Years since quittin.0   • Smokeless tobacco: Never Used   Substance and Sexual Activity   • Alcohol use: Yes     Alcohol/week: 0.0 - 1.0 standard drinks      Comment: seldom ; 1 beer/month or less   • Drug use: No   • Sexual activity: Not on file   Lifestyle   • Physical activity     Days per week: Not on file     Minutes per session: Not on file   • Stress: Not on file   Relationships   • Social connections     Talks on phone: Not on file     Gets together: Not on file     Attends Buddhist service: Not on file     Active member of club or organization: Not on file     Attends meetings of clubs or organizations: Not on file     Relationship status: Not on file   • Intimate partner violence     Fear of current or ex partner: Not on file     Emotionally abused: Not on file     Physically abused: Not on file     Forced sexual activity: Not on file   Other Topics Concern   • Not on file   Social History Narrative   • Not on file       I have reviewed and updated the patient's allergies, medications, past medical, surgical, social, and family histories.    EXAM:  Pleasant, alert, oriented and in no acute distress.   Oncology Encounter Vitals [01/11/21 1050]   ONC OP Encounter Vitals Group      /74      Heart Rate 76      Resp 16      Temp 97.8 °F (36.6 °C)      Temp src Temporal      SpO2       Weight 169 lb 12.8 oz (77 kg)      Height 5' 11\" (1.803 m)      Pain Score  0      Pain Location       Pain Education?       BSA (Calculated - m2) - Lili & Lili 1.97      BMI (Calculated) 23.68       ECOG Performance Status   ECOG [01/11/21 1120]   ECOG Performance Status 1           ROS and past,family and social histories reviewed with patient today.      CONSTITUTIONAL:  Patient is a 83 year old male who appears in no acute distress  HEENT: Head normocephalic, atraumatic, normal hair distribution, conjunctivae pale pink, sclerae non-icteric, ears externally without gross abnormalities, hearing grossly intact.    NECK: Supple and without evidence of thyromegaly or jugular venous distention.  Mild erythema is noted and most likely related to prior radiation.  RESPIRATORY:   Lungs clear to auscultation, effort unlabored  CARDIOVASCULAR: Heart S1,S2, no murmurs, gallops, rubs.    ABDOMEN:  soft non-tender, no hepatomegaly, splenomegaly, or hernias noted.  GENITOURINARY:  Deferred  LYMPHATIC: No palpable submandibular, cervical, supraclavicular adenopathy noted.  MUSCULOSKELETAL: Normal gait and station, good range of motion upper and lower extremities bilaterally, no subluxation or dislocation noted.  NEUROLOGIC: Cranial nerves II-XII grossly intact, no gross deficits noted.  PSYCHIATRIC: Alert and oriented times 3, mood and affect appropriate, recent and remote memory intact.         (3) no apparent problem

## 2022-04-06 NOTE — ASU DISCHARGE PLAN (ADULT/PEDIATRIC) - NS MD DC FALL RISK RISK
For information on Fall & Injury Prevention, visit: [email or web address] OR  [email or web address] OR  https://www.cdc.gov/steadi/patient.html

## 2022-04-06 NOTE — ASU DISCHARGE PLAN (ADULT/PEDIATRIC) - CARE PROVIDER_API CALL
Sohail Pagan)  Urology  [address], [address] [place] [address]  Phone: [phone]  Fax: [phone]  Follow Up Time: 1 week

## 2022-04-13 DIAGNOSIS — I25.10 ATHEROSCLEROTIC HEART DISEASE OF NATIVE CORONARY ARTERY WITHOUT ANGINA PECTORIS: ICD-10-CM

## 2022-04-13 DIAGNOSIS — I10 ESSENTIAL (PRIMARY) HYPERTENSION: ICD-10-CM

## 2022-04-13 DIAGNOSIS — Z85.828 PERSONAL HISTORY OF OTHER MALIGNANT NEOPLASM OF SKIN: ICD-10-CM

## 2022-04-13 DIAGNOSIS — Z95.5 PRESENCE OF CORONARY ANGIOPLASTY IMPLANT AND GRAFT: ICD-10-CM

## 2022-04-13 DIAGNOSIS — K21.9 GASTRO-ESOPHAGEAL REFLUX DISEASE WITHOUT ESOPHAGITIS: ICD-10-CM

## 2022-04-13 DIAGNOSIS — C67.9 MALIGNANT NEOPLASM OF BLADDER, UNSPECIFIED: ICD-10-CM

## 2022-04-13 DIAGNOSIS — N32.89 OTHER SPECIFIED DISORDERS OF BLADDER: ICD-10-CM

## 2022-04-13 DIAGNOSIS — E11.65 TYPE 2 DIABETES MELLITUS WITH HYPERGLYCEMIA: ICD-10-CM

## 2022-04-13 DIAGNOSIS — Z79.02 LONG TERM (CURRENT) USE OF ANTITHROMBOTICS/ANTIPLATELETS: ICD-10-CM

## 2022-04-13 DIAGNOSIS — Z79.82 LONG TERM (CURRENT) USE OF ASPIRIN: ICD-10-CM

## 2022-04-13 DIAGNOSIS — E11.51 TYPE 2 DIABETES MELLITUS WITH DIABETIC PERIPHERAL ANGIOPATHY WITHOUT GANGRENE: ICD-10-CM

## 2022-04-13 DIAGNOSIS — Z79.84 LONG TERM (CURRENT) USE OF ORAL HYPOGLYCEMIC DRUGS: ICD-10-CM

## 2022-04-13 DIAGNOSIS — Z87.891 PERSONAL HISTORY OF NICOTINE DEPENDENCE: ICD-10-CM

## 2022-04-13 DIAGNOSIS — E78.00 PURE HYPERCHOLESTEROLEMIA, UNSPECIFIED: ICD-10-CM

## 2022-04-13 DIAGNOSIS — E11.40 TYPE 2 DIABETES MELLITUS WITH DIABETIC NEUROPATHY, UNSPECIFIED: ICD-10-CM

## 2022-04-13 DIAGNOSIS — Z79.4 LONG TERM (CURRENT) USE OF INSULIN: ICD-10-CM

## 2022-04-13 DIAGNOSIS — Z85.51 PERSONAL HISTORY OF MALIGNANT NEOPLASM OF BLADDER: ICD-10-CM

## 2022-04-14 ENCOUNTER — APPOINTMENT (OUTPATIENT)
Dept: UROLOGY | Facility: CLINIC | Age: 67
End: 2022-04-14
Payer: MEDICARE

## 2022-04-14 VITALS — SYSTOLIC BLOOD PRESSURE: 175 MMHG | DIASTOLIC BLOOD PRESSURE: 89 MMHG | HEART RATE: 87 BPM | OXYGEN SATURATION: 97 %

## 2022-04-14 PROCEDURE — 99213 OFFICE O/P EST LOW 20 MIN: CPT

## 2022-04-14 NOTE — HISTORY OF PRESENT ILLNESS
[FreeTextEntry1] : 64 year old man seen 02/06/2020  with complaint of elevated PSA. This began 1/21/20, where it was found to be 6.34.  Patient had his PSA checked on 2/3/2015 before this where it was 2.03 and does not recall if he ever had another PSA level since. \par It is not associated with LUTS. Pt reports over the years he has had some nocturia but it is not bothersome and would not like to try any medications at this time. He denies any recent sexual activity or bike riding prior to getting his PSA checked.No hematuria, no dysuria, no frequency, no urgency, no hesitancy, no straining. No incontinence. No fevers, no chills, no nausea, no vomiting, no flank pain.Pt denies any family hx of prostate cancer\par \par 12/10/2020: Patient presents for follow up. He reports feeling a lump on RIGHT groin. It is not painful, but can be bothersome. Lifting makes it worse. He also reports some LEFT flank pain, then some gross hematuria. It has since resolved. No new  symptoms and other medical  issues remain unchanged from above. No current hematuria, no dysuria, no frequency, no urgency, no hesitancy, no straining. No incontinence. No fevers, no chills, no nausea, no vomiting, no flank pain. Renal US from [facility] was negative for stones, masses, or hydronephrosis. Cr elevated to 1.6\par \par 11/18/2021: Patient presents for follow up. He reports recent episode of gross hematuria. He did not follow up for work up before. Of note, Cr is now wnl. No other new complaints. \par \par 03/03/2022: Patient presents for follow up. He is s/p TURBT, here to review path. He had some hematuria post op, but now resolved. Path showed HG T1 UCC, muscle in specimen negative. \par \par 04/14/2022: Patient presents for follow up. He is s/p reTURBT, which was negative. Muscle in specimen. No LUTS or hematuria.

## 2022-04-14 NOTE — ASSESSMENT
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 ReTURBT negative. Recommend intravesical therapy. Will request BCG from Dr Anderson. If approved, he will start induction BCG in 5 weeks. If not, will use chemotherapy.

## 2022-04-14 NOTE — PHYSICAL EXAM
[General Appearance - Well Developed] : well developed [General Appearance - Well Nourished] : well nourished [Normal Appearance] : normal appearance [Well Groomed] : well groomed [General Appearance - In No Acute Distress] : no acute distress [Abdomen Soft] : soft [Abdomen Tenderness] : non-tender [Costovertebral Angle Tenderness] : no ~M costovertebral angle tenderness [Urinary Bladder Findings] : the bladder was normal on palpation [Prostate Enlarged] : was enlarged [Edema] : no peripheral edema [] : no respiratory distress [Respiration, Rhythm And Depth] : normal respiratory rhythm and effort [Exaggerated Use Of Accessory Muscles For Inspiration] : no accessory muscle use [Oriented To Time, Place, And Person] : oriented to person, place, and time [Affect] : the affect was normal [Mood] : the mood was normal [Not Anxious] : not anxious [Normal Station and Gait] : the gait and station were normal for the patient's age [No Focal Deficits] : no focal deficits [No Palpable Adenopathy] : no palpable adenopathy [Prostate Nodule] : did not have a nodule [Prostate Fluctuant] : was not fluctuant [Prostate Tenderness] : was not tender

## 2022-04-17 ENCOUNTER — RX RENEWAL (OUTPATIENT)
Age: 67
End: 2022-04-17

## 2022-04-19 PROBLEM — C67.9 MALIGNANT NEOPLASM OF BLADDER, UNSPECIFIED: Chronic | Status: ACTIVE | Noted: 2022-03-24

## 2022-04-19 PROBLEM — H26.9 UNSPECIFIED CATARACT: Chronic | Status: ACTIVE | Noted: 2022-03-24

## 2022-04-19 PROBLEM — I20.9 ANGINA PECTORIS, UNSPECIFIED: Chronic | Status: ACTIVE | Noted: 2022-03-24

## 2022-05-02 ENCOUNTER — NON-APPOINTMENT (OUTPATIENT)
Age: 67
End: 2022-05-02

## 2022-05-06 ENCOUNTER — RX RENEWAL (OUTPATIENT)
Age: 67
End: 2022-05-06

## 2022-05-08 ENCOUNTER — RX RENEWAL (OUTPATIENT)
Age: 67
End: 2022-05-08

## 2022-05-19 ENCOUNTER — APPOINTMENT (OUTPATIENT)
Dept: UROLOGY | Facility: CLINIC | Age: 67
End: 2022-05-19
Payer: MEDICARE

## 2022-05-19 VITALS
HEART RATE: 78 BPM | SYSTOLIC BLOOD PRESSURE: 181 MMHG | BODY MASS INDEX: 25.9 KG/M2 | HEIGHT: 71 IN | DIASTOLIC BLOOD PRESSURE: 82 MMHG | WEIGHT: 185 LBS | OXYGEN SATURATION: 98 %

## 2022-05-19 DIAGNOSIS — E11.65 TYPE 2 DIABETES MELLITUS WITH HYPERGLYCEMIA: ICD-10-CM

## 2022-05-19 DIAGNOSIS — Z79.4 TYPE 2 DIABETES MELLITUS WITH HYPERGLYCEMIA: ICD-10-CM

## 2022-05-19 PROCEDURE — 51720 TREATMENT OF BLADDER LESION: CPT

## 2022-05-19 RX ORDER — BACILLUS CALMETTE-GUERIN 50 MG/50ML
50 POWDER, FOR SUSPENSION INTRAVESICAL
Qty: 0 | Refills: 0 | Status: COMPLETED | OUTPATIENT
Start: 2022-05-19

## 2022-05-19 RX ADMIN — BACILLUS CALMETTE-GUERIN 0 MG: 50 POWDER, FOR SUSPENSION INTRAVESICAL at 00:00

## 2022-05-20 ENCOUNTER — APPOINTMENT (OUTPATIENT)
Dept: ENDOCRINOLOGY | Facility: CLINIC | Age: 67
End: 2022-05-20

## 2022-05-26 ENCOUNTER — APPOINTMENT (OUTPATIENT)
Dept: UROLOGY | Facility: CLINIC | Age: 67
End: 2022-05-26
Payer: MEDICARE

## 2022-05-26 PROCEDURE — 51720 TREATMENT OF BLADDER LESION: CPT

## 2022-05-26 PROCEDURE — 81003 URINALYSIS AUTO W/O SCOPE: CPT | Mod: QW

## 2022-05-27 LAB
BILIRUB UR QL STRIP: NORMAL
CLARITY UR: CLEAR
COLLECTION METHOD: NORMAL
GLUCOSE UR-MCNC: 250
HCG UR QL: 0.2 EU/DL
HGB UR QL STRIP.AUTO: NORMAL
KETONES UR-MCNC: NORMAL
LEUKOCYTE ESTERASE UR QL STRIP: NORMAL
NITRITE UR QL STRIP: NORMAL
PH UR STRIP: 5
PROT UR STRIP-MCNC: ABNORMAL
SP GR UR STRIP: 1.01

## 2022-06-02 ENCOUNTER — APPOINTMENT (OUTPATIENT)
Dept: UROLOGY | Facility: CLINIC | Age: 67
End: 2022-06-02
Payer: MEDICARE

## 2022-06-02 VITALS
WEIGHT: 185 LBS | HEART RATE: 75 BPM | DIASTOLIC BLOOD PRESSURE: 74 MMHG | BODY MASS INDEX: 25.9 KG/M2 | SYSTOLIC BLOOD PRESSURE: 147 MMHG | HEIGHT: 71 IN | OXYGEN SATURATION: 98 %

## 2022-06-02 LAB
BILIRUB UR QL STRIP: NORMAL
GLUCOSE UR-MCNC: 100
HCG UR QL: 0.2 EU/DL
HGB UR QL STRIP.AUTO: NORMAL
KETONES UR-MCNC: NORMAL
LEUKOCYTE ESTERASE UR QL STRIP: NORMAL
NITRITE UR QL STRIP: NORMAL
PH UR STRIP: 5
PROT UR STRIP-MCNC: 100
SP GR UR STRIP: 1.02

## 2022-06-02 PROCEDURE — 51720 TREATMENT OF BLADDER LESION: CPT

## 2022-06-02 PROCEDURE — 81003 URINALYSIS AUTO W/O SCOPE: CPT | Mod: QW

## 2022-06-02 RX ORDER — BACILLUS CALMETTE-GUERIN 50 MG/50ML
50 POWDER, FOR SUSPENSION INTRAVESICAL
Qty: 0 | Refills: 0 | Status: COMPLETED | OUTPATIENT
Start: 2022-06-02

## 2022-06-02 RX ADMIN — BACILLUS CALMETTE-GUERIN 0 MG: 50 POWDER, FOR SUSPENSION INTRAVESICAL at 00:00

## 2022-06-09 ENCOUNTER — APPOINTMENT (OUTPATIENT)
Dept: UROLOGY | Facility: CLINIC | Age: 67
End: 2022-06-09
Payer: MEDICARE

## 2022-06-09 VITALS
SYSTOLIC BLOOD PRESSURE: 187 MMHG | OXYGEN SATURATION: 98 % | DIASTOLIC BLOOD PRESSURE: 91 MMHG | WEIGHT: 185 LBS | HEART RATE: 78 BPM | BODY MASS INDEX: 25.9 KG/M2 | HEIGHT: 71 IN

## 2022-06-09 LAB
BILIRUB UR QL STRIP: NORMAL
GLUCOSE UR-MCNC: 500
HCG UR QL: 0.2 EU/DL
HGB UR QL STRIP.AUTO: NORMAL
KETONES UR-MCNC: NORMAL
LEUKOCYTE ESTERASE UR QL STRIP: NORMAL
NITRITE UR QL STRIP: NORMAL
PH UR STRIP: 5
PROT UR STRIP-MCNC: 30
SP GR UR STRIP: 1.02

## 2022-06-09 PROCEDURE — 51720 TREATMENT OF BLADDER LESION: CPT

## 2022-06-09 PROCEDURE — 81003 URINALYSIS AUTO W/O SCOPE: CPT | Mod: QW

## 2022-06-09 RX ORDER — BACILLUS CALMETTE-GUERIN 50 MG/50ML
50 POWDER, FOR SUSPENSION INTRAVESICAL
Qty: 0 | Refills: 0 | Status: COMPLETED | OUTPATIENT
Start: 2022-06-09

## 2022-06-09 RX ADMIN — BACILLUS CALMETTE-GUERIN 0 MG: 50 POWDER, FOR SUSPENSION INTRAVESICAL at 00:00

## 2022-06-16 ENCOUNTER — APPOINTMENT (OUTPATIENT)
Dept: UROLOGY | Facility: CLINIC | Age: 67
End: 2022-06-16
Payer: MEDICARE

## 2022-06-16 VITALS
DIASTOLIC BLOOD PRESSURE: 80 MMHG | HEART RATE: 79 BPM | OXYGEN SATURATION: 98 % | WEIGHT: 185 LBS | SYSTOLIC BLOOD PRESSURE: 150 MMHG | BODY MASS INDEX: 25.9 KG/M2 | HEIGHT: 71 IN

## 2022-06-16 LAB
BILIRUB UR QL STRIP: NORMAL
GLUCOSE UR-MCNC: 250
HCG UR QL: 0.2 EU/DL
HGB UR QL STRIP.AUTO: NORMAL
KETONES UR-MCNC: NORMAL
LEUKOCYTE ESTERASE UR QL STRIP: NORMAL
NITRITE UR QL STRIP: NORMAL
PH UR STRIP: 5.5
PROT UR STRIP-MCNC: 30
SP GR UR STRIP: 1.01

## 2022-06-16 PROCEDURE — 51720 TREATMENT OF BLADDER LESION: CPT

## 2022-06-16 PROCEDURE — 81003 URINALYSIS AUTO W/O SCOPE: CPT | Mod: QW

## 2022-06-16 RX ORDER — BACILLUS CALMETTE-GUERIN 50 MG/50ML
50 POWDER, FOR SUSPENSION INTRAVESICAL
Qty: 0 | Refills: 0 | Status: COMPLETED | OUTPATIENT
Start: 2022-06-16

## 2022-06-16 RX ADMIN — BACILLUS CALMETTE-GUERIN 0 MG: 50 POWDER, FOR SUSPENSION INTRAVESICAL at 00:00

## 2022-06-23 ENCOUNTER — APPOINTMENT (OUTPATIENT)
Dept: UROLOGY | Facility: CLINIC | Age: 67
End: 2022-06-23
Payer: MEDICARE

## 2022-06-23 VITALS
BODY MASS INDEX: 25.34 KG/M2 | HEART RATE: 76 BPM | OXYGEN SATURATION: 99 % | WEIGHT: 181 LBS | DIASTOLIC BLOOD PRESSURE: 75 MMHG | SYSTOLIC BLOOD PRESSURE: 139 MMHG | HEIGHT: 71 IN

## 2022-06-23 LAB
BILIRUB UR QL STRIP: ABNORMAL
CLARITY UR: CLEAR
COLLECTION METHOD: NORMAL
GLUCOSE UR-MCNC: 500
HCG UR QL: 0.2 EU/DL
HGB UR QL STRIP.AUTO: NORMAL
KETONES UR-MCNC: ABNORMAL
LEUKOCYTE ESTERASE UR QL STRIP: ABNORMAL
NITRITE UR QL STRIP: NORMAL
PH UR STRIP: 5
PROT UR STRIP-MCNC: 100
SP GR UR STRIP: >1.03

## 2022-06-23 PROCEDURE — 51720 TREATMENT OF BLADDER LESION: CPT

## 2022-06-23 PROCEDURE — 81003 URINALYSIS AUTO W/O SCOPE: CPT | Mod: QW

## 2022-06-23 RX ORDER — BACILLUS CALMETTE-GUERIN 50 MG/50ML
50 POWDER, FOR SUSPENSION INTRAVESICAL
Qty: 0 | Refills: 0 | Status: COMPLETED | OUTPATIENT
Start: 2022-06-23

## 2022-06-23 RX ADMIN — BACILLUS CALMETTE-GUERIN 0 MG: 50 POWDER, FOR SUSPENSION INTRAVESICAL at 00:00

## 2022-07-08 ENCOUNTER — RX RENEWAL (OUTPATIENT)
Age: 67
End: 2022-07-08

## 2022-07-08 NOTE — PATIENT PROFILE ADULT - NSPRESCRALCAMT_GEN_A_NUR
Consent (Marginal Mandibular)/Introductory Paragraph: The rationale for Mohs was explained to the patient and consent was obtained. The risks, benefits and alternatives to therapy were discussed in detail. Specifically, the risks of damage to the marginal mandibular branch of the facial nerve, infection, scarring, bleeding, prolonged wound healing, incomplete removal, allergy to anesthesia, and recurrence were addressed. Prior to the procedure, the treatment site was clearly identified and confirmed by the patient. All components of Universal Protocol/PAUSE Rule completed. 1 or 2

## 2022-07-19 ENCOUNTER — RX RENEWAL (OUTPATIENT)
Age: 67
End: 2022-07-19

## 2022-07-22 ENCOUNTER — NON-APPOINTMENT (OUTPATIENT)
Age: 67
End: 2022-07-22

## 2022-07-22 ENCOUNTER — APPOINTMENT (OUTPATIENT)
Dept: CARDIOLOGY | Facility: CLINIC | Age: 67
End: 2022-07-22

## 2022-07-22 VITALS
SYSTOLIC BLOOD PRESSURE: 150 MMHG | HEART RATE: 73 BPM | OXYGEN SATURATION: 98 % | HEIGHT: 71 IN | DIASTOLIC BLOOD PRESSURE: 70 MMHG | WEIGHT: 195 LBS | BODY MASS INDEX: 27.3 KG/M2

## 2022-07-22 PROCEDURE — 93000 ELECTROCARDIOGRAM COMPLETE: CPT

## 2022-07-22 PROCEDURE — 99214 OFFICE O/P EST MOD 30 MIN: CPT

## 2022-07-22 NOTE — HISTORY OF PRESENT ILLNESS
[FreeTextEntry1] : Jeffrey Cardenas is a 66-year-old man with multiple medical problems, including coronary artery disease, proximal LAD stent (2/2021), ischemic cardiomyopathy, hyperlipidemia, hypertension, diabetes mellitus, peripheral artery disease, right foot osteomyelitis, who returns for cardiac examination -our last encounter was in February.  He was subsequently diagnosed with bladder cancer and receiving BCG therapy.\par \par He describes mild bilateral lower extremity edema at the end of his day but otherwise has been doing well from a cardiac standpoint; no angina; tolerating prescribed pharmacotherapy and diligent with adherence.

## 2022-07-22 NOTE — PHYSICAL EXAM
[Normal S1, S2] : normal S1, S2 [Clear Lung Fields] : clear lung fields [Soft] : abdomen soft [Non Tender] : non-tender [No Rash] : no rash [Alert and Oriented] : alert and oriented [No Acute Distress] : no acute distress [Normal Gait] : normal gait [No Edema] : no edema [de-identified] : Wearing a face mask

## 2022-07-22 NOTE — REVIEW OF SYSTEMS
[Weight Gain (___ Lbs)] : [unfilled] ~Ulb weight gain [Under Stress] : under stress [SOB] : no shortness of breath [Chest Discomfort] : no chest discomfort [Lower Ext Edema] : lower extremity edema [Palpitations] : no palpitations [FreeTextEntry8] : SEE HPI

## 2022-07-22 NOTE — DISCUSSION/SUMMARY
[With Me] : with me [___ Month(s)] : in [unfilled] month(s) [FreeTextEntry1] : Hypertension: Suboptimal control; increase dose of carvedilol to 12.5 mg twice daily and continue other antihypertensive agents.\par \par Coronary artery disease: Stable; no angina; continue medical management with aspirin, clopidogrel, carvedilol, atorvastatin, and Ranexa.  \par \par Cardiomyopathy: History of LV systolic dysfunction with clinical improvement following revascularization and medical therapy; return for echocardiogram to assess LV function.\par \par

## 2022-08-02 ENCOUNTER — APPOINTMENT (OUTPATIENT)
Dept: ENDOCRINOLOGY | Facility: CLINIC | Age: 67
End: 2022-08-02

## 2022-08-02 VITALS
DIASTOLIC BLOOD PRESSURE: 75 MMHG | WEIGHT: 197 LBS | SYSTOLIC BLOOD PRESSURE: 145 MMHG | OXYGEN SATURATION: 99 % | BODY MASS INDEX: 27.48 KG/M2 | HEART RATE: 75 BPM

## 2022-08-02 PROCEDURE — 99214 OFFICE O/P EST MOD 30 MIN: CPT | Mod: 25

## 2022-08-02 PROCEDURE — 95251 CONT GLUC MNTR ANALYSIS I&R: CPT

## 2022-08-02 NOTE — ASSESSMENT
[FreeTextEntry1] : 1. DM 2- uncontrolled, complicated\par Patient with cardiac history and amputations. Overall sugars are not at goal. Need to have tighter diabetes control. Discussed GLP-1 risks and benefits.\par \par Switch to Tresiba 42 units QHS \par Cont Novolog\par Retry Ozempic\par Cont Metformin 1000 mg BID\par Cont diabetic diet\par Cont exercise\par optho UTD\par labs UTD\par foot exam UTD\par \par 2. HLD- stable\par Cont Atorvastatin\par \par Follow up in 3 months\par \par \par

## 2022-08-02 NOTE — HISTORY OF PRESENT ILLNESS
[FreeTextEntry1] : Mr. BROWN HEATON  is a 66 year old male with past medical history of Diabetes Mellitus Type 2, HLD, CAD, HTN, PAD  who presents for follow-up of his diabetes. Patient is s/p treatment for bladder cancer. His sugars are higher. \par \par \par POCT Glucose: mg/dL, \par \par Diabetes first diagnosed: Over 7 years\par Type: 2\par \par Current diabetic regimen:\par Metformin 1000 mg BID, Basaglar 42 units QHS\par Novolog 8-10 units TID\par Ozempic (had abdominal pain)\par \par Other relevant medications:\par Norvasc\par Coreg\par Lipitor\par Losartan\par \par Pt has freestyle wilfredo \par AGP Report: \par (7/6/22-8/2/22)\par High: 36%\par Target (): 33%\par Low: 1%\par \par \par \par Hypoglycemia: only 1 episode\par \par \par Diet:\par Breakfast: cereal, or eggs\par Lunch: deli sandwiches sometimes skip\par Dinner:deli sandwiches, chicken \par \par Exercise: None\par \par Urine micro: 56 mg/g 2/05/20\par lipid profile: LDL 66 (7/2021), LDL 91 (6/2020) 117 (1/21/20)\par last hba1c:5.7% (7/2021),  8.9% (11/2020), 12.2% 1/21/20, 9.9% 8/2017, 10.6% 2/2015\par eye exam: 7/2021\par diabetic foot exam/podiatry: WNL 7/2021\par \par \par \par \par

## 2022-08-02 NOTE — PHYSICAL EXAM
[Alert] : alert [Well Nourished] : well nourished [Healthy Appearance] : healthy appearance [No Acute Distress] : no acute distress [Well Developed] : well developed [Right foot was examined, including] : right foot ~C was examined, including visual inspection with sensory and pulse exams [Left foot was examined, including] : left foot ~C was examined, including visual inspection with sensory and pulse exams [Normal] : normal [2+] : 2+ in the dorsalis pedis [Vibration Dec.] : normal vibratory sensation at the level of the toes [Position Sense Dec.] : diminished position sense at the level of the toes [#1 Diminished] : number 1 was diminished [#2 Diminished] : number 2 was diminished [#3 Diminished] : number 3 was diminished [#4 Diminished] : number 4 was diminished [#5 Diminished] : number 5 was diminished [#6 Diminished] : number 6 was diminished [#7 Diminished] : number 7 was diminished [#8 Diminished] : number 8 was diminished [#9 Diminished] : number 9 was diminished [#10 Diminished] : number 10 was diminished [FreeTextEntry2] : s/p metatarsal amputation

## 2022-08-04 ENCOUNTER — NON-APPOINTMENT (OUTPATIENT)
Age: 67
End: 2022-08-04

## 2022-08-05 ENCOUNTER — NON-APPOINTMENT (OUTPATIENT)
Age: 67
End: 2022-08-05

## 2022-08-05 LAB
ALBUMIN SERPL ELPH-MCNC: 4.5 G/DL
ALP BLD-CCNC: 70 U/L
ALT SERPL-CCNC: 20 U/L
ANION GAP SERPL CALC-SCNC: 16 MMOL/L
AST SERPL-CCNC: 18 U/L
BASOPHILS # BLD AUTO: 0.04 K/UL
BASOPHILS NFR BLD AUTO: 0.7 %
BILIRUB SERPL-MCNC: 0.4 MG/DL
BUN SERPL-MCNC: 37 MG/DL
CALCIUM SERPL-MCNC: 9.6 MG/DL
CHLORIDE SERPL-SCNC: 103 MMOL/L
CHOLEST SERPL-MCNC: 168 MG/DL
CO2 SERPL-SCNC: 19 MMOL/L
CREAT SERPL-MCNC: 1.33 MG/DL
CREAT SPEC-SCNC: 102 MG/DL
EGFR: 58 ML/MIN/1.73M2
EOSINOPHIL # BLD AUTO: 0.09 K/UL
EOSINOPHIL NFR BLD AUTO: 1.6 %
ESTIMATED AVERAGE GLUCOSE: 177 MG/DL
FOLATE SERPL-MCNC: 9.2 NG/ML
FRUCTOSAMINE SERPL-MCNC: 355 UMOL/L
GLUCOSE SERPL-MCNC: 100 MG/DL
HBA1C MFR BLD HPLC: 7.8 %
HCT VFR BLD CALC: 34.2 %
HDLC SERPL-MCNC: 44 MG/DL
HGB BLD-MCNC: 11.1 G/DL
IMM GRANULOCYTES NFR BLD AUTO: 0.5 %
LDLC SERPL CALC-MCNC: 97 MG/DL
LYMPHOCYTES # BLD AUTO: 1.44 K/UL
LYMPHOCYTES NFR BLD AUTO: 25.4 %
MAN DIFF?: NORMAL
MCHC RBC-ENTMCNC: 29.8 PG
MCHC RBC-ENTMCNC: 32.5 GM/DL
MCV RBC AUTO: 91.9 FL
MICROALBUMIN 24H UR DL<=1MG/L-MCNC: 9 MG/DL
MICROALBUMIN/CREAT 24H UR-RTO: 87 MG/G
MONOCYTES # BLD AUTO: 0.51 K/UL
MONOCYTES NFR BLD AUTO: 9 %
NEUTROPHILS # BLD AUTO: 3.56 K/UL
NEUTROPHILS NFR BLD AUTO: 62.8 %
NONHDLC SERPL-MCNC: 124 MG/DL
PLATELET # BLD AUTO: 207 K/UL
POTASSIUM SERPL-SCNC: 5.3 MMOL/L
PROT SERPL-MCNC: 6.3 G/DL
RBC # BLD: 3.72 M/UL
RBC # FLD: 14.3 %
SODIUM SERPL-SCNC: 138 MMOL/L
TRIGL SERPL-MCNC: 137 MG/DL
VIT B12 SERPL-MCNC: 1861 PG/ML
WBC # FLD AUTO: 5.67 K/UL

## 2022-08-15 ENCOUNTER — NON-APPOINTMENT (OUTPATIENT)
Age: 67
End: 2022-08-15

## 2022-08-17 ENCOUNTER — NON-APPOINTMENT (OUTPATIENT)
Age: 67
End: 2022-08-17

## 2022-10-02 ENCOUNTER — RX RENEWAL (OUTPATIENT)
Age: 67
End: 2022-10-02

## 2022-10-05 NOTE — ED ADULT TRIAGE NOTE - NSWEIGHTCALCTOOLDRUG_GEN_A_CORE
· Patient low-grade fever 100 4 on 10/01/2022  · Patient denies any signs of upper respiratory tract infection, GI or  symptoms  · Chest x-ray negative for acute pathology  · Thought to be secondary to atelectasis, started using incentive spirometry  · Afebrile in the past 48 hours  used

## 2022-10-17 ENCOUNTER — RX RENEWAL (OUTPATIENT)
Age: 67
End: 2022-10-17

## 2022-10-26 ENCOUNTER — RX RENEWAL (OUTPATIENT)
Age: 67
End: 2022-10-26

## 2022-11-09 ENCOUNTER — APPOINTMENT (OUTPATIENT)
Dept: ENDOCRINOLOGY | Facility: CLINIC | Age: 67
End: 2022-11-09

## 2022-11-09 VITALS
WEIGHT: 199 LBS | HEART RATE: 67 BPM | DIASTOLIC BLOOD PRESSURE: 83 MMHG | SYSTOLIC BLOOD PRESSURE: 160 MMHG | BODY MASS INDEX: 27.75 KG/M2 | OXYGEN SATURATION: 99 %

## 2022-11-09 PROCEDURE — 83036 HEMOGLOBIN GLYCOSYLATED A1C: CPT | Mod: QW

## 2022-11-09 PROCEDURE — 82962 GLUCOSE BLOOD TEST: CPT

## 2022-11-09 PROCEDURE — 99214 OFFICE O/P EST MOD 30 MIN: CPT | Mod: 25

## 2022-11-09 NOTE — HISTORY OF PRESENT ILLNESS
[FreeTextEntry1] : Mr. BROWN HEATON  is a 66 year old male with past medical history of Diabetes Mellitus Type 2, HLD, CAD, HTN, PAD  who presents for follow-up of his diabetes. Patient is s/p treatment for bladder cancer. His sugars are higher. \par \par \par POCT Glucose: mg/dL, \par \par Diabetes first diagnosed: Over 7 years\par Type: 2\par \par Current diabetic regimen:\par Metformin 1000 mg BID, Basaglar 50 units QHS\par Novolog 8-10 units but only after dinner\par Ozempic (had abdominal pain)\par \par Other relevant medications:\par Norvasc\par Coreg\par Lipitor\par Losartan\par \par Pt has freestyle wilfredo \par AGP Report: \par (10/13/22-11/9/2022)\par High: 31%\par Target (): 52%\par Low: 1%\par \par \par \par Hypoglycemia: only 1 episode\par \par \par Diet:\par Breakfast: cereal, or eggs\par Lunch: deli sandwiches sometimes skip\par Dinner:deli sandwiches, chicken \par \par Exercise: None\par \par Urine micro: 56 mg/g 2/05/20\par lipid profile: LDL 66 (7/2021), LDL 91 (6/2020) 117 (1/21/20)\par last hba1c:5.7% (7/2021),  8.9% (11/2020), 12.2% 1/21/20, 9.9% 8/2017, 10.6% 2/2015\par eye exam: 2022\par diabetic foot exam/podiatry: +neuropathy 8/2022\par \par \par \par \par

## 2022-11-09 NOTE — ASSESSMENT
[FreeTextEntry1] : 1. DM 2- uncontrolled, complicated\par Patient with cardiac history and amputations. Overall sugars are not at goal. Need to have tighter diabetes control. \par \par Cont Basaglar 50 units QHS \par Cont Novolog but take before meals\par Cont Metformin 1000 mg BID\par Cont diabetic diet\par Cont exercise\par optho UTD\par labs UTD\par foot exam UTD\par \par 2. HLD- stable\par Cont Atorvastatin\par \par Follow up in 3 months\par \par \par

## 2022-11-30 NOTE — PROGRESS NOTE ADULT - ASSESSMENT
Pt with increased WBC to 15.25 ESR 87 MRI suspicious for metatarsal head & sesamoid osteomyelitis. Will take to OR today for debridement, excision  of sesamoids Med note appreciated & noted. THX No

## 2023-01-20 ENCOUNTER — RX RENEWAL (OUTPATIENT)
Age: 68
End: 2023-01-20

## 2023-01-22 ENCOUNTER — RX RENEWAL (OUTPATIENT)
Age: 68
End: 2023-01-22

## 2023-02-08 ENCOUNTER — RX RENEWAL (OUTPATIENT)
Age: 68
End: 2023-02-08

## 2023-02-22 NOTE — PATIENT PROFILE ADULT - NSPROGENSOURCEINFO_GEN_A_NUR
Pt is made aware of below information. Verbalized understanding and in agreement with plan. She says she is still having pain, and would like a referral placed to GI. Referral placed.    patient

## 2023-03-01 ENCOUNTER — APPOINTMENT (OUTPATIENT)
Dept: ENDOCRINOLOGY | Facility: CLINIC | Age: 68
End: 2023-03-01
Payer: MEDICARE

## 2023-03-01 VITALS
HEIGHT: 71 IN | HEART RATE: 65 BPM | OXYGEN SATURATION: 99 % | SYSTOLIC BLOOD PRESSURE: 173 MMHG | BODY MASS INDEX: 25.9 KG/M2 | WEIGHT: 185 LBS | DIASTOLIC BLOOD PRESSURE: 83 MMHG

## 2023-03-01 PROCEDURE — 83036 HEMOGLOBIN GLYCOSYLATED A1C: CPT | Mod: QW

## 2023-03-01 PROCEDURE — 82962 GLUCOSE BLOOD TEST: CPT

## 2023-03-01 PROCEDURE — 99214 OFFICE O/P EST MOD 30 MIN: CPT | Mod: 25

## 2023-03-02 NOTE — ASSESSMENT
[FreeTextEntry1] : 1. DM 2- uncontrolled, complicated\par Patient with cardiac history and amputations. Overall sugars are not at goal. Need to have tighter diabetes control. \par \par Cont Basaglar 50 units QHS \par Inc to Novolog 12 with dinner\par Cont Metformin 1000 mg BID\par Cont diabetic diet\par Cont exercise\par optho UTD\par labs UTD\par foot exam UTD\par \par 2. HLD- stable\par Cont Atorvastatin\par \par See CDE for education on carb counting and proper Novolog dosing\par \par \par

## 2023-03-02 NOTE — HISTORY OF PRESENT ILLNESS
[FreeTextEntry1] : Mr. BROWN HEATON  is a 67 year old male with past medical history of Diabetes Mellitus Type 2, HLD, CAD, HTN, PAD  who presents for follow-up of his diabetes. \par \par \par POCT Glucose:152 mg/dL, \par poct hga1c: 6.8\par \par Diabetes first diagnosed: Over 7 years\par Type: 2\par \par Current diabetic regimen:\par Metformin 1000 mg BID, Basaglar 50 units QHS\par Novolog 8-10 units but only after dinner\par Ozempic (had abdominal pain)\par \par Other relevant medications:\par Norvasc\par Coreg\par Lipitor\par Losartan\par \par Pt has freestyle wilfredo \par AGP Report: \par (2/2/2023-3/1/23)\par High: 35%\par Target (): 52%\par Low: 0%\par \par \par \par Hypoglycemia: only 1 episode\par \par \par Diet:\par Breakfast: cereal, or eggs\par Lunch: deli sandwiches sometimes skip\par Dinner:deli sandwiches, chicken \par \par Exercise: None\par \par Urine micro: 56 mg/g 2/05/20\par lipid profile: LDL 66 (7/2021), LDL 91 (6/2020) 117 (1/21/20)\par last hba1c:5.7% (7/2021),  8.9% (11/2020), 12.2% 1/21/20, 9.9% 8/2017, 10.6% 2/2015\par eye exam: 2022\par diabetic foot exam/podiatry: +neuropathy 8/2022\par \par \par \par \par

## 2023-03-08 NOTE — ED ADULT NURSE NOTE - SUICIDE SCREENING QUESTION 2
Medication:   Requested Prescriptions     Pending Prescriptions Disp Refills    lisinopril (PRINIVIL;ZESTRIL) 20 MG tablet 90 tablet 3     Sig: Take 1 tablet by mouth daily    rosuvastatin (CRESTOR) 5 MG tablet 90 tablet 3     Sig: Take 1 tablet by mouth nightly        Last Filled:  03/14/22    Patient Phone Number: [phone] (home)     Last appt: 3/14/2022   Next appt: 3/20/2023    Last OARRS: No flowsheet data found.        
Pt call in and needs a refill and would like for the refills to be 90 days supply with 3 refills so that the insurance would cover them.    [id number], [facility] [place] [address]      On the online direction it state that it is a [facility] BUT  AT [facility].    rosuvastatin (CRESTOR) 5 MG tablet     lisinopril (PRINIVIL;ZESTRIL) 20 MG tablet       
No

## 2023-03-10 ENCOUNTER — NON-APPOINTMENT (OUTPATIENT)
Age: 68
End: 2023-03-10

## 2023-03-10 ENCOUNTER — LABORATORY RESULT (OUTPATIENT)
Age: 68
End: 2023-03-10

## 2023-03-10 ENCOUNTER — APPOINTMENT (OUTPATIENT)
Dept: INTERNAL MEDICINE | Facility: CLINIC | Age: 68
End: 2023-03-10
Payer: MEDICARE

## 2023-03-10 VITALS
OXYGEN SATURATION: 99 % | HEART RATE: 66 BPM | RESPIRATION RATE: 14 BRPM | HEIGHT: 71 IN | SYSTOLIC BLOOD PRESSURE: 180 MMHG | DIASTOLIC BLOOD PRESSURE: 90 MMHG | WEIGHT: 198 LBS | BODY MASS INDEX: 27.72 KG/M2

## 2023-03-10 VITALS — SYSTOLIC BLOOD PRESSURE: 164 MMHG | DIASTOLIC BLOOD PRESSURE: 70 MMHG

## 2023-03-10 DIAGNOSIS — Z00.00 ENCOUNTER FOR GENERAL ADULT MEDICAL EXAMINATION W/OUT ABNORMAL FINDINGS: ICD-10-CM

## 2023-03-10 DIAGNOSIS — R97.20 ELEVATED PROSTATE, SPECIFIC ANTIGEN [PSA]: ICD-10-CM

## 2023-03-10 PROCEDURE — 93000 ELECTROCARDIOGRAM COMPLETE: CPT | Mod: 59

## 2023-03-10 PROCEDURE — G0439: CPT

## 2023-03-10 RX ORDER — INSULIN DEGLUDEC INJECTION 100 U/ML
100 INJECTION, SOLUTION SUBCUTANEOUS
Qty: 1 | Refills: 0 | Status: ACTIVE | COMMUNITY
Start: 2022-08-02

## 2023-03-10 RX ORDER — SEMAGLUTIDE 1.34 MG/ML
2 INJECTION, SOLUTION SUBCUTANEOUS
Qty: 3 | Refills: 2 | Status: DISCONTINUED | COMMUNITY
Start: 2022-08-02 | End: 2023-03-10

## 2023-03-10 NOTE — HEALTH RISK ASSESSMENT
[Good] : ~his/her~ current health as good [Yes] : Yes [Former] : Former [5-9] : 5-9 [> 15 Years] : > 15 Years [de-identified] : Rare [de-identified] : Doesn' t exercise but is active at work

## 2023-03-10 NOTE — ASSESSMENT
[FreeTextEntry1] : EKG Sinus bradycardia rare 59\par BP elevates; Pt didn't take any of his BP meds today. He normally takes these meds in the morning\par Overall feeling well

## 2023-03-10 NOTE — HISTORY OF PRESENT ILLNESS
[FreeTextEntry1] : Here for CPE\par Overall feeling well\par Vaxed and 1 booster against covid. [de-identified] : Former smoker.  Quit 40 years ago. Previously smoked 10 years at 1/2 ppd.\par Rare alcohol intake.\par Pt reports last colonoscopy 7-8 yerars ago\par Pt reports having recent discomfort form a right inguinal hernia\par Doesn't exercise but is physically active at work

## 2023-03-10 NOTE — PLAN
[FreeTextEntry1] : Pt will RTO 3 days to check BP after having taken his BP meds at the usual time\par Recommend screening colonoscopy'; Pt will contact GI

## 2023-03-11 LAB
ALBUMIN SERPL ELPH-MCNC: 4.7 G/DL
ALP BLD-CCNC: 76 U/L
ALT SERPL-CCNC: 26 U/L
ANION GAP SERPL CALC-SCNC: 12 MMOL/L
APPEARANCE: CLEAR
AST SERPL-CCNC: 18 U/L
BASOPHILS # BLD AUTO: 0.04 K/UL
BASOPHILS NFR BLD AUTO: 0.7 %
BILIRUB SERPL-MCNC: 0.4 MG/DL
BILIRUBIN URINE: NEGATIVE
BLOOD URINE: NEGATIVE
BUN SERPL-MCNC: 28 MG/DL
CALCIUM SERPL-MCNC: 9.7 MG/DL
CHLORIDE SERPL-SCNC: 107 MMOL/L
CHOLEST SERPL-MCNC: 183 MG/DL
CO2 SERPL-SCNC: 23 MMOL/L
COLOR: COLORLESS
CREAT SERPL-MCNC: 1.2 MG/DL
EGFR: 65 ML/MIN/1.73M2
EOSINOPHIL # BLD AUTO: 0.13 K/UL
EOSINOPHIL NFR BLD AUTO: 2.1 %
ESTIMATED AVERAGE GLUCOSE: 166 MG/DL
FOLATE SERPL-MCNC: 15.9 NG/ML
GLUCOSE QUALITATIVE U: NEGATIVE
GLUCOSE SERPL-MCNC: 133 MG/DL
HBA1C MFR BLD HPLC: 7.4 %
HCT VFR BLD CALC: 38.3 %
HDLC SERPL-MCNC: 46 MG/DL
HGB BLD-MCNC: 12.5 G/DL
IMM GRANULOCYTES NFR BLD AUTO: 0.5 %
KETONES URINE: NEGATIVE
LDLC SERPL CALC-MCNC: 115 MG/DL
LEUKOCYTE ESTERASE URINE: NEGATIVE
LYMPHOCYTES # BLD AUTO: 1.55 K/UL
LYMPHOCYTES NFR BLD AUTO: 25.4 %
MAN DIFF?: NORMAL
MCHC RBC-ENTMCNC: 29.7 PG
MCHC RBC-ENTMCNC: 32.6 GM/DL
MCV RBC AUTO: 91 FL
MONOCYTES # BLD AUTO: 0.47 K/UL
MONOCYTES NFR BLD AUTO: 7.7 %
NEUTROPHILS # BLD AUTO: 3.88 K/UL
NEUTROPHILS NFR BLD AUTO: 63.6 %
NITRITE URINE: NEGATIVE
NONHDLC SERPL-MCNC: 136 MG/DL
PH URINE: 6
PLATELET # BLD AUTO: 225 K/UL
POTASSIUM SERPL-SCNC: 5.3 MMOL/L
PROT SERPL-MCNC: 6.9 G/DL
PROTEIN URINE: ABNORMAL
PSA SERPL-MCNC: 3.13 NG/ML
RBC # BLD: 4.21 M/UL
RBC # FLD: 13.5 %
SODIUM SERPL-SCNC: 142 MMOL/L
SPECIFIC GRAVITY URINE: 1.01
TRIGL SERPL-MCNC: 106 MG/DL
TSH SERPL-ACNC: 1.49 UIU/ML
UROBILINOGEN URINE: NORMAL
VIT B12 SERPL-MCNC: 795 PG/ML
WBC # FLD AUTO: 6.1 K/UL

## 2023-03-13 ENCOUNTER — APPOINTMENT (OUTPATIENT)
Dept: INTERNAL MEDICINE | Facility: CLINIC | Age: 68
End: 2023-03-13
Payer: MEDICARE

## 2023-03-13 VITALS
OXYGEN SATURATION: 97 % | HEART RATE: 69 BPM | DIASTOLIC BLOOD PRESSURE: 88 MMHG | RESPIRATION RATE: 16 BRPM | SYSTOLIC BLOOD PRESSURE: 190 MMHG

## 2023-03-13 VITALS — DIASTOLIC BLOOD PRESSURE: 70 MMHG | SYSTOLIC BLOOD PRESSURE: 168 MMHG

## 2023-03-13 PROCEDURE — 99214 OFFICE O/P EST MOD 30 MIN: CPT

## 2023-03-13 NOTE — HISTORY OF PRESENT ILLNESS
[FreeTextEntry1] : Here for follow-up.\par Pt took his BP meds today [de-identified] : Feeling well

## 2023-03-21 ENCOUNTER — APPOINTMENT (OUTPATIENT)
Dept: CARDIOLOGY | Facility: CLINIC | Age: 68
End: 2023-03-21
Payer: MEDICARE

## 2023-03-21 VITALS
HEART RATE: 69 BPM | BODY MASS INDEX: 28 KG/M2 | RESPIRATION RATE: 15 BRPM | DIASTOLIC BLOOD PRESSURE: 70 MMHG | HEIGHT: 71 IN | OXYGEN SATURATION: 99 % | WEIGHT: 200 LBS | SYSTOLIC BLOOD PRESSURE: 134 MMHG

## 2023-03-21 VITALS
HEIGHT: 71 IN | WEIGHT: 200 LBS | HEART RATE: 69 BPM | DIASTOLIC BLOOD PRESSURE: 70 MMHG | OXYGEN SATURATION: 99 % | SYSTOLIC BLOOD PRESSURE: 134 MMHG | BODY MASS INDEX: 28 KG/M2

## 2023-03-21 DIAGNOSIS — Z01.818 ENCOUNTER FOR OTHER PREPROCEDURAL EXAMINATION: ICD-10-CM

## 2023-03-21 DIAGNOSIS — R60.0 LOCALIZED EDEMA: ICD-10-CM

## 2023-03-21 DIAGNOSIS — Z01.810 ENCOUNTER FOR PREPROCEDURAL CARDIOVASCULAR EXAMINATION: ICD-10-CM

## 2023-03-21 DIAGNOSIS — Z91.19 PATIENT'S NONCOMPLIANCE WITH OTHER MEDICAL TREATMENT AND REGIMEN: ICD-10-CM

## 2023-03-21 PROCEDURE — 99214 OFFICE O/P EST MOD 30 MIN: CPT

## 2023-03-21 RX ORDER — LOSARTAN POTASSIUM 50 MG/1
50 TABLET, FILM COATED ORAL
Qty: 90 | Refills: 0 | Status: DISCONTINUED | COMMUNITY
Start: 2022-05-09 | End: 2023-03-21

## 2023-03-28 ENCOUNTER — APPOINTMENT (OUTPATIENT)
Dept: GASTROENTEROLOGY | Facility: CLINIC | Age: 68
End: 2023-03-28
Payer: MEDICARE

## 2023-03-28 VITALS
WEIGHT: 200 LBS | HEIGHT: 71 IN | DIASTOLIC BLOOD PRESSURE: 91 MMHG | HEART RATE: 65 BPM | BODY MASS INDEX: 28 KG/M2 | SYSTOLIC BLOOD PRESSURE: 161 MMHG

## 2023-03-28 DIAGNOSIS — K21.9 GASTRO-ESOPHAGEAL REFLUX DISEASE W/OUT ESOPHAGITIS: ICD-10-CM

## 2023-03-28 DIAGNOSIS — Z12.11 ENCOUNTER FOR SCREENING FOR MALIGNANT NEOPLASM OF COLON: ICD-10-CM

## 2023-03-28 PROCEDURE — 99203 OFFICE O/P NEW LOW 30 MIN: CPT

## 2023-03-28 PROCEDURE — 99213 OFFICE O/P EST LOW 20 MIN: CPT

## 2023-03-31 ENCOUNTER — RX RENEWAL (OUTPATIENT)
Age: 68
End: 2023-03-31

## 2023-04-02 PROBLEM — K21.9 CHRONIC GERD: Status: ACTIVE | Noted: 2021-10-19

## 2023-04-02 PROBLEM — Z12.11 ENCOUNTER FOR SCREENING COLONOSCOPY: Status: ACTIVE | Noted: 2021-10-19

## 2023-04-02 NOTE — HISTORY OF PRESENT ILLNESS
[FreeTextEntry1] : 68yo male for screening colonoscopy\par \par Patient presents prior to screening colonoscopy.  Patient is asymptomatic without bleeding or change in bowel habits.  No family history of colon polyps or cancer.\par \par He has dm and awaiting stress test and echo\par He has hx GERD, maintained on demand PPI which largely controls symptoms

## 2023-04-02 NOTE — PHYSICAL EXAM
[Normal Voice/Communication] : normal voice/communication [Alert] : alert [Healthy Appearing] : healthy appearing [No Acute Distress] : no acute distress [Sclera] : the sclera and conjunctiva were normal [Normal Lips/Gums] : the lips and gums were normal [Hearing Threshold Finger Rub Not Wallace] : hearing was normal [Oropharynx] : the oropharynx was normal [Normal Appearance] : the appearance of the neck was normal [No Neck Mass] : no neck mass was observed [No Respiratory Distress] : no respiratory distress [No Acc Muscle Use] : no accessory muscle use [Respiration, Rhythm And Depth] : normal respiratory rhythm and effort [Auscultation Breath Sounds / Voice Sounds] : lungs were clear to auscultation bilaterally [Heart Rate And Rhythm] : heart rate was normal and rhythm regular [Normal S1, S2] : normal S1 and S2 [Murmurs] : no murmurs [Bowel Sounds] : normal bowel sounds [Abdomen Tenderness] : non-tender [No Masses] : no abdominal mass palpated [Abdomen Soft] : soft [] : no hepatosplenomegaly [Oriented To Time, Place, And Person] : oriented to person, place, and time

## 2023-04-02 NOTE — HISTORY OF PRESENT ILLNESS
[FreeTextEntry1] : 66yo male for screening colonoscopy\par \par Patient presents prior to screening colonoscopy.  Patient is asymptomatic without bleeding or change in bowel habits.  No family history of colon polyps or cancer.\par \par He has dm and awaiting stress test and echo\par He has hx GERD, maintained on demand PPI which largely controls symptoms

## 2023-04-02 NOTE — PHYSICAL EXAM
[Alert] : alert [Normal Voice/Communication] : normal voice/communication [Healthy Appearing] : healthy appearing [No Acute Distress] : no acute distress [Sclera] : the sclera and conjunctiva were normal [Hearing Threshold Finger Rub Not [place]] : hearing was normal [Normal Lips/Gums] : the lips and gums were normal [Oropharynx] : the oropharynx was normal [Normal Appearance] : the appearance of the neck was normal [No Neck Mass] : no neck mass was observed [No Respiratory Distress] : no respiratory distress [No Acc Muscle Use] : no accessory muscle use [Respiration, Rhythm And Depth] : normal respiratory rhythm and effort [Auscultation Breath Sounds / Voice Sounds] : lungs were clear to auscultation bilaterally [Heart Rate And Rhythm] : heart rate was normal and rhythm regular [Normal S1, S2] : normal S1 and S2 [Murmurs] : no murmurs [Bowel Sounds] : normal bowel sounds [Abdomen Tenderness] : non-tender [No Masses] : no abdominal mass palpated [] : no hepatosplenomegaly [Abdomen Soft] : soft [Oriented To Time, Place, And Person] : oriented to person, place, and time

## 2023-04-02 NOTE — ASSESSMENT
[FreeTextEntry1] : 66yo male for screening colonoscopy\par \par needs cardiac clearance given upcoming cardiac procedures\par hold am metformin day of procedure\par \par will check colonoscopy with miralax gatorade prep\par \par Risks and benefits of procedure(s) discussed with patient in detail, including but not limited to, perforation, bleeding, reaction to anesthesia, missed lesions.\par

## 2023-04-03 NOTE — ASU DISCHARGE PLAN (ADULT/PEDIATRIC) - DISCHARGE PLAN IS COMPLETE AND GIVEN TO PATIENT
: Yes Hypercholesterolemia Monitoring: I explained this is common when taking isotretinoin. We will monitor closely.

## 2023-04-11 ENCOUNTER — APPOINTMENT (OUTPATIENT)
Dept: UROLOGY | Facility: CLINIC | Age: 68
End: 2023-04-11
Payer: MEDICARE

## 2023-04-11 VITALS
BODY MASS INDEX: 28 KG/M2 | WEIGHT: 200 LBS | SYSTOLIC BLOOD PRESSURE: 117 MMHG | DIASTOLIC BLOOD PRESSURE: 60 MMHG | HEIGHT: 71 IN

## 2023-04-11 PROCEDURE — 99213 OFFICE O/P EST LOW 20 MIN: CPT

## 2023-04-11 NOTE — ASSESSMENT
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 ReTURBT negative s/p  intravesical BCG completed 6/2022. Now also with large nonreducible right inguinal hernia. \par For inguinal hernia patient was given a surgeon's name by his PCP and plans on following up with him to discuss surgical options\par RTO for cystoscopy

## 2023-04-11 NOTE — PHYSICAL EXAM
[General Appearance - Well Developed] : well developed [General Appearance - Well Nourished] : well nourished [Normal Appearance] : normal appearance [Well Groomed] : well groomed [General Appearance - In No Acute Distress] : no acute distress [Abdomen Soft] : soft [Abdomen Tenderness] : non-tender [Costovertebral Angle Tenderness] : no ~M costovertebral angle tenderness [Urinary Bladder Findings] : the bladder was normal on palpation [Edema] : no peripheral edema [] : no respiratory distress [Respiration, Rhythm And Depth] : normal respiratory rhythm and effort [Exaggerated Use Of Accessory Muscles For Inspiration] : no accessory muscle use [Oriented To Time, Place, And Person] : oriented to person, place, and time [Affect] : the affect was normal [Mood] : the mood was normal [Not Anxious] : not anxious [Normal Station and Gait] : the gait and station were normal for the patient's age [No Focal Deficits] : no focal deficits [No Palpable Adenopathy] : no palpable adenopathy [Scrotum] : the scrotum was normal [Testes Tenderness] : no tenderness of the testes [Prostate Tenderness] : the prostate was not tender [No Prostate Nodules] : no prostate nodules [Prostate Size ___ gm] : prostate size [unfilled] gm [FreeTextEntry1] : Large nonreducible right inguinal hernia

## 2023-04-11 NOTE — HISTORY OF PRESENT ILLNESS
[FreeTextEntry1] : 64 year old man seen 02/06/2020  with complaint of elevated PSA. This began 1/21/20, where it was found to be 6.34.  Patient had his PSA checked on 2/3/2015 before this where it was 2.03 and does not recall if he ever had another PSA level since. \par It is not associated with LUTS. Pt reports over the years he has had some nocturia but it is not bothersome and would not like to try any medications at this time. He denies any recent sexual activity or bike riding prior to getting his PSA checked.No hematuria, no dysuria, no frequency, no urgency, no hesitancy, no straining. No incontinence. No fevers, no chills, no nausea, no vomiting, no flank pain.Pt denies any family hx of prostate cancer\par \par 12/10/2020: Patient presents for follow up. He reports feeling a lump on RIGHT groin. It is not painful, but can be bothersome. Lifting makes it worse. He also reports some LEFT flank pain, then some gross hematuria. It has since resolved. No new  symptoms and other medical  issues remain unchanged from above. No current hematuria, no dysuria, no frequency, no urgency, no hesitancy, no straining. No incontinence. No fevers, no chills, no nausea, no vomiting, no flank pain. Renal US from [facility] was negative for stones, masses, or hydronephrosis. Cr elevated to 1.6\par \par 11/18/2021: Patient presents for follow up. He reports recent episode of gross hematuria. He did not follow up for work up before. Of note, Cr is now wnl. No other new complaints. \par \par 03/03/2022: Patient presents for follow up. He is s/p TURBT, here to review path. He had some hematuria post op, but now resolved. Path showed HG T1 UCC, muscle in specimen negative. \par \par 04/14/2022: Patient presents for follow up. He is s/p reTURBT, which was negative. Muscle in specimen. No LUTS or hematuria. \par \par 04/11/2023: Patient presents for follow up. He reports he finished his BCG treatments in 6/2022 and has not had a cystoscopy since. He also notes heaviness and feels his right inguinal hernia is worsening. He denies any new  symptoms. PSA WNL. No hematuria, no dysuria, no frequency, no urgency, no hesitancy, no straining. No incontinence. No fevers, no chills, no nausea, no vomiting, no flank pain.\par

## 2023-04-12 ENCOUNTER — APPOINTMENT (OUTPATIENT)
Dept: ENDOCRINOLOGY | Facility: CLINIC | Age: 68
End: 2023-04-12

## 2023-04-18 ENCOUNTER — RX RENEWAL (OUTPATIENT)
Age: 68
End: 2023-04-18

## 2023-04-19 NOTE — ED PROVIDER NOTE - NS_EDPROVIDERDISPOUSERTYPE_ED_A_ED
Attending Attestation (For Attendings USE Only)... Finasteride Pregnancy And Lactation Text: This medication is absolutely contraindicated during pregnancy. It is unknown if it is excreted in breast milk.

## 2023-04-25 ENCOUNTER — APPOINTMENT (OUTPATIENT)
Dept: CARDIOLOGY | Facility: CLINIC | Age: 68
End: 2023-04-25
Payer: MEDICARE

## 2023-04-25 PROCEDURE — 93306 TTE W/DOPPLER COMPLETE: CPT

## 2023-04-27 NOTE — DISCHARGE NOTE PROVIDER - YES NO FOR MLM POSITIVE OR NEGATIVE COVID RESULT
Thank you for choosing [facility] as your Pain Management provider!    It is recommended you schedule a follow-up appointment with ANSLEY Holder in If symptoms do not improve..    PROCEDURE  It is recommended that you be scheduled for the following procedure: SI Joint injection. Follow the specific instructions provided by the surgery scheduler for the testing, physician evaluation, pre-operative education, and other preparatory requirements of the procedure to help assure a safe and successful result. You can contact the scheduling team at [phone]. All calls will be returned within 2 business days.    CONTACTING YOUR PROVIDER  Our office hours are 8:00 am to 4:30 pm Monday through Friday. For urgent medical need contact 9-1-1. For routine medical questions you can leave a message for your provider on your [facility] account. We recommend utilizing [facility] for a faster response by messaging your provider directly. To sign up for [facility] you can visit [email or web address] or stop at any of our reception desk. If you do not have access to [facility] you can contact our pain line at  [phone] for routine medical questions. All calls will be answered within 2 business days.         . ,

## 2023-04-28 ENCOUNTER — RX RENEWAL (OUTPATIENT)
Age: 68
End: 2023-04-28

## 2023-05-09 ENCOUNTER — APPOINTMENT (OUTPATIENT)
Dept: DERMATOLOGY | Facility: CLINIC | Age: 68
End: 2023-05-09
Payer: MEDICARE

## 2023-05-09 DIAGNOSIS — D48.5 NEOPLASM OF UNCERTAIN BEHAVIOR OF SKIN: ICD-10-CM

## 2023-05-09 PROCEDURE — 11102 TANGNTL BX SKIN SINGLE LES: CPT

## 2023-05-09 PROCEDURE — 99213 OFFICE O/P EST LOW 20 MIN: CPT | Mod: 25

## 2023-05-09 RX ORDER — BACILLUS CALMETTE-GUERIN 50 MG/50ML
50 POWDER, FOR SUSPENSION INTRAVESICAL
Qty: 1 | Refills: 0 | Status: DISCONTINUED | COMMUNITY
Start: 2022-05-19 | End: 2023-05-09

## 2023-05-09 NOTE — PHYSICAL EXAM
[Alert] : alert [Oriented x 3] : ~L oriented x 3 [Well Nourished] : well nourished [FreeTextEntry3] : A skin exam was performed from the belt up including the scalp, face (including the lips, ears, nose and eyes), neck, chest, abdomen, back and upper extremities.  The patient declined examination below the belt. \par The exam revealed the following benign growths:\par Sterling pigmented nevi - numerous on the trunk.\par Lentigines - face.\par \par Firm papule, lower lip, just right of the midline.\par

## 2023-05-09 NOTE — ASSESSMENT
[FreeTextEntry1] : A skin examination was performed from the belt up.  We discussed the importance of photoprotection, including the use of hats, protective clothing and sunscreens with an SPF of at least 30.  Sun avoidance was also discussed.\par \par r/o BCC of the lower lip.\par Plan D&C if positive.

## 2023-05-09 NOTE — HISTORY OF PRESENT ILLNESS
[FreeTextEntry1] : Fit in for lesion of the lower lip. [de-identified] : Bleeding, with difficulty stopping it, this past weekend, after shaving.  No self tx.\par Patient on plavix and ASA.

## 2023-05-10 ENCOUNTER — NON-APPOINTMENT (OUTPATIENT)
Age: 68
End: 2023-05-10

## 2023-05-10 ENCOUNTER — RX RENEWAL (OUTPATIENT)
Age: 68
End: 2023-05-10

## 2023-05-11 ENCOUNTER — APPOINTMENT (OUTPATIENT)
Dept: CARDIOLOGY | Facility: CLINIC | Age: 68
End: 2023-05-11
Payer: MEDICARE

## 2023-05-11 PROCEDURE — 93015 CV STRESS TEST SUPVJ I&R: CPT

## 2023-05-11 PROCEDURE — 78452 HT MUSCLE IMAGE SPECT MULT: CPT

## 2023-05-11 PROCEDURE — A9500: CPT

## 2023-05-12 ENCOUNTER — NON-APPOINTMENT (OUTPATIENT)
Age: 68
End: 2023-05-12

## 2023-05-17 NOTE — HISTORY OF PRESENT ILLNESS
[FreeTextEntry1] : Jeffrey Cardenas is a 67-year-old man with multiple medical problems, including coronary artery disease, proximal LAD stent (2/2021), ischemic cardiomyopathy, hyperlipidemia, hypertension, diabetes mellitus, peripheral artery disease, right foot osteomyelitis, who returns for cardiac examination.\par \par He has been feeling well since I last saw him–no angina or dyspnea.  He has had recent poorly-controlled hypertension -- dose of amlodipine was recently increased by Dr Luna.   He describes multiple stressors (health, business, family).\par \par

## 2023-05-17 NOTE — DISCUSSION/SUMMARY
[With Me] : with me [___ Month(s)] : in [unfilled] month(s) [FreeTextEntry1] : \par Hypertension:  improved; continue present antihypertensive regimen.\par \par Leg edema: Suspect multifactorial etiology, including venous insufficiency, dependent positioning, CHF, amlodipine also likely contributing -   if worsens would consider discontinuing amlodipine and titrating other medications to maintain adequate control of his hypertension - Mr Cardenas  does not want to make changes today.\par \par Coronary artery disease: Stable; no angina; continue medical management with aspirin, clopidogrel, carvedilol, atorvastatin, and Ranexa.  Given > 2 years since PCI I recommend   Pharmacologic nuclear stress testing to evaluate for progression of disease.\par \par Cardiomyopathy:   I recommend reassessing left ventricular function with echocardiography\par

## 2023-05-17 NOTE — ADDENDUM
[FreeTextEntry1] : ADDENDUM (5/17/2023):\par \par Mr Cardenas is stable from cardiac standpoint for colonoscopy; I recommend continuing the uninterrupted use of aspirin and carvedilol; clopidogrel can be discontinued for 7 days prior to the procedure.

## 2023-05-17 NOTE — PHYSICAL EXAM
[No Acute Distress] : no acute distress [Normal S1, S2] : normal S1, S2 [Clear Lung Fields] : clear lung fields [Soft] : abdomen soft [Non Tender] : non-tender [Normal Gait] : normal gait [Alert and Oriented] : alert and oriented [de-identified] : Overweight [de-identified] : Wearing a face mask [de-identified] :  bilateral distal leg edema

## 2023-05-17 NOTE — REVIEW OF SYSTEMS
[Lower Ext Edema] : lower extremity edema [Under Stress] : under stress [Feeling Fatigued] : feeling fatigued [Weight Loss (___ Lbs)] : no recent weight loss [SOB] : no shortness of breath [Chest Discomfort] : no chest discomfort [Palpitations] : no palpitations

## 2023-05-20 LAB — CORE LAB BIOPSY: NORMAL

## 2023-05-23 ENCOUNTER — RESULT REVIEW (OUTPATIENT)
Age: 68
End: 2023-05-23

## 2023-05-23 ENCOUNTER — APPOINTMENT (OUTPATIENT)
Dept: GASTROENTEROLOGY | Facility: AMBULATORY MEDICAL SERVICES | Age: 68
End: 2023-05-23
Payer: MEDICARE

## 2023-05-23 PROCEDURE — 45385 COLONOSCOPY W/LESION REMOVAL: CPT | Mod: GC

## 2023-05-30 ENCOUNTER — APPOINTMENT (OUTPATIENT)
Dept: UROLOGY | Facility: CLINIC | Age: 68
End: 2023-05-30
Payer: MEDICARE

## 2023-05-30 VITALS
BODY MASS INDEX: 28 KG/M2 | WEIGHT: 200 LBS | HEIGHT: 71 IN | SYSTOLIC BLOOD PRESSURE: 152 MMHG | DIASTOLIC BLOOD PRESSURE: 73 MMHG

## 2023-05-30 DIAGNOSIS — C67.9 MALIGNANT NEOPLASM OF BLADDER, UNSPECIFIED: ICD-10-CM

## 2023-05-30 PROCEDURE — 52000 CYSTOURETHROSCOPY: CPT

## 2023-06-05 LAB — URINE CYTOLOGY: NORMAL

## 2023-07-11 ENCOUNTER — APPOINTMENT (OUTPATIENT)
Dept: DERMATOLOGY | Facility: CLINIC | Age: 68
End: 2023-07-11
Payer: MEDICARE

## 2023-07-11 DIAGNOSIS — L82.1 OTHER SEBORRHEIC KERATOSIS: ICD-10-CM

## 2023-07-11 DIAGNOSIS — L82.0 INFLAMED SEBORRHEIC KERATOSIS: ICD-10-CM

## 2023-07-11 DIAGNOSIS — L81.4 OTHER MELANIN HYPERPIGMENTATION: ICD-10-CM

## 2023-07-11 DIAGNOSIS — D22.9 MELANOCYTIC NEVI, UNSPECIFIED: ICD-10-CM

## 2023-07-11 PROCEDURE — 17110 DESTRUCTION B9 LES UP TO 14: CPT

## 2023-07-11 PROCEDURE — 99213 OFFICE O/P EST LOW 20 MIN: CPT | Mod: 25

## 2023-07-11 NOTE — PHYSICAL EXAM
[Alert] : alert [Oriented x 3] : ~L oriented x 3 [Well Nourished] : well nourished [Full Body Skin Exam Performed] : performed [FreeTextEntry3] : A full skin exam was performed including the scalp, face (including lips, ears, nose and eyes), neck, chest, abdomen, back, buttocks, upper extremities and lower extremities.  The patient declined examination of the genitalia.  \par The exam revealed the following benign growths:\par [place] pigmented nevi.\par Seborrheic keratoses.\par Lentigines.\par \par Greasy tan erythematous papule, right superior shoulder.\par

## 2023-07-11 NOTE — HISTORY OF PRESENT ILLNESS
[FreeTextEntry1] : Patient presents for skin examination. [de-identified] : Notes right shoulder lesion, with growth and irritation.  No bleeding.  Present for months.

## 2023-07-23 ENCOUNTER — RX RENEWAL (OUTPATIENT)
Age: 68
End: 2023-07-23

## 2023-07-30 ENCOUNTER — RX RENEWAL (OUTPATIENT)
Age: 68
End: 2023-07-30

## 2023-08-03 ENCOUNTER — RX RENEWAL (OUTPATIENT)
Age: 68
End: 2023-08-03

## 2023-08-07 ENCOUNTER — OUTPATIENT (OUTPATIENT)
Dept: OUTPATIENT SERVICES | Facility: HOSPITAL | Age: 68
LOS: 1 days | End: 2023-08-07
Payer: MEDICARE

## 2023-08-07 DIAGNOSIS — Z41.9 ENCOUNTER FOR PROCEDURE FOR PURPOSES OTHER THAN REMEDYING HEALTH STATE, UNSPECIFIED: Chronic | ICD-10-CM

## 2023-08-07 DIAGNOSIS — Z98.61 CORONARY ANGIOPLASTY STATUS: Chronic | ICD-10-CM

## 2023-08-07 DIAGNOSIS — Z98.890 OTHER SPECIFIED POSTPROCEDURAL STATES: Chronic | ICD-10-CM

## 2023-08-07 DIAGNOSIS — Z01.818 ENCOUNTER FOR OTHER PREPROCEDURAL EXAMINATION: ICD-10-CM

## 2023-08-07 DIAGNOSIS — Z89.429 ACQUIRED ABSENCE OF OTHER TOE(S), UNSPECIFIED SIDE: Chronic | ICD-10-CM

## 2023-08-07 LAB
A1C WITH ESTIMATED AVERAGE GLUCOSE RESULT: 7.1 % — HIGH (ref 4–5.6)
ALBUMIN SERPL ELPH-MCNC: 3.9 G/DL — SIGNIFICANT CHANGE UP (ref 3.3–5)
ALP SERPL-CCNC: 61 U/L — SIGNIFICANT CHANGE UP (ref 40–120)
ALT FLD-CCNC: 27 U/L — SIGNIFICANT CHANGE UP (ref 12–78)
ANION GAP SERPL CALC-SCNC: 5 MMOL/L — SIGNIFICANT CHANGE UP (ref 5–17)
APTT BLD: 32.2 SEC — SIGNIFICANT CHANGE UP (ref 24.5–35.6)
AST SERPL-CCNC: 13 U/L — LOW (ref 15–37)
BASOPHILS # BLD AUTO: 0.02 K/UL — SIGNIFICANT CHANGE UP (ref 0–0.2)
BASOPHILS NFR BLD AUTO: 0.3 % — SIGNIFICANT CHANGE UP (ref 0–2)
BILIRUB DIRECT SERPL-MCNC: 0.1 MG/DL — SIGNIFICANT CHANGE UP (ref 0–0.3)
BILIRUB SERPL-MCNC: 0.6 MG/DL — SIGNIFICANT CHANGE UP (ref 0.2–1.2)
BUN SERPL-MCNC: 39 MG/DL — HIGH (ref 7–23)
CALCIUM SERPL-MCNC: 9 MG/DL — SIGNIFICANT CHANGE UP (ref 8.5–10.1)
CHLORIDE SERPL-SCNC: 114 MMOL/L — HIGH (ref 96–108)
CO2 SERPL-SCNC: 23 MMOL/L — SIGNIFICANT CHANGE UP (ref 22–31)
CREAT SERPL-MCNC: 1.27 MG/DL — SIGNIFICANT CHANGE UP (ref 0.5–1.3)
EGFR: 61 ML/MIN/1.73M2 — SIGNIFICANT CHANGE UP
EOSINOPHIL # BLD AUTO: 0.07 K/UL — SIGNIFICANT CHANGE UP (ref 0–0.5)
EOSINOPHIL NFR BLD AUTO: 1.2 % — SIGNIFICANT CHANGE UP (ref 0–6)
ESTIMATED AVERAGE GLUCOSE: 157 MG/DL — HIGH (ref 68–114)
GLUCOSE SERPL-MCNC: 120 MG/DL — HIGH (ref 70–99)
HCT VFR BLD CALC: 34.6 % — LOW (ref 39–50)
HGB BLD-MCNC: 11.8 G/DL — LOW (ref 13–17)
IMM GRANULOCYTES NFR BLD AUTO: 0.5 % — SIGNIFICANT CHANGE UP (ref 0–0.9)
INR BLD: 0.95 RATIO — SIGNIFICANT CHANGE UP (ref 0.85–1.18)
LYMPHOCYTES # BLD AUTO: 1.43 K/UL — SIGNIFICANT CHANGE UP (ref 1–3.3)
LYMPHOCYTES # BLD AUTO: 23.9 % — SIGNIFICANT CHANGE UP (ref 13–44)
MCHC RBC-ENTMCNC: 30.9 PG — SIGNIFICANT CHANGE UP (ref 27–34)
MCHC RBC-ENTMCNC: 34.1 GM/DL — SIGNIFICANT CHANGE UP (ref 32–36)
MCV RBC AUTO: 90.6 FL — SIGNIFICANT CHANGE UP (ref 80–100)
MONOCYTES # BLD AUTO: 0.52 K/UL — SIGNIFICANT CHANGE UP (ref 0–0.9)
MONOCYTES NFR BLD AUTO: 8.7 % — SIGNIFICANT CHANGE UP (ref 2–14)
MRSA PCR RESULT.: SIGNIFICANT CHANGE UP
NEUTROPHILS # BLD AUTO: 3.92 K/UL — SIGNIFICANT CHANGE UP (ref 1.8–7.4)
NEUTROPHILS NFR BLD AUTO: 65.4 % — SIGNIFICANT CHANGE UP (ref 43–77)
PLATELET # BLD AUTO: 202 K/UL — SIGNIFICANT CHANGE UP (ref 150–400)
POTASSIUM SERPL-MCNC: 4.8 MMOL/L — SIGNIFICANT CHANGE UP (ref 3.5–5.3)
POTASSIUM SERPL-SCNC: 4.8 MMOL/L — SIGNIFICANT CHANGE UP (ref 3.5–5.3)
PROT SERPL-MCNC: 7 GM/DL — SIGNIFICANT CHANGE UP (ref 6–8.3)
PROTHROM AB SERPL-ACNC: 10.8 SEC — SIGNIFICANT CHANGE UP (ref 9.5–13)
RBC # BLD: 3.82 M/UL — LOW (ref 4.2–5.8)
RBC # FLD: 13.6 % — SIGNIFICANT CHANGE UP (ref 10.3–14.5)
S AUREUS DNA NOSE QL NAA+PROBE: SIGNIFICANT CHANGE UP
SODIUM SERPL-SCNC: 142 MMOL/L — SIGNIFICANT CHANGE UP (ref 135–145)
WBC # BLD: 5.99 K/UL — SIGNIFICANT CHANGE UP (ref 3.8–10.5)
WBC # FLD AUTO: 5.99 K/UL — SIGNIFICANT CHANGE UP (ref 3.8–10.5)

## 2023-08-07 PROCEDURE — 82248 BILIRUBIN DIRECT: CPT

## 2023-08-07 PROCEDURE — 85730 THROMBOPLASTIN TIME PARTIAL: CPT

## 2023-08-07 PROCEDURE — 85610 PROTHROMBIN TIME: CPT

## 2023-08-07 PROCEDURE — 36415 COLL VENOUS BLD VENIPUNCTURE: CPT

## 2023-08-07 PROCEDURE — 83036 HEMOGLOBIN GLYCOSYLATED A1C: CPT

## 2023-08-07 PROCEDURE — 93005 ELECTROCARDIOGRAM TRACING: CPT

## 2023-08-07 PROCEDURE — 85025 COMPLETE CBC W/AUTO DIFF WBC: CPT

## 2023-08-07 PROCEDURE — 87640 STAPH A DNA AMP PROBE: CPT

## 2023-08-07 PROCEDURE — 80053 COMPREHEN METABOLIC PANEL: CPT

## 2023-08-07 PROCEDURE — 93010 ELECTROCARDIOGRAM REPORT: CPT

## 2023-08-07 PROCEDURE — 87641 MR-STAPH DNA AMP PROBE: CPT

## 2023-08-07 RX ORDER — OXYMETAZOLINE HYDROCHLORIDE 0.5 MG/ML
2 SPRAY NASAL
Qty: 0 | Refills: 0 | DISCHARGE

## 2023-08-07 RX ORDER — INSULIN GLARGINE 100 [IU]/ML
42 INJECTION, SOLUTION SUBCUTANEOUS
Qty: 0 | Refills: 0 | DISCHARGE

## 2023-08-07 RX ORDER — HYDROCHLOROTHIAZIDE 25 MG
1 TABLET ORAL
Qty: 0 | Refills: 0 | DISCHARGE

## 2023-08-07 NOTE — CHART NOTE - NSCHARTNOTEFT_GEN_A_CORE
Plan  1. Stop all NSAIDS, herbal supplements and vitamins for 7 days.  2. NPO as per ASU instructions  3. Take the following medications ( Ranolazine, Carvedilol, losartan, amlodipine ) with small sips of water on the morning of your procedure/surgery.  4. Use EZ sponges as directed  5. Use mupirocin as directed  6. Labs, EKG as per surgeon.   7. PMD visit for optimization prior to surgery as per surgeon.  8. Pt to follow preop instructions on Basaglar and insulin from PMD and Clopidogrel instructions from surgeon and cardiologist. Plan  1. Stop all NSAIDS, herbal supplements and vitamins for 7 days.  2. NPO as per ASU instructions  3. Take the following medications ( Ranolazine, Carvedilol, losartan, amlodipine ) with small sips of water on the morning of your procedure/surgery.  4. Use EZ sponges as directed  5. Use mupirocin as directed  6. Labs, EKG as per surgeon.   7. PMD visit for optimization prior to surgery as per surgeon.  8. Pt to follow preop instructions on insulin from PMD and Clopidogrel instructions from surgeon and cardiologist. Plan  1. Stop all NSAIDS, herbal supplements and vitamins for 7 days.  2. NPO as per ASU instructions  3. Take the following medications ( Ranolazine, Carvedilol, losartan, amlodipine ) with small sips of water on the morning of your procedure/surgery.  4. Use EZ sponges as directed  5. Use mupirocin as directed  6. Labs, EKG as per surgeon.   7. Cardiologist and PMD visits for optimization prior to surgery as per surgeon.  8. Pt to follow preop instructions on insulin from PMD and Clopidogrel instructions from surgeon and cardiologist.

## 2023-08-08 DIAGNOSIS — Z01.818 ENCOUNTER FOR OTHER PREPROCEDURAL EXAMINATION: ICD-10-CM

## 2023-08-09 ENCOUNTER — APPOINTMENT (OUTPATIENT)
Dept: CARDIOLOGY | Facility: CLINIC | Age: 68
End: 2023-08-09
Payer: MEDICARE

## 2023-08-09 VITALS
DIASTOLIC BLOOD PRESSURE: 66 MMHG | BODY MASS INDEX: 27.3 KG/M2 | HEART RATE: 62 BPM | HEIGHT: 71 IN | SYSTOLIC BLOOD PRESSURE: 136 MMHG | OXYGEN SATURATION: 99 % | WEIGHT: 195 LBS

## 2023-08-09 PROCEDURE — 93000 ELECTROCARDIOGRAM COMPLETE: CPT

## 2023-08-09 PROCEDURE — 99214 OFFICE O/P EST MOD 30 MIN: CPT | Mod: 25

## 2023-08-09 RX ORDER — GLUC/MSM/COLGN2/HYAL/ANTIARTH3 375-375-20
TABLET ORAL DAILY
Refills: 0 | Status: ACTIVE | COMMUNITY

## 2023-08-09 NOTE — DISCUSSION/SUMMARY
[With Me] : with me [___ Month(s)] : in [unfilled] month(s) [FreeTextEntry1] : Here today for cardiac pre op evaluation prior to RIH repair with Dr Manzo scheduled for Monday 8/14/23 @ [facility]   Claims to be feeling well no cardiovascular complaints.  VSS EKG Sinus Bradycardia similar in comparison   Coronary artery disease: Stable; no angina;   Cardiomyopathy: Prior Echo reviewed NL LV FX EF 55-60%   Patient is asymptomatic from cardiac standpoint, VSS, Cardiac testing reviewed with Dr Franco  PT harriet to proceed with proposed surgery.  Plavix has been held and I advise he CW uninterrupted use of aspirin and carvedilol; clopidogrel will be resumed post op  Labs to be reviewed with PCP/PST  [EKG obtained to assist in diagnosis and management of assessed problem(s)] : EKG obtained to assist in diagnosis and management of assessed problem(s)

## 2023-08-09 NOTE — HISTORY OF PRESENT ILLNESS
[FreeTextEntry1] : Jeffrey Cardenas is a 67-year-old man with multiple medical problems, including coronary artery disease, proximal LAD stent (2/2021), ischemic cardiomyopathy, hyperlipidemia, hypertension, diabetes mellitus, peripheral artery disease, right foot osteomyelitis, who returns for cardiac pre op evaluation prior to RIH repair with Dr Manzo scheduled for Monday 8/14/23 @ [facility]   Claims to be feeling well no cardiovascular complaints.   He has been feeling well since I last saw him-no angina or dyspnea.  He has had recent poorly-controlled hypertension -- dose of amlodipine was recently increased by Dr Luna.   He describes multiple stressors (health, business, family).  medications/testing reviewed  Nuclear stress test 5/2023 Echo 4/2023 NL LV FX EF 55-60% mild DD

## 2023-08-10 ENCOUNTER — APPOINTMENT (OUTPATIENT)
Dept: INTERNAL MEDICINE | Facility: CLINIC | Age: 68
End: 2023-08-10
Payer: MEDICARE

## 2023-08-10 VITALS
DIASTOLIC BLOOD PRESSURE: 72 MMHG | HEART RATE: 62 BPM | RESPIRATION RATE: 14 BRPM | OXYGEN SATURATION: 98 % | WEIGHT: 195 LBS | TEMPERATURE: 98.8 F | SYSTOLIC BLOOD PRESSURE: 130 MMHG | HEIGHT: 71 IN | BODY MASS INDEX: 27.3 KG/M2

## 2023-08-10 DIAGNOSIS — K40.90 UNILATERAL INGUINAL HERNIA, W/OUT OBSTRUCTION OR GANGRENE, NOT SPECIFIED AS RECURRENT: ICD-10-CM

## 2023-08-10 DIAGNOSIS — I42.9 CARDIOMYOPATHY, UNSPECIFIED: ICD-10-CM

## 2023-08-10 DIAGNOSIS — Z01.818 ENCOUNTER FOR OTHER PREPROCEDURAL EXAMINATION: ICD-10-CM

## 2023-08-10 DIAGNOSIS — I25.10 ATHEROSCLEROTIC HEART DISEASE OF NATIVE CORONARY ARTERY W/OUT ANGINA PECTORIS: ICD-10-CM

## 2023-08-10 PROCEDURE — 99214 OFFICE O/P EST MOD 30 MIN: CPT

## 2023-08-10 NOTE — HISTORY OF PRESENT ILLNESS
[Coronary Artery Disease] : coronary artery disease [No Pertinent Pulmonary History] : no history of asthma, COPD, sleep apnea, or smoking [No Adverse Anesthesia Reaction] : no adverse anesthesia reaction in self or family member [Chronic Anticoagulation] : chronic anticoagulation [Diabetes] : diabetes [Aortic Stenosis] : no aortic stenosis [Atrial Fibrillation] : no atrial fibrillation [Recent Myocardial Infarction] : no recent myocardial infarction [Implantable Device/Pacemaker] : no implantable device/pacemaker [Chronic Kidney Disease] : no chronic kidney disease [FreeTextEntry1] : Right inguinal hernia  [FreeTextEntry2] : 8/14/23 [FreeTextEntry3] : Dr. Manzo [FreeTextEntry4] : BROWN HE[place]ON,  51, is here for presurgical evaluation. Overall feeling well. Pt denies chest pain, dyspnea, lightheadedness, palpitations, fever, chills, or sweats

## 2023-08-10 NOTE — ASSESSMENT
[Patient Optimized for Surgery] : Patient optimized for surgery [No Further Testing Recommended] : no further testing recommended [Modify anti-platelet treatment prior to procedure] : Modify anti-platelet treatment prior to procedure [FreeTextEntry4] : EKG Sinus bradycardia rate 53 with first degree AVB, within acceptable limits Presurgical labs are within acceptable limits Patient has been cleared for surgery by Cardiology There are no medical contraindications to proceeding with the planned surgery [FreeTextEntry6] : as per Cardiology and Surgery

## 2023-08-12 RX ORDER — FENTANYL CITRATE 50 UG/ML
25 INJECTION INTRAVENOUS
Refills: 0 | Status: DISCONTINUED | OUTPATIENT
Start: 2023-08-14 | End: 2023-08-14

## 2023-08-12 RX ORDER — OXYCODONE HYDROCHLORIDE 5 MG/1
5 TABLET ORAL ONCE
Refills: 0 | Status: DISCONTINUED | OUTPATIENT
Start: 2023-08-14 | End: 2023-08-14

## 2023-08-12 RX ORDER — ONDANSETRON 8 MG/1
4 TABLET, FILM COATED ORAL ONCE
Refills: 0 | Status: DISCONTINUED | OUTPATIENT
Start: 2023-08-14 | End: 2023-08-14

## 2023-08-12 RX ORDER — SODIUM CHLORIDE 9 MG/ML
1000 INJECTION, SOLUTION INTRAVENOUS
Refills: 0 | Status: DISCONTINUED | OUTPATIENT
Start: 2023-08-14 | End: 2023-08-14

## 2023-08-12 RX ORDER — FENTANYL CITRATE 50 UG/ML
50 INJECTION INTRAVENOUS
Refills: 0 | Status: DISCONTINUED | OUTPATIENT
Start: 2023-08-14 | End: 2023-08-14

## 2023-08-14 ENCOUNTER — TRANSCRIPTION ENCOUNTER (OUTPATIENT)
Age: 68
End: 2023-08-14

## 2023-08-14 ENCOUNTER — OUTPATIENT (OUTPATIENT)
Dept: INPATIENT UNIT | Facility: HOSPITAL | Age: 68
LOS: 1 days | Discharge: HOME | End: 2023-08-14
Payer: MEDICARE

## 2023-08-14 VITALS
OXYGEN SATURATION: 99 % | TEMPERATURE: 97 F | SYSTOLIC BLOOD PRESSURE: 126 MMHG | HEART RATE: 60 BPM | RESPIRATION RATE: 15 BRPM | DIASTOLIC BLOOD PRESSURE: 64 MMHG

## 2023-08-14 VITALS
WEIGHT: 190.04 LBS | RESPIRATION RATE: 16 BRPM | TEMPERATURE: 98 F | OXYGEN SATURATION: 100 % | SYSTOLIC BLOOD PRESSURE: 141 MMHG | DIASTOLIC BLOOD PRESSURE: 71 MMHG | HEART RATE: 64 BPM | HEIGHT: 71 IN

## 2023-08-14 DIAGNOSIS — Z98.890 OTHER SPECIFIED POSTPROCEDURAL STATES: Chronic | ICD-10-CM

## 2023-08-14 DIAGNOSIS — Z89.429 ACQUIRED ABSENCE OF OTHER TOE(S), UNSPECIFIED SIDE: Chronic | ICD-10-CM

## 2023-08-14 DIAGNOSIS — K40.90 UNILATERAL INGUINAL HERNIA, WITHOUT OBSTRUCTION OR GANGRENE, NOT SPECIFIED AS RECURRENT: ICD-10-CM

## 2023-08-14 DIAGNOSIS — Z98.61 CORONARY ANGIOPLASTY STATUS: Chronic | ICD-10-CM

## 2023-08-14 DIAGNOSIS — D17.6 BENIGN LIPOMATOUS NEOPLASM OF SPERMATIC CORD: ICD-10-CM

## 2023-08-14 DIAGNOSIS — Z41.9 ENCOUNTER FOR PROCEDURE FOR PURPOSES OTHER THAN REMEDYING HEALTH STATE, UNSPECIFIED: Chronic | ICD-10-CM

## 2023-08-14 LAB — GLUCOSE BLDC GLUCOMTR-MCNC: 199 MG/DL — HIGH (ref 70–99)

## 2023-08-14 PROCEDURE — 82962 GLUCOSE BLOOD TEST: CPT

## 2023-08-14 PROCEDURE — 88304 TISSUE EXAM BY PATHOLOGIST: CPT

## 2023-08-14 PROCEDURE — C9290: CPT

## 2023-08-14 PROCEDURE — 88302 TISSUE EXAM BY PATHOLOGIST: CPT | Mod: 26

## 2023-08-14 PROCEDURE — 88302 TISSUE EXAM BY PATHOLOGIST: CPT

## 2023-08-14 PROCEDURE — C1781: CPT

## 2023-08-14 PROCEDURE — 88304 TISSUE EXAM BY PATHOLOGIST: CPT | Mod: 26

## 2023-08-14 RX ORDER — ASCORBIC ACID 60 MG
1 TABLET,CHEWABLE ORAL
Qty: 0 | Refills: 0 | DISCHARGE

## 2023-08-14 RX ORDER — AMLODIPINE BESYLATE 2.5 MG/1
1 TABLET ORAL
Qty: 0 | Refills: 0 | DISCHARGE

## 2023-08-14 RX ORDER — INSULIN GLARGINE 100 [IU]/ML
0 INJECTION, SOLUTION SUBCUTANEOUS
Refills: 0 | DISCHARGE

## 2023-08-14 RX ORDER — METFORMIN HYDROCHLORIDE 850 MG/1
1 TABLET ORAL
Qty: 0 | Refills: 0 | DISCHARGE

## 2023-08-14 RX ORDER — LOSARTAN POTASSIUM 100 MG/1
1 TABLET, FILM COATED ORAL
Qty: 0 | Refills: 0 | DISCHARGE

## 2023-08-14 RX ORDER — RANOLAZINE 500 MG/1
1 TABLET, FILM COATED, EXTENDED RELEASE ORAL
Qty: 0 | Refills: 0 | DISCHARGE

## 2023-08-14 RX ORDER — ASPIRIN/CALCIUM CARB/MAGNESIUM 324 MG
1 TABLET ORAL
Qty: 0 | Refills: 0 | DISCHARGE

## 2023-08-14 RX ORDER — CHOLECALCIFEROL (VITAMIN D3) 125 MCG
1 CAPSULE ORAL
Qty: 0 | Refills: 0 | DISCHARGE

## 2023-08-14 RX ORDER — INSULIN GLARGINE 100 [IU]/ML
42 INJECTION, SOLUTION SUBCUTANEOUS
Refills: 0 | DISCHARGE

## 2023-08-14 RX ORDER — ATORVASTATIN CALCIUM 80 MG/1
1 TABLET, FILM COATED ORAL
Qty: 0 | Refills: 0 | DISCHARGE

## 2023-08-14 RX ORDER — CARVEDILOL PHOSPHATE 80 MG/1
1 CAPSULE, EXTENDED RELEASE ORAL
Qty: 0 | Refills: 0 | DISCHARGE

## 2023-08-14 RX ORDER — CINNAMON BARK 500 MG
1 CAPSULE ORAL
Qty: 0 | Refills: 0 | DISCHARGE

## 2023-08-14 RX ORDER — PREGABALIN 225 MG/1
1 CAPSULE ORAL
Qty: 0 | Refills: 0 | DISCHARGE

## 2023-08-14 RX ORDER — FERROUS SULFATE 325(65) MG
1 TABLET ORAL
Qty: 0 | Refills: 0 | DISCHARGE

## 2023-08-14 NOTE — BRIEF OPERATIVE NOTE - NSICDXBRIEFPROCEDURE_GEN_ALL_CORE_FT
PROCEDURES:  Open repair of inguinal hernia using mesh in adult 14-Aug-2023 09:52:06  Tushar Verde

## 2023-08-14 NOTE — ASU PREOP CHECKLIST - NOTHING BY MOUTH SINCE
13-Aug-2023 23:30 23y old Male with PMH for recent vaccination and recent asthma dx presented with a chief complaint of Hemoptysis, dx with LLL lung nodule and post obstructive PNA on antibiotics scheduled for bronch/laser tomorrow 23y old Male with PMH for recent vaccination and recent asthma dx presented with a chief complaint of Hemoptysis, dx with LLL lung nodule and PNA on antibiotics scheduled for bronch/laser tomorrow

## 2023-08-14 NOTE — ASU DISCHARGE PLAN (ADULT/PEDIATRIC) - ASU DC SPECIAL INSTRUCTIONSFT
Patient may resume regular activity and diet as tolerated. Limit heavy lifting or strenuous activity for 4-6wks. Patient may remove dressings but leave skin tape in place, they will fall off on their own after multiple showers with warm soap and water. May take shower tomorrow but do not scrub wound. Please call and schedule follow up appointment as advised.

## 2023-08-14 NOTE — ASU DISCHARGE PLAN (ADULT/PEDIATRIC) - CARE PROVIDER_API CALL
Gume Manzo  Surgery  [address], [address]  [place] [address]  Phone: [phone]  Fax: [phone]  Follow Up Time: 2 weeks

## 2023-08-14 NOTE — ASU PATIENT PROFILE, ADULT - FALL HARM RISK - UNIVERSAL INTERVENTIONS
Bed in lowest position, wheels locked, appropriate side rails in place/Call bell, personal items and telephone in reach/Instruct patient to call for assistance before getting out of bed or chair/Non-slip footwear when patient is out of bed/[place] to call system/Physically safe environment - no spills, clutter or unnecessary equipment/Purposeful Proactive Rounding/Room/bathroom lighting operational, light cord in reach

## 2023-08-16 DIAGNOSIS — Z85.51 PERSONAL HISTORY OF MALIGNANT NEOPLASM OF BLADDER: ICD-10-CM

## 2023-08-16 DIAGNOSIS — Z79.4 LONG TERM (CURRENT) USE OF INSULIN: ICD-10-CM

## 2023-08-16 DIAGNOSIS — Z95.5 PRESENCE OF CORONARY ANGIOPLASTY IMPLANT AND GRAFT: ICD-10-CM

## 2023-08-16 DIAGNOSIS — Z79.02 LONG TERM (CURRENT) USE OF ANTITHROMBOTICS/ANTIPLATELETS: ICD-10-CM

## 2023-08-16 DIAGNOSIS — K40.30 UNILATERAL INGUINAL HERNIA, WITH OBSTRUCTION, WITHOUT GANGRENE, NOT SPECIFIED AS RECURRENT: ICD-10-CM

## 2023-08-16 DIAGNOSIS — I10 ESSENTIAL (PRIMARY) HYPERTENSION: ICD-10-CM

## 2023-08-16 DIAGNOSIS — E11.65 TYPE 2 DIABETES MELLITUS WITH HYPERGLYCEMIA: ICD-10-CM

## 2023-08-16 DIAGNOSIS — Z79.82 LONG TERM (CURRENT) USE OF ASPIRIN: ICD-10-CM

## 2023-08-16 DIAGNOSIS — Z79.84 LONG TERM (CURRENT) USE OF ORAL HYPOGLYCEMIC DRUGS: ICD-10-CM

## 2023-08-16 DIAGNOSIS — E11.51 TYPE 2 DIABETES MELLITUS WITH DIABETIC PERIPHERAL ANGIOPATHY WITHOUT GANGRENE: ICD-10-CM

## 2023-08-16 DIAGNOSIS — E11.42 TYPE 2 DIABETES MELLITUS WITH DIABETIC POLYNEUROPATHY: ICD-10-CM

## 2023-08-16 DIAGNOSIS — Z86.16 PERSONAL HISTORY OF COVID-19: ICD-10-CM

## 2023-08-16 DIAGNOSIS — Z85.828 PERSONAL HISTORY OF OTHER MALIGNANT NEOPLASM OF SKIN: ICD-10-CM

## 2023-08-16 DIAGNOSIS — E78.00 PURE HYPERCHOLESTEROLEMIA, UNSPECIFIED: ICD-10-CM

## 2023-08-16 DIAGNOSIS — I25.10 ATHEROSCLEROTIC HEART DISEASE OF NATIVE CORONARY ARTERY WITHOUT ANGINA PECTORIS: ICD-10-CM

## 2023-08-16 LAB — SURGICAL PATHOLOGY STUDY: SIGNIFICANT CHANGE UP

## 2023-09-24 ENCOUNTER — RX RENEWAL (OUTPATIENT)
Age: 68
End: 2023-09-24

## 2023-09-27 ENCOUNTER — APPOINTMENT (OUTPATIENT)
Dept: ENDOCRINOLOGY | Facility: CLINIC | Age: 68
End: 2023-09-27

## 2023-10-20 ENCOUNTER — RX RENEWAL (OUTPATIENT)
Age: 68
End: 2023-10-20

## 2023-10-20 RX ORDER — AMLODIPINE BESYLATE 5 MG/1
5 TABLET ORAL
Qty: 90 | Refills: 2 | Status: ACTIVE | COMMUNITY
Start: 2021-07-20 | End: 1900-01-01

## 2023-10-20 NOTE — PROGRESS NOTE ADULT - ASSESSMENT
Alert afebrile. Marked reduction in edema, erythema R foot. Medial 1st MTP wound / suture line in place, no bone exposed, !st interspace wound stage III with greyish base to webspace. PEnrose mobilized & removed in toto. I&D sites look clean to 3rd toe however scant cap fill to digit / erythema. NO pus or odor. Irrigate ALL wounds / 1:5 NS:Betadine Sol rinse / NS. Repack 1st interspace wound / sterile 4x4 soaked in NS. Apply DSD / 4x4 & ABDs & Afia 4". Advise off load. L eft foot no issue. Dr. Arndt's note, noted & appreciated. Pt resigned to go to REHAB at [place]. Discussed / Dr Lacey BC neg to date OR Cult Bacteroides Scant MRSA Strep. ABX as per ID. Advise daily wound care as above and complete course of ABX in rehab. Discussed with pt today & with his wife last PM AT [facility]. Will F/U here or in rehab. THX Double O-Z Flap Text: The defect edges were debeveled with a #15 scalpel blade.  Given the location of the defect, shape of the defect and the proximity to free margins a Double O-Z flap was deemed most appropriate.  Using a sterile surgical marker, an appropriate transposition flap was drawn incorporating the defect and placing the expected incisions within the relaxed skin tension lines where possible. The area thus outlined was incised deep to adipose tissue with a #15 scalpel blade.  The skin margins were undermined to an appropriate distance in all directions utilizing iris scissors.

## 2023-11-13 ENCOUNTER — RX RENEWAL (OUTPATIENT)
Age: 68
End: 2023-11-13

## 2023-11-24 ENCOUNTER — RX RENEWAL (OUTPATIENT)
Age: 68
End: 2023-11-24

## 2023-11-29 RX ORDER — INSULIN GLARGINE 100 [IU]/ML
100 INJECTION, SOLUTION SUBCUTANEOUS
Qty: 15 | Refills: 2 | Status: ACTIVE | COMMUNITY
Start: 2020-03-27 | End: 1900-01-01

## 2023-11-29 RX ORDER — INSULIN GLARGINE 100 [IU]/ML
100 INJECTION, SOLUTION SUBCUTANEOUS AT BEDTIME
Qty: 3 | Refills: 0 | Status: ACTIVE | COMMUNITY
Start: 2023-11-29 | End: 1900-01-01

## 2023-12-16 ENCOUNTER — TRANSCRIPTION ENCOUNTER (OUTPATIENT)
Age: 68
End: 2023-12-16

## 2023-12-16 DIAGNOSIS — Z20.818 CONTACT WITH AND (SUSPECTED) EXPOSURE TO OTHER BACTERIAL COMMUNICABLE DISEASES: ICD-10-CM

## 2023-12-16 RX ORDER — AMOXICILLIN 500 MG/1
500 TABLET, FILM COATED ORAL
Qty: 20 | Refills: 0 | Status: ACTIVE | COMMUNITY
Start: 2023-12-16 | End: 1900-01-01

## 2024-01-01 NOTE — ASU PATIENT PROFILE, ADULT - INTERNATIONAL TRAVEL
COMMENTS:   Infant remains on ferrous sulfate supplementation. Most recent hematocrit (7/29) 39.3%.     PLAN:  - Continue ferrous sulfate therapy  - Repeat hematology labs in 1 month or prior to discharge   No

## 2024-01-07 NOTE — H&P PST ADULT - MUSCULOSKELETAL COMMENTS
INPATIENT DAILY PROGRESS NOTE  Murali Ly is a 45 year old male at Hospital  LOS: 6 days     This is Nephrology follow up for ESRD     Admitting Provider: Kevin Salinas MD      Assessment:   Overall condition: stable    45 year old male with  ESRD on hemodialysis QMWF, type I diabetes, coronary artery disease  presenting with right toe pain. Rt foot wound s/p BKA on Friday      ESRD:    pts cut his treatment short on Friday   Had HD Saturday   Pts stated that he is on TTS as OP   Next HD will be Tuesday      Anemia of CKD:  on epo     Dialysis access: arm AVF      Medications are reviewed          Plan:     -- Dialysis per orders   --Strict I/O   --Daily weight   --Dose meds to Cr CL < 10 on dialysis   --Renal diet   --Fluid restriction   --Protein supplements        Subjective:   Interval History:   No new complaints tolerated HD yesterday     Pertinent last 24 hours events & nursing notes are reviewed    I reviewed the patient's new clinical lab test results.      Current Facility-Administered Medications   Medication    NIFEdipine XL (PROCARDIA XL) ER tablet 120 mg    epoetin nuria (EPOGEN,PROCRIT) 4000 UNIT/ML injection 4,000 Units    HYDROcodone-acetaminophen (NORCO) 7.5-325 MG per tablet 1 tablet    polyethylene glycol (MIRALAX) packet 17 g    lisinopril (ZESTRIL) tablet 40 mg    gabapentin (NEURONTIN) capsule 100 mg    sodium chloride 0.9% infusion    acetaminophen (TYLENOL) tablet 650 mg    sodium chloride (NORMAL SALINE) 0.9 % bolus 100-200 mL    sodium chloride 0.9 % injection 10 mL    ALPRAZolam (XANAX) tablet 0.25 mg    aspirin (ECOTRIN) enteric coated tablet 81 mg    atorvastatin (LIPITOR) tablet 80 mg    famotidine (PEPCID) tablet 20 mg    labetalol (NORMODYNE) tablet 300 mg    sertraline (ZOLOFT) tablet 50 mg    sodium chloride 0.9 % flush bag 25 mL    sodium chloride 0.9 % injection 2 mL    dextrose 50 % injection 25 g    dextrose 50 % injection 12.5 g    glucagon (GLUCAGEN) injection 1  mg    dextrose (GLUTOSE) 40 % gel 15 g    dextrose (GLUTOSE) 40 % gel 30 g    melatonin tablet 3 mg    polyethylene glycol (MIRALAX) packet 17 g    docusate sodium-sennosides (SENOKOT S) 50-8.6 MG 2 tablet    bisacodyl (DULCOLAX) suppository 10 mg    ondansetron (ZOFRAN ODT) disintegrating tablet 4 mg    Or    ondansetron (ZOFRAN) injection 4 mg    insulin lispro (ADMELOG,HumaLOG) - Correction Dose    insulin lispro (ADMELOG,HumaLOG) - Correction Dose       Patient Active Problem List   Diagnosis    Obesity (BMI 30-39.9)    Hyperglycemia due to type 2 diabetes mellitus (CMD)    HTN (hypertension)    Benign essential HTN    Other hyperlipidemia    Coronary artery disease involving native coronary artery of native heart without angina pectoris    Diastolic dysfunction    Hypertensive heart disease with chronic diastolic congestive heart failure (CMD)    NSTEMI (non-ST elevated myocardial infarction) (CMD)    S/P right coronary artery (RCA) stent placement    Type 2 diabetes mellitus with ESRD (end-stage renal disease) (CMD)    S/P CABG (coronary artery bypass graft)    Reduced vision    Diabetic ketoacidosis without coma associated with type 2 diabetes mellitus (CMD)    Hypertensive urgency    Sepsis, due to unspecified organism, unspecified whether acute organ dysfunction present (CMD)    ESRD (end stage renal disease) on dialysis (CMD)    Anxiety and depression    Diarrhea    Necrotic toes (CMD)    Abdominal pain    Peripheral artery disease (CMD)    ESRD (end stage renal disease) (CMD)    Nausea and vomiting, unspecified vomiting type    Epigastric pain    Gastritis    Hypertensive heart disease with chronic diastolic congestive heart failure (CMD)    Chronic osteomyelitis of right foot (CMD)    Type 2 diabetes mellitus with diabetic peripheral angiopathy and gangrene, with long-term current use of insulin (CMD)    Hypoxia    Hypervolemia, unspecified hypervolemia type    Generalized abdominal pain    Intractable  vomiting    Hyperkalemia    Hyperkalemia    Uremia    Coronary artery disease involving native coronary artery of native heart without angina pectoris    Hypertensive heart disease without heart failure    Anemia    Leukocytosis    Back pain due to injury    Ileus (CMD)    Generalized abdominal pain    Diabetic foot infection (CMD)    Coronary artery disease involving native coronary artery of native heart without angina pectoris    Hypertensive heart disease with chronic diastolic congestive heart failure (CMD)    Gastroesophageal reflux disease without esophagitis       Objective:   Physical Exam   Blood pressure (!) 165/95, pulse 76, temperature 97.9 °F (36.6 °C), temperature source Oral, resp. rate 16, height 6' (1.829 m), weight 103.6 kg (228 lb 6.3 oz), SpO2 98 %.  Gen: well developed, well Nourished male, appears as stated age, not in acute distress   Eyes:  pale, not jaundiced   Neck: supple, no tracheal deviation, No JVD  Chest: No chest wall tenderness  Lungs: No crackles or Rhonchi  Heart: Regular rate and rhythm, no rub   Abdomen: soft, not distended, no tenderness, no ascites  Musculoskeletal: no apparent joint effusion or deformity   Skin: warm, dry, no rash, no lesions    Extremities: decreased muscle mass        I&O:   Intake/Output Summary (Last 24 hours) at 1/7/2024 1340  Last data filed at 1/7/2024 0845  Gross per 24 hour   Intake 100 ml   Output 3375 ml   Net -3275 ml           Rad/Lab are reviewed in chart       136 95 32 128   4.2 29 5.02      18.7 8.1 509    25.5        Broderick Gaitan MD     [facility]  Herb Gaitan MD.   Dae Brambila MD    Answering service [phone]   no digits on Right LE

## 2024-01-20 ENCOUNTER — RX RENEWAL (OUTPATIENT)
Age: 69
End: 2024-01-20

## 2024-02-14 ENCOUNTER — RX RENEWAL (OUTPATIENT)
Age: 69
End: 2024-02-14

## 2024-03-10 NOTE — PROVIDER CONTACT NOTE (OTHER) - ASSESSMENT
CXR was done on 4/4/21 (this admission), placement verified. Tip of picc is in the SVC rt atrium. Patient/Caregiver provided printed discharge information.

## 2024-03-13 ENCOUNTER — RX RENEWAL (OUTPATIENT)
Age: 69
End: 2024-03-13

## 2024-03-15 RX ORDER — CLOPIDOGREL BISULFATE 75 MG/1
75 TABLET, FILM COATED ORAL
Qty: 90 | Refills: 1 | Status: ACTIVE | COMMUNITY
Start: 2021-09-28 | End: 1900-01-01

## 2024-03-25 PROBLEM — Z95.5 PRESENCE OF CORONARY ANGIOPLASTY IMPLANT AND GRAFT: Chronic | Status: ACTIVE | Noted: 2023-08-07

## 2024-03-25 PROBLEM — K40.90 UNILATERAL INGUINAL HERNIA, WITHOUT OBSTRUCTION OR GANGRENE, NOT SPECIFIED AS RECURRENT: Chronic | Status: ACTIVE | Noted: 2023-08-07

## 2024-04-04 ENCOUNTER — APPOINTMENT (OUTPATIENT)
Dept: ENDOCRINOLOGY | Facility: CLINIC | Age: 69
End: 2024-04-04
Payer: MEDICARE

## 2024-04-04 VITALS
OXYGEN SATURATION: 98 % | WEIGHT: 195 LBS | HEART RATE: 64 BPM | SYSTOLIC BLOOD PRESSURE: 124 MMHG | DIASTOLIC BLOOD PRESSURE: 76 MMHG | HEIGHT: 71 IN | BODY MASS INDEX: 27.3 KG/M2

## 2024-04-04 DIAGNOSIS — E11.9 TYPE 2 DIABETES MELLITUS W/OUT COMPLICATIONS: ICD-10-CM

## 2024-04-04 DIAGNOSIS — I10 ESSENTIAL (PRIMARY) HYPERTENSION: ICD-10-CM

## 2024-04-04 DIAGNOSIS — E78.00 PURE HYPERCHOLESTEROLEMIA, UNSPECIFIED: ICD-10-CM

## 2024-04-04 PROCEDURE — 99214 OFFICE O/P EST MOD 30 MIN: CPT

## 2024-04-04 PROCEDURE — 95251 CONT GLUC MNTR ANALYSIS I&R: CPT

## 2024-04-04 PROCEDURE — G2211 COMPLEX E/M VISIT ADD ON: CPT

## 2024-04-04 RX ORDER — FLASH GLUCOSE SENSOR
KIT MISCELLANEOUS
Qty: 6 | Refills: 3 | Status: ACTIVE | COMMUNITY
Start: 2024-04-04 | End: 1900-01-01

## 2024-04-04 RX ORDER — INSULIN LISPRO-AABC 100 [IU]/ML
100 INJECTION, SOLUTION SUBCUTANEOUS
Qty: 2 | Refills: 2 | Status: ACTIVE | COMMUNITY
Start: 2024-04-04 | End: 1900-01-01

## 2024-04-04 RX ORDER — FLASH GLUCOSE SCANNING READER
EACH MISCELLANEOUS
Qty: 1 | Refills: 0 | Status: ACTIVE | COMMUNITY
Start: 2024-04-04 | End: 1900-01-01

## 2024-04-04 NOTE — PHYSICAL EXAM
[Alert] : alert [Well Nourished] : well nourished [No Acute Distress] : no acute distress [Well Developed] : well developed [Normal Sclera/Conjunctiva] : normal sclera/conjunctiva [EOMI] : extra ocular movement intact [No Proptosis] : no proptosis [Normal Oropharynx] : the oropharynx was normal [Thyroid Not Enlarged] : the thyroid was not enlarged [No Thyroid Nodules] : no palpable thyroid nodules [No Respiratory Distress] : no respiratory distress [No Accessory Muscle Use] : no accessory muscle use [Clear to Auscultation] : lungs were clear to auscultation bilaterally [Normal S1, S2] : normal S1 and S2 [Normal Rate] : heart rate was normal [Regular Rhythm] : with a regular rhythm [No Edema] : no peripheral edema [Pedal Pulses Normal] : the pedal pulses are present [Normal Bowel Sounds] : normal bowel sounds [Not Tender] : non-tender [Not Distended] : not distended [Soft] : abdomen soft [Normal Anterior Cervical Nodes] : no anterior cervical lymphadenopathy [Normal Posterior Cervical Nodes] : no posterior cervical lymphadenopathy [No Spinal Tenderness] : no spinal tenderness [Spine Straight] : spine straight [No Stigmata of Cushings Syndrome] : no stigmata of Cushings Syndrome [Normal Gait] : normal gait [Normal Strength/Tone] : muscle strength and tone were normal [No Rash] : no rash [Acanthosis Nigricans] : no acanthosis nigricans [Right foot was examined, including] : right foot ~C was examined, including visual inspection with sensory and pulse exams [Left foot was examined, including] : left foot ~C was examined, including visual inspection with sensory and pulse exams [Normal] : normal [2+] : 2+ in the dorsalis pedis [Vibration Dec.] : normal vibratory sensation at the level of the toes [Position Sense Dec.] : diminished position sense at the level of the toes [#1 Diminished] : number 1 was diminished [#2 Diminished] : number 2 was diminished [#3 Diminished] : number 3 was diminished [#4 Diminished] : number 4 was diminished [#5 Diminished] : number 5 was diminished [#6 Diminished] : number 6 was diminished [#7 Diminished] : number 7 was diminished [#8 Diminished] : number 8 was diminished [#9 Diminished] : number 9 was diminished [#10 Diminished] : number 10 was diminished [Normal Reflexes] : deep tendon reflexes were 2+ and symmetric [No Tremors] : no tremors [Oriented x3] : oriented to person, place, and time [FreeTextEntry2] : s/p metatarsal amputation [FreeTextEntry6] : s/p 2nd toe amputation

## 2024-04-04 NOTE — HISTORY OF PRESENT ILLNESS
[FreeTextEntry1] : Mr. BROWN HEATON  is a 68 year old male with past medical history of Diabetes Mellitus Type 2, HLD, CAD, HTN, PAD  who presents for follow-up of his diabetes.    POCT Glucose: mg/dL,  poct hga1c:   Diabetes first diagnosed: Over 7 years Type: 2  Current diabetic regimen: Metformin 1000 mg BID, Basaglar 50 units QHS Novolog 8-10 units but only after dinner Ozempic (had abdominal pain)  Other relevant medications: Norvasc Coreg Lipitor Losartan  Pt has freestyle wilfredo  AGP Report:  (1/6/24-4/4/24) High: 35% Target (): 48% Low: 1%  Hypoglycemia: only 1 episode   Diet: Breakfast: cereal, or eggs Lunch: deli sandwiches sometimes skip Dinner:deli sandwiches, chicken   Exercise: None  Urine micro: 56 mg/g 2/05/20 lipid profile: LDL 66 (7/2021), LDL 91 (6/2020) 117 (1/21/20) last hba1c:5.7% (7/2021),  8.9% (11/2020), 12.2% 1/21/20, 9.9% 8/2017, 10.6% 2/2015 eye exam: 2023 diabetic foot exam/podiatry: +neuropathy 8/2022

## 2024-04-04 NOTE — ASSESSMENT
[FreeTextEntry1] : 1. DM 2- uncontrolled, complicated Patient with cardiac history and amputations. Overall sugars are not at goal. Need to have tighter diabetes control.  Patient has been taking NovoLog more for coverage as opposed to premeals.  Reviewed over correct insulin intake.  Sugars are high  Cont Basaglar 50 units QHS  NovoLog not covered by insurance Start Lyumjev premeals Cont Metformin 1000 mg BID Upgrade to freestyle wilfredo 2 Cont diabetic diet Cont exercise optho UTD labs UTD foot exam UTD  2. HLD- stable Cont Atorvastatin

## 2024-04-09 RX ORDER — INSULIN ASPART INJECTION 100 [IU]/ML
100 INJECTION, SOLUTION SUBCUTANEOUS
Qty: 2 | Refills: 0 | Status: ACTIVE | COMMUNITY
Start: 2024-04-09 | End: 1900-01-01

## 2024-04-13 ENCOUNTER — RX RENEWAL (OUTPATIENT)
Age: 69
End: 2024-04-13

## 2024-04-17 LAB
ALBUMIN SERPL ELPH-MCNC: 4.6 G/DL
ALP BLD-CCNC: 82 U/L
ALT SERPL-CCNC: 21 U/L
ANION GAP SERPL CALC-SCNC: 14 MMOL/L
AST SERPL-CCNC: 14 U/L
BILIRUB SERPL-MCNC: 0.4 MG/DL
BUN SERPL-MCNC: 34 MG/DL
CALCIUM SERPL-MCNC: 9.7 MG/DL
CHLORIDE SERPL-SCNC: 104 MMOL/L
CHOLEST SERPL-MCNC: 202 MG/DL
CO2 SERPL-SCNC: 20 MMOL/L
CREAT SERPL-MCNC: 1.29 MG/DL
CREAT SPEC-SCNC: 114 MG/DL
EGFR: 60 ML/MIN/1.73M2
ESTIMATED AVERAGE GLUCOSE: 177 MG/DL
FOLATE SERPL-MCNC: 12.6 NG/ML
FRUCTOSAMINE SERPL-MCNC: 398 UMOL/L
GLUCOSE SERPL-MCNC: 124 MG/DL
HBA1C MFR BLD HPLC: 7.8 %
HCT VFR BLD CALC: 35.6 %
HDLC SERPL-MCNC: 46 MG/DL
HGB BLD-MCNC: 11.8 G/DL
LDLC SERPL CALC-MCNC: 116 MG/DL
MCHC RBC-ENTMCNC: 29.9 PG
MCHC RBC-ENTMCNC: 33.1 GM/DL
MCV RBC AUTO: 90.4 FL
MICROALBUMIN 24H UR DL<=1MG/L-MCNC: 20.3 MG/DL
MICROALBUMIN/CREAT 24H UR-RTO: 179 MG/G
NONHDLC SERPL-MCNC: 156 MG/DL
PLATELET # BLD AUTO: 253 K/UL
POTASSIUM SERPL-SCNC: 5.4 MMOL/L
PROT SERPL-MCNC: 6.9 G/DL
RBC # BLD: 3.94 M/UL
RBC # FLD: 13.7 %
SODIUM SERPL-SCNC: 139 MMOL/L
TRIGL SERPL-MCNC: 231 MG/DL
TSH SERPL-ACNC: 1.98 UIU/ML
VIT B12 SERPL-MCNC: 932 PG/ML
WBC # FLD AUTO: 6.17 K/UL

## 2024-05-02 ENCOUNTER — RX RENEWAL (OUTPATIENT)
Age: 69
End: 2024-05-02

## 2024-05-02 RX ORDER — METFORMIN HYDROCHLORIDE 1000 MG/1
1000 TABLET, COATED ORAL
Qty: 180 | Refills: 0 | Status: ACTIVE | COMMUNITY
Start: 2023-05-10 | End: 1900-01-01

## 2024-07-01 ENCOUNTER — RX RENEWAL (OUTPATIENT)
Age: 69
End: 2024-07-01

## 2024-07-03 DIAGNOSIS — E11.9 TYPE 2 DIABETES MELLITUS W/OUT COMPLICATIONS: ICD-10-CM

## 2024-07-08 RX ORDER — HUMAN INSULIN 100 [IU]/ML
100 INJECTION, SOLUTION SUBCUTANEOUS
Qty: 1 | Refills: 5 | Status: ACTIVE | COMMUNITY
Start: 2024-07-03

## 2024-07-12 ENCOUNTER — RX RENEWAL (OUTPATIENT)
Age: 69
End: 2024-07-12

## 2024-07-17 ENCOUNTER — APPOINTMENT (OUTPATIENT)
Dept: DERMATOLOGY | Facility: CLINIC | Age: 69
End: 2024-07-17

## 2024-07-19 ENCOUNTER — RX RENEWAL (OUTPATIENT)
Age: 69
End: 2024-07-19

## 2024-07-25 NOTE — PATIENT PROFILE ADULT - FALLEN IN THE PAST
Render Post-Care Instructions In Note?: no Lab: -[id number] Bill For Surgical Tray: yes Anticipated Plan (Based On Presumed Biopsy Results): The intent of today's procedure was to remove the entire atypical pigmented lesion in hopes that if the entire lesion is removed and any atypia is not significant, we can appropriately watch and monitor the site for recurrence. Lab Facility: 0 Hemostasis: Drysol Biopsy Method: Personna blade Anesthesia Type: 1% lidocaine with epinephrine Medical Necessity Clause: This procedure was medically necessary because the lesion that was treated was: Body Location Override (Optional - Billing Will Still Be Based On Selected Body Map Location If Applicable): right upper back Anesthesia Volume In Cc: 0.3 Billing Type: Third-Party Bill Medical Necessity Information: It is in your best interest to select a reason for this procedure from the list below. All of these items fulfill various CMS LCD requirements except the new and changing color options. Detail Level: Detailed Post-Care Instructions: I reviewed with the patient in detail post-care instructions. Patient is to keep the biopsy site dry overnight, and then apply Vaseline Path Notes (To The Dermatopathologist): check margins Depth Of Shave: dermis Size Of Lesion In Cm (Required): 0.4 Wound Care: Petrolatum Consent was obtained from the patient. The risks and benefits to therapy were discussed in detail. Specifically, the risks of infection, scarring, bleeding, prolonged wound healing, incomplete removal, allergy to anesthesia, nerve injury and recurrence were addressed. Prior to the procedure, the treatment site was clearly identified and confirmed by the patient. All components of Universal Protocol/PAUSE Rule completed. Notification Instructions: Patient will be notified of pathology results. However, patient instructed to call the office if not contacted within 2 weeks. no
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
## 2024-08-16 NOTE — PHYSICAL EXAM
[Alert] : alert [Well Nourished] : well nourished [No Acute Distress] : no acute distress [Well Developed] : well developed [Normal Sclera/Conjunctiva] : normal sclera/conjunctiva [EOMI] : extra ocular movement intact [No Proptosis] : no proptosis [Normal Oropharynx] : the oropharynx was normal [Thyroid Not Enlarged] : the thyroid was not enlarged [No Thyroid Nodules] : no palpable thyroid nodules [No Respiratory Distress] : no respiratory distress [No Accessory Muscle Use] : no accessory muscle use [Clear to Auscultation] : lungs were clear to auscultation bilaterally [Normal S1, S2] : normal S1 and S2 [Normal Rate] : heart rate was normal [Regular Rhythm] : with a regular rhythm [No Edema] : no peripheral edema [Pedal Pulses Normal] : the pedal pulses are present [Normal Bowel Sounds] : normal bowel sounds [Not Tender] : non-tender [Not Distended] : not distended [Soft] : abdomen soft [Normal Anterior Cervical Nodes] : no anterior cervical lymphadenopathy [No Spinal Tenderness] : no spinal tenderness [Spine Straight] : spine straight [No Stigmata of Cushings Syndrome] : no stigmata of Cushings Syndrome [Normal Gait] : normal gait [Normal Strength/Tone] : muscle strength and tone were normal [No Rash] : no rash [Acanthosis Nigricans] : no acanthosis nigricans [Right foot was examined, including] : right foot ~C was examined, including visual inspection with sensory and pulse exams [Left foot was examined, including] : left foot ~C was examined, including visual inspection with sensory and pulse exams [Normal] : normal [2+] : 2+ in the dorsalis pedis [Vibration Dec.] : normal vibratory sensation at the level of the toes [Position Sense Dec.] : diminished position sense at the level of the toes [#1 Diminished] : number 1 was diminished [#2 Diminished] : number 2 was diminished [#3 Diminished] : number 3 was diminished [#4 Diminished] : number 4 was diminished [#5 Diminished] : number 5 was diminished [#6 Diminished] : number 6 was diminished [#7 Diminished] : number 7 was diminished [#8 Diminished] : number 8 was diminished [#9 Diminished] : number 9 was diminished [#10 Diminished] : number 10 was diminished [Normal Reflexes] : deep tendon reflexes were 2+ and symmetric [No Tremors] : no tremors [Oriented x3] : oriented to person, place, and time [FreeTextEntry2] : s/p metatarsal amputation [FreeTextEntry6] : s/p 2nd toe amputation

## 2024-08-19 ENCOUNTER — APPOINTMENT (OUTPATIENT)
Dept: ENDOCRINOLOGY | Facility: CLINIC | Age: 69
End: 2024-08-19

## 2024-08-19 DIAGNOSIS — E11.65 TYPE 2 DIABETES MELLITUS WITH HYPERGLYCEMIA: ICD-10-CM

## 2024-08-19 DIAGNOSIS — Z79.4 TYPE 2 DIABETES MELLITUS WITH HYPERGLYCEMIA: ICD-10-CM

## 2024-09-03 NOTE — ED PROVIDER NOTE - CADM POA CENTRAL LINE
Call [facility] 
No

## 2024-09-26 ENCOUNTER — RX RENEWAL (OUTPATIENT)
Age: 69
End: 2024-09-26

## 2024-09-30 ENCOUNTER — RX RENEWAL (OUTPATIENT)
Age: 69
End: 2024-09-30

## 2024-11-25 ENCOUNTER — RX RENEWAL (OUTPATIENT)
Age: 69
End: 2024-11-25

## 2024-11-26 ENCOUNTER — RX RENEWAL (OUTPATIENT)
Age: 69
End: 2024-11-26

## 2024-12-11 ENCOUNTER — RX RENEWAL (OUTPATIENT)
Age: 69
End: 2024-12-11

## 2024-12-13 ENCOUNTER — NON-APPOINTMENT (OUTPATIENT)
Age: 69
End: 2024-12-13

## 2024-12-26 ENCOUNTER — APPOINTMENT (OUTPATIENT)
Dept: ENDOCRINOLOGY | Facility: CLINIC | Age: 69
End: 2024-12-26
Payer: MEDICARE

## 2024-12-26 VITALS
OXYGEN SATURATION: 99 % | WEIGHT: 197 LBS | BODY MASS INDEX: 27.58 KG/M2 | DIASTOLIC BLOOD PRESSURE: 80 MMHG | HEART RATE: 61 BPM | HEIGHT: 71 IN | SYSTOLIC BLOOD PRESSURE: 145 MMHG

## 2024-12-26 DIAGNOSIS — Z79.4 TYPE 2 DIABETES MELLITUS WITH HYPERGLYCEMIA: ICD-10-CM

## 2024-12-26 DIAGNOSIS — E78.00 PURE HYPERCHOLESTEROLEMIA, UNSPECIFIED: ICD-10-CM

## 2024-12-26 DIAGNOSIS — E11.65 TYPE 2 DIABETES MELLITUS WITH HYPERGLYCEMIA: ICD-10-CM

## 2024-12-26 PROCEDURE — 95251 CONT GLUC MNTR ANALYSIS I&R: CPT

## 2024-12-26 PROCEDURE — G2211 COMPLEX E/M VISIT ADD ON: CPT

## 2024-12-26 PROCEDURE — 83036 HEMOGLOBIN GLYCOSYLATED A1C: CPT | Mod: QW

## 2024-12-26 PROCEDURE — 99214 OFFICE O/P EST MOD 30 MIN: CPT

## 2024-12-26 NOTE — HISTORY OF PRESENT ILLNESS
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 SCARLETT WALTERS  is a 69 year old male with past medical history of Diabetes Mellitus Type 2, HLD, CAD, HTN, PAD  who presents for follow-up of his diabetes.    POCT Glucose: mg/dL,  poct hga1c:   Diabetes first diagnosed: Over 7 years Type: 2  Current diabetic regimen: Metformin 1000 mg BID, Basaglar 54 units QHS Lyumjev 8-11 units QD Novolog 8-10 units but only after dinner (stopped) Ozempic (had abdominal pain)  Other relevant medications: Norvasc Coreg Lipitor Losartan  Pt has freestyle дмитрий  AGP Report:  (9/28/24-12/26/24) High: 32% Target (): 43% Low: 1%  Hypoglycemia: only 1 episode   Diet: Breakfast: cereal, or eggs Lunch: deli sandwiches sometimes skip Dinner:deli sandwiches, chicken   Exercise: None  Urine micro: 56 mg/g 2/05/20 lipid profile: LDL 66 (7/2021), LDL 91 (6/2020) 117 (1/21/20) last hba1c:5.7% (7/2021),  8.9% (11/2020), 12.2% 1/21/20, 9.9% 8/2017, 10.6% 2/2015 eye exam: 2023 diabetic foot exam/podiatry: +neuropathy 4/2024

## 2024-12-26 NOTE — HISTORY OF PRESENT ILLNESS
[FreeTextEntry1] : Mr. SCARLETT WALTERS  is a 69 year old male with past medical history of Diabetes Mellitus Type 2, HLD, CAD, HTN, PAD  who presents for follow-up of his diabetes.    POCT Glucose: mg/dL,  poct hga1c:   Diabetes first diagnosed: Over 7 years Type: 2  Current diabetic regimen: Metformin 1000 mg BID, Basaglar 54 units QHS Lyumjev 8-11 units QD Novolog 8-10 units but only after dinner (stopped) Ozempic (had abdominal pain)  Other relevant medications: Norvasc Coreg Lipitor Losartan  Pt has freestyle дмитрий  AGP Report:  (9/28/24-12/26/24) High: 32% Target (): 43% Low: 1%  Hypoglycemia: only 1 episode   Diet: Breakfast: cereal, or eggs Lunch: deli sandwiches sometimes skip Dinner:deli sandwiches, chicken   Exercise: None  Urine micro: 56 mg/g 2/05/20 lipid profile: LDL 66 (7/2021), LDL 91 (6/2020) 117 (1/21/20) last hba1c:5.7% (7/2021),  8.9% (11/2020), 12.2% 1/21/20, 9.9% 8/2017, 10.6% 2/2015 eye exam: 2023 diabetic foot exam/podiatry: +neuropathy 4/2024

## 2024-12-26 NOTE — PHYSICAL EXAM
[Well Nourished] : well nourished [Normal Sclera/Conjunctiva] : normal sclera/conjunctiva [EOMI] : extra ocular movement intact [No Proptosis] : no proptosis [Normal Oropharynx] : the oropharynx was normal [Thyroid Not Enlarged] : the thyroid was not enlarged [No Thyroid Nodules] : no palpable thyroid nodules [No Respiratory Distress] : no respiratory distress [No Accessory Muscle Use] : no accessory muscle use [Clear to Auscultation] : lungs were clear to auscultation bilaterally [Normal S1, S2] : normal S1 and S2 [Normal Rate] : heart rate was normal [Regular Rhythm] : with a regular rhythm [No Edema] : no peripheral edema [Pedal Pulses Normal] : the pedal pulses are present [Normal Bowel Sounds] : normal bowel sounds [Not Tender] : non-tender [Not Distended] : not distended [Soft] : abdomen soft [Normal Anterior Cervical Nodes] : no anterior cervical lymphadenopathy [No Spinal Tenderness] : no spinal tenderness [Spine Straight] : spine straight [No Stigmata of Cushings Syndrome] : no stigmata of Cushings Syndrome [Normal Gait] : normal gait [Normal Strength/Tone] : muscle strength and tone were normal [Acanthosis Nigricans] : no acanthosis nigricans [Right foot was examined, including] : right foot ~C was examined, including visual inspection with sensory and pulse exams [Left foot was examined, including] : left foot ~C was examined, including visual inspection with sensory and pulse exams [Normal] : normal [2+] : 2+ in the dorsalis pedis [Vibration Dec.] : normal vibratory sensation at the level of the toes [Position Sense Dec.] : diminished position sense at the level of the toes [#1 Diminished] : number 1 was diminished [#2 Diminished] : number 2 was diminished [#3 Diminished] : number 3 was diminished [#4 Diminished] : number 4 was diminished [#5 Diminished] : number 5 was diminished [#6 Diminished] : number 6 was diminished [#7 Diminished] : number 7 was diminished [#8 Diminished] : number 8 was diminished [#9 Diminished] : number 9 was diminished [#10 Diminished] : number 10 was diminished [Normal Reflexes] : deep tendon reflexes were 2+ and symmetric [No Tremors] : no tremors [Alert] : alert [No Acute Distress] : no acute distress [Well Developed] : well developed [Normal Voice/Communication] : normal voice communication [No Rash] : no rash [Oriented x3] : oriented to person, place, and time [FreeTextEntry2] : s/p metatarsal amputation [FreeTextEntry6] : s/p 2nd toe amputation

## 2024-12-26 NOTE — ASSESSMENT
[FreeTextEntry1] : 1. DM 2- uncontrolled, complicated Patient with cardiac history and amputations. Overall sugars are not at goal. Need to have tighter diabetes control.  Patient has been taking NovoLog more for coverage as opposed to premeals.  Reviewed over correct insulin intake.  Sugars are high  Cont Basaglar 54 units QHS  Cont Lyumjev premeals Cont Metformin 1000 mg BID Upgrade to freestyle дмитрий 2 Cont diabetic diet Cont exercise optho UTD labs UTD foot exam UTD  2. HLD- stable Cont Atorvastatin

## 2024-12-27 LAB — HBA1C MFR BLD HPLC: 7.2

## 2025-01-10 ENCOUNTER — RX RENEWAL (OUTPATIENT)
Age: 70
End: 2025-01-10

## 2025-01-14 RX ORDER — INSULIN GLARGINE-YFGN 100 [IU]/ML
100 INJECTION, SOLUTION SUBCUTANEOUS AT BEDTIME
Qty: 6 | Refills: 2 | Status: ACTIVE | COMMUNITY
Start: 2025-01-14 | End: 1900-01-01

## 2025-01-29 ENCOUNTER — APPOINTMENT (OUTPATIENT)
Dept: VASCULAR SURGERY | Facility: CLINIC | Age: 70
End: 2025-01-29

## 2025-01-29 VITALS
OXYGEN SATURATION: 98 % | BODY MASS INDEX: 25.9 KG/M2 | SYSTOLIC BLOOD PRESSURE: 195 MMHG | HEIGHT: 71 IN | WEIGHT: 185 LBS | HEART RATE: 64 BPM | DIASTOLIC BLOOD PRESSURE: 90 MMHG

## 2025-01-29 DIAGNOSIS — E11.40 TYPE 2 DIABETES MELLITUS WITH DIABETIC NEUROPATHY, UNSPECIFIED: ICD-10-CM

## 2025-01-29 DIAGNOSIS — E11.9 TYPE 2 DIABETES MELLITUS W/OUT COMPLICATIONS: ICD-10-CM

## 2025-01-29 PROCEDURE — 93922 UPR/L XTREMITY ART 2 LEVELS: CPT

## 2025-01-29 PROCEDURE — 99203 OFFICE O/P NEW LOW 30 MIN: CPT

## 2025-01-29 NOTE — ASSESSMENT
[FreeTextEntry1] : 68yo male T2DM with new wound right TMA stump palpable pulses slight diminished waveform in AT/DP distribution unlikely significant I explained this to patient; wound likely DM/neurogenic in nature -continue close follow up and care with podiatry Dr. Cheema -follow up 2 months to reassess though I do not believe PAD is source of prolonged healing if healing minimal or slow with DM he may have skewed results of ARNULFO so will reassess in 2 months and possibly consider intervention v. further imaging  follow up 2 months

## 2025-01-29 NOTE — ED STATDOCS - CROS ED CONS ALL NEG
[facility]  AMBULATORY CLINIC VISIT    Chief Complaint   Patient presents with    Follow-up    Urinary Frequency     Medication did not help       Viri Epperson is a 58 year old female who presents for above.     ASSESSMENT/PLAN     Viri was seen today for follow-up and urinary frequency.    Diagnoses and all orders for this visit:    Urge incontinence of urine  Urgent continence did not improve with oxybutynin.  Given patient's continued symptoms, have advised consultation with urology.  Referral provided today.  Reassuringly a UA was checked last month and was normal.  Patient does not appear to be having any symptoms consistent with dysuria currently.  -     SERVICE TO UROLOGY    Encounter for screening mammogram for malignant neoplasm of breast  -     MAMMO SCREENING BILATERAL; Future    Essential (primary) hypertension  Patient reporting elevated blood pressure readings at home, despite normal reading here today.  Blood pressure was above goal at patient's last visit.  Given readings at home that have been elevated in the morning, would start a blood pressure medicine today.  Prescription sent for losartan 25 mg daily.  Patient advised on common side effects and recommended to let me know if she becomes dizzy upon standing.  -     losartan (COZAAR) 25 MG tablet; Take 1 tablet by mouth daily.    Dyspepsia  Starting omeprazole 20mg daily given continued symptoms of reflux and bloating.  H. pylori stool testing was negative at last visit.  Reminded to complete Cologuard for colon cancer screening.  -     omeprazole (PrilOSEC) 20 MG capsule; Take 1 capsule by mouth daily.    The patient will return in 2 months for cervical cancer screening.    Follow Up: Return in about 2 months (around 3/29/2025).     All questions answered. Patient understands and agrees with the plan of care.     SUBJECTIVE     Viri Epperson is a 58 year old female presents for Follow-up and Urinary Frequency (Medication did not help)        Urinary frequency  Oxybutynin not helpful when she was taking it  Denies burning with urination, reporting urge incontinence and frequency  Sometimes wetting her underwear  Denies sensation of cystocele    GERD  Triggered by certain foods, feeling bloated or tight  Feeling very gassy and feels pressure  Feels mostly triggered by meat such as red meat  Has never tried anything OTC    Checking BP at home and on occasion seeing systolics in 170s  No HA but sometimes feeling dizzy  Sometimes elevated in the AM    ROS: All other review of systems were assessed and are as in HPI or otherwise negative.     Past Medical Hx:  Past Medical History:   Diagnosis Date    Essential (primary) hypertension     High cholesterol        Past Surgical Hx:  History reviewed. No pertinent surgical history.    Current Medications:  Current Outpatient Medications   Medication Sig Dispense Refill    losartan (COZAAR) 25 MG tablet Take 1 tablet by mouth daily. 90 tablet 1    omeprazole (PrilOSEC) 20 MG capsule Take 1 capsule by mouth daily. 30 capsule 0    atorvastatin (LIPITOR) 10 MG tablet Take 1 tablet by mouth nightly. 90 tablet 1    oxybutynin (DITROPAN) 5 MG tablet Take 1 tablet by mouth 2 times daily. (Patient not taking: Reported on 1/29/2025) 30 tablet 0    fluticasone (FLONASE) 50 MCG/ACT nasal spray Spray 2 sprays in each nostril daily. (Patient not taking: Reported on 12/17/2024) 16 g 1    meclizine (ANTIVERT) 25 MG tablet Take 1 tablet by mouth 3 times daily as needed for Dizziness. (Patient not taking: Reported on 12/17/2024) 60 tablet 0     No current facility-administered medications for this visit.       Allergies:  ALLERGIES:  No Known Allergies    Family and social histories have been reviewed and updated as appropriate in the chart.    Depression Screen  More Data Synopsis  PHQ2/9 Numeric Score  More data may exist         1/29/2025 12/17/2024   PHQ 2/9 Numeric Score   Adult PHQ 2 Score 0 0   Adult PHQ 2  Interpretation No further screening needed No further screening needed      Details                     DEPRESSION ASSESSMENT/PLAN:  Depression screening is negative no further plan needed.       OBJECTIVE   Visit Vitals  /68 (BP Location: RUE - Right upper extremity, Patient Position: Sitting, Cuff Size: Regular)   Pulse 85   Temp 97.7 °F (36.5 °C) (Oral)   Resp 18   Ht 5' (1.524 m)   Wt 70.7 kg (155 lb 12.1 oz)   SpO2 97%   BMI 30.42 kg/m²     General: Alert, NAD  HEENT: NCAT. Anicteric sclerae.  Cardiovascular: Regular rate  Lungs: No increased respiratory effort  Skin: Without any rash on exposed skin.   Psych: Appropriate mood and affect.   Neuro: Alert and responding to commands appropriately.         Carmen Jeff MD  Family Medicine  [facility]       - - -

## 2025-02-07 ENCOUNTER — NON-APPOINTMENT (OUTPATIENT)
Age: 70
End: 2025-02-07

## 2025-02-07 ENCOUNTER — APPOINTMENT (OUTPATIENT)
Dept: CARDIOLOGY | Facility: CLINIC | Age: 70
End: 2025-02-07
Payer: MEDICARE

## 2025-02-07 VITALS
DIASTOLIC BLOOD PRESSURE: 70 MMHG | BODY MASS INDEX: 27.3 KG/M2 | HEIGHT: 71 IN | WEIGHT: 195 LBS | SYSTOLIC BLOOD PRESSURE: 154 MMHG | OXYGEN SATURATION: 100 % | HEART RATE: 63 BPM

## 2025-02-07 VITALS — SYSTOLIC BLOOD PRESSURE: 140 MMHG | DIASTOLIC BLOOD PRESSURE: 74 MMHG

## 2025-02-07 DIAGNOSIS — I25.10 ATHEROSCLEROTIC HEART DISEASE OF NATIVE CORONARY ARTERY W/OUT ANGINA PECTORIS: ICD-10-CM

## 2025-02-07 DIAGNOSIS — E78.00 PURE HYPERCHOLESTEROLEMIA, UNSPECIFIED: ICD-10-CM

## 2025-02-07 DIAGNOSIS — I42.9 CARDIOMYOPATHY, UNSPECIFIED: ICD-10-CM

## 2025-02-07 DIAGNOSIS — R60.0 LOCALIZED EDEMA: ICD-10-CM

## 2025-02-07 DIAGNOSIS — I10 ESSENTIAL (PRIMARY) HYPERTENSION: ICD-10-CM

## 2025-02-07 PROCEDURE — 99214 OFFICE O/P EST MOD 30 MIN: CPT

## 2025-02-07 PROCEDURE — G2211 COMPLEX E/M VISIT ADD ON: CPT

## 2025-02-07 PROCEDURE — 93000 ELECTROCARDIOGRAM COMPLETE: CPT

## 2025-02-07 RX ORDER — HYDRALAZINE HYDROCHLORIDE 25 MG/1
25 TABLET ORAL 3 TIMES DAILY
Qty: 270 | Refills: 1 | Status: ACTIVE | COMMUNITY
Start: 2025-02-07 | End: 1900-01-01

## 2025-02-07 NOTE — PHYSICAL EXAM
[No Acute Distress] : no acute distress [Normal S1, S2] : normal S1, S2 [Clear Lung Fields] : clear lung fields [Normal Gait] : normal gait [Alert and Oriented] : alert and oriented [Good Air Entry] : good air entry [de-identified] : Leg edema

## 2025-02-07 NOTE — HISTORY OF PRESENT ILLNESS
[FreeTextEntry1] : Walt Eubanks is a 69-year-old man with multiple medical problems, including coronary artery disease, proximal LAD stent (2/2021), ischemic cardiomyopathy (improved), hyperlipidemia, hypertension, diabetes mellitus, peripheral artery disease, right foot osteomyelitis, who returns for cardiac examination after a long absence--I have not seen him since March 2023.  He reports feeling well from a cardiac standpoint and his only complaint today is bilateral lower extremity edema--edema progressively worsens over the course of the day and is not present upon awakening in the morning.  He has no dyspnea or angina.  He reports good adherence with Rx.  * This visit was conducted as part of ongoing, longitudinal medical care for patient's chronic medical diagnoses and other issues.

## 2025-02-07 NOTE — DISCUSSION/SUMMARY
[With Me] : with me [___ Month(s)] : in [unfilled] month(s) [FreeTextEntry1] :  Hypertension: Blood pressure moderately elevated; I suspect amlodipine is contributing to edema although probably multifactorial.  Stop amlodipine; start hydralazine.  He will take hydralazine 25 mg twice daily for 1 week and then increase to 25 mg 3 times daily. I also recommended compression stockings and reduced sodium consumption. Echocardiogram to assess left and right heart functions.  Coronary artery disease: Stable; continue medical management with aspirin, clopidogrel, carvedilol, atorvastatin, and Ranexa.  Will recheck lipid panel with plans to titrate statin if LDL remains high.  Cardiomyopathy:   I recommend reassessing left ventricular function with echocardiography

## 2025-02-07 NOTE — CARDIOLOGY SUMMARY
[de-identified] : 2/7/2025. Sinus rhythm.  Inferior infarct.  Poor R wave progression. [de-identified] : 5/11/2023.  No ischemic ECG changes with regadenoson.  Medium sized moderate defects in the mid anterior, apical anterior, and apex that are fixed and suggestive of infarct.  Medium size, mild defect in the inferior wall that is partially reversible.  LVEF 52%.  Hypokinesis of the apical and apical anterior walls. [de-identified] : 4/25/2023. Left ventricular systolic function is normal with an ejection fraction visually estimated at 55 to 60 %. There is mild (grade 1) left ventricular diastolic dysfunction. Normal right ventricular size and function. Interatrial septum is aneurysmal. Patent foramen ovale by color flow Doppler. Mild mitral regurgitation. Mild pulmonic regurgitation. [de-identified] : Cardiac Cath: 2/3/21, New proximal LAD stenosis. Multiple small branch stenoses previously seen. LVEDP very elevated. Interventional Conclusions: Successful BREONNA of proximal LAD

## 2025-02-11 ENCOUNTER — APPOINTMENT (OUTPATIENT)
Dept: CARDIOLOGY | Facility: CLINIC | Age: 70
End: 2025-02-11

## 2025-02-12 ENCOUNTER — RESULT REVIEW (OUTPATIENT)
Age: 70
End: 2025-02-12

## 2025-02-14 ENCOUNTER — TRANSCRIPTION ENCOUNTER (OUTPATIENT)
Age: 70
End: 2025-02-14

## 2025-02-20 NOTE — ED STATDOCS - NS ED MD DISPO DIVISION
[FreeTextEntry1] : Ms. Ruiz is a 57 yo  who presents today as a referral from Dr. Solomon for surgical consultation.  Pt with h/o AUB, shakira-menopausal, experiencing menopausal symptoms ~55 (LMP ~24). Pt underwent suboptimal D&C hysteroscopy (25),  procedure limited by large uterine cavity size and multiple fibroids. Final pathology demonstrated fragments of endometrial polyp, as well as disordered proliferative endometrium w/ tubal metaplasia; negative for hyperplasia.  Given large size of uterus and bulk-symptoms, pt was recommended to undergo definitive surgical management with hysterectomy. Gyn Oncology consulted for surgical assistance given size of uterus.  Today, pt reports feeling relatively well. Reports no further episodes of bleeding since November. Tolerating PO w/o issue. Endorses regular bowel movements.  Of note, extremely elevated BP today. Denies HA/vis changes/CP/SOB. Pt reports no diagnosis of HTN. Strongly encouraged pt to go to hospital given hypertensive emergency and stroke risk.  Pelvic MRI (24) Uterus: Enlarged and anteverted, measuring 25.7 x 13.7 x 23cm; extends 14cm above the umbilicus to L2; Dominant right-sided intramural heterogeneous fibroid showing heterogenous appearance and heterogeneous enhancement and measures 12 x 13cm; Sarcomatous changes cannot be excluded. Near the fundus is an 8 x 6cm enhancing fibroid In the lower uterine segment posteriorly is a 6 x7 cm fibroid Along the left side are two fibroids measuring around 4cm in size. Several other smaller fibroids are seen Endometrium: Distorted due to multiple fibroids; There is a 6mm endometrium and an 8mm junctional zone Adnexa:  Right ovary: not visualized Left ovary: enlarged measuring 5.3 x 3.2 x 4cm w/ dominant 4cm cyst and an additional 25mm dominant follicle  HCM   Pap: 24, NILM, HPV neg Mammo: , wnl per pt C-scope: Never   Ob Hx:  Gyn Hx: Last menses 2024, has not yet gone a whole year w/o menses; +fibroids Med Hx: Obesity (BMI 36) Surg Hx: D&C Meds: None All: Meloxicam Fam Hx: None Soc Hx: Denies tobacco/EtOH/illicit drug use [facility]

## 2025-02-24 NOTE — ASU PATIENT PROFILE, ADULT - FALL HARM RISK - ATTEMPT OOB
[facility]    2025 3:29 PM    Assessment/Plan:    Patient presents today with supratherapeutic INR.    Assessment of patient findings and chart review: Pt questioned and confirmed correct dose. Pt has a bruise on her arm but it is reducing in size, and has been having shoulder pain. She was taking prednisone for shoulder pain beginning on 25 and took it for 6 days. She has been eating broccoli and cabbage nearly everyday since her last INR, but she denies all other changes.      Recommendation for patient's warfarin regimen: Hold dose today then decrease maintenance dose    Recommend repeat INR in 1 week  _________________________________________________________________    Mis Ca (81 y.o.) is followed by the [facility].    Anticoagulation Summary  As of 2025      INR goal:  2.0-3.0   TTR:  77.7% (2.3 y)   INR used for dosin.2 (2025)   Warfarin maintenance plan:  3 mg (6 mg x 0.5) every e, Th, Sat; 6 mg (6 mg x 1) all other days   Weekly warfarin total:  33 mg   Plan last modified:  Tino Mendiola, PharmD (7/15/2024)   Next INR check:  --   Target end date:  --    Indications    Paroxysmal atrial fibrillation [I48.0]  Long term (current) use of anticoagulants [Z79.01]                 Anticoagulation Episode Summary       INR check location:  --    Preferred lab:  --    Send INR reminders to:  [facility] COUMADIN MONITORING POOL    Comments:  [facility]          Anticoagulation Care Providers       Provider Role Specialty Phone number    Shamar Owens MD Responsible Electrophysiology [phone]                              
No

## 2025-03-07 ENCOUNTER — RX RENEWAL (OUTPATIENT)
Age: 70
End: 2025-03-07

## 2025-03-11 NOTE — ED ADULT NURSE NOTE - PAIN RATING/NUMBER SCALE (0-10): ACTIVITY
Pharmacy Antimicrobial Kinetic Dosing    Indication for Antimicrobials: SSTI/cellulitis of toes, PNA    Current Regimen of Each Antimicrobial:  Vancomycin Pharmacy to Dose, Start 3; Day # 10    Previous Antimicrobial Therapy:  Ceftriaxone 3/3-3/6   Cefepime 3/6  Metronidazole 3/6  Zosyn 3/6-3/9    Goal Level: Vancomycin -600    Date Dose & Interval Measured (mcg/mL) Predicted AUC 24-48 Predicted AUC 24,ss   3/1  500mg q12h 15.1 541 636   3/5  750mg q24h  14.2 427 427   3/10 750mg q24h  9.7 357 352     Significant Cultures:    Blood, paired: negative  3/6 Blood, paired: CoNS  bottles, prelim  3/6 Blood PCR: MRSE  3/6 MRSA, nares: negative (obtained after 8 days of vancomycin)   3/6 Respiratory, ET: few yeast  3/6 Blood, paired: pending    Labs:  Recent Labs     Units 25  0729 03/10/25  0352 25  2154 25  1750 25  1204 25  0419   CREATININE MG/DL  --  0.77 0.90 1.03*   < > 1.17*   BUN MG/DL  --  20 22* 23*   < > 30*   WBC K/uL 11.2* 13.2*  --   --   --  15.3*    < > = values in this interval not displayed.     Temp (24hrs), Av.9 °F (36.6 °C), Min:97.4 °F (36.3 °C), Max:98.6 °F (37 °C)    Conditions for Dosing Consideration: None    Creatinine Clearance (mL/min): Estimated Creatinine Clearance: 65 mL/min (based on SCr of 0.77 mg/dL).     Impression/Plan:   Continue vancomycin 750mg IV q24h   Predicted [id number] = 454, Predicted AUC24,ss = 462  Final dose of vancomycin tomorrow   BMP and CBC ordered per protocol  Antimicrobial stop date vancomycin extended until 3/12     Pharmacy will follow daily and adjust medications as appropriate for renal function and/or serum levels.    Thank you,  Jessica Mead, [facility]     0

## 2025-03-12 ENCOUNTER — RX RENEWAL (OUTPATIENT)
Age: 70
End: 2025-03-12

## 2025-03-13 ENCOUNTER — NON-APPOINTMENT (OUTPATIENT)
Age: 70
End: 2025-03-13

## 2025-03-17 ENCOUNTER — APPOINTMENT (OUTPATIENT)
Dept: INTERNAL MEDICINE | Facility: CLINIC | Age: 70
End: 2025-03-17
Payer: MEDICARE

## 2025-03-17 ENCOUNTER — LABORATORY RESULT (OUTPATIENT)
Age: 70
End: 2025-03-17

## 2025-03-17 VITALS
WEIGHT: 192 LBS | OXYGEN SATURATION: 98 % | HEIGHT: 71 IN | HEART RATE: 66 BPM | SYSTOLIC BLOOD PRESSURE: 110 MMHG | BODY MASS INDEX: 26.88 KG/M2 | RESPIRATION RATE: 16 BRPM | TEMPERATURE: 97.8 F | DIASTOLIC BLOOD PRESSURE: 64 MMHG

## 2025-03-17 DIAGNOSIS — I42.9 CARDIOMYOPATHY, UNSPECIFIED: ICD-10-CM

## 2025-03-17 DIAGNOSIS — E11.9 TYPE 2 DIABETES MELLITUS W/OUT COMPLICATIONS: ICD-10-CM

## 2025-03-17 DIAGNOSIS — R97.20 ELEVATED PROSTATE, SPECIFIC ANTIGEN [PSA]: ICD-10-CM

## 2025-03-17 DIAGNOSIS — M86.271 SUBACUTE OSTEOMYELITIS, RIGHT ANKLE AND FOOT: ICD-10-CM

## 2025-03-17 PROCEDURE — 99214 OFFICE O/P EST MOD 30 MIN: CPT

## 2025-03-17 NOTE — HISTORY OF PRESENT ILLNESS
[FreeTextEntry8] : Pt was hospitalized in February with osteomyelitis right foot. Hi has a PICC line and is receiving !V Meropenem Q12 hours at home,

## 2025-03-18 ENCOUNTER — APPOINTMENT (OUTPATIENT)
Dept: CARDIOLOGY | Facility: CLINIC | Age: 70
End: 2025-03-18
Payer: MEDICARE

## 2025-03-18 VITALS — DIASTOLIC BLOOD PRESSURE: 76 MMHG | SYSTOLIC BLOOD PRESSURE: 158 MMHG

## 2025-03-18 VITALS
DIASTOLIC BLOOD PRESSURE: 84 MMHG | HEIGHT: 71 IN | HEART RATE: 64 BPM | SYSTOLIC BLOOD PRESSURE: 148 MMHG | OXYGEN SATURATION: 98 % | WEIGHT: 188 LBS | BODY MASS INDEX: 26.32 KG/M2

## 2025-03-18 DIAGNOSIS — I10 ESSENTIAL (PRIMARY) HYPERTENSION: ICD-10-CM

## 2025-03-18 PROCEDURE — 99213 OFFICE O/P EST LOW 20 MIN: CPT

## 2025-03-18 PROCEDURE — G2211 COMPLEX E/M VISIT ADD ON: CPT

## 2025-03-18 RX ORDER — HYDRALAZINE HYDROCHLORIDE 25 MG/1
25 TABLET ORAL 3 TIMES DAILY
Refills: 0 | Status: ACTIVE | COMMUNITY
Start: 2025-03-18

## 2025-03-18 NOTE — REVIEW OF SYSTEMS
[Lower Ext Edema] : lower extremity edema [Under Stress] : under stress [SOB] : no shortness of breath [Dyspnea on exertion] : not dyspnea during exertion [Chest Discomfort] : no chest discomfort [Palpitations] : no palpitations

## 2025-03-18 NOTE — DISCUSSION/SUMMARY
[Hypertension] : hypertension [Medication Changes Per Orders] : Medication changes are as documented in orders [Low Sodium Diet] : low sodium diet [Patient] : the patient [FreeTextEntry1] : Recent elevated BP on hydralazine instead of amlodipine. LE edema improved. Advised to take additional hydralazine in am(50mg), and prn. Pt will monitor BP prior to taking higher dose.  He is scheduled to follow up in June.

## 2025-03-18 NOTE — CARDIOLOGY SUMMARY
[de-identified] : 2/7/2025. Sinus rhythm.  Inferior infarct.  Poor R wave progression. [de-identified] : 5/11/2023.  No ischemic ECG changes with regadenoson.  Medium sized moderate defects in the mid anterior, apical anterior, and apex that are fixed and suggestive of infarct.  Medium size, mild defect in the inferior wall that is partially reversible.  LVEF 52%.  Hypokinesis of the apical and apical anterior walls. [de-identified] : 2/12/2025. Left ventricular systolic function is normal with an ejection fraction visually estimated of 63%. Normal right ventricular size and function. LA mildly dilated. Mod mitral regurgitation. trace TR, AI, and pulmonic regurgitation. [de-identified] : Cardiac Cath: 2/3/21, New proximal LAD stenosis. Multiple small branch stenoses previously seen. LVEDP very elevated. Interventional Conclusions: Successful BREONNA of proximal LAD

## 2025-03-18 NOTE — PHYSICAL EXAM
[No Edema] : no edema [Normal] : alert and oriented, normal memory [de-identified] : R foot dressing intact

## 2025-03-18 NOTE — HISTORY OF PRESENT ILLNESS
[FreeTextEntry1] : Walt Eubanks is a 69-year-old man with multiple medical problems, including coronary artery disease, proximal LAD stent (2/2021), ischemic cardiomyopathy (improved), hyperlipidemia, hypertension, diabetes mellitus, peripheral artery disease, right foot osteomyelitis, who returns for cardiac examination after a long absence--I have not seen him since March 2023. Because of LE edema his amlodipine was changed to hydralzine. Repeat echo revealed normal LV function. Edema has improved. He had a debridement of R foot wound and is on IV abx. He has had some high BP readings with the home health nurse. The lowest BP he had with own machine is 138/79.  * This visit was conducted as part of ongoing, longitudinal medical care for patient's chronic medical diagnoses and other issues.

## 2025-03-21 ENCOUNTER — RX RENEWAL (OUTPATIENT)
Age: 70
End: 2025-03-21

## 2025-03-28 LAB
ALBUMIN SERPL ELPH-MCNC: 4.5 G/DL
ALP BLD-CCNC: 63 U/L
ALT SERPL-CCNC: 21 U/L
ANION GAP SERPL CALC-SCNC: 13 MMOL/L
APPEARANCE: CLEAR
AST SERPL-CCNC: 14 U/L
BILIRUB SERPL-MCNC: 0.4 MG/DL
BILIRUBIN URINE: NEGATIVE
BLOOD URINE: NEGATIVE
BUN SERPL-MCNC: 46 MG/DL
CALCIUM SERPL-MCNC: 9.6 MG/DL
CHLORIDE SERPL-SCNC: 105 MMOL/L
CHOLEST SERPL-MCNC: 165 MG/DL
CO2 SERPL-SCNC: 24 MMOL/L
COLOR: YELLOW
CREAT SERPL-MCNC: 1.59 MG/DL
EGFRCR SERPLBLD CKD-EPI 2021: 46 ML/MIN/1.73M2
ESTIMATED AVERAGE GLUCOSE: 137 MG/DL
FOLATE SERPL-MCNC: 11 NG/ML
GLUCOSE QUALITATIVE U: 100 MG/DL
GLUCOSE SERPL-MCNC: 152 MG/DL
HBA1C MFR BLD HPLC: 6.4 %
HCT VFR BLD CALC: 34.3 %
HDLC SERPL-MCNC: 41 MG/DL
HGB BLD-MCNC: 11.4 G/DL
KETONES URINE: ABNORMAL MG/DL
LDLC SERPL-MCNC: 91 MG/DL
LEUKOCYTE ESTERASE URINE: NEGATIVE
MCHC RBC-ENTMCNC: 30 PG
MCHC RBC-ENTMCNC: 33.2 G/DL
MCV RBC AUTO: 90.3 FL
NITRITE URINE: NEGATIVE
NONHDLC SERPL-MCNC: 124 MG/DL
PH URINE: 5
PLATELET # BLD AUTO: 207 K/UL
POTASSIUM SERPL-SCNC: 5.4 MMOL/L
PROT SERPL-MCNC: 6.4 G/DL
PROTEIN URINE: 30 MG/DL
PSA SERPL-MCNC: 3.5 NG/ML
RBC # BLD: 3.8 M/UL
RBC # FLD: 13.7 %
SODIUM SERPL-SCNC: 142 MMOL/L
SPECIFIC GRAVITY URINE: 1.03
TRIGL SERPL-MCNC: 193 MG/DL
TSH SERPL-ACNC: 1.6 UIU/ML
UROBILINOGEN URINE: 0.2 MG/DL
VIT B12 SERPL-MCNC: 406 PG/ML
WBC # FLD AUTO: 4.75 K/UL

## 2025-04-02 ENCOUNTER — APPOINTMENT (OUTPATIENT)
Dept: VASCULAR SURGERY | Facility: CLINIC | Age: 70
End: 2025-04-02
Payer: MEDICARE

## 2025-04-02 VITALS
HEIGHT: 71 IN | SYSTOLIC BLOOD PRESSURE: 130 MMHG | WEIGHT: 190 LBS | OXYGEN SATURATION: 98 % | HEART RATE: 67 BPM | DIASTOLIC BLOOD PRESSURE: 70 MMHG | BODY MASS INDEX: 26.6 KG/M2

## 2025-04-02 DIAGNOSIS — E11.9 TYPE 2 DIABETES MELLITUS W/OUT COMPLICATIONS: ICD-10-CM

## 2025-04-02 DIAGNOSIS — I73.9 PERIPHERAL VASCULAR DISEASE, UNSPECIFIED: ICD-10-CM

## 2025-04-02 DIAGNOSIS — S91.301A UNSPECIFIED OPEN WOUND, RIGHT FOOT, INITIAL ENCOUNTER: ICD-10-CM

## 2025-04-02 PROCEDURE — 99214 OFFICE O/P EST MOD 30 MIN: CPT

## 2025-04-02 NOTE — PHYSICAL EXAM
[2+] : right 2+ [Ankle Swelling (On Exam)] : present [Ankle Swelling Bilaterally] : bilaterally  [Ankle Swelling On The Right] : mild [Varicose Veins Of Lower Extremities] : not present [] : not present [de-identified] : well appearing [FreeTextEntry1] : nearly healed incision clean

## 2025-04-02 NOTE — HISTORY OF PRESENT ILLNESS
[FreeTextEntry1] : 69-year-old man with multiple medical problems, including coronary artery disease, proximal LAD stent (2/2021), ischemic cardiomyopathy (improved), hyperlipidemia, hypertension, diabetes mellitus, peripheral artery disease, right foot osteomyelitis here wirh wound developed on right TMA; follows closely with Dr. Cheema from podiatry however was lost to follow up in some time. He thinks wound may be from footwear. Dose get some swelling in legs throughout the day no claudication rest pain [de-identified] : since last visit wound on RLE nearly healed no claudication no rest pain and no fever or chills

## 2025-04-02 NOTE — ASSESSMENT
[FreeTextEntry1] : 68yo male T2DM with new wound right TMA stump palpable pulses slight diminished waveform in AT/DP distribution unlikely significant I explained this to patient; wound likely DM/neurogenic in nature; improved significantly since last visit  -continue wound care and close follow up with DR. Cheema -protective footwear -follow up in 6 months H&P for PAD

## 2025-04-07 ENCOUNTER — RX RENEWAL (OUTPATIENT)
Age: 70
End: 2025-04-07

## 2025-04-15 ENCOUNTER — RX RENEWAL (OUTPATIENT)
Age: 70
End: 2025-04-15

## 2025-04-15 DIAGNOSIS — E11.9 TYPE 2 DIABETES MELLITUS W/OUT COMPLICATIONS: ICD-10-CM

## 2025-04-16 RX ORDER — INSULIN DEGLUDEC 100 U/ML
100 INJECTION, SOLUTION SUBCUTANEOUS
Qty: 3 | Refills: 1 | Status: COMPLETED | COMMUNITY
Start: 2025-04-15 | End: 2025-04-16

## 2025-05-08 ENCOUNTER — APPOINTMENT (OUTPATIENT)
Dept: ENDOCRINOLOGY | Facility: CLINIC | Age: 70
End: 2025-05-08
Payer: MEDICARE

## 2025-05-08 VITALS
BODY MASS INDEX: 27.3 KG/M2 | HEIGHT: 71 IN | WEIGHT: 195 LBS | HEART RATE: 68 BPM | DIASTOLIC BLOOD PRESSURE: 80 MMHG | SYSTOLIC BLOOD PRESSURE: 120 MMHG | OXYGEN SATURATION: 98 %

## 2025-05-08 DIAGNOSIS — E11.9 TYPE 2 DIABETES MELLITUS W/OUT COMPLICATIONS: ICD-10-CM

## 2025-05-08 DIAGNOSIS — E78.00 PURE HYPERCHOLESTEROLEMIA, UNSPECIFIED: ICD-10-CM

## 2025-05-08 PROCEDURE — 95251 CONT GLUC MNTR ANALYSIS I&R: CPT

## 2025-05-08 PROCEDURE — G2211 COMPLEX E/M VISIT ADD ON: CPT

## 2025-05-08 PROCEDURE — 99214 OFFICE O/P EST MOD 30 MIN: CPT

## 2025-05-08 NOTE — HISTORY OF PRESENT ILLNESS
[FreeTextEntry1] : Mr. SCARLETT WALTERS  is a 69 year old male with past medical history of Diabetes Mellitus Type 2, HLD, CAD s/p BREONNA, HTN, PAD s/p right transmetatarsal, and left partial metatarsal who presents for follow-up of his diabetes. Patient had another diabetic foot infection and had to be debrided. He has been less active.   POCT Glucose: mg/dL,  poct hga1c:   Diabetes first diagnosed: Over 7 years Type: 2  Current diabetic regimen: Metformin 1000 mg BID, Basaglar 54 units QHS Lyumjev 8-11 units QD Novolog 8-10 units but only after dinner (stopped) Ozempic (had abdominal pain)  Other relevant medications: Norvasc Coreg Lipitor Losartan  Pt has freestyle дмитрий  AGP Report:  (2/8/25-5/8/25) High: 20% Target (): 74% Low: 1%  Hypoglycemia: only 1 episode   Diet: Breakfast: cereal, or eggs Lunch: deli sandwiches sometimes skip Dinner:deli sandwiches, chicken   Exercise: None  Urine micro: 56 mg/g 2/05/20 lipid profile: LDL 66 (7/2021), LDL 91 (6/2020) 117 (1/21/20) last hba1c:5.7% (7/2021),  8.9% (11/2020), 12.2% 1/21/20, 9.9% 8/2017, 10.6% 2/2015 eye exam: 2024 diabetic foot exam/podiatry: +neuropathy 4/2024

## 2025-05-08 NOTE — ASSESSMENT
[FreeTextEntry1] : 1. DM 2- uncontrolled, complicated Patient with cardiac history and amputations. Overall sugars are not at goal. Need to have tighter diabetes control.  Patient has been taking NovoLog more for coverage as opposed to premeals.  Reviewed over correct insulin intake.   Cont Basaglar 54 units QHS  Cont Lyumjev premeals Cont Metformin 1000 mg BID Upgrade to freestyle дмитрий 2 Cont diabetic diet Cont exercise optho UTD labs UTD foot exam UTD  2. HLD- stable Cont Atorvastatin

## 2025-05-30 ENCOUNTER — RX RENEWAL (OUTPATIENT)
Age: 70
End: 2025-05-30

## 2025-06-26 ENCOUNTER — APPOINTMENT (OUTPATIENT)
Dept: CARDIOLOGY | Facility: CLINIC | Age: 70
End: 2025-06-26
Payer: MEDICARE

## 2025-06-26 VITALS
OXYGEN SATURATION: 98 % | DIASTOLIC BLOOD PRESSURE: 70 MMHG | WEIGHT: 197 LBS | HEART RATE: 69 BPM | BODY MASS INDEX: 27.58 KG/M2 | SYSTOLIC BLOOD PRESSURE: 128 MMHG | HEIGHT: 71 IN

## 2025-06-26 VITALS — SYSTOLIC BLOOD PRESSURE: 150 MMHG | DIASTOLIC BLOOD PRESSURE: 80 MMHG

## 2025-06-26 PROCEDURE — G2211 COMPLEX E/M VISIT ADD ON: CPT

## 2025-06-26 PROCEDURE — 93000 ELECTROCARDIOGRAM COMPLETE: CPT

## 2025-06-26 PROCEDURE — 99214 OFFICE O/P EST MOD 30 MIN: CPT

## 2025-06-26 NOTE — REVIEW OF SYSTEMS
[Under Stress] : under stress [Feeling Fatigued] : feeling fatigued [Chest Discomfort] : no chest discomfort [Lower Ext Edema] : no extremity edema [Leg Claudication] : no intermittent leg claudication [Palpitations] : no palpitations [FreeTextEntry2] : Recent weight stable

## 2025-06-26 NOTE — DISCUSSION/SUMMARY
[With Me] : with me [___ Month(s)] : in [unfilled] month(s) [FreeTextEntry1] :  Hypertension: Suboptimal control--he did not increase hydralazine as recommended at last visit; he will make Rx change effective today.   Coronary artery disease: Stable; no angina; ECG similar to prior tracing; continue medical management with aspirin, clopidogrel, carvedilol, atorvastatin, and Ranexa.   Cardiomyopathy: Resolved; LV function is normal.  Mitral valve regurgitation: Moderate in severity; similar to previous imaging.  Continued monitoring planned.  Hyperlipidemia: LDL is not at goal; increase atorvastatin to 80 mg daily and recheck lipids prior to next visit.

## 2025-06-26 NOTE — PHYSICAL EXAM
[No Acute Distress] : no acute distress [Normal S1, S2] : normal S1, S2 [Clear Lung Fields] : clear lung fields [Normal Gait] : normal gait [Alert and Oriented] : alert and oriented [No Edema] : no edema [de-identified] : Overweight [de-identified] : Systolic murmur

## 2025-06-26 NOTE — HISTORY OF PRESENT ILLNESS
[FreeTextEntry1] : Walt Eubanks is a 69-year-old man with multiple medical problems, including coronary artery disease, proximal LAD stent (2/2021), ischemic cardiomyopathy (improved), hyperlipidemia, hypertension, diabetes mellitus, peripheral artery disease, right foot osteomyelitis, who returns for cardiac examination.  He rescheduled an earlier appointment to see me on June 11, 2025.  Lipids were not at goal when checked in March 2025 (LDL 91, triglyceride 193).  He has been feeling well from a cardiovascular standpoint.  He has no angina.  He has been tolerating all Rx but never increased dose of hydralazine as instructed during his last office visit with our nurse practitioner - says he forgot.  * This visit was conducted as part of ongoing, longitudinal medical care for patient's chronic medical diagnoses and other issues.

## 2025-06-26 NOTE — CARDIOLOGY SUMMARY
[de-identified] : 6/26/2025. Sinus rhythm.  Inferior infarct.  Poor R wave progression. [de-identified] : 5/11/2023.  No ischemic ECG changes with regadenoson.  Medium sized moderate defects in the mid anterior, apical anterior, and apex that are fixed and suggestive of infarct.  Medium size, mild defect in the inferior wall that is partially reversible.  LVEF 52%.  Hypokinesis of the apical and apical anterior walls. [de-identified] : 2/12/2025.  Normal left ventricular size and function with ejection fraction 63%.  Normal right ventricular size and function.  Mild left atrial enlargement.  Moderate mitral regurgitation. [de-identified] : Cardiac Cath: 2/3/21, New proximal LAD stenosis. Multiple small branch stenoses previously seen. LVEDP very elevated. Interventional Conclusions: Successful BREONNA of proximal LAD

## 2025-08-06 ENCOUNTER — RX RENEWAL (OUTPATIENT)
Age: 70
End: 2025-08-06

## 2025-08-06 RX ORDER — HYDRALAZINE HYDROCHLORIDE 50 MG/1
50 TABLET ORAL 3 TIMES DAILY
Qty: 270 | Refills: 1 | Status: ACTIVE | COMMUNITY
Start: 2025-08-06 | End: 1900-01-01

## 2025-08-12 ENCOUNTER — APPOINTMENT (OUTPATIENT)
Dept: INTERNAL MEDICINE | Facility: CLINIC | Age: 70
End: 2025-08-12
Payer: MEDICARE

## 2025-08-12 ENCOUNTER — LABORATORY RESULT (OUTPATIENT)
Age: 70
End: 2025-08-12

## 2025-08-12 VITALS
TEMPERATURE: 97.9 F | HEIGHT: 71 IN | DIASTOLIC BLOOD PRESSURE: 82 MMHG | HEART RATE: 69 BPM | WEIGHT: 190 LBS | RESPIRATION RATE: 16 BRPM | OXYGEN SATURATION: 98 % | SYSTOLIC BLOOD PRESSURE: 158 MMHG | BODY MASS INDEX: 26.6 KG/M2
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
## 2025-08-12 DIAGNOSIS — E11.9 TYPE 2 DIABETES MELLITUS W/OUT COMPLICATIONS: ICD-10-CM

## 2025-08-12 DIAGNOSIS — R97.20 ELEVATED PROSTATE, SPECIFIC ANTIGEN [PSA]: ICD-10-CM

## 2025-08-12 DIAGNOSIS — I10 ESSENTIAL (PRIMARY) HYPERTENSION: ICD-10-CM

## 2025-08-12 DIAGNOSIS — E78.00 PURE HYPERCHOLESTEROLEMIA, UNSPECIFIED: ICD-10-CM

## 2025-08-12 PROCEDURE — 36415 COLL VENOUS BLD VENIPUNCTURE: CPT

## 2025-08-12 PROCEDURE — 99214 OFFICE O/P EST MOD 30 MIN: CPT

## 2025-08-12 RX ORDER — DOXYCYCLINE HYCLATE 100 MG/1
100 CAPSULE ORAL
Qty: 2 | Refills: 0 | Status: ACTIVE | COMMUNITY
Start: 2025-08-12 | End: 1900-01-01

## 2025-08-21 ENCOUNTER — NON-APPOINTMENT (OUTPATIENT)
Age: 70
End: 2025-08-21

## 2025-08-21 LAB
A PHAGOCYTOPH IGG TITR SER IF: NORMAL
ALBUMIN SERPL ELPH-MCNC: 4.4 G/DL
ALP BLD-CCNC: 58 U/L
ALT SERPL-CCNC: 27 U/L
ANION GAP SERPL CALC-SCNC: 14 MMOL/L
AST SERPL-CCNC: 19 U/L
B BURGDOR AB SER QL IA: 0.08 IV
B MICROTI IGG TITR SER: NORMAL
BILIRUB SERPL-MCNC: 0.4 MG/DL
BUN SERPL-MCNC: 39 MG/DL
CALCIUM SERPL-MCNC: 9.3 MG/DL
CHLORIDE SERPL-SCNC: 107 MMOL/L
CHOLEST SERPL-MCNC: 128 MG/DL
CO2 SERPL-SCNC: 20 MMOL/L
CREAT SERPL-MCNC: 1.24 MG/DL
E CHAFFEENSIS IGG TITR SER IF: NORMAL
EGFRCR SERPLBLD CKD-EPI 2021: 62 ML/MIN/1.73M2
ESTIMATED AVERAGE GLUCOSE: 146 MG/DL
FOLATE SERPL-MCNC: 10.1 NG/ML
GLUCOSE SERPL-MCNC: 185 MG/DL
HBA1C MFR BLD HPLC: 6.7 %
HCT VFR BLD CALC: 34.7 %
HDLC SERPL-MCNC: 42 MG/DL
HGB BLD-MCNC: 11.2 G/DL
LDLC SERPL-MCNC: 65 MG/DL
MCHC RBC-ENTMCNC: 30.6 PG
MCHC RBC-ENTMCNC: 32.3 G/DL
MCV RBC AUTO: 94.8 FL
NONHDLC SERPL-MCNC: 87 MG/DL
PLATELET # BLD AUTO: 224 K/UL
POTASSIUM SERPL-SCNC: 4.9 MMOL/L
PROT SERPL-MCNC: 6.3 G/DL
PSA SERPL-MCNC: 2.85 NG/ML
RBC # BLD: 3.66 M/UL
RBC # FLD: 14.2 %
SODIUM SERPL-SCNC: 142 MMOL/L
TRIGL SERPL-MCNC: 120 MG/DL
TSH SERPL-ACNC: 1.74 UIU/ML
VIT B12 SERPL-MCNC: 545 PG/ML
WBC # FLD AUTO: 6.14 K/UL

## 2025-08-27 ENCOUNTER — RX RENEWAL (OUTPATIENT)
Age: 70
End: 2025-08-27

## 2025-08-28 ENCOUNTER — APPOINTMENT (OUTPATIENT)
Dept: NEUROSURGERY | Facility: CLINIC | Age: 70
End: 2025-08-28
Payer: COMMERCIAL

## 2025-08-28 VITALS
OXYGEN SATURATION: 97 % | HEART RATE: 65 BPM | DIASTOLIC BLOOD PRESSURE: 74 MMHG | HEIGHT: 71 IN | WEIGHT: 190 LBS | BODY MASS INDEX: 26.6 KG/M2 | SYSTOLIC BLOOD PRESSURE: 174 MMHG

## 2025-08-28 DIAGNOSIS — M54.2 CERVICALGIA: ICD-10-CM

## 2025-08-28 DIAGNOSIS — M54.9 DORSALGIA, UNSPECIFIED: ICD-10-CM

## 2025-08-28 PROCEDURE — 99204 OFFICE O/P NEW MOD 45 MIN: CPT

## 2025-09-03 ENCOUNTER — APPOINTMENT (OUTPATIENT)
Dept: MRI IMAGING | Facility: CLINIC | Age: 70
End: 2025-09-03

## 2025-09-03 PROCEDURE — 72141 MRI NECK SPINE W/O DYE: CPT | Mod: 26

## 2025-09-03 PROCEDURE — 72148 MRI LUMBAR SPINE W/O DYE: CPT | Mod: 26
